# Patient Record
Sex: FEMALE | Race: BLACK OR AFRICAN AMERICAN | Employment: UNEMPLOYED | URBAN - METROPOLITAN AREA
[De-identification: names, ages, dates, MRNs, and addresses within clinical notes are randomized per-mention and may not be internally consistent; named-entity substitution may affect disease eponyms.]

---

## 2017-01-11 ENCOUNTER — NURSE NAVIGATOR (OUTPATIENT)
Dept: FAMILY MEDICINE CLINIC | Age: 42
End: 2017-01-11

## 2017-02-22 ENCOUNTER — OFFICE VISIT (OUTPATIENT)
Dept: FAMILY MEDICINE CLINIC | Age: 42
End: 2017-02-22

## 2017-02-22 VITALS
HEART RATE: 74 BPM | HEIGHT: 63 IN | DIASTOLIC BLOOD PRESSURE: 61 MMHG | SYSTOLIC BLOOD PRESSURE: 129 MMHG | RESPIRATION RATE: 12 BRPM | TEMPERATURE: 98.1 F | BODY MASS INDEX: 50.82 KG/M2 | WEIGHT: 286.8 LBS | OXYGEN SATURATION: 100 %

## 2017-02-22 DIAGNOSIS — K21.9 GASTROESOPHAGEAL REFLUX DISEASE WITHOUT ESOPHAGITIS: ICD-10-CM

## 2017-02-22 DIAGNOSIS — R10.11 RUQ ABDOMINAL PAIN: ICD-10-CM

## 2017-02-22 DIAGNOSIS — E66.01 OBESITY, MORBID, BMI 50 OR HIGHER (HCC): ICD-10-CM

## 2017-02-22 DIAGNOSIS — M54.6 CHRONIC RIGHT-SIDED THORACIC BACK PAIN: ICD-10-CM

## 2017-02-22 DIAGNOSIS — G89.29 CHRONIC RIGHT-SIDED THORACIC BACK PAIN: ICD-10-CM

## 2017-02-22 DIAGNOSIS — R07.9 CHEST PAIN, UNSPECIFIED TYPE: Primary | ICD-10-CM

## 2017-02-22 RX ORDER — OMEPRAZOLE 40 MG/1
40 CAPSULE, DELAYED RELEASE ORAL DAILY
Qty: 30 CAP | Refills: 5 | Status: SHIPPED | OUTPATIENT
Start: 2017-02-22 | End: 2018-05-02 | Stop reason: ALTCHOICE

## 2017-02-22 RX ORDER — PREGABALIN 100 MG/1
100 CAPSULE ORAL 2 TIMES DAILY
Qty: 60 CAP | Refills: 5 | Status: SHIPPED | OUTPATIENT
Start: 2017-02-22 | End: 2017-06-14 | Stop reason: DRUGHIGH

## 2017-02-22 RX ORDER — GABAPENTIN 300 MG/1
CAPSULE ORAL
COMMUNITY
Start: 2016-12-11 | End: 2017-02-22 | Stop reason: ALTCHOICE

## 2017-02-22 RX ORDER — METFORMIN HYDROCHLORIDE 500 MG/1
500 TABLET, EXTENDED RELEASE ORAL 2 TIMES DAILY WITH MEALS
Qty: 60 TAB | Refills: 5 | Status: SHIPPED | OUTPATIENT
Start: 2017-02-22 | End: 2017-09-11 | Stop reason: SDUPTHER

## 2017-02-22 NOTE — PROGRESS NOTES
HISTORY OF PRESENT ILLNESS  Belen Thakur is a 35 y.o. female. HPI  Chest Wall Pain  Patient presents for follow up of severe back and chest wall pain. Symptoms began 3 months ago and have remained unchanged. Pain describes pain as severe 10/10, stabbing of thoracic back radiating into lateral chest wall and RUQ abdomen. Patient reports associated pleurisy pain with pressure and dyspnea. Patient reports new onset of left arm radiculopathy. She denies injury. She has been seen in the ED on 11/27, 12/6 and 12/11. She has had CTA chest, CT abd/pelvis, Thoracic CT, Cervical CT, CXR, abdominal US all without contributing findings. Has been treated with Naproxen, Zithromax, Flexeril, Medrol, Gabapentin, Oxycodone and Diluadid without improvement of pain. Previously labs have shown elevated CRP at 21.7 and WBC at 13.1. Protein levels remain elevated. MRI of thoracic spine negative for discitis but showed severe DDD of thoracic and cervical spine. Patient referred to orthopedics and they recommended a gallbladder function study. MRI Results (most recent):    Results from [place] encounter on 12/20/16   MRI [facility] SPINE W WO CONT   Narrative INDICATION: Thoracic back pain. Elevated CRP. Fever-rule out discitis. EXAM: Sagittal and axial and coronal images were obtained through the thoracic  spine in varying sequences. Intravenous contrast was administered: 10 cc cc Magnevist.    FINDINGS:    Comparison exam:  None are available. Sagittal images demonstrate significant multilevel degenerative disc disease  involving the cervical spine with probably some spinal stenosis at the C5-C6 and  C6-C7 levels. There is normal thoracic spinal alignment but severe multilevel  degenerative disc disease. STIR sequences demonstrate    The thoracic spinal cord    Axial images demonstrate demonstrate the presence of a moderate disc bulge at  T1-T2 with mild central canal narrowing.      There is a tiny central and left-sided protrusion at T8-T9. A moderate central and left-sided protrusion is present at T9-T10. This appears  to abut the thoracic cord at this level. A mild to moderate disc bulge is present at T10-T11. A mild disc bulge with a superimposed tiny right foraminal protrusion is present  at T12-L1. Postcontrast images demonstrate no specific enhancement of disc or vertebra per  se. There is questionable slight enhancement of the superior aspect of T8-not  definite finding. Impression IMPRESSION:    Severe multilevel degenerative disc disease involving the visualized cervical  spine with some evidence suggesting spinal stenosis at C5-C6 and C6-C7 levels. Cervical spine MRI may be appropriate if indicated clinically. Severe multilevel degenerative disc disease involving the thoracic spine. Multilevel tiny protrusions and disc bulges most significantly at T9-T10 with a  moderate central and left-sided protrusion appearing to abut the thoracic cord. No definite evidence for discitis. Question slight enhancement along what appears to represent the superior aspect  of the T8 vertebra-not a definite finding. Follow-up examination at an interval may be reasonable if indicated clinically. Review of Systems   Constitutional: Negative for chills, malaise/fatigue and weight loss. Respiratory: Negative for shortness of breath. Cardiovascular: Positive for chest pain. Negative for palpitations and leg swelling. Gastrointestinal: Positive for abdominal pain (RUQ) and heartburn. Negative for constipation and diarrhea. Musculoskeletal: Positive for back pain. Negative for joint pain, myalgias and neck pain. Neurological: Negative for dizziness, weakness and headaches. Psychiatric/Behavioral: Negative for depression. Physical Exam   Constitutional: She is oriented to person, place, and time. She appears well-developed and well-nourished. No distress.    Neck: Normal range of motion. Neck supple. No JVD present. Carotid bruit is not present. No thyromegaly present. Cardiovascular: Normal rate, regular rhythm and intact distal pulses. Exam reveals no gallop and no friction rub. No murmur heard. Pulmonary/Chest: Effort normal and breath sounds normal. No respiratory distress. Musculoskeletal: She exhibits no edema. Thoracic back: She exhibits decreased range of motion, tenderness and bony tenderness (T7-T10 spinal processes). She exhibits no swelling and no spasm. Lymphadenopathy:     She has no cervical adenopathy. Neurological: She is alert and oriented to person, place, and time. Psychiatric: She has a normal mood and affect. Her behavior is normal.   Nursing note and vitals reviewed. ASSESSMENT and PLAN  Haleigh Holliday was seen today for chest pain. Diagnoses and all orders for this visit:    Chest pain, unspecified type  Atypical symptoms and no acute changes on EKG, but given risk factors will request cardiology evaluation. Go to ER for new or worsening symptoms  -     AMB POC EKG ROUTINE W/ 12 LEADS, INTER & REP  -     REFERRAL TO CARDIOLOGY    Chronic right-sided thoracic back pain  Failure with multiple medications including Gabapentin. Begin Lyrica. -     pregabalin (LYRICA) 100 mg capsule; Take 1 Cap by mouth two (2) times a day. Max Daily Amount: 200 mg.    RUQ abdominal pain  R/o biliary dysfunction. -     NM HEPATOBILIARY DUCT SCAN; Future    Obesity, morbid, BMI 50 or higher ([address]  Discussed candidly with patient that the majority of her problems have arisen from her obesity. I recommend she pursue bariatric surgery. I will write a letter of medical necessity as needed. -     REFERRAL TO BARIATRIC SURGERY    Uncontrolled type 2 diabetes mellitus without complication, without long-term current use of insulin ([address]  Unable to take Janumet due to pill size. Begin Metformin XR.   -     metFORMIN ER (GLUCOPHAGE XR) 500 mg tablet;  Take 1 Tab by mouth two (2) times daily (with meals). Gastroesophageal reflux disease without esophagitis  -     Refill omeprazole (PRILOSEC) 40 mg capsule; Take 1 Cap by mouth daily. For GERD      I have discussed the diagnosis with the patient and the intended plan as seen in the above orders. The patient has received an after-visit summary along with patient information handout. I have discussed medication side effects and warnings with the patient as well. Follow-up Disposition:  Return in about 4 weeks (around 3/22/2017) for diabetes.

## 2017-02-22 NOTE — PATIENT INSTRUCTIONS
Back Pain: Care Instructions  Your Care Instructions    Back pain has many possible causes. It is often related to problems with muscles and ligaments of the back. It may also be related to problems with the nerves, discs, or bones of the back. Moving, lifting, standing, sitting, or sleeping in an awkward way can strain the back. Sometimes you don't notice the injury until later. Arthritis is another common cause of back pain. Although it may hurt a lot, back pain usually improves on its own within several weeks. Most people recover in 12 weeks or less. Using good home treatment and being careful not to stress your back can help you feel better sooner. Follow-up care is a key part of your treatment and safety. Be sure to make and go to all appointments, and call your doctor if you are having problems. Its also a good idea to know your test results and keep a list of the medicines you take. How can you care for yourself at home? · Sit or lie in positions that are most comfortable and reduce your pain. Try one of these positions when you lie down:  ¨ Lie on your back with your knees bent and supported by large pillows. ¨ Lie on the floor with your legs on the seat of a sofa or chair. Josephus Phlegm on your side with your knees and hips bent and a pillow between your legs. ¨ Lie on your stomach if it does not make pain worse. · Do not sit up in bed, and avoid soft couches and twisted positions. Bed rest can help relieve pain at first, but it delays healing. Avoid bed rest after the first day of back pain. · Change positions every 30 minutes. If you must sit for long periods of time, take breaks from sitting. Get up and walk around, or lie in a comfortable position. · Try using a heating pad on a low or medium setting for 15 to 20 minutes every 2 or 3 hours. Try a warm shower in place of one session with the heating pad. · You can also try an ice pack for 10 to 15 minutes every 2 to 3 hours.  Put a thin cloth between the ice pack and your skin. · Take pain medicines exactly as directed. ¨ If the doctor gave you a prescription medicine for pain, take it as prescribed. ¨ If you are not taking a prescription pain medicine, ask your doctor if you can take an over-the-counter medicine. · Take short walks several times a day. You can start with 5 to 10 minutes, 3 or 4 times a day, and work up to longer walks. Walk on level surfaces and avoid hills and stairs until your back is better. · Return to work and other activities as soon as you can. Continued rest without activity is usually not good for your back. · To prevent future back pain, do exercises to stretch and strengthen your back and stomach. Learn how to use good posture, safe lifting techniques, and proper body mechanics. When should you call for help? Call your doctor now or seek immediate medical care if:  · You have new or worsening numbness in your legs. · You have new or worsening weakness in your legs. (This could make it hard to stand up.)  · You lose control of your bladder or bowels. Watch closely for changes in your health, and be sure to contact your doctor if:  · Your pain gets worse. · You are not getting better after 2 weeks. Where can you learn more? Go to http://camelia-konrad.info/. Enter P053 in the search box to learn more about \"Back Pain: Care Instructions. \"  Current as of: May 23, 2016  Content Version: 11.1  © 9813-8188 [employer]. Care instructions adapted under license by [employer] (which disclaims liability or warranty for this information). If you have questions about a medical condition or this instruction, always ask your healthcare professional. [address] any warranty or liability for your use of this information.

## 2017-02-22 NOTE — PROGRESS NOTES
1. Have you been to the ER, urgent care clinic since your last visit? Hospitalized since your last visit? No    2. Have you seen or consulted any other health care providers outside of the [address] [place] [address] since your last visit? Include any pap smears or colon screening.  Dr. Rashmi Butler- gastro 1/2017, ortho- Dr. Marjan Bhatti- 1/2017, Dr. Marcia Landeros- for a back injection- was told she needed a gall bladder function test    Chief Complaint   Patient presents with    Chest Pain

## 2017-02-22 NOTE — MR AVS SNAPSHOT
Visit Information Date & Time Provider Department Dept. Phone Encounter #  
 2/22/2017  7:45 AM Marysol Arzola  [facility] [phone] [id number] Follow-up Instructions Return in about 4 weeks (around 3/22/2017) for diabetes. Upcoming Health Maintenance Date Due DTaP/Tdap/Td series (1 - Tdap) 3/2/1994 LIPID PANEL Q1 5/23/2015 EYE EXAM RETINAL OR DILATED Q1 10/14/2015 PAP AKA CERVICAL CYTOLOGY 6/13/2017 HEMOGLOBIN A1C Q6M 6/2/2017 FOOT EXAM Q1 8/17/2017 MICROALBUMIN Q1 8/17/2017 Pneumococcal 19-64 Highest Risk (3 of 3 - PPSV23) 9/29/2021 Allergies as of 2/22/2017  Review Complete On: 2/22/2017 By: Marysol Arzola NP Severity Noted Reaction Type Reactions Pcn [Penicillins]  05/13/2010    Hives Current Immunizations  Reviewed on 10/3/2016 Name Date Influenza Vaccine 9/29/2016, 10/5/2015, 10/21/2014 Influenza Vaccine Split 1/1/2011 Pneumococcal Vaccine (Unspecified Type) 9/29/2016, 3/31/2011 Not reviewed this visit You Were Diagnosed With   
  
 Codes Comments Chest pain, unspecified type    -  Primary ICD-10-CM: R07.9 ICD-9-CM: 786.50 Chronic right-sided thoracic back pain     ICD-10-CM: M54.6, G89.29 ICD-9-CM: 724.1, 338.29   
 RUQ abdominal pain     ICD-10-CM: R10.11 ICD-9-CM: 789.01 Obesity, morbid, BMI 50 or higher (HCC)     ICD-10-CM: E66.01 
ICD-9-CM: 278.01 Uncontrolled type 2 diabetes mellitus without complication, without long-term current use of insulin ([facility]     ICD-10-CM: E11.65 ICD-9-CM: 250.02 Gastroesophageal reflux disease without esophagitis     ICD-10-CM: K21.9 ICD-9-CM: 530.81 Vitals BP  
  
  
  
  
  
 129/61 (BP 1 Location: Right arm, BP Patient Position: Sitting) BMI and BSA Data Body Mass Index Body Surface Area 50.8 kg/m 2 2.4 m 2 Preferred Pharmacy Pharmacy Name Phone [facility] [address] [place], [place] - [address] [place] [address] Vilma Torres [phone] Your Updated Medication List  
  
   
This list is accurate as of: 2/22/17  9:26 AM.  Always use your most recent med list.  
  
  
  
  
 ADVAIR DISKUS 250-50 mcg/dose diskus inhaler Generic drug:  fluticasone-salmeterol TAKE ONE PUFF BY INHALATION 2 TIMES A DAY FOR ASTHMA PREVENTION  
  
 * albuterol 90 mcg/actuation inhaler Commonly known as:  PROVENTIL HFA, VENTOLIN HFA, PROAIR HFA Take 1-2 Puffs by inhalation every four (4) hours as needed for Wheezing. * albuterol 2.5 mg /3 mL (0.083 %) nebulizer solution Commonly known as:  PROVENTIL VENTOLIN  
3 mL by Nebulization route every four (4) hours as needed for Wheezing. cyclobenzaprine 10 mg tablet Commonly known as:  FLEXERIL Take 0.5-1 Tabs by mouth three (3) times daily as needed for Muscle Spasm(s). HYDROmorphone 2 mg tablet Commonly known as:  DILAUDID Take 1 Tab by mouth every four (4) hours as needed for Pain. Max Daily Amount: 12 mg.  
  
 lisinopril-hydroCHLOROthiazide 20-25 mg per tablet Commonly known as:  PRINZIDE, ZESTORETIC  
TAKE ONE TABLET BY MOUTH ONCE DAILY FOR BLOOD PRESSURE  
  
 meclizine 25 mg tablet Commonly known as:  ANTIVERT Take 1 Tab by mouth three (3) times daily as needed. metFORMIN  mg tablet Commonly known as:  GLUCOPHAGE XR Take 1 Tab by mouth two (2) times daily (with meals). montelukast 10 mg tablet Commonly known as:  SINGULAIR  
TAKE ONE TABLET BY MOUTH ONCE DAILY FOR ALLERGIES AND ASTHMA  
  
 omeprazole 40 mg capsule Commonly known as:  PRILOSEC Take 1 Cap by mouth daily. For GERD  
  
 ondansetron 4 mg disintegrating tablet Commonly known as:  ZOFRAN ODT Take 1 Tab by mouth every eight (8) hours as needed for Nausea. polyethylene glycol 17 gram/dose powder Commonly known as:  Loving Clos Take 17 g by mouth daily as needed. pregabalin 100 mg capsule Commonly known as:  Alcus Ed Take 1 Cap by mouth two (2) times a day. Max Daily Amount: 200 mg.  
  
 TYLENOL ARTHRITIS PAIN 650 mg CR tablet Generic drug:  acetaminophen Take 650 mg by mouth every six (6) hours as needed for Pain. * Notice: This list has 2 medication(s) that are the same as other medications prescribed for you. Read the directions carefully, and ask your doctor or other care provider to review them with you. Prescriptions Printed Refills  
 pregabalin (LYRICA) 100 mg capsule 5 Sig: Take 1 Cap by mouth two (2) times a day. Max Daily Amount: 200 mg. Class: Print Route: Oral  
  
Prescriptions Sent to Pharmacy Refills  
 metFORMIN ER (GLUCOPHAGE XR) 500 mg tablet 5 Sig: Take 1 Tab by mouth two (2) times daily (with meals). Class: Normal  
 Pharmacy: [address] Ph #: [phone] Route: Oral  
 omeprazole (PRILOSEC) 40 mg capsule 5 Sig: Take 1 Cap by mouth daily. For GERD Class: Normal  
 Pharmacy: [address] Ph #: [phone] Route: Oral  
  
We Performed the Following AMB POC EKG ROUTINE W/ 12 LEADS, INTER & REP [66834 CPT(R)] REFERRAL TO BARIATRIC SURGERY [AYJ278 Custom] REFERRAL TO CARDIOLOGY [HIG07 Custom] Comments:  
 Please evaluate patient for chest pain. Follow-up Instructions Return in about 4 weeks (around 3/22/2017) for diabetes. To-Do List   
 02/24/2017 Imaging:  [facility] HEPATOBILIARY DUCT SCAN Referral Information Referral ID Referred By Referred To  
  
 [id number] Kike Mccray Not Available Visits Status Start Date End Date 1 New Request 2/22/17 2/22/18 If your referral has a status of pending review or denied, additional information will be sent to support the outcome of this decision. Referral ID Referred By Referred To [id number] Isaac Hidalgo MD  
   [address] [facility] [address] Michele Lacey Phone: [phone] Fax: [phone] Visits Status Start Date End Date 1 New Request 2/22/17 2/22/18 If your referral has a status of pending review or denied, additional information will be sent to support the outcome of this decision. Referral ID Referred By Referred To  
 [id number] En Suarez MD  
   [address], [address] [facility] Phone: [phone] Fax: [phone] Visits Status Start Date End Date 1 New Request 2/22/17 2/22/18 If your referral has a status of pending review or denied, additional information will be sent to support the outcome of this decision. Patient Instructions Back Pain: Care Instructions Your Care Instructions Back pain has many possible causes. It is often related to problems with muscles and ligaments of the back. It may also be related to problems with the nerves, discs, or bones of the back. Moving, lifting, standing, sitting, or sleeping in an awkward way can strain the back. Sometimes you don't notice the injury until later. Arthritis is another common cause of back pain. Although it may hurt a lot, back pain usually improves on its own within several weeks. Most people recover in 12 weeks or less. Using good home treatment and being careful not to stress your back can help you feel better sooner. Follow-up care is a key part of your treatment and safety. Be sure to make and go to all appointments, and call your doctor if you are having problems. Its also a good idea to know your test results and keep a list of the medicines you take. How can you care for yourself at home? · Sit or lie in positions that are most comfortable and reduce your pain. Try one of these positions when you lie down: ¨ Lie on your back with your knees bent and supported by large pillows. ¨ Lie on the floor with your legs on the seat of a sofa or chair. Clevester Sanes on your side with your knees and hips bent and a pillow between your legs. ¨ Lie on your stomach if it does not make pain worse. · Do not sit up in bed, and avoid soft couches and twisted positions. Bed rest can help relieve pain at first, but it delays healing. Avoid bed rest after the first day of back pain. · Change positions every 30 minutes. If you must sit for long periods of time, take breaks from sitting. Get up and walk around, or lie in a comfortable position. · Try using a heating pad on a low or medium setting for 15 to 20 minutes every 2 or 3 hours. Try a warm shower in place of one session with the heating pad. · You can also try an ice pack for 10 to 15 minutes every 2 to 3 hours. Put a thin cloth between the ice pack and your skin. · Take pain medicines exactly as directed. ¨ If the doctor gave you a prescription medicine for pain, take it as prescribed. ¨ If you are not taking a prescription pain medicine, ask your doctor if you can take an over-the-counter medicine. · Take short walks several times a day. You can start with 5 to 10 minutes, 3 or 4 times a day, and work up to longer walks. Walk on level surfaces and avoid hills and stairs until your back is better. · Return to work and other activities as soon as you can. Continued rest without activity is usually not good for your back. · To prevent future back pain, do exercises to stretch and strengthen your back and stomach. Learn how to use good posture, safe lifting techniques, and proper body mechanics. When should you call for help? Call your doctor now or seek immediate medical care if: 
· You have new or worsening numbness in your legs. · You have new or worsening weakness in your legs. (This could make it hard to stand up.) · You lose control of your bladder or bowels. Watch closely for changes in your health, and be sure to contact your doctor if: · Your pain gets worse. · You are not getting better after 2 weeks. Where can you learn more? Go to http://camelia-konrad.info/. Enter F263 in the search box to learn more about \"Back Pain: Care Instructions. \" Current as of: May 23, 2016 Content Version: 11.1 © 7993-2972 [employer]. Care instructions adapted under license by [employer] (which disclaims liability or warranty for this information). If you have questions about a medical condition or this instruction, always ask your healthcare professional. [address] any warranty or liability for your use of this information. Please provide this summary of care documentation to your next provider. Your primary care clinician is listed as [facility]. If you have any questions after today's visit, please call [phone].

## 2017-02-24 ENCOUNTER — TELEPHONE (OUTPATIENT)
Dept: FAMILY MEDICINE CLINIC | Age: 42
End: 2017-02-24

## 2017-02-24 NOTE — TELEPHONE ENCOUNTER
Zuhair Terry  [phone]    Patient is requesting a pre-authorization for Lyrica.   She states that her pharmacy had previously sent this request.

## 2017-02-27 NOTE — TELEPHONE ENCOUNTER
----- Message from Elvira Davies sent at 2/24/2017  9:41 PM EST -----  Regarding: DIAMANTE Sandoval / Telephone  Contact: [phone]  [address] [facility], [phone]. Pt states she called a couple days ago and her insurance is waiting on the preauthorization for Lyrica from the practice.

## 2017-02-28 NOTE — TELEPHONE ENCOUNTER
Lyrica approved for coverage starting 2/28/2018 - 2/28/2018, case ID [id number]. Patient notified and will notified her pharmacy.

## 2017-03-07 ENCOUNTER — HOSPITAL ENCOUNTER (OUTPATIENT)
Dept: NUCLEAR MEDICINE | Age: 42
Discharge: HOME | End: 2017-03-07
Payer: COMMERCIAL

## 2017-03-07 VITALS — WEIGHT: 286 LBS | BODY MASS INDEX: 50.66 KG/M2

## 2017-03-07 DIAGNOSIS — R10.11 RUQ ABDOMINAL PAIN: ICD-10-CM

## 2017-03-07 PROCEDURE — 78227 HEPATOBIL SYST IMAGE W/DRUG: CPT

## 2017-03-07 PROCEDURE — 74011250636 HC RX REV CODE- 250/636

## 2017-03-07 PROCEDURE — 78226 HEPATOBILIARY SYSTEM IMAGING: CPT

## 2017-03-07 RX ADMIN — SINCALIDE 2.59 MCG: 5 INJECTION, POWDER, LYOPHILIZED, FOR SOLUTION INTRAVENOUS at 10:46

## 2017-03-07 NOTE — PROGRESS NOTES
[phone] ([place]) attempted to call patient no answer left message to call us back in regards to her abdominal scan

## 2017-03-17 NOTE — PROGRESS NOTES
Patient called back verified  Patient notified of above note and voiced understanding of what was read.

## 2017-06-14 ENCOUNTER — TELEPHONE (OUTPATIENT)
Dept: FAMILY MEDICINE CLINIC | Age: 42
End: 2017-06-14

## 2017-06-14 ENCOUNTER — OFFICE VISIT (OUTPATIENT)
Dept: FAMILY MEDICINE CLINIC | Age: 42
End: 2017-06-14

## 2017-06-14 VITALS
TEMPERATURE: 98.9 F | HEIGHT: 63 IN | HEART RATE: 74 BPM | WEIGHT: 286.6 LBS | DIASTOLIC BLOOD PRESSURE: 47 MMHG | RESPIRATION RATE: 16 BRPM | OXYGEN SATURATION: 99 % | BODY MASS INDEX: 50.78 KG/M2 | SYSTOLIC BLOOD PRESSURE: 94 MMHG

## 2017-06-14 DIAGNOSIS — E66.01 OBESITY, MORBID, BMI 50 OR HIGHER (HCC): ICD-10-CM

## 2017-06-14 DIAGNOSIS — I10 ESSENTIAL HYPERTENSION: ICD-10-CM

## 2017-06-14 DIAGNOSIS — G89.29 CHRONIC RIGHT-SIDED THORACIC BACK PAIN: Primary | ICD-10-CM

## 2017-06-14 DIAGNOSIS — M54.6 CHRONIC RIGHT-SIDED THORACIC BACK PAIN: Primary | ICD-10-CM

## 2017-06-14 LAB — HBA1C MFR BLD HPLC: 7.6 %

## 2017-06-14 RX ORDER — LISINOPRIL AND HYDROCHLOROTHIAZIDE 10; 12.5 MG/1; MG/1
1 TABLET ORAL DAILY
Qty: 30 TAB | Refills: 5 | Status: SHIPPED | OUTPATIENT
Start: 2017-06-14 | End: 2018-03-03 | Stop reason: SDUPTHER

## 2017-06-14 RX ORDER — PREGABALIN 150 MG/1
150 CAPSULE ORAL 2 TIMES DAILY
Qty: 60 CAP | Refills: 5 | Status: SHIPPED | OUTPATIENT
Start: 2017-06-14 | End: 2017-12-15 | Stop reason: SDUPTHER

## 2017-06-14 NOTE — PROGRESS NOTES
Chief Complaint   Patient presents with    Back Pain     lower right side of back, 7/10.  Pt states that the pain is worse at night time     \"REVIEWED RECORD IN PREPARATION FOR VISIT AND HAVE OBTAINED THE NECESSARY DOCUMENTATION\"

## 2017-06-14 NOTE — PATIENT INSTRUCTIONS
Learning About How to Have a Healthy Back  What causes back pain? Back pain is often caused by overuse, strain, or injury. For example, people often hurt their backs playing sports or working in the yard, being jolted in a car accident, or lifting something too heavy. Aging plays a part too. Your bones and muscles tend to lose strength as you age, which makes injury more likely. The spongy discs between the bones of the spine (vertebrae) may suffer from wear and tear and no longer provide enough cushion between the bones. A disc that bulges or breaks open (herniated disc) can press on nerves, causing back pain. In some people, back pain is the result of arthritis, broken vertebrae caused by bone loss (osteoporosis), illness, or a spine problem. Although most people have back pain at one time or another, there are steps you can take to make it less likely. How can you have a healthy back? Reduce stress on your back through good posture  Slumping or slouching alone may not cause low back pain. But after the back has been strained or injured, bad posture can make pain worse. · Sleep in a position that maintains your back's normal curves and on a mattress that feels comfortable. Sleep on your side with a pillow between your knees, or sleep on your back with a pillow under your knees. These positions can reduce strain on your back. · Stand and sit up straight. \"Good posture\" generally means your ears, shoulders, and hips are in a straight line. · If you must stand for a long time, put one foot on a stool, ledge, or box. Switch feet every now and then. · Sit in a chair that is low enough to let you place both feet flat on the floor with both knees nearly level with your hips. If your chair or desk is too high, use a footrest to raise your knees. Place a small pillow, a rolled-up towel, or a lumbar roll in the curve of your back if you need extra support.   · Try a kneeling chair, which helps tilt your hips forward. This takes pressure off your lower back. · Try sitting on an exercise ball. It can rock from side to side, which helps keep your back loose. · When driving, keep your knees nearly level with your hips. Sit straight, and drive with both hands on the steering wheel. Your arms should be in a slightly bent position. Reduce stress on your back through careful lifting  · Squat down, bending at the hips and knees only. If you need to, put one knee to the floor and extend your other knee in front of you, bent at a right angle (half kneeling). · Press your chest straight forward. This helps keep your upper back straight while keeping a slight arch in your low back. · Hold the load as close to your body as possible, at the level of your belly button (navel). · Use your feet to change direction, taking small steps. · Lead with your hips as you change direction. Keep your shoulders in line with your hips as you move. · Set down your load carefully, squatting with your knees and hips only. Exercise and stretch your back  · Do some exercise on most days of the week, if your doctor says it is okay. You can walk, run, swim, or cycle. · Stretch your back muscles. Here are a few exercises to try:  [place] on your back, and gently pull one bent knee to your chest. Put that foot back on the floor, and then pull the other knee to your chest.  ¨ Do pelvic tilts. Lie on your back with your knees bent. Tighten your stomach muscles. Pull your belly button (navel) in and up toward your ribs. You should feel like your back is pressing to the floor and your hips and pelvis are slightly lifting off the floor. Hold for 6 seconds while breathing smoothly. ¨ Sit with your back flat against a wall. · Keep your core muscles strong. The muscles of your back, belly (abdomen), and buttocks support your spine. ¨ Pull in your belly and imagine pulling your navel toward your spine. Hold this for 6 seconds, then relax.  Remember to keep breathing normally as you tense your muscles. ¨ Do curl-ups. Always do them with your knees bent. Keep your low back on the floor, and curl your shoulders toward your knees using a smooth, slow motion. Keep your arms folded across your chest. If this bothers your neck, try putting your hands behind your neck (not your head), with your elbows spread apart. ¨ Lie on your back with your knees bent and your feet flat on the floor. Tighten your belly muscles, and then push with your feet and raise your buttocks up a few inches. Hold this position 6 seconds as you continue to breathe normally, then lower yourself slowly to the floor. Repeat 8 to 12 times. ¨ If you like group exercise, try Pilates or yoga. These classes have poses that strengthen the core muscles. Lead a healthy lifestyle  · Stay at a healthy weight to avoid strain on your back. · Do not smoke. Smoking increases the risk of osteoporosis, which weakens the spine. If you need help quitting, talk to your doctor about stop-smoking programs and medicines. These can increase your chances of quitting for good. Where can you learn more? Go to http://camelia-konrad.info/. Enter L315 in the search box to learn more about \"Learning About How to Have a Healthy Back. \"  Current as of: May 23, 2016  Content Version: 11.2  © 8449-2687 [employer]. Care instructions adapted under license by [employer] (which disclaims liability or warranty for this information). If you have questions about a medical condition or this instruction, always ask your healthcare professional. [place] [address] any warranty or liability for your use of this information.

## 2017-06-14 NOTE — TELEPHONE ENCOUNTER
Patient has an appointment on 6/26/17 but is requesting to be seen today for back pain. Is there any availability?

## 2017-06-14 NOTE — PROGRESS NOTES
Vernette Buerger is a 38 y.o. female who was seen in clinic today (6/16/2017). Subjective:  Cardiovascular Review:  She has diabetes, hypertension, hyperlipidemia and obesity. Diet and Lifestyle: not attempting to follow a low fat, low cholesterol diet, not attempting to follow a low sodium diet, sedentary, nonsmoker  Home BP Monitoring: is not measured at home. Pertinent ROS: Reports she is taking medications regularly as instructed. No TIA's, no chest pain on exertion, no dyspnea on exertion, no swelling of ankles. Chest Wall Pain  Patient presents for follow up of thoracic back and chest wall pain. Symptoms began several months ago and have improved. Pain describes pain as moderate 7/10, stabbing of thoracic back radiating into lateral chest wall and RUQ abdomen. Patient reports associated pleurisy pain with pressure and dyspnea. She denies injury. She has had CTA chest, CT abd/pelvis, Thoracic CT, Cervical CT, CXR, abdominal US all without contributing findings. Has been treated with Naproxen, Zithromax, Flexeril, Medrol, Gabapentin, Oxycodone and Diluadid without improvement of pain. Taking Lyrica with improvement of symptoms. MRI of thoracic spine showed severe DDD of thoracic and cervical spine. Prior to Admission medications    Medication Sig Start Date End Date Taking? Authorizing Provider   pregabalin (LYRICA) 150 mg capsule Take 1 Cap by mouth two (2) times a day. Max Daily Amount: 300 mg. 6/14/17  Yes Namrata Patricia NP   lisinopril-hydroCHLOROthiazide (PRINZIDE, ZESTORETIC) 10-12.5 mg per tablet Take 1 Tab by mouth daily. For blood pressure 6/14/17  Yes Namrata Patricia NP   montelukast (SINGULAIR) 10 mg tablet TAKE ONE TABLET BY MOUTH ONCE DAILY FOR  ALLERGIES  AND  ASTHMA 2/28/17  Yes Namrata Patricia NP   metFORMIN ER (GLUCOPHAGE XR) 500 mg tablet Take 1 Tab by mouth two (2) times daily (with meals).  2/22/17  Yes Namrata Patricia NP   omeprazole (PRILOSEC) 40 mg capsule Take 1 Cap by mouth daily. For GERD 2/22/17  Yes Lexis Coe NP   albuterol (PROVENTIL VENTOLIN) 2.5 mg /3 mL (0.083 %) nebulizer solution 3 mL by Nebulization route every four (4) hours as needed for Wheezing. 1/2/17  Yes Lexis Coe NP   albuterol (PROVENTIL HFA, VENTOLIN HFA, PROAIR HFA) 90 mcg/actuation inhaler Take 1-2 Puffs by inhalation every four (4) hours as needed for Wheezing. 8/17/16  Yes Lexis Coe NP   meclizine (ANTIVERT) 25 mg tablet Take 1 Tab by mouth three (3) times daily as needed. 8/17/16  Yes Lexis Coe NP   ADVAIR DISKUS 250-50 mcg/dose diskus inhaler TAKE ONE PUFF BY INHALATION 2 TIMES A DAY FOR ASTHMA PREVENTION 2/18/16  Yes Lexis Coe NP   acetaminophen (TYLENOL ARTHRITIS PAIN) 650 mg CR tablet Take 650 mg by mouth every six (6) hours as needed for Pain. Historical Provider   HYDROmorphone (DILAUDID) 2 mg tablet Take 1 Tab by mouth every four (4) hours as needed for Pain. Max Daily Amount: 12 mg. 12/6/16   Rafael Omalley NP   ondansetron (ZOFRAN ODT) 4 mg disintegrating tablet Take 1 Tab by mouth every eight (8) hours as needed for Nausea. 12/6/16   Rafaelchirag Omalley NP   polyethylene glycol (MIRALAX) 17 gram/dose powder Take 17 g by mouth daily as needed. 12/17/15   Historical Provider   cyclobenzaprine (FLEXERIL) 10 mg tablet Take 0.5-1 Tabs by mouth three (3) times daily as needed for Muscle Spasm(s). 12/21/15   Lexis Coe NP          Allergies   Allergen Reactions    Pcn [Penicillins] Hives        ROS  See HPI    Objective:   Physical Exam   Constitutional: She is oriented to person, place, and time. She appears well-developed and well-nourished. No distress. Neck: Normal range of motion. Neck supple. No JVD present. Carotid bruit is not present. No thyromegaly present. Cardiovascular: Normal rate, regular rhythm and intact distal pulses. Exam reveals no gallop and no friction rub. No murmur heard.   Pulmonary/Chest: Effort normal and breath sounds normal. No respiratory distress. Musculoskeletal: She exhibits no edema. Thoracic back: She exhibits decreased range of motion, tenderness and bony tenderness (T7-T10 spinal processes). She exhibits no swelling and no spasm. Lymphadenopathy:     She has no cervical adenopathy. Neurological: She is alert and oriented to person, place, and time. Psychiatric: She has a normal mood and affect. Her behavior is normal.   Nursing note and vitals reviewed. Visit Vitals    BP 94/47 (BP 1 Location: Right arm, BP Patient Position: Sitting)    Pulse 74    Temp 98.9 °F (37.2 °C) (Oral)    Resp 16    Ht 5' 3\" (1.6 m)    Wt 286 lb 9.6 oz (130 kg)    SpO2 99%    BMI 50.77 kg/m2       Assessment & Plan:  Nata Maria was seen today for back pain. Diagnoses and all orders for this visit:    Chronic right-sided thoracic back pain  Will increase Lyrica. Referral to pain management if symptoms persist.   -     pregabalin (LYRICA) 150 mg capsule; Take 1 Cap by mouth two (2) times a day. Max Daily Amount: 300 mg. Uncontrolled type 2 diabetes mellitus without complication, without long-term current use of insulin (HCC)  AMB POC HEMOGLOBIN A1C: 7.6%. Reviewed diet and lifestyle changes. Essential hypertension  Hypotensive in clinic. Reduce Lisinopril-HCTZ  -     lisinopril-hydroCHLOROthiazide (PRINZIDE, ZESTORETIC) 10-12.5 mg per tablet; Take 1 Tab by mouth daily. For blood pressure    Obesity, morbid, BMI 50 or higher ([facility] [address] [facility]  Discussed need for weight loss through diet and exercise. Reviewed decreased caloric intake and increased activity. I have discussed the diagnosis with the patient and the intended plan as seen in the above orders. The patient has received an after-visit summary along with patient information handout. I have discussed medication side effects and warnings with the patient as well.     Follow-up Disposition:  Return in about 4 months (around 10/14/2017) for diabetes.         Afshan Vieyra NP

## 2017-06-14 NOTE — MR AVS SNAPSHOT
Visit Information Date & Time Provider Department Dept. Phone Encounter #  
 6/14/2017  7:00 PM Joshua Bhatia  [facility] [address] [phone] [id number] Follow-up Instructions Return in about 4 months (around 10/14/2017) for diabetes. Upcoming Health Maintenance Date Due DTaP/Tdap/Td series (1 - Tdap) 3/2/1994 LIPID PANEL Q1 5/23/2015 EYE EXAM RETINAL OR DILATED Q1 10/14/2015 HEMOGLOBIN A1C Q6M 6/2/2017 PAP AKA CERVICAL CYTOLOGY 6/13/2017 INFLUENZA AGE 9 TO ADULT 8/1/2017 FOOT EXAM Q1 8/17/2017 MICROALBUMIN Q1 8/17/2017 Pneumococcal 19-64 Highest Risk (3 of 3 - PPSV23) 9/29/2021 Allergies as of 6/14/2017  Review Complete On: 2/22/2017 By: Joshua Bhatia NP Severity Noted Reaction Type Reactions Pcn [Penicillins]  05/13/2010    Hives Current Immunizations  Reviewed on 10/3/2016 Name Date Influenza Vaccine 9/29/2016, 10/5/2015, 10/21/2014 Influenza Vaccine Split 1/1/2011 Pneumococcal Vaccine (Unspecified Type) 9/29/2016, 3/31/2011 Not reviewed this visit You Were Diagnosed With   
  
 Codes Comments Chronic right-sided thoracic back pain    -  Primary ICD-10-CM: M54.6, G89.29 ICD-9-CM: 724.1, 338.29 Uncontrolled type 2 diabetes mellitus without complication, without long-term current use of insulin ([facility]     ICD-10-CM: E11.65 ICD-9-CM: 250.02 Essential hypertension     ICD-10-CM: I10 
ICD-9-CM: 401.9 Iron deficiency anemia, unspecified iron deficiency anemia type     ICD-10-CM: D50.9 ICD-9-CM: 280.9 Mixed hyperlipidemia     ICD-10-CM: E78.2 ICD-9-CM: 272.2 Obesity, morbid, BMI 50 or higher (HCC)     ICD-10-CM: E66.01 
ICD-9-CM: 278.01 Vitals BP Pulse Temp Resp Height(growth percentile) Weight(growth percentile) 94/47 (BP 1 Location: Right arm, BP Patient Position: Sitting) 74 98.9 °F (37.2 °C) (Oral) 16 5' 3\" (1.6 m) 286 lb 9.6 oz (130 kg) SpO2 BMI OB Status Smoking Status 99% 50.77 kg/m2 Ablation Never Smoker Vitals History BMI and BSA Data Body Mass Index Body Surface Area 50.77 kg/m 2 2.4 m 2 Preferred Pharmacy Pharmacy Name Phone [facility] [address] [place], [place] - [address] [place] [address] Marysol Maher [phone] Your Updated Medication List  
  
   
This list is accurate as of: 6/14/17  7:52 PM.  Always use your most recent med list.  
  
  
  
  
 Vijay Trace 250-50 mcg/dose diskus inhaler Generic drug:  fluticasone-salmeterol TAKE ONE PUFF BY INHALATION 2 TIMES A DAY FOR ASTHMA PREVENTION  
  
 * albuterol 90 mcg/actuation inhaler Commonly known as:  PROVENTIL HFA, VENTOLIN HFA, PROAIR HFA Take 1-2 Puffs by inhalation every four (4) hours as needed for Wheezing. * albuterol 2.5 mg /3 mL (0.083 %) nebulizer solution Commonly known as:  PROVENTIL VENTOLIN  
3 mL by Nebulization route every four (4) hours as needed for Wheezing. cyclobenzaprine 10 mg tablet Commonly known as:  FLEXERIL Take 0.5-1 Tabs by mouth three (3) times daily as needed for Muscle Spasm(s). HYDROmorphone 2 mg tablet Commonly known as:  DILAUDID Take 1 Tab by mouth every four (4) hours as needed for Pain. Max Daily Amount: 12 mg.  
  
 lisinopril-hydroCHLOROthiazide 20-25 mg per tablet Commonly known as:  PRINZIDE, ZESTORETIC  
TAKE ONE TABLET BY MOUTH ONCE DAILY FOR BLOOD PRESSURE  
  
 meclizine 25 mg tablet Commonly known as:  ANTIVERT Take 1 Tab by mouth three (3) times daily as needed. metFORMIN  mg tablet Commonly known as:  GLUCOPHAGE XR Take 1 Tab by mouth two (2) times daily (with meals). montelukast 10 mg tablet Commonly known as:  SINGULAIR  
TAKE ONE TABLET BY MOUTH ONCE DAILY FOR  ALLERGIES  AND  ASTHMA  
  
 omeprazole 40 mg capsule Commonly known as:  PRILOSEC Take 1 Cap by mouth daily. For GERD  
  
 ondansetron 4 mg disintegrating tablet Commonly known as:  ZOFRAN ODT Take 1 Tab by mouth every eight (8) hours as needed for Nausea. polyethylene glycol 17 gram/dose powder Commonly known as:  Chaneta [place] Take 17 g by mouth daily as needed. pregabalin 150 mg capsule Commonly known as:  Jeannine  Take 1 Cap by mouth two (2) times a day. Max Daily Amount: 300 mg.  
  
 TYLENOL ARTHRITIS PAIN 650 mg CR tablet Generic drug:  acetaminophen Take 650 mg by mouth every six (6) hours as needed for Pain. * Notice: This list has 2 medication(s) that are the same as other medications prescribed for you. Read the directions carefully, and ask your doctor or other care provider to review them with you. Prescriptions Printed Refills  
 pregabalin (LYRICA) 150 mg capsule 5 Sig: Take 1 Cap by mouth two (2) times a day. Max Daily Amount: 300 mg. Class: Print Route: Oral  
  
We Performed the Following AMB POC HEMOGLOBIN A1C [16654 CPT(R)] Follow-up Instructions Return in about 4 months (around 10/14/2017) for diabetes. Patient Instructions Learning About How to Have a Healthy Back What causes back pain? Back pain is often caused by overuse, strain, or injury. For example, people often hurt their backs playing sports or working in the yard, being jolted in a car accident, or lifting something too heavy. Aging plays a part too. Your bones and muscles tend to lose strength as you age, which makes injury more likely. The spongy discs between the bones of the spine (vertebrae) may suffer from wear and tear and no longer provide enough cushion between the bones. A disc that bulges or breaks open (herniated disc) can press on nerves, causing back pain. In some people, back pain is the result of arthritis, broken vertebrae caused by bone loss (osteoporosis), illness, or a spine problem.  
Although most people have back pain at one time or another, there are steps you can take to make it less likely. How can you have a healthy back? Reduce stress on your back through good posture Slumping or slouching alone may not cause low back pain. But after the back has been strained or injured, bad posture can make pain worse. · Sleep in a position that maintains your back's normal curves and on a mattress that feels comfortable. Sleep on your side with a pillow between your knees, or sleep on your back with a pillow under your knees. These positions can reduce strain on your back. · Stand and sit up straight. \"Good posture\" generally means your ears, shoulders, and hips are in a straight line. · If you must stand for a long time, put one foot on a stool, ledge, or box. Switch feet every now and then. · Sit in a chair that is low enough to let you place both feet flat on the floor with both knees nearly level with your hips. If your chair or desk is too high, use a footrest to raise your knees. Place a small pillow, a rolled-up towel, or a lumbar roll in the curve of your back if you need extra support. · Try a kneeling chair, which helps tilt your hips forward. This takes pressure off your lower back. · Try sitting on an exercise ball. It can rock from side to side, which helps keep your back loose. · When driving, keep your knees nearly level with your hips. Sit straight, and drive with both hands on the steering wheel. Your arms should be in a slightly bent position. Reduce stress on your back through careful lifting · Squat down, bending at the hips and knees only. If you need to, put one knee to the floor and extend your other knee in front of you, bent at a right angle (half kneeling). · Press your chest straight forward. This helps keep your upper back straight while keeping a slight arch in your low back. · Hold the load as close to your body as possible, at the level of your belly button (navel). · Use your feet to change direction, taking small steps. · Lead with your hips as you change direction. Keep your shoulders in line with your hips as you move. · Set down your load carefully, squatting with your knees and hips only. Exercise and stretch your back · Do some exercise on most days of the week, if your doctor says it is okay. You can walk, run, swim, or cycle. · Stretch your back muscles. Here are a few exercises to try: ¨ Lie on your back, and gently pull one bent knee to your chest. Put that foot back on the floor, and then pull the other knee to your chest. 
¨ Do pelvic tilts. Lie on your back with your knees bent. Tighten your stomach muscles. Pull your belly button (navel) in and up toward your ribs. You should feel like your back is pressing to the floor and your hips and pelvis are slightly lifting off the floor. Hold for 6 seconds while breathing smoothly. ¨ Sit with your back flat against a wall. · Keep your core muscles strong. The muscles of your back, belly (abdomen), and buttocks support your spine. ¨ Pull in your belly and imagine pulling your navel toward your spine. Hold this for 6 seconds, then relax. Remember to keep breathing normally as you tense your muscles. ¨ Do curl-ups. Always do them with your knees bent. Keep your low back on the floor, and curl your shoulders toward your knees using a smooth, slow motion. Keep your arms folded across your chest. If this bothers your neck, try putting your hands behind your neck (not your head), with your elbows spread apart. ¨ Lie on your back with your knees bent and your feet flat on the floor. Tighten your belly muscles, and then push with your feet and raise your buttocks up a few inches. Hold this position 6 seconds as you continue to breathe normally, then lower yourself slowly to the floor. Repeat 8 to 12 times. ¨ If you like group exercise, try Pilates or yoga. These classes have poses that strengthen the core muscles. Lead a healthy lifestyle · Stay at a healthy weight to avoid strain on your back. · Do not smoke. Smoking increases the risk of osteoporosis, which weakens the spine. If you need help quitting, talk to your doctor about stop-smoking programs and medicines. These can increase your chances of quitting for good. Where can you learn more? Go to http://camelia-konrad.info/. Enter L315 in the search box to learn more about \"Learning About How to Have a Healthy Back. \" Current as of: May 23, 2016 Content Version: 11.2 © 8777-7484 [employer]orporated. Care instructions adapted under license by [employer] (which disclaims liability or warranty for this information). If you have questions about a medical condition or this instruction, always ask your healthcare professional. [address] any warranty or liability for your use of this information. Please provide this summary of care documentation to your next provider. Your primary care clinician is listed as Pako Crane. If you have any questions after today's visit, please call [phone].

## 2017-08-03 ENCOUNTER — OFFICE VISIT (OUTPATIENT)
Dept: FAMILY MEDICINE CLINIC | Age: 42
End: 2017-08-03

## 2017-08-03 VITALS
HEART RATE: 81 BPM | WEIGHT: 291 LBS | TEMPERATURE: 98.9 F | OXYGEN SATURATION: 97 % | HEIGHT: 63 IN | BODY MASS INDEX: 51.56 KG/M2 | SYSTOLIC BLOOD PRESSURE: 115 MMHG | DIASTOLIC BLOOD PRESSURE: 78 MMHG | RESPIRATION RATE: 16 BRPM

## 2017-08-03 DIAGNOSIS — M54.41 CHRONIC RIGHT-SIDED LOW BACK PAIN WITH RIGHT-SIDED SCIATICA: ICD-10-CM

## 2017-08-03 DIAGNOSIS — G89.29 CHRONIC RIGHT-SIDED LOW BACK PAIN WITH RIGHT-SIDED SCIATICA: ICD-10-CM

## 2017-08-03 RX ORDER — LISINOPRIL AND HYDROCHLOROTHIAZIDE 20; 25 MG/1; MG/1
TABLET ORAL
COMMUNITY
Start: 2017-06-03 | End: 2017-08-03 | Stop reason: ALTCHOICE

## 2017-08-03 RX ORDER — PREGABALIN 100 MG/1
CAPSULE ORAL
COMMUNITY
Start: 2017-06-05 | End: 2017-08-03 | Stop reason: ALTCHOICE

## 2017-08-03 RX ORDER — CLINDAMYCIN HYDROCHLORIDE 150 MG/1
CAPSULE ORAL
COMMUNITY
Start: 2017-07-06 | End: 2017-08-03 | Stop reason: ALTCHOICE

## 2017-08-03 RX ORDER — PREDNISONE 20 MG/1
20 TABLET ORAL
Qty: 5 TAB | Refills: 0 | Status: SHIPPED | OUTPATIENT
Start: 2017-08-03 | End: 2018-05-02 | Stop reason: ALTCHOICE

## 2017-08-03 RX ORDER — CEPHALEXIN 500 MG/1
CAPSULE ORAL
COMMUNITY
Start: 2017-05-19 | End: 2017-08-03 | Stop reason: ALTCHOICE

## 2017-08-03 NOTE — PATIENT INSTRUCTIONS

## 2017-08-03 NOTE — PROGRESS NOTES
Chief Complaint   Patient presents with    Back Pain     1. Have you been to the ER, urgent care clinic since your last visit? Hospitalized since your last visit? No    2. Have you seen or consulted any other health care providers outside of the [address] [place] [address] since your last visit? Include any pap smears or colon screening.  No

## 2017-08-03 NOTE — LETTER
NOTIFICATION RETURN TO WORK / SCHOOL 
 
8/3/2017 4:12 PM 
 
Ms. Casey Camarillo [address] [place] [phone] To Whom It May Concern: 
 
Casey Camarillo is currently under the care of FLORENCIA Hensley. She will return to work/school on: 8/7/2017 If there are questions or concerns please have the patient contact our office. Sincerely, Andrei Ascencio NP

## 2017-08-03 NOTE — MR AVS SNAPSHOT
Visit Information Date & Time Provider Department Dept. Phone Encounter #  
 8/3/2017  3:30 PM Harvey Wheeler, DIAMANTE [facility] [phone] [id number] Follow-up Instructions Return in about 2 months (around 10/3/2017) for follow up with Norberta Kawasaki. Upcoming Health Maintenance Date Due DTaP/Tdap/Td series (1 - Tdap) 3/2/1994 LIPID PANEL Q1 5/23/2015 EYE EXAM RETINAL OR DILATED Q1 10/14/2015 PAP AKA CERVICAL CYTOLOGY 6/13/2017 INFLUENZA AGE 9 TO ADULT 8/1/2017 FOOT EXAM Q1 8/17/2017 MICROALBUMIN Q1 8/17/2017 HEMOGLOBIN A1C Q6M 12/14/2017 Pneumococcal 19-64 Highest Risk (3 of 3 - PPSV23) 9/29/2021 Allergies as of 8/3/2017  Review Complete On: 8/3/2017 By: Hayes Agustin Severity Noted Reaction Type Reactions Pcn [Penicillins]  05/13/2010    Hives Current Immunizations  Reviewed on 10/3/2016 Name Date Influenza Vaccine 9/29/2016, 10/5/2015, 10/21/2014 Influenza Vaccine Split 1/1/2011 Pneumococcal Vaccine (Unspecified Type) 9/29/2016 ZZZ-RETIRED (DO NOT USE) Pneumococcal Vaccine (Unspecified Type) 3/31/2011 Not reviewed this visit You Were Diagnosed With   
  
 Codes Comments Uncontrolled type 2 diabetes mellitus without complication, without long-term current use of insulin ([facility] [address]    -  Primary ICD-10-CM: E11.65 ICD-9-CM: 250.02 Chronic right-sided low back pain with right-sided sciatica     ICD-10-CM: M54.41, G89.29 ICD-9-CM: 724.2, 724.3, 338.29 Vitals BP Pulse Temp Resp Height(growth percentile) Weight(growth percentile) 115/78 (BP 1 Location: Left arm, BP Patient Position: Sitting) 81 98.9 °F (37.2 °C) (Oral) 16 5' 3\" (1.6 m) 291 lb (132 kg) SpO2 BMI OB Status Smoking Status 97% 51.55 kg/m2 Ablation Never Smoker Vitals History BMI and BSA Data Body Mass Index Body Surface Area 51.55 kg/m 2 2.42 m 2 Preferred Pharmacy Pharmacy Name Phone [facility] [place], [place] - [address] [place] [address] Romy Hui [phone] Your Updated Medication List  
  
   
This list is accurate as of: 8/3/17  4:06 PM.  Always use your most recent med list.  
  
  
  
  
 Veryl Light 250-50 mcg/dose diskus inhaler Generic drug:  fluticasone-salmeterol TAKE ONE PUFF BY INHALATION 2 TIMES A DAY FOR ASTHMA PREVENTION  
  
 * albuterol 90 mcg/actuation inhaler Commonly known as:  PROVENTIL HFA, VENTOLIN HFA, PROAIR HFA Take 1-2 Puffs by inhalation every four (4) hours as needed for Wheezing. * albuterol 2.5 mg /3 mL (0.083 %) nebulizer solution Commonly known as:  PROVENTIL VENTOLIN  
3 mL by Nebulization route every four (4) hours as needed for Wheezing. lisinopril-hydroCHLOROthiazide 10-12.5 mg per tablet Commonly known as:  Karyn Mohs Take 1 Tab by mouth daily. For blood pressure  
  
 meclizine 25 mg tablet Commonly known as:  ANTIVERT Take 1 Tab by mouth three (3) times daily as needed. metFORMIN  mg tablet Commonly known as:  GLUCOPHAGE XR Take 1 Tab by mouth two (2) times daily (with meals). montelukast 10 mg tablet Commonly known as:  SINGULAIR  
TAKE ONE TABLET BY MOUTH ONCE DAILY FOR  ALLERGIES  AND  ASTHMA  
  
 omeprazole 40 mg capsule Commonly known as:  PRILOSEC Take 1 Cap by mouth daily. For GERD polyethylene glycol 17 gram/dose powder Commonly known as:  Becker Baumgarten Take 17 g by mouth daily as needed. predniSONE 20 mg tablet Commonly known as:  Edrie Power Take 1 Tab by mouth daily (with breakfast). pregabalin 150 mg capsule Commonly known as:  Michelle Saint Take 1 Cap by mouth two (2) times a day. Max Daily Amount: 300 mg.  
  
 TYLENOL ARTHRITIS PAIN 650 mg CR tablet Generic drug:  acetaminophen Take 650 mg by mouth every six (6) hours as needed for Pain. * Notice:   This list has 2 medication(s) that are the same as other medications prescribed for you. Read the directions carefully, and ask your doctor or other care provider to review them with you. Prescriptions Sent to Pharmacy Refills  
 predniSONE (DELTASONE) 20 mg tablet 0 Sig: Take 1 Tab by mouth daily (with breakfast). Class: Normal  
 Pharmacy: [address], [address] [place] [address] #: [phone] Route: Oral  
  
Follow-up Instructions Return in about 2 months (around 10/3/2017) for follow up with Adrianna Sierra. Patient Instructions Back Stretches: Exercises Your Care Instructions Here are some examples of exercises for stretching your back. Start each exercise slowly. Ease off the exercise if you start to have pain. Your doctor or physical therapist will tell you when you can start these exercises and which ones will work best for you. How to do the exercises Overhead stretch 1. Stand comfortably with your feet shoulder-width apart. 2. Looking straight ahead, raise both arms over your head and reach toward the ceiling. Do not allow your head to tilt back. 3. Hold for 15 to 30 seconds, then lower your arms to your sides. 4. Repeat 2 to 4 times. Side stretch 1. Stand comfortably with your feet shoulder-width apart. 2. Raise one arm over your head, and then lean to the other side. 3. Slide your hand down your leg as you let the weight of your arm gently stretch your side muscles. Hold for 15 to 30 seconds. 4. Repeat 2 to 4 times on each side. Press-up 1. Lie on your stomach, supporting your body with your forearms. 2. Press your elbows down into the floor to raise your upper back. As you do this, relax your stomach muscles and allow your back to arch without using your back muscles. As your press up, do not let your hips or pelvis come off the floor. 3. Hold for 15 to 30 seconds, then relax. 4. Repeat 2 to 4 times.  
Relax and rest 
 
 1. Lie on your back with a rolled towel under your neck and a pillow under your knees. Extend your arms comfortably to your sides. 2. Relax and breathe normally. 3. Remain in this position for about 10 minutes. 4. If you can, do this 2 or 3 times each day. Follow-up care is a key part of your treatment and safety. Be sure to make and go to all appointments, and call your doctor if you are having problems. It's also a good idea to know your test results and keep a list of the medicines you take. Where can you learn more? Go to http://camelia-konrad.info/. Enter W252 in the search box to learn more about \"Back Stretches: Exercises. \" Current as of: March 21, 2017 Content Version: 11.3 © 7168-2337 [employer], Infectious. Care instructions adapted under license by [employer] (which disclaims liability or warranty for this information). If you have questions about a medical condition or this instruction, always ask your healthcare professional. [address] any warranty or liability for your use of this information. Please provide this summary of care documentation to your next provider. Your primary care clinician is listed as Kentrell Fonseca. If you have any questions after today's visit, please call [phone].

## 2017-08-15 NOTE — PROGRESS NOTES
Back Pain  Patient presents for presents evaluation of low back problems. Symptoms have been present for several days and include pain in right lumbar back (aching in character; 5/10 in severity). Initial inciting event: none. Alleviating factors identifiable by patient are recumbency. Exacerbating factors identifiable by patient are standing, sitting. Treatments so far initiated by patient: none Previous lower back problems: Yes. Previous workup: MRI see below. Previous treatments: PT, NSAIDS, Prednisone. Study Result   **Final Report**         ICD Codes / Adm. Diagnosis: 724.4   / Thoracic or lumbosacral neurit    Examination:  MRI L SPINE WO CON  - [id number] - Sep 17 2015  8:21AM  Accession No:  [id number]  Reason:  lumbar DDD, left leg radiculopathy        REPORT:  CLINICAL HISTORY: Pain     INDICATION: Lower back pain     COMPARISON: None     TECHNIQUE: MR imaging of the lumbar spine was performed with sagittal T1,   T2, STIR;  axial T1, T2. Contrast was not administered.     FINDINGS:     There is normal alignment of the lumbar spine. Vertebral body heights are   maintained. Marrow signal is normal.  The conus medullaris terminates at the   superior aspect of L2. There is minimal congenital narrowing of the lumbar   canal. Abdominal aorta is normal in caliber. Proximal common iliac vessels   are normal in caliber.     L1/2:  The spinal canal and neuroforamina are widely patent.     L2/3:  The spinal canal and neuroforamina are widely patent.     L3/4:  Disc desiccation. Facet arthropathy. Mild right foraminal protrusion. There is mild right foraminal stenosis. The left neural foramen is patent.     L4/5:  Mild facet arthropathy. Disc desiccation. Spinal canal is patent. Mild right foraminal stenosis. .      L5/S1:  Facet arthropathy and hypertrophy is moderate to severe. Moderate   broad-based disc protrusion extends into the foramina bilaterally.  Mild   central canal stenosis and severe bilateral foraminal stenosis. .     The visualized musculature and intraperitoneal structures are within normal   limits         IMPRESSION:  Multilevel disc and facet degenerative change.     Mild central canal stenosis and severe bilateral foraminal stenosis at L5-S1.     Mild right foraminal stenosis L3-4 and L4-5.                  Current Outpatient Prescriptions   Medication Sig Dispense Refill    predniSONE (DELTASONE) 20 mg tablet Take 1 Tab by mouth daily (with breakfast). 5 Tab 0    pregabalin (LYRICA) 150 mg capsule Take 1 Cap by mouth two (2) times a day. Max Daily Amount: 300 mg. 60 Cap 5    lisinopril-hydroCHLOROthiazide (PRINZIDE, ZESTORETIC) 10-12.5 mg per tablet Take 1 Tab by mouth daily. For blood pressure 30 Tab 5    montelukast (SINGULAIR) 10 mg tablet TAKE ONE TABLET BY MOUTH ONCE DAILY FOR  ALLERGIES  AND  ASTHMA 30 Tab 5    metFORMIN ER (GLUCOPHAGE XR) 500 mg tablet Take 1 Tab by mouth two (2) times daily (with meals). 60 Tab 5    omeprazole (PRILOSEC) 40 mg capsule Take 1 Cap by mouth daily. For GERD 30 Cap 5    albuterol (PROVENTIL VENTOLIN) 2.5 mg /3 mL (0.083 %) nebulizer solution 3 mL by Nebulization route every four (4) hours as needed for Wheezing. 30 Each 3    acetaminophen (TYLENOL ARTHRITIS PAIN) 650 mg CR tablet Take 650 mg by mouth every six (6) hours as needed for Pain.  albuterol (PROVENTIL HFA, VENTOLIN HFA, PROAIR HFA) 90 mcg/actuation inhaler Take 1-2 Puffs by inhalation every four (4) hours as needed for Wheezing. 2 Inhaler 6    meclizine (ANTIVERT) 25 mg tablet Take 1 Tab by mouth three (3) times daily as needed. 20 Tab 1    ADVAIR DISKUS 250-50 mcg/dose diskus inhaler TAKE ONE PUFF BY INHALATION 2 TIMES A DAY FOR ASTHMA PREVENTION 1 Inhaler 5    polyethylene glycol (MIRALAX) 17 gram/dose powder Take 17 g by mouth daily as needed. Allergies   Allergen Reactions    Pcn [Penicillins] Hives         ROS:     Denies numbness, tingling, weakness.  Denies saddle anesthesia and bowel or bladder dysfunction. Physical Exam:     Visit Vitals    /78 (BP 1 Location: Left arm, BP Patient Position: Sitting)    Pulse 81    Temp 98.9 °F (37.2 °C) (Oral)    Resp 16    Ht 5' 3\" (1.6 m)    Wt 291 lb (132 kg)    SpO2 97%    BMI 51.55 kg/m2           Patient is a 38y.o. year-old female who appears awake and alert. Answers questions appropriately and is sitting uncomfortably on the exam table. Back: No tenderness upon palpation to the cervical, thoracic, lumbar, or sacral region of the spine. No ecchymosis or swelling noted. ROM limited by body habitus. Muscle strength 5/5. Positive SLR. Heart: S1S2. No murmur, gallop, or rub. Lungs: Clear to auscultation bilaterally. No distress noted. Extrem: Trace edema present likely dependent. Stable gait. DTRs 2+ Bilaterally to the upper and lower extremities. Neuro: CN 2-12 grossly intact. Assessment/ Plan:     Diagnoses and all orders for this visit:    1. Uncontrolled type 2 diabetes mellitus without complication, without long-term current use of insulin ([facility] [address]  Follow up with PCP. 2. Chronic right-sided low back pain with right-sided sciatica  -     predniSONE (DELTASONE) 20 mg tablet; Take 1 Tab by mouth daily (with breakfast). -     REFERRAL TO ORTHOPEDICS  Prdnisone taper, however she will require further intervention secondary to her chronic status. Given referral today. Likely needs updated MRI as well. Discussed proper body mechanics. Encouraged routine stretching as well as the avoidance of heavy lifting until symptoms improve.       Discussed expected course/resolution/complications of diagnosis in detail with patient.    Medication risks/benefits/costs/interactions/alternatives discussed with patient.    Pt was given an after visit summary which includes diagnoses, current medications & vitals.    Pt expressed understanding with the diagnosis and plan    Follow-up Disposition:  Return in about 2 months (around 10/3/2017) for follow up with Annelise Bush.

## 2017-09-11 RX ORDER — MONTELUKAST SODIUM 10 MG/1
TABLET ORAL
Qty: 30 TAB | Refills: 5 | Status: SHIPPED | OUTPATIENT
Start: 2017-09-11 | End: 2018-06-10 | Stop reason: SDUPTHER

## 2017-09-11 RX ORDER — METFORMIN HYDROCHLORIDE 500 MG/1
TABLET, EXTENDED RELEASE ORAL
Qty: 60 TAB | Refills: 5 | Status: SHIPPED | OUTPATIENT
Start: 2017-09-11 | End: 2018-05-09 | Stop reason: DRUGHIGH

## 2017-10-04 RX ORDER — ALBUTEROL SULFATE 90 UG/1
2 AEROSOL, METERED RESPIRATORY (INHALATION)
Qty: 2 INHALER | Refills: 5 | Status: SHIPPED | OUTPATIENT
Start: 2017-10-04 | End: 2020-08-14 | Stop reason: SDUPTHER

## 2017-10-04 RX ORDER — ALBUTEROL SULFATE 90 UG/1
AEROSOL, METERED RESPIRATORY (INHALATION)
COMMUNITY
End: 2017-10-04 | Stop reason: CLARIF

## 2017-10-04 NOTE — TELEPHONE ENCOUNTER
Patient is calling in regards to a missed call from [facility], tried contacting nurse, no success.     Best call back # for patient: [phone]

## 2017-10-04 NOTE — TELEPHONE ENCOUNTER
----- Message from Kin De La Vega sent at 10/4/2017  9:22 AM EDT -----  Regarding: DIAMANTE Peres/Rx refill  Patient has a Rx for an inhaler waiting at [facility] in [place]. They told her it was for 1 inhaler for $50. She normally gets 2 inhalers called in. She would like to see if her 2nd inhaler can be called in as well. Best contact number for patient is [phone].

## 2017-10-04 NOTE — TELEPHONE ENCOUNTER
Patient not using Proventil Inhaler she is using Ventolin HFA and she's getting 2 inhaler for $50.00 patient wants to send new prescription    LOV 8/3/2017

## 2017-10-22 ENCOUNTER — HOSPITAL ENCOUNTER (EMERGENCY)
Age: 42
Discharge: HOME | End: 2017-10-22
Attending: FAMILY MEDICINE

## 2017-10-22 VITALS
SYSTOLIC BLOOD PRESSURE: 141 MMHG | WEIGHT: 293 LBS | BODY MASS INDEX: 53.92 KG/M2 | RESPIRATION RATE: 16 BRPM | OXYGEN SATURATION: 97 % | HEIGHT: 62 IN | HEART RATE: 61 BPM | DIASTOLIC BLOOD PRESSURE: 70 MMHG | TEMPERATURE: 98.5 F

## 2017-10-22 DIAGNOSIS — K04.7 DENTAL INFECTION: Primary | ICD-10-CM

## 2017-10-22 RX ORDER — CLINDAMYCIN HYDROCHLORIDE 300 MG/1
300 CAPSULE ORAL 4 TIMES DAILY
Qty: 28 CAP | Refills: 0 | Status: SHIPPED | OUTPATIENT
Start: 2017-10-22 | End: 2017-10-29

## 2017-10-22 NOTE — UC PROVIDER NOTE
Patient is a 38 y.o. female presenting with dental problem. The history is provided by the patient. Dental Pain    This is a new problem. The current episode started yesterday. The problem occurs constantly. The problem has not changed (Has a history of returing gum infections) since onset. The pain is located in the left upper mouth. The quality of the pain is aching. The pain is at a severity of 8/10. Past Medical History:   Diagnosis Date    Anemia (iron deficiency)     Asthma     DDD (degenerative disc disease), lumbar     Depression     DM type 2 (diabetes mellitus, type 2) ([address] 2011    GERD (gastroesophageal reflux disease)     GI bleed     Hepatic steatosis 2016    confirmed by US    HTN (hypertension)     Irregular menses     Irritable bowel     Kidney mass     To right kidney    Morbid obesity ([address]     FE (obstructive sleep apnea)     w/ Cpap    PUD (peptic ulcer disease)         Past Surgical History:   Procedure Laterality Date    HX  SECTION      x 2    HX COLONOSCOPY  2015    HX ENDOSCOPY  2015    HX HYSTEROSCOPY WITH ENDOMETRIAL ABLATION  2014    HX TUBAL LIGATION      HX TUMOR REMOVAL  2016    right kidney, Dr Darian Galvez removed from right kidney on 02/15/2016          Family History   Problem Relation Age of Onset    Cancer Mother      Ovarian Cancer     Heart Attack Father     Heart Disease Father     Diabetes Father     Other Father      pancreatitis    Cancer Sister     Other Sister      chrons    Heart Attack Maternal Grandfather     Diabetes Paternal Grandmother     HIV/AIDS Son         Social History     Social History    Marital status:      Spouse name: N/A    Number of children: N/A    Years of education: N/A     Occupational History    Not on file.      Social History Main Topics    Smoking status: Never Smoker    Smokeless tobacco: Never Used    Alcohol use No    Drug use: No    Sexual activity: Yes     Partners: Male     Other Topics Concern    Not on file     Social History Narrative                ALLERGIES: Pcn [penicillins]    Review of Systems   Constitutional: Negative for activity change and fever. HENT: Positive for dental problem. Respiratory: Negative for shortness of breath. Cardiovascular: Negative for chest pain. Neurological: Positive for headaches. Vitals:    10/22/17 1916 10/22/17 1923   BP:  141/70   Pulse:  61   Resp:  16   Temp:  98.5 °F (36.9 °C)   SpO2:  97%   Weight: 132 kg (291 lb) 133.6 kg (294 lb 8 oz)   Height: 5' 3\" (1.6 m) 5' 2\" (1.575 m)       Physical Exam   Constitutional: She is oriented to person, place, and time. She appears well-developed and well-nourished. HENT:   Mouth/Throat: Oropharynx is clear and moist.   Left upper gingival tissue inflamed. Eyes: Conjunctivae and EOM are normal.   Pulmonary/Chest: Effort normal.   Neurological: She is alert and oriented to person, place, and time. Skin: Skin is warm and dry. Psychiatric: She has a normal mood and affect. Her behavior is normal. Judgment and thought content normal.   Nursing note and vitals reviewed. MDM     Differential Diagnosis; Clinical Impression; Plan:     CLINICAL IMPRESSION:  Dental infection  (primary encounter diagnosis)    Plan:  1. Cleocin 300 mg   2. Keep scheduled dental appointment  3. Risk of Significant Complications, Morbidity, and/or Mortality:   Presenting problems: Moderate  Diagnostic procedures: Moderate  Management options:   Moderate  Progress:   Patient progress:  Stable      Procedures

## 2017-11-13 ENCOUNTER — HOSPITAL ENCOUNTER (EMERGENCY)
Age: 42
Discharge: STILL ADMITTED OR ADMINISTRATIVE | End: 2017-11-13
Attending: FAMILY MEDICINE

## 2017-11-13 ENCOUNTER — HOSPITAL ENCOUNTER (EMERGENCY)
Age: 42
Discharge: HOME | End: 2017-11-13
Attending: EMERGENCY MEDICINE
Payer: COMMERCIAL

## 2017-11-13 ENCOUNTER — APPOINTMENT (OUTPATIENT)
Dept: GENERAL RADIOLOGY | Age: 42
End: 2017-11-13
Attending: PHYSICIAN ASSISTANT
Payer: COMMERCIAL

## 2017-11-13 VITALS
SYSTOLIC BLOOD PRESSURE: 120 MMHG | DIASTOLIC BLOOD PRESSURE: 62 MMHG | HEIGHT: 64 IN | WEIGHT: 293 LBS | TEMPERATURE: 97.5 F | BODY MASS INDEX: 50.02 KG/M2 | RESPIRATION RATE: 16 BRPM | OXYGEN SATURATION: 100 % | HEART RATE: 81 BPM

## 2017-11-13 DIAGNOSIS — R09.89 GLOBUS SENSATION: Primary | ICD-10-CM

## 2017-11-13 PROCEDURE — 99283 EMERGENCY DEPT VISIT LOW MDM: CPT

## 2017-11-13 PROCEDURE — 71020 XR CHEST PA LAT: CPT

## 2017-11-13 PROCEDURE — 74011000250 HC RX REV CODE- 250: Performed by: PHYSICIAN ASSISTANT

## 2017-11-13 PROCEDURE — 74011250637 HC RX REV CODE- 250/637: Performed by: PHYSICIAN ASSISTANT

## 2017-11-13 RX ORDER — LIDOCAINE HYDROCHLORIDE 20 MG/ML
15 SOLUTION OROPHARYNGEAL AS NEEDED
Qty: 1 BOTTLE | Refills: 0 | Status: SHIPPED | OUTPATIENT
Start: 2017-11-13 | End: 2018-05-02 | Stop reason: ALTCHOICE

## 2017-11-13 RX ADMIN — LIDOCAINE HYDROCHLORIDE 40 ML: 20 SOLUTION ORAL; TOPICAL at 17:29

## 2017-11-13 NOTE — DISCHARGE INSTRUCTIONS
Thank you for allowing us to provide you with excellent care today. We hope we addressed all of your concerns and needs. We strive to provide excellent quality care in the Emergency Department. Please rate us as excellent, as anything less than excellent does not meet our expectations. If you feel that you have not received excellent quality care or timely care, please ask to speak to the nurse manager. Please choose us in the future for your continued health care needs. The exam and treatment you received in the Emergency Department were for an urgent problem and are not intended as complete care. It is important that you follow-up with a doctor, nurse practitioner, or physician assistant to:  (1) confirm your diagnosis,  (2) re-evaluation of changes in your illness and treatment, and  (3) for ongoing care. If your symptoms become worse or you do not improve as expected and you are unable to reach your usual health care provider, you should return to the Emergency Department. We are available 24 hours a day. Take this sheet with you when you go to your follow-up visit. If you have any problem arranging the follow-up visit, contact [address] [place] [address] 21 [phone])    Make an appointment with your Primary Care doctor for follow up of this visit. Return to the ER if you are unable to be seen in the time recommended on your discharge instructions. Learning About a Lump in the Throat  What is a lump in the throat? The condition that people refer to as a \"lump in the throat\" is a feeling that an object is stuck in your throat. It's also called globus (say \"GLOH-bus\"). You don't really have anything stuck there. But the feeling is real, and it can be uncomfortable. This feeling may be there all the time, or it may happen now and then. It's not painful. The lump is felt in the front of the throat. It may feel like it moves up and down when you swallow. It's not clear where this feeling in the throat comes from. Gastroesophageal reflux disease (GERD) may contribute to it. Reflux means that stomach acid and juices flow from the stomach back up into the tube that leads from the throat to the stomach. (This tube is called the esophagus.) The lump in the throat may have other causes. These include problems with swallowing, as well as muscle spasms in the esophagus. It may also be felt as a result of stress or an anxiety disorder. In some cases, the feeling goes away by itself over time. How is it treated? The doctor will first examine you to try to find the cause of the feeling of the lump in your throat. He or she may give you medicine to reduce stomach acid to see if it helps relieve the lump sensation. If anxiety is a factor, the doctor may work with you to address it. If you still feel like there's a lump in your throat, your doctor may give you a variety of other tests. He or she may examine your throat, neck, voice box (pharynx), and esophagus to see if you have any blockage. Your doctor may use a thin, flexible tube, called a scope, to look deep into your throat. This is called a laryngoscopy. Or you may have a swallowing study that uses X-rays to film your throat as you swallow. If the tests find a problem that can be treated, your doctor will treat you for it. You may have speech therapy to learn how to relax the muscles of your throat or swallow differently. And just finding out that there's no physical cause for the lump in your throat may help you feel better. How can you care for yourself at home? · Try not to clear your throat or swallow too often. · Have something to eat or drink. This may give you some relief for a while. · Try to relax and reduce stress. Relaxation techniques such as deep breathing or meditation may help. Follow-up care is a key part of your treatment and safety. Be sure to make and go to all appointments, and call your doctor if you are having problems.  It's also a good idea to know your test results and keep a list of the medicines you take. Where can you learn more? Go to http://camelia-konrad.info/. Enter U804 in the search box to learn more about \"Learning About a Lump in the Throat. \"  Current as of: May 12, 2017  Content Version: 11.4  © 1312-5670 [employer]. Care instructions adapted under license by [employer] (which disclaims liability or warranty for this information). If you have questions about a medical condition or this instruction, always ask your healthcare professional. [address] any warranty or liability for your use of this information.

## 2017-11-13 NOTE — ED NOTES
Patient states she was eating lunch around noon and she swallowed a [place] godoy and she felt it turn sideways and get stuck. States that it is painful when swallowing, no difficulty with breathing, speaking clear sentences. States that she has been able to drink fluids and they did go down.

## 2017-11-13 NOTE — ED NOTES
Patient discharge instructions reviewed with patient by ANNALEE Kidd. Patient verbalized understanding. Patient ambulated off unit with .

## 2017-12-15 DIAGNOSIS — G89.29 CHRONIC RIGHT-SIDED THORACIC BACK PAIN: ICD-10-CM

## 2017-12-15 DIAGNOSIS — M54.6 CHRONIC RIGHT-SIDED THORACIC BACK PAIN: ICD-10-CM

## 2017-12-15 RX ORDER — PREGABALIN 150 MG/1
CAPSULE ORAL
Qty: 60 CAP | Refills: 5 | Status: SHIPPED | OUTPATIENT
Start: 2017-12-15 | End: 2018-07-09 | Stop reason: SDUPTHER

## 2017-12-18 ENCOUNTER — OFFICE VISIT (OUTPATIENT)
Dept: SLEEP MEDICINE | Age: 42
End: 2017-12-18

## 2017-12-18 VITALS
BODY MASS INDEX: 50.02 KG/M2 | OXYGEN SATURATION: 96 % | TEMPERATURE: 98.4 F | HEART RATE: 65 BPM | HEIGHT: 64 IN | DIASTOLIC BLOOD PRESSURE: 70 MMHG | WEIGHT: 293 LBS | SYSTOLIC BLOOD PRESSURE: 103 MMHG

## 2017-12-18 DIAGNOSIS — G47.33 OSA (OBSTRUCTIVE SLEEP APNEA): Primary | ICD-10-CM

## 2017-12-18 DIAGNOSIS — I10 ESSENTIAL HYPERTENSION: ICD-10-CM

## 2017-12-18 NOTE — PROGRESS NOTES
[address] [facility] [place]. [place], [address]  Tel.  [phone]  Fax. 100 [facility] 60  [place], [address] [facility]  Tel.  [phone]  Fax. [phone] [address] [place] [address]  Tel.  [phone]  Fax. [phone]       Subjective:      Hair Vickers is a 38 y.o. female self-referred for evaluation and management of sleep apnea. She was diagnosed with sleep apnea 10 years ago. She is using her device regularly. She complains of snoring, choking, excessive daytime sleepiness associated with machine not blowing and old symptoms (bed wetting and frequent night time awakenings. Symptoms began several years ago, gradually worsening since that time. She usually can fall asleep in 5 minutes. Family or house members note snoring, periods of not breathing even with PAP. She denies falling asleep while during conversation. There are minimal  mask comfort problems. The following problems are noted with PAP:     Drowsiness yes Problems exhaling no   Snoring yes Forget to put on no   Mask Comfortable yes Can't fall asleep no   Dry Mouth yes Mask falls off no   Air Leaking yes Frequent awakenings yes     Hair Vickers does wake up frequently at night. She is bothered by waking up too early and left unable to get back to sleep. She actually sleeps about 5 hours at night and wakes up about 3 times during the night. She does not work shifts:  .   [place] indicates she does get too little sleep at night. Her bedtime is 1100. She awakens at 0600. She does not take naps. She takes   naps a week lasting  . She has the following observed behaviors: Loud snoring, Light snoring, Bedwetting, Pauses in breathing, Biting tongue, Twitching of legs or feet, Grinding teeth, Kicking with legs; . (these are the same symptoms she had before starting on PAP therapy. She does not drive because she is drowsy. Her  even drives her to work.   Other remarks: waking with gasp or snort, vivid dreams     [place] Sleepiness Score: 22   which reflect severe daytime drowsiness. Allergies   Allergen Reactions    Pcn [Penicillins] Hives         Current Outpatient Prescriptions:     LYRICA 150 mg capsule, TAKE ONE CAPSULE BY MOUTH TWICE DAILY *MAX  DAILY AMOUNT 300 MG*, Disp: 60 Cap, Rfl: 5    lidocaine (LIDOCAINE VISCOUS) 2 % solution, Take 15 mL by mouth as needed for Pain., Disp: 1 Bottle, Rfl: 0    albuterol (VENTOLIN HFA) 90 mcg/actuation inhaler, Take 2 Puffs by inhalation every six (6) hours as needed for Wheezing., Disp: 2 Inhaler, Rfl: 5    ADVAIR DISKUS 250-50 mcg/dose diskus inhaler, INHALE ONE DOSE BY MOUTH TWICE DAILY FOR  ASTHMA  PREVENTION, Disp: 1 Inhaler, Rfl: 5    montelukast (SINGULAIR) 10 mg tablet, TAKE ONE TABLET BY MOUTH ONCE DAILY FOR  ALLERGIES  AND  ASTHMA, Disp: 30 Tab, Rfl: 5    metFORMIN ER (GLUCOPHAGE XR) 500 mg tablet, TAKE ONE TABLET BY MOUTH TWICE DAILY WITH MEALS, Disp: 60 Tab, Rfl: 5    lisinopril-hydroCHLOROthiazide (PRINZIDE, ZESTORETIC) 10-12.5 mg per tablet, Take 1 Tab by mouth daily. For blood pressure, Disp: 30 Tab, Rfl: 5    omeprazole (PRILOSEC) 40 mg capsule, Take 1 Cap by mouth daily. For GERD, Disp: 30 Cap, Rfl: 5    acetaminophen (TYLENOL ARTHRITIS PAIN) 650 mg CR tablet, Take 650 mg by mouth every six (6) hours as needed for Pain., Disp: , Rfl:     meclizine (ANTIVERT) 25 mg tablet, Take 1 Tab by mouth three (3) times daily as needed. , Disp: 20 Tab, Rfl: 1    polyethylene glycol (MIRALAX) 17 gram/dose powder, Take 17 g by mouth daily as needed. , Disp: , Rfl:     predniSONE (DELTASONE) 20 mg tablet, Take 1 Tab by mouth daily (with breakfast). , Disp: 5 Tab, Rfl: 0     She  has a past medical history of Anemia (iron deficiency); Asthma; DDD (degenerative disc disease), lumbar (2014); Depression; DM type 2 (diabetes mellitus, type 2) ([facility] (11/23/2011); GERD (gastroesophageal reflux disease); GI bleed (2015);  Hepatic steatosis (2016); HTN (hypertension); Irregular menses; Irritable bowel; Kidney mass; Morbid obesity ([facility] FE (obstructive sleep apnea); and PUD (peptic ulcer disease) (). She  has a past surgical history that includes  section; tubal ligation; endoscopy (2015); colonoscopy (2015); tumor removal (2016); hysteroscopy with endometrial ablation (2014); and urological.    She family history includes Cancer in her mother and sister; Diabetes in her father and paternal grandmother; HIV/AIDS in her son; Heart Attack in her father and maternal grandfather; Heart Disease in her father; Other in her father and sister. She  reports that she has never smoked. She has never used smokeless tobacco. She reports that she does not drink alcohol or use illicit drugs. Review of Systems:  Constitutional:  + weight gain. Eyes:  No blurred vision. CVS:  No significant chest pain  Pulm:  No significant shortness of breath  GI:  No significant nausea or vomiting  :  No significant nocturia  Musculoskeletal:  No significant joint pain at night  Skin:  No significant rashes  Neuro:  No significant dizziness   Psych:  No active mood issues    Sleep Review of Systems: notable for no difficulty falling asleep; +frequent awakenings at night;  some dreaming noted; no nightmares ; no early morning headaches ; no memory problems; no concentration issues; no history of any automobile or occupational accidents due to daytime drowsiness.       Objective:     Visit Vitals    /70    Pulse 65    Temp 98.4 °F (36.9 °C) (Oral)    Ht 5' 4\" (1.626 m)    Wt 293 lb (132.9 kg)    SpO2 96%    BMI 50.29 kg/m2         General:   Not in acute distress   Eyes:  Anicteric sclerae, no obvious strabismus   Nose:  No obvious nasal septum deviation    Oropharynx:   Class 3 oropharyngeal outlet, thick tongue base, enlarged and boggy uvula, low-lying soft palate, narrow tonsilo-pharyngeal pilars   Tonsils:   tonsils are present and normal   Neck:   Neck circ. in \"inches\": 18; midline trachea   Chest/Lungs:  Equal lung expansion, clear on auscultation    CVS:  Normal rate, regular rhythm; no JVD   Skin:  Warm to touch; no obvious rashes   Neuro:  No focal deficits ; no obvious tremor    Psych:  Normal affect,  normal countenance;          Assessment:       ICD-10-CM ICD-9-CM    1. FE (obstructive sleep apnea) G47.33 327.23 SLEEP STUDY UNATTENDED, 4 CHANNEL   2. Essential hypertension I10 401.9        Plan: Old records not available. I am ordering a home sleep apnea test to re-establish the diagnosis of sleep apnea and severity of it. Treatment options for sleep apnea were reviewed. She is interested in a new machine. We discussed APAPs vs CPAP and she would like an APAP if found to be a good candidate. The treatment plan was reviewed with the patient in detail and reviewed with the patient and the lead technologist. she understands that the lead technologist will be calling her  with the results and assisting with the next step in the treatment plan as outlined today during the consultation with me. All of her questions were addressed. Counseling was provided regarding the importance of regular PAP use and on proper sleep hygiene and safe driving. I have counseled the patient regarding the benefits of weight loss. 2.Hypertension - she continues on her current regimen. I have reviewed the relationship between hypertension as it relates to sleep-disordered breathing. Thank you for allowing us to participate in your patient's medical care.   Mitra Santos MD  Diplomate in Sleep Medicine  [facility]

## 2017-12-18 NOTE — PATIENT INSTRUCTIONS
[address] [facility] [address] [place]. [place], [address]  Tel.  [phone]  Fax. 100 [facility] 60  [place], [address] [facility]  Tel.  [phone]  Fax. [phone] [address]  Tel.  [phone]  Fax. [phone]     Sleep Apnea: After Your Visit  Your Care Instructions  Sleep apnea occurs when you frequently stop breathing for 10 seconds or longer during sleep. It can be mild to severe, based on the number of times per hour that you stop breathing or have slowed breathing. Blocked or narrowed airways in your nose, mouth, or throat can cause sleep apnea. Your airway can become blocked when your throat muscles and tongue relax during sleep. Sleep apnea is common, occurring in 1 out of 20 individuals. Individuals having any of the following characteristics should be evaluated and treated right away due to high risk and detrimental consequences from untreated sleep apnea:  1. Obesity  2. Congestive Heart failure  3. Atrial Fibrillation  4. Uncontrolled Hypertension  5. Type II Diabetes  6. Night-time Arrhythmias  7. Stroke  8. Pulmonary Hypertension  9. High-risk Driving Populations (pilots, truck drivers, etc.)  10. Patients Considering Weight-loss Surgery    How do you know you have sleep apnea? You probably have sleep apnea if you answer 'yes' to 3 or more of the following questions:  S - Have you been told that you Snore? T - Are you often Tired during the day? O - Has anyone Observed you stop breathing while sleeping? P- Do you have (or are being treated for) high blood Pressure? B - Are you obese (Body Mass Index > 35)? A - Is your Age 48years old or older? N - Is your Neck size greater than 16 inches? G - Are you male Gender? A sleep physician can prescribe a breathing device that prevents tissues in the throat from blocking your airway.  Or your doctor may recommend using a dental device (oral breathing device) to help keep your airway open. In some cases, surgery may be needed to remove enlarged tissues in the throat. Follow-up care is a key part of your treatment and safety. Be sure to make and go to all appointments, and call your doctor if you are having problems. It's also a good idea to know your test results and keep a list of the medicines you take. How can you care for yourself at home? · Lose weight, if needed. It may reduce the number of times you stop breathing or have slowed breathing. · Go to bed at the same time every night. · Sleep on your side. It may stop mild apnea. If you tend to roll onto your back, sew a pocket in the back of your pajama top. Put a tennis ball into the pocket, and stitch the pocket shut. This will help keep you from sleeping on your back. · Avoid alcohol and medicines such as sleeping pills and sedatives before bed. · Do not smoke. Smoking can make sleep apnea worse. If you need help quitting, talk to your doctor about stop-smoking programs and medicines. These can increase your chances of quitting for good. · Prop up the head of your bed 4 to 6 inches by putting bricks under the legs of the bed. · Treat breathing problems, such as a stuffy nose, caused by a cold or allergies. · Use a continuous positive airway pressure (CPAP) breathing machine if lifestyle changes do not help your apnea and your doctor recommends it. The machine keeps your airway from closing when you sleep. · If CPAP does not help you, ask your doctor whether you should try other breathing machines. A bilevel positive airway pressure machine has two types of air pressureâone for breathing in and one for breathing out. Another device raises or lowers air pressure as needed while you breathe. · If your nose feels dry or bleeds when using one of these machines, talk with your doctor about increasing moisture in the air. A humidifier may help.   · If your nose is runny or stuffy from using a breathing machine, talk with your doctor about using decongestants or a corticosteroid nasal spray. When should you call for help? Watch closely for changes in your health, and be sure to contact your doctor if:  · You still have sleep apnea even though you have made lifestyle changes. · You are thinking of trying a device such as CPAP. · You are having problems using a CPAP or similar machine. Where can you learn more? Go to Taplet. Enter X229 in the search box to learn more about \"Sleep Apnea: After Your Visit. \"   © 9010-1935 Healthwise, [employer]. Care instructions adapted under license by Nalini Jeffrey (which disclaims liability or warranty for this information). This care instruction is for use with your licensed healthcare professional. If you have questions about a medical condition or this instruction, always ask your healthcare professional. [employer] any warranty or liability for your use of this information. PROPER SLEEP HYGIENE    What to avoid  · Do not have drinks with caffeine, such as coffee or black tea, for 8 hours before bed. · Do not smoke or use other types of tobacco near bedtime. Nicotine is a stimulant and can keep you awake. · Avoid drinking alcohol late in the evening, because it can cause you to wake in the middle of the night. · Do not eat a big meal close to bedtime. If you are hungry, eat a light snack. · Do not drink a lot of water close to bedtime, because the need to urinate may wake you up during the night. · Do not read or watch TV in bed. Use the bed only for sleeping and sexual activity. What to try  · Go to bed at the same time every night, and wake up at the same time every morning. Do not take naps during the day. · Keep your bedroom quiet, dark, and cool. · Get regular exercise, but not within 3 to 4 hours of your bedtime. .  · Sleep on a comfortable pillow and mattress.   · If watching the clock makes you anxious, turn it facing away from you so you cannot see the time. · If you worry when you lie down, start a worry book. Well before bedtime, write down your worries, and then set the book and your concerns aside. · Try meditation or other relaxation techniques before you go to bed. · If you cannot fall asleep, get up and go to another room until you feel sleepy. Do something relaxing. Repeat your bedtime routine before you go to bed again. · Make your house quiet and calm about an hour before bedtime. Turn down the lights, turn off the TV, log off the computer, and turn down the volume on music. This can help you relax after a busy day. Drowsy Driving  The [address] [place] [address] Road Traffic Safety Administration cites drowsiness as a causing factor in more than 938,066 police reported crashes annually, resulting in 76,000 injuries and 1,500 deaths. Other surveys suggest 55% of people polled have driven while drowsy in the past year, 23% had fallen asleep but not crashed, 3% crashed, and 2% had and accident due to drowsy driving. Who is at risk? Young Drivers: One study of drowsy driving accidents states that 55% of the drivers were under 25 years. Of those, 75% were male. Shift Workers and Travelers: People who work overnight or travel across time zones frequently are at higher risk of experiencing Circadian Rhythm Disorders. They are trying to work and function when their body is programed to sleep. Sleep Deprived: Lack of sleep has a serious impact on your ability to pay attention or focus on a task. Consistently getting less than the average of 8 hours your body needs creates partial or cumulative sleep deprivation. Untreated Sleep Disorders: Sleep Apnea, Narcolepsy, R.L.S., and other sleep disorders (untreated) prevent a person from getting enough restful sleep. This leads to excessive daytime sleepiness and increases the risk for drowsy driving accidents by up to 7 times.   Medications / Alcohol: Even over the counter medications can cause drowsiness. Medications that impair a drivers attention should have a warning label. Alcohol naturally makes you sleepy and on its own can cause accidents. Combined with excessive drowsiness its effects are amplified. Signs of Drowsy Driving:   * You don't remember driving the last few miles   * You may drift out of your kyaw   * You are unable to focus and your thoughts wander   * You may yawn more often than normal   * You have difficulty keeping your eyes open / nodding off   * Missing traffic signs, speeding, or tailgating  Prevention-   Good sleep hygiene, lifestyle and behavioral choices have the most impact on drowsy driving. There is no substitute for sleep and the average person requires 8 hours nightly. If you find yourself driving drowsy, stop and sleep. Consider the sleep hygiene tips provided during your visit as well. Medication Refill Policy: Refills for all medications require 1 week advance notice. Please have your pharmacy fax a refill request. We are unable to fax, or call in \"controled substance\" medications and you will need to pick these prescriptions up from our office. [employer] Activation    Thank you for requesting access to [employer]. Please follow the instructions below to securely access and download your online medical record. [employer] allows you to send messages to your doctor, view your test results, renew your prescriptions, schedule appointments, and more. How Do I Sign Up? 1. In your internet browser, go to [email or web address]. 2. Click on the First Time User? Click Here link in the Sign In box. You will see the New Member Sign Up page. 3. Enter your [employer] Access Code exactly as it appears below. You will not need to use this code after youve completed the sign-up process. If you do not sign up before the expiration date, you must request a new code. [employer] Access Code:  Activation code not generated  Current [employer]Connect Status: Patient Declined (This is the date your [employer] access code will )    4. Enter the last four digits of your Social Security Number (xxxx) and Date of Birth (mm/dd/yyyy) as indicated and click Submit. You will be taken to the next sign-up page. 5. Create a [employer] ID. This will be your [employer] login ID and cannot be changed, so think of one that is secure and easy to remember. 6. Create a [employer] password. You can change your password at any time. 7. Enter your Password Reset Question and Answer. This can be used at a later time if you forget your password. 8. Enter your e-mail address. You will receive e-mail notification when new information is available in [address] 9. Click Sign Up. You can now view and download portions of your medical record. 10. Click the Download Summary menu link to download a portable copy of your medical information. Additional Information    If you have questions, please call [phone]. Remember, [employer] is NOT to be used for urgent needs. For medical emergencies, dial 911.

## 2017-12-18 NOTE — PROGRESS NOTES
HSAT Setup - [address]    · Patient was educated on proper hookup and operation of the HSAT. · Instruction forms and documentation were reviewed and signed. · The patient demonstrated good understanding of the HSAT. · General information regarding operations and maintenance of the device was provided. · Patient was provided information on sleep apnea including coresponding risk factors and the importance of proper treatment. · Follow-up appointment was made to return the HSAT. He will be contacted once the results have been reviewed. · Patient was asked to contact our office for any problems regarding his home sleep test study.

## 2017-12-19 ENCOUNTER — DOCUMENTATION ONLY (OUTPATIENT)
Dept: SLEEP MEDICINE | Age: 42
End: 2017-12-19

## 2017-12-27 ENCOUNTER — TELEPHONE (OUTPATIENT)
Dept: SLEEP MEDICINE | Age: 42
End: 2017-12-27

## 2017-12-27 DIAGNOSIS — G47.33 OBSTRUCTIVE SLEEP APNEA SYNDROME: Primary | ICD-10-CM

## 2017-12-27 NOTE — TELEPHONE ENCOUNTER
Results of sleep study in R-drive  Lead tech to convey results to patient  HSAT results in R-drive. Test positive forsignificant sleep apnea. AH 12/hour and lowest oxygen saturation was 86%. We had discussed treatment options at initial consultation. Based on the results of the home sleep apnea test, I believe a trial of APAP would be an effective mode of therapy. APAP order attached. she should be seen in the sleep disorder center 4-6 weeks after initiating PAP therapy. The APAP will have modem access so she can call the sleep center if she  has questions/concerns regarding the initial PAP settings. Front staff to Order PAP and call patient and let them know which DME company they should be hearing from after results reviewed with lead support technologist.   Schedule for first adherence visit in 6 weeks.

## 2017-12-27 NOTE — TELEPHONE ENCOUNTER
HSAT Returned - [facility]    Date of Study: 12/18/17    The following information was gathered from the patients study log:    · Lights off: 11:11P  · Estimated sleep onset: 11:37P    · Awakened a total of >6 times  · The patient felt they slept 2 hours  · Patient took no sleep aid before starting the test  · Sleep quality was worse compared to a usual nights sleep. Further information provided: \"I was up just about every hour. Feelings of chocking and not being able to breath. \"

## 2018-01-02 ENCOUNTER — DOCUMENTATION ONLY (OUTPATIENT)
Dept: SLEEP MEDICINE | Age: 43
End: 2018-01-02

## 2018-01-31 ENCOUNTER — HOSPITAL ENCOUNTER (EMERGENCY)
Age: 43
Discharge: HOME | End: 2018-01-31
Attending: FAMILY MEDICINE

## 2018-01-31 ENCOUNTER — HOSPITAL ENCOUNTER (OUTPATIENT)
Dept: LAB | Age: 43
Discharge: HOME | End: 2018-01-31

## 2018-01-31 VITALS
WEIGHT: 290 LBS | DIASTOLIC BLOOD PRESSURE: 60 MMHG | HEIGHT: 64 IN | RESPIRATION RATE: 18 BRPM | SYSTOLIC BLOOD PRESSURE: 132 MMHG | OXYGEN SATURATION: 98 % | TEMPERATURE: 98.5 F | HEART RATE: 89 BPM | BODY MASS INDEX: 49.51 KG/M2

## 2018-01-31 DIAGNOSIS — N76.4 LEFT GENITAL LABIAL ABSCESS: Primary | ICD-10-CM

## 2018-01-31 PROCEDURE — 87205 SMEAR GRAM STAIN: CPT | Performed by: FAMILY MEDICINE

## 2018-01-31 RX ORDER — DOXYCYCLINE 100 MG/1
100 CAPSULE ORAL 2 TIMES DAILY
Qty: 20 CAP | Refills: 0 | Status: SHIPPED | OUTPATIENT
Start: 2018-01-31 | End: 2018-05-02 | Stop reason: ALTCHOICE

## 2018-01-31 RX ORDER — CLINDAMYCIN HYDROCHLORIDE 300 MG/1
300 CAPSULE ORAL 4 TIMES DAILY
Qty: 28 CAP | Refills: 0 | Status: SHIPPED | OUTPATIENT
Start: 2018-01-31 | End: 2018-02-07

## 2018-01-31 RX ORDER — IBUPROFEN 800 MG/1
800 TABLET ORAL
Qty: 20 TAB | Refills: 0 | Status: SHIPPED | OUTPATIENT
Start: 2018-01-31 | End: 2018-02-07

## 2018-01-31 RX ORDER — TRAMADOL HYDROCHLORIDE 50 MG/1
50 TABLET ORAL
Qty: 15 TAB | Refills: 0 | Status: SHIPPED | OUTPATIENT
Start: 2018-01-31 | End: 2018-05-02 | Stop reason: ALTCHOICE

## 2018-01-31 NOTE — DISCHARGE INSTRUCTIONS

## 2018-01-31 NOTE — LETTER
[facility] 
[address] [place] [address] 
[phone] Work/School Note Date: 1/31/2018 To Whom It May concern: 
 
Charis Herring was seen and treated today in the urgent care center by the following provider(s): 
Attending Provider: Debora Christy MD.   
 
Charis Herring may return to work on 2/5/18. Sincerely, Debora Christy MD

## 2018-02-04 LAB
BACTERIA SPEC CULT: NORMAL
GRAM STN SPEC: NORMAL
GRAM STN SPEC: NORMAL
SERVICE CMNT-IMP: NORMAL

## 2018-02-14 NOTE — UC PROVIDER NOTE
Patient is a 38 y.o. female presenting with skin problem. The history is provided by the patient. Skin Problem    This is a new problem. The current episode started more than 1 week ago. The problem has been gradually worsening. There has been no fever. The rash is present on the genitalia (left labia ). The pain is at a severity of 3/10. The pain is mild. The pain has been constant since onset. Associated symptoms include pain. She has tried nothing for the symptoms. Past Medical History:   Diagnosis Date    Anemia (iron deficiency)     Asthma     DDD (degenerative disc disease), lumbar     Depression     DM type 2 (diabetes mellitus, type 2) ([address] 2011    GERD (gastroesophageal reflux disease)     GI bleed     Hepatic steatosis 2016    confirmed by US    HTN (hypertension)     Irregular menses     Irritable bowel     Kidney mass     To right kidney    Morbid [facility]     FE (obstructive sleep apnea)     w/ Cpap    PUD (peptic ulcer disease)         Past Surgical History:   Procedure Laterality Date    HX  SECTION      x 2    HX COLONOSCOPY  2015    HX ENDOSCOPY  2015    HX HYSTEROSCOPY WITH ENDOMETRIAL ABLATION  2014    HX TUBAL LIGATION      HX TUMOR REMOVAL  2016    right kidney, Dr Garcia Grate removed from right kidney on 02/15/2016          Family History   Problem Relation Age of Onset    Cancer Mother      Ovarian Cancer     Heart Attack Father     Heart Disease Father     Diabetes Father     Other Father      pancreatitis    Cancer Sister     Other Sister      chrons    Heart Attack Maternal Grandfather     Diabetes Paternal Grandmother     HIV/AIDS Son         Social History     Social History    Marital status:      Spouse name: N/A    Number of children: N/A    Years of education: N/A     Occupational History    Not on file.      Social History Main Topics    Smoking status: Never Smoker  Smokeless tobacco: Never Used    Alcohol use No    Drug use: No    Sexual activity: Yes     Partners: Male     Other Topics Concern    Not on file     Social History Narrative                ALLERGIES: Pcn [penicillins]    Review of Systems   All other systems reviewed and are negative. Vitals:    01/31/18 1828   BP: 132/60   Pulse: 89   Resp: 18   Temp: 98.5 °F (36.9 °C)   SpO2: 98%   Weight: 131.5 kg (290 lb)   Height: 5' 4\" (1.626 m)       Physical Exam   Constitutional: No distress. Genitourinary:       There is tenderness and lesion (small boil- no fluctuation ) on the left labia. Nursing note and vitals reviewed. MDM     Differential Diagnosis; Clinical Impression; Plan:     CLINICAL IMPRESSION:  Left genital labial abscess  (primary encounter diagnosis)      DDX    Plan:    Warm compress  Doxycycline and if not better add clindamycin  Tramadol and motrin for pain   Amount and/or Complexity of Data Reviewed:    Review and summarize past medical records:  Yes  Risk of Significant Complications, Morbidity, and/or Mortality:   Presenting problems: Moderate  Management options:   Moderate  Progress:   Patient progress:  Stable      Procedures

## 2018-05-02 ENCOUNTER — OFFICE VISIT (OUTPATIENT)
Dept: INTERNAL MEDICINE CLINIC | Facility: CLINIC | Age: 43
End: 2018-05-02

## 2018-05-02 VITALS
TEMPERATURE: 98.1 F | BODY MASS INDEX: 49.17 KG/M2 | DIASTOLIC BLOOD PRESSURE: 57 MMHG | HEIGHT: 64 IN | RESPIRATION RATE: 18 BRPM | OXYGEN SATURATION: 97 % | HEART RATE: 69 BPM | WEIGHT: 288 LBS | SYSTOLIC BLOOD PRESSURE: 119 MMHG

## 2018-05-02 DIAGNOSIS — E11.42 TYPE 2 DIABETES MELLITUS WITH DIABETIC POLYNEUROPATHY, WITHOUT LONG-TERM CURRENT USE OF INSULIN (HCC): ICD-10-CM

## 2018-05-02 DIAGNOSIS — Z76.89 ENCOUNTER TO ESTABLISH CARE: Primary | ICD-10-CM

## 2018-05-02 DIAGNOSIS — Z12.4 SCREENING FOR CERVICAL CANCER: ICD-10-CM

## 2018-05-02 DIAGNOSIS — E66.01 MORBID OBESITY WITH BMI OF 45.0-49.9, ADULT (HCC): ICD-10-CM

## 2018-05-02 DIAGNOSIS — G47.33 OSA (OBSTRUCTIVE SLEEP APNEA): ICD-10-CM

## 2018-05-02 DIAGNOSIS — E78.2 MIXED HYPERLIPIDEMIA: ICD-10-CM

## 2018-05-02 DIAGNOSIS — Z23 ENCOUNTER FOR IMMUNIZATION: ICD-10-CM

## 2018-05-02 DIAGNOSIS — J45.20 MILD INTERMITTENT ASTHMA WITHOUT COMPLICATION: ICD-10-CM

## 2018-05-02 DIAGNOSIS — I10 ESSENTIAL HYPERTENSION: ICD-10-CM

## 2018-05-02 RX ORDER — DICLOFENAC SODIUM 75 MG/1
75 TABLET, DELAYED RELEASE ORAL
COMMUNITY
Start: 2018-04-13 | End: 2018-05-13

## 2018-05-02 NOTE — PATIENT INSTRUCTIONS
Diabetes Foot Health: Care Instructions  Your Care Instructions    When you have diabetes, your feet need extra care and attention. Diabetes can damage the nerve endings and blood vessels in your feet, making you less likely to notice when your feet are injured. Diabetes also limits your body's ability to fight infection and get blood to areas that need it. If you get a minor foot injury, it could become an ulcer or a serious infection. With good foot care, you can prevent most of these problems. Caring for your feet can be quick and easy. Most of the care can be done when you are bathing or getting ready for bed. Follow-up care is a key part of your treatment and safety. Be sure to make and go to all appointments, and call your doctor if you are having problems. It's also a good idea to know your test results and keep a list of the medicines you take. How can you care for yourself at home? · Keep your blood sugar close to normal by watching what and how much you eat, monitoring blood sugar, taking medicines if prescribed, and getting regular exercise. · Do not smoke. Smoking affects blood flow and can make foot problems worse. If you need help quitting, talk to your doctor about stop-smoking programs and medicines. These can increase your chances of quitting for good. · Eat a diet that is low in fats. High fat intake can cause fat to build up in your blood vessels and decrease blood flow. · Inspect your feet daily for blisters, cuts, cracks, or sores. If you cannot see well, use a mirror or have someone help you. · Take care of your feet:  [facility] your feet every day. Use warm (not hot) water. Check the water temperature with your wrists or other part of your body, not your feet. ¨ Dry your feet well. Pat them dry. Do not rub the skin on your feet too hard. Dry well between your toes. If the skin on your feet stays moist, bacteria or a fungus can grow, which can lead to infection.   ¨ Keep your skin soft. Use moisturizing skin cream to keep the skin on your feet soft and prevent calluses and cracks. But do not put the cream between your toes, and stop using any cream that causes a rash. ¨ Clean underneath your toenails carefully. Do not use a sharp object to clean underneath your toenails. Use the blunt end of a nail file or other rounded tool. ¨ Trim and file your toenails straight across to prevent ingrown toenails. Use a nail clipper, not scissors. Use an emery board to smooth the edges. · Change socks daily. Socks without seams are best, because seams often rub the feet. You can find socks for people with diabetes from specialty catalogs. · Look inside your shoes every day for things like gravel or torn linings, which could cause blisters or sores. · Buy shoes that fit well:  ¨ Look for shoes that have plenty of space around the toes. This helps prevent bunions and blisters. ¨ Try on shoes while wearing the kind of socks you will usually wear with the shoes. ¨ Avoid plastic shoes. They may rub your feet and cause blisters. Good shoes should be made of materials that are flexible and breathable, such as leather or cloth. ¨ Break in new shoes slowly by wearing them for no more than an hour a day for several days. Take extra time to check your feet for red areas, blisters, or other problems after you wear new shoes. · Do not go barefoot. Do not wear sandals, and do not wear shoes with very thin soles. Thin soles are easy to puncture. They also do not protect your feet from hot pavement or cold weather. · Have your doctor check your feet during each visit. If you have a foot problem, see your doctor. Do not try to treat an early foot problem at home. Home remedies or treatments that you can buy without a prescription (such as corn removers) can be harmful. · Always get early treatment for foot problems. A minor irritation can lead to a major problem if not properly cared for early.   When should you call for help? Call your doctor now or seek immediate medical care if:  ? · You have a foot sore, an ulcer or break in the skin that is not healing after 4 days, bleeding corns or calluses, or an ingrown toenail. ? · You have blue or black areas, which can mean bruising or blood flow problems. ? · You have peeling skin or tiny blisters between your toes or cracking or oozing of the skin. ? · You have a fever for more than 24 hours and a foot sore. ? · You have new numbness or tingling in your feet that does not go away after you move your feet or change positions. ? · You have unexplained or unusual swelling of the foot or ankle. ? Watch closely for changes in your health, and be sure to contact your doctor if:  ? · You cannot do proper foot care. Where can you learn more? Go to http://camelia-konrad.info/. Enter A739 in the search box to learn more about \"Diabetes Foot Health: Care Instructions. \"  Current as of: March 13, 2017  Content Version: 11.4  © 8112-3079 [employer]. Care instructions adapted under license by [employer] (which disclaims liability or warranty for this information). If you have questions about a medical condition or this instruction, always ask your healthcare professional. [address] any warranty or liability for your use of this information.

## 2018-05-02 NOTE — PROGRESS NOTES
CC:  Chief Complaint   Patient presents with   [address] [facility] [address] Immunization/Injection       HISTORY OF PRESENT ILLNESS  Geanie Leventhal is a 37 y.o. female. Presents to [facility] care. She has type 2 DM with neuropathy, HTN, asthma, allergic rhinitis, FE, hyperlipidemia, and depression. Previous PCP was ANNALEE Espinoza at [facility]. Today she complains of intermittent mid and low back pain that she accounts to her weight. Endocrine Review  She is seen for diabetes. Reports polydipsia and polyuria, denies hypoglycemia. Home glucose monitoring: is performed twice a day. Home -230. She reports medication compliance: compliant all of the time. Medication side effects: none. Diabetic diet compliance: compliant most of the time. Lab review: orders written for new lab studies as appropriate; see orders. Eye exam: overdue. Soc Hx  . Has 2 children (ages 32 and 23). 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 Never smoker. Does not drink alcohol. Denies recreational drug use. Walks occasionally for exercise. Drinks unsweetened tea; denies coffee or sodas.     Health Maintenance  Flu vaccine: declined    Pneumonia vaccine: PPSV-23 9/9/16     Tetanus vaccine: 5/2/18     Pap smear: due for this  Mammogram: due for this  Eye exam: Dr. Micheal Monae, 2016, due for this  Foot exam: 5/2/18  Lipids: will check today  A1c: will check today  Advanced Directives: given information  End of Life: given information      ROS  Constitutional: negative for fevers, chills, night sweats, fatigue, weight loss  Eyes:   negative for visual disturbance and irritation  ENT:   negative for tinnitus, sore throat, nasal congestion, ear pains, hoarseness  Respiratory:  negative for cough, hemoptysis, dyspnea, wheezing  CV:   negative for chest pain, palpitations, lower extremity edema  GI:   negative for heartburn, abd pain, nausea, vomiting, diarrhea, constipation  Endo:               negative for polyuria, polydipsia, polyphagia, cold/heat intolerance  Genitourinary: negative for frequency, dysuria, hematuria  Integument:  negative for rash and pruritus  Hematologic:  negative for easy bruising and gum/nose bleeding  Musculoskel: negative for myalgias, muscle weakness  Neurological:  negative for headaches, dizziness, gait problems, memory problems   Behavl/Psych: negative for feelings of anxiety, depression, mood change    Patient Active Problem List   Diagnosis Code    HTN (hypertension) I10    Depression F32.9    FE (obstructive sleep apnea) G47.33    Asthma J45.909    Iron deficiency anemia D50.9    Hyperlipidemia E78.5    Obesity, morbid, BMI 50 or higher ([address] E66.01    Type II diabetes mellitus, uncontrolled ([address] E11.65     Past Medical History:   Diagnosis Date    Anemia (iron deficiency)     Asthma     DDD (degenerative disc disease), lumbar 2014    Depression     DM type 2 (diabetes mellitus, type 2) ([address] 2011    GERD (gastroesophageal reflux disease)     GI bleed     Hepatic steatosis 2016    confirmed by US    HTN (hypertension)     Irregular menses     Irritable bowel     Kidney mass     To right kidney    Morbid obesity ([address]     FE (obstructive sleep apnea)     w/ Cpap    PUD (peptic ulcer disease)      Past Surgical History:   Procedure Laterality Date    HX  SECTION      x 2    HX COLONOSCOPY  2015    HX ENDOSCOPY  2015    HX HYSTEROSCOPY WITH ENDOMETRIAL ABLATION  2014    HX TUBAL LIGATION      HX TUMOR REMOVAL  2016    right kidney, Dr Christa Gordon UROLOGICAL      Mass removed from right kidney on 02/15/2016      Social History     Social History    Marital status:      Spouse name: N/A    Number of children: N/A    Years of education: N/A     Social History Main Topics    Smoking status: Never Smoker    Smokeless tobacco: Never Used    Alcohol use No    Drug use: No    Sexual activity: Yes     Partners: Male     Other Topics Concern    None     Social History Narrative     Family History   Problem Relation Age of Onset    Cancer Mother      Ovarian Cancer /breast ca    Heart Attack Father     Heart Disease Father     Diabetes Father     Other Father      pancreatitis    Cancer Sister     Other Sister      chrons    Heart Attack Maternal Grandfather     Diabetes Paternal Grandmother     HIV/AIDS Son      Allergies   Allergen Reactions    Pcn [Penicillins] Hives     Current Outpatient Prescriptions   Medication Sig Dispense Refill    diclofenac EC (VOLTAREN) 75 mg EC tablet Take 75 mg by mouth.  lisinopril-hydroCHLOROthiazide (PRINZIDE, ZESTORETIC) 10-12.5 mg per tablet TAKE ONE TABLET BY MOUTH ONCE DAILY FOR BLOOD PRESSURE 30 Tab 5    albuterol (PROVENTIL VENTOLIN) 2.5 mg /3 mL (0.083 %) nebulizer solution USE ONE VIAL IN NEBULIZER EVERY 4 HOURS AS NEEDED FOR WHEEZING 30 Each 3    LYRICA 150 mg capsule TAKE ONE CAPSULE BY MOUTH TWICE DAILY *MAX  DAILY AMOUNT 300 MG* 60 Cap 5    albuterol (VENTOLIN HFA) 90 mcg/actuation inhaler Take 2 Puffs by inhalation every six (6) hours as needed for Wheezing. 2 Inhaler 5    ADVAIR DISKUS 250-50 mcg/dose diskus inhaler INHALE ONE DOSE BY MOUTH TWICE DAILY FOR  ASTHMA  PREVENTION 1 Inhaler 5    montelukast (SINGULAIR) 10 mg tablet TAKE ONE TABLET BY MOUTH ONCE DAILY FOR  ALLERGIES  AND  ASTHMA 30 Tab 5    metFORMIN ER (GLUCOPHAGE XR) 500 mg tablet TAKE ONE TABLET BY MOUTH TWICE DAILY WITH MEALS 60 Tab 5    acetaminophen (TYLENOL ARTHRITIS PAIN) 650 mg CR tablet Take 650 mg by mouth every six (6) hours as needed for Pain.  meclizine (ANTIVERT) 25 mg tablet Take 1 Tab by mouth three (3) times daily as needed. 20 Tab 1    polyethylene glycol (MIRALAX) 17 gram/dose powder Take 17 g by mouth daily as needed.            PHYSICAL EXAM  Visit Vitals    /57 (BP 1 Location: Left arm, BP Patient Position: Sitting)  Pulse 69    Temp 98.1 °F (36.7 °C) (Oral)    Resp 18    Ht 5' 4\" (1.626 m)    Wt 288 lb (130.6 kg)    SpO2 97%    BMI 49.44 kg/m2       General: Pleasant, morbidly obese. In no distress. Alert and oriented to person, place, and time. Head: Normocephalic, atraumatic. Eyes: Conjunctiva clear. Pupils equal, round, reactive to light. Extraocular movements intact. Ears/Nose: Normal nasal mucosa. No drainage or sinus tenderness. Mouth/Throat: Lips, mucosa, and tongue normal. Oropharynx benign. Neck: Supple, symmetrical, trachea midline, no lymphadenopathy, no carotid bruits, no JVD, thyroid: not enlarged, symmetric, no tenderness/mass/nodules. Lungs: Clear to auscultation bilaterally. No crackles or wheezes. Chest Wall: No tenderness or deformity. Heart: RRR, normal S1 and S2, no murmur, click, rub, or gallop. Back: Symmetric, no curvature. ROM normal. No CVA tenderness. Abdomen: Soft, non-distended, bowel sounds normal. No tenderness. No masses. No hepatosplenomegaly. Lymph nodes: Cervical and supraclavicular nodes normal.  Extremities: No clubbing, cyanosis, or edema. Skin: Acanthosis nigricans at neck. Pulses: 2+ and symmetric at dorsalis pedis and posterior tibialis pulses. Neurological: CN II-XII intact. Normal strength, sensation, and reflexes throughout. Musculoskeletal: Gait normal. ROM normal at both knees and shoulders. Psychiatric: Normal mood and affect.  Behavior is normal.   Diabetic foot exam:     Left: Reflexes 2+     Vibratory sensation normal    Proprioception normal   Sharp/dull discrimination normal    Filament test normal sensation with micro filament   Pulse DP: 2+ (normal)   Pulse PT: 2+ (normal)   Deformities: None  Right: Reflexes 2+   Vibratory sensation normal   Proprioception normal   Sharp/dull discrimination normal   Filament test normal sensation with micro filament   Pulse DP: 2+ (normal)   Pulse PT: 2+ (normal)   Deformities: None           ASSESSMENT AND PLAN    ICD-10-CM ICD-9-CM    1. Encounter to establish care Z76.89 V65.8    2. Type 2 diabetes mellitus with diabetic polyneuropathy, without long-term current use of insulin ([facility]) E11.42 250.60 MICROALBUMIN, UR, RAND W/ MICROALB/CREAT RATIO     357.2 HEMOGLOBIN A1C WITH EAG       DIABETES FOOT EXAM      REFERRAL TO OPTOMETRY   3. Essential hypertension F35 663.6 METABOLIC PANEL, COMPREHENSIVE      CBC WITH AUTOMATED DIFF   4. Mixed hyperlipidemia E78.2 272.2 LIPID PANEL      TSH RFX ON ABNORMAL TO FREE T4   5. FE (obstructive sleep apnea) G47.33 327.23    6. Mild intermittent asthma without complication C46.49 447.29    7. Morbid obesity with BMI of 45.0-49.9, adult ([facility]) E66.01 278.01     Z68.42 V85.42    8. Screening for cervical cancer Z12.4 V76.2 REFERRAL TO GYNECOLOGY   9. Encounter for immunization Z23 V03.89 TETANUS, DIPHTHERIA TOXOIDS AND ACELLULAR PERTUSSIS VACCINE (TDAP), IN INDIVIDS. >=7,      Diagnoses and all orders for this visit:    1. Encounter to establish care  Medical records reviewed. 2. Type 2 diabetes mellitus with diabetic polyneuropathy, without long-term current use of insulin ([facility])  Check A1c. Continue metformin at same dose pending results. -     MICROALBUMIN, UR, RAND W/ MICROALB/CREAT RATIO  -     HEMOGLOBIN A1C WITH EAG  -      DIABETES FOOT EXAM  -     REFERRAL TO OPTOMETRY    3. Essential hypertension  Controlled at 119/57. Continue lisinopril-HCTZ. -     METABOLIC PANEL, COMPREHENSIVE  -     CBC WITH AUTOMATED DIFF    4. Mixed hyperlipidemia  -     LIPID PANEL  -     TSH RFX ON ABNORMAL TO FREE T4    5. FE (obstructive sleep apnea)  Continue CPAP. 6. Mild intermittent asthma without complication    7. Morbid obesity with BMI of 45.0-49.9, adult ([facility]    8.  Screening for cervical cancer  -     REFERRAL TO GYNECOLOGY    9. Encounter for immunization  -     Tetanus, diphtheria toxoids and acellular pertussis (TDAP) vaccine, in individuals >=7 years, IM      Follow-up Disposition:  Return in about 3 months (around 8/2/2018), or if symptoms worsen or fail to improve, for DM, HTN, weight. Provided patient and/or family with advanced directive information and answered pertinent questions. Encouraged patient to provide a copy of advanced directive to the office when available. I have discussed the diagnosis with the patient and the intended plan as seen in the above orders. Patient is in agreement. The patient has received an after-visit summary and questions were answered concerning future plans. I have discussed medication side effects and warnings with the patient as well.

## 2018-05-02 NOTE — MR AVS SNAPSHOT
[address] [facility] [address] [phone] Patient: Alexa Has MRN: [id number] ONR:8/4/8009 Visit Information Date & Time Provider Department Dept. Phone Encounter #  
 5/2/2018  8:30 AM Sanjeev Blevins MD [facility] Internal Medicine of [address] [place] [address] [id number] Follow-up Instructions Return in about 3 months (around 8/2/2018), or if symptoms worsen or fail to improve, for DM, HTN, weight. Your Appointments 5/3/2018  3:00 PM  
Any with Kathrine Bassett MD  
[facility] ([facility]) Appt Note: adherence visit; p  
 [address] [facility] [address] [phone] [address] [place] [address], [address] [phone] Upcoming Health Maintenance Date Due DTaP/Tdap/Td series (1 - Tdap) 3/2/1994 LIPID PANEL Q1 5/23/2015 EYE EXAM RETINAL OR DILATED Q1 10/14/2015 PAP AKA CERVICAL CYTOLOGY 6/13/2017 FOOT EXAM Q1 8/17/2017 MICROALBUMIN Q1 8/17/2017 HEMOGLOBIN A1C Q6M 12/14/2017 Influenza Age 5 to Adult 8/1/2018 Allergies as of 5/2/2018  Review Complete On: 5/2/2018 By: Sanjeev Blevins MD  
  
 Severity Noted Reaction Type Reactions Pcn [Penicillins]  05/13/2010    Hives Current Immunizations  Reviewed on 10/3/2016 Name Date Influenza Vaccine 9/29/2016, 10/5/2015, 10/21/2014 Influenza Vaccine Split 1/1/2011 Pneumococcal Vaccine (Unspecified Type) 9/29/2016 Tdap  Incomplete ZZZ-RETIRED (DO NOT USE) Pneumococcal Vaccine (Unspecified Type) 3/31/2011 Not reviewed this visit You Were Diagnosed With   
  
 Codes Comments Encounter to establish care    -  Primary ICD-10-CM: Z76.89 
ICD-9-CM: V65.8 Type 2 diabetes mellitus with diabetic polyneuropathy, without long-term current use of insulin (HCC)     ICD-10-CM: E11.42 
ICD-9-CM: 250.60, 357.2 Essential hypertension     ICD-10-CM: I10 
ICD-9-CM: 401.9 Mixed hyperlipidemia     ICD-10-CM: E78.2 ICD-9-CM: 272.2   
 FE (obstructive sleep apnea)     ICD-10-CM: G47.33 
ICD-9-CM: 327.23 Mild intermittent asthma without complication     IEZ-15-EZ: J45.20 ICD-9-CM: 493.90 Morbid obesity with BMI of 45.0-49.9, adult (HCC)     ICD-10-CM: E66.01, Z68.42 
ICD-9-CM: 278.01, V85.42 Screening for cervical cancer     ICD-10-CM: Z12.4 ICD-9-CM: V76.2 Encounter for immunization     ICD-10-CM: E01 ICD-9-CM: V03.89 Vitals BP Pulse Temp Resp Height(growth percentile) Weight(growth percentile) 119/57 (BP 1 Location: Left arm, BP Patient Position: Sitting) 69 98.1 °F (36.7 °C) (Oral) 18 5' 4\" (1.626 m) 288 lb (130.6 kg) SpO2 BMI OB Status Smoking Status 97% 49.44 kg/m2 Ablation Never Smoker Vitals History BMI and BSA Data Body Mass Index Body Surface Area  
 49.44 kg/m 2 2.43 m 2 Preferred Pharmacy Pharmacy Name Phone [facility] [address] [place] [address] [phone] Your Updated Medication List  
  
   
This list is accurate as of 5/2/18  9:21 AM.  Always use your most recent med list.  
  
  
  
  
 Alfa Harrison 250-50 mcg/dose diskus inhaler Generic drug:  fluticasone-salmeterol INHALE ONE DOSE BY MOUTH TWICE DAILY FOR  ASTHMA  PREVENTION  
  
 * albuterol 90 mcg/actuation inhaler Commonly known as:  VENTOLIN HFA Take 2 Puffs by inhalation every six (6) hours as needed for Wheezing. * albuterol 2.5 mg /3 mL (0.083 %) nebulizer solution Commonly known as:  PROVENTIL VENTOLIN  
USE ONE VIAL IN NEBULIZER EVERY 4 HOURS AS NEEDED FOR WHEEZING  
  
 diclofenac EC 75 mg EC tablet Commonly known as:  VOLTAREN Take 75 mg by mouth.  
  
 lisinopril-hydroCHLOROthiazide 10-12.5 mg per tablet Commonly known as:  PRINZIDE, ZESTORETIC  
TAKE ONE TABLET BY MOUTH ONCE DAILY FOR BLOOD PRESSURE  
  
 LYRICA 150 mg capsule Generic drug:  pregabalin TAKE ONE CAPSULE BY MOUTH TWICE DAILY *MAX  DAILY AMOUNT 300 MG*  
  
 meclizine 25 mg tablet Commonly known as:  ANTIVERT Take 1 Tab by mouth three (3) times daily as needed. metFORMIN  mg tablet Commonly known as:  GLUCOPHAGE XR  
TAKE ONE TABLET BY MOUTH TWICE DAILY WITH MEALS  
  
 montelukast 10 mg tablet Commonly known as:  SINGULAIR  
TAKE ONE TABLET BY MOUTH ONCE DAILY FOR  ALLERGIES  AND  ASTHMA polyethylene glycol 17 gram/dose powder Commonly known as:  Emmer Dwight Take 17 g by mouth daily as needed. TYLENOL ARTHRITIS PAIN 650 mg Robertha Slates Generic drug:  acetaminophen Take 650 mg by mouth every six (6) hours as needed for Pain. * Notice: This list has 2 medication(s) that are the same as other medications prescribed for you. Read the directions carefully, and ask your doctor or other care provider to review them with you. We Performed the Following CBC WITH AUTOMATED DIFF [84514 CPT(R)] HEMOGLOBIN A1C WITH EAG [95377 CPT(R)]  DIABETES FOOT EXAM [HM7 Custom] LIPID PANEL [90700 CPT(R)] METABOLIC PANEL, COMPREHENSIVE [09325 CPT(R)] MICROALBUMIN, UR, RAND W/ MICROALB/CREAT RATIO [id number] CPT(R)] REFERRAL TO GYNECOLOGY [REF30 Custom] Comments:  
 Cervical cancer screening. REFERRAL TO OPTOMETRY [id number] Custom] Comments:  
 Dilated diabetic eye exam.  
 TETANUS, DIPHTHERIA TOXOIDS AND ACELLULAR PERTUSSIS VACCINE (TDAP), IN INDIVIDS. >=7, IM [id number] CPT(R)] TSH RFX ON ABNORMAL TO FREE T4 [[id number] Custom] Follow-up Instructions Return in about 3 months (around 8/2/2018), or if symptoms worsen or fail to improve, for DM, HTN, weight. Referral Information Referral ID Referred By Referred To  
  
 [id number] [facility], [address] [place] [address] Visits Status Start Date End Date 1 New Request 5/2/18 5/2/19 If your referral has a status of pending review or denied, additional information will be sent to support the outcome of this decision. Referral ID Referred By Referred To  
 [id number] Courtney Bundy. [address], [address]  
   [address], [address] Visits Status Start Date End Date 1 New Request 5/2/18 5/2/19 If your referral has a status of pending review or denied, additional information will be sent to support the outcome of this decision. Patient Instructions Diabetes Foot Health: Care Instructions Your Care Instructions When you have diabetes, your feet need extra care and attention. Diabetes can damage the nerve endings and blood vessels in your feet, making you less likely to notice when your feet are injured. Diabetes also limits your body's ability to fight infection and get blood to areas that need it. If you get a minor foot injury, it could become an ulcer or a serious infection. With good foot care, you can prevent most of these problems. Caring for your feet can be quick and easy. Most of the care can be done when you are bathing or getting ready for bed. Follow-up care is a key part of your treatment and safety. Be sure to make and go to all appointments, and call your doctor if you are having problems. It's also a good idea to know your test results and keep a list of the medicines you take. How can you care for yourself at home? · Keep your blood sugar close to normal by watching what and how much you eat, monitoring blood sugar, taking medicines if prescribed, and getting regular exercise. · Do not smoke. Smoking affects blood flow and can make foot problems worse. If you need help quitting, talk to your doctor about stop-smoking programs and medicines. These can increase your chances of quitting for good. · Eat a diet that is low in fats. High fat intake can cause fat to build up in your blood vessels and decrease blood flow. · Inspect your feet daily for blisters, cuts, cracks, or sores. If you cannot see well, use a mirror or have someone help you. · Take care of your feet: 
[facility] AUTHORITY your feet every day. Use warm (not hot) water. Check the water temperature with your wrists or other part of your body, not your feet. ¨ Dry your feet well. Pat them dry. Do not rub the skin on your feet too hard. Dry well between your toes. If the skin on your feet stays moist, bacteria or a fungus can grow, which can lead to infection. ¨ Keep your skin soft. Use moisturizing skin cream to keep the skin on your feet soft and prevent calluses and cracks. But do not put the cream between your toes, and stop using any cream that causes a rash. ¨ Clean underneath your toenails carefully. Do not use a sharp object to clean underneath your toenails. Use the blunt end of a nail file or other rounded tool. ¨ Trim and file your toenails straight across to prevent ingrown toenails. Use a nail clipper, not scissors. Use an emery board to smooth the edges. · Change socks daily. Socks without seams are best, because seams often rub the feet. You can find socks for people with diabetes from specialty catalogs. · Look inside your shoes every day for things like gravel or torn linings, which could cause blisters or sores. · Buy shoes that fit well: 
¨ Look for shoes that have plenty of space around the toes. This helps prevent bunions and blisters. ¨ Try on shoes while wearing the kind of socks you will usually wear with the shoes. ¨ Avoid plastic shoes. They may rub your feet and cause blisters. Good shoes should be made of materials that are flexible and breathable, such as leather or cloth. ¨ Break in new shoes slowly by wearing them for no more than an hour a day for several days. Take extra time to check your feet for red areas, blisters, or other problems after you wear new shoes. · Do not go barefoot.  Do not wear sandals, and do not wear shoes with very thin soles. Thin soles are easy to puncture. They also do not protect your feet from hot pavement or cold weather. · Have your doctor check your feet during each visit. If you have a foot problem, see your doctor. Do not try to treat an early foot problem at home. Home remedies or treatments that you can buy without a prescription (such as corn removers) can be harmful. · Always get early treatment for foot problems. A minor irritation can lead to a major problem if not properly cared for early. When should you call for help? Call your doctor now or seek immediate medical care if: 
? · You have a foot sore, an ulcer or break in the skin that is not healing after 4 days, bleeding corns or calluses, or an ingrown toenail. ? · You have blue or black areas, which can mean bruising or blood flow problems. ? · You have peeling skin or tiny blisters between your toes or cracking or oozing of the skin. ? · You have a fever for more than 24 hours and a foot sore. ? · You have new numbness or tingling in your feet that does not go away after you move your feet or change positions. ? · You have unexplained or unusual swelling of the foot or ankle. ? Watch closely for changes in your health, and be sure to contact your doctor if: 
? · You cannot do proper foot care. Where can you learn more? Go to http://camelia-konrad.info/. Enter A739 in the search box to learn more about \"Diabetes Foot Health: Care Instructions. \" Current as of: March 13, 2017 Content Version: 11.4 © 2835-6101 [employer]. Care instructions adapted under license by [employer] (which disclaims liability or warranty for this information). If you have questions about a medical condition or this instruction, always ask your healthcare professional. [place] [address] any warranty or liability for your use of this information. Introducing [facility]! Dear Meaghan Boyce: Thank you for requesting a [employer] account. Our records indicate that you have previously registered for a [employer] account but its currently inactive. Please call our [employer] support line at [phone]. Additional Information If you have questions, please visit the Frequently Asked Questions section of the [employer] website at [email or web address] Remember, [employer] is NOT to be used for urgent needs. For medical emergencies, dial 911. Now available from your iPhone and Android! Please provide this summary of care documentation to your next provider. Your primary care clinician is listed as Raquel Sandoval. If you have any questions after today's visit, please call [phone].

## 2018-05-02 NOTE — PROGRESS NOTES
Myrtle Avina  Identified pt with two pt identifiers(name and ). Chief Complaint   Patient presents with   [facility] Establish Care    Immunization/Injection       1. Have you been to the ER, urgent care clinic since your last visit? Hospitalized since your last visit? NO    2. Have you seen or consulted any other health care providers outside of the [address] [place] [address] since your last visit? Include any pap smears or colon screening. Vanita Morrison np previous PCP and Marjan    Today's provider has been notified of reason for visit, vitals and flowsheets obtained on patients. Patient declines information on advanced directives.     Reviewed record In preparation for visit, huddled with provider and have obtained necessary documentation      Health Maintenance Due   Topic    DTaP/Tdap/Td series (1 - Tdap)    LIPID PANEL Q1     EYE EXAM RETINAL OR DILATED Q1     PAP AKA CERVICAL CYTOLOGY     FOOT EXAM Q1     MICROALBUMIN Q1     HEMOGLOBIN A1C Q6M        Wt Readings from Last 3 Encounters:   18 288 lb (130.6 kg)   18 290 lb (131.5 kg)   17 293 lb (132.9 kg)     Temp Readings from Last 3 Encounters:   18 98.1 °F (36.7 °C) (Oral)   18 98.5 °F (36.9 °C)   17 98.4 °F (36.9 °C) (Oral)     BP Readings from Last 3 Encounters:   18 119/57   18 132/60   17 103/70     Pulse Readings from Last 3 Encounters:   18 69   18 89   17 65     Vitals:    18 0844   BP: 119/57   Pulse: 69   Resp: 18   Temp: 98.1 °F (36.7 °C)   TempSrc: Oral   SpO2: 97%   Weight: 288 lb (130.6 kg)   Height: 5' 4\" (1.626 m)   PainSc:   6         Learning Assessment:  :     Learning Assessment 2018   PRIMARY LEARNER Patient Patient Patient   HIGHEST LEVEL OF EDUCATION - PRIMARY LEARNER  - > 4 [place] CAREGIVER - No -   PRIMARY LANGUAGE ENGLISH ENGLISH ENGLISH   LEARNER PREFERENCE PRIMARY READING OTHER (COMMENT) LISTENING   ANSWERED BY patient patient self     - - self   RELATIONSHIP SELF SELF SELF       Depression Screening:  :     No flowsheet data found. Fall Risk Assessment:  :     Fall Risk Assessment, last 12 mths 8/3/2017   Able to walk? Yes   Fall in past 12 months? No       Abuse Screening:  :     Abuse Screening Questionnaire 5/9/2014   Do you ever feel afraid of your partner? N   Are you in a relationship with someone who physically or mentally threatens you? N   Is it safe for you to go home? Y       ADL Screening:  :     ADL Assessment 5/2/2018   Feeding yourself No Help Needed   Getting from bed to chair No Help Needed   Getting dressed No Help Needed   Bathing or showering No Help Needed   Walk across the room (includes cane/walker) No Help Needed   Using the telphone No Help Needed   Taking your medications No Help Needed   Preparing meals No Help Needed   Managing money (expenses/bills) No Help Needed   Moderately strenuous housework (laundry) No Help Needed   Shopping for personal items (toiletries/medicines) No Help Needed   Shopping for groceries No Help Needed   Driving No Help Needed   Climbing a flight of stairs No Help Needed   Getting to places beyond walking distances No Help Needed           Patient received TDAP in left arm. Patient tolerated procedure without complaints and received VIS. Medication reconciliation up to date and corrected with patient at this time.

## 2018-05-02 NOTE — PROGRESS NOTES
CC:   Chief Complaint   Patient presents with   [address] [facility] [address] [facility] [address] Immunization/Injection       HISTORY OF PRESENT ILLNESS  Jhon Bonner is a 37 y.o. female. Presents to [facility] care. She has type 2 DM with neuropathy, HTN, asthma, allergic rhinitis, FE, hyperlipidemia, and depression. Previous PCP was ANNALEE Park at [facility]. Today she complains of intermittent mid and low back pain that she accounts to her weight. Endocrine Review  She is seen for diabetes. Reports polydipsia and polyuria, denies hypoglycemia. Home glucose monitoring: is performed twice a day. Home -230. She reports medication compliance: compliant all of the time. Medication side effects: none. Diabetic diet compliance: compliant most of the time. Lab review: orders written for new lab studies as appropriate; see orders. Eye exam: overdue. Soc Hx  . Has 2 children (ages 32 and 23). Works as the  of [employer] for [employer]. Never smoker. Does not drink alcohol. Denies recreational drug use. Walks occasionally for exercise. Drinks unsweetened tea; denies coffee or sodas.     Health Maintenance  Flu vaccine: declined    Pneumonia vaccine: PPSV-23 9/9/16     Tetanus vaccine: 5/2/18     Pap smear: due for this  Mammogram: due for this  Eye exam: Dr. Copeland Officer, 2016, due for this  Foot exam: 5/2/18  Lipids: will check today  A1c: will check today  Advanced Directives: given information  End of Life: given information      ROS  Constitutional: negative for fevers, chills, night sweats  ENT:   negative for sore throat, nasal congestion, ear pains, hoarseness  Respiratory:  negative for cough, hemoptysis, dyspnea,wheezing  CV:   negative for chest pain, palpitations, lower extremity edema  GI:   negative for heartburn, abd pain, nausea, vomiting, diarrhea, constipation  Genitourinary: negative for frequency, dysuria and hematuria  Integument:  negative for rash and pruritus  Musculoskel: negative for myalgias, arthralgias, back pain, muscle weakness, joint pain  Neurological:  negative for headaches, dizziness, vertigo, gait problems  Behavl/Psych: negative for feelings of anxiety, depression, mood change     Patient Active Problem List   Diagnosis Code    HTN (hypertension) I10    Depression F32.9    FE (obstructive sleep apnea) G47.33    Asthma J45.909    Iron deficiency anemia D50.9    Hyperlipidemia E78.5    Obesity, morbid, BMI 50 or higher [facility] E66.01    Type II diabetes mellitus, uncontrolled ([facility] E11.65     Past Medical History:   Diagnosis Date    Anemia (iron deficiency)     Asthma     DDD (degenerative disc disease), lumbar 2014    Depression     DM type 2 (diabetes mellitus, type 2) ([facility] 11/23/2011    GERD (gastroesophageal reflux disease)     GI bleed 2015    Hepatic steatosis 11/2016    confirmed by US    HTN (hypertension)     Irregular menses     Irritable bowel     Kidney mass     To right kidney    Morbid obesity ([facility]     FE (obstructive sleep apnea)     w/ Cpap    PUD (peptic ulcer disease) 2015     Allergies   Allergen Reactions    Pcn [Penicillins] Hives       Current Outpatient Prescriptions   Medication Sig Dispense Refill    diclofenac EC (VOLTAREN) 75 mg EC tablet Take 75 mg by mouth.  lisinopril-hydroCHLOROthiazide (PRINZIDE, ZESTORETIC) 10-12.5 mg per tablet TAKE ONE TABLET BY MOUTH ONCE DAILY FOR BLOOD PRESSURE 30 Tab 5    albuterol (PROVENTIL VENTOLIN) 2.5 mg /3 mL (0.083 %) nebulizer solution USE ONE VIAL IN NEBULIZER EVERY 4 HOURS AS NEEDED FOR WHEEZING 30 Each 3    LYRICA 150 mg capsule TAKE ONE CAPSULE BY MOUTH TWICE DAILY *MAX  DAILY AMOUNT 300 MG* 60 Cap 5    albuterol (VENTOLIN HFA) 90 mcg/actuation inhaler Take 2 Puffs by inhalation every six (6) hours as needed for Wheezing.  2 Inhaler 5    ADVAIR DISKUS 250-50 mcg/dose diskus inhaler INHALE ONE DOSE BY MOUTH TWICE DAILY FOR  ASTHMA PREVENTION 1 Inhaler 5    montelukast (SINGULAIR) 10 mg tablet TAKE ONE TABLET BY MOUTH ONCE DAILY FOR  ALLERGIES  AND  ASTHMA 30 Tab 5    metFORMIN ER (GLUCOPHAGE XR) 500 mg tablet TAKE ONE TABLET BY MOUTH TWICE DAILY WITH MEALS 60 Tab 5    acetaminophen (TYLENOL ARTHRITIS PAIN) 650 mg CR tablet Take 650 mg by mouth every six (6) hours as needed for Pain.  meclizine (ANTIVERT) 25 mg tablet Take 1 Tab by mouth three (3) times daily as needed. 20 Tab 1    polyethylene glycol (MIRALAX) 17 gram/dose powder Take 17 g by mouth daily as needed.  traMADol (ULTRAM) 50 mg tablet Take 1 Tab by mouth every six (6) hours as needed for Pain. Max Daily Amount: 200 mg. 15 Tab 0    doxycycline (MONODOX) 100 mg capsule Take 1 Cap by mouth two (2) times a day. 20 Cap 0    lidocaine (LIDOCAINE VISCOUS) 2 % solution Take 15 mL by mouth as needed for Pain. 1 Bottle 0    predniSONE (DELTASONE) 20 mg tablet Take 1 Tab by mouth daily (with breakfast). 5 Tab 0    omeprazole (PRILOSEC) 40 mg capsule Take 1 Cap by mouth daily. For GERD 30 Cap 5         PHYSICAL EXAM  Visit Vitals    /57 (BP 1 Location: Left arm, BP Patient Position: Sitting)    Pulse 69    Temp 98.1 °F (36.7 °C) (Oral)    Resp 18    Ht 5' 4\" (1.626 m)    Wt 288 lb (130.6 kg)    SpO2 97%    BMI 49.44 kg/m2       General: Well-developed and well-nourished, no distress. HEENT:  Head normocephalic/atraumatic, no scleral icterus  Lungs:  Clear to ausculation bilaterally. Good air movement. Heart:  Regular rate and rhythm, normal S1 and S2, no murmur, gallop, or rub  Extremities: No clubbing, cyanosis, or edema. Neurological: Alert and oriented. Psychiatric: Normal mood and affect.  Behavior is normal.     Results for orders placed or performed during the hospital encounter of 01/31/18   CULTURE, WOUND W GRAM STAIN   Result Value Ref Range    Special Requests: RIGHT LABIA     GRAM STAIN 4+ WBCS SEEN      GRAM STAIN NO ORGANISMS SEEN      Culture result: NO GROWTH 3 DAYS           ASSESSMENT AND PLAN  {ASSESSMENT/PLAN:20433}      Provided patient and/or family with advanced directive information and answered pertinent questions. Encouraged patient to provide a copy of advanced directive to the office when available. I have discussed the diagnosis with the patient and the intended plan as seen in the above orders. Patient is in agreement. The patient has received an after-visit summary and questions were answered concerning future plans. I have discussed medication side effects and warnings with the patient as well.

## 2018-05-03 ENCOUNTER — OFFICE VISIT (OUTPATIENT)
Dept: SLEEP MEDICINE | Age: 43
End: 2018-05-03

## 2018-05-03 VITALS
OXYGEN SATURATION: 97 % | SYSTOLIC BLOOD PRESSURE: 124 MMHG | HEIGHT: 64 IN | HEART RATE: 97 BPM | WEIGHT: 290 LBS | TEMPERATURE: 97.7 F | DIASTOLIC BLOOD PRESSURE: 73 MMHG | BODY MASS INDEX: 49.51 KG/M2

## 2018-05-03 DIAGNOSIS — G47.33 OBSTRUCTIVE SLEEP APNEA SYNDROME: Primary | ICD-10-CM

## 2018-05-03 DIAGNOSIS — I10 ESSENTIAL HYPERTENSION: ICD-10-CM

## 2018-05-03 LAB
ALBUMIN SERPL-MCNC: 3.9 G/DL (ref 3.5–5.5)
ALBUMIN/CREAT UR: 12.2 MG/G CREAT (ref 0–30)
ALBUMIN/GLOB SERPL: 1.2 {RATIO} (ref 1.2–2.2)
ALP SERPL-CCNC: 73 IU/L (ref 39–117)
ALT SERPL-CCNC: 29 IU/L (ref 0–32)
AST SERPL-CCNC: 22 IU/L (ref 0–40)
BASOPHILS # BLD AUTO: 0 X10E3/UL (ref 0–0.2)
BASOPHILS NFR BLD AUTO: 0 %
BILIRUB SERPL-MCNC: 0.2 MG/DL (ref 0–1.2)
BUN SERPL-MCNC: 12 MG/DL (ref 6–24)
BUN/CREAT SERPL: 15 (ref 9–23)
CALCIUM SERPL-MCNC: 9.4 MG/DL (ref 8.7–10.2)
CHLORIDE SERPL-SCNC: 96 MMOL/L (ref 96–106)
CHOLEST SERPL-MCNC: 203 MG/DL (ref 100–199)
CO2 SERPL-SCNC: 25 MMOL/L (ref 18–29)
CREAT SERPL-MCNC: 0.78 MG/DL (ref 0.57–1)
CREAT UR-MCNC: 164.1 MG/DL
EOSINOPHIL # BLD AUTO: 0.3 X10E3/UL (ref 0–0.4)
EOSINOPHIL NFR BLD AUTO: 4 %
ERYTHROCYTE [DISTWIDTH] IN BLOOD BY AUTOMATED COUNT: 15 % (ref 12.3–15.4)
EST. AVERAGE GLUCOSE BLD GHB EST-MCNC: 217 MG/DL
GFR SERPLBLD CREATININE-BSD FMLA CKD-EPI: 106 ML/MIN/1.73
GFR SERPLBLD CREATININE-BSD FMLA CKD-EPI: 92 ML/MIN/1.73
GLOBULIN SER CALC-MCNC: 3.2 G/DL (ref 1.5–4.5)
GLUCOSE SERPL-MCNC: 247 MG/DL (ref 65–99)
HBA1C MFR BLD: 9.2 % (ref 4.8–5.6)
HCT VFR BLD AUTO: 35.5 % (ref 34–46.6)
HDLC SERPL-MCNC: 40 MG/DL
HGB BLD-MCNC: 11.6 G/DL (ref 11.1–15.9)
IMM GRANULOCYTES # BLD: 0 X10E3/UL (ref 0–0.1)
IMM GRANULOCYTES NFR BLD: 0 %
LDLC SERPL CALC-MCNC: 125 MG/DL (ref 0–99)
LYMPHOCYTES # BLD AUTO: 2.8 X10E3/UL (ref 0.7–3.1)
LYMPHOCYTES NFR BLD AUTO: 36 %
MCH RBC QN AUTO: 28 PG (ref 26.6–33)
MCHC RBC AUTO-ENTMCNC: 32.7 G/DL (ref 31.5–35.7)
MCV RBC AUTO: 86 FL (ref 79–97)
MICROALBUMIN UR-MCNC: 20.1 UG/ML
MONOCYTES # BLD AUTO: 0.5 X10E3/UL (ref 0.1–0.9)
MONOCYTES NFR BLD AUTO: 7 %
NEUTROPHILS # BLD AUTO: 4.2 X10E3/UL (ref 1.4–7)
NEUTROPHILS NFR BLD AUTO: 53 %
PLATELET # BLD AUTO: 323 X10E3/UL (ref 150–379)
POTASSIUM SERPL-SCNC: 4.4 MMOL/L (ref 3.5–5.2)
PROT SERPL-MCNC: 7.1 G/DL (ref 6–8.5)
RBC # BLD AUTO: 4.15 X10E6/UL (ref 3.77–5.28)
SODIUM SERPL-SCNC: 136 MMOL/L (ref 134–144)
TRIGL SERPL-MCNC: 192 MG/DL (ref 0–149)
TSH SERPL DL<=0.005 MIU/L-ACNC: 0.97 UIU/ML (ref 0.45–4.5)
VLDLC SERPL CALC-MCNC: 38 MG/DL (ref 5–40)
WBC # BLD AUTO: 7.9 X10E3/UL (ref 3.4–10.8)

## 2018-05-03 NOTE — PROGRESS NOTES
05/03/2018, 3:52 PM Settings requested by MARYBEL Lopez per Derik Dudley MD  Set Mode to AutoSet   Set Min Pressure to 9.0 cmH2O   Set Max Pressure to 15.0 cmH2O   Set Response to Standard   Set EPR type to Fulltime   Set EPR level to 3   Set Ramp enable to Off   Set Ramp time to 5 min   Set Start pressure to 4.0 cmH2O    Previous Settings   Set EPR level to 3   Set Mode to AutoSet   Set Min Pressure to 6.0 cmH2O   Set Max Pressure to 15.0 cmH2O   Set Response to Standard   Set EPR type to Fulltime   Set Ramp enable to Off   Set Ramp time to 5 min   Set Start pressure to 4.0 cmH2O    She was asked to contact our office for any problems regarding PAP therapy.

## 2018-05-03 NOTE — PATIENT INSTRUCTIONS
[address] [place]. [place], [address]  Tel.  [phone]  Fax. [address] [facility] 60  [place], [address] [facility]  Tel.  [phone]  Fax. [phone] [address] [facility] [place]  Tel.  [phone]  Fax. [phone]     PROPER SLEEP HYGIENE    What to avoid  · Do not have drinks with caffeine, such as coffee or black tea, for 8 hours before bed. · Do not smoke or use other types of tobacco near bedtime. Nicotine is a stimulant and can keep you awake. · Avoid drinking alcohol late in the evening, because it can cause you to wake in the middle of the night. · Do not eat a big meal close to bedtime. If you are hungry, eat a light snack. · Do not drink a lot of water close to bedtime, because the need to urinate may wake you up during the night. · Do not read or watch TV in bed. Use the bed only for sleeping and sexual activity. What to try  · Go to bed at the same time every night, and wake up at the same time every morning. Do not take naps during the day. · Keep your bedroom quiet, dark, and cool. · Get regular exercise, but not within 3 to 4 hours of your bedtime. .  · Sleep on a comfortable pillow and mattress. · If watching the clock makes you anxious, turn it facing away from you so you cannot see the time. · If you worry when you lie down, start a worry book. Well before bedtime, write down your worries, and then set the book and your concerns aside. · Try meditation or other relaxation techniques before you go to bed. · If you cannot fall asleep, get up and go to another room until you feel sleepy. Do something relaxing. Repeat your bedtime routine before you go to bed again. · Make your house quiet and calm about an hour before bedtime. Turn down the lights, turn off the TV, log off the computer, and turn down the volume on music. This can help you relax after a busy day.     Drowsy Driving  The [address] [place] [address] Road Traffic Safety Administration cites drowsiness as a causing factor in more than 418,735 police reported crashes annually, resulting in 76,000 injuries and 1,500 deaths. Other surveys suggest 55% of people polled have driven while drowsy in the past year, 23% had fallen asleep but not crashed, 3% crashed, and 2% had and accident due to drowsy driving. Who is at risk? Young Drivers: One study of drowsy driving accidents states that 55% of the drivers were under 25 years. Of those, 75% were male. Shift Workers and Travelers: People who work overnight or travel across time zones frequently are at higher risk of experiencing Circadian Rhythm Disorders. They are trying to work and function when their body is programed to sleep. Sleep Deprived: Lack of sleep has a serious impact on your ability to pay attention or focus on a task. Consistently getting less than the average of 8 hours your body needs creates partial or cumulative sleep deprivation. Untreated Sleep Disorders: Sleep Apnea, Narcolepsy, R.L.S., and other sleep disorders (untreated) prevent a person from getting enough restful sleep. This leads to excessive daytime sleepiness and increases the risk for drowsy driving accidents by up to 7 times. Medications / Alcohol: Even over the counter medications can cause drowsiness. Medications that impair a drivers attention should have a warning label. Alcohol naturally makes you sleepy and on its own can cause accidents. Combined with excessive drowsiness its effects are amplified. Signs of Drowsy Driving:   * You don't remember driving the last few miles   * You may drift out of your kyaw   * You are unable to focus and your thoughts wander   * You may yawn more often than normal   * You have difficulty keeping your eyes open / nodding off   * Missing traffic signs, speeding, or tailgating  Prevention-   Good sleep hygiene, lifestyle and behavioral choices have the most impact on drowsy driving.  There is no substitute for sleep and the average person requires 8 hours nightly. If you find yourself driving drowsy, stop and sleep. Consider the sleep hygiene tips provided during your visit as well. Medication Refill Policy: Refills for all medications require 1 week advance notice. Please have your pharmacy fax a refill request. We are unable to fax, or call in \"controled substance\" medications and you will need to pick these prescriptions up from our office. SingleFeed Activation    Thank you for requesting access to SingleFeed. Please follow the instructions below to securely access and download your online medical record. SingleFeed allows you to send messages to your doctor, view your test results, renew your prescriptions, schedule appointments, and more. How Do I Sign Up? 1. In your internet browser, go to [email or web address]. 2. Click on the First Time User? Click Here link in the Sign In box. You will see the New Member Sign Up page. 3. Enter your SingleFeed Access Code exactly as it appears below. You will not need to use this code after youve completed the sign-up process. If you do not sign up before the expiration date, you must request a new code. SingleFeed Access Code: [email or web address]  Expires: 2018  3:29 PM (This is the date your SingleFeed access code will )    4. Enter the last four digits of your Social Security Number (xxxx) and Date of Birth (mm/dd/yyyy) as indicated and click Submit. You will be taken to the next sign-up page. 5. Create a SingleFeed ID. This will be your SingleFeed login ID and cannot be changed, so think of one that is secure and easy to remember. 6. Create a SingleFeed password. You can change your password at any time. 7. Enter your Password Reset Question and Answer. This can be used at a later time if you forget your password. 8. Enter your e-mail address. You will receive e-mail notification when new information is available in [address] 9. Click Sign Up.  You can now view and download portions of your medical record. 10. Click the Download Summary menu link to download a portable copy of your medical information. Additional Information    If you have questions, please call [phone]. Remember, Appsperse is NOT to be used for urgent needs. For medical emergencies, dial 911.

## 2018-05-03 NOTE — PROGRESS NOTES
[address] [place]. [place], [address]  Tel.  [phone]  Fax. 100 [facility] 60  [place], [address] [facility]  Tel.  [phone]  Fax. [phone] [address] [facility] [place]  Tel.  [phone]  Fax. [phone]     Annita Courtney is a 37 y.o. female seen for a positive airway pressure follow-up. She reports no problems using the device. The following problems are identified:    Drowsiness yes Problems exhaling no   Snoring no Forget to put on no   Mask Comfortable yes Can't fall asleep no   Dry Mouth no Mask falls off no   Air Leaking yes Frequent awakenings no     Download reviewed. She admits that her sleep has improved. Therapy Apnea Index averaged over PAP use: 0.2 /hr which reflects improved sleep breathing condition. Allergies   Allergen Reactions    Pcn [Penicillins] Hives       She has a current medication list which includes the following prescription(s): diclofenac ec, lisinopril-hydrochlorothiazide, lyrica, montelukast, metformin er, acetaminophen, meclizine, polyethylene glycol, albuterol, albuterol, and advair diskus. .      She  has a past medical history of Anemia (iron deficiency); Asthma; DDD (degenerative disc disease), lumbar (2014); Depression; DM type 2 ([facility] 2[facility] (11/23/2011); GERD (gastroesophageal reflux disease); GI bleed (2015); Hepatic steatosis (11/2016); HTN (hypertension); Irregular menses; Irritable bowel; Kidney mass; Morbid obesity ([facility] FE (obstructive sleep apnea); and PUD (peptic ulcer disease) (2015). [place] Sleepiness Score: 21   and Modified F.O.S.Q. Score Total / 2: 13   which reflect improved sleep quality over therapy time.     O>    Visit Vitals    /73    Pulse 97    Temp 97.7 °F (36.5 °C)    Ht 5' 4\" (1.626 m)    Wt 290 lb (131.5 kg)    SpO2 97%    BMI 49.78 kg/m2           General:   Alert, oriented, not in distress   Neck:   No JVD    Chest/Lungs: symetrical lung expansion , no accessory muscle use    Extremities:  no obvious rashes , negative edema    Neuro:  No focal deficits ; No obvious tremor    Psych:  Normal affect ,  Normal countenance ;         A>    ICD-10-CM ICD-9-CM    1. Obstructive sleep apnea syndrome G47.33 327.23    2. Essential hypertension I10 401.9      AHI = 13(12-17). On CPAP :  6-12 cmH2O. Compliant:      yes    Therapeutic Response:  Positive    P>      * Follow-up Disposition:  Return in about 1 year (around 5/3/2019). Adjust to 9-15 cm  * Mask evaluation done in the office - see tech notes. * She was asked to contact our office for any problems regarding PAP therapy. * Counseling was provided regarding the importance of regular PAP use and on proper sleep hygiene and safe driving. * Re-enforced proper and regular cleaning for the device. 2. Hypertension - she continues on her current regimen. I have reviewed the relationship between hypertension as it relates to sleep-disordered breathing.      Joe Brumfield MD  Diplomate in Sleep Medicine  [facility]

## 2018-05-04 ENCOUNTER — DOCUMENTATION ONLY (OUTPATIENT)
Dept: SLEEP MEDICINE | Age: 43
End: 2018-05-04

## 2018-05-08 NOTE — PROGRESS NOTES
Your labs showed normal kidney and liver tests, blood counts, thyroid, and urine protein test. Your A1c was high at 9.2%, meaning your average BS over the past 3 months was 217. The goal A1c is less than 7.0%. Dr. Esperanza Roque recommends you increase metformin 500 mg to 2 tabs twice daily and also start taking glipizide 5 mg twice daily to improve your diabetes control. Your cholesterol level was also running high. Your LDL, or bad cholesterol, was high at 125. It should be less than 100. Both diabetes and high cholesterol increase your risk of heart disease. Dr. Esperanza Roque is going to start you on a cholesterol-lowering medication called atorvastatin. [If patient is agreeable to these changes, send me rx for metformin XR 1000 mg by mouth twice daily, #60, 3 RF, glipizide 5 mg 1 tab by mouth twice daily, #60, 3 RF, and atorvastatin 10 mg 1 tab by mouth once daily, #30, 3 RF. ]

## 2018-05-09 RX ORDER — METFORMIN HYDROCHLORIDE EXTENDED-RELEASE TABLETS 1000 MG/1
1000 TABLET, FILM COATED, EXTENDED RELEASE ORAL 2 TIMES DAILY
Qty: 60 TAB | Refills: 3 | Status: SHIPPED | OUTPATIENT
Start: 2018-05-09 | End: 2018-05-14 | Stop reason: CLARIF

## 2018-05-09 RX ORDER — ATORVASTATIN CALCIUM 10 MG/1
10 TABLET, FILM COATED ORAL DAILY
Qty: 30 TAB | Refills: 3 | Status: SHIPPED | OUTPATIENT
Start: 2018-05-09 | End: 2018-09-16 | Stop reason: SDUPTHER

## 2018-05-09 RX ORDER — GLIPIZIDE 5 MG/1
5 TABLET ORAL 2 TIMES DAILY
Qty: 60 TAB | Refills: 3 | Status: SHIPPED | OUTPATIENT
Start: 2018-05-09 | End: 2018-08-02 | Stop reason: DRUGHIGH

## 2018-05-09 NOTE — PROGRESS NOTES
Spoke with patient and after verifying name and date of birth of patient gave patient test results and any instructions in note per provider. Patient stated understanding. Per Dr Yvon Camarena rx pended for metformin XR 1000 mg by mouth twice daily, #60, 3 RF, glipizide 5 mg 1 tab by mouth twice daily, #60, 3 RF, and atorvastatin 10 mg 1 tab by mouth once daily, #30, 3 RF.

## 2018-05-09 NOTE — TELEPHONE ENCOUNTER
Spoke with patient and after verifying name and date of birth of patient gave patient test results and any instructions in note per provider. Patient stated understanding. Per Dr Gar Romberg rx pended for metformin XR 1000 mg by mouth twice daily, #60, 3 RF, glipizide 5 mg 1 tab by mouth twice daily, #60, 3 RF, and atorvastatin 10 mg 1 tab by mouth once daily, #30, 3 RF.

## 2018-05-10 ENCOUNTER — DOCUMENTATION ONLY (OUTPATIENT)
Dept: INTERNAL MEDICINE CLINIC | Facility: CLINIC | Age: 43
End: 2018-05-10

## 2018-06-08 ENCOUNTER — APPOINTMENT (OUTPATIENT)
Dept: GENERAL RADIOLOGY | Age: 43
End: 2018-06-08
Attending: PHYSICIAN ASSISTANT
Payer: COMMERCIAL

## 2018-06-08 ENCOUNTER — OFFICE VISIT (OUTPATIENT)
Dept: INTERNAL MEDICINE CLINIC | Facility: CLINIC | Age: 43
End: 2018-06-08

## 2018-06-08 ENCOUNTER — HOSPITAL ENCOUNTER (EMERGENCY)
Age: 43
Discharge: HOME | End: 2018-06-08
Attending: EMERGENCY MEDICINE
Payer: COMMERCIAL

## 2018-06-08 ENCOUNTER — TELEPHONE (OUTPATIENT)
Dept: INTERNAL MEDICINE CLINIC | Facility: CLINIC | Age: 43
End: 2018-06-08

## 2018-06-08 VITALS
HEART RATE: 63 BPM | WEIGHT: 285.27 LBS | HEIGHT: 64 IN | DIASTOLIC BLOOD PRESSURE: 81 MMHG | TEMPERATURE: 98.7 F | RESPIRATION RATE: 19 BRPM | SYSTOLIC BLOOD PRESSURE: 114 MMHG | OXYGEN SATURATION: 98 % | BODY MASS INDEX: 48.7 KG/M2

## 2018-06-08 VITALS
DIASTOLIC BLOOD PRESSURE: 68 MMHG | OXYGEN SATURATION: 97 % | BODY MASS INDEX: 49.34 KG/M2 | TEMPERATURE: 98.5 F | RESPIRATION RATE: 18 BRPM | HEART RATE: 75 BPM | WEIGHT: 289 LBS | SYSTOLIC BLOOD PRESSURE: 120 MMHG | HEIGHT: 64 IN

## 2018-06-08 DIAGNOSIS — R07.9 CHEST PAIN, UNSPECIFIED TYPE: Primary | ICD-10-CM

## 2018-06-08 LAB
ALBUMIN SERPL-MCNC: 3.6 G/DL (ref 3.5–5)
ALBUMIN/GLOB SERPL: 0.8 {RATIO} (ref 1.1–2.2)
ALP SERPL-CCNC: 78 U/L (ref 45–117)
ALT SERPL-CCNC: 28 U/L (ref 12–78)
ANION GAP SERPL CALC-SCNC: 6 MMOL/L (ref 5–15)
APPEARANCE UR: CLEAR
AST SERPL-CCNC: 13 U/L (ref 15–37)
ATRIAL RATE: 65 BPM
BACTERIA URNS QL MICRO: NEGATIVE /HPF
BASOPHILS # BLD: 0 K/UL (ref 0–0.1)
BASOPHILS NFR BLD: 0 % (ref 0–1)
BILIRUB SERPL-MCNC: 0.2 MG/DL (ref 0.2–1)
BILIRUB UR QL: NEGATIVE
BUN SERPL-MCNC: 12 MG/DL (ref 6–20)
BUN/CREAT SERPL: 13 (ref 12–20)
CALCIUM SERPL-MCNC: 9.5 MG/DL (ref 8.5–10.1)
CALCULATED P AXIS, ECG09: 34 DEGREES
CALCULATED R AXIS, ECG10: 21 DEGREES
CALCULATED T AXIS, ECG11: 7 DEGREES
CHLORIDE SERPL-SCNC: 104 MMOL/L (ref 97–108)
CO2 SERPL-SCNC: 28 MMOL/L (ref 21–32)
COLOR UR: NORMAL
CREAT SERPL-MCNC: 0.96 MG/DL (ref 0.55–1.02)
D DIMER PPP FEU-MCNC: 0.43 MG/L FEU (ref 0–0.65)
DIAGNOSIS, 93000: NORMAL
DIFFERENTIAL METHOD BLD: ABNORMAL
EOSINOPHIL # BLD: 0.3 K/UL (ref 0–0.4)
EOSINOPHIL NFR BLD: 4 % (ref 0–7)
EPITH CASTS URNS QL MICRO: NORMAL /LPF
ERYTHROCYTE [DISTWIDTH] IN BLOOD BY AUTOMATED COUNT: 14 % (ref 11.5–14.5)
GLOBULIN SER CALC-MCNC: 4.5 G/DL (ref 2–4)
GLUCOSE SERPL-MCNC: 103 MG/DL (ref 65–100)
GLUCOSE UR STRIP.AUTO-MCNC: NEGATIVE MG/DL
HCT VFR BLD AUTO: 36 % (ref 35–47)
HGB BLD-MCNC: 11.7 G/DL (ref 11.5–16)
HGB UR QL STRIP: NEGATIVE
HYALINE CASTS URNS QL MICRO: NORMAL /LPF (ref 0–5)
IMM GRANULOCYTES # BLD: 0 K/UL (ref 0–0.04)
IMM GRANULOCYTES NFR BLD AUTO: 0 % (ref 0–0.5)
KETONES UR QL STRIP.AUTO: NEGATIVE MG/DL
LEUKOCYTE ESTERASE UR QL STRIP.AUTO: NEGATIVE
LIPASE SERPL-CCNC: 82 U/L (ref 73–393)
LYMPHOCYTES # BLD: 3.8 K/UL (ref 0.8–3.5)
LYMPHOCYTES NFR BLD: 42 % (ref 12–49)
MCH RBC QN AUTO: 28.1 PG (ref 26–34)
MCHC RBC AUTO-ENTMCNC: 32.5 G/DL (ref 30–36.5)
MCV RBC AUTO: 86.5 FL (ref 80–99)
MONOCYTES # BLD: 0.6 K/UL (ref 0–1)
MONOCYTES NFR BLD: 7 % (ref 5–13)
NEUTS SEG # BLD: 4.3 K/UL (ref 1.8–8)
NEUTS SEG NFR BLD: 47 % (ref 32–75)
NITRITE UR QL STRIP.AUTO: NEGATIVE
NRBC # BLD: 0 K/UL (ref 0–0.01)
NRBC BLD-RTO: 0 PER 100 WBC
P-R INTERVAL, ECG05: 158 MS
PH UR STRIP: 6 [PH] (ref 5–8)
PLATELET # BLD AUTO: 281 K/UL (ref 150–400)
PMV BLD AUTO: 11 FL (ref 8.9–12.9)
POTASSIUM SERPL-SCNC: 4 MMOL/L (ref 3.5–5.1)
PROT SERPL-MCNC: 8.1 G/DL (ref 6.4–8.2)
PROT UR STRIP-MCNC: NEGATIVE MG/DL
Q-T INTERVAL, ECG07: 406 MS
QRS DURATION, ECG06: 74 MS
QTC CALCULATION (BEZET), ECG08: 422 MS
RBC # BLD AUTO: 4.16 M/UL (ref 3.8–5.2)
RBC #/AREA URNS HPF: NORMAL /HPF (ref 0–5)
SODIUM SERPL-SCNC: 138 MMOL/L (ref 136–145)
SP GR UR REFRACTOMETRY: 1.02 (ref 1–1.03)
TROPONIN I SERPL-MCNC: <0.04 NG/ML
TROPONIN I SERPL-MCNC: <0.04 NG/ML
UA: UC IF INDICATED,UAUC: NORMAL
UROBILINOGEN UR QL STRIP.AUTO: 0.2 EU/DL (ref 0.2–1)
VENTRICULAR RATE, ECG03: 65 BPM
WBC # BLD AUTO: 9.1 K/UL (ref 3.6–11)
WBC URNS QL MICRO: NORMAL /HPF (ref 0–4)

## 2018-06-08 PROCEDURE — 36415 COLL VENOUS BLD VENIPUNCTURE: CPT | Performed by: EMERGENCY MEDICINE

## 2018-06-08 PROCEDURE — 85025 COMPLETE CBC W/AUTO DIFF WBC: CPT | Performed by: PHYSICIAN ASSISTANT

## 2018-06-08 PROCEDURE — 71046 X-RAY EXAM CHEST 2 VIEWS: CPT

## 2018-06-08 PROCEDURE — 84484 ASSAY OF TROPONIN QUANT: CPT | Performed by: PHYSICIAN ASSISTANT

## 2018-06-08 PROCEDURE — 74011250637 HC RX REV CODE- 250/637: Performed by: PHYSICIAN ASSISTANT

## 2018-06-08 PROCEDURE — 81001 URINALYSIS AUTO W/SCOPE: CPT | Performed by: PHYSICIAN ASSISTANT

## 2018-06-08 PROCEDURE — 93005 ELECTROCARDIOGRAM TRACING: CPT

## 2018-06-08 PROCEDURE — 80053 COMPREHEN METABOLIC PANEL: CPT | Performed by: PHYSICIAN ASSISTANT

## 2018-06-08 PROCEDURE — 85379 FIBRIN DEGRADATION QUANT: CPT | Performed by: EMERGENCY MEDICINE

## 2018-06-08 PROCEDURE — 83690 ASSAY OF LIPASE: CPT | Performed by: PHYSICIAN ASSISTANT

## 2018-06-08 PROCEDURE — 99285 EMERGENCY DEPT VISIT HI MDM: CPT

## 2018-06-08 RX ORDER — GUAIFENESIN 100 MG/5ML
162 LIQUID (ML) ORAL DAILY
Qty: 100 TAB | Refills: 0 | Status: SHIPPED | OUTPATIENT
Start: 2018-06-08 | End: 2019-06-18

## 2018-06-08 RX ORDER — NITROGLYCERIN 0.4 MG/1
0.4 TABLET SUBLINGUAL
Qty: 1 BOTTLE | Refills: 0 | Status: SHIPPED | OUTPATIENT
Start: 2018-06-08

## 2018-06-08 RX ORDER — HYDROCODONE BITARTRATE AND ACETAMINOPHEN 5; 325 MG/1; MG/1
1 TABLET ORAL
Status: COMPLETED | OUTPATIENT
Start: 2018-06-08 | End: 2018-06-08

## 2018-06-08 RX ADMIN — HYDROCODONE BITARTRATE AND ACETAMINOPHEN 1 TABLET: 5; 325 TABLET ORAL at 14:02

## 2018-06-08 NOTE — ED NOTES
Discharge instructions reviewed with pt. Discharge instructions given to pt per Dr. Shy Kendrick. Pt able to return/verbalize discharge instructions. Copy of discharge instructions given. Pt condition stable, respiratory status within normal limits, neuro status intact. Pt ambulatory out of ER, accompanied by .

## 2018-06-08 NOTE — ED NOTES
Bedside and Verbal shift change report given to [address] [facility] (oncoming nurse) by Megan Yan (offgoing nurse). Report included the following information SBAR, ED Summary, Intake/Output, MAR and Recent Results. Pt on monitor x 3. Call bell in reach. Pt updated on plan of care.

## 2018-06-08 NOTE — TELEPHONE ENCOUNTER
Patient called with c/o chest pain and pain down left arm. Also has pain in her mid back. She has had this pain since yesterday and states she first thought it was indigestion. She states she has been using her inhaler more than usual. Sounds somewhat distressed. Advised ER. Her  will take her to the ER, if pain increases she will call 911. Patient stated understanding.

## 2018-06-08 NOTE — LETTER
[address] 
[facility] EMERGENCY DEPT 
[address] [place] [address] [address] [id number] [phone] Work/School Note Date: 6/8/2018 To Whom It May concern: 
 
Fayrene Divine was seen and treated today in the emergency room by the following provider(s): 
Attending Provider: Jennifer Jaquez MD.   
 
Fayrene Divine No work till 6/10/18 Sincerely, Jennifer Jaquez MD

## 2018-06-08 NOTE — DISCHARGE INSTRUCTIONS
Chest Pain: Care Instructions  Your Care Instructions    There are many things that can cause chest pain. Some are not serious and will get better on their own in a few days. But some kinds of chest pain need more testing and treatment. Your doctor may have recommended a follow-up visit in the next 8 to 12 hours. If you are not getting better, you may need more tests or treatment. Even though your doctor has released you, you still need to watch for any problems. The doctor carefully checked you, but sometimes problems can develop later. If you have new symptoms or if your symptoms do not get better, get medical care right away. If you have worse or different chest pain or pressure that lasts more than 5 minutes or you passed out (lost consciousness), call 911 or seek other emergency help right away. A medical visit is only one step in your treatment. Even if you feel better, you still need to do what your doctor recommends, such as going to all suggested follow-up appointments and taking medicines exactly as directed. This will help you recover and help prevent future problems. How can you care for yourself at home? · Rest until you feel better. · Take your medicine exactly as prescribed. Call your doctor if you think you are having a problem with your medicine. · Do not drive after taking a prescription pain medicine. When should you call for help? Call 911 if:  ? · You passed out (lost consciousness). ? · You have severe difficulty breathing. ? · You have symptoms of a heart attack. These may include:  ¨ Chest pain or pressure, or a strange feeling in your chest.  ¨ Sweating. ¨ Shortness of breath. ¨ Nausea or vomiting. ¨ Pain, pressure, or a strange feeling in your back, neck, jaw, or upper belly or in one or both shoulders or arms. ¨ Lightheadedness or sudden weakness. ¨ A fast or irregular heartbeat.   After you call 911, the  may tell you to chew 1 adult-strength or 2 to 4 low-dose aspirin. Wait for an ambulance. Do not try to drive yourself. ?Call your doctor today if:  ? · You have any trouble breathing. ? · Your chest pain gets worse. ? · You are dizzy or lightheaded, or you feel like you may faint. ? · You are not getting better as expected. ? · You are having new or different chest pain. Where can you learn more? Go to http://camelia-konrad.info/. Enter A120 in the search box to learn more about \"Chest Pain: Care Instructions. \"  Current as of: 2017  Content Version: 11.4  © 2593-0429 [employer]. Care instructions adapted under license by [employer]Me (which disclaims liability or warranty for this information). If you have questions about a medical condition or this instruction, always ask your healthcare professional. [address] any warranty or liability for your use of this information. Sverhmarket Activation    Thank you for requesting access to Sverhmarket. Please follow the instructions below to securely access and download your online medical record. Sverhmarket allows you to send messages to your doctor, view your test results, renew your prescriptions, schedule appointments, and more. How Do I Sign Up? 1. In your internet browser, go to [email or web address]  2. Click on the First Time User? Click Here link in the Sign In box. You will be redirect to the New Member Sign Up page. 3. Enter your Sverhmarket Access Code exactly as it appears below. You will not need to use this code after youve completed the sign-up process. If you do not sign up before the expiration date, you must request a new code. Sverhmarket Access Code: [email or web address]  Expires: 2018  3:29 PM (This is the date your Sverhmarket access code will )    4. Enter the last four digits of your Social Security Number (xxxx) and Date of Birth (mm/dd/yyyy) as indicated and click Submit.  You will be taken to the next sign-up page.  5. Create a Voltt ID. This will be your Flybits login ID and cannot be changed, so think of one that is secure and easy to remember. 6. Create a Flybits password. You can change your password at any time. 7. Enter your Password Reset Question and Answer. This can be used at a later time if you forget your password. 8. Enter your e-mail address. You will receive e-mail notification when new information is available in [address] 9. Click Sign Up. You can now view and download portions of your medical record. 10. Click the Download Summary menu link to download a portable copy of your medical information. Additional Information    If you have questions, please visit the Frequently Asked Questions section of the Flybits website at [email or web address] Remember, Flybits is NOT to be used for urgent needs. For medical emergencies, dial 911.

## 2018-06-08 NOTE — PATIENT INSTRUCTIONS
Chest Pain: Care Instructions  Your Care Instructions    There are many things that can cause chest pain. Some are not serious and will get better on their own in a few days. But some kinds of chest pain need more testing and treatment. Your doctor may have recommended a follow-up visit in the next 8 to 12 hours. If you are not getting better, you may need more tests or treatment. Even though your doctor has released you, you still need to watch for any problems. The doctor carefully checked you, but sometimes problems can develop later. If you have new symptoms or if your symptoms do not get better, get medical care right away. If you have worse or different chest pain or pressure that lasts more than 5 minutes or you passed out (lost consciousness), call 911 or seek other emergency help right away. A medical visit is only one step in your treatment. Even if you feel better, you still need to do what your doctor recommends, such as going to all suggested follow-up appointments and taking medicines exactly as directed. This will help you recover and help prevent future problems. How can you care for yourself at home? · Rest until you feel better. · Take your medicine exactly as prescribed. Call your doctor if you think you are having a problem with your medicine. · Do not drive after taking a prescription pain medicine. When should you call for help? Call 911 if:  ? · You passed out (lost consciousness). ? · You have severe difficulty breathing. ? · You have symptoms of a heart attack. These may include:  ¨ Chest pain or pressure, or a strange feeling in your chest.  ¨ Sweating. ¨ Shortness of breath. ¨ Nausea or vomiting. ¨ Pain, pressure, or a strange feeling in your back, neck, jaw, or upper belly or in one or both shoulders or arms. ¨ Lightheadedness or sudden weakness. ¨ A fast or irregular heartbeat.   After you call 911, the  may tell you to chew 1 adult-strength or 2 to 4 low-dose aspirin. Wait for an ambulance. Do not try to drive yourself. ?Call your doctor today if:  ? · You have any trouble breathing. ? · Your chest pain gets worse. ? · You are dizzy or lightheaded, or you feel like you may faint. ? · You are not getting better as expected. ? · You are having new or different chest pain. Where can you learn more? Go to http://camelia-konrad.info/. Enter A120 in the search box to learn more about \"Chest Pain: Care Instructions. \"  Current as of: March 20, 2017  Content Version: 11.4  © 0399-8013 [employer]. Care instructions adapted under license by [employer] (which disclaims liability or warranty for this information). If you have questions about a medical condition or this instruction, always ask your healthcare professional. [address] any warranty or liability for your use of this information.

## 2018-06-08 NOTE — MR AVS SNAPSHOT
[address] [address] [phone] Patient: Christel Nielsen MRN: [id number]:[id number][id number] Visit Information Date & Time Provider Department Dept. Phone Encounter #  
 6/8/2018 11:30 AM Dany Boone MD [facility] Internal Medicine of [address] [place] [address] [id number] Follow-up Instructions Return in about 4 days (around 6/12/2018), or if symptoms worsen or fail to improve, for Chest pain. Your Appointments 8/2/2018  8:10 AM  
ESTABLISHED PATIENT with Dany Boone MD  
[facility] Internal Medicine of [facility]) Appt Note: Dm Htn Weight $25cp 05.02.18 Kg  
 [address] 
[facility] [address] [phone]  
  
   
 [address] [facility] Upcoming Health Maintenance Date Due  
 BREAST CANCER SCRN MAMMOGRAM 3/2/1991 EYE EXAM RETINAL OR DILATED Q1 10/14/2015 PAP AKA CERVICAL CYTOLOGY 6/13/2017 Influenza Age 5 to Adult 8/1/2018 HEMOGLOBIN A1C Q6M 11/2/2018 FOOT EXAM Q1 5/2/2019 MICROALBUMIN Q1 5/2/2019 LIPID PANEL Q1 5/2/2019 DTaP/Tdap/Td series (2 - Td) 5/2/2028 Allergies as of 6/8/2018  Review Complete On: 6/8/2018 By: Dany Boone MD  
  
 Severity Noted Reaction Type Reactions Pcn [Penicillins]  05/13/2010    Hives Current Immunizations  Reviewed on 10/3/2016 Name Date Influenza Vaccine 9/29/2016, 10/5/2015, 10/21/2014 Influenza Vaccine Split 1/1/2011 Pneumococcal Vaccine (Unspecified Type) 9/29/2016 Tdap 5/2/2018 ZZZ-RETIRED (DO NOT USE) Pneumococcal Vaccine (Unspecified Type) 3/31/2011 Not reviewed this visit You Were Diagnosed With   
  
 Codes Comments Chest pain, unspecified type    -  Primary ICD-10-CM: R07.9 ICD-9-CM: 786.50 Vitals BP Pulse Temp Resp Height(growth percentile) Weight(growth percentile)  120/68 (BP 1 Location: Left arm, BP Patient Position: Sitting) 75 98.5 °F (36.9 °C) (Oral) 18 5' 4\" (1.626 m) 289 lb (131.1 kg) SpO2 BMI OB Status Smoking Status 97% 49.61 kg/m2 Ablation Never Smoker BMI and BSA Data Body Mass Index Body Surface Area  
 49.61 kg/m 2 2.43 m 2 Preferred Pharmacy Pharmacy Name Phone [facility] [phone] Your Updated Medication List  
  
   
This list is accurate as of 6/8/18 11:55 AM.  Always use your most recent med list.  
  
  
  
  
 ADVAIR DISKUS 250-50 mcg/dose diskus inhaler Generic drug:  fluticasone-salmeterol INHALE ONE DOSE BY MOUTH TWICE DAILY FOR  ASTHMA  PREVENTION  
  
 * albuterol 90 mcg/actuation inhaler Commonly known as:  VENTOLIN HFA Take 2 Puffs by inhalation every six (6) hours as needed for Wheezing. * albuterol 2.5 mg /3 mL (0.083 %) nebulizer solution Commonly known as:  PROVENTIL VENTOLIN  
USE ONE VIAL IN NEBULIZER EVERY 4 HOURS AS NEEDED FOR WHEEZING  
  
 atorvastatin 10 mg tablet Commonly known as:  LIPITOR Take 1 Tab by mouth daily. Indications: hyperlipidemia  
  
 glipiZIDE 5 mg tablet Commonly known as:  Donzell Schooner Take 1 Tab by mouth two (2) times a day. Indications: type 2 diabetes mellitus  
  
 lisinopril-hydroCHLOROthiazide 10-12.5 mg per tablet Commonly known as:  PRINZIDE, ZESTORETIC  
TAKE ONE TABLET BY MOUTH ONCE DAILY FOR BLOOD PRESSURE  
  
 LYRICA 150 mg capsule Generic drug:  pregabalin TAKE ONE CAPSULE BY MOUTH TWICE DAILY *MAX  DAILY AMOUNT 300 MG*  
  
 meclizine 25 mg tablet Commonly known as:  ANTIVERT Take 1 Tab by mouth three (3) times daily as needed. metFORMIN  mg tablet Commonly known as:  GLUCOPHAGE XR Take 2 Tabs by mouth two (2) times a day. montelukast 10 mg tablet Commonly known as:  SINGULAIR  
TAKE ONE TABLET BY MOUTH ONCE DAILY FOR  ALLERGIES  AND  ASTHMA polyethylene glycol 17 gram/dose powder Commonly known as:  Breezy Goodson Take 17 g by mouth daily as needed. TYLENOL ARTHRITIS PAIN 650 mg Norma Mckeon Generic drug:  acetaminophen Take 650 mg by mouth every six (6) hours as needed for Pain. * Notice: This list has 2 medication(s) that are the same as other medications prescribed for you. Read the directions carefully, and ask your doctor or other care provider to review them with you. We Performed the Following AMB POC EKG ROUTINE W/ 12 LEADS, INTER & REP [73593 CPT(R)] Follow-up Instructions Return in about 4 days (around 6/12/2018), or if symptoms worsen or fail to improve, for Chest pain. Patient Instructions Chest Pain: Care Instructions Your Care Instructions There are many things that can cause chest pain. Some are not serious and will get better on their own in a few days. But some kinds of chest pain need more testing and treatment. Your doctor may have recommended a follow-up visit in the next 8 to 12 hours. If you are not getting better, you may need more tests or treatment. Even though your doctor has released you, you still need to watch for any problems. The doctor carefully checked you, but sometimes problems can develop later. If you have new symptoms or if your symptoms do not get better, get medical care right away. If you have worse or different chest pain or pressure that lasts more than 5 minutes or you passed out (lost consciousness), call 911 or seek other emergency help right away. A medical visit is only one step in your treatment. Even if you feel better, you still need to do what your doctor recommends, such as going to all suggested follow-up appointments and taking medicines exactly as directed. This will help you recover and help prevent future problems. How can you care for yourself at home? · Rest until you feel better. · Take your medicine exactly as prescribed. Call your doctor if you think you are having a problem with your medicine. · Do not drive after taking a prescription pain medicine. When should you call for help? Call 911 if: 
? · You passed out (lost consciousness). ? · You have severe difficulty breathing. ? · You have symptoms of a heart attack. These may include: ¨ Chest pain or pressure, or a strange feeling in your chest. 
¨ Sweating. ¨ Shortness of breath. ¨ Nausea or vomiting. ¨ Pain, pressure, or a strange feeling in your back, neck, jaw, or upper belly or in one or both shoulders or arms. ¨ Lightheadedness or sudden weakness. ¨ A fast or irregular heartbeat. After you call 911, the  may tell you to chew 1 adult-strength or 2 to 4 low-dose aspirin. Wait for an ambulance. Do not try to drive yourself. ?Call your doctor today if: 
? · You have any trouble breathing. ? · Your chest pain gets worse. ? · You are dizzy or lightheaded, or you feel like you may faint. ? · You are not getting better as expected. ? · You are having new or different chest pain. Where can you learn more? Go to http://camelia-konrad.info/. Enter A120 in the search box to learn more about \"Chest Pain: Care Instructions. \" Current as of: March 20, 2017 Content Version: 11.4 © 7397-4031 [employer]. Care instructions adapted under license by [employer] (which disclaims liability or warranty for this information). If you have questions about a medical condition or this instruction, always ask your healthcare professional. [address] [address] any warranty or liability for your use of this information. Introducing [facility]! [facility] introduces A[employer] patient portal. Now you can access parts of your medical record, email your doctor's office, and request medication refills online. 1. In your internet browser, go to [email or web address]. [employer] 2. Click on the First Time User? Click Here link in the Sign In box.  You will see the New Member Sign Up page. 3. Enter your [employer] Access Code exactly as it appears below. You will not need to use this code after youve completed the sign-up process. If you do not sign up before the expiration date, you must request a new code. · [employer] Access Code: [email or web address] Expires: 8/1/2018  3:29 PM 
 
4. Enter the last four digits of your Social Security Number (xxxx) and Date of Birth (mm/dd/yyyy) as indicated and click Submit. You will be taken to the next sign-up page. 5. Create a [employer] ID. This will be your [employer] login ID and cannot be changed, so think of one that is secure and easy to remember. 6. Create a [employer] password. You can change your password at any time. 7. Enter your Password Reset Question and Answer. This can be used at a later time if you forget your password. 8. Enter your e-mail address. You will receive e-mail notification when new information is available in [address]. 9. Click Sign Up. You can now view and download portions of your medical record. 10. Click the Download Summary menu link to download a portable copy of your medical information. If you have questions, please visit the Frequently Asked Questions section of the [employer] website. Remember, [employer] is NOT to be used for urgent needs. For medical emergencies, dial 911. Now available from your iPhone and Android! Please provide this summary of care documentation to your next provider. Your primary care clinician is listed as Lyly Multani. If you have any questions after today's visit, please call [phone].

## 2018-06-08 NOTE — PROGRESS NOTES
Gabriella Jung  Identified pt with two pt identifiers(name and ). Chief Complaint   Patient presents with    Chest Pain       1. Have you been to the ER, urgent care clinic since your last visit? Hospitalized since your last visit? NO    2. Have you seen or consulted any other health care providers outside of the [facility] since your last visit? Include any pap smears or colon screening. NO    Today's provider has been notified of reason for visit, vitals and flowsheets obtained on patients.      Patient received paperwork for advance directive during previous visit but has not completed at this time     Reviewed record In preparation for visit, huddled with provider and have obtained necessary documentation      Health Maintenance Due   Topic    BREAST CANCER SCRN MAMMOGRAM     EYE EXAM RETINAL OR DILATED Q1     PAP AKA CERVICAL CYTOLOGY        Wt Readings from Last 3 Encounters:   18 289 lb (131.1 kg)   18 290 lb (131.5 kg)   18 288 lb (130.6 kg)     Temp Readings from Last 3 Encounters:   18 98.5 °F (36.9 °C) (Oral)   18 97.7 °F (36.5 °C)   18 98.1 °F (36.7 °C) (Oral)     BP Readings from Last 3 Encounters:   18 120/68   18 124/73   18 119/57     Pulse Readings from Last 3 Encounters:   18 75   18 97   18 69     Vitals:    18 1123   BP: 120/68   Pulse: 75   Resp: 18   Temp: 98.5 °F (36.9 °C)   TempSrc: Oral   SpO2: 97%   Weight: 289 lb (131.1 kg)   Height: 5' 4\" (1.626 m)   PainSc:   6         Learning Assessment:  :     Learning Assessment 2018   PRIMARY LEARNER Patient Patient Patient   HIGHEST LEVEL OF EDUCATION - PRIMARY LEARNER  - > 4 YEARS OF COLLEGE -   BARRIERS PRIMARY LEARNER - NONE -   [address]  CAREGIVER - No -   PRIMARY LANGUAGE ENGLISH ENGLISH ENGLISH   LEARNER PREFERENCE PRIMARY READING OTHER (COMMENT) LISTENING   ANSWERED BY patient patient self     - - self RELATIONSHIP SELF SELF SELF       Depression Screening:  :     No flowsheet data found. Fall Risk Assessment:  :     Fall Risk Assessment, last 12 mths 8/3/2017   Able to walk? Yes   Fall in past 12 months? No       Abuse Screening:  :     Abuse Screening Questionnaire 5/9/2014   Do you ever feel afraid of your partner? N   Are you in a relationship with someone who physically or mentally threatens you? N   Is it safe for you to go home? Y       ADL Screening:  :     ADL Assessment 5/2/2018   Feeding yourself No Help Needed   Getting from bed to chair No Help Needed   Getting dressed No Help Needed   Bathing or showering No Help Needed   Walk across the room (includes cane/walker) No Help Needed   Using the telphone No Help Needed   Taking your medications No Help Needed   Preparing meals No Help Needed   Managing money (expenses/bills) No Help Needed   Moderately strenuous housework (laundry) No Help Needed   Shopping for personal items (toiletries/medicines) No Help Needed   Shopping for groceries No Help Needed   Driving No Help Needed   Climbing a flight of stairs No Help Needed   Getting to places beyond walking distances No Help Needed           Per Dr. Florina Lancaster verbal order read back orders placed for EKG. Medication reconciliation up to date and corrected with patient at this time.

## 2018-06-08 NOTE — PROGRESS NOTES
CC:   Chief Complaint   Patient presents with    Chest Pain       HISTORY OF PRESENT ILLNESS  Marcia Stout is a 37 y.o. female. Patient complains of 4 day history of chest pain. Located under left breast, radiates to left arm, 10/10 in severity, throbbing in character, occurs at rest any time of the day, has associated dyspnea when it occurs, lasts for about 5 minutes at a time but recurs several times for over an hour. No alleviating factors. Denies new lightheadedness, heart palpitations, or nausea. Has had some regurgitation and increased burping over the past few days; no relief with omeprazole. Has family history of CAD; her father had 2 heart attacks. Denies recent prolonged car or airplane trips. PMH:  She has type 2 DM with neuropathy, HTN, asthma, allergic rhinitis, FE, hyperlipidemia, and depression. Soc Hx  . Has 2 children (ages 32 and 23). Works as the  of [employer] for [employer]. Never smoker. Does not drink alcohol. Denies recreational drug use. Walks occasionally for exercise. Drinks unsweetened tea; denies coffee or sodas.     Patient Active Problem List   Diagnosis Code    HTN (hypertension) I10    Depression F32.9    FE (obstructive sleep apnea) G47.33    Asthma J45.909    Iron deficiency anemia D50.9    Hyperlipidemia E78.5    Obesity, morbid, BMI 50 or higher ([address] E66.01    Type II diabetes mellitus, uncontrolled ([address] E11.65     Past Medical History:   Diagnosis Date    Anemia (iron deficiency)     Asthma     DDD (degenerative disc disease), lumbar 2014    Depression     DM type 2 (diabetes mellitus, type 2) ([address] 11/23/2011    GERD (gastroesophageal reflux disease)     GI bleed 2015    Hepatic steatosis 11/2016    confirmed by US    HTN (hypertension)     Irregular menses     Irritable bowel     Kidney mass     To right kidney    Morbid obesity ([address]     FE (obstructive sleep apnea)     w/ Cpap    PUD (peptic ulcer disease) 2015     Allergies   Allergen Reactions    Pcn [Penicillins] Hives       Current Outpatient Prescriptions   Medication Sig Dispense Refill    metFORMIN ER (GLUCOPHAGE XR) 500 mg tablet Take 2 Tabs by mouth two (2) times a day. 120 Tab 5    glipiZIDE (GLUCOTROL) 5 mg tablet Take 1 Tab by mouth two (2) times a day. Indications: type 2 diabetes mellitus 60 Tab 3    atorvastatin (LIPITOR) 10 mg tablet Take 1 Tab by mouth daily. Indications: hyperlipidemia 30 Tab 3    lisinopril-hydroCHLOROthiazide (PRINZIDE, ZESTORETIC) 10-12.5 mg per tablet TAKE ONE TABLET BY MOUTH ONCE DAILY FOR BLOOD PRESSURE 30 Tab 5    albuterol (PROVENTIL VENTOLIN) 2.5 mg /3 mL (0.083 %) nebulizer solution USE ONE VIAL IN NEBULIZER EVERY 4 HOURS AS NEEDED FOR WHEEZING 30 Each 3    LYRICA 150 mg capsule TAKE ONE CAPSULE BY MOUTH TWICE DAILY *MAX  DAILY AMOUNT 300 MG* 60 Cap 5    albuterol (VENTOLIN HFA) 90 mcg/actuation inhaler Take 2 Puffs by inhalation every six (6) hours as needed for Wheezing. 2 Inhaler 5    ADVAIR DISKUS 250-50 mcg/dose diskus inhaler INHALE ONE DOSE BY MOUTH TWICE DAILY FOR  ASTHMA  PREVENTION 1 Inhaler 5    montelukast (SINGULAIR) 10 mg tablet TAKE ONE TABLET BY MOUTH ONCE DAILY FOR  ALLERGIES  AND  ASTHMA 30 Tab 5    acetaminophen (TYLENOL ARTHRITIS PAIN) 650 mg CR tablet Take 650 mg by mouth every six (6) hours as needed for Pain.  meclizine (ANTIVERT) 25 mg tablet Take 1 Tab by mouth three (3) times daily as needed. 20 Tab 1    polyethylene glycol (MIRALAX) 17 gram/dose powder Take 17 g by mouth daily as needed. PHYSICAL EXAM  Visit Vitals    /68 (BP 1 Location: Left arm, BP Patient Position: Sitting)    Pulse 75    Temp 98.5 °F (36.9 °C) (Oral)    Resp 18    Ht 5' 4\" (1.626 m)    Wt 289 lb (131.1 kg)    SpO2 97%    BMI 49.61 kg/m2       General: Morbidly obese, no distress.   HEENT:  Head normocephalic/atraumatic, no scleral icterus  Lungs:  Clear to ausculation bilaterally. Good air movement. Chest Wall: No tenderness. Heart:  Regular rate and rhythm, normal S1 and S2, no murmur, gallop, or rub  Abdomen: Soft, obese, normal bowel sounds, mild epigastric tenderness, no guarding, masses, rebound tenderness, or HSM. Extremities: No clubbing, cyanosis, or edema. Neurological: Alert and oriented. Psychiatric: Normal mood and affect. Behavior is normal.     Results for orders placed or performed in visit on 05/02/18   LIPID PANEL   Result Value Ref Range    Cholesterol, total 203 (H) 100 - 199 mg/dL    Triglyceride 192 (H) 0 - 149 mg/dL    HDL Cholesterol 40 >39 mg/dL    VLDL, calculated 38 5 - 40 mg/dL    LDL, calculated 125 (H) 0 - 99 mg/dL   METABOLIC PANEL, COMPREHENSIVE   Result Value Ref Range    Glucose 247 (H) 65 - 99 mg/dL    BUN 12 6 - 24 mg/dL    Creatinine 0.78 0.57 - 1.00 mg/dL    GFR est non-AA 92 >59 mL/min/1.73    GFR est  >59 mL/min/1.73    BUN/Creatinine ratio 15 9 - 23    Sodium 136 134 - 144 mmol/L    Potassium 4.4 3.5 - 5.2 mmol/L    Chloride 96 96 - 106 mmol/L    CO2 25 18 - 29 mmol/L    Calcium 9.4 8.7 - 10.2 mg/dL    Protein, total 7.1 6.0 - 8.5 g/dL    Albumin 3.9 3.5 - 5.5 g/dL    GLOBULIN, TOTAL 3.2 1.5 - 4.5 g/dL    A-G Ratio 1.2 1.2 - 2.2    Bilirubin, total 0.2 0.0 - 1.2 mg/dL    Alk. phosphatase 73 39 - 117 IU/L    AST (SGOT) 22 0 - 40 IU/L    ALT (SGPT) 29 0 - 32 IU/L   CBC WITH AUTOMATED DIFF   Result Value Ref Range    WBC 7.9 3.4 - 10.8 x10E3/uL    RBC 4.15 3.77 - 5.28 x10E6/uL    HGB 11.6 11.1 - 15.9 g/dL    HCT 35.5 34.0 - 46.6 %    MCV 86 79 - 97 fL    MCH 28.0 26.6 - 33.0 pg    MCHC 32.7 31.5 - 35.7 g/dL    RDW 15.0 12.3 - 15.4 %    PLATELET 183 345 - 962 x10E3/uL    NEUTROPHILS 53 Not Estab. %    Lymphocytes 36 Not Estab. %    MONOCYTES 7 Not Estab. %    EOSINOPHILS 4 Not Estab. %    BASOPHILS 0 Not Estab. %    ABS. NEUTROPHILS 4.2 1.4 - 7.0 x10E3/uL    Abs Lymphocytes 2.8 0.7 - 3.1 x10E3/uL    ABS.  MONOCYTES 0.5 0.1 - 0.9 x10E3/uL    ABS. EOSINOPHILS 0.3 0.0 - 0.4 x10E3/uL    ABS. BASOPHILS 0.0 0.0 - 0.2 x10E3/uL    IMMATURE GRANULOCYTES 0 Not Estab. %    ABS. IMM. GRANS. 0.0 0.0 - 0.1 x10E3/uL   MICROALBUMIN, UR, RAND W/ MICROALB/CREAT RATIO   Result Value Ref Range    Creatinine, urine 164.1 Not Estab. mg/dL    Microalbumin, urine 20.1 Not Estab. ug/mL    Microalb/Creat ratio (ug/mg creat.) 12.2 0.0 - 30.0 mg/g creat   HEMOGLOBIN A1C WITH EAG   Result Value Ref Range    Hemoglobin A1c 9.2 (H) 4.8 - 5.6 %    Estimated average glucose 217 mg/dL   TSH RFX ON ABNORMAL TO FREE T4   Result Value Ref Range    TSH 0.969 0.450 - 4.500 uIU/mL       EKG today: NSR at 69 bpm. Flat T waves. Unchanged from EKG dated 2/22/17. ASSESSMENT AND PLAN    ICD-10-CM ICD-9-CM    1. Chest pain, unspecified type R07.9 786.50 AMB POC EKG ROUTINE W/ 12 LEADS, INTER & REP     Diagnoses and all orders for this visit:    1. Chest pain, unspecified type  Will send to [facility] ED to rule out MI or unstable angina. She has several cardiac risk factors including DM, HTN, hyperlipidemia, family history of CAD, and obesity. Given  mg in clinic.  -     AMB POC EKG ROUTINE W/ 12 LEADS, INTER & REP      Follow-up Disposition:  Return in about 4 days (around 6/12/2018), or if symptoms worsen or fail to improve, for Chest pain. Provided patient and/or family with advanced directive information and answered pertinent questions. Encouraged patient to provide a copy of advanced directive to the office when available. I have discussed the diagnosis with the patient and the intended plan as seen in the above orders. Patient is in agreement. The patient has received an after-visit summary and questions were answered concerning future plans. I have discussed medication side effects and warnings with the patient as well.

## 2018-06-08 NOTE — ED PROVIDER NOTES
EMERGENCY DEPARTMENT HISTORY AND PHYSICAL EXAM      Date: 6/8/2018  Patient Name: Gerald Maria    History of Presenting Illness     Chief Complaint   Patient presents with    Chest Pain     pt reports chest pain under left breast x4 days, was seen by PCP today and sent to ED for evaluation     I have seen and evaluated this patient in the Express Care portion of triage for CP, seen by PCP today. The patients care will begin now and orders have been placed. This patient will be seen and provided further care in the Emergency Room. Written by Concepción Ramsay, a scribe for Stephen Mcpherson PA-C    History Provided By: Patient and Patient's     HPI: Gerald Maria, 37 y.o. female with PMHx significant for obesity, anemia, HTN, DM (type 2), depression, asthma, GERD, right kidney mass presents ambulatory to the ED with cc of intermittent moderate CP, ongoing for 3 days. Pt reports that she visited PCP's office for check up today when she had an onset of sharp CP to the left side and radiating to her left shoulder and was advised to visit ED. She endorses to taking an Aspirin before leaving PCP's office with mild relief of sxs. Pt notes that the CP typically lasts 5-6 minutes and that she attempts to stay still in order for it to be alleviated. She denies doing any exerting activities or eating at the time of CP onset. Pt discloses that she is sedentary throughout the day. Additional she denies any hx of aneurysms or gallstones. She denies any hx of blood clots or traveling long distances recently. Pt specifically denies any abdominal pain, nausea, vomiting, fevers, chills, or SOB. Chief Complaint: CP  Duration: 3 days  Timing:  Intermittent  Location: left side CP radiating to left shoulder  Quality: Sharp  Severity: Moderate  Modifying Factors: alleviated when staying still  Associated Symptoms: N/A     There are no other complaints, changes, or physical findings at this time.     SHx: (-) smoking; (-) EtOH use; (-) illicit drug use  PCP: Sreedhar Kaur MD    Current Outpatient Prescriptions   Medication Sig Dispense Refill    nitroglycerin (NITROSTAT) 0.4 mg SL tablet Take 1 Tab by mouth every five (5) minutes as needed for Chest Pain. Sit/Lay down then put one tab under the tongue every 5 minutes as needed for chest pain for 3 doses 1 Bottle 0    aspirin 81 mg chewable tablet Take 2 Tabs by mouth daily. 100 Tab 0    metFORMIN ER (GLUCOPHAGE XR) 500 mg tablet Take 2 Tabs by mouth two (2) times a day. 120 Tab 5    glipiZIDE (GLUCOTROL) 5 mg tablet Take 1 Tab by mouth two (2) times a day. Indications: type 2 diabetes mellitus 60 Tab 3    atorvastatin (LIPITOR) 10 mg tablet Take 1 Tab by mouth daily. Indications: hyperlipidemia 30 Tab 3    lisinopril-hydroCHLOROthiazide (PRINZIDE, ZESTORETIC) 10-12.5 mg per tablet TAKE ONE TABLET BY MOUTH ONCE DAILY FOR BLOOD PRESSURE 30 Tab 5    albuterol (PROVENTIL VENTOLIN) 2.5 mg /3 mL (0.083 %) nebulizer solution USE ONE VIAL IN NEBULIZER EVERY 4 HOURS AS NEEDED FOR WHEEZING 30 Each 3    LYRICA 150 mg capsule TAKE ONE CAPSULE BY MOUTH TWICE DAILY *MAX  DAILY AMOUNT 300 MG* 60 Cap 5    albuterol (VENTOLIN HFA) 90 mcg/actuation inhaler Take 2 Puffs by inhalation every six (6) hours as needed for Wheezing. 2 Inhaler 5    ADVAIR DISKUS 250-50 mcg/dose diskus inhaler INHALE ONE DOSE BY MOUTH TWICE DAILY FOR  ASTHMA  PREVENTION 1 Inhaler 5    montelukast (SINGULAIR) 10 mg tablet TAKE ONE TABLET BY MOUTH ONCE DAILY FOR  ALLERGIES  AND  ASTHMA 30 Tab 5    acetaminophen (TYLENOL ARTHRITIS PAIN) 650 mg CR tablet Take 650 mg by mouth every six (6) hours as needed for Pain.  meclizine (ANTIVERT) 25 mg tablet Take 1 Tab by mouth three (3) times daily as needed. 20 Tab 1    polyethylene glycol (MIRALAX) 17 gram/dose powder Take 17 g by mouth daily as needed.          Past History     Past Medical History:  Past Medical History:   Diagnosis Date    Anemia (iron deficiency)  Asthma     DDD (degenerative disc disease), lumbar     Depression     DM type 2 (diabetes mellitus, type 2) ([facility] 2011    GERD (gastroesophageal reflux disease)     GI bleed     Hepatic steatosis 2016    confirmed by US    HTN (hypertension)     Irregular menses     Irritable bowel     Kidney mass     To right kidney    Morbid obesity ([facility] [address]     FE (obstructive sleep apnea)     w/ Cpap    PUD (peptic ulcer disease)        Past Surgical History:  Past Surgical History:   Procedure Laterality Date    HX  SECTION      x 2    HX COLONOSCOPY  2015    HX ENDOSCOPY  2015    HX HYSTEROSCOPY WITH ENDOMETRIAL ABLATION  2014    HX TUBAL LIGATION      HX TUMOR REMOVAL  2016    right kidney, Dr Manisha Braswell Maximiliano Ralphs removed from right kidney on 02/15/2016        Family History:  Family History   Problem Relation Age of Onset    Cancer Mother      Ovarian Cancer /breast ca    Heart Attack Father     Heart Disease Father     Diabetes Father     Other Father      pancreatitis    Cancer Sister     Other Sister      chrons    Heart Attack Maternal Grandfather     Diabetes Paternal Grandmother     HIV/AIDS Son        Social History:  Social History   Substance Use Topics    Smoking status: Never Smoker    Smokeless tobacco: Never Used    Alcohol use No       Allergies: Allergies   Allergen Reactions    Pcn [Penicillins] Hives         Review of Systems   Review of Systems   Constitutional: Negative. Negative for chills and fever. HENT: Negative. Negative for congestion and rhinorrhea. Respiratory: Negative. Negative for cough, chest tightness, shortness of breath and wheezing. Cardiovascular: Positive for chest pain (left side, radiating to left side). Negative for palpitations. Gastrointestinal: Negative. Negative for abdominal pain, constipation, nausea and vomiting. Endocrine: Negative. Genitourinary: Negative.   Negative for decreased urine volume, flank pain, hematuria and pelvic pain. Musculoskeletal: Negative. Negative for back pain and neck pain. Skin: Negative. Negative for color change, pallor and rash. Neurological: Negative. Negative for dizziness, seizures, weakness, numbness and headaches. Hematological: Negative. Negative for adenopathy. Psychiatric/Behavioral: Negative. All other systems reviewed and are negative. Physical Exam   Physical Exam   Constitutional: She is oriented to person, place, and time. She appears well-developed and well-nourished. No distress. HENT:   Head: Normocephalic and atraumatic. Mouth/Throat: No oropharyngeal exudate. Eyes: Conjunctivae are normal. Pupils are equal, round, and reactive to light. Right eye exhibits no discharge. Left eye exhibits no discharge. No scleral icterus. Neck: Normal range of motion. Neck supple. No JVD present. Cardiovascular: Normal rate, regular rhythm, normal heart sounds and intact distal pulses. Exam reveals no gallop and no friction rub. No murmur heard. Pulmonary/Chest: Effort normal and breath sounds normal. No stridor. No respiratory distress. She has no wheezes. She has no rales. She exhibits no tenderness. Abdominal: Soft. Normal aorta and bowel sounds are normal. She exhibits no distension, no abdominal bruit, no pulsatile midline mass and no mass. There is no tenderness. There is no rebound and no guarding. Musculoskeletal:        Right lower leg: Normal.        Left lower leg: Normal.   Neurological: She is alert and oriented to person, place, and time. She displays normal reflexes. No cranial nerve deficit. She exhibits normal muscle tone. Coordination normal.   Skin: Skin is warm. No rash noted. She is not diaphoretic. No pallor. Vitals reviewed.       Diagnostic Study Results     Labs -     Recent Results (from the past 12 hour(s))   TROPONIN I    Collection Time: 06/08/18  4:32 PM   Result Value Ref Range Troponin-I, Qt. <0.04 <0.05 ng/mL     Radiologic Studies -   CXR Results  (Last 48 hours)               06/08/18 1435  XR CHEST PA LAT Final result    Impression:  Impression:   1. No acute cardiopulmonary disease           Narrative:  INDICATION:  Chest pain under left breast for 4 days. Exam: Chest 2 views. Comparison: November 13, 2017. Findings: Cardiomediastinal silhouette is normal. Pulmonary vasculature is not   engorged. No focal parenchymal opacities, effusions, or pneumothorax. Bony   thorax is intact. Medical Decision Making   I am the first provider for this patient. I reviewed the vital signs, available nursing notes, past medical history, past surgical history, family history and social history. Vital Signs-Reviewed the patient's vital signs. Patient Vitals for the past 12 hrs:   Pulse Resp BP SpO2   06/08/18 1715 63 19 114/81 98 %   06/08/18 1545 68 18 104/61 99 %     Pulse Oximetry Analysis - 100% on RA    Cardiac Monitor:   Rate: 65 bpm  Rhythm: Normal Sinus Rhythm     EKG interpretation: (Preliminary) 12:34  Rhythm: normal sinus rhythm; and irregular. Rate (approx.): 65 bpm; Axis: normal; AK interval: normal; QRS interval: normal ; ST/T wave: nonspecific T wave abnormality; P-R-T axes: 34 21 7; Other findings: abnormal ekg. Written by Maryuri Slade ED Scribe, as dictated by Julián Sweeney MD.    Records Reviewed: Nursing Notes, Old Medical Records and Previous Laboratory Studies    Provider Notes (Medical Decision Making):   DDx: coronary syndrome, PE, bilary colic, PUD, aortic aneurysm, atypical CP  Impression/plan: Pt with significant cardiac factor presents to the ED with several days of intermittent atypical CP. She was seen by PCP who had concerns with changes in EKG. Her EKG was unremarkable here. Will discuss case with cardiologist with plans for outpatient f/u and stress test.       ED Course:   Initial assessment performed.  The patients presenting problems have been discussed, and they are in agreement with the care plan formulated and outlined with them. I have encouraged them to ask questions as they arise throughout their visit. Consult Note:  4:08 PM  Renny Serra MD spoke with Macey Gutierrez. Katie Freeman MD.  Specialty: Cardiologist  Discussed pt's hx, disposition, and available diagnostic and imaging results. Reviewed care plans. Consultant agrees with repeat tropin and recommends discharge if pt's enzymes are normal.    Critical Care Time: 0    Disposition:  Discharge Note:  5:05 PM  The pt is ready for discharge. The pt's signs, symptoms, diagnosis, and discharge instructions have been discussed and pt has conveyed their understanding. The pt is to follow up as recommended or return to ER should their symptoms worsen. Plan has been discussed and pt is in agreement. PLAN:  1. Discharge Medication List as of 6/8/2018  5:05 PM      START taking these medications    Details   nitroglycerin (NITROSTAT) 0.4 mg SL tablet Take 1 Tab by mouth every five (5) minutes as needed for Chest Pain. Sit/Lay down then put one tab under the tongue every 5 minutes as needed for chest pain for 3 doses, Print, Disp-1 Bottle, R-0      aspirin 81 mg chewable tablet Take 2 Tabs by mouth daily. , Print, Disp-100 Tab, R-0         CONTINUE these medications which have NOT CHANGED    Details   metFORMIN ER (GLUCOPHAGE XR) 500 mg tablet Take 2 Tabs by mouth two (2) times a day., Normal, Disp-120 Tab, R-5      glipiZIDE (GLUCOTROL) 5 mg tablet Take 1 Tab by mouth two (2) times a day. Indications: type 2 diabetes mellitus, Normal, Disp-60 Tab, R-3      atorvastatin (LIPITOR) 10 mg tablet Take 1 Tab by mouth daily.  Indications: hyperlipidemia, Normal, Disp-30 Tab, R-3      lisinopril-hydroCHLOROthiazide (PRINZIDE, ZESTORETIC) 10-12.5 mg per tablet TAKE ONE TABLET BY MOUTH ONCE DAILY FOR BLOOD PRESSURE, NormalPlease consider 90 day supplies to promote better adherenceDisp-30 Tab, R-5      albuterol (PROVENTIL VENTOLIN) 2.5 mg /3 mL (0.083 %) nebulizer solution USE ONE VIAL IN NEBULIZER EVERY 4 HOURS AS NEEDED FOR WHEEZING, NormalPlease consider 90 day supplies to promote better adherenceDisp-30 Each, R-3      LYRICA 150 mg capsule TAKE ONE CAPSULE BY MOUTH TWICE DAILY *MAX  DAILY AMOUNT 300 MG*, Print, Disp-60 Cap, R-5      albuterol (VENTOLIN HFA) 90 mcg/actuation inhaler Take 2 Puffs by inhalation every six (6) hours as needed for Wheezing., Normal, Disp-2 Inhaler, R-5      ADVAIR DISKUS 250-50 mcg/dose diskus inhaler INHALE ONE DOSE BY MOUTH TWICE DAILY FOR  ASTHMA  PREVENTION, Normal, Disp-1 Inhaler, R-5      montelukast (SINGULAIR) 10 mg tablet TAKE ONE TABLET BY MOUTH ONCE DAILY FOR  ALLERGIES  AND  ASTHMA, Normal, Disp-30 Tab, R-5      acetaminophen (TYLENOL ARTHRITIS PAIN) 650 mg CR tablet Take 650 mg by mouth every six (6) hours as needed for Pain., Historical Med      meclizine (ANTIVERT) 25 mg tablet Take 1 Tab by mouth three (3) times daily as needed., Normal, Disp-20 Tab, R-1      polyethylene glycol (MIRALAX) 17 gram/dose powder Take 17 g by mouth daily as needed., Historical Med           2. Follow-up Information     Follow up With Details Comments [address], DO Schedule an appointment as soon as possible for a visit in 3 days  [address]  [address] [place] [address]  [phone]      [facility] EMERGENCY DEPT  If symptoms worsen [address] [place] [address]  [phone]        Return to ED if worse     Diagnosis     Clinical Impression:   1. Chest pain, unspecified type      Attestations: This note is prepared by Jillian Mclaughlin, acting as a Scribe for Everardo Cano MD.    Everardo Cano MD: The scribe's documentation has been prepared under my direction and personally reviewed by me in its entirety.  I confirm that the notes above accurately reflects all work, treatment, procedures, and medical decision making performed by me.

## 2018-06-11 RX ORDER — MONTELUKAST SODIUM 10 MG/1
TABLET ORAL
Qty: 30 TAB | Refills: 5 | Status: SHIPPED | OUTPATIENT
Start: 2018-06-11 | End: 2019-01-21 | Stop reason: SDUPTHER

## 2018-07-19 DIAGNOSIS — R42 VERTIGO: ICD-10-CM

## 2018-07-19 RX ORDER — MECLIZINE HYDROCHLORIDE 25 MG/1
TABLET ORAL
Qty: 20 TAB | Refills: 1 | Status: SHIPPED | OUTPATIENT
Start: 2018-07-19 | End: 2018-12-13

## 2018-07-23 DIAGNOSIS — G89.29 CHRONIC RIGHT-SIDED THORACIC BACK PAIN: ICD-10-CM

## 2018-07-23 DIAGNOSIS — M54.6 CHRONIC RIGHT-SIDED THORACIC BACK PAIN: ICD-10-CM

## 2018-07-23 RX ORDER — PREGABALIN 150 MG/1
CAPSULE ORAL
Qty: 60 CAP | Refills: 5 | Status: CANCELLED | OUTPATIENT
Start: 2018-07-23

## 2018-07-23 RX ORDER — PREGABALIN 150 MG/1
CAPSULE ORAL
Qty: 60 CAP | Refills: 5 | OUTPATIENT
Start: 2018-07-23

## 2018-07-23 NOTE — TELEPHONE ENCOUNTER
Date Time      8/2/2018 8:10 AM P  Future Appointments  Date Time Provider Dwight Johanna   8/2/2018 8:10 AM [facility] Slade [facility]        Pending Prescriptions Disp Refills    pregabalin (LYRICA) 150 mg capsule 60 Cap 5

## 2018-08-02 ENCOUNTER — OFFICE VISIT (OUTPATIENT)
Dept: INTERNAL MEDICINE CLINIC | Facility: CLINIC | Age: 43
End: 2018-08-02

## 2018-08-02 VITALS
HEART RATE: 79 BPM | HEIGHT: 64 IN | DIASTOLIC BLOOD PRESSURE: 69 MMHG | WEIGHT: 292.6 LBS | TEMPERATURE: 98.6 F | BODY MASS INDEX: 49.95 KG/M2 | OXYGEN SATURATION: 97 % | RESPIRATION RATE: 18 BRPM | SYSTOLIC BLOOD PRESSURE: 106 MMHG

## 2018-08-02 DIAGNOSIS — Z12.4 SCREENING FOR CERVICAL CANCER: ICD-10-CM

## 2018-08-02 DIAGNOSIS — G47.33 OSA (OBSTRUCTIVE SLEEP APNEA): ICD-10-CM

## 2018-08-02 DIAGNOSIS — E11.65: Primary | ICD-10-CM

## 2018-08-02 DIAGNOSIS — E78.2 MIXED HYPERLIPIDEMIA: ICD-10-CM

## 2018-08-02 DIAGNOSIS — E66.01 OBESITY, MORBID, BMI 50 OR HIGHER (HCC): ICD-10-CM

## 2018-08-02 DIAGNOSIS — E11.3559: Primary | ICD-10-CM

## 2018-08-02 DIAGNOSIS — I25.10 CORONARY ARTERY DISEASE INVOLVING NATIVE CORONARY ARTERY OF NATIVE HEART WITHOUT ANGINA PECTORIS: ICD-10-CM

## 2018-08-02 DIAGNOSIS — Z12.39 SCREENING FOR BREAST CANCER: ICD-10-CM

## 2018-08-02 DIAGNOSIS — I10 ESSENTIAL HYPERTENSION: ICD-10-CM

## 2018-08-02 RX ORDER — GLIPIZIDE 10 MG/1
10 TABLET ORAL 2 TIMES DAILY
Qty: 60 TAB | Refills: 5 | Status: SHIPPED | OUTPATIENT
Start: 2018-08-02 | End: 2019-04-12 | Stop reason: SDUPTHER

## 2018-08-02 NOTE — PROGRESS NOTES
CC:   Chief Complaint   Patient presents with    Hypertension    Diabetes    Weight Management       HISTORY OF PRESENT ILLNESS  Artis Diaz is a 37 y.o. female. Presents for 3 month follow up evaluation. She has type 2 DM with neuropathy, HTN, asthma, allergic rhinitis, FE, hyperlipidemia, chronic low back pain, and depression. Today she complains of intermittent mid and low back pain; reports that Lyrica 150 mg BID is not helping the pain as much as it did previously. At last clinic visit on 6/8/18, was sent to [id number] [place] [address] ED with severe left-sided CP. Ruled out for MI. Was referred to Dr. Shakira Seo (Cardiology); reports she had a cardiac stress test that was negative but he told her to continue taking daily ASA and prescribed her SL NTG. Has a follow up appointment with him. Endocrine Review  She is seen for diabetes. Reports polydipsia and polyuria, denies hypoglycemia.  Home glucose monitoring: is performed twice a day. Home FBS: 200-230. She reports medication compliance: compliant all of the time.  Medication side effects: none.  Diabetic diet compliance: compliant most of the time.  Lab review: orders written for new lab studies as appropriate; see orders.  Eye exam: overdue.      Cardiovascular Review  The patient has hypertension, hyperlipidemia and non-obstructive coronary artery disease. She reports taking medications as instructed, no medication side effects noted. Diet and Lifestyle: generally follows a low fat low cholesterol diet, generally follows a low sodium diet, no formal exercise but active during the day. Lab review: orders written for new lab studies as appropriate; see orders. Soc Hx  . Has 2 children (ages 32 and 23). Works as the  of [employer] for [employer]. Never smoker. Does not drink alcohol. Denies recreational drug use. Walks occasionally for exercise.   Drinks unsweetened tea; denies coffee or sodas.     Health Maintenance  Flu vaccine: declined                                    Pneumonia vaccine: PPSV-23 9/9/16                                                                    Tetanus vaccine: 5/2/18                                                                 Pap smear: due for this  Mammogram: due for this  Eye exam: Dr. Celina Hair, 2016, due for this  Foot exam: 5/2/18  Lipids: 5/2/18: tot chol 203, ; was started on atorvastatin 10 mg daily  A1c: 5/2/18 (9.2%)  Advanced Directives: given information  End of Life: given information                                     ROS  A complete review of systems was performed and is negative except for those mentioned in the HPI.      Patient Active Problem List   Diagnosis Code    HTN (hypertension) I10    Depression F32.9    FE (obstructive sleep apnea) G47.33    Asthma J45.909    Iron deficiency anemia D50.9    Hyperlipidemia E78.5    Obesity, morbid, BMI 50 or higher ([facility] E66.01    Type II diabetes mellitus, uncontrolled ([facility] [facility] E11.65     Past Medical History:   Diagnosis Date    Anemia (iron deficiency)     Asthma     DDD (degenerative disc disease), lumbar 2014    Depression     DM type 2 (diabetes mellitus, type 2) ([facility] [address] [facility] 11/23/2011    GERD (gastroesophageal reflux disease)     GI bleed 2015    Hepatic steatosis 11/2016    confirmed by US    HTN (hypertension)     Irregular menses     Irritable bowel     Kidney mass     To right kidney    Morbid obesity (HCC)     FE (obstructive sleep apnea)     w/ Cpap    PUD (peptic ulcer disease) 2015     Allergies   Allergen Reactions    Pcn [Penicillins] Hives       Current Outpatient Prescriptions   Medication Sig Dispense Refill    meclizine (ANTIVERT) 25 mg tablet TAKE ONE TABLET BY MOUTH THREE TIMES DAILY AS NEEDED 20 Tab 1    LYRICA 150 mg capsule TAKE ONE CAPSULE BY MOUTH TWICE DAILY 60 Cap 5    montelukast (SINGULAIR) 10 mg tablet TAKE ONE TABLET BY MOUTH ONCE DAILY FOR  ALLERGIES  AND  ASTHMA 30 Tab 5    nitroglycerin (NITROSTAT) 0.4 mg SL tablet Take 1 Tab by mouth every five (5) minutes as needed for Chest Pain. Sit/Lay down then put one tab under the tongue every 5 minutes as needed for chest pain for 3 doses 1 Bottle 0    aspirin 81 mg chewable tablet Take 2 Tabs by mouth daily. 100 Tab 0    metFORMIN ER (GLUCOPHAGE XR) 500 mg tablet Take 2 Tabs by mouth two (2) times a day. 120 Tab 5    glipiZIDE (GLUCOTROL) 5 mg tablet Take 1 Tab by mouth two (2) times a day. Indications: type 2 diabetes mellitus 60 Tab 3    atorvastatin (LIPITOR) 10 mg tablet Take 1 Tab by mouth daily. Indications: hyperlipidemia 30 Tab 3    lisinopril-hydroCHLOROthiazide (PRINZIDE, ZESTORETIC) 10-12.5 mg per tablet TAKE ONE TABLET BY MOUTH ONCE DAILY FOR BLOOD PRESSURE 30 Tab 5    albuterol (PROVENTIL VENTOLIN) 2.5 mg /3 mL (0.083 %) nebulizer solution USE ONE VIAL IN NEBULIZER EVERY 4 HOURS AS NEEDED FOR WHEEZING 30 Each 3    albuterol (VENTOLIN HFA) 90 mcg/actuation inhaler Take 2 Puffs by inhalation every six (6) hours as needed for Wheezing. 2 Inhaler 5    ADVAIR DISKUS 250-50 mcg/dose diskus inhaler INHALE ONE DOSE BY MOUTH TWICE DAILY FOR  ASTHMA  PREVENTION 1 Inhaler 5    acetaminophen (TYLENOL ARTHRITIS PAIN) 650 mg CR tablet Take 650 mg by mouth every six (6) hours as needed for Pain.  polyethylene glycol (MIRALAX) 17 gram/dose powder Take 17 g by mouth daily as needed. PHYSICAL EXAM  Visit Vitals    /69 (BP 1 Location: Left arm, BP Patient Position: Sitting)    Pulse 79    Temp 98.6 °F (37 °C) (Oral)    Resp 18    Ht 5' 4\" (1.626 m)    Wt 292 lb 9.6 oz (132.7 kg)    SpO2 97%    BMI 50.22 kg/m2       General: Pleasant, morbidly obese, no distress. HEENT:  Head normocephalic/atraumatic, no scleral icterus  Neck: Supple. No carotid bruits, JVD, lymphadenopathy, or thyromegaly. Lungs:  Clear to ausculation bilaterally. Good air movement.   Heart:  Regular rate and rhythm, normal S1 and S2, no murmur, gallop, or rub  Extremities: No clubbing, cyanosis, or edema. Neurological: Alert and oriented. Psychiatric: Normal mood and affect. Behavior is normal.         ASSESSMENT AND PLAN    ICD-10-CM ICD-9-CM    1. Uncontrolled type 2 diabetes mellitus with stable proliferative retinopathy, without long-term current use of insulin, unspecified laterality (HCC) E11.3559 250.52 glipiZIDE (GLUCOTROL) 10 mg tablet    E11.65 362.02 exenatide microspheres (BYDUREON) 2 mg/0.65 mL pnij      HEMOGLOBIN A1C WITH EAG   2. Essential hypertension I10 401.9    3. Obesity, morbid, BMI 50 or higher ([facility]) E66.01 278.01 REFERRAL TO BARIATRIC SURGERY   4. Mixed hyperlipidemia E78.2 272.2 LIPID PANEL   5. Coronary artery disease involving native coronary artery of native heart without angina pectoris I25.10 414.01    6. FE (obstructive sleep apnea) G47.33 327.23    7. Screening for breast cancer Z12.31 V76.10 [facility] MAMMO BI SCREENING INCL CAD   8. Screening for cervical cancer Z12.4 V76.2        Diagnoses and all orders for this visit:    1. Uncontrolled type 2 diabetes mellitus with stable proliferative retinopathy, without long-term current use of insulin, unspecified laterality (HCC)  Last A1c 9.2% in 5/18. Glipizide 5 mg BID added; will increase today to 10 mg BID and add Bydureon.  -     Start glipiZIDE (GLUCOTROL) 10 mg tablet; Take 1 Tab by mouth two (2) times a day. -     Start exenatide microspheres (BYDUREON) 2 mg/0.65 mL pnij; 2 mg by SubCUTAneous route every seven (7) days.  -     HEMOGLOBIN A1C WITH EAG    2. Essential hypertension  Well-controlled. Continue lisinopril-HCTZ 10-12.5 mg daily. 3. Obesity, morbid, BMI 50 or higher ([facility] [address]  -     Referral:  Dr. Ranjeet Baldwin [facility]    4. Mixed hyperlipidemia  -     LIPID PANEL    5. Coronary artery disease involving native coronary artery of native heart without angina pectoris  Non-obstructive. Continue daily ASA and SL NTG prn.   Will obtain medical records from Dr. Sushant Demarco. 6. FE (obstructive sleep apnea)  Continue CPAP. 7. Screening for breast cancer  -     [facility] MAMMO BI SCREENING INCL CAD; Future    8. Screening for cervical cancer  Patient instructed to schedule Pap smear appointment. Given referral in 5/18. Follow-up Disposition:  Return in about 3 months (around 11/2/2018), or if symptoms worsen or fail to improve, for DM, HTN, weight. Provided patient and/or family with advanced directive information and answered pertinent questions. Encouraged patient to provide a copy of advanced directive to the office when available. I have discussed the diagnosis with the patient and the intended plan as seen in the above orders. Patient is in agreement. The patient has received an after-visit summary and questions were answered concerning future plans. I have discussed medication side effects and warnings with the patient as well.

## 2018-08-02 NOTE — PROGRESS NOTES
Eamon Friday  Identified pt with two pt identifiers(name and ). Chief Complaint   Patient presents with    Hypertension    Diabetes    Weight Management       1. Have you been to the ER, urgent care clinic since your last visit? Hospitalized since your last visit? NO    2. Have you seen or consulted any other health care providers outside of the [address] [place] [address] since your last visit? Include any pap smears or colon screening. NO    Today's provider has been notified of reason for visit, vitals and flowsheets obtained on patients. Patient declines information on advanced directives. Reviewed record In preparation for visit, huddled with provider and have obtained necessary documentation      Health Maintenance Due   Topic    BREAST CANCER SCRN MAMMOGRAM     EYE EXAM RETINAL OR DILATED Q1     PAP AKA CERVICAL CYTOLOGY        Wt Readings from Last 3 Encounters:   18 292 lb 9.6 oz (132.7 kg)   18 285 lb 4.4 oz (129.4 kg)   18 289 lb (131.1 kg)     Temp Readings from Last 3 Encounters:   18 98.6 °F (37 °C) (Oral)   18 98.7 °F (37.1 °C)   18 98.5 °F (36.9 °C) (Oral)     BP Readings from Last 3 Encounters:   18 106/69   18 114/81   18 120/68     Pulse Readings from Last 3 Encounters:   18 79   18 63   18 75     Vitals:    18 0817   BP: 106/69   Pulse: 79   Resp: 18   Temp: 98.6 °F (37 °C)   TempSrc: Oral   SpO2: 97%   Weight: 292 lb 9.6 oz (132.7 kg)   Height: 5' 4\" (1.626 m)   PainSc:   6         Learning Assessment:  :     Learning Assessment 2018   PRIMARY LEARNER Patient Patient Patient   HIGHEST LEVEL OF EDUCATION - PRIMARY LEARNER  - > 4 YEARS OF COLLEGE -   BARRIERS PRIMARY LEARNER - NONE -   Ardena Klinefelter CAREGIVER - No -   PRIMARY LANGUAGE ENGLISH ENGLISH ENGLISH   LEARNER PREFERENCE PRIMARY READING OTHER (COMMENT) LISTENING   ANSWERED BY patient patient self     - - self   RELATIONSHIP SELF SELF SELF       Depression Screening:  :     No flowsheet data found. Fall Risk Assessment:  :     Fall Risk Assessment, last 12 mths 8/3/2017   Able to walk? Yes   Fall in past 12 months? No       Abuse Screening:  :     Abuse Screening Questionnaire 5/9/2014   Do you ever feel afraid of your partner? N   Are you in a relationship with someone who physically or mentally threatens you? N   Is it safe for you to go home? Y       ADL Screening:  :     ADL Assessment 5/2/2018   Feeding yourself No Help Needed   Getting from bed to chair No Help Needed   Getting dressed No Help Needed   Bathing or showering No Help Needed   Walk across the room (includes cane/walker) No Help Needed   Using the telphone No Help Needed   Taking your medications No Help Needed   Preparing meals No Help Needed   Managing money (expenses/bills) No Help Needed   Moderately strenuous housework (laundry) No Help Needed   Shopping for personal items (toiletries/medicines) No Help Needed   Shopping for groceries No Help Needed   Driving No Help Needed   Climbing a flight of stairs No Help Needed   Getting to places beyond walking distances No Help Needed     Medication reconciliation up to date and corrected with patient at this time.

## 2018-08-02 NOTE — MR AVS SNAPSHOT
[address] [address] [phone] Patient: Hair Vickers MRN: [id number] VMR:[id number] Visit Information Date & Time Provider Department Dept. Phone Encounter #  
 8/2/2018  8:10 AM Alyson Mendoza MD [facility] Internal Medicine of [address] [place] [address] [id number] Follow-up Instructions Return in about 3 months (around 11/2/2018), or if symptoms worsen or fail to improve, for DM, HTN, weight. Upcoming Health Maintenance Date Due  
 BREAST CANCER SCRN MAMMOGRAM 3/2/1991 EYE EXAM RETINAL OR DILATED Q1 10/14/2015 PAP AKA CERVICAL CYTOLOGY 6/13/2017 Influenza Age 5 to Adult 9/3/2018* HEMOGLOBIN A1C Q6M 11/2/2018 FOOT EXAM Q1 5/2/2019 MICROALBUMIN Q1 5/2/2019 LIPID PANEL Q1 5/2/2019 DTaP/Tdap/Td series (2 - Td) 5/2/2028 *Topic was postponed. The date shown is not the original due date. Allergies as of 8/2/2018  Review Complete On: 8/2/2018 By: Alyson Mendoza MD  
  
 Severity Noted Reaction Type Reactions Pcn [Penicillins]  05/13/2010    Hives Current Immunizations  Reviewed on 10/3/2016 Name Date Influenza Vaccine 9/29/2016, 10/5/2015, 10/21/2014 Influenza Vaccine Split 1/1/2011 Pneumococcal Vaccine (Unspecified Type) 9/29/2016 Tdap 5/2/2018 ZZZ-RETIRED (DO NOT USE) Pneumococcal Vaccine (Unspecified Type) 3/31/2011 Not reviewed this visit You Were Diagnosed With   
  
 Codes Comments Uncontrolled type 2 diabetes mellitus with stable proliferative retinopathy, without long-term current use of insulin, unspecified laterality ([facility] [address]    -  Primary ICD-10-CM: X94.7567, E11.65 ICD-9-CM: 250.52, 362.02 Essential hypertension     ICD-10-CM: I10 
ICD-9-CM: 401.9 Obesity, morbid, BMI 50 or higher (HCC)     ICD-10-CM: E66.01 
ICD-9-CM: 278.01 Mixed hyperlipidemia     ICD-10-CM: E78.2 ICD-9-CM: 272.2 Coronary artery disease involving native coronary artery of native heart without angina pectoris     ICD-10-CM: I25.10 ICD-9-CM: 414.01   
 FE (obstructive sleep apnea)     ICD-10-CM: G47.33 
ICD-9-CM: 327.23 Screening for breast cancer     ICD-10-CM: Z12.31 
ICD-9-CM: V76.10 Screening for cervical cancer     ICD-10-CM: Z12.4 ICD-9-CM: V76.2 Vitals BP Pulse Temp Resp Height(growth percentile) Weight(growth percentile) 106/69 (BP 1 Location: Left arm, BP Patient Position: Sitting) 79 98.6 °F (37 °C) (Oral) 18 5' 4\" (1.626 m) 292 lb 9.6 oz (132.7 kg) SpO2 BMI OB Status Smoking Status 97% 50.22 kg/m2 Ablation Never Smoker BMI and BSA Data Body Mass Index Body Surface Area  
 50.22 kg/m 2 2.45 m 2 Preferred Pharmacy Pharmacy Name Phone [facility] [address], [address] [place] [phone] Your Updated Medication List  
  
   
This list is accurate as of 8/2/18  8:55 AM.  Always use your most recent med list.  
  
  
  
  
 Troy Stands 250-50 mcg/dose diskus inhaler Generic drug:  fluticasone-salmeterol INHALE ONE DOSE BY MOUTH TWICE DAILY FOR  ASTHMA  PREVENTION  
  
 * albuterol 90 mcg/actuation inhaler Commonly known as:  VENTOLIN HFA Take 2 Puffs by inhalation every six (6) hours as needed for Wheezing. * albuterol 2.5 mg /3 mL (0.083 %) nebulizer solution Commonly known as:  PROVENTIL VENTOLIN  
USE ONE VIAL IN NEBULIZER EVERY 4 HOURS AS NEEDED FOR WHEEZING  
  
 aspirin 81 mg chewable tablet Take 2 Tabs by mouth daily. atorvastatin 10 mg tablet Commonly known as:  LIPITOR Take 1 Tab by mouth daily. Indications: hyperlipidemia  
  
 exenatide microspheres 2 mg/0.65 mL Pnij Commonly known as:  BYDUREON  
2 mg by SubCUTAneous route every seven (7) days. glipiZIDE 10 mg tablet Commonly known as:  Loura Chelsea Take 1 Tab by mouth two (2) times a day. lisinopril-hydroCHLOROthiazide 10-12.5 mg per tablet Commonly known as:  PRINZIDE, ZESTORETIC  
TAKE ONE TABLET BY MOUTH ONCE DAILY FOR BLOOD PRESSURE  
  
 LYRICA 150 mg capsule Generic drug:  pregabalin TAKE ONE CAPSULE BY MOUTH TWICE DAILY  
  
 meclizine 25 mg tablet Commonly known as:  ANTIVERT  
TAKE ONE TABLET BY MOUTH THREE TIMES DAILY AS NEEDED  
  
 metFORMIN  mg tablet Commonly known as:  GLUCOPHAGE XR Take 2 Tabs by mouth two (2) times a day. montelukast 10 mg tablet Commonly known as:  SINGULAIR  
TAKE ONE TABLET BY MOUTH ONCE DAILY FOR  ALLERGIES  AND  ASTHMA  
  
 nitroglycerin 0.4 mg SL tablet Commonly known as:  NITROSTAT Take 1 Tab by mouth every five (5) minutes as needed for Chest Pain. Sit/Lay down then put one tab under the tongue every 5 minutes as needed for chest pain for 3 doses  
  
 polyethylene glycol 17 gram/dose powder Commonly known as:  Aurther Buck Take 17 g by mouth daily as needed. TYLENOL ARTHRITIS PAIN 650 mg Troy Pali Generic drug:  acetaminophen Take 650 mg by mouth every six (6) hours as needed for Pain. * Notice: This list has 2 medication(s) that are the same as other medications prescribed for you. Read the directions carefully, and ask your doctor or other care provider to review them with you. Prescriptions Sent to Pharmacy Refills  
 glipiZIDE (GLUCOTROL) 10 mg tablet 5 Sig: Take 1 Tab by mouth two (2) times a day. Class: Normal  
 Pharmacy: [address] [place] [address] Ph #: [phone] Route: Oral  
 exenatide microspheres (BYDUREON) 2 mg/0.65 mL pnij 5 Si mg by SubCUTAneous route every seven (7) days. Class: Normal  
 Pharmacy: [address] [place] [address] Ph #: [phone] Route: SubCUTAneous We Performed the Following HEMOGLOBIN A1C WITH EAG [66074 CPT(R)] LIPID PANEL [27692 CPT(R)] REFERRAL TO BARIATRIC SURGERY [TDN845 Custom] Comments:  
 Evaluation and management for surgical weight loss options in patient with BMI 50 and DM, HTN, hyperlipidemia Follow-up Instructions Return in about 3 months (around 11/2/2018), or if symptoms worsen or fail to improve, for DM, HTN, weight. To-Do List   
 08/02/2018 Imaging:  OBDULIA MAMMO BI SCREENING INCL CAD Referral Information Referral ID Referred By Referred To  
  
 [id number] Ivonne Gold MD   
   [address] [place] [address] Phone: [phone] Fax: [phone] Visits Status Start Date End Date 1 New Request 8/2/18 8/2/19 If your referral has a status of pending review or denied, additional information will be sent to support the outcome of this decision. Patient Instructions Learning About Tests When You Have Diabetes Why do you need regular diabetes tests? Diabetes can be hard on your body if it's not well controlled. But having tests on a regular schedule can help you and your doctor find problems early, when it's easier to start managing them. What tests do you need? The tests you may have, how often you should have them, and the goals of the tests are: 
A1c blood test. This test shows the average level of blood sugar over the past 2 to 3 months. It helps your doctor see whether blood sugar levels have been staying within your target range. · How often: Every 3 to 6 months · Goal: A blood sugar level in your target range Blood pressure test: This test measures the pressure of blood flow in the arteries. Controlling blood pressure can help prevent damage to nerves and blood vessels. · How often: Every 3 to 6 months · Goal: A blood pressure level in your target range Cholesterol test: This test measures the amount of a type of fat in the blood.  It is common for people with diabetes to also have high cholesterol. Too much cholesterol in the blood can build up inside the blood vessels and raise the risk for heart attack and stroke. · How often: At the time of your diabetes diagnosis, and as often as your doctor recommends after that · Goal: A cholesterol level in your target range Albumin-creatinine ratio test: This test checks for kidney damage by looking for the protein albumin (say \"al-BYOO-ivanna\") in the urine. Albumin is normally found in the blood. Kidney damage can let small amounts of it (microalbumin) leak into the urine. · How often: Once a year · Goal: No protein in the urine Blood creatinine test/estimated glomerular filtration (eGFR): The blood creatinine (say \"ohrb-QW-wa-neen\") level shows how well your kidneys are working. Creatinine is a waste product that muscles release into the blood. Blood creatinine is used to estimate the glomerular filtration rate. A high level of creatinine and/or a low eGFR may mean your kidneys are not working as well as they should. · How often: Once a year · Goal: Normal level of creatinine in the blood. The eGFR goal is greater than 60 mL/min/1.73 m². Complete foot exam: The doctor checks for foot sores and whether any sensation has been lost. 
· How often: Once a year · Goal: Healthy feet with no foot ulcers or loss of feeling Dental exam and cleaning: The dentist checks for gum disease and tooth decay. People with high blood sugar are more likely to have these problems. · How often: Every 6 months · Goal: Healthy teeth and gums Complete eye exam: High blood sugar levels can damage the eyes. This exam is done by an ophthalmologist or optometrist. It includes a dilated eye exam. The exam shows whether there's damage to the back of the eye (diabetic retinopathy). · How often: Once a year. If you don't have any signs of diabetic retinopathy, your doctor may recommend an exam every 2 years. · Goal: No damage to the back of the eye Thyroid-stimulating hormone (TSH) blood test: This test checks for thyroid disease. Too little thyroid hormone can cause some medicines (like insulin) to stay in the body longer. This can cause low blood sugar. You may be tested if you have high cholesterol or are a woman over 48years old. · How often: As part of your diabetes diagnosis, and as often as your doctor recommends after that · Goal: Normal level of TSH in the blood Follow-up care is a key part of your treatment and safety. Be sure to make and go to all appointments, and call your doctor if you are having problems. It's also a good idea to know your test results and keep a list of the medicines you take. Where can you learn more? Go to http://camelia-konrad.info/. Enter [id number] in the search box to learn more about \"Learning About Tests When You Have Diabetes. \" Current as of: December 7, 2017 Content Version: 11.7 © 2278-2844 qualifyor. Care instructions adapted under license by [employer] (which disclaims liability or warranty for this information). If you have questions about a medical condition or this instruction, always ask your healthcare professional. [address] [address] any warranty or liability for your use of this information. Introducing [facility] [place] [facility]! [employer] introduces Streamweaver patient portal. Now you can access parts of your medical record, email your doctor's office, and request medication refills online. 1. In your internet browser, go to [email or web address] 2. Click on the First Time User? Click Here link in the Sign In box. You will see the New Member Sign Up page. 3. Enter your Streamweaver Access Code exactly as it appears below. You will not need to use this code after youve completed the sign-up process. If you do not sign up before the expiration date, you must request a new code. · Streamweaver Access Code: [email or web address] Expires: 10/31/2018  8:55 AM 
 
4. Enter the last four digits of your Social Security Number (xxxx) and Date of Birth (mm/dd/yyyy) as indicated and click Submit. You will be taken to the next sign-up page. 5. Create a [employer] ID. This will be your [employer] login ID and cannot be changed, so think of one that is secure and easy to remember. 6. Create a [employer] password. You can change your password at any time. 7. Enter your Password Reset Question and Answer. This can be used at a later time if you forget your password. 8. Enter your e-mail address. You will receive e-mail notification when new information is available in [address] 9. Click Sign Up. You can now view and download portions of your medical record. 10. Click the Download Summary menu link to download a portable copy of your medical information. If you have questions, please visit the Frequently Asked Questions section of the [employer] website. Remember, [employer]ages is NOT to be used for urgent needs. For medical emergencies, dial 911. Now available from your iPhone and Android! Please provide this summary of care documentation to your next provider. Your primary care clinician is listed as Behzad Ledesma. If you have any questions after today's visit, please call [phone].

## 2018-08-02 NOTE — PATIENT INSTRUCTIONS
Learning About Tests When You Have Diabetes  Why do you need regular diabetes tests? Diabetes can be hard on your body if it's not well controlled. But having tests on a regular schedule can help you and your doctor find problems early, when it's easier to start managing them. What tests do you need? The tests you may have, how often you should have them, and the goals of the tests are:  A1c blood test. This test shows the average level of blood sugar over the past 2 to 3 months. It helps your doctor see whether blood sugar levels have been staying within your target range. · How often: Every 3 to 6 months  · Goal: A blood sugar level in your target range  Blood pressure test: This test measures the pressure of blood flow in the arteries. Controlling blood pressure can help prevent damage to nerves and blood vessels. · How often: Every 3 to 6 months  · Goal: A blood pressure level in your target range  Cholesterol test: This test measures the amount of a type of fat in the blood. It is common for people with diabetes to also have high cholesterol. Too much cholesterol in the blood can build up inside the blood vessels and raise the risk for heart attack and stroke. · How often: At the time of your diabetes diagnosis, and as often as your doctor recommends after that  · Goal: A cholesterol level in your target range  Albumin-creatinine ratio test: This test checks for kidney damage by looking for the protein albumin (say \"al-BYOO-ivanna\") in the urine. Albumin is normally found in the blood. Kidney damage can let small amounts of it (microalbumin) leak into the urine. · How often: Once a year  · Goal: No protein in the urine  Blood creatinine test/estimated glomerular filtration (eGFR): The blood creatinine (say \"yzpz-KZ-vb-neen\") level shows how well your kidneys are working. Creatinine is a waste product that muscles release into the blood.  Blood creatinine is used to estimate the glomerular filtration rate. A high level of creatinine and/or a low eGFR may mean your kidneys are not working as well as they should. · How often: Once a year  · Goal: Normal level of creatinine in the blood. The eGFR goal is greater than 60 mL/min/1.73 m². Complete foot exam: The doctor checks for foot sores and whether any sensation has been lost.  · How often: Once a year  · Goal: Healthy feet with no foot ulcers or loss of feeling  Dental exam and cleaning: The dentist checks for gum disease and tooth decay. People with high blood sugar are more likely to have these problems. · How often: Every 6 months  · Goal: Healthy teeth and gums  Complete eye exam: High blood sugar levels can damage the eyes. This exam is done by an ophthalmologist or optometrist. It includes a dilated eye exam. The exam shows whether there's damage to the back of the eye (diabetic retinopathy). · How often: Once a year. If you don't have any signs of diabetic retinopathy, your doctor may recommend an exam every 2 years. · Goal: No damage to the back of the eye  Thyroid-stimulating hormone (TSH) blood test: This test checks for thyroid disease. Too little thyroid hormone can cause some medicines (like insulin) to stay in the body longer. This can cause low blood sugar. You may be tested if you have high cholesterol or are a woman over 48years old. · How often: As part of your diabetes diagnosis, and as often as your doctor recommends after that  · Goal: Normal level of TSH in the blood  Follow-up care is a key part of your treatment and safety. Be sure to make and go to all appointments, and call your doctor if you are having problems. It's also a good idea to know your test results and keep a list of the medicines you take. Where can you learn more? Go to http://camelia-konrad.info/. Enter [id number] in the search box to learn more about \"Learning About Tests When You Have Diabetes. \"  Current as of: December 7, 2017  Content Version: 11.7  © 6705-4745 [employer]wise[employer]. Care instructions adapted under license by [employer] (which disclaims liability or warranty for this information). If you have questions about a medical condition or this instruction, always ask your healthcare professional. [address] any warranty or liability for your use of this information.

## 2018-08-03 LAB
CHOLEST SERPL-MCNC: 153 MG/DL (ref 100–199)
EST. AVERAGE GLUCOSE BLD GHB EST-MCNC: 194 MG/DL
HBA1C MFR BLD: 8.4 % (ref 4.8–5.6)
HDLC SERPL-MCNC: 41 MG/DL
LDLC SERPL CALC-MCNC: 86 MG/DL (ref 0–99)
TRIGL SERPL-MCNC: 132 MG/DL (ref 0–149)
VLDLC SERPL CALC-MCNC: 26 MG/DL (ref 5–40)

## 2018-08-07 RX ORDER — DICLOFENAC SODIUM 75 MG/1
TABLET, DELAYED RELEASE ORAL
COMMUNITY
Start: 2018-05-16 | End: 2018-08-08 | Stop reason: SDUPTHER

## 2018-08-07 NOTE — TELEPHONE ENCOUNTER
Date Time      11/2/2018 8:10 AM Future Appointments  Date Time Provider Dwight Spencer   11/2/2018 8:10 AM Areli Yap MD [address] MD [address] [place]        Pending Prescriptions Disp Refills    diclofenac EC (VOLTAREN) 75 mg EC tablet 60 Tab      Sig: Take 1 Tab by mouth two (2) times a day.

## 2018-08-08 RX ORDER — DICLOFENAC SODIUM 75 MG/1
75 TABLET, DELAYED RELEASE ORAL 2 TIMES DAILY
Qty: 60 TAB | Refills: 3 | OUTPATIENT
Start: 2018-08-08 | End: 2019-12-04

## 2018-08-10 NOTE — PROGRESS NOTES
Spoke with patient and after verifying name and date of birth of patient gave patient test results and any instructions in note per provider. She was able to get the bydureon. Patient stated understanding.

## 2018-08-10 NOTE — PROGRESS NOTES
Your recent labs showed A1c 8.4%, meaning your average BS's over the past 3 months was 194. This is too high. Your goal A1c is less than 7.0%. Were you able to get Bydureon, the injectable diabetes medication that helps you lose weight? If not, Dr. Melia Gomez will start you on a 3rd diabetes tablet. Continue metformin 1000 mg BID and glipizide 10 mg BID.

## 2018-09-27 ENCOUNTER — OFFICE VISIT (OUTPATIENT)
Dept: INTERNAL MEDICINE CLINIC | Facility: CLINIC | Age: 43
End: 2018-09-27

## 2018-09-27 VITALS
HEIGHT: 64 IN | BODY MASS INDEX: 50.02 KG/M2 | RESPIRATION RATE: 18 BRPM | WEIGHT: 293 LBS | SYSTOLIC BLOOD PRESSURE: 119 MMHG | DIASTOLIC BLOOD PRESSURE: 88 MMHG | OXYGEN SATURATION: 95 % | TEMPERATURE: 98.3 F | HEART RATE: 67 BPM

## 2018-09-27 DIAGNOSIS — E11.65: Primary | ICD-10-CM

## 2018-09-27 DIAGNOSIS — M54.50 ACUTE BILATERAL LOW BACK PAIN WITHOUT SCIATICA: ICD-10-CM

## 2018-09-27 DIAGNOSIS — E11.3559: Primary | ICD-10-CM

## 2018-09-27 DIAGNOSIS — R10.33 PERIUMBILICAL ABDOMINAL PAIN: ICD-10-CM

## 2018-09-27 NOTE — PROGRESS NOTES
HPI  Belen Thakur is a 37y.o. year old female patient of Meliton Kline MD who presents with c/o back and abd pain. Pt has history of has HTN (hypertension), Depression, FE (obstructive sleep apnea), Asthma, Iron deficiency anemia, Hyperlipidemia, Obesity, morbid, BMI 50 or higher ([address] Type II diabetes mellitus, uncontrolled ([address] and Coronary artery disease involving native coronary artery of native heart without angina pectoris on her problem list..    C/o possible reaction to Bydureon. C/o stomach getting tight across mid-abdomen and low back pain. Keeping her up at night. Pain is constant, 6/10, sharp achy. Lying down is the worst. Had some skin irritation at injection site on abdomen. Picked it and developed a sore. Denies injury or fall. Denies N/V/D. Denies fever or chills. Hurts when she yawns and has pain down her back. No rashes other than at site. Has hx of chronic back pain and knows she needs surgery but states this pain feels different. Taking tylenol arthritis and lyrica. Tylenol helps some. Denies urinary symptoms, no blood in urine. Per records pt saw Dr. Albert Sotelo one month ago for uncontrolled DM and was started on Bydureon. She last took Bydureon last Sunday, has taken it for a month.        Patient Active Problem List   Diagnosis Code    HTN (hypertension) I10    Depression F32.9    FE (obstructive sleep apnea) G47.33    Asthma J45.909    Iron deficiency anemia D50.9    Hyperlipidemia E78.5    Obesity, morbid, BMI 50 or higher ([address] E66.01    Type II diabetes mellitus, uncontrolled ([address] E11.65    Coronary artery disease involving native coronary artery of native heart without angina pectoris I25.10     Past Medical History:   Diagnosis Date    Anemia (iron deficiency)     Asthma     DDD (degenerative disc disease), lumbar 2014    Depression     DM type 2 (diabetes mellitus, type 2) ([address] 11/23/2011    GERD (gastroesophageal reflux disease)     GI bleed 2015    Hepatic steatosis 2016    confirmed by US    HTN (hypertension)     Irregular menses     Irritable bowel     Kidney mass     To right kidney    Morbid obesity ([address]     FE (obstructive sleep apnea)     w/ Cpap    PUD (peptic ulcer disease)      Past Surgical History:   Procedure Laterality Date    HX  SECTION      x 2    HX COLONOSCOPY  2015    HX ENDOSCOPY  2015    HX HYSTEROSCOPY WITH ENDOMETRIAL ABLATION  2014    HX TUBAL LIGATION      HX TUMOR REMOVAL  2016    right kidney, Dr Perfecto Pablo [facility]      Mass removed from right kidney on 02/15/2016      Social History     Social History    Marital status:      Spouse name: N/A    Number of children: N/A    Years of education: N/A     Social History Main Topics    Smoking status: Never Smoker    Smokeless tobacco: Never Used    Alcohol use No    Drug use: No    Sexual activity: Yes     Partners: Male     Other Topics Concern    None     Social History Narrative     Family History   Problem Relation Age of Onset    Cancer Mother      Ovarian Cancer /breast ca    Heart Attack Father     Heart Disease Father     Diabetes Father     Other Father      pancreatitis    Cancer Sister     Other Sister      chrons    Heart Attack Maternal Grandfather     Diabetes Paternal Grandmother     HIV/AIDS Son      Allergies   Allergen Reactions    Pcn [Penicillins] Hives       MEDICATIONS  Current Outpatient Prescriptions   Medication Sig    atorvastatin (LIPITOR) 10 mg tablet TAKE 1 TABLET BY MOUTH ONCE DAILY    diclofenac EC (VOLTAREN) 75 mg EC tablet Take 1 Tab by mouth two (2) times a day.  glipiZIDE (GLUCOTROL) 10 mg tablet Take 1 Tab by mouth two (2) times a day.  exenatide microspheres (BYDUREON) 2 mg/0.65 mL pnij 2 mg by SubCUTAneous route every seven (7) days.     meclizine (ANTIVERT) 25 mg tablet TAKE ONE TABLET BY MOUTH THREE TIMES DAILY AS NEEDED    LYRICA 150 mg capsule TAKE ONE CAPSULE BY MOUTH TWICE DAILY    montelukast (SINGULAIR) 10 mg tablet TAKE ONE TABLET BY MOUTH ONCE DAILY FOR  ALLERGIES  AND  ASTHMA    nitroglycerin (NITROSTAT) 0.4 mg SL tablet Take 1 Tab by mouth every five (5) minutes as needed for Chest Pain. Sit/Lay down then put one tab under the tongue every 5 minutes as needed for chest pain for 3 doses    aspirin 81 mg chewable tablet Take 2 Tabs by mouth daily.  metFORMIN ER (GLUCOPHAGE XR) 500 mg tablet Take 2 Tabs by mouth two (2) times a day.  lisinopril-hydroCHLOROthiazide (PRINZIDE, ZESTORETIC) 10-12.5 mg per tablet TAKE ONE TABLET BY MOUTH ONCE DAILY FOR BLOOD PRESSURE    albuterol (PROVENTIL VENTOLIN) 2.5 mg /3 mL (0.083 %) nebulizer solution USE ONE VIAL IN NEBULIZER EVERY 4 HOURS AS NEEDED FOR WHEEZING    albuterol (VENTOLIN HFA) 90 mcg/actuation inhaler Take 2 Puffs by inhalation every six (6) hours as needed for Wheezing.  ADVAIR DISKUS 250-50 mcg/dose diskus inhaler INHALE ONE DOSE BY MOUTH TWICE DAILY FOR  ASTHMA  PREVENTION    acetaminophen (TYLENOL ARTHRITIS PAIN) 650 mg CR tablet Take 650 mg by mouth every six (6) hours as needed for Pain.  polyethylene glycol (MIRALAX) 17 gram/dose powder Take 17 g by mouth daily as needed. No current facility-administered medications for this visit. REVIEW OF SYSTEMS  Per HPI        Visit Vitals    /88 (BP 1 Location: Left arm, BP Patient Position: Sitting)    Pulse 67    Temp 98.3 °F (36.8 °C) (Oral)    Resp 18    Ht 5' 4\" (1.626 m)    Wt 294 lb 9.6 oz (133.6 kg)    SpO2 95%    BMI 50.57 kg/m2         General: Well-developed, well-nourished. In no distress. A&O x 3. Head: Normocephalic, atraumatic. Eyes: Conjunctiva clear. Lungs: Clear to auscultation bilaterally. No crackles or wheezes. No use of accessory muscles. Speaks in full sentences without SOB. Chest Wall: No tenderness or deformity. Heart: RRR, normal S1 and S2, no murmur, click, rub, or gallop. Back: Symmetric. ROM intact.  No CVA tenderness. Spine is non-tender to palpation. Abdomen: Soft, non-distended, bowel sounds normal. No tenderness. No masses. No hepatosplenomegaly. Skin: Approx 5mm scab to mid abdomen that is healing. Musculoskeletal: Gait normal.   Psychiatric: Normal mood and affect. Behavior is normal.            Lab Results   Component Value Date/Time    Hemoglobin A1c 8.4 (H) 08/02/2018 09:02 AM    Hemoglobin A1c (POC) 7.6 06/14/2017 08:03 PM     Lab Results   Component Value Date/Time    Sodium 138 06/08/2018 02:20 PM    Potassium 4.0 06/08/2018 02:20 PM    Chloride 104 06/08/2018 02:20 PM    CO2 28 06/08/2018 02:20 PM    Anion gap 6 06/08/2018 02:20 PM    Glucose 103 (H) 06/08/2018 02:20 PM    BUN 12 06/08/2018 02:20 PM    Creatinine 0.96 06/08/2018 02:20 PM    BUN/Creatinine ratio 13 06/08/2018 02:20 PM    GFR est AA >60 06/08/2018 02:20 PM    GFR est non-AA >60 06/08/2018 02:20 PM    Calcium 9.5 06/08/2018 02:20 PM    Bilirubin, total 0.2 06/08/2018 02:20 PM    AST (SGOT) 13 (L) 06/08/2018 02:20 PM    Alk. phosphatase 78 06/08/2018 02:20 PM    Protein, total 8.1 06/08/2018 02:20 PM    Albumin 3.6 06/08/2018 02:20 PM    Globulin 4.5 (H) 06/08/2018 02:20 PM    A-G Ratio 0.8 (L) 06/08/2018 02:20 PM    ALT (SGPT) 28 06/08/2018 02:20 PM       ASSESSMENT and PLAN  Diagnoses and all orders for this visit:    1. Uncontrolled type 2 diabetes mellitus with stable proliferative retinopathy, without long-term current use of insulin, unspecified laterality (HCC)  -     Liraglutide (VICTOZA) 0.6 mg/0.1 mL (18 mg/3 mL) pnij; 0.6 mg once daily  -STOP Bydureon.   -it is unclear if bydureon is direct cause but will try stopping it and eval for symptom improvement   -f/u with Dr. Rhonda Sepulveda in 3 weeks. Contact office in interim if symptoms worsen or do not improve    2. Periumbilical abdominal pain  -see #1    3.  Acute bilateral low back pain without sciatica  -see #1            Patient Instructions     Please contact our office if your symptoms worsen or do not improve. Please call 911 or go directly to the Emergency Department if you develop shortness of breath, chest pain, difficulty breathing or worsening of your symptoms. Type 2 Diabetes: Care Instructions  Your Care Instructions    Type 2 diabetes is a disease that develops when the body's tissues cannot use insulin properly. Over time, the pancreas cannot make enough insulin. Insulin is a hormone that helps the body's cells use sugar (glucose) for energy. It also helps the body store extra sugar in muscle, fat, and liver cells. Without insulin, the sugar cannot get into the cells to do its work. It stays in the blood instead. This can cause high blood sugar levels. A person has diabetes when the blood sugar stays too high too much of the time. Over time, diabetes can lead to diseases of the heart, blood vessels, nerves, kidneys, and eyes. You may be able to control your blood sugar by losing weight, eating a healthy diet, and getting daily exercise. You may also have to take insulin or other diabetes medicine. Follow-up care is a key part of your treatment and safety. Be sure to make and go to all appointments. Call your doctor if you are having problems. It's also a good idea to know your test results and keep a list of the medicines you take. How can you care for yourself at home? · Keep your blood sugar at a target level (which you set with your doctor). ¨ Eat a good diet that spreads carbohydrate throughout the day. Carbohydrate-the body's main source of fuel-affects blood sugar more than any other nutrient. Carbohydrate is in fruits, vegetables, milk, and yogurt. It also is in breads, cereals, vegetables such as potatoes and corn, and sugary foods such as candy and cakes. ¨ Aim for 30 minutes of exercise on most, preferably all, days of the week. Walking is a good choice.  You also may want to do other activities, such as running, swimming, cycling, or playing tennis or team sports. If your doctor says it's okay, do muscle-strengthening exercises at least 2 times a week. ¨ Take your medicines exactly as prescribed. Call your doctor if you think you are having a problem with your medicine. You will get more details on the specific medicines your doctor prescribes. · Check your blood sugar as often as your doctor recommends. It is important to keep track of any symptoms you have, such as low blood sugar. Also tell your doctor if you have any changes in your activities, diet, or insulin use. · Talk to your doctor before you start taking aspirin every day. Aspirin can help certain people lower their risk of a heart attack or stroke. But taking aspirin isn't right for everyone, because it can cause serious bleeding. · Do not smoke. If you need help quitting, talk to your doctor about stop-smoking programs and medicines. These can increase your chances of quitting for good. · Keep your cholesterol and blood pressure at normal levels. You may need to take one or more medicines to reach your goals. Take them exactly as directed. Do not stop or change a medicine without talking to your doctor first.  When should you call for help? Call 911 anytime you think you may need emergency care. For example, call if:    · You passed out (lost consciousness), or you suddenly become very sleepy or confused. (You may have very low blood sugar.)    Call your doctor now or seek immediate medical care if:    · Your blood sugar is 300 mg/dL or is higher than the level your doctor has set for you.     · You have symptoms of low blood sugar, such as:  ¨ Sweating. ¨ Feeling nervous, shaky, and weak. ¨ Extreme hunger and slight nausea. ¨ Dizziness and headache. ¨ Blurred vision.   ¨ Confusion.    Watch closely for changes in your health, and be sure to contact your doctor if:    · You often have problems controlling your blood sugar.     · You have symptoms of long-term diabetes problems, such Walt Werner New vision changes. ¨ New pain, numbness, or tingling in your hands or feet. ¨ Skin problems. Where can you learn more? Go to http://camelia-konrad.info/. Enter C553 in the search box to learn more about \"Type 2 Diabetes: Care Instructions. \"  Current as of: December 7, 2017  Content Version: 11.7  © 7438-0240 [employer] Care instructions adapted under license by [employer] (which disclaims liability or warranty for this information). If you have questions about a medical condition or this instruction, always ask your healthcare professional. [place] [address] any warranty or liability for your use of this information. Please keep your follow-up appointment with Kee Gama MD.     Follow-up Disposition:  Return in about 3 weeks (around 10/18/2018), or if symptoms worsen or fail to improve, for w/Dr. Geo Mi for DM med eval.    Health Maintenance Due   Topic Date Due    BREAST CANCER SCRN MAMMOGRAM  03/02/1991    EYE EXAM RETINAL OR DILATED Q1  10/14/2015    PAP AKA CERVICAL CYTOLOGY  06/13/2017    Influenza Age 9 to Adult  08/01/2018       I have discussed the diagnosis with the patient and the intended plan as seen in the above orders. Patient is in agreement. The patient has received an after-visit summary and questions were answered concerning future plans. I have discussed medication side effects and warnings with the patient as well. Warning signs for the above conditions were discussed including when to call our office or go to the emergency room. The nurse provided the patient and/or family with advanced directive information if needed and encouraged the patient to provide a copy to the office when available.

## 2018-09-27 NOTE — PATIENT INSTRUCTIONS
Please contact our office if your symptoms worsen or do not improve. Please call 911 or go directly to the Emergency Department if you develop shortness of breath, chest pain, difficulty breathing or worsening of your symptoms. Type 2 Diabetes: Care Instructions  Your Care Instructions    Type 2 diabetes is a disease that develops when the body's tissues cannot use insulin properly. Over time, the pancreas cannot make enough insulin. Insulin is a hormone that helps the body's cells use sugar (glucose) for energy. It also helps the body store extra sugar in muscle, fat, and liver cells. Without insulin, the sugar cannot get into the cells to do its work. It stays in the blood instead. This can cause high blood sugar levels. A person has diabetes when the blood sugar stays too high too much of the time. Over time, diabetes can lead to diseases of the heart, blood vessels, nerves, kidneys, and eyes. You may be able to control your blood sugar by losing weight, eating a healthy diet, and getting daily exercise. You may also have to take insulin or other diabetes medicine. Follow-up care is a key part of your treatment and safety. Be sure to make and go to all appointments. Call your doctor if you are having problems. It's also a good idea to know your test results and keep a list of the medicines you take. How can you care for yourself at home? · Keep your blood sugar at a target level (which you set with your doctor). ¨ Eat a good diet that spreads carbohydrate throughout the day. Carbohydrate-the body's main source of fuel-affects blood sugar more than any other nutrient. Carbohydrate is in fruits, vegetables, milk, and yogurt. It also is in breads, cereals, vegetables such as potatoes and corn, and sugary foods such as candy and cakes. ¨ Aim for 30 minutes of exercise on most, preferably all, days of the week. Walking is a good choice.  You also may want to do other activities, such as running, swimming, cycling, or playing tennis or team sports. If your doctor says it's okay, do muscle-strengthening exercises at least 2 times a week. ¨ Take your medicines exactly as prescribed. Call your doctor if you think you are having a problem with your medicine. You will get more details on the specific medicines your doctor prescribes. · Check your blood sugar as often as your doctor recommends. It is important to keep track of any symptoms you have, such as low blood sugar. Also tell your doctor if you have any changes in your activities, diet, or insulin use. · Talk to your doctor before you start taking aspirin every day. Aspirin can help certain people lower their risk of a heart attack or stroke. But taking aspirin isn't right for everyone, because it can cause serious bleeding. · Do not smoke. If you need help quitting, talk to your doctor about stop-smoking programs and medicines. These can increase your chances of quitting for good. · Keep your cholesterol and blood pressure at normal levels. You may need to take one or more medicines to reach your goals. Take them exactly as directed. Do not stop or change a medicine without talking to your doctor first.  When should you call for help? Call 911 anytime you think you may need emergency care. For example, call if:    · You passed out (lost consciousness), or you suddenly become very sleepy or confused. (You may have very low blood sugar.)    Call your doctor now or seek immediate medical care if:    · Your blood sugar is 300 mg/dL or is higher than the level your doctor has set for you.     · You have symptoms of low blood sugar, such as:  ¨ Sweating. ¨ Feeling nervous, shaky, and weak. ¨ Extreme hunger and slight nausea. ¨ Dizziness and headache. ¨ Blurred vision.   ¨ Confusion.    Watch closely for changes in your health, and be sure to contact your doctor if:    · You often have problems controlling your blood sugar.     · You have symptoms of long-term diabetes problems, such as:  ¨ New vision changes. ¨ New pain, numbness, or tingling in your hands or feet. ¨ Skin problems. Where can you learn more? Go to http://camelia-konrad.info/. Enter C553 in the search box to learn more about \"Type 2 Diabetes: Care Instructions. \"  Current as of: December 7, 2017  Content Version: 11.7  © 0758-6085 [employer]. Care instructions adapted under license by [employer] (which disclaims liability or warranty for this information). If you have questions about a medical condition or this instruction, always ask your healthcare professional. [address] any warranty or liability for your use of this information.

## 2018-09-27 NOTE — MR AVS SNAPSHOT
[address] [address] [phone] Patient: Abdoulaye Hassan MRN: [id number] OCP:0/6/9508 Visit Information Date & Time Provider Department Dept. Phone Encounter #  
 9/27/2018  8:00 AM Nona Nieto, [address] [facility] of [address] [place] [address] [id number] Follow-up Instructions Return in about 3 weeks (around 10/18/2018), or if symptoms worsen or fail to improve, for w/Dr. Domenic Mcardle for DM med eval.  
  
Your Appointments 11/2/2018  8:10 AM  
ESTABLISHED PATIENT with Hailey Kendrick MD  
[facility] of [facility]) Appt Note: 3 months (around 11/2/2018), or if symptoms worsen or fail to improve, for DM, HTN, weight [address] [phone]  
  
   
 [address] [facility] Upcoming Health Maintenance Date Due  
 BREAST CANCER SCRN MAMMOGRAM 3/2/1991 EYE EXAM RETINAL OR DILATED Q1 10/14/2015 PAP AKA CERVICAL CYTOLOGY 6/13/2017 Influenza Age 5 to Adult 8/1/2018 HEMOGLOBIN A1C Q6M 2/2/2019 FOOT EXAM Q1 5/2/2019 MICROALBUMIN Q1 5/2/2019 LIPID PANEL Q1 8/2/2019 DTaP/Tdap/Td series (2 - Td) 5/2/2028 Allergies as of 9/27/2018  Review Complete On: 9/27/2018 By: Nona Nieto NP Severity Noted Reaction Type Reactions Pcn [Penicillins]  05/13/2010    Hives Current Immunizations  Reviewed on 9/27/2018 Name Date Influenza Vaccine 9/29/2016, 10/5/2015, 10/21/2014 Influenza Vaccine Split 1/1/2011 Pneumococcal Vaccine (Unspecified Type) 9/29/2016 Tdap 5/2/2018 ZZZ-RETIRED (DO NOT USE) Pneumococcal Vaccine (Unspecified Type) 3/31/2011 Reviewed by Diana Roberts LPN on 0/11/9784 at  8:03 AM  
You Were Diagnosed With   
  
 Codes Comments  Uncontrolled type 2 diabetes mellitus with stable proliferative retinopathy, without long-term current use of insulin, unspecified laterality [facility])    -  Primary ICD-10-CM: Y12.4342, E11.65 ICD-9-CM: 250.52, 362.02 Vitals BP Pulse Temp Resp Height(growth percentile) Weight(growth percentile) 119/88 (BP 1 Location: Left arm, BP Patient Position: Sitting) 67 98.3 °F (36.8 °C) (Oral) 18 5' 4\" (1.626 m) 294 lb 9.6 oz (133.6 kg) SpO2 BMI OB Status Smoking Status 95% 50.57 kg/m2 Ablation Never Smoker Vitals History BMI and BSA Data Body Mass Index Body Surface Area 50.57 kg/m 2 2.46 m 2 Preferred Pharmacy Pharmacy Name Phone [facility] [address] [place] [address] Ramakrishna Potter [phone] Your Updated Medication List  
  
   
This list is accurate as of 9/27/18  8:42 AM.  Always use your most recent med list.  
  
  
  
  
 Sinai Littler 250-50 mcg/dose diskus inhaler Generic drug:  fluticasone-salmeterol INHALE ONE DOSE BY MOUTH TWICE DAILY FOR  ASTHMA  PREVENTION  
  
 * albuterol 90 mcg/actuation inhaler Commonly known as:  VENTOLIN HFA Take 2 Puffs by inhalation every six (6) hours as needed for Wheezing. * albuterol 2.5 mg /3 mL (0.083 %) nebulizer solution Commonly known as:  PROVENTIL VENTOLIN  
USE ONE VIAL IN NEBULIZER EVERY 4 HOURS AS NEEDED FOR WHEEZING  
  
 aspirin 81 mg chewable tablet Take 2 Tabs by mouth daily. atorvastatin 10 mg tablet Commonly known as:  LIPITOR  
TAKE 1 TABLET BY MOUTH ONCE DAILY  
  
 diclofenac EC 75 mg EC tablet Commonly known as:  VOLTAREN Take 1 Tab by mouth two (2) times a day. glipiZIDE 10 mg tablet Commonly known as:  [place] Take 1 Tab by mouth two (2) times a day. Liraglutide 0.6 mg/0.1 mL (18 mg/3 mL) Pnij Commonly known as:  VICTOZA  
0.6 mg once daily  
  
 lisinopril-hydroCHLOROthiazide 10-12.5 mg per tablet Commonly known as:  PRINZIDE, ZESTORETIC  
TAKE ONE TABLET BY MOUTH ONCE DAILY FOR BLOOD PRESSURE  
  
 LYRICA 150 mg capsule Generic drug:  pregabalin TAKE ONE CAPSULE BY MOUTH TWICE DAILY  
  
 meclizine 25 mg tablet Commonly known as:  ANTIVERT  
TAKE ONE TABLET BY MOUTH THREE TIMES DAILY AS NEEDED  
  
 metFORMIN  mg tablet Commonly known as:  GLUCOPHAGE XR Take 2 Tabs by mouth two (2) times a day. montelukast 10 mg tablet Commonly known as:  SINGULAIR  
TAKE ONE TABLET BY MOUTH ONCE DAILY FOR  ALLERGIES  AND  ASTHMA  
  
 nitroglycerin 0.4 mg SL tablet Commonly known as:  NITROSTAT Take 1 Tab by mouth every five (5) minutes as needed for Chest Pain. Sit/Lay down then put one tab under the tongue every 5 minutes as needed for chest pain for 3 doses  
  
 polyethylene glycol 17 gram/dose powder Commonly known as:  [place]berman Amauri Take 17 g by mouth daily as needed. TYLENOL ARTHRITIS PAIN 650 mg Jessica Ifeoma Generic drug:  acetaminophen Take 650 mg by mouth every six (6) hours as needed for Pain. * Notice: This list has 2 medication(s) that are the same as other medications prescribed for you. Read the directions carefully, and ask your doctor or other care provider to review them with you. Prescriptions Sent to Pharmacy Refills Liraglutide (VICTOZA) 0.6 mg/0.1 mL (18 mg/3 mL) pnij 3 Si.6 mg once daily Class: Normal  
 Pharmacy: [address] [place] [address] Ph #: [phone] Follow-up Instructions Return in about 3 weeks (around 10/18/2018), or if symptoms worsen or fail to improve, for w/Dr. Darryle Colt for DM med eval.  
  
  
Patient Instructions Please contact our office if your symptoms worsen or do not improve. Please call 911 or go directly to the Emergency Department if you develop shortness of breath, chest pain, difficulty breathing or worsening of your symptoms. Type 2 Diabetes: Care Instructions Your Care Instructions Type 2 diabetes is a disease that develops when the body's tissues cannot use insulin properly. Over time, the pancreas cannot make enough insulin. Insulin is a hormone that helps the body's cells use sugar (glucose) for energy. It also helps the body store extra sugar in muscle, fat, and liver cells. Without insulin, the sugar cannot get into the cells to do its work. It stays in the blood instead. This can cause high blood sugar levels. A person has diabetes when the blood sugar stays too high too much of the time. Over time, diabetes can lead to diseases of the heart, blood vessels, nerves, kidneys, and eyes. You may be able to control your blood sugar by losing weight, eating a healthy diet, and getting daily exercise. You may also have to take insulin or other diabetes medicine. Follow-up care is a key part of your treatment and safety. Be sure to make and go to all appointments. Call your doctor if you are having problems. It's also a good idea to know your test results and keep a list of the medicines you take. How can you care for yourself at home? · Keep your blood sugar at a target level (which you set with your doctor). ¨ Eat a good diet that spreads carbohydrate throughout the day. Carbohydrate-the body's main source of fuel-affects blood sugar more than any other nutrient. Carbohydrate is in fruits, vegetables, milk, and yogurt. It also is in breads, cereals, vegetables such as potatoes and corn, and sugary foods such as candy and cakes. ¨ Aim for 30 minutes of exercise on most, preferably all, days of the week. Walking is a good choice. You also may want to do other activities, such as running, swimming, cycling, or playing tennis or team sports. If your doctor says it's okay, do muscle-strengthening exercises at least 2 times a week. ¨ Take your medicines exactly as prescribed. Call your doctor if you think you are having a problem with your medicine. You will get more details on the specific medicines your doctor prescribes. · Check your blood sugar as often as your doctor recommends. It is important to keep track of any symptoms you have, such as low blood sugar. Also tell your doctor if you have any changes in your activities, diet, or insulin use. · Talk to your doctor before you start taking aspirin every day. Aspirin can help certain people lower their risk of a heart attack or stroke. But taking aspirin isn't right for everyone, because it can cause serious bleeding. · Do not smoke. If you need help quitting, talk to your doctor about stop-smoking programs and medicines. These can increase your chances of quitting for good. · Keep your cholesterol and blood pressure at normal levels. You may need to take one or more medicines to reach your goals. Take them exactly as directed. Do not stop or change a medicine without talking to your doctor first. 
When should you call for help? Call 911 anytime you think you may need emergency care. For example, call if: 
  · You passed out (lost consciousness), or you suddenly become very sleepy or confused. (You may have very low blood sugar.)  
 Call your doctor now or seek immediate medical care if: 
  · Your blood sugar is 300 mg/dL or is higher than the level your doctor has set for you.  
  · You have symptoms of low blood sugar, such as: ¨ Sweating. ¨ Feeling nervous, shaky, and weak. ¨ Extreme hunger and slight nausea. ¨ Dizziness and headache. ¨ Blurred vision. ¨ Confusion.  
 Watch closely for changes in your health, and be sure to contact your doctor if: 
  · You often have problems controlling your blood sugar.  
  · You have symptoms of long-term diabetes problems, such as: ¨ New vision changes. ¨ New pain, numbness, or tingling in your hands or feet. ¨ Skin problems. Where can you learn more? Go to http://camelia-konrad.info/. Enter C553 in the search box to learn more about \"Type 2 Diabetes: Care Instructions. \" Current as of: December 7, 2017 Content Version: 11.7 © 9656-1739 [employer]. Care instructions adapted under license by [employer] (which disclaims liability or warranty for this information). If you have questions about a medical condition or this instruction, always ask your healthcare professional. [address] any warranty or liability for your use of this information. Introducing [facility]! [employer] introduces [employer] patient portal. Now you can access parts of your medical record, email your doctor's office, and request medication refills online. 1. In your internet browser, go to [email or web address] 2. Click on the First Time User? Click Here link in the Sign In box. You will see the New Member Sign Up page. 3. Enter your [employer] Access Code exactly as it appears below. You will not need to use this code after youve completed the sign-up process. If you do not sign up before the expiration date, you must request a new code. · [employer] Access Code: [email or web address] Expires: 10/31/2018  8:55 AM 
 
4. Enter the last four digits of your Social Security Number (xxxx) and Date of Birth (mm/dd/yyyy) as indicated and click Submit. You will be taken to the next sign-up page. 5. Create a Concuity ID. This will be your Concuity login ID and cannot be changed, so think of one that is secure and easy to remember. 6. Create a [employer] password. You can change your password at any time. 7. Enter your Password Reset Question and Answer. This can be used at a later time if you forget your password. 8. Enter your e-mail address. You will receive e-mail notification when new information is available in [address] 9. Click Sign Up. You can now view and download portions of your medical record. 10. Click the Download Summary menu link to download a portable copy of your medical information. If you have questions, please visit the Frequently Asked Questions section of the [employer] website. Remember, [employer] is NOT to be used for urgent needs. For medical emergencies, dial 911. Now available from your iPhone and Android! Please provide this summary of care documentation to your next provider. Your primary care clinician is listed as Katlyn Maurer. If you have any questions after today's visit, please call [phone].

## 2018-09-27 NOTE — PROGRESS NOTES
Trisha Rodriguez  Identified pt with two pt identifiers(name and ). Chief Complaint   Patient presents with    Medication Problem     started few weeks ago, started when she began new medication, last took it last     Back Pain    Abdominal Pain    Rash       Reviewed record In preparation for visit and have obtained necessary documentation. Has info on advanced directive but has not filled them out. 1. Have you been to the ER, urgent care clinic or hospitalized since your last visit? No     2. Have you seen or consulted any other health care providers outside of the [address] [place] [address] since your last visit? Include any pap smears or colon screening. No    Vitals reviewed with provider.     Health Maintenance reviewed:     Health Maintenance Due   Topic    BREAST CANCER SCRN MAMMOGRAM     EYE EXAM RETINAL OR DILATED Q1     PAP AKA CERVICAL CYTOLOGY     Influenza Age 5 to Adult           Wt Readings from Last 3 Encounters:   18 294 lb 9.6 oz (133.6 kg)   18 292 lb 9.6 oz (132.7 kg)   18 285 lb 4.4 oz (129.4 kg)        Temp Readings from Last 3 Encounters:   18 98.3 °F (36.8 °C) (Oral)   18 98.6 °F (37 °C) (Oral)   18 98.7 °F (37.1 °C)        BP Readings from Last 3 Encounters:   18 119/88   18 106/69   18 114/81        Pulse Readings from Last 3 Encounters:   18 67   18 79   18 63        Vitals:    18 0809   BP: 119/88   Pulse: 67   Resp: 18   Temp: 98.3 °F (36.8 °C)   TempSrc: Oral   SpO2: 95%   Weight: 294 lb 9.6 oz (133.6 kg)   Height: 5' 4\" (1.626 m)   PainSc:   7   PainLoc: Abdomen          Learning Assessment:   :       Learning Assessment 2018   PRIMARY LEARNER Patient Patient Patient   HIGHEST LEVEL OF EDUCATION - PRIMARY LEARNER  - > 4 YEARS OF COLLEGE -   BARRIERS PRIMARY LEARNER - NONE -   CO-LEARNER CAREGIVER - No -   PRIMARY LANGUAGE ENGLISH ENGLISH ENGLISH   LEARNER PREFERENCE PRIMARY READING OTHER (COMMENT) LISTENING   ANSWERED BY patient patient self     - - self   RELATIONSHIP SELF SELF SELF        Depression Screening:   :       PHQ over the last two weeks 9/27/2018   Little interest or pleasure in doing things Not at all   Feeling down, depressed, irritable, or hopeless Not at all   Total Score PHQ 2 0        Fall Risk Assessment:   :       Fall Risk Assessment, last 12 mths 8/3/2017   Able to walk? Yes   Fall in past 12 months? No        Abuse Screening:   :       Abuse Screening Questionnaire 5/9/2014   Do you ever feel afraid of your partner? N   Are you in a relationship with someone who physically or mentally threatens you? N   Is it safe for you to go home?  Y        ADL Screening:   :       ADL Assessment 5/2/2018   Feeding yourself No Help Needed   Getting from bed to chair No Help Needed   Getting dressed No Help Needed   Bathing or showering No Help Needed   Walk across the room (includes cane/walker) No Help Needed   Using the telphone No Help Needed   Taking your medications No Help Needed   Preparing meals No Help Needed   Managing money (expenses/bills) No Help Needed   Moderately strenuous housework (laundry) No Help Needed   Shopping for personal items (toiletries/medicines) No Help Needed   Shopping for groceries No Help Needed   Driving No Help Needed   Climbing a flight of stairs No Help Needed   Getting to places beyond walking distances No Help Needed

## 2018-10-08 DIAGNOSIS — I10 ESSENTIAL HYPERTENSION: ICD-10-CM

## 2018-10-08 RX ORDER — LISINOPRIL AND HYDROCHLOROTHIAZIDE 10; 12.5 MG/1; MG/1
TABLET ORAL
Qty: 90 TAB | Refills: 3 | Status: SHIPPED | OUTPATIENT
Start: 2018-10-08 | End: 2019-11-27 | Stop reason: SDUPTHER

## 2018-10-08 NOTE — TELEPHONE ENCOUNTER
PCP: Serenity Hsu MD     Last appt: 9/27/2018   Future Appointments  Date Time Provider Dwight Spencer   10/18/2018 9:30 AM Serenity Hsu  W. Marily Castillo   11/2/2018 8:10 AM Serenity Hsu  W. Marily Castillo        Requested Prescriptions     Pending Prescriptions Disp Refills    lisinopril-hydroCHLOROthiazide (PRINZIDE, ZESTORETIC) 10-12.5 mg per tablet 30 Tab 5     Sig: TAKE ONE TABLET BY MOUTH ONCE DAILY FOR BLOOD PRESSURE

## 2018-10-08 NOTE — TELEPHONE ENCOUNTER
Refill   Requested Prescriptions     Pending Prescriptions Disp Refills    lisinopril-hydroCHLOROthiazide (PRINZIDE, ZESTORETIC) 10-12.5 mg per tablet 30 Tab 5     Pt is not seeing Marius Schwab, NP any more.

## 2018-10-18 ENCOUNTER — OFFICE VISIT (OUTPATIENT)
Dept: INTERNAL MEDICINE CLINIC | Facility: CLINIC | Age: 43
End: 2018-10-18

## 2018-10-18 VITALS
TEMPERATURE: 97.6 F | RESPIRATION RATE: 16 BRPM | BODY MASS INDEX: 49 KG/M2 | SYSTOLIC BLOOD PRESSURE: 105 MMHG | HEIGHT: 64 IN | OXYGEN SATURATION: 96 % | WEIGHT: 287 LBS | HEART RATE: 65 BPM | DIASTOLIC BLOOD PRESSURE: 61 MMHG

## 2018-10-18 DIAGNOSIS — E78.2 MIXED HYPERLIPIDEMIA: ICD-10-CM

## 2018-10-18 DIAGNOSIS — E66.01 OBESITY, MORBID, BMI 50 OR HIGHER (HCC): ICD-10-CM

## 2018-10-18 DIAGNOSIS — Z23 ENCOUNTER FOR IMMUNIZATION: ICD-10-CM

## 2018-10-18 DIAGNOSIS — M54.2 NECK PAIN: ICD-10-CM

## 2018-10-18 DIAGNOSIS — I10 ESSENTIAL HYPERTENSION: ICD-10-CM

## 2018-10-18 DIAGNOSIS — E11.42 TYPE 2 DIABETES MELLITUS WITH DIABETIC POLYNEUROPATHY, WITHOUT LONG-TERM CURRENT USE OF INSULIN (HCC): Primary | ICD-10-CM

## 2018-10-18 RX ORDER — AMITRIPTYLINE HYDROCHLORIDE 50 MG/1
50 TABLET, FILM COATED ORAL
Qty: 30 TAB | Refills: 2 | Status: SHIPPED | OUTPATIENT
Start: 2018-10-18 | End: 2018-10-18 | Stop reason: SDUPTHER

## 2018-10-18 RX ORDER — AMITRIPTYLINE HYDROCHLORIDE 50 MG/1
50 TABLET, FILM COATED ORAL
Qty: 30 TAB | Refills: 2 | Status: SHIPPED | OUTPATIENT
Start: 2018-10-18 | End: 2019-12-27 | Stop reason: ALTCHOICE

## 2018-10-18 NOTE — PROGRESS NOTES
CC:   Chief Complaint   Patient presents with    Diabetes    Medication Refill    Immunization/Injection    Neck Pain     upper back and numbness in hands/feet       HISTORY OF PRESENT ILLNESS  Gisel Thapa is a 37 y.o. female. Presents for 2 month follow up evaluation. She has type 2 DM with neuropathy, HTN, asthma, allergic rhinitis, FE, hyperlipidemia, chronic low back pain, and depression.  Today she complains of pain at the back of the neck and constant numbness at bilateral hands and feet for the past 2 weeks. Was unable to tolerate Bydureon. Changed to Victoza that she is tolerating well; has been taking for about 3 weeks. Has noticed decreased appetite on Victoza. Endocrine Review  She is seen for diabetes.  Denies polydipsia, polyuria, or hypoglycemia.  Home glucose monitoring: is performed twice a day.  Home FBS: 134 this am.  She reports medication compliance: compliant all of the time.  Medication side effects: none.  Diabetic diet compliance: compliant most of the time.  Lab review: reviewed with patient.  Last A1c 8.4% on 8/2/18. Eye exam: overdue.       Cardiovascular Review  The patient has hypertension, hyperlipidemia and non-obstructive coronary artery disease.  She reports taking medications as instructed, no medication side effects noted.  Diet and Lifestyle: generally follows a low fat low cholesterol diet, generally follows a low sodium diet, no formal exercise but active during the day.  Lab review: orders written for new lab studies as appropriate; see orders.       Soc Hx  .  Has 2 children (ages 32 and 23).  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 Maxwell Lennon not drink alcohol.   Denies recreational drug use.  Walks occasionally for exercise.  Drinks unsweetened tea; denies coffee or sodas.     Health Maintenance  Flu vaccine: declined                                    Pneumonia vaccine: PPSV-23 9/9/16                                                                    Tetanus vaccine: 5/2/18                                                                 Pap smear: due for this  Mammogram: due for this  Eye exam: Dr. Beatrice Briones, 2016, due for this  Foot exam: 5/2/18  Lipids: 8/2/18 (tot chol 153, LDL 86)  A1c: 8/2/18 (8.4%)  Advanced Directives: given information  End of Life: given information                                      ROS  A complete review of systems was performed and is negative except for those mentioned in the HPI.      Patient Active Problem List   Diagnosis Code    HTN (hypertension) I10    Depression F32.9    FE (obstructive sleep apnea) G47.33    Asthma J45.909    Iron deficiency anemia D50.9    Hyperlipidemia E78.5    Obesity, morbid, BMI 50 or higher ([facility] E66.01    Type II diabetes mellitus, uncontrolled ([facility] E11.65    Coronary artery disease involving native coronary artery of native heart without angina pectoris I25.10     Past Medical History:   Diagnosis Date    Anemia (iron deficiency)     Asthma     DDD (degenerative disc disease), lumbar 2014    Depression     DM type 2 (diabetes mellitus, type 2) ([facility] 11/23/2011    GERD (gastroesophageal reflux disease)     GI bleed 2015    Hepatic steatosis 11/2016    confirmed by US    HTN (hypertension)     Irregular menses     Irritable bowel     Kidney mass     To right kidney    Morbid obesity (HCC)     FE (obstructive sleep apnea)     w/ Cpap    PUD (peptic ulcer disease) 2015     Allergies   Allergen Reactions    Pcn [Penicillins] Hives       Current Outpatient Medications   Medication Sig Dispense Refill    lisinopril-hydroCHLOROthiazide (PRINZIDE, ZESTORETIC) 10-12.5 mg per tablet TAKE ONE TABLET BY MOUTH ONCE DAILY FOR BLOOD PRESSURE 90 Tab 3    Liraglutide (VICTOZA) 0.6 mg/0.1 mL (18 mg/3 mL) pnij 0.6 mg once daily 30 Adjustable Dose Pre-filled Pen Syringe 3    atorvastatin (LIPITOR) 10 mg tablet TAKE 1 TABLET BY MOUTH ONCE DAILY 90 Tab 1    diclofenac EC (VOLTAREN) 75 mg EC tablet Take 1 Tab by mouth two (2) times a day. 60 Tab 3    glipiZIDE (GLUCOTROL) 10 mg tablet Take 1 Tab by mouth two (2) times a day. 60 Tab 5    meclizine (ANTIVERT) 25 mg tablet TAKE ONE TABLET BY MOUTH THREE TIMES DAILY AS NEEDED 20 Tab 1    LYRICA 150 mg capsule TAKE ONE CAPSULE BY MOUTH TWICE DAILY 60 Cap 5    montelukast (SINGULAIR) 10 mg tablet TAKE ONE TABLET BY MOUTH ONCE DAILY FOR  ALLERGIES  AND  ASTHMA 30 Tab 5    nitroglycerin (NITROSTAT) 0.4 mg SL tablet Take 1 Tab by mouth every five (5) minutes as needed for Chest Pain. Sit/Lay down then put one tab under the tongue every 5 minutes as needed for chest pain for 3 doses 1 Bottle 0    aspirin 81 mg chewable tablet Take 2 Tabs by mouth daily. 100 Tab 0    metFORMIN ER (GLUCOPHAGE XR) 500 mg tablet Take 2 Tabs by mouth two (2) times a day. 120 Tab 5    albuterol (PROVENTIL VENTOLIN) 2.5 mg /3 mL (0.083 %) nebulizer solution USE ONE VIAL IN NEBULIZER EVERY 4 HOURS AS NEEDED FOR WHEEZING 30 Each 3    albuterol (VENTOLIN HFA) 90 mcg/actuation inhaler Take 2 Puffs by inhalation every six (6) hours as needed for Wheezing. 2 Inhaler 5    ADVAIR DISKUS 250-50 mcg/dose diskus inhaler INHALE ONE DOSE BY MOUTH TWICE DAILY FOR  ASTHMA  PREVENTION 1 Inhaler 5    acetaminophen (TYLENOL ARTHRITIS PAIN) 650 mg CR tablet Take 650 mg by mouth every six (6) hours as needed for Pain.  polyethylene glycol (MIRALAX) 17 gram/dose powder Take 17 g by mouth daily as needed. PHYSICAL EXAM  Visit Vitals  /61 (BP 1 Location: Left arm, BP Patient Position: Sitting)   Pulse 65   Temp 97.6 °F (36.4 °C) (Oral)   Resp 16   Ht 5' 4\" (1.626 m)   Wt 287 lb (130.2 kg)   SpO2 96%   BMI 49.26 kg/m²   7 lb weight loss since last clinic visit one month ago    General: Morbidly obese, no distress. HEENT:  Head normocephalic/atraumatic, no scleral icterus  Neck: Normal ROM.   No tenderness at posterior neck. Increased pain with flexion and extension. No lymphadenopathy or thyromegaly. Lungs:  Clear to ausculation bilaterally. Good air movement. Heart:  Regular rate and rhythm, normal S1 and S2, no murmur, gallop, or rub  Extremities: No clubbing, cyanosis, or edema. Negative Tinel's sign at hands. Neurological: Alert and oriented. Psychiatric: Normal mood and affect. Behavior is normal.         ASSESSMENT AND PLAN    ICD-10-CM ICD-9-CM    1. Type 2 diabetes mellitus with diabetic polyneuropathy, without long-term current use of insulin ([facility]) E11.42 250.60      357.2    2. Neck pain M54.2 723.1 XR SPINE CERV 4 OR 5 V   3. Essential hypertension I10 401.9    4. Obesity, morbid, BMI 50 or higher ([facility] E66.01 278.01    5. Mixed hyperlipidemia E78.2 272.2    6. Encounter for immunization Z23 V03.89 INFLUENZA VIRUS VAC QUAD,SPLIT,PRESV FREE SYRINGE IM     Diagnoses and all orders for this visit:    Type 2 diabetes mellitus with diabetic polyneuropathy, without long-term current use of insulin ([facility]  Improving control by home BS readings. Continue present management with glipizide, metformin, and Victoza. Continue Lyrica. -     Start amitriptyline (ELAVIL) 50 mg tablet; Take 1 Tab by mouth nightly. For numbness at hands and feet. Neck pain  Suspect OA or DDD. Heating pad and   -     XR SPINE CERV 4 OR 5 V; Future    Essential hypertension  Controlled. Continue present management. Obesity, morbid, BMI 50 or higher ([facility]  Discussed the patient's BMI with her. The BMI follow up plan is as follows: dietary management education, guidance, and counseling; encourage exercise; monitor weight; prescribed dietary intake. Losing weight on Victoza. Follow up BMI in 2 months. Mixed hyperlipidemia  Controlled on atorvastatin 10 mg daily.     Encounter for immunization  -     INFLUENZA VIRUS VAC QUAD,SPLIT,PRESV FREE SYRINGE IM      Follow-up Disposition:  Return in about 2 months (around 12/18/2018), or if symptoms worsen or fail to improve, for DM, neuropathy. I have discussed the diagnosis with the patient and the intended plan as seen in the above orders. Patient is in agreement. The patient has received an after-visit summary and questions were answered concerning future plans. I have discussed medication side effects and warnings with the patient as well.

## 2018-10-18 NOTE — PATIENT INSTRUCTIONS
Vaccine Information Statement    Influenza (Flu) Vaccine (Inactivated or Recombinant): What you need to know    Many Vaccine Information Statements are available in Greek and other languages. See www.immunize.org/vis  Hojas de Información Sobre Vacunas están disponibles en Español y en muchos otros idiomas. Visite www.immunize.org/vis    1. Why get vaccinated? Influenza (flu) is a contagious disease that spreads around the [place] every year, usually between October and May. Flu is caused by influenza viruses, and is spread mainly by coughing, sneezing, and close contact. Anyone can get flu. Flu strikes suddenly and can last several days. Symptoms vary by age, but can include:   fever/chills   sore throat   muscle aches   fatigue   cough   headache    runny or stuffy nose    Flu can also lead to pneumonia and blood infections, and cause diarrhea and seizures in children. If you have a medical condition, such as heart or lung disease, flu can make it worse. Flu is more dangerous for some people. Infants and young children, people 72years of age and older, pregnant women, and people with certain health conditions or a weakened immune system are at greatest risk. Each year thousands of people in the [facility] die from flu, and many more are hospitalized. Flu vaccine can:   keep you from getting flu,   make flu less severe if you do get it, and   keep you from spreading flu to your family and other people. 2. Inactivated and recombinant flu vaccines    A dose of flu vaccine is recommended every flu season. Children 6 months through 6years of age may need two doses during the same flu season. Everyone else needs only one dose each flu season.        Some inactivated flu vaccines contain a very small amount of a mercury-based preservative called thimerosal. Studies have not shown thimerosal in vaccines to be harmful, but flu vaccines that do not contain thimerosal are available. There is no live flu virus in flu shots. They cannot cause the flu. There are many flu viruses, and they are always changing. Each year a new flu vaccine is made to protect against three or four viruses that are likely to cause disease in the upcoming flu season. But even when the vaccine doesnt exactly match these viruses, it may still provide some protection    Flu vaccine cannot prevent:   flu that is caused by a virus not covered by the vaccine, or   illnesses that look like flu but are not. It takes about 2 weeks for protection to develop after vaccination, and protection lasts through the flu season. 3. Some people should not get this vaccine    Tell the person who is giving you the vaccine:     If you have any severe, life-threatening allergies. If you ever had a life-threatening allergic reaction after a dose of flu vaccine, or have a severe allergy to any part of this vaccine, you may be advised not to get vaccinated. Most, but not all, types of flu vaccine contain a small amount of egg protein.  If you ever had Guillain-Barré Syndrome (also called GBS). Some people with a history of GBS should not get this vaccine. This should be discussed with your doctor.  If you are not feeling well. It is usually okay to get flu vaccine when you have a mild illness, but you might be asked to come back when you feel better. 4. Risks of a vaccine reaction    With any medicine, including vaccines, there is a chance of reactions. These are usually mild and go away on their own, but serious reactions are also possible. Most people who get a flu shot do not have any problems with it.      Minor problems following a flu shot include:    soreness, redness, or swelling where the shot was given     hoarseness   sore, red or itchy eyes   cough   fever   aches   headache   itching   fatigue  If these problems occur, they usually begin soon after the shot and last 1 or 2 days. More serious problems following a flu shot can include the following:     There may be a small increased risk of Guillain-Barré Syndrome (GBS) after inactivated flu vaccine. This risk has been estimated at 1 or 2 additional cases per million people vaccinated. This is much lower than the risk of severe complications from flu, which can be prevented by flu vaccine.  Young children who get the flu shot along with pneumococcal vaccine (PCV13) and/or DTaP vaccine at the same time might be slightly more likely to have a seizure caused by fever. Ask your doctor for more information. Tell your doctor if a child who is getting flu vaccine has ever had a seizure. Problems that could happen after any injected vaccine:      People sometimes faint after a medical procedure, including vaccination. Sitting or lying down for about 15 minutes can help prevent fainting, and injuries caused by a fall. Tell your doctor if you feel dizzy, or have vision changes or ringing in the ears.  Some people get severe pain in the shoulder and have difficulty moving the arm where a shot was given. This happens very rarely.  Any medication can cause a severe allergic reaction. Such reactions from a vaccine are very rare, estimated at about 1 in a million doses, and would happen within a few minutes to a few hours after the vaccination. As with any medicine, there is a very remote chance of a vaccine causing a serious injury or death. The safety of vaccines is always being monitored. For more information, visit: www.cdc.gov/vaccinesafety/    5. What if there is a serious reaction? What should I look for?  Look for anything that concerns you, such as signs of a severe allergic reaction, very high fever, or unusual behavior.     Signs of a severe allergic reaction can include hives, swelling of the face and throat, difficulty breathing, a fast heartbeat, dizziness, and weakness - usually within a few minutes to a few hours after the vaccination. What should I do?  If you think it is a severe allergic reaction or other emergency that cant wait, call 9-1-1 and get the person to the nearest hospital. Otherwise, call your doctor.  Reactions should be reported to the Vaccine Adverse Event Reporting System (VAERS). Your doctor should file this report, or you can do it yourself through  the VAERS web site at www.vaers. [facility].gov, or by calling 0-771.336.8040. VAERS does not give medical advice. 6. The National Vaccine Injury Compensation Program    The [facility] Vaccine Injury Compensation Program (VICP) is a federal program that was created to compensate people who may have been injured by certain vaccines. Persons who believe they may have been injured by a vaccine can learn about the program and about filing a claim by calling 4-891.883.8429 or visiting the [employer] website at www.[facility].gov/vaccinecompensation. There is a time limit to file a claim for compensation. 7. How can I learn more?  Ask your healthcare provider. He or she can give you the vaccine package insert or suggest other sources of information.  Call your local or state health department.  Contact the Centers for Disease Control and Prevention (CDC):  - Call 3-670.276.9123 (1-800-CDC-INFO) or  - Visit CDCs website at www.cdc.gov/flu    Vaccine Information Statement   Inactivated Influenza Vaccine   8/7/2015  42 JUANJOSE Dhruv Dana [id number]    Department of Health and Human Services  Centers for Disease Control and Prevention    Office Use Only

## 2018-10-18 NOTE — PROGRESS NOTES
Joshua Ontiveros  Identified pt with two pt identifiers(name and ). Chief Complaint   Patient presents with    Diabetes    Medication Refill    Immunization/Injection    Neck Pain     upper back and numbness in hands/feet       1. Have you been to the ER, urgent care clinic since your last visit? Hospitalized since your last visit? NO    2. Have you seen or consulted any other health care providers outside of the [address] [place] [address] since your last visit? Include any pap smears or colon screening. NO  Per Dr. Handy Persaud verbal order read back orders placed for flu vaccine. Prescription for elavil 50 mg #30 2 refills called to [address] per written order of Dr Handy Persaud. Patient was given referrals for mammogram,eye exam and pap at previous visit. Today's provider has been notified of reason for visit, vitals and flowsheets obtained on patients.      Patient received paperwork for advance directive during previous visit but has not completed at this time     Reviewed record In preparation for visit, huddled with provider and have obtained necessary documentation      Health Maintenance Due   Topic    BREAST CANCER SCRN MAMMOGRAM     EYE EXAM RETINAL OR DILATED Q1     PAP AKA CERVICAL CYTOLOGY     Influenza Age 5 to Adult        Wt Readings from Last 3 Encounters:   10/18/18 287 lb (130.2 kg)   18 294 lb 9.6 oz (133.6 kg)   18 292 lb 9.6 oz (132.7 kg)     Temp Readings from Last 3 Encounters:   10/18/18 97.6 °F (36.4 °C) (Oral)   18 98.3 °F (36.8 °C) (Oral)   18 98.6 °F (37 °C) (Oral)     BP Readings from Last 3 Encounters:   18 119/88   18 106/69   18 114/81     Pulse Readings from Last 3 Encounters:   10/18/18 65   18 67   18 79     Vitals:    10/18/18 0940   Pulse: 65   Resp: 16   Temp: 97.6 °F (36.4 °C)   TempSrc: Oral   SpO2: 96%   Weight: 287 lb (130.2 kg)   Height: 5' 4\" (1.626 m)   PainSc:   8   PainLoc: Neck         Learning Assessment:  :     Learning Assessment 5/2/2018 12/2/2016 5/9/2014   PRIMARY LEARNER Patient Patient Patient   HIGHEST LEVEL OF EDUCATION - PRIMARY LEARNER  - > 4 YEARS OF COLLEGE -   BARRIERS PRIMARY LEARNER - NONE -   CO-LEARNER CAREGIVER - No -   PRIMARY LANGUAGE ENGLISH ENGLISH ENGLISH   LEARNER PREFERENCE PRIMARY READING OTHER (COMMENT) LISTENING   ANSWERED BY patient patient self     - - self   RELATIONSHIP SELF SELF SELF       Depression Screening:  :     PHQ over the last two weeks 9/27/2018   Little interest or pleasure in doing things Not at all   Feeling down, depressed, irritable, or hopeless Not at all   Total Score PHQ 2 0       Fall Risk Assessment:  :     Fall Risk Assessment, last 12 mths 8/3/2017   Able to walk? Yes   Fall in past 12 months? No       Abuse Screening:  :     Abuse Screening Questionnaire 5/9/2014   Do you ever feel afraid of your partner? N   Are you in a relationship with someone who physically or mentally threatens you? N   Is it safe for you to go home? Y       ADL Screening:  :     ADL Assessment 5/2/2018   Feeding yourself No Help Needed   Getting from bed to chair No Help Needed   Getting dressed No Help Needed   Bathing or showering No Help Needed   Walk across the room (includes cane/walker) No Help Needed   Using the telphone No Help Needed   Taking your medications No Help Needed   Preparing meals No Help Needed   Managing money (expenses/bills) No Help Needed   Moderately strenuous housework (laundry) No Help Needed   Shopping for personal items (toiletries/medicines) No Help Needed   Shopping for groceries No Help Needed   Driving No Help Needed   Climbing a flight of stairs No Help Needed   Getting to places beyond walking distances No Help Needed       After verbal order read back of , patient received Flu Shot in right arm,  [address]  [id number] Lot [id number] Exp 6/12/19. Patient tolerated procedure without complaints and received VIS.           Medication reconciliation up to date and corrected with patient at this time.

## 2018-11-13 ENCOUNTER — TELEPHONE (OUTPATIENT)
Dept: SLEEP MEDICINE | Age: 43
End: 2018-11-13

## 2018-11-13 NOTE — TELEPHONE ENCOUNTER
Patient called into the office to have a download of her cpap device, she states she is not sleeping well and wants to make sure the pressure is ok. Please download and reach out to the patient at [phone].

## 2018-12-13 ENCOUNTER — OFFICE VISIT (OUTPATIENT)
Dept: INTERNAL MEDICINE CLINIC | Facility: CLINIC | Age: 43
End: 2018-12-13

## 2018-12-13 VITALS
HEART RATE: 78 BPM | BODY MASS INDEX: 49.34 KG/M2 | TEMPERATURE: 97.8 F | RESPIRATION RATE: 14 BRPM | SYSTOLIC BLOOD PRESSURE: 116 MMHG | HEIGHT: 64 IN | WEIGHT: 289 LBS | OXYGEN SATURATION: 97 % | DIASTOLIC BLOOD PRESSURE: 84 MMHG

## 2018-12-13 DIAGNOSIS — E78.2 MIXED HYPERLIPIDEMIA: ICD-10-CM

## 2018-12-13 DIAGNOSIS — M54.6 CHRONIC MIDLINE THORACIC BACK PAIN: ICD-10-CM

## 2018-12-13 DIAGNOSIS — J45.20 MILD INTERMITTENT ASTHMA WITHOUT COMPLICATION: ICD-10-CM

## 2018-12-13 DIAGNOSIS — I10 ESSENTIAL HYPERTENSION: ICD-10-CM

## 2018-12-13 DIAGNOSIS — Z12.4 SCREENING FOR CERVICAL CANCER: ICD-10-CM

## 2018-12-13 DIAGNOSIS — G89.29 CHRONIC MIDLINE THORACIC BACK PAIN: ICD-10-CM

## 2018-12-13 DIAGNOSIS — E11.42 TYPE 2 DIABETES MELLITUS WITH DIABETIC POLYNEUROPATHY, WITHOUT LONG-TERM CURRENT USE OF INSULIN (HCC): Primary | ICD-10-CM

## 2018-12-13 DIAGNOSIS — Z12.39 SCREENING FOR BREAST CANCER: ICD-10-CM

## 2018-12-13 DIAGNOSIS — I25.10 CORONARY ARTERY DISEASE INVOLVING NATIVE CORONARY ARTERY OF NATIVE HEART WITHOUT ANGINA PECTORIS: ICD-10-CM

## 2018-12-13 DIAGNOSIS — M54.50 CHRONIC MIDLINE LOW BACK PAIN WITHOUT SCIATICA: ICD-10-CM

## 2018-12-13 DIAGNOSIS — G89.29 CHRONIC MIDLINE LOW BACK PAIN WITHOUT SCIATICA: ICD-10-CM

## 2018-12-13 DIAGNOSIS — E66.01 MORBID OBESITY WITH BMI OF 45.0-49.9, ADULT (HCC): ICD-10-CM

## 2018-12-13 LAB — HBA1C MFR BLD HPLC: 7.7 %

## 2018-12-13 NOTE — PROGRESS NOTES
CC:   Chief Complaint   Patient presents with    Diabetes    Blood Pressure Check    Weight Management       HISTORY OF PRESENT ILLNESS  Asya Royal is a 37 y.o. female. Presents for 2 month follow up evaluation.  She has type 2 DM with neuropathy, HTN, asthma, allergic rhinitis, FE, hyperlipidemia, chronic low back pain, vertigo, and depression.      At last clinic visit, she complained of posterior neck pain and constant numbness at bilateral hands and feet. Cervical spine X-ray showed osteoarthritis with anterior disc osteophytes at C4-C7. Was started on diclofenac 75 mg BID. For numbness at hands and feet, was started amitriptyline 50 mg nightly as an addition to Lyrica 150 mg BID. Reports that these medications have been helping. Takes Diclofenac only as needed and takes amitriptyline only about 2-3 days a week. Reports she does not like taking medications. Today she complains of worsened mid and low back pain. Denies any recent injury or trauma. Endocrine Review  She is seen for diabetes.  Denies polydipsia, polyuria, or hypoglycemia.  Home glucose monitoring: is performed twice a day. She reports medication compliance with glipizide, metformin, and Victoza. Medication side effects: none.  Diabetic diet compliance: compliant most of the time.  Lab review: reviewed with patient.  A1c 7.7% on 12/13/18 (was 8.4% on 8/2/18). Eye exam: overdue.       Soc Hx  .  Has 2 children (ages 32 and 23).  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   Denies alcohol or recreational drug use.  Walks occasionally for exercise.  Drinks unsweetened tea; denies coffee or sodas.     Health Maintenance  Flu vaccine: declined                                    Pneumonia vaccine: PPSV-23 9/9/16                                                                    Tetanus vaccine: 5/2/18                                                                 Pap smear: due for this  Mammogram: due for this  Eye exam: Dr. Oscar Gallegos 2016, due for this  Foot exam: 5/2/18  Lipids: 8/2/18 (tot chol 153, LDL 86)  A1c: 12/13/18 (7.7%)  Advanced Directives: given information  End of Life: given information                                      ROS  A complete review of systems was performed and is negative except for those mentioned in the HPI. Patient Active Problem List   Diagnosis Code    HTN (hypertension) I10    Depression F32.9    FE (obstructive sleep apnea) G47.33    Asthma J45.909    Iron deficiency anemia D50.9    Hyperlipidemia E78.5    Obesity, morbid, BMI 50 or higher ([facility] E66.01    Type 2 diabetes mellitus with diabetic polyneuropathy, without long-term current use of insulin (HCC) E11.42    Coronary artery disease involving native coronary artery of native heart without angina pectoris I25.10     Past Medical History:   Diagnosis Date    Anemia (iron deficiency)     Asthma     DDD (degenerative disc disease), lumbar 2014    Depression     DM type 2 (diabetes mellitus, type 2) ([facility] 11/23/2011    GERD (gastroesophageal reflux disease)     GI bleed 2015    Hepatic steatosis 11/2016    confirmed by US    HTN (hypertension)     Irregular menses     Irritable bowel     Kidney mass     To right kidney    Morbid obesity ([facility]     FE (obstructive sleep apnea)     w/ Cpap    PUD (peptic ulcer disease) 2015     Allergies   Allergen Reactions    Pcn [Penicillins] Hives       Current Outpatient Medications   Medication Sig Dispense Refill    amitriptyline (ELAVIL) 50 mg tablet Take 1 Tab by mouth nightly. For numbness at hands and feet.  30 Tab 2    lisinopril-hydroCHLOROthiazide (PRINZIDE, ZESTORETIC) 10-12.5 mg per tablet TAKE ONE TABLET BY MOUTH ONCE DAILY FOR BLOOD PRESSURE 90 Tab 3    Liraglutide (VICTOZA) 0.6 mg/0.1 mL (18 mg/3 mL) pnij 0.6 mg once daily 30 Adjustable Dose Pre-filled Pen Syringe 3    atorvastatin (LIPITOR) 10 mg tablet TAKE 1 TABLET BY MOUTH ONCE DAILY 90 Tab 1    diclofenac EC (VOLTAREN) 75 mg EC tablet Take 1 Tab by mouth two (2) times a day. 60 Tab 3    glipiZIDE (GLUCOTROL) 10 mg tablet Take 1 Tab by mouth two (2) times a day. 60 Tab 5    meclizine (ANTIVERT) 25 mg tablet TAKE ONE TABLET BY MOUTH THREE TIMES DAILY AS NEEDED 20 Tab 1    LYRICA 150 mg capsule TAKE ONE CAPSULE BY MOUTH TWICE DAILY 60 Cap 5    montelukast (SINGULAIR) 10 mg tablet TAKE ONE TABLET BY MOUTH ONCE DAILY FOR  ALLERGIES  AND  ASTHMA 30 Tab 5    nitroglycerin (NITROSTAT) 0.4 mg SL tablet Take 1 Tab by mouth every five (5) minutes as needed for Chest Pain. Sit/Lay down then put one tab under the tongue every 5 minutes as needed for chest pain for 3 doses 1 Bottle 0    metFORMIN ER (GLUCOPHAGE XR) 500 mg tablet Take 2 Tabs by mouth two (2) times a day. 120 Tab 5    albuterol (PROVENTIL VENTOLIN) 2.5 mg /3 mL (0.083 %) nebulizer solution USE ONE VIAL IN NEBULIZER EVERY 4 HOURS AS NEEDED FOR WHEEZING 30 Each 3    albuterol (VENTOLIN HFA) 90 mcg/actuation inhaler Take 2 Puffs by inhalation every six (6) hours as needed for Wheezing. 2 Inhaler 5    ADVAIR DISKUS 250-50 mcg/dose diskus inhaler INHALE ONE DOSE BY MOUTH TWICE DAILY FOR  ASTHMA  PREVENTION 1 Inhaler 5    acetaminophen (TYLENOL ARTHRITIS PAIN) 650 mg CR tablet Take 650 mg by mouth every six (6) hours as needed for Pain.  polyethylene glycol (MIRALAX) 17 gram/dose powder Take 17 g by mouth daily as needed.  aspirin 81 mg chewable tablet Take 2 Tabs by mouth daily. 100 Tab 0         PHYSICAL EXAM  Visit Vitals  /84 (BP 1 Location: Left arm, BP Patient Position: Sitting)   Pulse 78   Temp 97.8 °F (36.6 °C) (Oral)   Resp 14   Ht 5' 4\" (1.626 m)   Wt 289 lb (131.1 kg)   SpO2 97%   BMI 49.61 kg/m²   2 lb weight gain since last clinic visit, 5 lb weight loss over past 3 months    General: Morbidly obese, no distress. HEENT:  Head normocephalic/atraumatic, no scleral icterus  Neck: Normal ROM. No tenderness at posterior neck. No lymphadenopathy or thyromegaly. Lungs:  Clear to ausculation bilaterally. Good air movement. Heart:  Regular rate and rhythm, normal S1 and S2, no murmur, gallop, or rub  Extremities: No clubbing, cyanosis, or edema. Negative Tinel's sign at hands. Neurological: Alert and oriented. Psychiatric: Normal mood and affect. Behavior is normal.         ASSESSMENT AND PLAN    ICD-10-CM ICD-9-CM    1. Type 2 diabetes mellitus with diabetic polyneuropathy, without long-term current use of insulin ([facility]) E11.42 250.60 AMB POC HEMOGLOBIN A1C     357.2 REFERRAL TO OPHTHALMOLOGY   2. Essential hypertension I10 401.9    3. Chronic midline thoracic back pain M54.6 724.1 XR SPINE THORAC 3 V    G89.29 338.29 REFERRAL TO ORTHOPEDICS   4. Mixed hyperlipidemia E78.2 272.2    5. Coronary artery disease involving native coronary artery of native heart without angina pectoris I25.10 414.01    6. Mild intermittent asthma without complication M56.17 206.96    7. Morbid obesity with BMI of 45.0-49.9, adult ([facility]) E66.01 278.01     Z68.42 V85.42    8. Screening for cervical cancer Z12.4 V76.2 REFERRAL TO GYNECOLOGY   9. Screening for breast cancer Z12.31 V76.10 [facility] MAMMO BI SCREENING INCL CAD   10. Chronic midline low back pain without sciatica M54.5 724.2 XR SPINE LUMB 2 OR 3 V    G89.29 338.29      Diagnoses and all orders for this visit:    1. Type 2 diabetes mellitus with diabetic polyneuropathy, without long-term current use of insulin ([facility])  A1c 7.7% today. Improving control. Continue glipizide, metformin, and Victoza  -     AMB POC HEMOGLOBIN A1C  -     REFERRAL TO OPHTHALMOLOGY (Dr. Eileen Caban)    2. Essential hypertension  Controlled. Continue lisinopril- HCTZ 10-12.5 mg daily. 3. Chronic midline thoracic back pain  Continue Lyrica and diclofenac.  -     XR SPINE THORAC 3 V; Future  -     REFERRAL TO ORTHOPEDICS (Dr. Milly Ibarra)    4. Mixed hyperlipidemia  Controlled on atorvastatin.     5. Coronary artery disease involving native coronary artery of native heart without angina pectoris  Asymptomatic. Continue ASA 81 mg daily and statin. 6. Mild intermittent asthma without complication    7. Morbid obesity with BMI of 45.0-49.9, adult [facility])  Discussed the patient's BMI with her. The BMI follow up plan is as follows: dietary management education, guidance, and counseling; encourage exercise; monitor weight; prescribed dietary intake. Follow up BMI in 3 months. 8. Screening for cervical cancer  -     REFERRAL TO GYNECOLOGY    9. Screening for breast cancer  -     [facility] MAMMO BI SCREENING INCL CAD; Future    10. Chronic midline low back pain without sciatica  -     XR SPINE LUMB 2 OR 3 V; Future      Follow-up Disposition:  Return in about 3 months (around 3/13/2019), or if symptoms worsen or fail to improve, for DM, HTN; schedule for fasting labs 1 week prior to appt. I have discussed the diagnosis with the patient and the intended plan as seen in the above orders. Patient is in agreement. The patient has received an after-visit summary and questions were answered concerning future plans. I have discussed medication side effects and warnings with the patient as well.

## 2018-12-13 NOTE — PROGRESS NOTES
Tabby Nicholas  Identified pt with two pt identifiers(name and ). Chief Complaint   Patient presents with    Diabetes    Blood Pressure Check    Weight Management       Reviewed record In preparation for visit and have obtained necessary documentation. Has info on advanced directive but has not filled them out. 1. Have you been to the ER, urgent care clinic or hospitalized since your last visit? No     2. Have you seen or consulted any other health care providers outside of the [facility] since your last visit? Include any pap smears or colon screening. No    Vitals reviewed with provider.     Health Maintenance reviewed:     Health Maintenance Due   Topic    BREAST CANCER SCRN MAMMOGRAM     EYE EXAM RETINAL OR DILATED     PAP AKA CERVICAL CYTOLOGY           Wt Readings from Last 3 Encounters:   18 289 lb (131.1 kg)   10/18/18 287 lb (130.2 kg)   18 294 lb 9.6 oz (133.6 kg)        Temp Readings from Last 3 Encounters:   18 97.8 °F (36.6 °C) (Oral)   10/18/18 97.6 °F (36.4 °C) (Oral)   18 98.3 °F (36.8 °C) (Oral)        BP Readings from Last 3 Encounters:   18 116/84   10/18/18 105/61   18 119/88        Pulse Readings from Last 3 Encounters:   18 78   10/18/18 65   18 67        Vitals:    18 0803   BP: 116/84   Pulse: 78   Resp: 14   Temp: 97.8 °F (36.6 °C)   TempSrc: Oral   SpO2: 97%   Weight: 289 lb (131.1 kg)   Height: 5' 4\" (1.626 m)   PainSc:   6   PainLoc: Back          Learning Assessment:   :       Learning Assessment 2018   PRIMARY LEARNER Patient Patient Patient   HIGHEST LEVEL OF EDUCATION - PRIMARY LEARNER  - > 4 YEARS OF COLLEGE -   BARRIERS PRIMARY LEARNER - NONE -   CO-LEARNER CAREGIVER - No -   PRIMARY LANGUAGE ENGLISH ENGLISH ENGLISH   LEARNER PREFERENCE PRIMARY READING OTHER (COMMENT) LISTENING   ANSWERED BY patient patient self     - - self   RELATIONSHIP SELF SELF SELF        Depression Screening:   :       PHQ over the last two weeks 9/27/2018   Little interest or pleasure in doing things Not at all   Feeling down, depressed, irritable, or hopeless Not at all   Total Score PHQ 2 0        Fall Risk Assessment:   :       Fall Risk Assessment, last 12 mths 8/3/2017   Able to walk? Yes   Fall in past 12 months? No        Abuse Screening:   :       Abuse Screening Questionnaire 5/9/2014   Do you ever feel afraid of your partner? N   Are you in a relationship with someone who physically or mentally threatens you? N   Is it safe for you to go home?  Y        ADL Screening:   :       ADL Assessment 5/2/2018   Feeding yourself No Help Needed   Getting from bed to chair No Help Needed   Getting dressed No Help Needed   Bathing or showering No Help Needed   Walk across the room (includes cane/walker) No Help Needed   Using the telphone No Help Needed   Taking your medications No Help Needed   Preparing meals No Help Needed   Managing money (expenses/bills) No Help Needed   Moderately strenuous housework (laundry) No Help Needed   Shopping for personal items (toiletries/medicines) No Help Needed   Shopping for groceries No Help Needed   Driving No Help Needed   Climbing a flight of stairs No Help Needed   Getting to places beyond walking distances No Help Needed

## 2018-12-26 NOTE — TELEPHONE ENCOUNTER
2nd Download added to patient chart. Patient was called and data was reviewed. Patient will reachout to [facility] for a mask fitting. Patient to schedule a follow up visit if needed.

## 2019-01-06 RX ORDER — METFORMIN HYDROCHLORIDE 500 MG/1
TABLET, EXTENDED RELEASE ORAL
Qty: 120 TAB | Refills: 5 | Status: SHIPPED | OUTPATIENT
Start: 2019-01-06 | End: 2019-09-10 | Stop reason: SDUPTHER

## 2019-01-30 ENCOUNTER — OFFICE VISIT (OUTPATIENT)
Dept: INTERNAL MEDICINE CLINIC | Facility: CLINIC | Age: 44
End: 2019-01-30

## 2019-01-30 VITALS
HEIGHT: 64 IN | OXYGEN SATURATION: 96 % | DIASTOLIC BLOOD PRESSURE: 74 MMHG | TEMPERATURE: 98 F | WEIGHT: 292 LBS | HEART RATE: 77 BPM | RESPIRATION RATE: 18 BRPM | SYSTOLIC BLOOD PRESSURE: 133 MMHG | BODY MASS INDEX: 49.85 KG/M2

## 2019-01-30 DIAGNOSIS — K62.89 PERIRECTAL SKIN IRRITATION: ICD-10-CM

## 2019-01-30 DIAGNOSIS — A08.4 VIRAL GASTROENTERITIS: Primary | ICD-10-CM

## 2019-01-30 RX ORDER — PREGABALIN 200 MG/1
200 CAPSULE ORAL 2 TIMES DAILY
COMMUNITY
Start: 2019-01-11 | End: 2020-06-01 | Stop reason: SDUPTHER

## 2019-01-30 NOTE — LETTER
NOTIFICATION RETURN TO WORK / SCHOOL 
 
1/30/2019 11:39 AM 
 
Ms. Ev Mcneill [address] [place] [id number] To Whom It May Concern: 
 
Ev Mcneill is currently under the care of [address] [place] [address]. Please excuse her for missing work on 1/28 - 1/31/19. She will return to work/school on: Friday, 2/1/19. If there are questions or concerns please have the patient contact our office. Sincerely, Nadege Baig MD

## 2019-01-30 NOTE — PROGRESS NOTES
Brigido Charles  Identified pt with two pt identifiers(name and ). Chief Complaint   Patient presents with    Diarrhea    Hoarse    Abdominal Pain    Rash     between buttocks     Has referrals given recently,but has not scheduled appts as of yet. 1. Have you been to the ER, urgent care clinic since your last visit? Hospitalized since your last visit? NO    2. Have you seen or consulted any other health care providers outside of the [address] [place] [address] since your last visit? Include any pap smears or colon screening. Dr Carvajal Plants provider has been notified of reason for visit, vitals and flowsheets obtained on patients.      Patient received paperwork for advance directive during previous visit but has not completed at this time     Reviewed record In preparation for visit, huddled with provider and have obtained necessary documentation      Health Maintenance Due   Topic    BREAST CANCER SCRN MAMMOGRAM     EYE EXAM RETINAL OR DILATED     PAP AKA CERVICAL CYTOLOGY        Wt Readings from Last 3 Encounters:   19 292 lb (132.5 kg)   18 289 lb (131.1 kg)   10/18/18 287 lb (130.2 kg)     Temp Readings from Last 3 Encounters:   19 98 °F (36.7 °C) (Oral)   18 97.8 °F (36.6 °C) (Oral)   10/18/18 97.6 °F (36.4 °C) (Oral)     BP Readings from Last 3 Encounters:   19 133/74   18 116/84   10/18/18 105/61     Pulse Readings from Last 3 Encounters:   19 77   18 78   10/18/18 65     Vitals:    19 1111   BP: 133/74   Pulse: 77   Resp: 18   Temp: 98 °F (36.7 °C)   TempSrc: Oral   SpO2: 96%   Weight: 292 lb (132.5 kg)   Height: 5' 4\" (1.626 m)   PainSc:   7   PainLoc: Abdomen         Learning Assessment:  :     Learning Assessment 2018   PRIMARY LEARNER Patient Patient Patient   HIGHEST LEVEL OF EDUCATION - PRIMARY LEARNER  - > 4 YEARS OF COLLEGE -   BARRIERS PRIMARY LEARNER - NONE -   [address] CAREGIVER - No -   PRIMARY LANGUAGE ENGLISH ENGLISH ENGLISH   LEARNER PREFERENCE PRIMARY READING OTHER (COMMENT) LISTENING   ANSWERED BY patient patient self     - - self   RELATIONSHIP SELF SELF SELF       Depression Screening:  :     PHQ over the last two weeks 1/30/2019   Little interest or pleasure in doing things Not at all   Feeling down, depressed, irritable, or hopeless Not at all   Total Score PHQ 2 0       Fall Risk Assessment:  :     Fall Risk Assessment, last 12 mths 8/3/2017   Able to walk? Yes   Fall in past 12 months? No       Abuse Screening:  :     Abuse Screening Questionnaire 5/9/2014   Do you ever feel afraid of your partner? N   Are you in a relationship with someone who physically or mentally threatens you? N   Is it safe for you to go home? Y       ADL Screening:  :     ADL Assessment 5/2/2018   Feeding yourself No Help Needed   Getting from bed to chair No Help Needed   Getting dressed No Help Needed   Bathing or showering No Help Needed   Walk across the room (includes cane/walker) No Help Needed   Using the telphone No Help Needed   Taking your medications No Help Needed   Preparing meals No Help Needed   Managing money (expenses/bills) No Help Needed   Moderately strenuous housework (laundry) No Help Needed   Shopping for personal items (toiletries/medicines) No Help Needed   Shopping for groceries No Help Needed   Driving No Help Needed   Climbing a flight of stairs No Help Needed   Getting to places beyond walking distances No Help Needed                 Medication reconciliation up to date and corrected with patient at this time.

## 2019-01-30 NOTE — PROGRESS NOTES
CC:   Chief Complaint   Patient presents with    Diarrhea    Hoarse    Abdominal Pain    Rash     between buttocks       HISTORY OF PRESENT ILLNESS  Alexa Garcia is a 37 y.o. female. Patient complains of 3 day history of diarrhea, crampy abdominal pain, and low-grade fevers. Having 3-4 loose stools daily; has itching and discomfort at jaylyn-rectal area over past 2 days (\"my butt cheeks hurt\"). Symptoms unchanged since onset. Stayed home from work on 1/28 and 1/29/19. She denies heartburn, nausea, vomiting, hematemesis, constipation, hematochezia, melena, or dysuria. Has decreased oral intake because she is afraid she will have diarrhea. Her urine output has been normal.  Exposed to people with colds and illnesses at work but denies any known contacts with similar symptoms. She denies any known recent ingestion of possible contaminated food, toxic plants, or inappropriate medications/poisons.        Patient Active Problem List   Diagnosis Code    HTN (hypertension) I10    Depression F32.9    FE (obstructive sleep apnea) G47.33    Asthma J45.909    Iron deficiency anemia D50.9    Hyperlipidemia E78.5    Obesity, morbid, BMI 50 or higher ([address] E66.01    Type 2 diabetes mellitus with diabetic polyneuropathy, without long-term current use of insulin (HCC) E11.42    Coronary artery disease involving native coronary artery of native heart without angina pectoris I25.10     Past Medical History:   Diagnosis Date    Anemia (iron deficiency)     Asthma     DDD (degenerative disc disease), lumbar 2014    Depression     DM type 2 (diabetes mellitus, type 2) ([address] 11/23/2011    GERD (gastroesophageal reflux disease)     GI bleed 2015    Hepatic steatosis 11/2016    confirmed by US    HTN (hypertension)     Irregular menses     Irritable bowel     Kidney mass     To right kidney    Morbid obesity ([address]     FE (obstructive sleep apnea)     w/ Cpap    PUD (peptic ulcer disease) 2015 Allergies   Allergen Reactions    Pcn [Penicillins] Hives       Current Outpatient Medications   Medication Sig Dispense Refill    pregabalin (LYRICA) 200 mg capsule Take 200 mg by mouth two (2) times a day.  montelukast (SINGULAIR) 10 mg tablet TAKE ONE TABLET BY MOUTH ONCE DAILY FOR  ALLERGIES  AND  ASTHMA 90 Tab 3    metFORMIN ER (GLUCOPHAGE XR) 500 mg tablet TAKE 2 TABLETS BY MOUTH TWICE DAILY 120 Tab 5    amitriptyline (ELAVIL) 50 mg tablet Take 1 Tab by mouth nightly. For numbness at hands and feet. 30 Tab 2    lisinopril-hydroCHLOROthiazide (PRINZIDE, ZESTORETIC) 10-12.5 mg per tablet TAKE ONE TABLET BY MOUTH ONCE DAILY FOR BLOOD PRESSURE 90 Tab 3    Liraglutide (VICTOZA) 0.6 mg/0.1 mL (18 mg/3 mL) pnij 0.6 mg once daily 30 Adjustable Dose Pre-filled Pen Syringe 3    atorvastatin (LIPITOR) 10 mg tablet TAKE 1 TABLET BY MOUTH ONCE DAILY 90 Tab 1    diclofenac EC (VOLTAREN) 75 mg EC tablet Take 1 Tab by mouth two (2) times a day. 60 Tab 3    glipiZIDE (GLUCOTROL) 10 mg tablet Take 1 Tab by mouth two (2) times a day. 60 Tab 5    nitroglycerin (NITROSTAT) 0.4 mg SL tablet Take 1 Tab by mouth every five (5) minutes as needed for Chest Pain. Sit/Lay down then put one tab under the tongue every 5 minutes as needed for chest pain for 3 doses 1 Bottle 0    aspirin 81 mg chewable tablet Take 2 Tabs by mouth daily. 100 Tab 0    albuterol (PROVENTIL VENTOLIN) 2.5 mg /3 mL (0.083 %) nebulizer solution USE ONE VIAL IN NEBULIZER EVERY 4 HOURS AS NEEDED FOR WHEEZING 30 Each 3    albuterol (VENTOLIN HFA) 90 mcg/actuation inhaler Take 2 Puffs by inhalation every six (6) hours as needed for Wheezing. 2 Inhaler 5         PHYSICAL EXAM  Visit Vitals  /74 (BP 1 Location: Left arm, BP Patient Position: Sitting)   Pulse 77   Temp 98 °F (36.7 °C) (Oral)   Resp 18   Ht 5' 4\" (1.626 m)   Wt 292 lb (132.5 kg)   SpO2 96%   BMI 50.12 kg/m²       General: Morbidly obese, no distress.   HEENT:  Head normocephalic/atraumatic, no scleral icterus  Lungs:  Clear to ausculation bilaterally. Good air movement. Heart:  Regular rate and rhythm, normal S1 and S2, no murmur, gallop, or rub  Abdomen: Soft, obese, normal bowel sounds, no tenderness, no guarding, masses, rebound tenderness, or HSM. Rectal: Mild skin irritation at inner gluteal folds (right > left). Neurological: Alert and oriented. Psychiatric: Normal mood and affect. Behavior is normal.         ASSESSMENT AND PLAN    ICD-10-CM ICD-9-CM    1. Viral gastroenteritis A08.4 008.8    2. Perirectal skin irritation K62.89 569.49      Diagnoses and all orders for this visit:    1. Viral gastroenteritis  Okay to take Imodium for diarrhea. Dinks plenty of fluids to prevent dehydration, start with liquid diet then advance to soft bland foods as tolerated. Avoid spicy or fatty foods. 2. Perirectal skin irritation   Secondary to diarrhea. Recommended Tuck's or witch-glen wipes and Desitin ointment. Work note written; excuse from work 1/28-1/31/19, return to work on 2/1/19. Follow-up Disposition:  Return if symptoms worsen or fail to improve, for Scheduled appointment on 3/15/19. I have discussed the diagnosis with the patient and the intended plan as seen in the above orders. Patient is in agreement. The patient has received an after-visit summary and questions were answered concerning future plans. I have discussed medication side effects and warnings with the patient as well.

## 2019-01-30 NOTE — PATIENT INSTRUCTIONS
Gastroenteritis: Care Instructions  Your Care Instructions    Gastroenteritis is an illness that may cause nausea, vomiting, and diarrhea. It is sometimes called \"stomach flu. \" It can be caused by bacteria or a virus. You will probably begin to feel better in 1 to 2 days. In the meantime, get plenty of rest and make sure you do not become dehydrated. Dehydration occurs when your body loses too much fluid. Follow-up care is a key part of your treatment and safety. Be sure to make and go to all appointments, and call your doctor if you are having problems. It's also a good idea to know your test results and keep a list of the medicines you take. How can you care for yourself at home? · If your doctor prescribed antibiotics, take them as directed. Do not stop taking them just because you feel better. You need to take the full course of antibiotics. · Drink plenty of fluids to prevent dehydration, enough so that your urine is light yellow or clear like water. Choose water and other caffeine-free clear liquids until you feel better. If you have kidney, heart, or liver disease and have to limit fluids, talk with your doctor before you increase your fluid intake. · Drink fluids slowly, in frequent, small amounts, because drinking too much too fast can cause vomiting. · Begin eating mild foods, such as dry toast, yogurt, applesauce, bananas, and rice. Avoid spicy, hot, or high-fat foods, and do not drink alcohol or caffeine for a day or two. Do not drink milk or eat ice cream until you are feeling better. How to prevent gastroenteritis  · Keep hot foods hot and cold foods cold. · Do not eat meats, dressings, salads, or other foods that have been kept at room temperature for more than 2 hours. · Use a thermometer to check your refrigerator. It should be between 34°F and 40°F.  · Defrost meats in the refrigerator or microwave, not on the kitchen counter. · Keep your hands and your kitchen clean.  Wash your hands, cutting boards, and countertops with hot soapy water frequently. · Cook meat until it is well done. · Do not eat raw eggs or uncooked sauces made with raw eggs. · Do not take chances. If food looks or tastes spoiled, throw it out. When should you call for help? Call 911 anytime you think you may need emergency care. For example, call if:    · You vomit blood or what looks like coffee grounds.     · You passed out (lost consciousness).     · You pass maroon or very bloody stools.    Call your doctor now or seek immediate medical care if:    · You have severe belly pain.     · You have signs of needing more fluids. You have sunken eyes, a dry mouth, and pass only a little dark urine.     · You feel like you are going to faint.     · You have increased belly pain that does not go away in 1 to 2 days.     · You have new or increased nausea, or you are vomiting.     · You have a new or higher fever.     · Your stools are black and tarlike or have streaks of blood.    Watch closely for changes in your health, and be sure to contact your doctor if:    · You are dizzy or lightheaded.     · You urinate less than usual, or your urine is dark yellow or brown.     · You do not feel better with each day that goes by. Where can you learn more? Go to http://camelia-konrad.info/. Enter N142 in the search box to learn more about \"Gastroenteritis: Care Instructions. \"  Current as of: July 30, 2018  Content Version: 11.9  © 3518-9183 [employer]. Care instructions adapted under license by [employer] (which disclaims liability or warranty for this information). If you have questions about a medical condition or this instruction, always ask your healthcare professional. [place] [address] any warranty or liability for your use of this information.

## 2019-02-03 NOTE — ED PROVIDER NOTES
[address]  EMERGENCY DEPARTMENT HISTORY AND PHYSICAL EXAM         Date of Service: 2017   Patient Name: Fatmata Irizarry   YOB: 1975  Medical Record Number: [id number]    History of Presenting Illness     Chief Complaint   Patient presents with    Foreign Body Swallowed     Patient states she has a Algerian godoy stuck in her throat x 3 hours. Patient is talking in full sentences, denies any difficulty breathing. History Provided By:  patient    Additional History:   Fatmata Irizarry is a 38 y.o. female with PMhx significant for HTN, DM, GERD, and PUD who presents ambulatory to the ED for evaluation of possible foreign body obstruction. Pt states a Algerian godoy became stuck in her throat at 12:30 PM. Pt presents with mild throat pain but denies any trouble breathing or swallowing. She states she has drank water and tea and ate a banana, bread, and tea without relief in her symptoms. She notes she thinks the Algerian godoy has moved a little she first felt it become stuck. She denies any cough, or any other complaints at this time. Social Hx: - Tobacco, - EtOH, - Illicit Drugs    There are no other complaints, changes or physical findings at this time.     Primary Care Provider: Fan Hagan NP     Past History     Past Medical History:   Past Medical History:   Diagnosis Date    Anemia (iron deficiency)     Asthma     DDD (degenerative disc disease), lumbar     Depression     DM type 2 (diabetes mellitus, type 2) ([address] 2011    GERD (gastroesophageal reflux disease)     GI bleed     Hepatic steatosis 2016    confirmed by US    HTN (hypertension)     Irregular menses     Irritable bowel     Kidney mass     To right kidney    Morbid obesity ([address]     FE (obstructive sleep apnea)     w/ Cpap    PUD (peptic ulcer disease)         Past Surgical History:   Past Surgical History:   Procedure Laterality Date    HX  SECTION      x 2    HX COLONOSCOPY  12/2015    HX ENDOSCOPY  12/2015    HX HYSTEROSCOPY WITH ENDOMETRIAL ABLATION  8/2014    HX TUBAL LIGATION      HX TUMOR REMOVAL  2/2016    right kidney, Dr Chuck Sood [place] removed from right kidney on 02/15/2016         Family History:   Family History   Problem Relation Age of Onset    Cancer Mother      Ovarian Cancer     Heart Attack Father     Heart Disease Father     Diabetes Father     Other Father      pancreatitis    Cancer Sister     Other Sister      chrons    Heart Attack Maternal Grandfather     Diabetes Paternal Grandmother     HIV/AIDS Son         Social History:   Social History   Substance Use Topics    Smoking status: Never Smoker    Smokeless tobacco: Never Used    Alcohol use No        Allergies: Allergies   Allergen Reactions    Pcn [Penicillins] Hives        Review of Systems   Review of Systems   Constitutional: Negative for chills and fever. HENT: Positive for sore throat. Negative for trouble swallowing.         (+) possible foreign body obstruction   Eyes: Negative for pain. Respiratory: Negative for cough and shortness of breath. Cardiovascular: Negative for chest pain. Gastrointestinal: Negative for abdominal pain, diarrhea, nausea and vomiting. Genitourinary: Negative for dysuria and hematuria. Musculoskeletal: Negative for arthralgias and myalgias. Skin: Negative for rash. Neurological: Negative for dizziness, light-headedness, numbness and headaches. Psychiatric/Behavioral: Negative for behavioral problems and confusion. Physical Exam  Physical Exam   Constitutional: She is oriented to person, place, and time. She appears well-developed and well-nourished. No distress. HENT:   Head: Normocephalic and atraumatic.    Right Ear: External ear normal.   Left Ear: External ear normal.   Nose: Nose normal.   Mouth/Throat: Oropharynx is clear and moist. No oropharyngeal exudate, posterior oropharyngeal edema or posterior oropharyngeal erythema. No foreign body seen on exam.   No drooling. Eyes: Conjunctivae and EOM are normal.   Neck: Normal range of motion. Neck supple. Cardiovascular: Normal rate, regular rhythm and normal heart sounds. No murmur heard. Pulmonary/Chest: Effort normal and breath sounds normal. No stridor. She has no decreased breath sounds. She has no wheezes. No tripoding. Pt able to tolerate secretions and speak in full sentences. Quiet breathing. Abdominal: Soft. Bowel sounds are normal. She exhibits no distension. There is no tenderness. There is no guarding. Musculoskeletal: Normal range of motion. She exhibits no edema or tenderness. Neurological: She is alert and oriented to person, place, and time. Skin: Skin is warm and dry. No rash noted. She is not diaphoretic. Psychiatric: She has a normal mood and affect. Her behavior is normal. Judgment normal.   Nursing note and vitals reviewed. Medical Decision Making   I am the first provider for this patient. I reviewed the vital signs, available nursing notes, past medical history, past surgical history, family history and social history. Old Medical Records: Old medical records. Provider Notes:   Pt presents after possible foreign body obstruction from french godoy 4 hours ago. Pt has tried conservative measures without relief. Pt is not in respiratory distress. Pt able to tolerate secretions and speak in full sentences. XR is negative for foreign body. Suspect possible esophageal abrasion. Recommend Lidocaine as needed and follow up with PCP if symptoms persist.       ED Course:  4:22 PM   Initial assessment performed. The patients presenting problems have been discussed, and they are in agreement with the care plan formulated and outlined with them. I have encouraged them to ask questions as they arise throughout their visit.     Progress Notes:   5:25 PM  I have just reevaluated the patient. I have reviewed her vital signs and determined there is currently no worsening in their condition or physical exam. Results have been reviewed with them and their questions have been answered. Patient tolerating PO without difficulty. 5:36 PM  I have reviewed discharge instructions with the patient and explained medications that she is being discharged with. The patient verbalized understanding and agrees with plan. Diagnostic Study Results     Radiologic Studies -  The following have been ordered and reviewed:    CXR Results  (Last 48 hours)               11/13/17 1708  XR CHEST PA LAT Final result    Impression:  IMPRESSION: No acute intrathoracic disease. Narrative:  CLINICAL HISTORY: Evaluate for presence of foreign body,    INDICATION: Possible foreign body       COMPARISON: 3/24/2012       FINDINGS:    PA and lateral views of the chest are obtained. The cardiopericardial silhouette is within normal limits. There is no pleural   effusion, pneumothorax or focal consolidation present. Vital Signs-Reviewed the patient's vital signs. Patient Vitals for the past 12 hrs:   Temp Pulse Resp BP SpO2   11/13/17 1557 97.5 °F (36.4 °C) 81 16 120/62 100 %       Medications Given in the ED:  Medications   mylanta/viscous lidocaine (SUSAN)(GI COCKTAIL) (40 mL Oral Given 11/13/17 1729)       Diagnosis:  Clinical Impression:   1. Globus sensation         Plan:  1: Discharge home  2. Medications as directed  3. Schedule f/u with PCP  4.  Return precautions reviewed    Follow-up Information     Follow up With Details Comments Contact Info    Cheryl Covington NP Schedule an appointment as soon as possible for a visit in 3 days  [address] EMERGENCY DEPT  As needed, If symptoms worsen [address] [place] [address]  [phone]          5:   Discharge Medication List as of 11/13/2017  5:16 PM      START taking these medications    Details   lidocaine (LIDOCAINE VISCOUS) 2 % solution Take 15 mL by mouth as needed for Pain., Print, Disp-1 Bottle, R-0         CONTINUE these medications which have NOT CHANGED    Details   albuterol (VENTOLIN HFA) 90 mcg/actuation inhaler Take 2 Puffs by inhalation every six (6) hours as needed for Wheezing., Normal, Disp-2 Inhaler, R-5      ADVAIR DISKUS 250-50 mcg/dose diskus inhaler INHALE ONE DOSE BY MOUTH TWICE DAILY FOR  ASTHMA  PREVENTION, Normal, Disp-1 Inhaler, R-5      montelukast (SINGULAIR) 10 mg tablet TAKE ONE TABLET BY MOUTH ONCE DAILY FOR  ALLERGIES  AND  ASTHMA, Normal, Disp-30 Tab, R-5      metFORMIN ER (GLUCOPHAGE XR) 500 mg tablet TAKE ONE TABLET BY MOUTH TWICE DAILY WITH MEALS, Normal, Disp-60 Tab, R-5      predniSONE (DELTASONE) 20 mg tablet Take 1 Tab by mouth daily (with breakfast). , Normal, Disp-5 Tab, R-0      pregabalin (LYRICA) 150 mg capsule Take 1 Cap by mouth two (2) times a day. Max Daily Amount: 300 mg., Print, Disp-60 Cap, R-5      lisinopril-hydroCHLOROthiazide (PRINZIDE, ZESTORETIC) 10-12.5 mg per tablet Take 1 Tab by mouth daily. For blood pressure, Normal, Disp-30 Tab, R-5      omeprazole (PRILOSEC) 40 mg capsule Take 1 Cap by mouth daily. For GERD, Normal, Disp-30 Cap, R-5      acetaminophen (TYLENOL ARTHRITIS PAIN) 650 mg CR tablet Take 650 mg by mouth every six (6) hours as needed for Pain., Historical Med      meclizine (ANTIVERT) 25 mg tablet Take 1 Tab by mouth three (3) times daily as needed., Normal, Disp-20 Tab, R-1      polyethylene glycol (MIRALAX) 17 gram/dose powder Take 17 g by mouth daily as needed., Historical Med           Return to ED if worse. Disposition:  DISCHARGE NOTE  5:36 PM  The patient has been re-evaluated and is ready for discharge. Reviewed available results with patient. Counseled pt on diagnosis and care plan. Pt has expressed understanding, and all questions have been answered.  Pt agrees with plan and agrees to follow up as recommended, or return to the ED if their symptoms worsen. Discharge instructions have been provided and explained to the pt, along with reasons to return to the ED. Attestations: This note is prepared by Masha Murray, acting as Scribe for Elma Vargas. NATE [place]: The scribe's documentation has been prepared under my direction and personally reviewed by me in its entirety. I confirm that the note above accurately reflects all work, treatment, procedures, and medical decision making performed by me. 16

## 2019-03-11 DIAGNOSIS — I10 ESSENTIAL HYPERTENSION: ICD-10-CM

## 2019-03-11 DIAGNOSIS — E78.2 MIXED HYPERLIPIDEMIA: ICD-10-CM

## 2019-03-11 DIAGNOSIS — E11.42 TYPE 2 DIABETES MELLITUS WITH DIABETIC POLYNEUROPATHY, WITHOUT LONG-TERM CURRENT USE OF INSULIN (HCC): ICD-10-CM

## 2019-03-12 LAB
ALBUMIN SERPL-MCNC: 4 G/DL (ref 3.5–5.5)
ALBUMIN/CREAT UR: 8.1 MG/G CREAT (ref 0–30)
ALBUMIN/GLOB SERPL: 1.2 {RATIO} (ref 1.2–2.2)
ALP SERPL-CCNC: 71 IU/L (ref 39–117)
ALT SERPL-CCNC: 25 IU/L (ref 0–32)
AST SERPL-CCNC: 21 IU/L (ref 0–40)
BASOPHILS # BLD AUTO: 0 X10E3/UL (ref 0–0.2)
BASOPHILS NFR BLD AUTO: 1 %
BILIRUB SERPL-MCNC: 0.3 MG/DL (ref 0–1.2)
BUN SERPL-MCNC: 9 MG/DL (ref 6–24)
BUN/CREAT SERPL: 10 (ref 9–23)
CALCIUM SERPL-MCNC: 9.1 MG/DL (ref 8.7–10.2)
CHLORIDE SERPL-SCNC: 102 MMOL/L (ref 96–106)
CHOLEST SERPL-MCNC: 145 MG/DL (ref 100–199)
CO2 SERPL-SCNC: 23 MMOL/L (ref 20–29)
CREAT SERPL-MCNC: 0.92 MG/DL (ref 0.57–1)
CREAT UR-MCNC: 250.5 MG/DL
EOSINOPHIL # BLD AUTO: 0.3 X10E3/UL (ref 0–0.4)
EOSINOPHIL NFR BLD AUTO: 5 %
ERYTHROCYTE [DISTWIDTH] IN BLOOD BY AUTOMATED COUNT: 14.6 % (ref 12.3–15.4)
EST. AVERAGE GLUCOSE BLD GHB EST-MCNC: 192 MG/DL
GLOBULIN SER CALC-MCNC: 3.4 G/DL (ref 1.5–4.5)
GLUCOSE SERPL-MCNC: 168 MG/DL (ref 65–99)
HBA1C MFR BLD: 8.3 % (ref 4.8–5.6)
HCT VFR BLD AUTO: 34.7 % (ref 34–46.6)
HDLC SERPL-MCNC: 41 MG/DL
HGB BLD-MCNC: 11.1 G/DL (ref 11.1–15.9)
IMM GRANULOCYTES # BLD AUTO: 0 X10E3/UL (ref 0–0.1)
IMM GRANULOCYTES NFR BLD AUTO: 0 %
LDLC SERPL CALC-MCNC: 87 MG/DL (ref 0–99)
LYMPHOCYTES # BLD AUTO: 2.1 X10E3/UL (ref 0.7–3.1)
LYMPHOCYTES NFR BLD AUTO: 35 %
MCH RBC QN AUTO: 27 PG (ref 26.6–33)
MCHC RBC AUTO-ENTMCNC: 32 G/DL (ref 31.5–35.7)
MCV RBC AUTO: 84 FL (ref 79–97)
MICROALBUMIN UR-MCNC: 20.2 UG/ML
MONOCYTES # BLD AUTO: 0.4 X10E3/UL (ref 0.1–0.9)
MONOCYTES NFR BLD AUTO: 7 %
NEUTROPHILS # BLD AUTO: 3.1 X10E3/UL (ref 1.4–7)
NEUTROPHILS NFR BLD AUTO: 52 %
PLATELET # BLD AUTO: 284 X10E3/UL (ref 150–379)
POTASSIUM SERPL-SCNC: 4.8 MMOL/L (ref 3.5–5.2)
PROT SERPL-MCNC: 7.4 G/DL (ref 6–8.5)
RBC # BLD AUTO: 4.11 X10E6/UL (ref 3.77–5.28)
SODIUM SERPL-SCNC: 138 MMOL/L (ref 134–144)
TRIGL SERPL-MCNC: 83 MG/DL (ref 0–149)
TSH SERPL DL<=0.005 MIU/L-ACNC: 1.29 UIU/ML (ref 0.45–4.5)
VLDLC SERPL CALC-MCNC: 17 MG/DL (ref 5–40)
WBC # BLD AUTO: 5.9 X10E3/UL (ref 3.4–10.8)

## 2019-03-15 ENCOUNTER — OFFICE VISIT (OUTPATIENT)
Dept: INTERNAL MEDICINE CLINIC | Facility: CLINIC | Age: 44
End: 2019-03-15

## 2019-03-15 VITALS
DIASTOLIC BLOOD PRESSURE: 66 MMHG | HEART RATE: 73 BPM | WEIGHT: 289 LBS | RESPIRATION RATE: 18 BRPM | TEMPERATURE: 98 F | SYSTOLIC BLOOD PRESSURE: 109 MMHG | OXYGEN SATURATION: 98 % | BODY MASS INDEX: 49.34 KG/M2 | HEIGHT: 64 IN

## 2019-03-15 DIAGNOSIS — G89.29 CHRONIC MIDLINE LOW BACK PAIN WITHOUT SCIATICA: ICD-10-CM

## 2019-03-15 DIAGNOSIS — E66.01 MORBID OBESITY WITH BMI OF 45.0-49.9, ADULT (HCC): ICD-10-CM

## 2019-03-15 DIAGNOSIS — Z12.4 SCREENING FOR CERVICAL CANCER: ICD-10-CM

## 2019-03-15 DIAGNOSIS — Z12.39 SCREENING FOR BREAST CANCER: ICD-10-CM

## 2019-03-15 DIAGNOSIS — E11.42 TYPE 2 DIABETES MELLITUS WITH DIABETIC POLYNEUROPATHY, WITHOUT LONG-TERM CURRENT USE OF INSULIN (HCC): Primary | ICD-10-CM

## 2019-03-15 DIAGNOSIS — M54.50 CHRONIC MIDLINE LOW BACK PAIN WITHOUT SCIATICA: ICD-10-CM

## 2019-03-15 DIAGNOSIS — E78.2 MIXED HYPERLIPIDEMIA: ICD-10-CM

## 2019-03-15 DIAGNOSIS — I25.10 CORONARY ARTERY DISEASE INVOLVING NATIVE CORONARY ARTERY OF NATIVE HEART WITHOUT ANGINA PECTORIS: ICD-10-CM

## 2019-03-15 DIAGNOSIS — I10 ESSENTIAL HYPERTENSION: ICD-10-CM

## 2019-03-15 NOTE — PROGRESS NOTES
CC:   Chief Complaint   Patient presents with    Diabetes    Hypertension    Results       HISTORY OF PRESENT ILLNESS  Alexa Garcia is a 53 y.o. female. Presents for 3 month follow up evaluation.  She has type 2 DM with neuropathy, HTN, asthma, allergic rhinitis, FE, hyperlipidemia, chronic low back pain, vertigo, and depression.  Today she has no new complaints. Still with mid and low back pain; no injury or trauma. Saw Dr. Hector Zepeda on 1/11/19; he determined the back pain was muscular. Recommended weight loss, prescribed Skelaxin and physical therapy, and referred to Dr. Faviola Estrada. States that she does not have time to do PT.     Endocrine Review  She is seen for diabetes.  Denies polydipsia, polyuria, or hypoglycemia.  Home glucose monitoring: is performed twice a day. She reports medication compliance with glipizide, metformin, and Victoza. Medication side effects: none.  Diabetic diet compliance: compliant most of the time.  Lab review: reviewed with patient.  A1c 8.3% on 3/11/19 (was 7.7% on 12/13/18 and 8.4% on 8/2/18).   Eye exam: overdue.       Soc Hx  .  Has 2 children (ages 32 and 23).  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   Denies alcohol or recreational drug use.  Walks occasionally for exercise.  Drinks unsweetened tea; denies coffee or sodas.     Health Maintenance  Flu vaccine: declined                                    Pneumonia vaccine: PPSV-23 9/9/16                                                                    Tetanus vaccine: 5/2/18                                                                 Pap smear: due for this  Mammogram: due for this  Eye exam: Dr. Lutz [place] 2016, due for this  Foot exam: 5/2/18  Lipids: 8/2/18 (tot chol 153, LDL 86)  A1c: 12/13/18 (7.7%)  Advanced Directives: given information  End of Life: given information                                      ROS  A complete review of systems was performed and is negative except for those mentioned in the HPI. Patient Active Problem List   Diagnosis Code    HTN (hypertension) I10    Depression F32.9    FE (obstructive sleep apnea) G47.33    Asthma J45.909    Iron deficiency anemia D50.9    Hyperlipidemia E78.5    Obesity, morbid, BMI 50 or higher ([facility] [address] E66.01    Type 2 diabetes mellitus with diabetic polyneuropathy, without long-term current use of insulin (HCC) E11.42    Coronary artery disease involving native coronary artery of native heart without angina pectoris I25.10     Past Medical History:   Diagnosis Date    Anemia (iron deficiency)     Asthma     DDD (degenerative disc disease), lumbar 2014    Depression     DM type 2 (diabetes mellitus, type 2) ([facility] [address] 11/23/2011    GERD (gastroesophageal reflux disease)     GI bleed 2015    Hepatic steatosis 11/2016    confirmed by US    HTN (hypertension)     Irregular menses     Irritable bowel     Kidney mass     To right kidney    Morbid obesity ([facility] [address]     FE (obstructive sleep apnea)     w/ Cpap    PUD (peptic ulcer disease) 2015     Allergies   Allergen Reactions    Pcn [Penicillins] Hives       Current Outpatient Medications   Medication Sig Dispense Refill    pregabalin (LYRICA) 200 mg capsule Take 200 mg by mouth two (2) times a day.  montelukast (SINGULAIR) 10 mg tablet TAKE ONE TABLET BY MOUTH ONCE DAILY FOR  ALLERGIES  AND  ASTHMA 90 Tab 3    metFORMIN ER (GLUCOPHAGE XR) 500 mg tablet TAKE 2 TABLETS BY MOUTH TWICE DAILY 120 Tab 5    amitriptyline (ELAVIL) 50 mg tablet Take 1 Tab by mouth nightly. For numbness at hands and feet.  30 Tab 2    lisinopril-hydroCHLOROthiazide (PRINZIDE, ZESTORETIC) 10-12.5 mg per tablet TAKE ONE TABLET BY MOUTH ONCE DAILY FOR BLOOD PRESSURE 90 Tab 3    Liraglutide (VICTOZA) 0.6 mg/0.1 mL (18 mg/3 mL) pnij 0.6 mg once daily 30 Adjustable Dose Pre-filled Pen Syringe 3    atorvastatin (LIPITOR) 10 mg tablet TAKE 1 TABLET BY MOUTH ONCE DAILY 90 Tab 1  diclofenac EC (VOLTAREN) 75 mg EC tablet Take 1 Tab by mouth two (2) times a day. 60 Tab 3    glipiZIDE (GLUCOTROL) 10 mg tablet Take 1 Tab by mouth two (2) times a day. 60 Tab 5    nitroglycerin (NITROSTAT) 0.4 mg SL tablet Take 1 Tab by mouth every five (5) minutes as needed for Chest Pain. Sit/Lay down then put one tab under the tongue every 5 minutes as needed for chest pain for 3 doses 1 Bottle 0    aspirin 81 mg chewable tablet Take 2 Tabs by mouth daily. 100 Tab 0    albuterol (PROVENTIL VENTOLIN) 2.5 mg /3 mL (0.083 %) nebulizer solution USE ONE VIAL IN NEBULIZER EVERY 4 HOURS AS NEEDED FOR WHEEZING 30 Each 3    albuterol (VENTOLIN HFA) 90 mcg/actuation inhaler Take 2 Puffs by inhalation every six (6) hours as needed for Wheezing. 2 Inhaler 5         PHYSICAL EXAM  Visit Vitals  /66 (BP 1 Location: Right arm, BP Patient Position: Sitting)   Pulse 73   Temp 98 °F (36.7 °C) (Oral)   Resp 18   Ht 5' 4\" (1.626 m)   Wt 289 lb (131.1 kg)   SpO2 98%   BMI 49.61 kg/m²   Weight unchanged since clinic visit 3 months ago    General: Morbidly obese, no distress. HEENT:  Head normocephalic/atraumatic, no scleral icterus  Neck: Normal ROM.  No tenderness at posterior neck. No lymphadenopathy or thyromegaly. Lungs:  Clear to ausculation bilaterally. Good air movement. Heart:  Regular rate and rhythm, normal S1 and S2, no murmur, gallop, or rub  Extremities: No clubbing, cyanosis, or edema. Back:  Normal ROM. No vertebral, paravertebral, or CVA tenderness. Neurological: Alert and oriented. Non-focal exam.  Psychiatric: Normal mood and affect.  Behavior is normal.      Results for orders placed or performed in visit on 03/11/19   MICROALBUMIN, UR, RAND W/ MICROALB/CREAT RATIO   Result Value Ref Range    Creatinine, urine 250.5 Not Estab. mg/dL    Microalbumin, urine 20.2 Not Estab. ug/mL    Microalb/Creat ratio (ug/mg creat.) 8.1 0.0 - 30.0 mg/g creat   LIPID PANEL   Result Value Ref Range    Cholesterol, total 145 100 - 199 mg/dL    Triglyceride 83 0 - 149 mg/dL    HDL Cholesterol 41 >39 mg/dL    VLDL, calculated 17 5 - 40 mg/dL    LDL, calculated 87 0 - 99 mg/dL   HEMOGLOBIN A1C WITH EAG   Result Value Ref Range    Hemoglobin A1c 8.3 (H) 4.8 - 5.6 %    Estimated average glucose 192 mg/dL   CBC WITH AUTOMATED DIFF   Result Value Ref Range    WBC 5.9 3.4 - 10.8 x10E3/uL    RBC 4.11 3.77 - 5.28 x10E6/uL    HGB 11.1 11.1 - 15.9 g/dL    HCT 34.7 34.0 - 46.6 %    MCV 84 79 - 97 fL    MCH 27.0 26.6 - 33.0 pg    MCHC 32.0 31.5 - 35.7 g/dL    RDW 14.6 12.3 - 15.4 %    PLATELET 782 318 - 631 x10E3/uL    NEUTROPHILS 52 Not Estab. %    Lymphocytes 35 Not Estab. %    MONOCYTES 7 Not Estab. %    EOSINOPHILS 5 Not Estab. %    BASOPHILS 1 Not Estab. %    ABS. NEUTROPHILS 3.1 1.4 - 7.0 x10E3/uL    Abs Lymphocytes 2.1 0.7 - 3.1 x10E3/uL    ABS. MONOCYTES 0.4 0.1 - 0.9 x10E3/uL    ABS. EOSINOPHILS 0.3 0.0 - 0.4 x10E3/uL    ABS. BASOPHILS 0.0 0.0 - 0.2 x10E3/uL    IMMATURE GRANULOCYTES 0 Not Estab. %    ABS. IMM. GRANS. 0.0 0.0 - 0.1 [id number]/VM   METABOLIC PANEL, COMPREHENSIVE   Result Value Ref Range    Glucose 168 (H) 65 - 99 mg/dL    BUN 9 6 - 24 mg/dL    Creatinine 0.92 0.57 - 1.00 mg/dL    GFR est non-AA 75 >59 mL/min/1.73    GFR est AA 86 >59 mL/min/1.73    BUN/Creatinine ratio 10 9 - 23    Sodium 138 134 - 144 mmol/L    Potassium 4.8 3.5 - 5.2 mmol/L    Chloride 102 96 - 106 mmol/L    CO2 23 20 - 29 mmol/L    Calcium 9.1 8.7 - 10.2 mg/dL    Protein, total 7.4 6.0 - 8.5 g/dL    Albumin 4.0 3.5 - 5.5 g/dL    GLOBULIN, TOTAL 3.4 1.5 - 4.5 g/dL    A-G Ratio 1.2 1.2 - 2.2    Bilirubin, total 0.3 0.0 - 1.2 mg/dL    Alk. phosphatase 71 39 - 117 IU/L    AST (SGOT) 21 0 - 40 IU/L    ALT (SGPT) 25 0 - 32 IU/L   TSH RFX ON ABNORMAL TO FREE T4   Result Value Ref Range    TSH 1.290 0.450 - 4.500 uIU/mL       ASSESSMENT AND PLAN    ICD-10-CM ICD-9-CM    1.  Type 2 diabetes mellitus with diabetic polyneuropathy, without long-term current use of insulin ([facility]) E11.42 250.60 liraglutide (VICTOZA 2-RENEE) 0.6 mg/0.1 mL (18 mg/3 mL) pnij     357.2    2. Essential hypertension I10 401.9    3. Chronic midline low back pain without sciatica M54.5 724.2     G89.29 338.29    4. Mixed hyperlipidemia E78.2 272.2    5. Coronary artery disease involving native coronary artery of native heart without angina pectoris I25.10 414.01    6. Morbid obesity with BMI of 45.0-49.9, adult ([facility]) E66.01 278.01 REFERRAL TO WEIGHT LOSS    Z68.42 V85.42    7. Screening for breast cancer Z12.31 V76.10    8. Screening for cervical cancer Z12.4 V76.2      Discussed labs from 31//19/ with patient. Normal kidney and liver tests, blood counts, thyroid, cholesterol (tot chol 145, LDL 87) , and urine protein test.  Your A1c was 8.3% with average BS of 192; A1c was 7.7% on 12/13/18.  Plan to increase Victoza to 1.2 mg once daily (can go up to 1.8 mg once daily). Diagnoses and all orders for this visit:    1. Type 2 diabetes mellitus with diabetic polyneuropathy, without long-term current use of insulin ([facility])  Uncontrolled. A1c 8.3%. increase Victoza from 0.6 mg to 1.2 mg once daily. Continue glipizide and metformin. -     liraglutide (VICTOZA 2-RENEE) 0.6 mg/0.1 mL (18 mg/3 mL) pnij; 1.2 mg by SubCUTAneous route daily.        -     She was reminded to schedule diabetic eye exam    2. Essential hypertension  Well-controlled. Continue lisinopril-HCTZ 10-12.5 mg daily. 3. Chronic midline low back pain without sciatica  Continue diclofenac, Lyrica, amitriptyline, and muscle relaxant. Aim for weight loss. 4. Mixed hyperlipidemia  Well-controlled on atorvastatin 10 mg daily. 5. Coronary artery disease involving native coronary artery of native heart without angina pectoris  Asymptomatic. Continue ASA 81 mg daily. 6. Morbid obesity with BMI of 45.0-49.9, adult ([facility])  -     REFERRAL TO WEIGHT LOSS (Dr. Angel Daily)    7.  Screening for breast cancer  She was reminded to schedule mammogram.    8. Screening for cervical cancer  She was reminded to schedule Pap smear. Follow-up Disposition:  Return in about 3 months (around 6/15/2019), or if symptoms worsen or fail to improve, for DM, HTN, POC A1c. I have discussed the diagnosis with the patient and the intended plan as seen in the above orders. Patient is in agreement. The patient has received an after-visit summary and questions were answered concerning future plans. I have discussed medication side effects and warnings with the patient as well.

## 2019-03-15 NOTE — PROGRESS NOTES
Turner Evans  Identified pt with two pt identifiers(name and ). Chief Complaint   Patient presents with    Diabetes    Hypertension    Results       1. Have you been to the ER, urgent care clinic since your last visit? Hospitalized since your last visit? NO    2. Have you seen or consulted any other health care providers outside of the [address] [place] [address] since your last visit? Include any pap smears or colon screening. Dr Jacqui Bumpers provider has been notified of reason for visit, vitals and flowsheets obtained on patients.      Patient received paperwork for advance directive during previous visit but has not completed at this time     Reviewed record In preparation for visit, huddled with provider and have obtained necessary documentation      Health Maintenance Due   Topic    BREAST CANCER SCRN MAMMOGRAM     EYE EXAM RETINAL OR DILATED     PAP AKA CERVICAL CYTOLOGY        Wt Readings from Last 3 Encounters:   03/15/19 289 lb (131.1 kg)   19 292 lb (132.5 kg)   18 289 lb (131.1 kg)     Temp Readings from Last 3 Encounters:   03/15/19 98 °F (36.7 °C) (Oral)   19 98 °F (36.7 °C) (Oral)   18 97.8 °F (36.6 °C) (Oral)     BP Readings from Last 3 Encounters:   03/15/19 109/66   19 133/74   18 116/84     Pulse Readings from Last 3 Encounters:   03/15/19 73   19 77   18 78     Vitals:    03/15/19 0757   BP: 109/66   Pulse: 73   Resp: 18   Temp: 98 °F (36.7 °C)   TempSrc: Oral   SpO2: 98%   Weight: 289 lb (131.1 kg)   Height: 5' 4\" (1.626 m)   PainSc:   7   PainLoc: Back         Learning Assessment:  :     Learning Assessment 2018   PRIMARY LEARNER Patient Patient Patient   HIGHEST LEVEL OF EDUCATION - PRIMARY LEARNER  - > 4 YEARS OF COLLEGE -   BARRIERS PRIMARY LEARNER - NONE -   CO-LEARNER CAREGIVER - No -   PRIMARY LANGUAGE ENGLISH ENGLISH ENGLISH   LEARNER PREFERENCE PRIMARY READING OTHER (COMMENT) LISTENING   ANSWERED BY patient patient self     - - self   RELATIONSHIP SELF SELF SELF       Depression Screening:  :     3 most recent PHQ Screens 3/15/2019   Little interest or pleasure in doing things Not at all   Feeling down, depressed, irritable, or hopeless Not at all   Total Score PHQ 2 0       Fall Risk Assessment:  :     Fall Risk Assessment, last 12 mths 8/3/2017   Able to walk? Yes   Fall in past 12 months? No       Abuse Screening:  :     Abuse Screening Questionnaire 5/9/2014   Do you ever feel afraid of your partner? N   Are you in a relationship with someone who physically or mentally threatens you? N   Is it safe for you to go home? Y       ADL Screening:  :     ADL Assessment 5/2/2018   Feeding yourself No Help Needed   Getting from bed to chair No Help Needed   Getting dressed No Help Needed   Bathing or showering No Help Needed   Walk across the room (includes cane/walker) No Help Needed   Using the telphone No Help Needed   Taking your medications No Help Needed   Preparing meals No Help Needed   Managing money (expenses/bills) No Help Needed   Moderately strenuous housework (laundry) No Help Needed   Shopping for personal items (toiletries/medicines) No Help Needed   Shopping for groceries No Help Needed   Driving No Help Needed   Climbing a flight of stairs No Help Needed   Getting to places beyond walking distances No Help Needed                 Medication reconciliation up to date and corrected with patient at this time.

## 2019-03-15 NOTE — PATIENT INSTRUCTIONS
[facility] Medically Supervised Weight Loss Program  Dr. Ira Acevedo, DO  [facility]  [place], [place]  Direct Information Line: [phone]         Learning About Diabetes Food Guidelines  Your Care Instructions    Meal planning is important to manage diabetes. It helps keep your blood sugar at a target level (which you set with your doctor). You don't have to eat special foods. You can eat what your family eats, including sweets once in a while. But you do have to pay attention to how often you eat and how much you eat of certain foods. You may want to work with a dietitian or a certified diabetes educator (CDE) to help you plan meals and snacks. A dietitian or CDE can also help you lose weight if that is one of your goals. What should you know about eating carbs? Managing the amount of carbohydrate (carbs) you eat is an important part of healthy meals when you have diabetes. Carbohydrate is found in many foods. · Learn which foods have carbs. And learn the amounts of carbs in different foods. ? Bread, cereal, pasta, and rice have about 15 grams of carbs in a serving. A serving is 1 slice of bread (1 ounce), ½ cup of cooked cereal, or 1/3 cup of cooked pasta or rice. ? Fruits have 15 grams of carbs in a serving. A serving is 1 small fresh fruit, such as an apple or orange; ½ of a banana; ½ cup of cooked or canned fruit; ½ cup of fruit juice; 1 cup of melon or raspberries; or 2 tablespoons of dried fruit. ? Milk and no-sugar-added yogurt have 15 grams of carbs in a serving. A serving is 1 cup of milk or 2/3 cup of no-sugar-added yogurt. ? Starchy vegetables have 15 grams of carbs in a serving. A serving is ½ cup of mashed potatoes or sweet potato; 1 cup winter squash; ½ of a small baked potato; ½ cup of cooked beans; or ½ cup cooked corn or green peas.   · Learn how much carbs to eat each day and at each meal. A dietitian or CDE can teach you how to keep track of the amount of carbs you eat. This is called carbohydrate counting. · If you are not sure how to count carbohydrate grams, use the Plate Method to plan meals. It is a good, quick way to make sure that you have a balanced meal. It also helps you spread carbs throughout the day. ? Divide your plate by types of foods. Put non-starchy vegetables on half the plate, meat or other protein food on one-quarter of the plate, and a grain or starchy vegetable in the final quarter of the plate. To this you can add a small piece of fruit and 1 cup of milk or yogurt, depending on how many carbs you are supposed to eat at a meal.  · Try to eat about the same amount of carbs at each meal. Do not \"save up\" your daily allowance of carbs to eat at one meal.  · Proteins have very little or no carbs per serving. Examples of proteins are beef, chicken, turkey, fish, eggs, tofu, cheese, cottage cheese, and peanut butter. A serving size of meat is 3 ounces, which is about the size of a deck of cards. Examples of meat substitute serving sizes (equal to 1 ounce of meat) are 1/4 cup of cottage cheese, 1 egg, 1 tablespoon of peanut butter, and ½ cup of tofu. How can you eat out and still eat healthy? · Learn to estimate the serving sizes of foods that have carbohydrate. If you measure food at home, it will be easier to estimate the amount in a serving of restaurant food. · If the meal you order has too much carbohydrate (such as potatoes, corn, or baked beans), ask to have a low-carbohydrate food instead. Ask for a salad or green vegetables. · If you use insulin, check your blood sugar before and after eating out to help you plan how much to eat in the future. · If you eat more carbohydrate at a meal than you had planned, take a walk or do other exercise. This will help lower your blood sugar. What else should you know? · Limit saturated fat, such as the fat from meat and dairy products.  This is a healthy choice because people who have diabetes are at higher risk of heart disease. So choose lean cuts of meat and nonfat or low-fat dairy products. Use olive or canola oil instead of butter or shortening when cooking. · Don't skip meals. Your blood sugar may drop too low if you skip meals and take insulin or certain medicines for diabetes. · Check with your doctor before you drink alcohol. Alcohol can cause your blood sugar to drop too low. Alcohol can also cause a bad reaction if you take certain diabetes medicines. Follow-up care is a key part of your treatment and safety. Be sure to make and go to all appointments, and call your doctor if you are having problems. It's also a good idea to know your test results and keep a list of the medicines you take. Where can you learn more? Go to http://camelia-konrad.info/. Enter V166 in the search box to learn more about \"Learning About Diabetes Food Guidelines. \"  Current as of: July 25, 2018  Content Version: 11.9  © 2445-7187 [employer]. Care instructions adapted under license by [employer] (which disclaims liability or warranty for this information). If you have questions about a medical condition or this instruction, always ask your healthcare professional. [address] any warranty or liability for your use of this information.

## 2019-03-26 ENCOUNTER — TELEPHONE (OUTPATIENT)
Dept: INTERNAL MEDICINE CLINIC | Facility: CLINIC | Age: 44
End: 2019-03-26

## 2019-03-26 NOTE — TELEPHONE ENCOUNTER
Patient states her blood sugar has been dropping to 50-69 mid morning for the last 3- 4 mornings. Please advise.

## 2019-03-26 NOTE — TELEPHONE ENCOUNTER
Pt states prior to medication being changed her blood sugar was high but since the change her numbers have been rather low, she's even almost passed out because they've been so low.  Would like return call from nurse to advise her of what she should do or if her medication needs to be changed  May be reached at [phone] and further states that if you need to leave a voicemail that is perfectly fine because it is her private phone

## 2019-03-26 NOTE — TELEPHONE ENCOUNTER
Instruct her to decrease glipizide 10 mg to 1/2 tab twice a day. If blood sugars continue to be too low after 3 days, then decrease to 1/2 tab once a day. Continue metformin and Victoza at the same doses.

## 2019-06-06 ENCOUNTER — OFFICE VISIT (OUTPATIENT)
Dept: ENDOCRINOLOGY | Age: 44
End: 2019-06-06

## 2019-06-06 VITALS
HEIGHT: 64 IN | SYSTOLIC BLOOD PRESSURE: 96 MMHG | WEIGHT: 287 LBS | HEART RATE: 64 BPM | DIASTOLIC BLOOD PRESSURE: 58 MMHG | BODY MASS INDEX: 49 KG/M2

## 2019-06-06 DIAGNOSIS — E11.9 TYPE 2 DIABETES MELLITUS WITHOUT COMPLICATION, WITHOUT LONG-TERM CURRENT USE OF INSULIN (HCC): Primary | ICD-10-CM

## 2019-06-06 DIAGNOSIS — E78.5 HYPERLIPIDEMIA, UNSPECIFIED HYPERLIPIDEMIA TYPE: ICD-10-CM

## 2019-06-06 DIAGNOSIS — I10 ESSENTIAL HYPERTENSION WITH GOAL BLOOD PRESSURE LESS THAN 130/80: ICD-10-CM

## 2019-06-06 RX ORDER — FLUTICASONE PROPIONATE AND SALMETEROL 500; 50 UG/1; UG/1
POWDER RESPIRATORY (INHALATION)
COMMUNITY
Start: 2015-08-07 | End: 2019-06-18 | Stop reason: SDUPTHER

## 2019-06-06 RX ORDER — METAXALONE 800 MG/1
TABLET ORAL
Refills: 2 | COMMUNITY
Start: 2019-04-03 | End: 2019-06-18

## 2019-06-06 NOTE — PROGRESS NOTES
CONSULTATION REQUESTED BY: Daryl Alston MD     REASON FOR CONSULT:  Uncontrolled type 2 diabetes    CHIEF COMPLAINT: evaluation of type 2 diabetes    HISTORY OF PRESENT ILLNESS:   Lesvia Birmingham is a 53 y.o. female with a PMHx as noted below who presents for evaluation of uncontrolled type 2 diabetes. Diabetes History:  Diabetes was diagnosed about 3 years ago,  Family History of diabetes is positive in her father,  Last A1c prior to initial visit was 8.3%, today is 6.9%.      Current Home Regimen:  - metformin 1000mg twice per day  - glipizide 10mg before breakfast and dinner (taking dinner dose at bedtime)  - victoza 1.2mg each morning before breakfast (taking at bedtime)    Review of home glucose:  AM: 120-197 variable  Not exercising  Not following any caloric or carb restriction    Review of most recent diabetes-related labs:  Lab Results   Component Value Date    HBA1C 8.3 (H) 03/11/2019    HBA1C 8.4 (H) 08/02/2018    HBA1C 9.2 (H) 05/02/2018    CEK4PEJR 7.7 12/13/2018    XXA8VGHO 7.6 06/14/2017    HXZ3HAIX 8.4 12/02/2016    CHOL 145 03/11/2019    LDLC 87 03/11/2019    GFRAA 86 03/11/2019    GFRNA 75 03/11/2019    MCACR 8.1 03/11/2019    TSH 1.290 03/11/2019    B12LT 423 08/17/2016     Lab Key:  [id number] = IA-2 pancreatic islet cell autoantibody  CPEPL = C-peptide level  :EXT = External Lab  GADLT = CAILIN-65 autoantibody   INSUL = Insulin level  MCACR (or MALBEXT) = Urine Microalbumin (or External UM)  B12LT = B12 level    PAST MEDICAL/SURGICAL HISTORY:   Past Medical History:   Diagnosis Date    Anemia (iron deficiency)     Asthma     DDD (degenerative disc disease), lumbar 2014    Depression     DM type 2 (diabetes mellitus, type 2) ([facility] [address] 11/23/2011    GERD (gastroesophageal reflux disease)     GI bleed 2015    Hepatic steatosis 11/2016    confirmed by US    HTN (hypertension)     Irregular menses     Irritable bowel     Kidney mass     5/28/19: US showed rigth renal cystic nephroma, follows with Urology (Dr. Celestina Seoi)    Morbid obesity ([address]     FE (obstructive sleep apnea)     w/ Cpap    PUD (peptic ulcer disease)      Past Surgical History:   Procedure Laterality Date    HX  SECTION      x 2    HX COLONOSCOPY  2015    HX ENDOSCOPY  2015    HX HYSTEROSCOPY WITH ENDOMETRIAL ABLATION  2014    HX TUBAL LIGATION      HX TUMOR REMOVAL  2016    right kidney, Dr Lisandra Morrison removed from right kidney on 02/15/2016        ALLERGIES:   Allergies   Allergen Reactions    Pcn [Penicillins] Hives       MEDICATIONS ON ADMISSION:     Current Outpatient Medications:     fluticasone propion-salmeterol (ADVAIR DISKUS) 500-50 mcg/dose diskus inhaler, Advair Diskus 500 mcg-50 mcg/dose powder for inhalation  Prescribed by non [facility] MD, Disp: , Rfl:     atorvastatin (LIPITOR) 10 mg tablet, TAKE 1 TABLET BY MOUTH ONCE DAILY, Disp: 90 Tab, Rfl: 3    glipiZIDE (GLUCOTROL) 10 mg tablet, TAKE 1 TABLET BY MOUTH TWICE DAILY, Disp: 180 Tab, Rfl: 3    liraglutide (VICTOZA 2-RENEE) 0.6 mg/0.1 mL (18 mg/3 mL) pnij, 1.2 mg by SubCUTAneous route daily. , Disp: 60 Adjustable Dose Pre-filled Pen Syringe, Rfl: 5    pregabalin (LYRICA) 200 mg capsule, Take 200 mg by mouth two (2) times a day., Disp: , Rfl:     montelukast (SINGULAIR) 10 mg tablet, TAKE ONE TABLET BY MOUTH ONCE DAILY FOR  ALLERGIES  AND  ASTHMA, Disp: 90 Tab, Rfl: 3    metFORMIN ER (GLUCOPHAGE XR) 500 mg tablet, TAKE 2 TABLETS BY MOUTH TWICE DAILY, Disp: 120 Tab, Rfl: 5    lisinopril-hydroCHLOROthiazide (PRINZIDE, ZESTORETIC) 10-12.5 mg per tablet, TAKE ONE TABLET BY MOUTH ONCE DAILY FOR BLOOD PRESSURE, Disp: 90 Tab, Rfl: 3    albuterol (PROVENTIL VENTOLIN) 2.5 mg /3 mL (0.083 %) nebulizer solution, USE ONE VIAL IN NEBULIZER EVERY 4 HOURS AS NEEDED FOR WHEEZING, Disp: 30 Each, Rfl: 3    albuterol (VENTOLIN HFA) 90 mcg/actuation inhaler, Take 2 Puffs by inhalation every six (6) hours as needed for Wheezing., Disp: 2 Inhaler, Rfl: 5    metaxalone (SKELAXIN) 800 mg tablet, , Disp: , Rfl: 2    amitriptyline (ELAVIL) 50 mg tablet, Take 1 Tab by mouth nightly. For numbness at hands and feet., Disp: 30 Tab, Rfl: 2    diclofenac EC (VOLTAREN) 75 mg EC tablet, Take 1 Tab by mouth two (2) times a day., Disp: 60 Tab, Rfl: 3    nitroglycerin (NITROSTAT) 0.4 mg SL tablet, Take 1 Tab by mouth every five (5) minutes as needed for Chest Pain. Sit/Lay down then put one tab under the tongue every 5 minutes as needed for chest pain for 3 doses, Disp: 1 Bottle, Rfl: 0    aspirin 81 mg chewable tablet, Take 2 Tabs by mouth daily. , Disp: 100 Tab, Rfl: 0    SOCIAL HISTORY:   Social History     Socioeconomic History    Marital status:      Spouse name: Not on file    Number of children: Not on file    Years of education: Not on file    Highest education level: Not on file   Occupational History    Not on file   Social Needs    Financial resource strain: Not on file    Food insecurity:     Worry: Not on file     Inability: Not on file    Transportation needs:     Medical: Not on file     Non-medical: Not on file   Tobacco Use    Smoking status: Never Smoker    Smokeless tobacco: Never Used   Substance and Sexual Activity    Alcohol use: No    Drug use: No    Sexual activity: Yes     Partners: Male   Lifestyle    Physical activity:     Days per week: Not on file     Minutes per session: Not on file    Stress: Not on file   Relationships    Social connections:     Talks on phone: Not on file     Gets together: Not on file     Attends Che[employer] service: Not on file     Active member of club or organization: Not on file     Attends meetings of clubs or organizations: Not on file     Relationship status: Not on file    Intimate partner violence:     Fear of current or ex partner: Not on file     Emotionally abused: Not on file     Physically abused: Not on file     Forced sexual activity: Not on file   Other Topics Concern  Not on file   Social History Narrative    Not on file       FAMILY HISTORY:  Family History   Problem Relation Age of Onset    Cancer Mother         Ovarian Cancer /breast ca    Heart Attack Father     Heart Disease Father     Diabetes Father     Other Father         pancreatitis    Cancer Sister     Other Sister         chrons    Heart Attack Maternal Grandfather     Diabetes Paternal Grandmother     HIV/AIDS Son        REVIEW OF SYSTEMS: Complete ROS assessed and noted for that which is described above, all else are negative. Eyes: normal  ENT: normal  CVS: normal  Resp: normal  GI: normal  : normal  GYN: normal  Endocrine: normal  Integument: normal  Musculoskeletal: normal  Neuro: normal  Psych: normal      PHYSICAL EXAMINATION:    VITAL SIGNS:  Visit Vitals  BP 96/58 (BP 1 Location: Left arm, BP Patient Position: Sitting)   Pulse 64   Ht 5' 4\" (1.626 m)   Wt 287 lb (130.2 kg)   BMI 49.26 kg/m²       GENERAL: NCAT, Sitting comfortably, NAD  EYES: EOMI, non-icteric, no proptosis  Ear/Nose/Throat: NCAT, no inflammation, no masses  LYMPH NODES: No LAD  CARDIOVASCULAR: S1 S2, RRR, No murmur, 2+ radial pulses  RESPIRATORY: CTA b/l, no wheeze/rales  GASTROINTESTINAL: NT, ND  MUSCULOSKELETAL: Normal ROM, no atrophy  SKIN: warm, no edema/rash/ or other skin changes  NEUROLOGIC: 5/5 power all extremities, no tremor, AAOx3  PSYCHIATRIC: Normal affect, Normal insight and judgement    Diabetic foot exam:     Left Foot:   Visual Exam: normal    Pulse DP: 2+ (normal)   Filament test: normal sensation    Vibratory sensation: Vibratory sensation: normal       Right Foot:   Visual Exam: normal    Pulse DP: 2+ (normal)   Filament test: normal sensation    Vibratory sensation: Vibratory sensation: normal      REVIEW OF LABORATORY AND RADIOLOGY DATA:   Labs and documentation have been reviewed as described above.      ASSESSMENT AND PLAN:   Blanca Douglas is a 53 y.o. female with a PMHx as noted above who presents for evaluation of uncontrolled type 2 diabetes. Problems:  Type 2 diabetes Uncontrolled  Hyperlipidemia  Hypertension    We had the pleasure of reviewing together the basics of diabetes including basic pathophysiology and diabetes care. We further discussed the importance of checking home glucose regularly and takin all of their scheduled medications in order to have the best possible outcome. I was able to answer any questions they had in clinic today and they are invited to reach me if they have any further questions. Based upon our discussion together today we have decided to make the following changes: We noted several areas that we can work on together. Firstly we can increase her victoza to 1.8mg as she is tolerating it well. She denies hx of pancreatitis or thyroid issues. This will allow her to receive the most benefit for her weight and sugars. Another area to work on is the glipizide taken at bedtime, can be moved to pre-dinner, which will help get her post prandial sugar down sooner which would be ideal. Finally, she is not tacking carb intake or exercising. We noted that lifestyle modifications are missing and that these are the very first items she should be working on as the medications are not substitutes for these. We discussed carb goals per meal and exercise ideas. PLAN  Type 2 Diabetes  Medications:  metformin 1000mg twice per day  glipizide 10mg before breakfast and dinner  victoza Increase to 1.8mg each morning before breakfast  Walk 20 minutes 4 days/week, 1 hr after dinner  Advised to check glucose daily  Provided with glucose log sheets for later review. HTN: BP stable on current medications, no changes today. HLD: reviewed, stable on a statin    RTC: I would like to see them back in 3 months. 60 minutes spent together with patient today of which >50% of this time was spent in counseling and coordination of care. Temi Maddox.  [facility]

## 2019-06-06 NOTE — PATIENT INSTRUCTIONS
metformin 1000mg twice per day    glipizide 10mg before breakfast and dinner    victoza Increase to 1.8mg each morning before breakfast    Please walk 20 minutes 4 days per week, 1 hour after dinner      Please note our new policy, you must arrive to the clinic 15 minutes before your appointment time to allow enough time for proper check-in, adequate time to spend with your doctor, and also to respect the appointment time of the next patient. Not arriving 15 minutes in advance may result in having your appointment rescheduled for the next available day/time.  ----------------------------------------------------------------------------------------------------------------------    Below you will find a glucose log sheet which you can use to record your blood sugars. Without checking and recording what your home glucose levels are, it will be difficult to make any changes to your medication dose, even when significant changes may be needed. Please feel free to use the log below to record your home glucose levels. At the very least, I would like for you to login the entire 2-3 weeks just before your visit so we can make your visit much more productive and beneficial to you. GLUCOSE LOG SHEET:    Date Breakfast Lunch Dinner Bedtime Comments ? GLUCOSE LOG SHEET:    Date Breakfast Lunch Dinner Bedtime Comments ? GLUCOSE LOG SHEET:    Date Breakfast Lunch Dinner Bedtime Comments ?

## 2019-06-07 LAB — HBA1C MFR BLD HPLC: 6.9 %

## 2019-06-18 ENCOUNTER — OFFICE VISIT (OUTPATIENT)
Dept: INTERNAL MEDICINE CLINIC | Facility: CLINIC | Age: 44
End: 2019-06-18

## 2019-06-18 VITALS
SYSTOLIC BLOOD PRESSURE: 113 MMHG | WEIGHT: 281 LBS | DIASTOLIC BLOOD PRESSURE: 68 MMHG | RESPIRATION RATE: 18 BRPM | OXYGEN SATURATION: 98 % | TEMPERATURE: 98 F | BODY MASS INDEX: 47.97 KG/M2 | HEART RATE: 78 BPM | HEIGHT: 64 IN

## 2019-06-18 DIAGNOSIS — E11.42 TYPE 2 DIABETES MELLITUS WITH DIABETIC POLYNEUROPATHY, WITHOUT LONG-TERM CURRENT USE OF INSULIN (HCC): Primary | ICD-10-CM

## 2019-06-18 DIAGNOSIS — G89.29 CHRONIC MIDLINE LOW BACK PAIN WITHOUT SCIATICA: ICD-10-CM

## 2019-06-18 DIAGNOSIS — I10 ESSENTIAL HYPERTENSION: ICD-10-CM

## 2019-06-18 DIAGNOSIS — E66.01 MORBID OBESITY WITH BMI OF 45.0-49.9, ADULT (HCC): ICD-10-CM

## 2019-06-18 DIAGNOSIS — M54.50 CHRONIC MIDLINE LOW BACK PAIN WITHOUT SCIATICA: ICD-10-CM

## 2019-06-18 DIAGNOSIS — E78.2 MIXED HYPERLIPIDEMIA: ICD-10-CM

## 2019-06-18 DIAGNOSIS — S76.312A STRAIN OF LEFT HAMSTRING MUSCLE, INITIAL ENCOUNTER: ICD-10-CM

## 2019-06-18 DIAGNOSIS — J45.30 MILD PERSISTENT ASTHMA WITHOUT COMPLICATION: ICD-10-CM

## 2019-06-18 DIAGNOSIS — I25.10 CORONARY ARTERY DISEASE INVOLVING NATIVE CORONARY ARTERY OF NATIVE HEART WITHOUT ANGINA PECTORIS: ICD-10-CM

## 2019-06-18 LAB — HBA1C MFR BLD HPLC: 6.7 %

## 2019-06-18 RX ORDER — FLUTICASONE PROPIONATE AND SALMETEROL 500; 50 UG/1; UG/1
1 POWDER RESPIRATORY (INHALATION) EVERY 12 HOURS
Qty: 60 EACH | Refills: 11 | Status: SHIPPED | OUTPATIENT
Start: 2019-06-18 | End: 2021-01-26 | Stop reason: SDUPTHER

## 2019-06-18 RX ORDER — METAXALONE 800 MG/1
800 TABLET ORAL
Qty: 90 TAB | Refills: 0 | Status: SHIPPED | OUTPATIENT
Start: 2019-06-18 | End: 2020-01-11

## 2019-06-18 NOTE — PROGRESS NOTES
CC:   Chief Complaint   Patient presents with    Diabetes    Hypertension    Buttocks pain     left side       HISTORY OF PRESENT ILLNESS  Mayra Flynn is a 53 y.o. female. Accompanied by her . Presents for 3 month follow up evaluation. She has type 2 DM with neuropathy, HTN, asthma, allergic rhinitis, FE, hyperlipidemia, chronic low back pain, and depression.  Today she has complains of 3 day history of left buttock pain that after pushing something away forcefully with her left leg. Midline low back pain better; tends to come and go. Endocrine Review  She is seen for diabetes. Since last clinic visit, saw Dr. Adan Martinez on 6/6/19. He increased her Victoza dose to 1.8 mg/day.   Denies polydipsia, polyuria, or hypoglycemia.  Home glucose monitoring: is performed twice a day.  She reports medication compliance with glipizide, metformin, and Victoza.  Medication side effects: none.  Diabetic diet compliance: compliant most of the time.  Lab review: reviewed with patient.  A1c is 6.7% today (6/18/19), was 8.3% on 3/11/19.  Eye exam: UTD.      Soc Hx  .  Has 2 adult children.  Works as the  of [employer] for [employer]. [employer] be running for [employer] in Nov.  Never smoker.  Denies alcohol or recreational drug use.  Walks occasionally for exercise; admits to not walking as much as Dr. Adan Martinez recommended. Annabel Crome unsweetened tea; denies coffee or sodas.     Health Maintenance  Flu vaccine: declined                                    Pneumonia vaccine: PPSV-23 9/9/16                                                                    Tetanus vaccine: 5/2/18                                                                 Pap smear: 3/28/19, Luis Miguel Pierre ([address] [place] [address])  Mammogram: 3/28/19 ([address] [place] [address])  Eye exam: 4/18/19, Dr. Red Carty exam: 5/2/18  Lipids:3/11/19 (tot chol 145, LDL 87)  A1c: 6/6/19 (6.9%)  Advanced Directives: given information  End of Life: given information                                      ROS  A complete review of systems was performed and is negative except for those mentioned in the HPI.     Patient Active Problem List   Diagnosis Code    HTN (hypertension) I10    Depression F32.9    FE (obstructive sleep apnea) G47.33    Asthma J45.909    Iron deficiency anemia D50.9    Hyperlipidemia E78.5    Obesity, morbid, BMI 50 or higher ([facility] [address] E66.01    Type 2 diabetes mellitus with diabetic polyneuropathy, without long-term current use of insulin (HCC) E11.42    Coronary artery disease involving native coronary artery of native heart without angina pectoris I25.10    Chronic midline low back pain without sciatica M54.5, G89.29     Past Medical History:   Diagnosis Date    Anemia (iron deficiency)     Asthma     DDD (degenerative disc disease), lumbar 2014    Depression     DM type 2 (diabetes mellitus, type 2) ([facility] [address] 11/23/2011    GERD (gastroesophageal reflux disease)     GI bleed 2015    Hepatic steatosis 11/2016    confirmed by US    HTN (hypertension)     Irregular menses     Irritable bowel     Kidney mass     5/28/19: US showed rigth renal cystic nephroma, follows with Urology (Dr. Cid Friday)    Morbid obesity ([facility] [address]     FE (obstructive sleep apnea)     w/ Cpap    PUD (peptic ulcer disease) 2015     Allergies   Allergen Reactions    Pcn [Penicillins] Hives       Current Outpatient Medications   Medication Sig Dispense Refill    fluticasone propion-salmeterol (ADVAIR DISKUS) 500-50 mcg/dose diskus inhaler Advair Diskus 500 mcg-50 mcg/dose powder for inhalation   Prescribed by non 606/706 Megan Rooney MD      atorvastatin (LIPITOR) 10 mg tablet TAKE 1 TABLET BY MOUTH ONCE DAILY 90 Tab 3    glipiZIDE (GLUCOTROL) 10 mg tablet TAKE 1 TABLET BY MOUTH TWICE DAILY 180 Tab 3    liraglutide (VICTOZA 2-RENEE) 0.6 mg/0.1 mL (18 mg/3 mL) pnij 1.2 mg by SubCUTAneous route daily.  (Patient taking differently: 1.8 mg by SubCUTAneous route daily.) 60 Adjustable Dose Pre-filled Pen Syringe 5    pregabalin (LYRICA) 200 mg capsule Take 200 mg by mouth two (2) times a day.  montelukast (SINGULAIR) 10 mg tablet TAKE ONE TABLET BY MOUTH ONCE DAILY FOR  ALLERGIES  AND  ASTHMA 90 Tab 3    metFORMIN ER (GLUCOPHAGE XR) 500 mg tablet TAKE 2 TABLETS BY MOUTH TWICE DAILY 120 Tab 5    amitriptyline (ELAVIL) 50 mg tablet Take 1 Tab by mouth nightly. For numbness at hands and feet. 30 Tab 2    lisinopril-hydroCHLOROthiazide (PRINZIDE, ZESTORETIC) 10-12.5 mg per tablet TAKE ONE TABLET BY MOUTH ONCE DAILY FOR BLOOD PRESSURE 90 Tab 3    diclofenac EC (VOLTAREN) 75 mg EC tablet Take 1 Tab by mouth two (2) times a day. 60 Tab 3    nitroglycerin (NITROSTAT) 0.4 mg SL tablet Take 1 Tab by mouth every five (5) minutes as needed for Chest Pain. Sit/Lay down then put one tab under the tongue every 5 minutes as needed for chest pain for 3 doses 1 Bottle 0    albuterol (PROVENTIL VENTOLIN) 2.5 mg /3 mL (0.083 %) nebulizer solution USE ONE VIAL IN NEBULIZER EVERY 4 HOURS AS NEEDED FOR WHEEZING 30 Each 3    albuterol (VENTOLIN HFA) 90 mcg/actuation inhaler Take 2 Puffs by inhalation every six (6) hours as needed for Wheezing. 2 Inhaler 5         PHYSICAL EXAM  Visit Vitals  /68 (BP 1 Location: Right arm, BP Patient Position: Sitting)   Pulse 78   Temp 98 °F (36.7 °C) (Oral)   Resp 18   Ht 5' 4\" (1.626 m)   Wt 281 lb (127.5 kg)   SpO2 98%   BMI 48.23 kg/m²   8 lb weight loss since last clinic visit 3 months ago    General: Morbidly obese, no distress. HEENT:  Head normocephalic/atraumatic, no scleral icterus  Neck: Supple. No carotid bruits, JVD, lymphadenopathy, or thyromegaly. Lungs:  Clear to ausculation bilaterally. Good air movement. Heart:  Regular rate and rhythm, normal S1 and S2, no murmur, gallop, or rub  Extremities: No clubbing, cyanosis, or edema. Normal ROM at knees with no crepitus. Tenderness along left hamstring muscle. Back:  Normal ROM.  No vertebral, paravertebral, or CVA tenderness. Neurological: Alert and oriented. Non-focal exam.  Psychiatric: Normal mood and affect. Behavior is normal.     Lab Results   Component Value Date/Time    Hemoglobin A1c 8.3 (H) 03/11/2019 08:24 AM    Hemoglobin A1c (POC) 6.7 06/18/2019 08:10 AM         ASSESSMENT AND PLAN    ICD-10-CM ICD-9-CM    1. Type 2 diabetes mellitus with diabetic polyneuropathy, without long-term current use of insulin ([facility]) E11.42 250.60 AMB POC HEMOGLOBIN A1C     357.2 liraglutide (VICTOZA 2-RENEE) 0.6 mg/0.1 mL (18 mg/3 mL) pnij   2. Essential hypertension I10 401.9    3. Strain of left hamstring muscle, initial encounter S76.312A 843.8 metaxalone (SKELAXIN) 800 mg tablet   4. Mixed hyperlipidemia E78.2 272.2    5. Chronic midline low back pain without sciatica M54.5 724.2     G89.29 338.29    6. Coronary artery disease involving native coronary artery of native heart without angina pectoris I25.10 414.01    7. Morbid obesity with BMI of 45.0-49.9, adult ([facility]) E66.01 278.01     Z68.42 V85.42    8. Mild persistent asthma without complication Q29.63 816.23 fluticasone propion-salmeterol (ADVAIR DISKUS) 500-50 mcg/dose diskus inhaler     Diagnoses and all orders for this visit:    1. Type 2 diabetes mellitus with diabetic polyneuropathy, without long-term current use of insulin ([facility])  A1c 6.7% today. Well-controlled. Continue metformin, glipizide, and Victoza. -     AMB POC HEMOGLOBIN A1C  -     liraglutide (VICTOZA 2-RENEE) 0.6 mg/0.1 mL (18 mg/3 mL) pnij; 1.8 mg by SubCUTAneous route daily. 2. Essential hypertension  Controlled. Continue present management. 3. Strain of left hamstring muscle, initial encounter  Use diclofenac and muscle relaxant.  -     Start metaxalone (SKELAXIN) 800 mg tablet; Take 1 Tab by mouth three (3) times daily as needed (muscle spasms). 4. Mixed hyperlipidemia  Controlled. Continue present management.     5. Chronic midline low back pain without sciatica    6. Coronary artery disease involving native coronary artery of native heart without angina pectoris    7. Morbid obesity with BMI of 45.0-49.9, adult [facility])  Discussed the patient's BMI with her. The BMI follow up plan is as follows: dietary management education, guidance, and counseling; encourage exercise; monitor weight; prescribed dietary intake. Follow up BMI in 3 months. Patient Instructions  Healthy snacks include:  -  Apple   -  Rice cakes  -  Raisins  -  98% fat-free popcorn -  Almonds  -  Orange  -  Yogurt   -  Granola bar  -  Animal crackers  -  Fruit smoothie  -  Hardboiled egg -  Carrot sticks  -  Sunflower seeds  -  Seedless grapes -  Banana  -  Apple sauce   -  Fig bars  -  Celery & reduced-fat peanut butter    WEIGHT LOSS RECOMMENDATIONS:  New Technology:   -  Use the free My Fitness Pal onelia to keep track of daily calories     Aerobic exercise: goal of 3-5 times per week, about 30 minutes    Diet changes: limiting daily calorie intake to 2,000. Work on reading nutrition labels on food (in particular the serving size, the calories per serving, and carbohydrates). Work on decreasing portion sizes & snacking. 8. Mild persistent asthma without complication  -     Refill fluticasone propion-salmeterol (ADVAIR DISKUS) 500-50 mcg/dose diskus inhaler; Take 1 Puff by inhalation every twelve (12) hours. Asthma, Dx: J45.30      Follow-up and Dispositions    · Return in about 3 months (around 9/18/2019), or if symptoms worsen or fail to improve, for DM, HTN, weight. I have discussed the diagnosis with the patient and the intended plan as seen in the above orders. Patient is in agreement. The patient has received an after-visit summary and questions were answered concerning future plans. I have discussed medication side effects and warnings with the patient as well.

## 2019-06-18 NOTE — PATIENT INSTRUCTIONS
Patient Instructions    Healthy snacks include:  -  Apple   -  Rice cakes  -  Raisins  -  98% fat-free popcorn -  Almonds  -  Orange  -  Yogurt   -  Granola bar  -  Animal crackers  -  Fruit smoothie  -  Hardboiled egg -  Carrot sticks  -  Sunflower seeds  -  Seedless grapes -  Banana  -  Apple sauce   -  Fig bars  -  Celery & reduced-fat peanut butter      WEIGHT LOSS RECOMMENDATIONS:    New Technology:   -  Use the free My [employer] Pal onelia to keep track of daily calories     Aerobic exercise: goal of 3-5 times per week, about 30 minutes    Diet changes: limiting daily calorie intake to 2,000. Work on reading nutrition labels on food (in particular the serving size, the calories per serving, and carbohydrates). Work on decreasing portion sizes & snacking.

## 2019-06-18 NOTE — PROGRESS NOTES
Jay Hassan  Identified pt with two pt identifiers(name and ). Chief Complaint   Patient presents with    Diabetes    Hypertension    Buttocks pain     left side       1. Have you been to the ER, urgent care clinic since your last visit? Hospitalized since your last visit? NO    2. Have you seen or consulted any other health care providers outside of the [address] [place] [address] since your last visit? Include any pap smears or colon screening. Dr Myriam Burgess provider has been notified of reason for visit, vitals and flowsheets obtained on patients.      Patient received paperwork for advance directive during previous visit but has not completed at this time     Reviewed record In preparation for visit, huddled with provider and have obtained necessary documentation      Health Maintenance Due   Topic    BREAST CANCER SCRN MAMMOGRAM     PAP AKA CERVICAL CYTOLOGY        Wt Readings from Last 3 Encounters:   19 281 lb (127.5 kg)   19 287 lb (130.2 kg)   03/15/19 289 lb (131.1 kg)     Temp Readings from Last 3 Encounters:   19 98 °F (36.7 °C) (Oral)   03/15/19 98 °F (36.7 °C) (Oral)   19 98 °F (36.7 °C) (Oral)     BP Readings from Last 3 Encounters:   19 113/68   19 96/58   03/15/19 109/66     Pulse Readings from Last 3 Encounters:   19 78   19 64   03/15/19 73     Vitals:    19 0805   BP: 113/68   Pulse: 78   Resp: 18   Temp: 98 °F (36.7 °C)   TempSrc: Oral   SpO2: 98%   Weight: 281 lb (127.5 kg)   Height: 5' 4\" (1.626 m)   PainSc:   6   PainLoc: Buttocks         Learning Assessment:  :     Learning Assessment 2018   PRIMARY LEARNER Patient Patient Patient   HIGHEST LEVEL OF EDUCATION - PRIMARY LEARNER  - > 4 YEARS OF COLLEGE -   BARRIERS PRIMARY LEARNER - NONE -   CO-LEARNER CAREGIVER - No -   PRIMARY LANGUAGE ENGLISH ENGLISH ENGLISH   LEARNER PREFERENCE PRIMARY READING OTHER (COMMENT) LISTENING   ANSWERED BY patient patient self     - - self   RELATIONSHIP SELF SELF SELF       Depression Screening:  :     3 most recent PHQ Screens 3/15/2019   Little interest or pleasure in doing things Not at all   Feeling down, depressed, irritable, or hopeless Not at all   Total Score PHQ 2 0       Fall Risk Assessment:  :     Fall Risk Assessment, last 12 mths 8/3/2017   Able to walk? Yes   Fall in past 12 months? No       Abuse Screening:  :     Abuse Screening Questionnaire 5/9/2014   Do you ever feel afraid of your partner? N   Are you in a relationship with someone who physically or mentally threatens you? N   Is it safe for you to go home? Y       ADL Screening:  :     ADL Assessment 5/2/2018   Feeding yourself No Help Needed   Getting from bed to chair No Help Needed   Getting dressed No Help Needed   Bathing or showering No Help Needed   Walk across the room (includes cane/walker) No Help Needed   Using the telphone No Help Needed   Taking your medications No Help Needed   Preparing meals No Help Needed   Managing money (expenses/bills) No Help Needed   Moderately strenuous housework (laundry) No Help Needed   Shopping for personal items (toiletries/medicines) No Help Needed   Shopping for groceries No Help Needed   Driving No Help Needed   Climbing a flight of stairs No Help Needed   Getting to places beyond walking distances No Help Needed                 Medication reconciliation up to date and corrected with patient at this time.

## 2019-07-02 DIAGNOSIS — Z12.39 SCREENING FOR BREAST CANCER: ICD-10-CM

## 2019-09-10 RX ORDER — METFORMIN HYDROCHLORIDE 500 MG/1
TABLET, EXTENDED RELEASE ORAL
Qty: 120 TAB | Refills: 5 | Status: SHIPPED | OUTPATIENT
Start: 2019-09-10 | End: 2020-09-28

## 2019-11-27 DIAGNOSIS — I10 ESSENTIAL HYPERTENSION: ICD-10-CM

## 2019-11-29 RX ORDER — LISINOPRIL AND HYDROCHLOROTHIAZIDE 10; 12.5 MG/1; MG/1
TABLET ORAL
Qty: 90 TAB | Refills: 3 | Status: SHIPPED | OUTPATIENT
Start: 2019-11-29 | End: 2020-02-18

## 2019-12-04 ENCOUNTER — HOSPITAL ENCOUNTER (EMERGENCY)
Age: 44
Discharge: HOME | End: 2019-12-04
Attending: EMERGENCY MEDICINE
Payer: SELF-PAY

## 2019-12-04 ENCOUNTER — APPOINTMENT (OUTPATIENT)
Dept: GENERAL RADIOLOGY | Age: 44
End: 2019-12-04
Attending: PHYSICIAN ASSISTANT
Payer: SELF-PAY

## 2019-12-04 VITALS
DIASTOLIC BLOOD PRESSURE: 43 MMHG | OXYGEN SATURATION: 100 % | WEIGHT: 273.59 LBS | SYSTOLIC BLOOD PRESSURE: 117 MMHG | TEMPERATURE: 98 F | RESPIRATION RATE: 20 BRPM | HEART RATE: 70 BPM | BODY MASS INDEX: 45.58 KG/M2 | HEIGHT: 65 IN

## 2019-12-04 DIAGNOSIS — R07.9 CHEST PAIN, UNSPECIFIED TYPE: Primary | ICD-10-CM

## 2019-12-04 LAB
ALBUMIN SERPL-MCNC: 3.2 G/DL (ref 3.5–5)
ALBUMIN/GLOB SERPL: 0.8 {RATIO} (ref 1.1–2.2)
ALP SERPL-CCNC: 77 U/L (ref 45–117)
ALT SERPL-CCNC: 25 U/L (ref 12–78)
ANION GAP SERPL CALC-SCNC: 7 MMOL/L (ref 5–15)
AST SERPL-CCNC: 10 U/L (ref 15–37)
BASOPHILS # BLD: 0 K/UL (ref 0–0.1)
BASOPHILS NFR BLD: 0 % (ref 0–1)
BILIRUB SERPL-MCNC: 0.2 MG/DL (ref 0.2–1)
BUN SERPL-MCNC: 9 MG/DL (ref 6–20)
BUN/CREAT SERPL: 9 (ref 12–20)
CALCIUM SERPL-MCNC: 9.6 MG/DL (ref 8.5–10.1)
CHLORIDE SERPL-SCNC: 104 MMOL/L (ref 97–108)
CO2 SERPL-SCNC: 27 MMOL/L (ref 21–32)
CREAT SERPL-MCNC: 1.03 MG/DL (ref 0.55–1.02)
DIFFERENTIAL METHOD BLD: ABNORMAL
EOSINOPHIL # BLD: 0.2 K/UL (ref 0–0.4)
EOSINOPHIL NFR BLD: 3 % (ref 0–7)
ERYTHROCYTE [DISTWIDTH] IN BLOOD BY AUTOMATED COUNT: 15.1 % (ref 11.5–14.5)
GLOBULIN SER CALC-MCNC: 4.1 G/DL (ref 2–4)
GLUCOSE SERPL-MCNC: 87 MG/DL (ref 65–100)
HCT VFR BLD AUTO: 35.9 % (ref 35–47)
HGB BLD-MCNC: 11.6 G/DL (ref 11.5–16)
IMM GRANULOCYTES # BLD AUTO: 0 K/UL (ref 0–0.04)
IMM GRANULOCYTES NFR BLD AUTO: 0 % (ref 0–0.5)
LYMPHOCYTES # BLD: 2.6 K/UL (ref 0.8–3.5)
LYMPHOCYTES NFR BLD: 37 % (ref 12–49)
MCH RBC QN AUTO: 27.4 PG (ref 26–34)
MCHC RBC AUTO-ENTMCNC: 32.3 G/DL (ref 30–36.5)
MCV RBC AUTO: 84.7 FL (ref 80–99)
MONOCYTES # BLD: 0.4 K/UL (ref 0–1)
MONOCYTES NFR BLD: 6 % (ref 5–13)
NEUTS SEG # BLD: 3.8 K/UL (ref 1.8–8)
NEUTS SEG NFR BLD: 54 % (ref 32–75)
NRBC # BLD: 0 K/UL (ref 0–0.01)
NRBC BLD-RTO: 0 PER 100 WBC
PLATELET # BLD AUTO: 289 K/UL (ref 150–400)
PMV BLD AUTO: 11.2 FL (ref 8.9–12.9)
POTASSIUM SERPL-SCNC: 3.7 MMOL/L (ref 3.5–5.1)
PROT SERPL-MCNC: 7.3 G/DL (ref 6.4–8.2)
RBC # BLD AUTO: 4.24 M/UL (ref 3.8–5.2)
SODIUM SERPL-SCNC: 138 MMOL/L (ref 136–145)
TROPONIN I SERPL-MCNC: <0.05 NG/ML
WBC # BLD AUTO: 7.1 K/UL (ref 3.6–11)

## 2019-12-04 PROCEDURE — 36415 COLL VENOUS BLD VENIPUNCTURE: CPT

## 2019-12-04 PROCEDURE — 84484 ASSAY OF TROPONIN QUANT: CPT

## 2019-12-04 PROCEDURE — 93005 ELECTROCARDIOGRAM TRACING: CPT

## 2019-12-04 PROCEDURE — 71046 X-RAY EXAM CHEST 2 VIEWS: CPT

## 2019-12-04 PROCEDURE — 85025 COMPLETE CBC W/AUTO DIFF WBC: CPT

## 2019-12-04 PROCEDURE — 80053 COMPREHEN METABOLIC PANEL: CPT

## 2019-12-04 PROCEDURE — 99283 EMERGENCY DEPT VISIT LOW MDM: CPT

## 2019-12-04 PROCEDURE — 74011250637 HC RX REV CODE- 250/637: Performed by: PHYSICIAN ASSISTANT

## 2019-12-04 RX ORDER — NITROGLYCERIN 0.4 MG/1
0.4 TABLET SUBLINGUAL
Status: DISCONTINUED | OUTPATIENT
Start: 2019-12-04 | End: 2019-12-04

## 2019-12-04 RX ORDER — OXYCODONE AND ACETAMINOPHEN 5; 325 MG/1; MG/1
1 TABLET ORAL
Status: COMPLETED | OUTPATIENT
Start: 2019-12-04 | End: 2019-12-04

## 2019-12-04 RX ORDER — HYDROCODONE BITARTRATE AND ACETAMINOPHEN 5; 325 MG/1; MG/1
1 TABLET ORAL
Qty: 10 TAB | Refills: 0 | Status: SHIPPED | OUTPATIENT
Start: 2019-12-04 | End: 2019-12-07

## 2019-12-04 RX ORDER — GUAIFENESIN 100 MG/5ML
81 LIQUID (ML) ORAL DAILY
Qty: 30 TAB | Refills: 0 | Status: SHIPPED | OUTPATIENT
Start: 2019-12-04 | End: 2020-01-03

## 2019-12-04 RX ORDER — MORPHINE SULFATE 4 MG/ML
4 INJECTION, SOLUTION INTRAMUSCULAR; INTRAVENOUS
Status: DISCONTINUED | OUTPATIENT
Start: 2019-12-04 | End: 2019-12-04

## 2019-12-04 RX ADMIN — OXYCODONE HYDROCHLORIDE AND ACETAMINOPHEN 1 TABLET: 5; 325 TABLET ORAL at 20:00

## 2019-12-05 LAB
ATRIAL RATE: 66 BPM
CALCULATED P AXIS, ECG09: 48 DEGREES
CALCULATED R AXIS, ECG10: 42 DEGREES
CALCULATED T AXIS, ECG11: 20 DEGREES
DIAGNOSIS, 93000: NORMAL
P-R INTERVAL, ECG05: 158 MS
Q-T INTERVAL, ECG07: 408 MS
QRS DURATION, ECG06: 80 MS
QTC CALCULATION (BEZET), ECG08: 427 MS
VENTRICULAR RATE, ECG03: 66 BPM

## 2019-12-05 NOTE — ED NOTES
Pt reports left sided chest pain that radiates to the left side of her neck and anterior shoulder. Pt reports increased pain with movement but the pain is not contingent upon movement.  Pt denies any injury

## 2019-12-05 NOTE — ED NOTES
Pt discharged by Shelly Patel. Pt provided with discharge instructions Rx and instructions on follow up care. Pt out of ED in stable condition accompanied by .

## 2019-12-05 NOTE — DISCHARGE INSTRUCTIONS
Thank you for allowing us to take care of you today! We hope we addressed all of your concerns and needs. We strive to provide excellent quality care in the Emergency Department. You will receive a survey after your visit to evaluate the care you were provided. Should you receive a survey from us, we invite you to share your experience and tell us what made it excellent. It was a pleasure serving you, we invite you to share your experience with us, in our pursuit for excellence, should you be selected to receive a survey. The exam and treatment you received in the Emergency Department were for an urgent problem and are not intended as complete care. It is important that you follow up with a doctor, nurse practitioner, or physician assistant for ongoing care. If your symptoms become worse or you do not improve as expected and you are unable to reach your usual health care provider, you should return to the Emergency Department. We are available 24 hours a day. Please take your discharge instructions with you when you go to your follow-up appointment. If you have any problem arranging a follow-up appointment, contact the Emergency Department immediately. If a prescription has been provided, please have it filled as soon as possible to prevent a delay in treatment. Read the entire medication instruction sheet provided to you by the pharmacy. If you have any questions or reservations about taking the medication due to side effects or interactions with other medications, please call your primary care physician or contact the ER to speak with the charge nurse. Make an appointment with your family doctor or the physician you were referred to for follow-up of this visit as instructed on your discharge paperwork, as this is mandatory follow-up. Return to the ER if you are unable to be seen or if you are unable to be seen in a timely manner.     If you have any problem arranging the follow-up visit, contact the Emergency Department immediately. I hope you feel better and thank you again for allow us to provide you with excellent care today! Warmest regards,    Brett Emmanuel PA-C  Emergency Medicine Physician Assistant  [facility]      Vitals:    12/04/19 1841   BP: 117/43   BP 1 Location: Left arm   BP Patient Position: At rest   Pulse: 70   Resp: 20   Temp: 98 °F (36.7 °C)   SpO2: 100%   Weight: 124.1 kg (273 lb 9.5 oz)   Height: 5' 5\" (1.651 m)       Recent Results (from the past 12 hour(s))   CBC WITH AUTOMATED DIFF    Collection Time: 12/04/19  7:29 PM   Result Value Ref Range    WBC 7.1 3.6 - 11.0 K/uL    RBC 4.24 3.80 - 5.20 M/uL    HGB 11.6 11.5 - 16.0 g/dL    HCT 35.9 35.0 - 47.0 %    MCV 84.7 80.0 - 99.0 FL    MCH 27.4 26.0 - 34.0 PG    MCHC 32.3 30.0 - 36.5 g/dL    RDW 15.1 (H) 11.5 - 14.5 %    PLATELET 691 850 - 329 K/uL    MPV 11.2 8.9 - 12.9 FL    NRBC 0.0 0  WBC    ABSOLUTE NRBC 0.00 0.00 - 0.01 K/uL    NEUTROPHILS 54 32 - 75 %    LYMPHOCYTES 37 12 - 49 %    MONOCYTES 6 5 - 13 %    EOSINOPHILS 3 0 - 7 %    BASOPHILS 0 0 - 1 %    IMMATURE GRANULOCYTES 0 0.0 - 0.5 %    ABS. NEUTROPHILS 3.8 1.8 - 8.0 K/UL    ABS. LYMPHOCYTES 2.6 0.8 - 3.5 K/UL    ABS. MONOCYTES 0.4 0.0 - 1.0 K/UL    ABS. EOSINOPHILS 0.2 0.0 - 0.4 K/UL    ABS. BASOPHILS 0.0 0.0 - 0.1 K/UL    ABS. IMM.  GRANS. 0.0 0.00 - 0.04 K/UL    DF AUTOMATED     METABOLIC PANEL, COMPREHENSIVE    Collection Time: 12/04/19  7:29 PM   Result Value Ref Range    Sodium 138 136 - 145 mmol/L    Potassium 3.7 3.5 - 5.1 mmol/L    Chloride 104 97 - 108 mmol/L    CO2 27 21 - 32 mmol/L    Anion gap 7 5 - 15 mmol/L    Glucose 87 65 - 100 mg/dL    BUN 9 6 - 20 MG/DL    Creatinine 1.03 (H) 0.55 - 1.02 MG/DL    BUN/Creatinine ratio 9 (L) 12 - 20      GFR est AA >60 >60 ml/min/1.73m2    GFR est non-AA 58 (L) >60 ml/min/1.73m2    Calcium 9.6 8.5 - 10.1 MG/DL    Bilirubin, total 0.2 0.2 - 1.0 MG/DL    ALT (SGPT) 25 12 - 78 U/L    AST (SGOT) 10 (L) 15 - 37 U/L    Alk. phosphatase 77 45 - 117 U/L    Protein, total 7.3 6.4 - 8.2 g/dL    Albumin 3.2 (L) 3.5 - 5.0 g/dL    Globulin 4.1 (H) 2.0 - 4.0 g/dL    A-G Ratio 0.8 (L) 1.1 - 2.2     TROPONIN I    Collection Time: 12/04/19  7:29 PM   Result Value Ref Range    Troponin-I, Qt. <0.05 <0.05 ng/mL       XR CHEST PA LAT   Final Result   IMPRESSION: No acute cardiopulmonary disease. CT Results  (Last 48 hours)    None            [facility] Departments     For adult and child immunizations, family planning, TB screening, STD testing and women's health services. [facility]: [place] [phone]      [facility] 25   657 [facility]   [address]   170 [facility]: [facility] 200 [facility] [phone]      [address] [place] [address]          [address] [place] [address] [address]     For primary care services, woman and child wellness, and some clinics providing specialty care. [facility] [address] [facility] 2525 [facility] [phone]   411 [facility] 200 [facility] [address] [place] [address] [phone]   339 [facility] [address]  [phone]   [address]  [facility] 1604 [facility] 5850  Community  [phone]   [address] [place] [address] [address] [place] [phone]   [facility] 81 [facility] [phone]   Christina Share the [address] [phone]   [facility]: [facility] ravi Guadarrama [address] [place] [address], [address] [phone]     [address] [phone]   [facility] Out[facility] 5850  Community  [phone]   [facility]  [address] ([email or web address]) [phone]         Sexual Health/Woman Wellness Clinics    For STD/HIV testing and treatment, pregnancy testing and services, men's health, birth control services, LGBT services, and hepatitis/HPV vaccine services. [place] for [place] [employer] 201 [address] [facility] [address] [facility] [address] [facility] 1579 600 [address] [place] All [phone]   [facility] [address], 5th floor [phone]   Pregnancy [address] for Women [address]  [place] [phone]         [place] [address] [facility] [address] [place] [address]   [phone]   [place]   [phone]   Women, Infant and Children's Services: [address] [phone]       [address] [facility] [address]   [address]  Crisis Intervention   [phone]   [address]   4506 [facility] Psychiatry     [phone]   [address] Crisis   [address] [facility] [address] [phone]       Local Primary Care Physicians  [facility] [phone]  Romero Priest, MD Eugenie Fothergill, MD Ozzie Manns, MD [facility] Community Doctors [phone]  Tacho Marrufo, FNP  MD Chani Franz MD Harlen Laos, MD [address] [address] [phone]  MD Gina Graham MD [address] [facility] [phone]  MD Asia Jimenez MD Willard Boozer, MD Rosalia Hollering, MD   [facility] [phone]  [facility] IRGTBK ADAMARIS, MD Rufino Shelley, NP [address] [phone]  MD Azeb White MD Mayme Herrlich, MD Ardith Rhine, MD Wilbur Parish, MD Gerome Meadows, MD Cyndie Robinsons, MD   [facility] [phone]  Lata Hou MD [facility] [phone]  MD Edis Chavez, NP  RoopaMD Amber Levy MD Evander Hedger, MD Loralee Bernhardt, MD Elizbeth Certain, MD   [facility] 375 MD Yari Oleary, FNP  Praveen Travis, NP  Anushka Hull, MD Joana Marin, MD Alphonse Robert, MD JONAS Trujillo [facility] [phone]  Nadine Kim, MD Willa Merritt, MD Regina Ricks, MD Shayy Starkey, MD Sharon Ledesma, MD   [facility] [place] [phone]  MD Shivani Sotelo, MD [place] [phone]  Sheila Zhang, MD Kelvin Salinas, MD Alirio William, MD   [facility] [phone]  Derick Lopez, MD Howard Hung, MD Daiana Garcia, MD Sonny Arizmendi, MD Rocio Her, MD Mari Tate, NP  Darion Griffith MD [address]  66   [phone]  Lesli Jeffers, MD Pam Keller, MD Niels Seo MD   2102 [facility] [phone]  MD Vijaya Gomez, FNP  Lo Haines, PAKARRIE Haines, FNP  Dominick Casillas, PA-C Hamilton Bosworth, MD Charlaine Gallant, NP Dorsey Alpers, DO Miscellaneous:  Josy Crowder -[phone]       Patient Education        Chest Pain: Care Instructions  Your Care Instructions    There are many things that can cause chest pain. Some are not serious and will get better on their own in a few days. But some kinds of chest pain need more testing and treatment. Your doctor may have recommended a follow-up visit in the next 8 to 12 hours. If you are not getting better, you may need more tests or treatment. Even though your doctor has released you, you still need to watch for any problems. The doctor carefully checked you, but sometimes problems can develop later. If you have new symptoms or if your symptoms do not get better, get medical care right away. If you have worse or different chest pain or pressure that lasts more than 5 minutes or you passed out (lost consciousness), call 911 or seek other emergency help right away.    A medical visit is only one step in your treatment. Even if you feel better, you still need to do what your doctor recommends, such as going to all suggested follow-up appointments and taking medicines exactly as directed. This will help you recover and help prevent future problems. How can you care for yourself at home? · Rest until you feel better. · Take your medicine exactly as prescribed. Call your doctor if you think you are having a problem with your medicine. · Do not drive after taking a prescription pain medicine. When should you call for help? Call 911 if:    · You passed out (lost consciousness).     · You have severe difficulty breathing.     · You have symptoms of a heart attack. These may include:  ? Chest pain or pressure, or a strange feeling in your chest.  ? Sweating. ? Shortness of breath. ? Nausea or vomiting. ? Pain, pressure, or a strange feeling in your back, neck, jaw, or upper belly or in one or both shoulders or arms. ? Lightheadedness or sudden weakness. ? A fast or irregular heartbeat. After you call 911, the  may tell you to chew 1 adult-strength or 2 to 4 low-dose aspirin. Wait for an ambulance. Do not try to drive yourself.    Call your doctor today if:    · You have any trouble breathing.     · Your chest pain gets worse.     · You are dizzy or lightheaded, or you feel like you may faint.     · You are not getting better as expected.     · You are having new or different chest pain. Where can you learn more? Go to http://camelia-konrad.info/. Enter A120 in the search box to learn more about \"Chest Pain: Care Instructions. \"  Current as of: June 26, 2019  Content Version: 12.2  © 0173-5206 [employer]. Care instructions adapted under license by "[employer] (which disclaims liability or warranty for this information).  If you have questions about a medical condition or this instruction, always ask your healthcare professional. [address] any warranty or liability for your use of this information. Patient Education        Musculoskeletal Chest Pain: Care Instructions  Your Care Instructions    Chest pain is not always a sign that something is wrong with your heart or that you have another serious problem. The doctor thinks your chest pain is caused by strained muscles or ligaments, inflamed chest cartilage, or another problem in your chest, rather than by your heart. You may need more tests to find the cause of your chest pain. Follow-up care is a key part of your treatment and safety. Be sure to make and go to all appointments, and call your doctor if you are having problems. It's also a good idea to know your test results and keep a list of the medicines you take. How can you care for yourself at home? · Take pain medicines exactly as directed. ? If the doctor gave you a prescription medicine for pain, take it as prescribed. ? If you are not taking a prescription pain medicine, ask your doctor if you can take an over-the-counter medicine. · Rest and protect the sore area. · Stop, change, or take a break from any activity that may be causing your pain or soreness. · Put ice or a cold pack on the sore area for 10 to 20 minutes at a time. Try to do this every 1 to 2 hours for the next 3 days (when you are awake) or until the swelling goes down. Put a thin cloth between the ice and your skin. · After 2 or 3 days, apply a heating pad set on low or a warm cloth to the area that hurts. Some doctors suggest that you go back and forth between hot and cold. · Do not wrap or tape your ribs for support. This may cause you to take smaller breaths, which could increase your risk of lung problems. · Mentholated creams such as Bengay or Icy Hot may soothe sore muscles. Follow the instructions on the package. · Follow your doctor's instructions for exercising. · Gentle stretching and massage may help you get better faster.  Stretch slowly to the point just before pain begins, and hold the stretch for at least 15 to 30 seconds. Do this 3 or 4 times a day. Stretch just after you have applied heat. · As your pain gets better, slowly return to your normal activities. Any increased pain may be a sign that you need to rest a while longer. When should you call for help? Call 911 anytime you think you may need emergency care. For example, call if:    · You have chest pain or pressure. This may occur with:  ? Sweating. ? Shortness of breath. ? Nausea or vomiting. ? Pain that spreads from the chest to the neck, jaw, or one or both shoulders or arms. ? Dizziness or lightheadedness. ? A fast or uneven pulse. After calling 911, chew 1 adult-strength aspirin. Wait for an ambulance. Do not try to drive yourself.     · You have sudden chest pain and shortness of breath, or you cough up blood.    Call your doctor now or seek immediate medical care if:    · You have any trouble breathing.     · Your chest pain gets worse.     · Your chest pain occurs consistently with exercise and is relieved by rest.    Watch closely for changes in your health, and be sure to contact your doctor if:    · Your chest pain does not get better after 1 week. Where can you learn more? Go to http://camelia-konrad.info/. Enter V293 in the search box to learn more about \"Musculoskeletal Chest Pain: Care Instructions. \"  Current as of: June 26, 2019  Content Version: 12.2  © 5407-0807 [employer]. Care instructions adapted under license by [employer] (which disclaims liability or warranty for this information). If you have questions about a medical condition or this instruction, always ask your healthcare professional. [place] [address] any warranty or liability for your use of this information.

## 2019-12-27 ENCOUNTER — OFFICE VISIT (OUTPATIENT)
Dept: INTERNAL MEDICINE CLINIC | Facility: CLINIC | Age: 44
End: 2019-12-27

## 2019-12-27 VITALS
HEIGHT: 65 IN | TEMPERATURE: 98.1 F | SYSTOLIC BLOOD PRESSURE: 137 MMHG | OXYGEN SATURATION: 98 % | HEART RATE: 57 BPM | BODY MASS INDEX: 45.98 KG/M2 | DIASTOLIC BLOOD PRESSURE: 82 MMHG | RESPIRATION RATE: 12 BRPM | WEIGHT: 276 LBS

## 2019-12-27 DIAGNOSIS — G44.219 EPISODIC TENSION-TYPE HEADACHE, NOT INTRACTABLE: ICD-10-CM

## 2019-12-27 DIAGNOSIS — E66.01 OBESITY, MORBID, BMI 40.0-49.9 (HCC): ICD-10-CM

## 2019-12-27 DIAGNOSIS — R10.12 ABDOMINAL WALL PAIN IN LEFT UPPER QUADRANT: ICD-10-CM

## 2019-12-27 DIAGNOSIS — F33.2 SEVERE EPISODE OF RECURRENT MAJOR DEPRESSIVE DISORDER, WITHOUT PSYCHOTIC FEATURES (HCC): Primary | ICD-10-CM

## 2019-12-27 PROBLEM — R87.610 ATYPICAL SQUAMOUS CELLS OF UNDETERMINED SIGNIFICANCE (ASCUS) ON PAPANICOLAOU SMEAR OF CERVIX: Status: ACTIVE | Noted: 2019-04-03

## 2019-12-27 RX ORDER — ESCITALOPRAM OXALATE 20 MG/1
TABLET ORAL
Qty: 30 TAB | Refills: 5 | Status: SHIPPED | OUTPATIENT
Start: 2019-12-27 | End: 2020-09-25

## 2019-12-27 NOTE — PROGRESS NOTES
Kinza Mijares  Identified pt with two pt identifiers(name and ). Chief Complaint   Patient presents with    Headache    Abdominal Pain       Reviewed record In preparation for visit and have obtained necessary documentation. Has info on advanced directive but has not filled them out. 1. Have you been to the ER, urgent care clinic or hospitalized since your last visit? [facility] ER 19    2. Have you seen or consulted any other health care providers outside of the [address] [place] [address] since your last visit? Include any pap smears or colon screening. No    Vitals reviewed with provider.     Health Maintenance reviewed:     Health Maintenance Due   Topic    Influenza Age 5 to Adult     HEMOGLOBIN A1C Q6M     BREAST CANCER SCRN MAMMOGRAM           Wt Readings from Last 3 Encounters:   19 276 lb (125.2 kg)   19 273 lb 9.5 oz (124.1 kg)   19 281 lb (127.5 kg)        Temp Readings from Last 3 Encounters:   19 98.1 °F (36.7 °C) (Oral)   19 98 °F (36.7 °C)   19 98 °F (36.7 °C) (Oral)        BP Readings from Last 3 Encounters:   19 137/82   19 117/43   19 113/68        Pulse Readings from Last 3 Encounters:   19 (!) 57   19 70   19 78        Vitals:    19 0936   BP: 137/82   Pulse: (!) 57   Resp: 12   Temp: 98.1 °F (36.7 °C)   TempSrc: Oral   SpO2: 98%   Weight: 276 lb (125.2 kg)   Height: 5' 5\" (1.651 m)   PainSc:   4   PainLoc: Head          Learning Assessment:   :       Learning Assessment 2018   PRIMARY LEARNER Patient Patient Patient   HIGHEST LEVEL OF EDUCATION - PRIMARY LEARNER  - > 4 YEARS OF COLLEGE -   BARRIERS PRIMARY LEARNER - NONE -   CO-LEARNER CAREGIVER - No -   PRIMARY LANGUAGE ENGLISH ENGLISH ENGLISH   LEARNER PREFERENCE PRIMARY READING OTHER (COMMENT) LISTENING   ANSWERED BY patient patient self     - - self   RELATIONSHIP SELF SELF SELF        Depression Screening:   :       3 most recent PHQ Screens 3/15/2019   Little interest or pleasure in doing things Not at all   Feeling down, depressed, irritable, or hopeless Not at all   Total Score PHQ 2 0        Fall Risk Assessment:   :       Fall Risk Assessment, last 12 mths 8/3/2017   Able to walk? Yes   Fall in past 12 months? No        Abuse Screening:   :       Abuse Screening Questionnaire 5/9/2014   Do you ever feel afraid of your partner? N   Are you in a relationship with someone who physically or mentally threatens you? N   Is it safe for you to go home?  Y        ADL Screening:   :       ADL Assessment 5/2/2018   Feeding yourself No Help Needed   Getting from bed to chair No Help Needed   Getting dressed No Help Needed   Bathing or showering No Help Needed   Walk across the room (includes cane/walker) No Help Needed   Using the telphone No Help Needed   Taking your medications No Help Needed   Preparing meals No Help Needed   Managing money (expenses/bills) No Help Needed   Moderately strenuous housework (laundry) No Help Needed   Shopping for personal items (toiletries/medicines) No Help Needed   Shopping for groceries No Help Needed   Driving No Help Needed   Climbing a flight of stairs No Help Needed   Getting to places beyond walking distances No Help Needed

## 2019-12-27 NOTE — PATIENT INSTRUCTIONS

## 2019-12-27 NOTE — PROGRESS NOTES
HISTORY OF PRESENT ILLNESS  Cindy Norman is a 53 y.o. female. She is accompanied by her . HPI  She presents for several complaints: headaches, abdominal pain and depression. Precipitating factors are stress. 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 Similar symptoms occurred years ago with stress and she responded well to Lexapro. Current headache began 3 days ago, but have been occuring off and on for almost 2 months. Headaches are describes as throbbing pain, unilateral in the left temporal area along with the feeling that she is wearing a hat that is too tight and hurts all aroung head. She denies nausea, vomiting, sonophobia, photophobia, scotomata,aura. They usually last several days and occur every week. .Pain intensity at worst is 10/10 and after taking Excedrin 4/10. She complains of 2 months intermittent LUQ abdominal pain without radiation  She was seen in the ED for this on Dec 16 as she feared she was having a heart attack. CBC, creat and troponin were normal except Creat of 1.03. EKG showed only non-specific changes. CXR was normal. The pain is described as a dull ache and is 7/10 in intensity. Pain lasts 30 minutes a time. Aggravating factors: none. Alleviating factors: recumbency. Associated symptoms: constipation. The patient denies belching, diarrhea, urinary frequency and urinary urgency. Symptoms have been unchanged since. She is seen for depression. Current therapy is with nothing. Symptoms began 2 months ago. Ongoing symptoms include depressed mood, tearfulness, anhedonia, insomnia, feelings of worthlessness/guilt, difficulty concentrating, hopelessness, recurrent thoughts of death and suicidal thoughts without plan. She denies suicidal thoughts with specific plan and suicidal attempt.        Patient Active Problem List   Diagnosis Code    HTN (hypertension) I10    Depression F32.9    FE (obstructive sleep apnea) G47.33    Asthma J45.909    Iron deficiency anemia D50.9    Hyperlipidemia E78.5    Obesity, morbid, BMI 50 or higher (HCC) E66.01    Type 2 diabetes mellitus with diabetic polyneuropathy, without long-term current use of insulin (HCC) E11.42    Coronary artery disease involving native coronary artery of native heart without angina pectoris I25.10    Chronic midline low back pain without sciatica M54.5, G89.29    Atypical squamous cells of undetermined significance (ASCUS) on Papanicolaou smear of cervix R87.610     Past Medical History:   Diagnosis Date    Anemia (iron deficiency)     Asthma     DDD (degenerative disc disease), lumbar     Depression     DM type 2 (diabetes mellitus, type 2) ([facility] [address] 2011    GERD (gastroesophageal reflux disease)     GI bleed     Hepatic steatosis 2016    confirmed by US    HTN (hypertension)     Irregular menses     Irritable bowel     Kidney mass     19: US showed rigth renal cystic nephroma, follows with Urology (Dr. Bijan Flores)    Morbid obesity ([facility] [address]     FE (obstructive sleep apnea)     w/ Cpap    PUD (peptic ulcer disease)      Past Surgical History:   Procedure Laterality Date    HX  SECTION      x 2    HX COLONOSCOPY  2015    HX ENDOSCOPY  2015    HX HYSTEROSCOPY WITH ENDOMETRIAL ABLATION  2014    HX TUBAL LIGATION      HX TUMOR REMOVAL  2016    right kidney, Dr Fredy Rojas removed from right kidney on 02/15/2016      Social History     Socioeconomic History    Marital status:      Spouse name: Not on file    Number of children: Not on file    Years of education: Not on file    Highest education level: Not on file   Tobacco Use    Smoking status: Never Smoker    Smokeless tobacco: Never Used   Substance and Sexual Activity    Alcohol use: No    Drug use: No    Sexual activity: Yes     Partners: Male     Family History   Problem Relation Age of Onset    Cancer Mother         Ovarian Cancer /breast ca    Heart Attack Father     Heart Disease Father     Diabetes Father     Other Father         pancreatitis    Cancer Sister     Other Sister         chrons    Heart Attack Maternal Grandfather     Diabetes Paternal Grandmother     HIV/AIDS Son      Allergies   Allergen Reactions    Pcn [Penicillins] Hives     Current Outpatient Medications   Medication Sig Dispense Refill    aspirin 81 mg chewable tablet Take 1 Tab by mouth daily for 30 days. 30 Tab 0    lisinopril-hydroCHLOROthiazide (PRINZIDE, ZESTORETIC) 10-12.5 mg per tablet TAKE 1 TABLET BY MOUTH ONCE DAILY FOR BLOOD PRESSURE 90 Tab 3    metFORMIN ER (GLUCOPHAGE XR) 500 mg tablet TAKE 2 TABLETS BY MOUTH TWICE DAILY 120 Tab 5    liraglutide (VICTOZA 2-RENEE) 0.6 mg/0.1 mL (18 mg/3 mL) pnij 1.8 mg by SubCUTAneous route daily. 9 Pen 3    fluticasone propion-salmeterol (ADVAIR DISKUS) 500-50 mcg/dose diskus inhaler Take 1 Puff by inhalation every twelve (12) hours. Asthma, Dx: J45.30 60 Each 11    metaxalone (SKELAXIN) 800 mg tablet Take 1 Tab by mouth three (3) times daily as needed (muscle spasms). 90 Tab 0    atorvastatin (LIPITOR) 10 mg tablet TAKE 1 TABLET BY MOUTH ONCE DAILY 90 Tab 3    glipiZIDE (GLUCOTROL) 10 mg tablet TAKE 1 TABLET BY MOUTH TWICE DAILY 180 Tab 3    pregabalin (LYRICA) 200 mg capsule Take 200 mg by mouth two (2) times a day.  montelukast (SINGULAIR) 10 mg tablet TAKE ONE TABLET BY MOUTH ONCE DAILY FOR  ALLERGIES  AND  ASTHMA 90 Tab 3    amitriptyline (ELAVIL) 50 mg tablet Take 1 Tab by mouth nightly. For numbness at hands and feet. 30 Tab 2    nitroglycerin (NITROSTAT) 0.4 mg SL tablet Take 1 Tab by mouth every five (5) minutes as needed for Chest Pain.  Sit/Lay down then put one tab under the tongue every 5 minutes as needed for chest pain for 3 doses 1 Bottle 0    albuterol (PROVENTIL VENTOLIN) 2.5 mg /3 mL (0.083 %) nebulizer solution USE ONE VIAL IN NEBULIZER EVERY 4 HOURS AS NEEDED FOR WHEEZING 30 Each 3    albuterol (VENTOLIN HFA) 90 mcg/actuation inhaler Take 2 Puffs by inhalation every six (6) hours as needed for Wheezing. 2 Inhaler 5     ROS     Visit Vitals  /82 (BP 1 Location: Left arm, BP Patient Position: Sitting)   Pulse (!) 57   Temp 98.1 °F (36.7 °C) (Oral)   Resp 12   Ht 5' 5\" (1.651 m)   Wt 276 lb (125.2 kg)   SpO2 98%   BMI 45.93 kg/m²     Physical Exam  Vitals signs and nursing note reviewed. Constitutional:       Appearance: She is obese. HENT:      Head: Normocephalic and atraumatic. Right Ear: Tympanic membrane and ear canal normal.      Left Ear: Tympanic membrane and ear canal normal.      Nose: Nose normal.      Mouth/Throat:      Mouth: Mucous membranes are moist.      Pharynx: Oropharynx is clear. Eyes:      Extraocular Movements: Extraocular movements intact. Conjunctiva/sclera: Conjunctivae normal.      Pupils: Pupils are equal, round, and reactive to light. Funduscopic exam:     Right eye: No papilledema. Left eye: No papilledema. Neck:      Musculoskeletal: Neck supple. Thyroid: No thyromegaly. Cardiovascular:      Rate and Rhythm: Normal rate and regular rhythm. Heart sounds: Normal heart sounds. No murmur. Pulmonary:      Effort: Pulmonary effort is normal.      Breath sounds: Normal breath sounds. No wheezing, rhonchi or rales. Abdominal:      General: Abdomen is flat. Bowel sounds are normal. There is no distension. Palpations: Abdomen is soft. There is no hepatomegaly, splenomegaly or mass. Tenderness: There is tenderness in the left upper quadrant. Comments: Pain in LUQ moving from supine to sitting position. Lymphadenopathy:      Cervical: No cervical adenopathy. Skin:     General: Skin is warm and dry. Findings: No rash. Neurological:      Mental Status: She is alert and oriented to person, place, and time. Cranial Nerves: Cranial nerves are intact. Motor: Motor function is intact. Coordination: Coordination is intact. Gait: Gait is intact. Deep Tendon Reflexes:      Reflex Scores:       Patellar reflexes are 2+ on the right side and 2+ on the left side. Psychiatric:         Attention and Perception: Attention normal.         Mood and Affect: Mood is depressed. Affect is tearful. Speech: Speech normal.         Behavior: Behavior is slowed. Behavior is cooperative. Cognition and Memory: Cognition normal.         Judgment: Judgment is inappropriate. Lab Results   Component Value Date/Time    WBC 7.1 12/04/2019 07:29 PM    HGB 11.6 12/04/2019 07:29 PM    HCT 35.9 12/04/2019 07:29 PM    PLATELET 647 48/43/9953 07:29 PM    MCV 84.7 12/04/2019 07:29 PM     Lab Results   Component Value Date/Time    Sodium 138 12/04/2019 07:29 PM    Potassium 3.7 12/04/2019 07:29 PM    Chloride 104 12/04/2019 07:29 PM    CO2 27 12/04/2019 07:29 PM    Anion gap 7 12/04/2019 07:29 PM    Glucose 87 12/04/2019 07:29 PM    BUN 9 12/04/2019 07:29 PM    Creatinine 1.03 (H) 12/04/2019 07:29 PM    BUN/Creatinine ratio 9 (L) 12/04/2019 07:29 PM    GFR est AA >60 12/04/2019 07:29 PM    GFR est non-AA 58 (L) 12/04/2019 07:29 PM    Calcium 9.6 12/04/2019 07:29 PM    Bilirubin, total 0.2 12/04/2019 07:29 PM    AST (SGOT) 10 (L) 12/04/2019 07:29 PM    Alk. phosphatase 77 12/04/2019 07:29 PM    Protein, total 7.3 12/04/2019 07:29 PM    Albumin 3.2 (L) 12/04/2019 07:29 PM    Globulin 4.1 (H) 12/04/2019 07:29 PM    A-G Ratio 0.8 (L) 12/04/2019 07:29 PM    ALT (SGPT) 25 12/04/2019 07:29 PM         ASSESSMENT and PLAN    ICD-10-CM ICD-9-CM    1. Severe episode of recurrent major depressive disorder, without psychotic features ([facility] [address] F33.2 296.33 escitalopram oxalate (LEXAPRO) 20 mg tablet   2. Abdominal wall pain in left upper quadrant R10.12 789.02    3. Episodic tension-type headache, not intractable G44.219 339.11    4.  Obesity, morbid, BMI 40.0-49.9 ([facility] E66.01 278.01      Diagnoses and all orders for this visit:    1. Severe episode of recurrent major depressive disorder, without psychotic features ([facility] [address]  After some discussion she contracted for safety and  will make sure she is not left alone. Will need to set up with therapist as well, but she was so distraught today, that was not discussed. Advised time in  sunlight and aerobic exercise even if only starts at 5 minutes twice a day. She has been given suicide hotline phone numbers and told to go to ED is she feels that she will harm herself. -     escitalopram oxalate (LEXAPRO) 20 mg tablet; Take a half tablet for 5 days, then one tablet daily. 2. Abdominal wall pain in left upper quadrant  Nothing to suggest intra-abdominal pathology    3. Episodic tension-type headache, not intractable  Probably stress induced. Use Excedrin PRN    4. Obesity, morbid, BMI 40.0-49.9 ([facility]  Discussed using smaller plate for portion control, keeping a food diary for awareness of food consumed, checking weight often, and increasing physical activity. Will re-evaluate next visit. Follow-up and Dispositions    · Return in about 2 weeks (around 1/10/2020) for with Dr Peggy Werner for depression (with Dr BURNS is no availabitly with Dr ABARCA).       lab results and schedule of future lab studies reviewed with patient  reviewed diet, exercise and weight control  I have discussed the diagnosis, evaluation and treatment options and the intended plan with the patient. Patient understands and is in agreement. The patient has received an after-visit summary and questions were answered concerning future plans. I have discussed side effects and warnings of any new medications with the patient as well.

## 2020-01-11 ENCOUNTER — HOSPITAL ENCOUNTER (EMERGENCY)
Age: 45
Discharge: HOME | End: 2020-01-11
Attending: EMERGENCY MEDICINE
Payer: SELF-PAY

## 2020-01-11 VITALS
TEMPERATURE: 98.6 F | WEIGHT: 271.17 LBS | HEIGHT: 65 IN | BODY MASS INDEX: 45.18 KG/M2 | RESPIRATION RATE: 18 BRPM | SYSTOLIC BLOOD PRESSURE: 109 MMHG | HEART RATE: 62 BPM | OXYGEN SATURATION: 98 % | DIASTOLIC BLOOD PRESSURE: 74 MMHG

## 2020-01-11 DIAGNOSIS — J01.00 ACUTE NON-RECURRENT MAXILLARY SINUSITIS: Primary | ICD-10-CM

## 2020-01-11 PROCEDURE — 99283 EMERGENCY DEPT VISIT LOW MDM: CPT

## 2020-01-11 RX ORDER — DOXYCYCLINE HYCLATE 100 MG
100 TABLET ORAL 2 TIMES DAILY
Qty: 20 TAB | Refills: 0 | Status: SHIPPED | OUTPATIENT
Start: 2020-01-11 | End: 2020-01-21

## 2020-01-11 NOTE — ED PROVIDER NOTES
EMERGENCY DEPARTMENT HISTORY AND PHYSICAL EXAM      Date: 1/11/2020  Patient Name: Estella Delgado    History of Presenting Illness     Chief Complaint   Patient presents with    Ear Pain     bilateral     Sinus Infection     Pt. states she feels like she has a sinus infection    Dizziness       History Provided By: Patient    HPI: Estella Delgado, 53 y.o. female presents by POV to the ED with cc of bilateral ear pain, sinus pressure and dizziness. Patient states she is having the symptoms for approximately 2 weeks. Patient states she was taking over-the-counter sinus medications and the symptoms seem to go away for couple of days and then they came back worse. Patient states the left ear hurts worse then the right and the right nostril is congested more than the left. Patient denies headache, shortness of breath or chest pain. There are no other complaints, changes, or physical findings at this time. PCP: Daphne Dietz MD    No current facility-administered medications on file prior to encounter. Current Outpatient Medications on File Prior to Encounter   Medication Sig Dispense Refill    escitalopram oxalate (LEXAPRO) 20 mg tablet Take a half tablet for 5 days, then one tablet daily. 30 Tab 5    lisinopril-hydroCHLOROthiazide (PRINZIDE, ZESTORETIC) 10-12.5 mg per tablet TAKE 1 TABLET BY MOUTH ONCE DAILY FOR BLOOD PRESSURE 90 Tab 3    metFORMIN ER (GLUCOPHAGE XR) 500 mg tablet TAKE 2 TABLETS BY MOUTH TWICE DAILY 120 Tab 5    fluticasone propion-salmeterol (ADVAIR DISKUS) 500-50 mcg/dose diskus inhaler Take 1 Puff by inhalation every twelve (12) hours. Asthma, Dx: J45.30 60 Each 11    atorvastatin (LIPITOR) 10 mg tablet TAKE 1 TABLET BY MOUTH ONCE DAILY 90 Tab 3    glipiZIDE (GLUCOTROL) 10 mg tablet TAKE 1 TABLET BY MOUTH TWICE DAILY 180 Tab 3    pregabalin (LYRICA) 200 mg capsule Take 200 mg by mouth two (2) times a day.       montelukast (SINGULAIR) 10 mg tablet TAKE ONE TABLET BY MOUTH ONCE DAILY FOR  ALLERGIES  AND  ASTHMA 90 Tab 3    albuterol (PROVENTIL VENTOLIN) 2.5 mg /3 mL (0.083 %) nebulizer solution USE ONE VIAL IN NEBULIZER EVERY 4 HOURS AS NEEDED FOR WHEEZING 30 Each 3    albuterol (VENTOLIN HFA) 90 mcg/actuation inhaler Take 2 Puffs by inhalation every six (6) hours as needed for Wheezing. 2 Inhaler 5    liraglutide (VICTOZA 2-RENEE) 0.6 mg/0.1 mL (18 mg/3 mL) pnij 1.8 mg by SubCUTAneous route daily. 9 Pen 3    [DISCONTINUED] metaxalone (SKELAXIN) 800 mg tablet Take 1 Tab by mouth three (3) times daily as needed (muscle spasms). 90 Tab 0    nitroglycerin (NITROSTAT) 0.4 mg SL tablet Take 1 Tab by mouth every five (5) minutes as needed for Chest Pain.  Sit/Lay down then put one tab under the tongue every 5 minutes as needed for chest pain for 3 doses 1 Bottle 0       Past History     Past Medical History:  Past Medical History:   Diagnosis Date    Anemia (iron deficiency)     Asthma     DDD (degenerative disc disease), lumbar     Depression     DM type 2 (diabetes mellitus, type 2) ([address] 2011    GERD (gastroesophageal reflux disease)     GI bleed     Hepatic steatosis 2016    confirmed by US    HTN (hypertension)     Irregular menses     Irritable bowel     Kidney mass     19: US showed rigth renal cystic nephroma, follows with Urology (Dr. Cid Friday)    Morbid obesity ([address]     FE (obstructive sleep apnea)     w/ Cpap    PUD (peptic ulcer disease)        Past Surgical History:  Past Surgical History:   Procedure Laterality Date    HX  SECTION      x 2    HX COLONOSCOPY  2015    HX ENDOSCOPY  2015    HX HYSTEROSCOPY WITH ENDOMETRIAL ABLATION  2014    HX TUBAL LIGATION      HX TUMOR REMOVAL  2016    right kidney, Dr Musa Reis removed from right kidney on 02/15/2016        Family History:  Family History   Problem Relation Age of Onset    Cancer Mother         Ovarian Cancer /breast ca   Alchi [place] Heart Attack Father     Heart Disease Father     Diabetes Father     Other Father         pancreatitis    Cancer Sister     Other Sister         chrons    Heart Attack Maternal Grandfather     Diabetes Paternal Grandmother     HIV/AIDS Son        Social History:  Social History     Tobacco Use    Smoking status: Never Smoker    Smokeless tobacco: Never Used   Substance Use Topics    Alcohol use: No    Drug use: No       Allergies: Allergies   Allergen Reactions    Pcn [Penicillins] Hives         Review of Systems   Review of Systems   Constitutional: Negative for chills and fever. HENT: Positive for ear pain, sinus pressure and sinus pain. Negative for congestion, rhinorrhea and sore throat. Eyes: Negative for pain, discharge and itching. Respiratory: Negative for cough and shortness of breath. Cardiovascular: Negative for chest pain. Gastrointestinal: Negative for abdominal pain, nausea and vomiting. Endocrine: Negative for polyuria. Genitourinary: Negative for dysuria and frequency. Musculoskeletal: Negative for arthralgias and myalgias. Skin: Negative for rash. Neurological: Negative for dizziness, weakness and headaches. All other systems reviewed and are negative. Physical Exam   Physical Exam  Vitals signs and nursing note reviewed. Constitutional:       General: She is not in acute distress. Appearance: She is well-developed. She is not diaphoretic. Comments: 53 y.o. female   HENT:      Head: Normocephalic and atraumatic. Right Ear: Hearing and external ear normal. Tympanic membrane is erythematous. Left Ear: Hearing, tympanic membrane, ear canal and external ear normal.      Nose: Nasal tenderness and congestion present. Right Turbinates: Swollen. Right Sinus: Maxillary sinus tenderness present. Left Sinus: Maxillary sinus tenderness present.       Mouth/Throat:      Mouth: Mucous membranes are moist.      Pharynx: Oropharynx is clear.   Eyes:      Conjunctiva/sclera: Conjunctivae normal.      Pupils: Pupils are equal, round, and reactive to light. Neck:      Musculoskeletal: Normal range of motion and neck supple. No neck rigidity or muscular tenderness. Vascular: No carotid bruit. Cardiovascular:      Rate and Rhythm: Normal rate and regular rhythm. Heart sounds: Normal heart sounds. No murmur. Pulmonary:      Effort: Pulmonary effort is normal. No respiratory distress. Breath sounds: Normal breath sounds. No wheezing. Abdominal:      General: Bowel sounds are normal.      Palpations: Abdomen is soft. Musculoskeletal: Normal range of motion. Lymphadenopathy:      Cervical: No cervical adenopathy. Skin:     General: Skin is warm and dry. Capillary Refill: Capillary refill takes less than 2 seconds. Neurological:      General: No focal deficit present. Mental Status: She is alert and oriented to person, place, and time. Mental status is at baseline. Psychiatric:         Mood and Affect: Mood normal.         Behavior: Behavior normal.         Diagnostic Study Results     Labs -   No results found for this or any previous visit (from the past 12 hour(s)). Radiologic Studies -   No orders to display       Medical Decision Making   I am the first provider for this patient. I reviewed the vital signs, available nursing notes, past medical history, past surgical history, family history and social history. Vital Signs-Reviewed the patient's vital signs. Patient Vitals for the past 12 hrs:   Temp Pulse Resp BP SpO2   01/11/20 0835 98.6 °F (37 °C) 62 18 109/74 98 %       Records Reviewed: Nursing Notes, Old Medical Records, Previous Radiology Studies and Previous Laboratory Studies    Provider Notes (Medical Decision Making):   Differential diagnoses include rhinovirus, allergic rhinitis, acute sinusitis, pharyngitis. ED Course:   Initial assessment performed.  The patients presenting problems have been discussed, and they are in agreement with the care plan formulated and outlined with them. I have encouraged them to ask questions as they arise throughout their visit. Advised patient she should begin taking the doxycycline for the acute sinusitis soon as possible and take until regimen is complete then follow-up with her primary care provider in 3 to 5 days. Otherwise she may come back to the emergency department if symptoms worsen which include but not limited to shortness of breath, difficulty breathing or swallowing, or any other worsening symptoms. Critical Care Time: None    Disposition:  DISCHARGE NOTE:  8:55 AM  The pt is ready for discharge. The pt's signs, symptoms, diagnosis, and discharge instructions have been discussed and pt has conveyed their understanding. The pt is to follow up as recommended or return to ER should their symptoms worsen. Plan has been discussed and pt is in agreement. Jose Leo NP  1/11/2020      PLAN:  1. Current Discharge Medication List      START taking these medications    Details   doxycycline (VIBRA-TABS) 100 mg tablet Take 1 Tab by mouth two (2) times a day for 10 days. Qty: 20 Tab, Refills: 0         CONTINUE these medications which have NOT CHANGED    Details   escitalopram oxalate (LEXAPRO) 20 mg tablet Take a half tablet for 5 days, then one tablet daily.   Qty: 30 Tab, Refills: 5    Associated Diagnoses: Severe episode of recurrent major depressive disorder, without psychotic features (HCC)      lisinopril-hydroCHLOROthiazide (PRINZIDE, ZESTORETIC) 10-12.5 mg per tablet TAKE 1 TABLET BY MOUTH ONCE DAILY FOR BLOOD PRESSURE  Qty: 90 Tab, Refills: 3    Associated Diagnoses: Essential hypertension      metFORMIN ER (GLUCOPHAGE XR) 500 mg tablet TAKE 2 TABLETS BY MOUTH TWICE DAILY  Qty: 120 Tab, Refills: 5    Comments: Please consider 90 day supplies to promote better adherence      fluticasone propion-salmeterol (ADVAIR DISKUS) 500-50 mcg/dose diskus inhaler Take 1 Puff by inhalation every twelve (12) hours. Asthma, Dx: J45.30  Qty: 60 Each, Refills: 11    Associated Diagnoses: Mild persistent asthma without complication      atorvastatin (LIPITOR) 10 mg tablet TAKE 1 TABLET BY MOUTH ONCE DAILY  Qty: 90 Tab, Refills: 3      glipiZIDE (GLUCOTROL) 10 mg tablet TAKE 1 TABLET BY MOUTH TWICE DAILY  Qty: 180 Tab, Refills: 3    Comments: Please consider 90 day supplies to promote better adherence  Associated Diagnoses: Uncontrolled type 2 diabetes mellitus with stable proliferative retinopathy, without long-term current use of insulin ([facility])      pregabalin (LYRICA) 200 mg capsule Take 200 mg by mouth two (2) times a day. montelukast (SINGULAIR) 10 mg tablet TAKE ONE TABLET BY MOUTH ONCE DAILY FOR  ALLERGIES  AND  ASTHMA  Qty: 90 Tab, Refills: 3    Comments: Please consider 90 day supplies to promote better adherence      albuterol (PROVENTIL VENTOLIN) 2.5 mg /3 mL (0.083 %) nebulizer solution USE ONE VIAL IN NEBULIZER EVERY 4 HOURS AS NEEDED FOR WHEEZING  Qty: 30 Each, Refills: 3    Comments: Please consider 90 day supplies to promote better adherence      albuterol (VENTOLIN HFA) 90 mcg/actuation inhaler Take 2 Puffs by inhalation every six (6) hours as needed for Wheezing. Qty: 2 Inhaler, Refills: 5      liraglutide (VICTOZA 2-RENEE) 0.6 mg/0.1 mL (18 mg/3 mL) pnij 1.8 mg by SubCUTAneous route daily. Qty: 9 Pen, Refills: 3    Associated Diagnoses: Type 2 diabetes mellitus with diabetic polyneuropathy, without long-term current use of insulin ([facility])      nitroglycerin (NITROSTAT) 0.4 mg SL tablet Take 1 Tab by mouth every five (5) minutes as needed for Chest Pain. Sit/Lay down then put one tab under the tongue every 5 minutes as needed for chest pain for 3 doses  Qty: 1 Bottle, Refills: 0           2.    Follow-up Information     Follow up With Specialties Details Why Contact Info    [facility] EMERGENCY DEPT Emergency Medicine Go in 1 week As needed, If symptoms worsen [address] [place] [address]    Jennyfer Crawley MD Internal Medicine Schedule an appointment as soon as possible for a visit in 3 days As needed, If symptoms worsen [address] [place] [address]  [phone]          Return to ED if worse     Diagnosis     Clinical Impression:   1. Acute non-recurrent maxillary sinusitis          Please note that this dictation was completed with Droplet, the computer voice recognition software. Quite often unanticipated grammatical, syntax, homophones, and other interpretive errors are inadvertently transcribed by the computer software. Please disregards these errors. Please excuse any errors that have escaped final proofreading. This note will not be viewable in [address]

## 2020-01-11 NOTE — ED TRIAGE NOTES
Assumed care of pt from triage. Pt is A&O x 4. Pt reports CC of bilat ear pain and HA x 1 week. Pt reports intermittent dizziness when moving too quickly. Pt reports she was on meclizine in the past for vertigo and believes this is the same, however she is out of medication. Pt ambulatory from triage without difficulty.

## 2020-01-11 NOTE — DISCHARGE INSTRUCTIONS
Patient Education        Sinusitis: Care Instructions  Your Care Instructions    Sinusitis is an infection of the lining of the sinus cavities in your head. Sinusitis often follows a cold. It causes pain and pressure in your head and face. In most cases, sinusitis gets better on its own in 1 to 2 weeks. But some mild symptoms may last for several weeks. Sometimes antibiotics are needed. Follow-up care is a key part of your treatment and safety. Be sure to make and go to all appointments, and call your doctor if you are having problems. It's also a good idea to know your test results and keep a list of the medicines you take. How can you care for yourself at home? · Take an over-the-counter pain medicine, such as acetaminophen (Tylenol), ibuprofen (Advil, Motrin), or naproxen (Aleve). Read and follow all instructions on the label. · If the doctor prescribed antibiotics, take them as directed. Do not stop taking them just because you feel better. You need to take the full course of antibiotics. · Be careful when taking over-the-counter cold or flu medicines and Tylenol at the same time. Many of these medicines have acetaminophen, which is Tylenol. Read the labels to make sure that you are not taking more than the recommended dose. Too much acetaminophen (Tylenol) can be harmful. · Breathe warm, moist air from a steamy shower, a hot bath, or a sink filled with hot water. Avoid cold, dry air. Using a humidifier in your home may help. Follow the directions for cleaning the machine. · Use saline (saltwater) nasal washes to help keep your nasal passages open and wash out mucus and bacteria. You can buy saline nose drops at a grocery store or drugstore. Or you can make your own at home by adding 1 teaspoon of salt and 1 teaspoon of baking soda to 2 cups of distilled water. If you make your own, fill a bulb syringe with the solution, insert the tip into your nostril, and squeeze gently. Roz Crowder your nose.   · Put a hot, wet towel or a warm gel pack on your face 3 or 4 times a day for 5 to 10 minutes each time. · Try a decongestant nasal spray like oxymetazoline (Afrin). Do not use it for more than 3 days in a row. Using it for more than 3 days can make your congestion worse. When should you call for help? Call your doctor now or seek immediate medical care if:    · You have new or worse swelling or redness in your face or around your eyes.     · You have a new or higher fever.    Watch closely for changes in your health, and be sure to contact your doctor if:    · You have new or worse facial pain.     · The mucus from your nose becomes thicker (like pus) or has new blood in it.     · You are not getting better as expected. Where can you learn more? Go to http://camelia-konrad.info/. Enter I713 in the search box to learn more about \"Sinusitis: Care Instructions. \"  Current as of: October 21, 2018  Content Version: 12.2  © 6368-9523 [employer]. Care instructions adapted under license by [employer] (which disclaims liability or warranty for this information). If you have questions about a medical condition or this instruction, always ask your healthcare professional. [address] any warranty or liability for your use of this information.

## 2020-02-17 ENCOUNTER — OFFICE VISIT (OUTPATIENT)
Dept: INTERNAL MEDICINE CLINIC | Facility: CLINIC | Age: 45
End: 2020-02-17

## 2020-02-17 VITALS
HEIGHT: 65 IN | BODY MASS INDEX: 46.9 KG/M2 | OXYGEN SATURATION: 98 % | DIASTOLIC BLOOD PRESSURE: 58 MMHG | TEMPERATURE: 98.3 F | HEART RATE: 66 BPM | WEIGHT: 281.5 LBS | SYSTOLIC BLOOD PRESSURE: 96 MMHG | RESPIRATION RATE: 12 BRPM

## 2020-02-17 DIAGNOSIS — G47.33 OSA (OBSTRUCTIVE SLEEP APNEA): ICD-10-CM

## 2020-02-17 DIAGNOSIS — E11.42 TYPE 2 DIABETES MELLITUS WITH DIABETIC POLYNEUROPATHY, WITHOUT LONG-TERM CURRENT USE OF INSULIN (HCC): ICD-10-CM

## 2020-02-17 DIAGNOSIS — I10 ESSENTIAL HYPERTENSION: ICD-10-CM

## 2020-02-17 DIAGNOSIS — R51.9 CHRONIC DAILY HEADACHE: Primary | ICD-10-CM

## 2020-02-17 DIAGNOSIS — F33.0 MILD EPISODE OF RECURRENT MAJOR DEPRESSIVE DISORDER (HCC): ICD-10-CM

## 2020-02-17 DIAGNOSIS — E66.01 MORBID OBESITY WITH BMI OF 40.0-44.9, ADULT (HCC): ICD-10-CM

## 2020-02-17 LAB
HBA1C MFR BLD HPLC: 8.2 %
MICROALBUMIN UR TEST STR-MCNC: 80 MG/L
MICROALBUMIN/CREAT RATIO POC: >300 MG/G

## 2020-02-17 RX ORDER — BUTALBITAL, ACETAMINOPHEN AND CAFFEINE 50; 325; 40 MG/1; MG/1; MG/1
1 TABLET ORAL
Qty: 60 TAB | Refills: 1 | Status: SHIPPED | OUTPATIENT
Start: 2020-02-17 | End: 2021-01-26

## 2020-02-17 NOTE — PATIENT INSTRUCTIONS
Patient Instructions  1. Start Fioricet 1 tablet every 6 hrs as needed for headaches. Try not to take any more than 4 tabs a day. 2.  Stop taking Tylenol PM, Ne [place] Plus, Tylenol severe sinus, ibuprofen, or BC powder. 3.  Take Benadryl 25 mg 1-2 tab at bedtime as needed for sleep. 4.  Stop taking lisinopril-HCT since your blood pressure is low. Headache: Care Instructions  Your Care Instructions    Headaches have many possible causes. Most headaches aren't a sign of a more serious problem, and they will get better on their own. Home treatment may help you feel better faster. The doctor has checked you carefully, but problems can develop later. If you notice any problems or new symptoms, get medical treatment right away. Follow-up care is a key part of your treatment and safety. Be sure to make and go to all appointments, and call your doctor if you are having problems. It's also a good idea to know your test results and keep a list of the medicines you take. How can you care for yourself at home? · Do not drive if you have taken a prescription pain medicine. · Rest in a quiet, dark room until your headache is gone. Close your eyes and try to relax or go to sleep. Don't watch TV or read. · Put a cold, moist cloth or cold pack on the painful area for 10 to 20 minutes at a time. Put a thin cloth between the cold pack and your skin. · Use a warm, moist towel or a heating pad set on low to relax tight shoulder and neck muscles. · Have someone gently massage your neck and shoulders. · Take pain medicines exactly as directed. ? If the doctor gave you a prescription medicine for pain, take it as prescribed. ? If you are not taking a prescription pain medicine, ask your doctor if you can take an over-the-counter medicine. · Be careful not to take pain medicine more often than the instructions allow, because you may get worse or more frequent headaches when the medicine wears off.   · Do not ignore new symptoms that occur with a headache, such as a fever, weakness or numbness, vision changes, or confusion. These may be signs of a more serious problem. To prevent headaches  · Keep a headache diary so you can figure out what triggers your headaches. Avoiding triggers may help you prevent headaches. Record when each headache began, how long it lasted, and what the pain was like (throbbing, aching, stabbing, or dull). Write down any other symptoms you had with the headache, such as nausea, flashing lights or dark spots, or sensitivity to bright light or loud noise. Note if the headache occurred near your period. List anything that might have triggered the headache, such as certain foods (chocolate, cheese, wine) or odors, smoke, bright light, stress, or lack of sleep. · Find healthy ways to deal with stress. Headaches are most common during or right after stressful times. Take time to relax before and after you do something that has caused a headache in the past.  · Try to keep your muscles relaxed by keeping good posture. Check your jaw, face, neck, and shoulder muscles for tension, and try relaxing them. When sitting at a desk, change positions often, and stretch for 30 seconds each hour. · Get plenty of sleep and exercise. · Eat regularly and well. Long periods without food can trigger a headache. · Treat yourself to a massage. Some people find that regular massages are very helpful in relieving tension. · Limit caffeine by not drinking too much coffee, tea, or soda. But don't quit caffeine suddenly, because that can also give you headaches. · Reduce eyestrain from computers by blinking frequently and looking away from the computer screen every so often. Make sure you have proper eyewear and that your monitor is set up properly, about an arm's length away. · Seek help if you have depression or anxiety. Your headaches may be linked to these conditions.  Treatment can both prevent headaches and help with symptoms of anxiety or depression. When should you call for help? Call 911 anytime you think you may need emergency care. For example, call if:    · You have signs of a stroke. These may include:  ? Sudden numbness, paralysis, or weakness in your face, arm, or leg, especially on only one side of your body. ? Sudden vision changes. ? Sudden trouble speaking. ? Sudden confusion or trouble understanding simple statements. ? Sudden problems with walking or balance. ? A sudden, severe headache that is different from past headaches.    Call your doctor now or seek immediate medical care if:    · You have a new or worse headache.     · Your headache gets much worse. Where can you learn more? Go to http://camelia-konrad.info/. Enter M271 in the search box to learn more about \"Headache: Care Instructions. \"  Current as of: March 28, 2019  Content Version: 12.2  © 2844-8182 [employer]. Care instructions adapted under license by [employer] (which disclaims liability or warranty for this information). If you have questions about a medical condition or this instruction, always ask your healthcare professional. [place] [address] any warranty or liability for your use of this information.

## 2020-02-17 NOTE — PROGRESS NOTES
CC:   Chief Complaint   Patient presents with    Headache     over 30 days    Diabetes       HISTORY OF PRESENT ILLNESS  Ami Hoffman is a 53 y.o. female. Accompanied by her . Presents for evaluation of severe headaches. She has type 2 DM with neuropathy, HTN, asthma, allergic rhinitis, FE, hyperlipidemia, chronic low back pain, and depression. Complains of daily headaches for over a month. Located at across frontal area. Throbbing, 9/10 today, usually 7/10. Wakes up with headache, lasts all day. Denies fevers, chills, sinus congestion, aura, nausea, photophobia, or phonophobia. Rubbing the head helps. Has been taking Tylenol PM or Ne [place] Plus with some relief; makes HA pain decrease to 4/10. Over past month has also taken Tylenol Severe Sinus, ibuprofen, and BC powder with minimal relief. Headache improved on days 3 and 4 only after taking antibiotic for acute sinusitis in Jan.  Denies history of daily headaches lasting over a month in the past.  Does have history of daily headaches for 10 days in a row in the past diagnosed as cluster headaches. Has not been sleeping well even with using CPAP. Wakes up still feeling tired. Reports depressed mood and anxiety better since starting Lexapro 20 mg daily on 12/7/19. Tolerating with no side effects. Denies SI or HI. She is seen for diabetes and hypertension. Since last visit she reports no polyuria or polydipsia, no hypoglycemia. Home glucose monitoring: is not performed. She reports medication compliance: compliant all of the time. Medication side effects: none. Diabetic diet compliance: noncompliant some of the time. Lab review: A1c is 8.2% today (2/17/20), was 6.7% on 6/18/19 . Eye exam: UTD.         Patient Active Problem List   Diagnosis Code    HTN (hypertension) I10    Depression F32.9    FE (obstructive sleep apnea) G47.33    Asthma J45.909    Iron deficiency anemia D50.9    Hyperlipidemia E78.5    Obesity, morbid, BMI 50 or higher ([facility]) E66.01    Type 2 diabetes mellitus with diabetic polyneuropathy, without long-term current use of insulin ([facility]) E11.42    Coronary artery disease involving native coronary artery of native heart without angina pectoris I25.10    Chronic midline low back pain without sciatica M54.5, G89.29    Atypical squamous cells of undetermined significance (ASCUS) on Papanicolaou smear of cervix R87.610     Past Medical History:   Diagnosis Date    Anemia (iron deficiency)     Asthma     DDD (degenerative disc disease), lumbar 2014    Depression     DM type 2 (diabetes mellitus, type 2) ([facility] 11/23/2011    GERD (gastroesophageal reflux disease)     GI bleed 2015    Hepatic steatosis 11/2016    confirmed by US    HTN (hypertension)     Irregular menses     Irritable bowel     Kidney mass     5/28/19: US showed rigth renal cystic nephroma, follows with Urology (Dr. Brant Neumann)    Morbid obesity ([facility]     FE (obstructive sleep apnea)     w/ Cpap    PUD (peptic ulcer disease) 2015     Allergies   Allergen Reactions    Pcn [Penicillins] Hives       Current Outpatient Medications   Medication Sig Dispense Refill    escitalopram oxalate (LEXAPRO) 20 mg tablet Take a half tablet for 5 days, then one tablet daily. 30 Tab 5    lisinopril-hydroCHLOROthiazide (PRINZIDE, ZESTORETIC) 10-12.5 mg per tablet TAKE 1 TABLET BY MOUTH ONCE DAILY FOR BLOOD PRESSURE 90 Tab 3    metFORMIN ER (GLUCOPHAGE XR) 500 mg tablet TAKE 2 TABLETS BY MOUTH TWICE DAILY 120 Tab 5    liraglutide (VICTOZA 2-RENEE) 0.6 mg/0.1 mL (18 mg/3 mL) pnij 1.8 mg by SubCUTAneous route daily. 9 Pen 3    fluticasone propion-salmeterol (ADVAIR DISKUS) 500-50 mcg/dose diskus inhaler Take 1 Puff by inhalation every twelve (12) hours.  Asthma, Dx: J45.30 60 Each 11    atorvastatin (LIPITOR) 10 mg tablet TAKE 1 TABLET BY MOUTH ONCE DAILY 90 Tab 3    glipiZIDE (GLUCOTROL) 10 mg tablet TAKE 1 TABLET BY MOUTH TWICE DAILY 180 Tab 3    pregabalin (LYRICA) 200 mg capsule Take 200 mg by mouth two (2) times a day.  montelukast (SINGULAIR) 10 mg tablet TAKE ONE TABLET BY MOUTH ONCE DAILY FOR  ALLERGIES  AND  ASTHMA 90 Tab 3    nitroglycerin (NITROSTAT) 0.4 mg SL tablet Take 1 Tab by mouth every five (5) minutes as needed for Chest Pain. Sit/Lay down then put one tab under the tongue every 5 minutes as needed for chest pain for 3 doses 1 Bottle 0    albuterol (PROVENTIL VENTOLIN) 2.5 mg /3 mL (0.083 %) nebulizer solution USE ONE VIAL IN NEBULIZER EVERY 4 HOURS AS NEEDED FOR WHEEZING 30 Each 3    albuterol (VENTOLIN HFA) 90 mcg/actuation inhaler Take 2 Puffs by inhalation every six (6) hours as needed for Wheezing. 2 Inhaler 5         PHYSICAL EXAM  Visit Vitals  BP 96/58 (BP 1 Location: Left arm, BP Patient Position: Sitting)   Pulse 66   Temp 98.3 °F (36.8 °C) (Oral)   Resp 12   Ht 5' 5\" (1.651 m)   Wt 281 lb 8 oz (127.7 kg)   SpO2 98%   BMI 46.84 kg/m²       General: Obese, no distress. HEENT:  Head normocephalic/atraumatic, no scleral icterus. Oropharynx benign. No sinus tenderness. Lungs:  Clear to ausculation bilaterally. Good air movement. Heart:  Regular rate and rhythm, normal S1 and S2, no murmur, gallop, or rub  Extremities: No clubbing, cyanosis, or edema. Neurological: Alert and oriented. Psychiatric: Normal mood and affect. Behavior is normal.     Results for orders placed or performed in visit on 02/17/20   AMB POC HEMOGLOBIN A1C   Result Value Ref Range    Hemoglobin A1c (POC) 8.2 %         ASSESSMENT AND PLAN    ICD-10-CM ICD-9-CM    1. Chronic daily headache R51 784.0 butalbital-acetaminophen-caffeine (FIORICET, ESGIC) -40 mg per tablet   2. Type 2 diabetes mellitus with diabetic polyneuropathy, without long-term current use of insulin (HCC) E11.42 250.60 AMB POC HEMOGLOBIN A1C     357.2 AMB POC URINE, MICROALBUMIN   3. Essential hypertension I10 401.9    4.  Mild episode of recurrent major depressive disorder ([facility] F33.0 296.31    5. FE (obstructive sleep apnea) G47.33 327.23    6. Morbid obesity with BMI of 40.0-44.9, adult ([facility] E66.01 278.01     Z68.41 V85.41      Diagnoses and all orders for this visit:    1. Chronic daily headache  Tension headaches. May be due to poor sleep. Also consider medication overuse headaches. -     Start butalbital-acetaminophen-caffeine (FIORICET, ESGIC) -40 mg per tablet; Take 1 Tab by mouth every six (6) hours as needed for Headache. Max: 4 tabs a day  Patient Instructions  1. Start Fioricet 1 tablet every 6 hrs as needed for headaches. Try not to take any more than 4 tabs a day. 2.  Stop taking Tylenol PM, Ne [place] Plus, Tylenol severe sinus, ibuprofen, or BC powder. 3.  Take Benadryl 25 mg 1-2 tab at bedtime as needed for sleep. 4.  Stop taking lisinopril-HCT since your blood pressure is low. 2. Type 2 diabetes mellitus with diabetic polyneuropathy, without long-term current use of insulin (HCC)  Uncontrolled. A1c 8.2% today. Counseled on adherence to diabetic diet. -     AMB POC HEMOGLOBIN A1C  -     AMB POC URINE, MICROALBUMIN    3. Essential hypertension  Low BP at 96/58. Stop lisinopril-HCTZ. 4. Mild episode of recurrent major depressive disorder (HCC)  Improved symptoms on Lexapro. 5. FE (obstructive sleep apnea)    6. Morbid obesity with BMI of 40.0-44.9, adult ([facility]  Counseled on diet, exercise, and weight loss. Follow-up and Dispositions    · Return in about 1 month (around 3/17/2020), or if symptoms worsen or fail to improve, for Headaches. I have discussed the diagnosis with the patient and the intended plan as seen in the above orders. Patient is in agreement. The patient has received an after-visit summary and questions were answered concerning future plans. I have discussed medication side effects and warnings with the patient as well.

## 2020-02-17 NOTE — PROGRESS NOTES
Mayra Flynn  Identified pt with two pt identifiers(name and ). Chief Complaint   Patient presents with    Headache    Diabetes       Reviewed record In preparation for visit and have obtained necessary documentation. Has info on advanced directive but has not filled them out. 1. Have you been to the ER, urgent care clinic or hospitalized since your last visit[facility] [id number] [facility]     2. Have you seen or consulted any other health care providers outside of the [address] [place] [address] since your last visit? Include any pap smears or colon screening. No    Vitals reviewed with provider. Health Maintenance reviewed:     Health Maintenance Due   Topic    Influenza Age 5 to Adult     MICROALBUMIN Q1         Wt Readings from Last 3 Encounters:   20 271 lb 2.7 oz (123 kg)   19 276 lb (125.2 kg)   19 273 lb 9.5 oz (124.1 kg)      Temp Readings from Last 3 Encounters:   20 98.6 °F (37 °C)   19 98.1 °F (36.7 °C) (Oral)   19 98 °F (36.7 °C)      BP Readings from Last 3 Encounters:   20 109/74   19 137/82   19 117/43      Pulse Readings from Last 3 Encounters:   20 62   19 (!) 57   19 70      There were no vitals filed for this visit.        Learning Assessment:   :     Learning Assessment 2018   PRIMARY LEARNER Patient Patient Patient   HIGHEST LEVEL OF EDUCATION - PRIMARY LEARNER  - > 4 YEARS OF COLLEGE -   BARRIERS PRIMARY LEARNER - NONE -   CO-LEARNER CAREGIVER - No -   PRIMARY LANGUAGE ENGLISH ENGLISH ENGLISH   LEARNER PREFERENCE PRIMARY READING OTHER (COMMENT) LISTENING   ANSWERED BY patient patient self     - - self   RELATIONSHIP SELF SELF SELF        Depression Screening:   :     3 most recent PHQ Screens 3/15/2019   Little interest or pleasure in doing things Not at all   Feeling down, depressed, irritable, or hopeless Not at all   Total Score PHQ 2 0        Fall Risk Assessment:   :     Fall Risk Assessment, last 12 mths 8/3/2017   Able to walk? Yes   Fall in past 12 months? No        Abuse Screening:   :     Abuse Screening Questionnaire 5/9/2014   Do you ever feel afraid of your partner? N   Are you in a relationship with someone who physically or mentally threatens you? N   Is it safe for you to go home?  Y        ADL Screening:   :     ADL Assessment 5/2/2018   Feeding yourself No Help Needed   Getting from bed to chair No Help Needed   Getting dressed No Help Needed   Bathing or showering No Help Needed   Walk across the room (includes cane/walker) No Help Needed   Using the telphone No Help Needed   Taking your medications No Help Needed   Preparing meals No Help Needed   Managing money (expenses/bills) No Help Needed   Moderately strenuous housework (laundry) No Help Needed   Shopping for personal items (toiletries/medicines) No Help Needed   Shopping for groceries No Help Needed   Driving No Help Needed   Climbing a flight of stairs No Help Needed   Getting to places beyond walking distances No Help Needed

## 2020-02-18 DIAGNOSIS — E11.21 DIABETIC NEPHROPATHY ASSOCIATED WITH TYPE 2 DIABETES MELLITUS (HCC): Primary | ICD-10-CM

## 2020-02-18 RX ORDER — LISINOPRIL 5 MG/1
5 TABLET ORAL DAILY
Qty: 90 TAB | Refills: 1 | Status: SHIPPED | OUTPATIENT
Start: 2020-02-18 | End: 2020-03-05 | Stop reason: ALTCHOICE

## 2020-02-18 NOTE — PROGRESS NOTES
Your urine protein test done in clinic showed you are spilling a lot of protein into the urine. This is an early sign of diabetes starting to affect your kidneys. Dr. Arvizu Service is starting you on a low dose of lisinopril for kidney protection. A prescription has been sent to your pharmacy.

## 2020-02-27 ENCOUNTER — TELEPHONE (OUTPATIENT)
Dept: INTERNAL MEDICINE CLINIC | Facility: CLINIC | Age: 45
End: 2020-02-27

## 2020-02-27 DIAGNOSIS — R42 VERTIGO: ICD-10-CM

## 2020-02-27 NOTE — TELEPHONE ENCOUNTER
Patient has called and requested a prescription for vertigo called Meclizine; she has had it from another doctor here before (older script) she did see Dr Jeovany Espinoza a week or so ago but for something else.   [phone]

## 2020-02-28 RX ORDER — MECLIZINE HYDROCHLORIDE 25 MG/1
25 TABLET ORAL
Qty: 20 TAB | Refills: 1 | Status: SHIPPED | OUTPATIENT
Start: 2020-02-28

## 2020-02-28 NOTE — TELEPHONE ENCOUNTER
A prescription was sent to [address] Review of her medical records showed that she last received a prescription of meclizine for vertigo on 8/17/16.

## 2020-03-04 ENCOUNTER — TELEPHONE (OUTPATIENT)
Dept: INTERNAL MEDICINE CLINIC | Facility: CLINIC | Age: 45
End: 2020-03-04

## 2020-03-04 DIAGNOSIS — E11.42 TYPE 2 DIABETES MELLITUS WITH DIABETIC POLYNEUROPATHY, WITHOUT LONG-TERM CURRENT USE OF INSULIN (HCC): Primary | ICD-10-CM

## 2020-03-04 NOTE — TELEPHONE ENCOUNTER
Pt stated she was recently placed on lisinopril and has now developed a severe cough. She would like to request this rx be changed to something else. Please give pt a call back to discuss at [phone].

## 2020-03-05 RX ORDER — VALSARTAN 40 MG/1
40 TABLET ORAL DAILY
Qty: 30 TAB | Refills: 3 | Status: SHIPPED | OUTPATIENT
Start: 2020-03-05 | End: 2020-07-07 | Stop reason: RX

## 2020-03-05 NOTE — TELEPHONE ENCOUNTER
I called the patient and verified her by name and date of birth. I informed the patient of the message per Dr. Chloe Sandhu. The patient stated that a prescription for Linsopril was sent in to her pharmacy after she was told not to take it anymore. It is now causing a cough and wants to know if there is something else that can be taken in place of it. Please advise. I see that it was sent for her to take because of kidney protection.

## 2020-03-06 NOTE — TELEPHONE ENCOUNTER
I called the patient and verified her by name and date of birth. I informed the patient of the message per Dr. Leia Padgett. The patient understood and did not have any questions at this time.

## 2020-05-12 ENCOUNTER — OFFICE VISIT (OUTPATIENT)
Dept: URGENT CARE | Age: 45
End: 2020-05-12

## 2020-05-12 ENCOUNTER — NURSE TRIAGE (OUTPATIENT)
Dept: OTHER | Facility: CLINIC | Age: 45
End: 2020-05-12

## 2020-05-12 VITALS — RESPIRATION RATE: 18 BRPM | OXYGEN SATURATION: 98 % | HEART RATE: 86 BPM | TEMPERATURE: 98.4 F

## 2020-05-12 DIAGNOSIS — J45.31 MILD PERSISTENT ASTHMA WITH EXACERBATION: Primary | ICD-10-CM

## 2020-05-12 RX ORDER — ALBUTEROL SULFATE 0.83 MG/ML
2.5 SOLUTION RESPIRATORY (INHALATION)
Qty: 30 NEBULE | Refills: 0 | Status: SHIPPED | OUTPATIENT
Start: 2020-05-12 | End: 2020-08-14 | Stop reason: SDUPTHER

## 2020-05-12 RX ORDER — PREDNISONE 10 MG/1
TABLET ORAL
Qty: 21 TAB | Refills: 0 | Status: SHIPPED | OUTPATIENT
Start: 2020-05-12 | End: 2020-08-14

## 2020-05-12 RX ORDER — ALBUTEROL SULFATE 90 UG/1
2 AEROSOL, METERED RESPIRATORY (INHALATION)
Qty: 1 INHALER | Refills: 0 | Status: SHIPPED | OUTPATIENT
Start: 2020-05-12 | End: 2021-01-26 | Stop reason: SDUPTHER

## 2020-05-12 NOTE — TELEPHONE ENCOUNTER
Pt reports worsening SOB, worse than her underlying asthma normally is, wheezing. Reason for Disposition   [1] MILD difficulty breathing (e.g., minimal/no SOB at rest, SOB with walking, pulse <100) AND [2] NEW-onset or WORSE than normal    Answer Assessment - Initial Assessment Questions  1. RESPIRATORY STATUS: \"Describe your breathing? \" (e.g., wheezing, shortness of breath, unable to speak, severe coughing)       Wheezing and SOB; can talk but gets winded when walking  2. ONSET: \"When did this breathing problem begin? \"       Worsening over the past several days  3. PATTERN \"Does the difficult breathing come and go, or has it been constant since it started? \"       constant  4. SEVERITY: \"How bad is your breathing? \" (e.g., mild, moderate, severe)     - MILD: No SOB at rest, mild SOB with walking, speaks normally in sentences, can lay down, no retractions, pulse < 100.     - MODERATE: SOB at rest, SOB with minimal exertion and prefers to sit, cannot lie down flat, speaks in phrases, mild retractions, audible wheezing, pulse 100-120.     - SEVERE: Very SOB at rest, speaks in single words, struggling to breathe, sitting hunched forward, retractions, pulse > 120       Worse than usual, her nebulizer does help some  5. RECURRENT SYMPTOM: \"Have you had difficulty breathing before? \" If so, ask: \"When was the last time? \" and \"What happened that time? \"       Yes, she has asthma  6. CARDIAC HISTORY: \"Do you have any history of heart disease? \" (e.g., heart attack, angina, bypass surgery, angioplasty)       yes  7. LUNG HISTORY: \"Do you have any history of lung disease? \"  (e.g., pulmonary embolus, asthma, emphysema)      yes  8. CAUSE: \"What do you think is causing the breathing problem? \"       asthma  9. OTHER SYMPTOMS: \"Do you have any other symptoms? (e.g., dizziness, runny nose, cough, chest pain, fever)      no  10. PREGNANCY: \"Is there any chance you are pregnant? \" \"When was your last menstrual period? \"        no  11. TRAVEL: \"Have you traveled out of the country in the last month? \" (e.g., travel history, exposures)        no    Protocols used: BREATHING DIFFICULTY-ADULT-AH

## 2020-05-12 NOTE — PROGRESS NOTES
This patient was seen in [facility] at [address] [place] [address] Urgent Care while in their vehicle due to COVID-19 pandemic with PPE and focused examination in order to decrease community viral transmission. The patient/guardian gave verbal consent to treat. Phuong Higuera is a 50 y.o. female who complains of worsening cough, SOB for 3 days. Has hx of asthma. Has increased use of albuterol inhaler. Denies fever, CP. No known COVID-19 contacts. Has hx of DM2, HTN. Pt declines COVID-19 testing as she has been self-isolating for weeks. Has nebulizer at home but ran out of albuterol solution. The history is provided by the patient.         Past Medical History:   Diagnosis Date    Anemia (iron deficiency)     Asthma     DDD (degenerative disc disease), lumbar     Depression     DM type 2 (diabetes mellitus, type 2) ([facility] [address] 2011    GERD (gastroesophageal reflux disease)     GI bleed     Hepatic steatosis 2016    confirmed by US    HTN (hypertension)     Irregular menses     Irritable bowel     Kidney mass     19: US showed rigth renal cystic nephroma, follows with Urology (Dr. Caden Robles)    Morbid obesity ([facility] [address]     FE (obstructive sleep apnea)     w/ Cpap    PUD (peptic ulcer disease)         Past Surgical History:   Procedure Laterality Date    HX  SECTION      x 2    HX COLONOSCOPY  2015    HX ENDOSCOPY  2015    HX HYSTEROSCOPY WITH ENDOMETRIAL ABLATION  2014    HX TUBAL LIGATION      HX TUMOR REMOVAL  2016    right kidney, Dr Jessica Sanchez removed from right kidney on 02/15/2016          Family History   Problem Relation Age of Onset    Cancer Mother         Ovarian Cancer /breast ca    Heart Attack Father     Heart Disease Father     Diabetes Father     Other Father         pancreatitis    Cancer Sister     Other Sister         chrons    Heart Attack Maternal Grandfather     Diabetes Paternal Grandmother    Mar Bui HIV/AIDS Son         Social History     Socioeconomic History    Marital status:      Spouse name: Not on file    Number of children: Not on file    Years of education: Not on file    Highest education level: Not on file   Occupational History    Not on file   Social Needs    Financial resource strain: Not on file    Food insecurity     Worry: Not on file     Inability: Not on file    Transportation needs     Medical: Not on file     Non-medical: Not on file   Tobacco Use    Smoking status: Never Smoker    Smokeless tobacco: Never Used   Substance and Sexual Activity    Alcohol use: No    Drug use: No    Sexual activity: Yes     Partners: Male   Lifestyle    Physical activity     Days per week: Not on file     Minutes per session: Not on file    Stress: Not on file   Relationships    Social connections     Talks on phone: Not on file     Gets together: Not on file     Attends [employer] service: Not on file     Active member of club or organization: Not on file     Attends meetings of clubs or organizations: Not on file     Relationship status: Not on file    Intimate partner violence     Fear of current or ex partner: Not on file     Emotionally abused: Not on file     Physically abused: Not on file     Forced sexual activity: Not on file   Other Topics Concern    Not on file   Social History Narrative    Not on file                ALLERGIES: Lisinopril and Pcn [penicillins]    Review of Systems   Constitutional: Negative for chills and fever. HENT: Negative for congestion and sore throat. Respiratory: Positive for cough and shortness of breath. Negative for wheezing. Cardiovascular: Negative for chest pain. Gastrointestinal: Negative for nausea and vomiting. Musculoskeletal: Negative for myalgias. Neurological: Negative for dizziness and headaches.        Vitals:    05/12/20 1024   Pulse: 86   Resp: 18   Temp: 98.4 °F (36.9 °C)   SpO2: 98%       Physical Exam  Vitals signs and nursing note reviewed. Constitutional:       General: She is not in acute distress. Appearance: She is well-developed. She is not diaphoretic. Pulmonary:      Effort: Pulmonary effort is normal. No respiratory distress. Breath sounds: No stridor. Examination of the right-upper field reveals wheezing. Examination of the left-upper field reveals wheezing. Examination of the right-lower field reveals wheezing. Examination of the left-lower field reveals wheezing. Wheezing present. No rhonchi or rales. Neurological:      Mental Status: She is alert. Psychiatric:         Behavior: Behavior normal.         Thought Content: Thought content normal.         Judgment: Judgment normal.         MDM    ICD-10-CM ICD-9-CM   1. Mild persistent asthma with exacerbation J45.31 493.92       Orders Placed This Encounter    predniSONE (STERAPRED DS) 10 mg dose pack     Sig: Take as directed for 6 days, with food     Dispense:  21 Tab     Refill:  0    albuterol (PROVENTIL VENTOLIN) 2.5 mg /3 mL (0.083 %) nebu     Sig: 3 mL by Nebulization route every six (6) hours as needed for Wheezing. Dispense:  30 Nebule     Refill:  0    albuterol (PROVENTIL HFA, VENTOLIN HFA, PROAIR HFA) 90 mcg/actuation inhaler     Sig: Take 2 Puffs by inhalation every six (6) hours as needed for Wheezing. Dispense:  1 Inhaler     Refill:  0      Follow up with PCP virtually    If signs and symptoms become worse the pt is to go to the ER.          Procedures

## 2020-06-01 DIAGNOSIS — E11.42 TYPE 2 DIABETES MELLITUS WITH DIABETIC POLYNEUROPATHY, WITHOUT LONG-TERM CURRENT USE OF INSULIN (HCC): Primary | ICD-10-CM

## 2020-06-01 RX ORDER — PREGABALIN 200 MG/1
200 CAPSULE ORAL 2 TIMES DAILY
Qty: 60 CAP | Refills: 1 | Status: SHIPPED | OUTPATIENT
Start: 2020-06-01 | End: 2020-10-22

## 2020-06-01 NOTE — TELEPHONE ENCOUNTER
REFILL     PCP: Luci Shannon MD     Last appt: 2/17/2020   No future appointments. Requested Prescriptions     Pending Prescriptions Disp Refills    pregabalin (Lyrica) 200 mg capsule       Sig: Take 1 Cap by mouth two (2) times a day. Max Daily Amount: 400 mg.

## 2020-06-01 NOTE — TELEPHONE ENCOUNTER
[facility] sent a fax to request a refill of   Requested Prescriptions     Pending Prescriptions Disp Refills    pregabalin (Lyrica) 200 mg capsule       Sig: Take 1 Cap by mouth two (2) times a day. Max Daily Amount: 400 mg.

## 2020-06-03 ENCOUNTER — HOSPITAL ENCOUNTER (EMERGENCY)
Age: 45
Discharge: HOME | End: 2020-06-03
Attending: EMERGENCY MEDICINE
Payer: SELF-PAY

## 2020-06-03 ENCOUNTER — APPOINTMENT (OUTPATIENT)
Dept: GENERAL RADIOLOGY | Age: 45
End: 2020-06-03
Attending: EMERGENCY MEDICINE
Payer: SELF-PAY

## 2020-06-03 ENCOUNTER — APPOINTMENT (OUTPATIENT)
Dept: ULTRASOUND IMAGING | Age: 45
End: 2020-06-03
Attending: EMERGENCY MEDICINE
Payer: SELF-PAY

## 2020-06-03 VITALS
RESPIRATION RATE: 16 BRPM | SYSTOLIC BLOOD PRESSURE: 145 MMHG | TEMPERATURE: 98.6 F | BODY MASS INDEX: 49.95 KG/M2 | OXYGEN SATURATION: 99 % | WEIGHT: 292.55 LBS | DIASTOLIC BLOOD PRESSURE: 70 MMHG | HEIGHT: 64 IN | HEART RATE: 80 BPM

## 2020-06-03 DIAGNOSIS — M77.9 TENDINITIS: ICD-10-CM

## 2020-06-03 DIAGNOSIS — M25.562 ACUTE PAIN OF LEFT KNEE: Primary | ICD-10-CM

## 2020-06-03 PROCEDURE — 73560 X-RAY EXAM OF KNEE 1 OR 2: CPT

## 2020-06-03 PROCEDURE — 74011250637 HC RX REV CODE- 250/637: Performed by: EMERGENCY MEDICINE

## 2020-06-03 PROCEDURE — 93971 EXTREMITY STUDY: CPT

## 2020-06-03 PROCEDURE — 99284 EMERGENCY DEPT VISIT MOD MDM: CPT

## 2020-06-03 RX ORDER — HYDROCODONE BITARTRATE AND ACETAMINOPHEN 5; 325 MG/1; MG/1
1 TABLET ORAL
Status: COMPLETED | OUTPATIENT
Start: 2020-06-03 | End: 2020-06-03

## 2020-06-03 RX ORDER — NAPROXEN 500 MG/1
500 TABLET ORAL
Qty: 20 TAB | Refills: 0 | Status: SHIPPED | OUTPATIENT
Start: 2020-06-03 | End: 2020-06-03

## 2020-06-03 RX ORDER — CYCLOBENZAPRINE HCL 10 MG
10 TABLET ORAL
Qty: 20 TAB | Refills: 0 | Status: SHIPPED | OUTPATIENT
Start: 2020-06-03 | End: 2020-06-03

## 2020-06-03 RX ORDER — NAPROXEN 500 MG/1
500 TABLET ORAL
Qty: 20 TAB | Refills: 0 | Status: SHIPPED | OUTPATIENT
Start: 2020-06-03 | End: 2020-08-14

## 2020-06-03 RX ORDER — ONDANSETRON 4 MG/1
4 TABLET, ORALLY DISINTEGRATING ORAL
Status: COMPLETED | OUTPATIENT
Start: 2020-06-03 | End: 2020-06-03

## 2020-06-03 RX ORDER — CYCLOBENZAPRINE HCL 10 MG
10 TABLET ORAL
Qty: 20 TAB | Refills: 0 | Status: SHIPPED | OUTPATIENT
Start: 2020-06-03 | End: 2020-10-19

## 2020-06-03 RX ADMIN — HYDROCODONE BITARTRATE AND ACETAMINOPHEN 1 TABLET: 5; 325 TABLET ORAL at 11:38

## 2020-06-03 RX ADMIN — ONDANSETRON 4 MG: 4 TABLET, ORALLY DISINTEGRATING ORAL at 11:38

## 2020-06-03 NOTE — ED NOTES
Discharge instructions provided. Pt verbalized understanding and has no further questions at this time. Pt leaving ED via wheelchair in stable condition.

## 2020-06-03 NOTE — DISCHARGE INSTRUCTIONS
Patient Education        Knee Pain or Injury: Care Instructions  Your Care Instructions     Injuries are a common cause of knee problems. Sudden (acute) injuries may be caused by a direct blow to the knee. They can also be caused by abnormal twisting, bending, or falling on the knee. Pain, bruising, or swelling may be severe, and may start within minutes of the injury. Overuse is another cause of knee pain. Other causes are climbing stairs, kneeling, and other activities that use the knee. Everyday wear and tear, especially as you get older, also can cause knee pain. Rest, along with home treatment, often relieves pain and allows your knee to heal. If you have a serious knee injury, you may need tests and treatment. Follow-up care is a key part of your treatment and safety. Be sure to make and go to all appointments, and call your doctor if you are having problems. It's also a good idea to know your test results and keep a list of the medicines you take. How can you care for yourself at home? · Be safe with medicines. Read and follow all instructions on the label. ? If the doctor gave you a prescription medicine for pain, take it as prescribed. ? If you are not taking a prescription pain medicine, ask your doctor if you can take an over-the-counter medicine. · Rest and protect your knee. Take a break from any activity that may cause pain. · Put ice or a cold pack on your knee for 10 to 20 minutes at a time. Put a thin cloth between the ice and your skin. · Prop up a sore knee on a pillow when you ice it or anytime you sit or lie down for the next 3 days. Try to keep it above the level of your heart. This will help reduce swelling. · If your knee is not swollen, you can put moist heat, a heating pad, or a warm cloth on your knee. · If your doctor recommends an elastic bandage, sleeve, or other type of support for your knee, wear it as directed.   · Follow your doctor's instructions about how much weight you can put on your leg. Use a cane, crutches, or a walker as instructed. · Follow your doctor's instructions about activity during your healing process. If you can do mild exercise, slowly increase your activity. · Reach and stay at a healthy weight. Extra weight can strain the joints, especially the knees and hips, and make the pain worse. Losing even a few pounds may help. When should you call for help? [id number] anytime you think you may need emergency care. For example, call if:  · You have symptoms of a blood clot in your lung (called a pulmonary embolism). These may include:  ? Sudden chest pain. ? Trouble breathing. ? Coughing up blood. Call your doctor now or seek immediate medical care if:  · You have severe or increasing pain. · Your leg or foot turns cold or changes color. · You cannot stand or put weight on your knee. · Your knee looks twisted or bent out of shape. · You cannot move your knee. · You have signs of infection, such as:  ? Increased pain, swelling, warmth, or redness. ? Red streaks leading from the knee. ? Pus draining from a place on your knee. ? A fever. · You have signs of a blood clot in your leg (called a deep vein thrombosis), such as:  ? Pain in your calf, back of the knee, thigh, or groin. ? Redness and swelling in your leg or groin. Watch closely for changes in your health, and be sure to contact your doctor if:  · You have tingling, weakness, or numbness in your knee. · You have any new symptoms, such as swelling. · You have bruises from a knee injury that last longer than 2 weeks. · You do not get better as expected. Where can you learn more? Go to http://camelia-konrad.info/  Enter K195 in the search box to learn more about \"Knee Pain or Injury: Care Instructions. \"  Current as of: June 26, 2019               Content Version: 12.5  © 5363-9049 [employer]wise[employer].    Care instructions adapted under license by [employer] (which disclaims liability or warranty for this information). If you have questions about a medical condition or this instruction, always ask your healthcare professional. [address] any warranty or liability for your use of this information. Patient Education        Tendon Injury (Tendinopathy): Care Instructions  Your Care Instructions     Tendons are tough, flexible tissues that connect muscle to bone. A tendon can hurt or get torn from overuse or aging, especially tendons in the shoulder, elbow, wrist, hip, knee, or ankle. Tendon injuries may be called tendinopathy or tendinitis. Tendon injuries can occur from any motion you have to repeat in a job, sports, or daily activities. Tennis elbow is one common tendon injury. You can treat most tendon problems with over-the-counter pain medicine, rest, changes in your activities, and physical therapy. Follow-up care is a key part of your treatment and safety. Be sure to make and go to all appointments, and call your doctor if you are having problems. It's also a good idea to know your test results and keep a list of the medicines you take. How can you care for yourself at home? · Rest the sore area. You may have to stop doing the activity that caused the tendon pain for a while. · Take an over-the-counter pain medicine, such as acetaminophen (Tylenol), ibuprofen (Advil, Motrin), or naproxen (Aleve). Read and follow all instructions on the label. · Do not take two or more pain medicines at the same time unless the doctor told you to. Many pain medicines have acetaminophen, which is Tylenol. Too much acetaminophen (Tylenol) can be harmful. · Put ice or a cold pack on the sore area for 10 to 20 minutes at a time. Try to do this every 1 to 2 hours for the next 3 days (when you are awake) or until any swelling goes down. Put a thin cloth between the ice and your skin.   · Prop up the sore area on a pillow when you ice it or anytime you sit or lie down during the next 3 days. Try to keep it above the level of your heart. This will help reduce swelling. · Follow your doctor's advice for wearing and caring for a sling, splint, or cast. In some cases, you may wear one of these for a while to help your tendon heal.  · Follow your doctor's advice for stretching and physical therapy. Gently move your joint through its full range of motion. This will prevent stiffness in your joint. · Go back to your activity slowly. Warm up before and stretch after the activity. You also can try making some changes. For example, if a sport caused your tendon pain, alternate the sport with another activity. If using a tool causes pain, switch hands or change your . Stop the activity if it hurts. After the activity, apply ice to prevent pain and swelling. · Do not smoke. Smoking can slow healing. If you need help quitting, talk to your doctor about stop-smoking programs and medicines. These can increase your chances of quitting for good. When should you call for help? Watch closely for changes in your health, and be sure to contact your doctor if:  · Your pain gets worse. · You do not get better as expected. Where can you learn more? Go to http://camelia-konrad.info/  Enter A157 in the search box to learn more about \"Tendon Injury (Tendinopathy): Care Instructions. \"  Current as of: March 2, 2020               Content Version: 12.5  © 3003-2386 [employer]wise, Inc[employer]. Care instructions adapted under license by [employer] (which disclaims liability or warranty for this information). If you have questions about a medical condition or this instruction, always ask your healthcare professional. [address] any warranty or liability for your use of this information.

## 2020-06-03 NOTE — ED NOTES
Pt arrives to the ED [id number] with a c/c of intermittent left knee pain onset six weeks ago, worsening today. Pt additionally endorses she noted some swelling this past week, denies any SOB or CP. Pt is noted in stable condition, now in ED room with side rail up, bed to lowest position and call light within reach. Will continue to monitor.

## 2020-06-03 NOTE — ED PROVIDER NOTES
EMERGENCY DEPARTMENT HISTORY AND PHYSICAL EXAM      Date: 6/3/2020  Patient Name: Danilo Dunn    History of Presenting Illness     Chief Complaint   Patient presents with    Knee Pain     Ambulatory into the ED with c/o non-traumatic Lt knee pain x 6 weeks. She had xrays at [facility] about 6 weeks ago and was told she had a fracture. Was evaluated by [facility] 2 weeks ago and was told it was a hamstring injury. Now c/o swelling and increased pain to Lt knee x 1 week. History Provided By: Patient    HPI: Danilo Dunn, 50 y.o. female presents to the ED with cc of left knee pain. Patient states that her symptoms began 6 weeks ago. She denies any injury. [facility], and told that she had a fracture. She received a knee immobilizer, and after several weeks, states that the pain had not improved. She followed up with orthopedics, and they looked at her x-rays from [facility].  They stated she did not have a fracture, but they said she had pulled her hamstring muscles. She followed their instructions, did exercises, but continues to have discomfort. She states that currently her pain is a 7 out of 10 in severity. When she ambulates, the pain worsens. She denies calf pain or numbness or tingling. She also denies chest pain, fever, chills or shortness of breath. There are no other complaints, changes, or physical findings at this time. PCP: Una Elizabeth MD    No current facility-administered medications on file prior to encounter. Current Outpatient Medications on File Prior to Encounter   Medication Sig Dispense Refill    pregabalin (Lyrica) 200 mg capsule Take 1 Cap by mouth two (2) times a day. Max Daily Amount: 400 mg. 60 Cap 1    predniSONE (STERAPRED DS) 10 mg dose pack Take as directed for 6 days, with food 21 Tab 0    albuterol (PROVENTIL VENTOLIN) 2.5 mg /3 mL (0.083 %) nebu 3 mL by Nebulization route every six (6) hours as needed for Wheezing.  27 Nebule 0    albuterol (PROVENTIL HFA, VENTOLIN HFA, PROAIR HFA) 90 mcg/actuation inhaler Take 2 Puffs by inhalation every six (6) hours as needed for Wheezing. 1 Inhaler 0    montelukast (SINGULAIR) 10 mg tablet TAKE 1 TABLET BY MOUTH ONCE DAILY FOR  ALLERGIES  AND  ASTHMA 90 Tab 3    valsartan (DIOVAN) 40 mg tablet Take 1 Tab by mouth daily. For renal protection. 30 Tab 3    meclizine (ANTIVERT) 25 mg tablet Take 1 Tab by mouth three (3) times daily as needed for Dizziness. 20 Tab 1    butalbital-acetaminophen-caffeine (FIORICET, ESGIC) -40 mg per tablet Take 1 Tab by mouth every six (6) hours as needed for Headache. Max: 4 tabs a day 60 Tab 1    escitalopram oxalate (LEXAPRO) 20 mg tablet Take a half tablet for 5 days, then one tablet daily. 30 Tab 5    metFORMIN ER (GLUCOPHAGE XR) 500 mg tablet TAKE 2 TABLETS BY MOUTH TWICE DAILY 120 Tab 5    liraglutide (VICTOZA 2-RENEE) 0.6 mg/0.1 mL (18 mg/3 mL) pnij 1.8 mg by SubCUTAneous route daily. 9 Pen 3    fluticasone propion-salmeterol (ADVAIR DISKUS) 500-50 mcg/dose diskus inhaler Take 1 Puff by inhalation every twelve (12) hours. Asthma, Dx: J45.30 60 Each 11    atorvastatin (LIPITOR) 10 mg tablet TAKE 1 TABLET BY MOUTH ONCE DAILY 90 Tab 3    glipiZIDE (GLUCOTROL) 10 mg tablet TAKE 1 TABLET BY MOUTH TWICE DAILY 180 Tab 3    nitroglycerin (NITROSTAT) 0.4 mg SL tablet Take 1 Tab by mouth every five (5) minutes as needed for Chest Pain. Sit/Lay down then put one tab under the tongue every 5 minutes as needed for chest pain for 3 doses 1 Bottle 0    albuterol (PROVENTIL VENTOLIN) 2.5 mg /3 mL (0.083 %) nebulizer solution USE ONE VIAL IN NEBULIZER EVERY 4 HOURS AS NEEDED FOR WHEEZING 30 Each 3    albuterol (VENTOLIN HFA) 90 mcg/actuation inhaler Take 2 Puffs by inhalation every six (6) hours as needed for Wheezing.  2 Inhaler 5       Past History     Past Medical History:  Past Medical History:   Diagnosis Date    Anemia (iron deficiency)     Asthma  DDD (degenerative disc disease), lumbar     Depression     DM type 2 (diabetes mellitus, type 2) ([facility] [address] 2011    GERD (gastroesophageal reflux disease)     GI bleed     Hepatic steatosis 2016    confirmed by US    HTN (hypertension)     Irregular menses     Irritable bowel     Kidney mass     19: US showed rigth renal cystic nephroma, follows with Urology (Dr. Ramona Blandon)    Morbid obesity ([address]     FE (obstructive sleep apnea)     w/ Cpap    PUD (peptic ulcer disease)        Past Surgical History:  Past Surgical History:   Procedure Laterality Date    HX  SECTION      x 2    HX COLONOSCOPY  2015    HX ENDOSCOPY  2015    HX HYSTEROSCOPY WITH ENDOMETRIAL ABLATION  2014    HX TUBAL LIGATION      HX TUMOR REMOVAL  2016    right kidney, Dr Norris Middleton UROLOGICAL      Mass removed from right kidney on 02/15/2016        Family History:  Family History   Problem Relation Age of Onset    Cancer Mother         Ovarian Cancer /breast ca    Heart Attack Father     Heart Disease Father     Diabetes Father     Other Father         pancreatitis    Cancer Sister     Other Sister         chrons    Heart Attack Maternal Grandfather     Diabetes Paternal Grandmother     HIV/AIDS Son        Social History:  Social History     Tobacco Use    Smoking status: Never Smoker    Smokeless tobacco: Never Used   Substance Use Topics    Alcohol use: No    Drug use: No       Allergies: Allergies   Allergen Reactions    Lisinopril Cough    Pcn [Penicillins] Hives         Review of Systems   Review of Systems   Constitutional: Negative for fever. HENT: Negative for congestion. Eyes: Negative. Respiratory: Negative for cough. Cardiovascular: Negative for chest pain. Gastrointestinal: Negative for abdominal pain. Endocrine: Negative for heat intolerance. Genitourinary: Negative for dysuria. Musculoskeletal: Negative for back pain.    Skin: Negative for rash. Allergic/Immunologic: Negative for immunocompromised state. Neurological: Negative for dizziness. Hematological: Does not bruise/bleed easily. Psychiatric/Behavioral: Negative. All other systems reviewed and are negative. Physical Exam   Physical Exam  Vitals signs and nursing note reviewed. Constitutional:       General: She is not in acute distress. Appearance: She is well-developed. HENT:      Head: Normocephalic. Neck:      Musculoskeletal: Normal range of motion and neck supple. Cardiovascular:      Rate and Rhythm: Normal rate and regular rhythm. Heart sounds: Normal heart sounds. Pulmonary:      Effort: Pulmonary effort is normal.      Breath sounds: Normal breath sounds. Abdominal:      General: Bowel sounds are normal.      Palpations: Abdomen is soft. Tenderness: There is no abdominal tenderness. Musculoskeletal: Normal range of motion. General: Tenderness present. Comments: Left knee tenderness and increased warmth, no erythema   Skin:     General: Skin is warm and dry. Neurological:      General: No focal deficit present. Mental Status: She is alert and oriented to person, place, and time. Psychiatric:         Mood and Affect: Mood normal.         Behavior: Behavior normal.         Diagnostic Study Results     Labs -   No results found for this or any previous visit (from the past 12 hour(s)). Radiologic Studies -   XR KNEE LT MAX 2 VWS   Final Result   IMPRESSION: Small joint effusion. No acute osseous abnormality. CT Results  (Last 48 hours)    None        CXR Results  (Last 48 hours)    None          Medical Decision Making   I am the first provider for this patient. I reviewed the vital signs, available nursing notes, past medical history, past surgical history, family history and social history. Vital Signs-Reviewed the patient's vital signs.   Patient Vitals for the past 12 hrs:   Temp Pulse Resp BP SpO2 06/03/20 1145 -- -- -- 147/60 98 %   06/03/20 1046 98.6 °F (37 °C) 82 14 (!) 149/98 97 %     . Records Reviewed: Nursing Notes and Old Medical Records    Provider Notes (Medical Decision Making):   Fracture, sprain, contusion, arthritis, DVT    ED Course:   Initial assessment performed. The patients presenting problems have been discussed, and they are in agreement with the care plan formulated and outlined with them. I have encouraged them to ask questions as they arise throughout their visit. Progress note:    Patient is feeling better. Her results were reviewed. She is advised to follow-up and return to ER if worse         Critical Care Time:   0    Disposition:  home  DISCHARGE PLAN:  1. Discharge Medication List as of 6/3/2020 12:38 PM      START taking these medications    Details   naproxen (NAPROSYN) 500 mg tablet Take 1 Tab by mouth every twelve (12) hours as needed for Pain., Normal, Disp-20 Tab, R-0      cyclobenzaprine (FLEXERIL) 10 mg tablet Take 1 Tab by mouth three (3) times daily as needed for Muscle Spasm(s). , Normal, Disp-20 Tab, R-0         CONTINUE these medications which have NOT CHANGED    Details   pregabalin (Lyrica) 200 mg capsule Take 1 Cap by mouth two (2) times a day. Max Daily Amount: 400 mg., Normal, Disp-60 Cap, R-1      predniSONE (STERAPRED DS) 10 mg dose pack Take as directed for 6 days, with food, Normal, Disp-21 Tab, R-0      !! albuterol (PROVENTIL VENTOLIN) 2.5 mg /3 mL (0.083 %) nebu 3 mL by Nebulization route every six (6) hours as needed for Wheezing., Normal, Disp-30 Nebule, R-0      !! albuterol (PROVENTIL HFA, VENTOLIN HFA, PROAIR HFA) 90 mcg/actuation inhaler Take 2 Puffs by inhalation every six (6) hours as needed for Wheezing., Normal, Disp-1 Inhaler, R-0      montelukast (SINGULAIR) 10 mg tablet TAKE 1 TABLET BY MOUTH ONCE DAILY FOR  ALLERGIES  AND  ASTHMA, Normal, Disp-90 Tab, R-3      valsartan (DIOVAN) 40 mg tablet Take 1 Tab by mouth daily.  For renal protection. , Normal, Disp-30 Tab, R-3      meclizine (ANTIVERT) 25 mg tablet Take 1 Tab by mouth three (3) times daily as needed for Dizziness., Normal, Disp-20 Tab, R-1      butalbital-acetaminophen-caffeine (FIORICET, ESGIC) -40 mg per tablet Take 1 Tab by mouth every six (6) hours as needed for Headache. Max: 4 tabs a day, Normal, Disp-60 Tab, R-1      escitalopram oxalate (LEXAPRO) 20 mg tablet Take a half tablet for 5 days, then one tablet daily. , Normal, Disp-30 Tab, R-5      metFORMIN ER (GLUCOPHAGE XR) 500 mg tablet TAKE 2 TABLETS BY MOUTH TWICE DAILY, NormalPlease consider 90 day supplies to promote better adherenceDisp-120 Tab, R-5      liraglutide (VICTOZA 2-RENEE) 0.6 mg/0.1 mL (18 mg/3 mL) pnij 1.8 mg by SubCUTAneous route daily. , Normal, Disp-9 Pen, R-3      fluticasone propion-salmeterol (ADVAIR DISKUS) 500-50 mcg/dose diskus inhaler Take 1 Puff by inhalation every twelve (12) hours. Asthma, Dx: J45.30, Normal, Disp-60 Each, R-11      atorvastatin (LIPITOR) 10 mg tablet TAKE 1 TABLET BY MOUTH ONCE DAILY, Normal, Disp-90 Tab, R-3      glipiZIDE (GLUCOTROL) 10 mg tablet TAKE 1 TABLET BY MOUTH TWICE DAILY, NormalPlease consider 90 day supplies to promote better adherenceDisp-180 Tab, R-3      nitroglycerin (NITROSTAT) 0.4 mg SL tablet Take 1 Tab by mouth every five (5) minutes as needed for Chest Pain. Sit/Lay down then put one tab under the tongue every 5 minutes as needed for chest pain for 3 doses, Print, Disp-1 Bottle, R-0      !! albuterol (PROVENTIL VENTOLIN) 2.5 mg /3 mL (0.083 %) nebulizer solution USE ONE VIAL IN NEBULIZER EVERY 4 HOURS AS NEEDED FOR WHEEZING, NormalPlease consider 90 day supplies to promote better adherenceDisp-30 Each, R-3      !! albuterol (VENTOLIN HFA) 90 mcg/actuation inhaler Take 2 Puffs by inhalation every six (6) hours as needed for Wheezing., Normal, Disp-2 Inhaler, R-5       !! - Potential duplicate medications found. Please discuss with provider.         2. Follow-up Information     Follow up With Specialties Details Why Contact Info    Nadeem Whitney MD Internal Medicine In 2 days As needed [address]  [phone]      Gurdeep Hughes MD Orthopedic Surgery Call As needed [address]  [address] [facility] [address]  [phone]      [facility] EMERGENCY DEPT Emergency Medicine  If symptoms worsen [address] [address]  [phone]        3. Return to ED if worse     Diagnosis     Clinical Impression:   1. Acute pain of left knee    2. Tendinitis        Attestations:    Elodia Montano MD    Please note that this dictation was completed with [employer], the computer voice recognition software. Quite often unanticipated grammatical, syntax, homophones, and other interpretive errors are inadvertently transcribed by the computer software. Please disregard these errors. Please excuse any errors that have escaped final proofreading. Thank you.

## 2020-06-29 ENCOUNTER — TELEPHONE (OUTPATIENT)
Dept: INTERNAL MEDICINE CLINIC | Facility: CLINIC | Age: 45
End: 2020-06-29

## 2020-06-29 NOTE — TELEPHONE ENCOUNTER
Find out which ED she went to and request records. I cannot write an order without seeing her first, but I recommend she follow up with Dr. Flory Baugh and let him know that the knee is not getting better and ask if a MRI is needed. Let her know that a knee MRI costs anywhere from $550 to $750, sometimes more.

## 2020-06-29 NOTE — TELEPHONE ENCOUNTER
Pt called requesting a script to get an MRI done on her left knee. Pt stated that she is having severe pain and swilling in that knee. Pt also stated that she has been multiple places to get the knee checked out but the only thing they will do is x-rays and they are stating that they are not finding anything. Pt stated that she can't take the pain anymore and needs something to be done.     Pt stated if she does not answer to please leave a vm

## 2020-06-29 NOTE — TELEPHONE ENCOUNTER
Verified patients name and date of birth. Patient went to [facility] ER. She saw th NP at [facility] and was told she had a hamstring problem. She states she is having trouble walking. Patient states she is not working now and would like just get the MRI ordered if possible. Please advise.

## 2020-06-30 ENCOUNTER — TELEPHONE (OUTPATIENT)
Dept: INTERNAL MEDICINE CLINIC | Facility: CLINIC | Age: 45
End: 2020-06-30

## 2020-06-30 NOTE — TELEPHONE ENCOUNTER
Tell her can either see Dr. Melisa Barahona as I recommended or I can place referral for her to see a different orthopedist.

## 2020-06-30 NOTE — TELEPHONE ENCOUNTER
Returning the call about the order for MRI needs an in person visit first available is 7/22 /2020 and she is in constant pain wants to know what other option she may have.   [phone]

## 2020-07-07 ENCOUNTER — TELEPHONE (OUTPATIENT)
Dept: INTERNAL MEDICINE CLINIC | Facility: CLINIC | Age: 45
End: 2020-07-07

## 2020-07-07 DIAGNOSIS — E11.42 TYPE 2 DIABETES MELLITUS WITH DIABETIC POLYNEUROPATHY, WITHOUT LONG-TERM CURRENT USE OF INSULIN (HCC): Primary | ICD-10-CM

## 2020-07-07 RX ORDER — LOSARTAN POTASSIUM 25 MG/1
25 TABLET ORAL DAILY
Qty: 30 TAB | Refills: 3 | Status: SHIPPED | OUTPATIENT
Start: 2020-07-07 | End: 2020-12-28 | Stop reason: SDUPTHER

## 2020-07-07 NOTE — TELEPHONE ENCOUNTER
Verified patients name and date of birth. Pharmacist Jessica states they have losartan 25 mg and 50 mg. Valsartan 40 not available.

## 2020-07-07 NOTE — TELEPHONE ENCOUNTER
[address] on file sent a fax requesting an alternative for valsartan 40mg tablet, currently on backorder.

## 2020-08-14 ENCOUNTER — VIRTUAL VISIT (OUTPATIENT)
Dept: INTERNAL MEDICINE CLINIC | Age: 45
End: 2020-08-14
Payer: MEDICAID

## 2020-08-14 ENCOUNTER — TELEPHONE (OUTPATIENT)
Dept: INTERNAL MEDICINE CLINIC | Age: 45
End: 2020-08-14

## 2020-08-14 DIAGNOSIS — I10 ESSENTIAL HYPERTENSION: ICD-10-CM

## 2020-08-14 DIAGNOSIS — E11.42 TYPE 2 DIABETES MELLITUS WITH DIABETIC POLYNEUROPATHY, WITHOUT LONG-TERM CURRENT USE OF INSULIN (HCC): Primary | ICD-10-CM

## 2020-08-14 DIAGNOSIS — F33.1 MODERATE EPISODE OF RECURRENT MAJOR DEPRESSIVE DISORDER (HCC): ICD-10-CM

## 2020-08-14 DIAGNOSIS — M54.50 CHRONIC MIDLINE LOW BACK PAIN WITHOUT SCIATICA: ICD-10-CM

## 2020-08-14 DIAGNOSIS — G89.29 CHRONIC MIDLINE LOW BACK PAIN WITHOUT SCIATICA: ICD-10-CM

## 2020-08-14 DIAGNOSIS — M15.9 PRIMARY OSTEOARTHRITIS INVOLVING MULTIPLE JOINTS: ICD-10-CM

## 2020-08-14 PROCEDURE — 99214 OFFICE O/P EST MOD 30 MIN: CPT | Performed by: INTERNAL MEDICINE

## 2020-08-14 PROCEDURE — 3052F HG A1C>EQUAL 8.0%<EQUAL 9.0%: CPT | Performed by: INTERNAL MEDICINE

## 2020-08-14 RX ORDER — VALSARTAN 40 MG/1
TABLET ORAL
COMMUNITY
Start: 2020-05-11 | End: 2021-01-26

## 2020-08-14 RX ORDER — PIOGLITAZONEHYDROCHLORIDE 30 MG/1
30 TABLET ORAL DAILY
Qty: 30 TAB | Refills: 2 | Status: SHIPPED | OUTPATIENT
Start: 2020-08-14 | End: 2020-12-09

## 2020-08-14 RX ORDER — IBUPROFEN 600 MG/1
600 TABLET ORAL
Qty: 60 TAB | Refills: 2 | Status: SHIPPED | OUTPATIENT
Start: 2020-08-14 | End: 2021-08-23

## 2020-08-14 NOTE — TELEPHONE ENCOUNTER
Pt called and wanted an inperson visit with Dr. Daniele Soria. Pt stated that she is in constant pain and was hoping to get some kind of referral to see a specialist. Pt also stated that she does not believe her dm medication is working right her bs has been in the 300's. Pt refused a virtual visit for this afternoon.  Pt also stated that she did not have insurance and did not want to pay extra for a virtual visit

## 2020-08-14 NOTE — PROGRESS NOTES
Laura Puentes is a 50 y.o. female who was seen by synchronous (real-time) audio-video technology on 8/14/2020 for Blood Pressure Check (running in 300's for about a month) and Joint Pain (knees, shoulder, back and ankles,wrists and fingers)        Assessment & Plan:     Diagnoses and all orders for this visit:    1. Type 2 diabetes mellitus with diabetic polyneuropathy, without long-term current use of insulin (HCC)  Elevated blood sugars in 300's at home. Continue metformin XR 1000 mg BID, glipizide 10 mg BID, and Victoza 1.8 mg sq daily. Will run out of Victoza in about 2 weeks; replace with pioglitazone. Explained to her that we may need to start insulin if her next A1C is 10.0% or greater.  -     Start pioglitazone (ACTOS) 30 mg tablet; Take 1 Tab by mouth daily. 2. Essential hypertension  Continue valsartan. 3. Chronic midline low back pain without sciatica  -     Start ibuprofen (MOTRIN) 600 mg tablet; Take 1 Tab by mouth every six (6) hours as needed for Pain. 4. Primary osteoarthritis involving multiple joints  -     Start ibuprofen (MOTRIN) 600 mg tablet; Take 1 Tab by mouth every six (6) hours as needed for Pain. 5. Moderate episode of recurrent major depressive disorder (HCC)  Continue Lexapro 20 mg daily. Follow-up and Dispositions    · Return in about 2 months (around 10/14/2020), or if symptoms worsen or fail to improve, for DM, POC A1c, depression. 712  Subjective:     Presents with complaints of pain and high blood sugars. Last seen in clinic on 2/17/20. She has type 2 DM with neuropathy, HTN, asthma, allergic rhinitis, FE, hyperlipidemia, chronic low back pain, and depression. Reports BS's have been in the 300's, including fasting blood sugars over the past few weeks. Gaining weight. Denies polydipsia or polyuria. Compliant with medications but will not be able to afford Victoza after this month. Compliant with diabetic diet most of the time.   Last A1c 8.2% on 2/17/20. Complains of constant pain at several joints, including knees, shoulders, back, ankles, wrists, and fingers. Using Voltaren gel and taking Tylenol without relief. Hard to shower herself well due to pain. Out of work since Nov.  No health insurance. Feels depressed. Denies SI or HI. Her  is on disability. Prior to Admission medications    Medication Sig Start Date End Date Taking? Authorizing Provider   valsartan (DIOVAN) 40 mg tablet valsartan 40 mg tablet   Take 1 tablet every day by oral route. 5/11/20  Yes Provider, Historical   atorvastatin (LIPITOR) 10 mg tablet Take 1 tablet by mouth once daily 7/7/20  Yes Levi Sandoval MD   losartan (COZAAR) 25 mg tablet Take 1 Tab by mouth daily. (Substitute for valsartan). For renal protection. 7/7/20  Yes Satnider England MD   naproxen (NAPROSYN) 500 mg tablet Take 1 Tab by mouth every twelve (12) hours as needed for Pain. 6/3/20  Yes Melchor Uribe MD   cyclobenzaprine (FLEXERIL) 10 mg tablet Take 1 Tab by mouth three (3) times daily as needed for Muscle Spasm(s). 6/3/20  Yes Melchor Uribe MD   pregabalin (Lyrica) 200 mg capsule Take 1 Cap by mouth two (2) times a day. Max Daily Amount: 400 mg. 6/1/20  Yes Levi Sandoval MD   albuterol (PROVENTIL HFA, VENTOLIN HFA, PROAIR HFA) 90 mcg/actuation inhaler Take 2 Puffs by inhalation every six (6) hours as needed for Wheezing. 5/12/20  Yes Roseann Rey MD   montelukast (SINGULAIR) 10 mg tablet TAKE 1 TABLET BY MOUTH ONCE DAILY FOR  ALLERGIES  AND  ASTHMA 4/13/20  Yes Satinder England MD   meclizine (ANTIVERT) 25 mg tablet Take 1 Tab by mouth three (3) times daily as needed for Dizziness. 2/28/20  Yes Levi Sandoval MD   butalbital-acetaminophen-caffeine (FIORICET, ESGIC) -40 mg per tablet Take 1 Tab by mouth every six (6) hours as needed for Headache.  Max: 4 tabs a day 2/17/20  Yes Levi Sandoval MD   escitalopram oxalate (LEXAPRO) 20 mg tablet Take a half tablet for 5 days, then one tablet daily. 12/27/19  Yes Manjula Fierro MD   metFORMIN ER (GLUCOPHAGE XR) 500 mg tablet TAKE 2 TABLETS BY MOUTH TWICE DAILY 9/10/19  Yes Emerald Villalba NP   liraglutide (VICTOZA 2-RENEE) 0.6 mg/0.1 mL (18 mg/3 mL) pnij 1.8 mg by SubCUTAneous route daily. 6/18/19  Yes Jadon England MD   fluticasone propion-salmeterol (ADVAIR DISKUS) 500-50 mcg/dose diskus inhaler Take 1 Puff by inhalation every twelve (12) hours. Asthma, Dx: J45.30 6/18/19  Yes Alexis Callaway MD   glipiZIDE (GLUCOTROL) 10 mg tablet TAKE 1 TABLET BY MOUTH TWICE DAILY 4/12/19  Yes Alexis Callaway MD   nitroglycerin (NITROSTAT) 0.4 mg SL tablet Take 1 Tab by mouth every five (5) minutes as needed for Chest Pain.  Sit/Lay down then put one tab under the tongue every 5 minutes as needed for chest pain for 3 doses 6/8/18  Yes Latisha Rojas MD   albuterol (PROVENTIL VENTOLIN) 2.5 mg /3 mL (0.083 %) nebulizer solution USE ONE VIAL IN NEBULIZER EVERY 4 HOURS AS NEEDED FOR WHEEZING 3/5/18  Yes Candice Baez NP     Patient Active Problem List   Diagnosis Code    HTN (hypertension) I10    Depression F32.9    FE (obstructive sleep apnea) G47.33    Asthma J45.909    Iron deficiency anemia D50.9    Hyperlipidemia E78.5    Obesity, morbid, BMI 50 or higher ([facility]) E66.01    Type 2 diabetes mellitus with diabetic polyneuropathy, without long-term current use of insulin ([facility]) E11.42    Coronary artery disease involving native coronary artery of native heart without angina pectoris I25.10    Chronic midline low back pain without sciatica M54.5, G89.29    Atypical squamous cells of undetermined significance (ASCUS) on Papanicolaou smear of cervix R87.610       Objective:     Patient-Reported Vitals 8/14/2020   Patient-Reported Weight 289lb   Patient-Reported LMP no menses      General: alert, cooperative, no distress   Mental  status: normal mood, behavior, speech, dress, motor activity, and thought processes, able to follow commands   HENT: NCAT   Neck: no visualized mass   Resp: no respiratory distress   Neuro: no gross deficits   Skin: no discoloration or lesions of concern on visible areas   Psychiatric: normal affect, consistent with stated mood, no evidence of hallucinations     Additional exam findings: morbidly obese, tearful      We discussed the expected course, resolution and complications of the diagnosis(es) in detail. Medication risks, benefits, costs, interactions, and alternatives were discussed as indicated. I advised her to contact the office if her condition worsens, changes or fails to improve as anticipated. She expressed understanding with the diagnosis(es) and plan. THIS VISIT WAS COMPLETED VIRTUALLY USING [employer]. Azael Elizondo, who was evaluated through a patient-initiated, synchronous (real-time) audio-video encounter, and/or her healthcare decision maker, is aware that it is a billable service, with coverage as determined by her insurance carrier. She provided verbal consent to proceed: Yes, and patient identification was verified. It was conducted pursuant to the emergency declaration under the [address] [place] [address] authority and the [employer] and [employer] General Act. A caregiver was present when appropriate. Ability to conduct physical exam was limited. I was at home. The patient was at home.       Mary Bernard MD

## 2020-08-14 NOTE — PATIENT INSTRUCTIONS
Learning About Benefits From Quitting Smoking How does quitting smoking make you healthier? If you're thinking about quitting smoking, you may have a few reasons to be smoke-free. Your health may be one of them. · When you quit smoking, you lower your risks for cancer, lung disease, heart attack, stroke, blood vessel disease, and blindness from macular degeneration. · When you're smoke-free, you get sick less often, and you heal faster. You are less likely to get colds, flu, bronchitis, and pneumonia. · As a nonsmoker, you may find that your mood is better and you are less stressed. When and how will you feel healthier? Quitting has real health benefits that start from day 1 of being smoke-free. And the longer you stay smoke-free, the healthier you get and the better you feel. The first hours · After just 20 minutes, your blood pressure and heart rate go down. That means there's less stress on your heart and blood vessels. · Within 12 hours, the level of carbon monoxide in your blood drops back to normal. That makes room for more oxygen. With more oxygen in your body, you may notice that you have more energy than when you smoked. After 2 weeks · Your lungs start to work better. · Your risk of heart attack starts to drop. After 1 month · When your lungs are clear, you cough less and breathe deeper, so it's easier to be active. · Your sense of taste and smell return. That means you can enjoy food more than you have since you started smoking. Over the years · Over the years, your risks of heart disease, heart attack, and stroke are lower. · After 10 years, your risk of dying from lung cancer is cut by about half. And your risk for many other types of cancer is lower too. How would quitting help others in your life? When you quit smoking, you improve the health of everyone who now breathes in your smoke. · Their heart, lung, and cancer risks drop, much like yours. · They are sick less. For babies and small children, living smoke-free means they're less likely to have ear infections, pneumonia, and bronchitis. · If you're a woman who is or will be pregnant someday, quitting smoking means a healthier . · Children who are close to you are less likely to become adult smokers. Where can you learn more? Go to http://camelia-konrad.info/ Enter 052 [id number] [id number] 11 in the search box to learn more about \"Learning About Benefits From Quitting Smoking. \" Current as of: 2020               Content Version: 12.5 © 4251-6320 [employer]. Care instructions adapted under license by [employer] (which disclaims liability or warranty for this information). If you have questions about a medical condition or this instruction, always ask your healthcare professional. [address] any warranty or liability for your use of this information.

## 2020-08-14 NOTE — PROGRESS NOTES
Tayler Fonseca  Identified pt with two pt identifiers(name and ). Chief Complaint   Patient presents with    Blood Pressure Check     running in 300's for about a month    Joint Pain     knees, shoulder, back and ankles,wrists and fingers     Pain in joints is rated at an 8/10 an not get comfortable has been going for a while, but has gotten worse    1. Have you been to the ER, urgent care clinic since your last visit? Hospitalized since your last visit? [facility] [place] [facility]    2. Have you seen or consulted any other health care providers outside of the [address] [place] [address] since your last visit? Include any pap smears or colon screening. [facility]  And Jennifer Polanco     Today's provider has been notified of reason for visit, vitals and flowsheets obtained on patients. Reviewed record In preparation for visit, huddled with provider and have obtained necessary documentation      Health Maintenance Due   Topic    MICROALBUMIN Q1     Lipid Screen     Foot Exam Q1     Influenza Age 5 to Adult        Wt Readings from Last 3 Encounters:   20 292 lb 8.8 oz (132.7 kg)   20 281 lb 8 oz (127.7 kg)   20 271 lb 2.7 oz (123 kg)     Temp Readings from Last 3 Encounters:   20 98.6 °F (37 °C)   20 98.4 °F (36.9 °C)   20 98.3 °F (36.8 °C) (Oral)     BP Readings from Last 3 Encounters:   20 145/70   20 96/58   20 109/74     Pulse Readings from Last 3 Encounters:   20 80   20 86   20 66     There were no vitals filed for this visit.       Learning Assessment:  :     Learning Assessment 2018   PRIMARY LEARNER Patient Patient Patient   HIGHEST LEVEL OF EDUCATION - PRIMARY LEARNER  - > 4 YEARS OF COLLEGE -   BARRIERS PRIMARY LEARNER - NONE -   CO-LEARNER CAREGIVER - No -   PRIMARY LANGUAGE ENGLISH ENGLISH ENGLISH   LEARNER PREFERENCE PRIMARY READING OTHER (COMMENT) LISTENING   ANSWERED BY patient patient self     - - self RELATIONSHIP SELF SELF SELF       Depression Screening:  :     3 most recent PHQ Screens 2/17/2020   PHQ Not Done Active Diagnosis of Depression or Bipolar Disorder   Little interest or pleasure in doing things -   Feeling down, depressed, irritable, or hopeless -   Total Score PHQ 2 -       Fall Risk Assessment:  :     Fall Risk Assessment, last 12 mths 8/14/2020   Able to walk? Yes   Fall in past 12 months? Yes   Fall with injury? No   Number of falls in past 12 months 4   Fall Risk Score 4       Abuse Screening:  :     Abuse Screening Questionnaire 8/14/2020 5/9/2014   Do you ever feel afraid of your partner? N N   Are you in a relationship with someone who physically or mentally threatens you? N N   Is it safe for you to go home? Y Y       ADL Screening:  :     ADL Assessment 5/2/2018   Feeding yourself No Help Needed   Getting from bed to chair No Help Needed   Getting dressed No Help Needed   Bathing or showering No Help Needed   Walk across the room (includes cane/walker) No Help Needed   Using the telphone No Help Needed   Taking your medications No Help Needed   Preparing meals No Help Needed   Managing money (expenses/bills) No Help Needed   Moderately strenuous housework (laundry) No Help Needed   Shopping for personal items (toiletries/medicines) No Help Needed   Shopping for groceries No Help Needed   Driving No Help Needed   Climbing a flight of stairs No Help Needed   Getting to places beyond walking distances No Help Needed                 Medication reconciliation up to date and corrected with patient at this time.

## 2020-08-24 ENCOUNTER — TELEPHONE (OUTPATIENT)
Dept: INTERNAL MEDICINE CLINIC | Age: 45
End: 2020-08-24

## 2020-08-24 DIAGNOSIS — M15.9 PRIMARY OSTEOARTHRITIS INVOLVING MULTIPLE JOINTS: Primary | ICD-10-CM

## 2020-08-24 NOTE — TELEPHONE ENCOUNTER
Pt called and given Dr Afshan Peña name and number to call for appointment. Informed if visit is to expensive, can do an in person with Dr Tk Beltran (already has one 10/19/2020). Pt has no further questions at this time.

## 2020-08-24 NOTE — TELEPHONE ENCOUNTER
Pt called to request a referral to an arthritis specialist for \"all over arthritis\". Pt stated she can barely walk and needs to see a specialist. Please return call at [phone].

## 2020-08-24 NOTE — TELEPHONE ENCOUNTER
She wants a referral to see a rheumatologist, states all of her joints hurt, no position is comfortable. Pain is a 9/10, stopped working in November and has no insurance.

## 2020-08-24 NOTE — TELEPHONE ENCOUNTER
A referral has been placed and can be mailed to her to call and schedule an appointment. She will have to call and schedule an appointment. If it is too expensive, she should schedule an in-person appointment with me about her arthritis and pain.

## 2020-08-26 ENCOUNTER — TELEPHONE (OUTPATIENT)
Dept: INTERNAL MEDICINE CLINIC | Age: 45
End: 2020-08-26

## 2020-08-26 NOTE — TELEPHONE ENCOUNTER
Pt called to confirm referral has been sent to Dr. Tiago England. Please return pt's call to discuss at [phone], pt was expecting a call from that office when they received referral, hasn't heard back.

## 2020-10-19 ENCOUNTER — OFFICE VISIT (OUTPATIENT)
Dept: INTERNAL MEDICINE CLINIC | Age: 45
End: 2020-10-19
Payer: MEDICAID

## 2020-10-19 VITALS
DIASTOLIC BLOOD PRESSURE: 84 MMHG | HEIGHT: 64 IN | HEART RATE: 65 BPM | OXYGEN SATURATION: 97 % | RESPIRATION RATE: 16 BRPM | BODY MASS INDEX: 49.54 KG/M2 | TEMPERATURE: 97.8 F | WEIGHT: 290.2 LBS | SYSTOLIC BLOOD PRESSURE: 128 MMHG

## 2020-10-19 DIAGNOSIS — I10 ESSENTIAL HYPERTENSION: ICD-10-CM

## 2020-10-19 DIAGNOSIS — Z23 NEEDS FLU SHOT: ICD-10-CM

## 2020-10-19 DIAGNOSIS — E78.2 MIXED HYPERLIPIDEMIA: ICD-10-CM

## 2020-10-19 DIAGNOSIS — Z23 ENCOUNTER FOR IMMUNIZATION: ICD-10-CM

## 2020-10-19 DIAGNOSIS — H65.01 NON-RECURRENT ACUTE SEROUS OTITIS MEDIA OF RIGHT EAR: ICD-10-CM

## 2020-10-19 DIAGNOSIS — E11.42 TYPE 2 DIABETES MELLITUS WITH DIABETIC POLYNEUROPATHY, WITHOUT LONG-TERM CURRENT USE OF INSULIN (HCC): Primary | ICD-10-CM

## 2020-10-19 DIAGNOSIS — E11.21 TYPE 2 DIABETES WITH NEPHROPATHY (HCC): ICD-10-CM

## 2020-10-19 DIAGNOSIS — F33.1 MODERATE EPISODE OF RECURRENT MAJOR DEPRESSIVE DISORDER (HCC): ICD-10-CM

## 2020-10-19 DIAGNOSIS — M25.50 ARTHRALGIA, UNSPECIFIED JOINT: ICD-10-CM

## 2020-10-19 DIAGNOSIS — J45.30 MILD PERSISTENT ASTHMA WITHOUT COMPLICATION: ICD-10-CM

## 2020-10-19 LAB — HBA1C MFR BLD HPLC: 9.4 %

## 2020-10-19 PROCEDURE — 90471 IMMUNIZATION ADMIN: CPT

## 2020-10-19 PROCEDURE — 83036 HEMOGLOBIN GLYCOSYLATED A1C: CPT

## 2020-10-19 PROCEDURE — 90686 IIV4 VACC NO PRSV 0.5 ML IM: CPT

## 2020-10-19 PROCEDURE — 99214 OFFICE O/P EST MOD 30 MIN: CPT

## 2020-10-19 RX ORDER — BUPROPION HYDROCHLORIDE 150 MG/1
150 TABLET, EXTENDED RELEASE ORAL DAILY
Qty: 30 TAB | Refills: 3 | Status: SHIPPED | OUTPATIENT
Start: 2020-10-19 | End: 2021-02-23 | Stop reason: SDUPTHER

## 2020-10-19 RX ORDER — DOXYCYCLINE 100 MG/1
100 CAPSULE ORAL 2 TIMES DAILY
Qty: 10 CAP | Refills: 0 | Status: SHIPPED | OUTPATIENT
Start: 2020-10-19 | End: 2020-10-28 | Stop reason: SDUPTHER

## 2020-10-19 NOTE — PROGRESS NOTES
Chief Complaint   Patient presents with    Diabetes     2 month f/u    Depression     Pt states she is having numbness in hands and feet again, she has not been taking the Amitriptyline and thinks she needs to be back on it. Pt states she is in pain and has a constant headache all the time. 1. Have you been to the ER, urgent care clinic since your last visit? Hospitalized since your last visit? No    2. Have you seen or consulted any other health care providers outside of the [address] [place] [address] since your last visit? Include any pap smears or colon screening. Yes When: Rheumatologist Dr. Gordy Ashby 10/5/2020    Visit Vitals  /84 (BP 1 Location: Left arm, BP Patient Position: Sitting)   Pulse 65   Temp 97.8 °F (36.6 °C) (Oral)   Resp 16   Ht 5' 4\" (1.626 m)   Wt 290 lb 3.2 oz (131.6 kg)   SpO2 97%   BMI 49.81 kg/m²     Administered Flu vaccine, pt tolerated well, VIS provided.

## 2020-10-19 NOTE — PATIENT INSTRUCTIONS
Patient Instructions  Stop Lexapro 20 mg by:  1) Take 1 tablet every other day for 3 times,   2) Take 1 tablet every 3 days for 2 times,   3) Take 1 tablet every 4 days for 2 times,   4) Take 1 tablet every 7 days for 2 times then stop. 5) Let me know when you are off Lexapro completely, so I can start you on amitriptyline. Vaccine Information Statement    Influenza (Flu) Vaccine (Inactivated or Recombinant): What You Need to Know    Many Vaccine Information Statements are available in Occitan and other languages. See www.immunize.org/vis  Hojas de información sobre vacunas están disponibles en español y en muchos otros idiomas. Visite www.immunize.org/vis    1. Why get vaccinated? Influenza vaccine can prevent influenza (flu). Flu is a contagious disease that spreads around the [facility] every year, usually between October and May. Anyone can get the flu, but it is more dangerous for some people. Infants and young children, people 72years of age and older, pregnant women, and people with certain health conditions or a weakened immune system are at greatest risk of flu complications. Pneumonia, bronchitis, sinus infections and ear infections are examples of flu-related complications. If you have a medical condition, such as heart disease, cancer or diabetes, flu can make it worse. Flu can cause fever and chills, sore throat, muscle aches, fatigue, cough, headache, and runny or stuffy nose. Some people may have vomiting and diarrhea, though this is more common in children than adults. Each year thousands of people in the [facility] die from flu, and many more are hospitalized. Flu vaccine prevents millions of illnesses and flu-related visits to the doctor each year. 2. Influenza vaccines     CDC recommends everyone 10months of age and older get vaccinated every flu season. Children 6 months through 6years of age may need 2 doses during a single flu season.   Everyone else needs only 1 dose each flu season. It takes about 2 weeks for protection to develop after vaccination. There are many flu viruses, and they are always changing. Each year a new flu vaccine is made to protect against three or four viruses that are likely to cause disease in the upcoming flu season. Even when the vaccine doesnt exactly match these viruses, it may still provide some protection. Influenza vaccine does not cause flu. Influenza vaccine may be given at the same time as other vaccines. 3. Talk with your health care provider    Tell your vaccine provider if the person getting the vaccine:   Has had an allergic reaction after a previous dose of influenza vaccine, or has any severe, life-threatening allergies.  Has ever had Guillain-Barré Syndrome (also called GBS). In some cases, your health care provider may decide to postpone influenza vaccination to a future visit. People with minor illnesses, such as a cold, may be vaccinated. People who are moderately or severely ill should usually wait until they recover before getting influenza vaccine. Your health care provider can give you more information. 4. Risks of a reaction     Soreness, redness, and swelling where shot is given, fever, muscle aches, and headache can happen after influenza vaccine.  There may be a very small increased risk of Guillain-Barré Syndrome (GBS) after inactivated influenza vaccine (the flu shot). [employer] children who get the flu shot along with pneumococcal vaccine (PCV13), and/or DTaP vaccine at the same time might be slightly more likely to have a seizure caused by fever. Tell your health care provider if a child who is getting flu vaccine has ever had a seizure. People sometimes faint after medical procedures, including vaccination. Tell your provider if you feel dizzy or have vision changes or ringing in the ears.     As with any medicine, there is a very remote chance of a vaccine causing a severe allergic reaction, other serious injury, or death. 5. What if there is a serious problem? An allergic reaction could occur after the vaccinated person leaves the clinic. If you see signs of a severe allergic reaction (hives, swelling of the face and throat, difficulty breathing, a fast heartbeat, dizziness, or weakness), call 9-1-1 and get the person to the nearest hospital.    For other signs that concern you, call your health care provider. Adverse reactions should be reported to the Vaccine Adverse Event Reporting System (VAERS). Your health care provider will usually file this report, or you can do it yourself. Visit the VAERS website at www.vaers. hhs.gov or call 3-925.251.6256. VAERS is only for reporting reactions, and VAERS staff do not give medical advice. 6. The National Vaccine Injury Compensation Program    The [facility] Vaccine Injury Compensation Program (VICP) is a federal program that was created to compensate people who may have been injured by certain vaccines. Visit the VICP website at www.hrsa.gov/vaccinecompensation or call 0-738.618.8918 to learn about the program and about filing a claim. There is a time limit to file a claim for compensation. 7. How can I learn more?  Ask your health care provider.  Call your local or state health department.  Contact the Centers for Disease Control and Prevention (CDC):  - Call 7-887.513.9250 (1-800-CDC-INFO) or  - Visit CDCs influenza website at www.cdc.gov/flu    Vaccine Information Statement (Interim)  Inactivated Influenza Vaccine   8/15/2019  42 JUANJOSE Godfrey [id number]   Department of Health and Human Services  Centers for Disease Control and Prevention    Office Use Only

## 2020-10-19 NOTE — PROGRESS NOTES
CC:   Chief Complaint   Patient presents with    Diabetes     2 month f/u    Depression       HISTORY OF PRESENT ILLNESS  Trell Reyes is a 50 y.o. female. Presents for 2 month follow up evaluation. She has type 2 DM with neuropathy, HTN, asthma, allergic rhinitis, FE, hyperlipidemia, chronic low back pain, and depression. Complains of headache now and pain at joints diffusely, especially knees, ankles, and shoulder. Has applied for disability. Saw arthritis doctor recently; he is testing her for RA and SLE. Has next appointment in 2 days. Complains of pain at right ear for a week. Improved when she took an antibiotic before dental work. Reports poor vision. Legally blind in left eye. Saw eye doctor about a month ago. . Could not do refraction exam; was told to get DM under better control then return. Has polyuria and unintentional weight loss (from 319 a few months ago to 290 lbs today), denies polydipsia. Has much numbness at feet and hands. Would like to get back on amitriptyline 50 mg nightly but was stopped when she started on Lexapro due to adverse interactions between the two. FBS today: 130. PM BS: 150-220. Infrequently has BS 46-50; has hypoglycemic sxs, can happen any time of the day. Trying to stay on diabetic diet. Avoiding fast foods. Cristian Alley in her yard 2 days ago. Samantha Ramírez on ground for some time until she could get up.  felt useless because he could not help her. Only she and  at home. Youngest son moved out in Aug to go to school to do his Masters. Feeling very depressed since she lost her job. Was main breadwinner in the family because her  is disabled. Feels like she has no purpose in life. Denies SI or HI. Also depressed about her health problems. Taking Lexapro but not helping enough. ROS  A complete review of systems was performed and is negative except for those mentioned in the HPI.      Patient Active Problem List Diagnosis Code    HTN (hypertension) I10    Moderate episode of recurrent major depressive disorder (HCC) F33.1    FE (obstructive sleep apnea) G47.33    Asthma J45.909    Iron deficiency anemia D50.9    Hyperlipidemia E78.5    Obesity, morbid, BMI 50 or higher ([facility]) E66.01    Type 2 diabetes mellitus with diabetic polyneuropathy, without long-term current use of insulin (HCC) E11.42    Coronary artery disease involving native coronary artery of native heart without angina pectoris I25.10    Chronic midline low back pain without sciatica M54.5, G89.29    Atypical squamous cells of undetermined significance (ASCUS) on Papanicolaou smear of cervix R87.610     Past Medical History:   Diagnosis Date    Anemia (iron deficiency)     Asthma     DDD (degenerative disc disease), lumbar 2014    Depression     DM type 2 (diabetes mellitus, type 2) ([facility] 11/23/2011    GERD (gastroesophageal reflux disease)     GI bleed 2015    Hepatic steatosis 11/2016    confirmed by US    HTN (hypertension)     Irregular menses     Irritable bowel     Kidney mass     5/28/19: US showed rigth renal cystic nephroma, follows with Urology (Dr. Zaina Negron)    Morbid obesity ([facility]     FE (obstructive sleep apnea)     w/ Cpap    PUD (peptic ulcer disease) 2015     Allergies   Allergen Reactions    Lisinopril Cough    Pcn [Penicillins] Hives       Current Outpatient Medications   Medication Sig Dispense Refill    metFORMIN ER (GLUCOPHAGE XR) 500 mg tablet Take 2 tablets by mouth twice daily 120 Tab 3    escitalopram oxalate (LEXAPRO) 20 mg tablet Take 1 Tab by mouth daily. 30 Tab 3    valsartan (DIOVAN) 40 mg tablet valsartan 40 mg tablet   Take 1 tablet every day by oral route.  ibuprofen (MOTRIN) 600 mg tablet Take 1 Tab by mouth every six (6) hours as needed for Pain. 60 Tab 2    pioglitazone (ACTOS) 30 mg tablet Take 1 Tab by mouth daily.  30 Tab 2    atorvastatin (LIPITOR) 10 mg tablet Take 1 tablet by mouth once daily 90 Tab 1    losartan (COZAAR) 25 mg tablet Take 1 Tab by mouth daily. (Substitute for valsartan). For renal protection. 30 Tab 3    pregabalin (Lyrica) 200 mg capsule Take 1 Cap by mouth two (2) times a day. Max Daily Amount: 400 mg. 60 Cap 1    albuterol (PROVENTIL HFA, VENTOLIN HFA, PROAIR HFA) 90 mcg/actuation inhaler Take 2 Puffs by inhalation every six (6) hours as needed for Wheezing. 1 Inhaler 0    montelukast (SINGULAIR) 10 mg tablet TAKE 1 TABLET BY MOUTH ONCE DAILY FOR  ALLERGIES  AND  ASTHMA 90 Tab 3    meclizine (ANTIVERT) 25 mg tablet Take 1 Tab by mouth three (3) times daily as needed for Dizziness. 20 Tab 1    butalbital-acetaminophen-caffeine (FIORICET, ESGIC) -40 mg per tablet Take 1 Tab by mouth every six (6) hours as needed for Headache. Max: 4 tabs a day 60 Tab 1    liraglutide (VICTOZA 2-RENEE) 0.6 mg/0.1 mL (18 mg/3 mL) pnij 1.8 mg by SubCUTAneous route daily. 9 Pen 3    fluticasone propion-salmeterol (ADVAIR DISKUS) 500-50 mcg/dose diskus inhaler Take 1 Puff by inhalation every twelve (12) hours. Asthma, Dx: J45.30 60 Each 11    glipiZIDE (GLUCOTROL) 10 mg tablet TAKE 1 TABLET BY MOUTH TWICE DAILY 180 Tab 3    nitroglycerin (NITROSTAT) 0.4 mg SL tablet Take 1 Tab by mouth every five (5) minutes as needed for Chest Pain. Sit/Lay down then put one tab under the tongue every 5 minutes as needed for chest pain for 3 doses 1 Bottle 0    albuterol (PROVENTIL VENTOLIN) 2.5 mg /3 mL (0.083 %) nebulizer solution USE ONE VIAL IN NEBULIZER EVERY 4 HOURS AS NEEDED FOR WHEEZING 30 Each 3         PHYSICAL EXAM  Visit Vitals  /84 (BP 1 Location: Left arm, BP Patient Position: Sitting)   Pulse 65   Temp 97.8 °F (36.6 °C) (Oral)   Resp 16   Ht 5' 4\" (1.626 m)   Wt 290 lb 3.2 oz (131.6 kg)   SpO2 97%   BMI 49.81 kg/m²       General: Morbidly obese, no distress. HEENT:  Head normocephalic/atraumatic, no scleral icterus  Neck: Supple.  No JVD, lymphadenopathy, or thyromegaly. Lungs:  Clear to ausculation bilaterally. Good air movement. Heart:  Regular rate and rhythm, normal S1 and S2, no murmur, gallop, or rub  Extremities: No clubbing, cyanosis, or edema. Neurological: Alert and oriented. Psychiatric: Normal mood and affect. Behavior is normal.   Diabetic foot exam:     Left Foot:   Visual Exam: normal    Pulse DP: 1+ (weak)   Filament test: absent sensation    Vibratory sensation: absent      Right Foot:   Visual Exam: normal    Pulse DP: 1+ (weak)   Filament test: absent sensation    Vibratory sensation: absent      Results for orders placed or performed in visit on 10/19/20   AMB POC HEMOGLOBIN A1C   Result Value Ref Range    Hemoglobin A1c (POC) 9.4 %         ASSESSMENT AND PLAN    ICD-10-CM ICD-9-CM    1. Type 2 diabetes mellitus with diabetic polyneuropathy, without long-term current use of insulin (HCC)  E11.42 250.60 AMB POC HEMOGLOBIN A1C     357.2 HM DIABETES FOOT EXAM   2. Type 2 diabetes with nephropathy (HCC)  E11.21 250.40      583.81    3. Arthralgia, unspecified joint  M25.50 719.40    4. Non-recurrent acute serous otitis media of right ear  H65.01 381.01 doxycycline (MONODOX) 100 mg capsule   5. Essential hypertension  Q35 126.2 METABOLIC PANEL, BASIC   6. Mixed hyperlipidemia  E78.2 272.2 LIPID PANEL   7. Moderate episode of recurrent major depressive disorder (HCC)  F33.1 296.32 buPROPion SR (WELLBUTRIN SR) 150 mg SR tablet   8. Mild persistent asthma without complication  P60.57 852.17    9. Needs flu shot  Z23 V04.81 INFLUENZA VIRUS VAC QUAD,SPLIT,PRESV FREE SYRINGE IM   10. Encounter for immunization  Z23 V03.89      Diagnoses and all orders for this visit:    1. Type 2 diabetes mellitus with diabetic polyneuropathy, without long-term current use of insulin (HCC)  Not controlled. A1c 9.4% today, up from 8.2% in 2/20. Better BS's at home over the past 2-3 weeks with improved diet. Continue Victoza, glipizide, metformin, and pioglitazone.   On Lyrica for neuropathy; would like to have amitriptyline added back. -     AMB POC HEMOGLOBIN A1C  -      DIABETES FOOT EXAM    2. Type 2 diabetes with nephropathy (HCC)  Urine microalbuminuria seen on 2/17/20. 3. Arthralgia, unspecified joint  Diffuse arthralgias. Follow up with rheumatologist to R/O RA or SLE. 4. Non-recurrent acute serous otitis media of right ear  -     Start doxycycline (MONODOX) 100 mg capsule; Take 1 Cap by mouth two (2) times a day. 5. Essential hypertension  Controlled. Continue losartan 25 mg daily.  -     METABOLIC PANEL, BASIC    6. Mixed hyperlipidemia  Continue atorvastatin 10 mg daily pending lab results. -     LIPID PANEL    7. Moderate episode of recurrent major depressive disorder (HCC)  -     Start buPROPion SR (WELLBUTRIN SR) 150 mg SR tablet; Take 1 Tab by mouth daily. 8. Mild persistent asthma without complication  Controlled. 9. Needs flu shot  -     INFLUENZA VIRUS VAC QUAD,SPLIT,PRESV FREE SYRINGE IM      Follow-up and Dispositions    · Return in about 3 months (around 1/19/2021), or if symptoms worsen or fail to improve, for DM, HTN, depression. I have discussed the diagnosis with the patient and the intended plan as seen in the above orders. Patient is in agreement. The patient has received an after-visit summary and questions were answered concerning future plans. I have discussed medication side effects and warnings with the patient as well.

## 2020-10-20 LAB
BUN SERPL-MCNC: 13 MG/DL (ref 6–24)
BUN/CREAT SERPL: 16 (ref 9–23)
CALCIUM SERPL-MCNC: 9.9 MG/DL (ref 8.7–10.2)
CHLORIDE SERPL-SCNC: 101 MMOL/L (ref 96–106)
CHOLEST SERPL-MCNC: 187 MG/DL (ref 100–199)
CO2 SERPL-SCNC: 27 MMOL/L (ref 20–29)
CREAT SERPL-MCNC: 0.83 MG/DL (ref 0.57–1)
GLUCOSE SERPL-MCNC: 118 MG/DL (ref 65–99)
HDLC SERPL-MCNC: 49 MG/DL
LDLC SERPL CALC-MCNC: 117 MG/DL (ref 0–99)
POTASSIUM SERPL-SCNC: 4.7 MMOL/L (ref 3.5–5.2)
SODIUM SERPL-SCNC: 141 MMOL/L (ref 134–144)
TRIGL SERPL-MCNC: 118 MG/DL (ref 0–149)
VLDLC SERPL CALC-MCNC: 21 MG/DL (ref 5–40)

## 2020-10-21 NOTE — PROGRESS NOTES
Message sent to patient by My Chart:  Your labs showed normal kidney tests. Your LDL, or bad cholesterol, was high at 117. It should be less than 100. Start taking atorvastatin 10 mg 2 tablets once a day. Let me know when you start getting low so I can send in a refill on this higher dose.     Dr. Rodríguez Hence

## 2020-10-22 ENCOUNTER — TELEPHONE (OUTPATIENT)
Dept: INTERNAL MEDICINE CLINIC | Age: 45
End: 2020-10-22

## 2020-10-22 DIAGNOSIS — H66.90 ACUTE OTITIS MEDIA, UNSPECIFIED OTITIS MEDIA TYPE: Primary | ICD-10-CM

## 2020-10-22 DIAGNOSIS — E11.42 TYPE 2 DIABETES MELLITUS WITH DIABETIC POLYNEUROPATHY, WITHOUT LONG-TERM CURRENT USE OF INSULIN (HCC): ICD-10-CM

## 2020-10-22 RX ORDER — PREGABALIN 200 MG/1
CAPSULE ORAL
Qty: 60 CAP | Refills: 0 | Status: SHIPPED | OUTPATIENT
Start: 2020-10-22 | End: 2020-12-04

## 2020-10-22 RX ORDER — AZITHROMYCIN 250 MG/1
TABLET, FILM COATED ORAL
Qty: 6 TAB | Refills: 0 | Status: SHIPPED | OUTPATIENT
Start: 2020-10-22 | End: 2021-01-26

## 2020-10-22 NOTE — TELEPHONE ENCOUNTER
Pt called and requested a call back to discuss possibly getting a different antibiotic than the one originally prescribed for her ear infection. Pt stated that her ear is still pounding, getting worse not better. Please return call at [phone].

## 2020-10-22 NOTE — TELEPHONE ENCOUNTER
Verified patients name and date of birth. Advised her per Dr Momo Martino. Patient stated understanding.

## 2020-10-26 ENCOUNTER — TELEPHONE (OUTPATIENT)
Dept: INTERNAL MEDICINE CLINIC | Age: 45
End: 2020-10-26

## 2020-10-26 DIAGNOSIS — H65.01 NON-RECURRENT ACUTE SEROUS OTITIS MEDIA OF RIGHT EAR: ICD-10-CM

## 2020-10-26 NOTE — TELEPHONE ENCOUNTER
Verified patients name and date of birth. Patient has severe right ear pain-states she she has finished zpac that was prescribed. Her right ear still hurts and patient wants to know if she can have another antibiotic called to [facility].

## 2020-10-26 NOTE — TELEPHONE ENCOUNTER
Pt called because she is still experiencing severe pain in her ear and has finished the antibiotics prescribed. Pt would like a call back at [phone] to discuss what she should do next, no appts available w/in the next 2 days.

## 2020-10-27 NOTE — TELEPHONE ENCOUNTER
Tell her that since she is allergic to penicillin, there are not many choices. The next best choice for ear infection is doxycycline, which I prescribed to her before. Did she have any problems with it? If not, I will prescribe it again but for 10 days. The other choice is cefdinir for 10 days but this medication is a cousin to penicillin and may possibly cause hives.

## 2020-10-28 RX ORDER — DOXYCYCLINE 100 MG/1
100 CAPSULE ORAL 2 TIMES DAILY
Qty: 20 CAP | Refills: 0 | Status: SHIPPED | OUTPATIENT
Start: 2020-10-28 | End: 2020-11-07

## 2020-10-28 NOTE — TELEPHONE ENCOUNTER
Pt returning call from the nurse. Pt stated she needs to know what antibiotics are to be prescribed. Has had continued pain for another two days. Please return call at [phone]. Pt stated if she does not answer, please call 's cell at [phone].

## 2020-10-28 NOTE — TELEPHONE ENCOUNTER
Spoke with patient. She stated she did not have any problems with the doxycycline and is willing to try it again. Uses [facility].

## 2020-10-28 NOTE — TELEPHONE ENCOUNTER
Verified patients name and date of birth. [facility] He is unsure if patient had an issue with doxycycline in the past. He will have patient call office when available. She uses [facility] [address] .

## 2020-11-10 ENCOUNTER — TELEPHONE (OUTPATIENT)
Dept: INTERNAL MEDICINE CLINIC | Age: 45
End: 2020-11-10

## 2020-11-10 NOTE — TELEPHONE ENCOUNTER
Approved today pioglitazone 30 mg   Your request has been approved   Drug Pioglitazone HCl 30MG tablets

## 2020-11-15 PROBLEM — M06.00 SERONEGATIVE RHEUMATOID ARTHRITIS (HCC): Status: ACTIVE | Noted: 2020-11-15

## 2020-12-04 DIAGNOSIS — E11.42 TYPE 2 DIABETES MELLITUS WITH DIABETIC POLYNEUROPATHY, WITHOUT LONG-TERM CURRENT USE OF INSULIN (HCC): ICD-10-CM

## 2020-12-04 DIAGNOSIS — H65.01 NON-RECURRENT ACUTE SEROUS OTITIS MEDIA OF RIGHT EAR: ICD-10-CM

## 2020-12-04 RX ORDER — ATORVASTATIN CALCIUM 10 MG/1
TABLET, FILM COATED ORAL
Qty: 90 TAB | Refills: 3 | Status: SHIPPED | OUTPATIENT
Start: 2020-12-04 | End: 2021-10-27

## 2020-12-04 RX ORDER — AMITRIPTYLINE HYDROCHLORIDE 50 MG/1
50 TABLET, FILM COATED ORAL
Qty: 30 TAB | Refills: 3 | Status: SHIPPED | OUTPATIENT
Start: 2020-12-04 | End: 2022-02-09

## 2020-12-04 RX ORDER — PREGABALIN 200 MG/1
CAPSULE ORAL
Qty: 60 CAP | Refills: 0 | Status: SHIPPED | OUTPATIENT
Start: 2020-12-04 | End: 2020-12-31

## 2020-12-04 RX ORDER — DOXYCYCLINE 100 MG/1
CAPSULE ORAL
Qty: 10 CAP | Refills: 0 | OUTPATIENT
Start: 2020-12-04

## 2020-12-04 NOTE — TELEPHONE ENCOUNTER
Verified patients name and date of birth. Patient stated she was told she could go back on elavil once she was off lexapro. She is fully off lexapro and now on wellbutrin.

## 2020-12-09 DIAGNOSIS — E11.42 TYPE 2 DIABETES MELLITUS WITH DIABETIC POLYNEUROPATHY, WITHOUT LONG-TERM CURRENT USE OF INSULIN (HCC): ICD-10-CM

## 2020-12-09 RX ORDER — PIOGLITAZONEHYDROCHLORIDE 30 MG/1
TABLET ORAL
Qty: 30 TAB | Refills: 0 | Status: SHIPPED | OUTPATIENT
Start: 2020-12-09 | End: 2021-01-06

## 2020-12-28 DIAGNOSIS — E11.42 TYPE 2 DIABETES MELLITUS WITH DIABETIC POLYNEUROPATHY, WITHOUT LONG-TERM CURRENT USE OF INSULIN (HCC): ICD-10-CM

## 2020-12-28 RX ORDER — LOSARTAN POTASSIUM 25 MG/1
25 TABLET ORAL DAILY
Qty: 30 TAB | Refills: 3 | Status: SHIPPED | OUTPATIENT
Start: 2020-12-28 | End: 2021-01-26 | Stop reason: ALTCHOICE

## 2020-12-28 NOTE — TELEPHONE ENCOUNTER
PCP: Wicho Bush MD     Last appt: 10/19/2020   Future Appointments   Date Time Provider Dwight Spencer   1/26/2021  8:10 AM Wicho Bush MD [facility] AMB        Requested Prescriptions     Pending Prescriptions Disp Refills    losartan (COZAAR) 25 mg tablet 30 Tab 3     Sig: Take 1 Tab by mouth daily. (Substitute for valsartan). For renal protection.

## 2020-12-28 NOTE — TELEPHONE ENCOUNTER
Requested Prescriptions     Pending Prescriptions Disp Refills    losartan (COZAAR) 25 mg tablet 30 Tab 3     Sig: Take 1 Tab by mouth daily. (Substitute for valsartan). For renal protection.

## 2020-12-30 DIAGNOSIS — E11.42 TYPE 2 DIABETES MELLITUS WITH DIABETIC POLYNEUROPATHY, WITHOUT LONG-TERM CURRENT USE OF INSULIN (HCC): ICD-10-CM

## 2020-12-31 RX ORDER — PREGABALIN 200 MG/1
CAPSULE ORAL
Qty: 60 CAP | Refills: 0 | Status: SHIPPED | OUTPATIENT
Start: 2020-12-31 | End: 2021-02-15 | Stop reason: SDUPTHER

## 2021-01-04 ENCOUNTER — TELEPHONE (OUTPATIENT)
Dept: INTERNAL MEDICINE CLINIC | Age: 46
End: 2021-01-04

## 2021-01-04 NOTE — TELEPHONE ENCOUNTER
Pt called and stated that her arthritis doctor wanted to put her on a low dose prednisone of 5 mg daily. Pt and this provider need to know if this is ok and if not what else should they try. Pt stated that she is having a lot of pain.

## 2021-01-04 NOTE — TELEPHONE ENCOUNTER
Tell her it is okay to start on prednisone if needed but she will need to check her BS's twice a day on prednisone since prednisone can increase blood glucose (check BS before breakfast and 1-2 hrs after dinner). It is likely she will need to start on insulin if she starts taking prednisone.

## 2021-01-04 NOTE — TELEPHONE ENCOUNTER
Verified patients name and date of birth. Advised her per Dr Ele Linares. Patient stated understanding.

## 2021-01-05 DIAGNOSIS — E11.42 TYPE 2 DIABETES MELLITUS WITH DIABETIC POLYNEUROPATHY, WITHOUT LONG-TERM CURRENT USE OF INSULIN (HCC): ICD-10-CM

## 2021-01-06 ENCOUNTER — TRANSCRIBE ORDER (OUTPATIENT)
Dept: SCHEDULING | Age: 46
End: 2021-01-06

## 2021-01-06 DIAGNOSIS — R10.32 LEFT LOWER QUADRANT PAIN: Primary | ICD-10-CM

## 2021-01-06 RX ORDER — PIOGLITAZONEHYDROCHLORIDE 30 MG/1
TABLET ORAL
Qty: 30 TAB | Refills: 0 | Status: SHIPPED | OUTPATIENT
Start: 2021-01-06 | End: 2021-06-29

## 2021-01-21 ENCOUNTER — HOSPITAL ENCOUNTER (OUTPATIENT)
Dept: CT IMAGING | Age: 46
Discharge: HOME | End: 2021-01-21
Attending: NURSE PRACTITIONER
Payer: MEDICAID

## 2021-01-21 DIAGNOSIS — R10.32 LEFT LOWER QUADRANT PAIN: ICD-10-CM

## 2021-01-21 PROCEDURE — 74177 CT ABD & PELVIS W/CONTRAST: CPT

## 2021-01-21 PROCEDURE — 74011000255 HC RX REV CODE- 255: Performed by: NURSE PRACTITIONER

## 2021-01-21 PROCEDURE — 74011000636 HC RX REV CODE- 636: Performed by: NURSE PRACTITIONER

## 2021-01-21 RX ORDER — BARIUM SULFATE 20 MG/ML
900 SUSPENSION ORAL
Status: COMPLETED | OUTPATIENT
Start: 2021-01-21 | End: 2021-01-21

## 2021-01-21 RX ADMIN — IOPAMIDOL 100 ML: 755 INJECTION, SOLUTION INTRAVENOUS at 08:12

## 2021-01-21 RX ADMIN — BARIUM SULFATE 900 ML: 21 SUSPENSION ORAL at 08:12

## 2021-01-26 ENCOUNTER — OFFICE VISIT (OUTPATIENT)
Dept: INTERNAL MEDICINE CLINIC | Age: 46
End: 2021-01-26
Payer: MEDICAID

## 2021-01-26 VITALS
RESPIRATION RATE: 16 BRPM | OXYGEN SATURATION: 97 % | BODY MASS INDEX: 50.02 KG/M2 | WEIGHT: 293 LBS | HEART RATE: 72 BPM | HEIGHT: 64 IN | SYSTOLIC BLOOD PRESSURE: 145 MMHG | TEMPERATURE: 99.1 F | DIASTOLIC BLOOD PRESSURE: 77 MMHG

## 2021-01-26 DIAGNOSIS — E11.42 TYPE 2 DIABETES MELLITUS WITH DIABETIC POLYNEUROPATHY, WITHOUT LONG-TERM CURRENT USE OF INSULIN (HCC): Primary | ICD-10-CM

## 2021-01-26 DIAGNOSIS — M06.00 SERONEGATIVE RHEUMATOID ARTHRITIS (HCC): ICD-10-CM

## 2021-01-26 DIAGNOSIS — J45.30 MILD PERSISTENT ASTHMA WITHOUT COMPLICATION: ICD-10-CM

## 2021-01-26 DIAGNOSIS — E78.2 MIXED HYPERLIPIDEMIA: ICD-10-CM

## 2021-01-26 DIAGNOSIS — R41.3 MEMORY LOSS: ICD-10-CM

## 2021-01-26 DIAGNOSIS — I10 ESSENTIAL HYPERTENSION: ICD-10-CM

## 2021-01-26 LAB — HBA1C MFR BLD HPLC: 6.3 %

## 2021-01-26 PROCEDURE — 83036 HEMOGLOBIN GLYCOSYLATED A1C: CPT | Performed by: INTERNAL MEDICINE

## 2021-01-26 PROCEDURE — 99214 OFFICE O/P EST MOD 30 MIN: CPT | Performed by: INTERNAL MEDICINE

## 2021-01-26 RX ORDER — GLIPIZIDE 10 MG/1
TABLET ORAL
Qty: 180 TAB | Refills: 1
Start: 2021-01-26 | End: 2021-08-27

## 2021-01-26 RX ORDER — VALSARTAN 40 MG/1
TABLET ORAL
Qty: 180 TAB | Refills: 0
Start: 2021-01-26 | End: 2022-01-27 | Stop reason: DRUGHIGH

## 2021-01-26 RX ORDER — LEUCOVORIN CALCIUM 5 MG/1
TABLET ORAL
COMMUNITY
Start: 2020-12-11 | End: 2021-06-29

## 2021-01-26 RX ORDER — FLUTICASONE PROPIONATE AND SALMETEROL 500; 50 UG/1; UG/1
1 POWDER RESPIRATORY (INHALATION) EVERY 12 HOURS
Qty: 60 EACH | Refills: 11 | Status: SHIPPED | OUTPATIENT
Start: 2021-01-26 | End: 2022-01-27 | Stop reason: SDUPTHER

## 2021-01-26 RX ORDER — METHOTREXATE 2.5 MG/1
TABLET ORAL
COMMUNITY
Start: 2021-01-19

## 2021-01-26 RX ORDER — PREDNISONE 5 MG/1
TABLET ORAL
COMMUNITY
Start: 2021-01-04 | End: 2021-03-31 | Stop reason: SDUPTHER

## 2021-01-26 RX ORDER — ALBUTEROL SULFATE 90 UG/1
2 AEROSOL, METERED RESPIRATORY (INHALATION)
Qty: 1 INHALER | Refills: 11 | Status: SHIPPED | OUTPATIENT
Start: 2021-01-26 | End: 2022-01-27 | Stop reason: SDUPTHER

## 2021-01-26 RX ORDER — DICYCLOMINE HYDROCHLORIDE 10 MG/1
CAPSULE ORAL
COMMUNITY
Start: 2021-01-06 | End: 2021-06-29

## 2021-01-26 RX ORDER — FOLIC ACID 1 MG/1
1 TABLET ORAL DAILY
COMMUNITY
Start: 2021-01-19

## 2021-01-26 NOTE — PROGRESS NOTES
CC:   Chief Complaint   Patient presents with    Hypertension       HISTORY OF PRESENT ILLNESS  Lindsay Guy is a 50 y.o. female. Presents for 3 month follow up evaluation. She has type 2 DM with neuropathy, HTN, hyperlipidemia, seronegative RA, asthma, allergic rhinitis, FE,  depression, and morbid obesity.      She complains of:   1) LLQ abdominal pain that radiates to the RLQ. Has been seeing Dr. Veda Callaway and Skylar Donovan NP for this. Was started on dicyclomine. 2) Memory loss and new stuttering for the past few months. Would like to see a neurologist.  3) diffuse pain at the joints. On prednisone 5 mg daily, helping joint pains. Also on methotrexate. Has difficulty walking long distances due to pain. Would like to get handicapped placard; wrote in [facility] form. She is seen for diabetes. Since last visit she reports hypoglycemia in the early afternoons; occurred even after decreasing glipizide 10 mg form 1 tab twice daily to 1/2 tab twice daily. Takes pioglitazone in the morning. Denies polyuria or polydipsia. Home glucose monitoring: is performed regularly. She reports medication compliance: compliant all of the time. Medication side effects: none. Diabetic diet compliance: compliant most of the time. Lab review: A1c is 6.3% today (1/26/21), down from 9.4% on 10/19/20. Eye exam: UTD. The patient has hypertension and hyperlipidemia. Denies HA's, CP, SOB, dizziness, heart palpitations, or leg swelling. Home BP monitoring: not done. She reports taking medications as instructed, no medication side effects noted. Diet and Lifestyle: generally follows a low fat low cholesterol diet, generally follows a low sodium diet, sedentary. [id number]: tot chol 187, LDL 17. Depressed mood improving. 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 Denies SI or HI. Compliant with Wellbutrin  mg daily.     Soc Hx  ;  is disabled.  Has 2 children (ages 35 and 24); youngest son in school studying for Master's degree.  Unemployed; has applied for disability.  Never smoker.  Denies alcohol or recreational drug use.  Walks occasionally for exercise.  Drinks unsweetened tea; denies coffee or sodas. ROS  A complete review of systems was performed and is negative except for those mentioned in the HPI.     Patient Active Problem List   Diagnosis Code    HTN (hypertension) I10    Moderate episode of recurrent major depressive disorder ([facility] F33.1    FE (obstructive sleep apnea) G47.33    Asthma J45.909    Iron deficiency anemia D50.9    Hyperlipidemia E78.5    Obesity, morbid, BMI 50 or higher ([facility]) E66.01    Type 2 diabetes mellitus with diabetic polyneuropathy, without long-term current use of insulin ([facility]) E11.42    Coronary artery disease involving native coronary artery of native heart without angina pectoris I25.10    Chronic midline low back pain without sciatica M54.5, G89.29    Atypical squamous cells of undetermined significance (ASCUS) on Papanicolaou smear of cervix R87.610    Type 2 diabetes with nephropathy ([facility]) E11.21    Seronegative rheumatoid arthritis ([facility] M06.00     Past Medical History:   Diagnosis Date    Anemia (iron deficiency)     Asthma     DDD (degenerative disc disease), lumbar 2014    Depression     DM type 2 (diabetes mellitus, type 2) ([facility] 11/23/2011    GERD (gastroesophageal reflux disease)     GI bleed 2015    Hepatic steatosis 11/2016    confirmed by US    HTN (hypertension)     Irregular menses     Irritable bowel     Kidney mass     5/28/19: US showed rigth renal cystic nephroma, follows with Urology (Dr. Ludy Renae)    Morbid obesity ([facility]     FE (obstructive sleep apnea)     w/ Cpap    PUD (peptic ulcer disease) 2015     Allergies   Allergen Reactions    Lisinopril Cough    Pcn [Penicillins] Hives       Current Outpatient Medications   Medication Sig Dispense Refill    dicyclomine (BENTYL) 10 mg capsule TAKE 1 CAPSULE BY MOUTH 4 TIMES DAILY AS NEEDED FOR PAIN      folic acid (FOLVITE) 1 mg tablet       methotrexate (RHEUMATREX) 2.5 mg tablet       predniSONE (DELTASONE) 5 mg tablet TAKE 1 TABLET BY MOUTH IN THE MORNING AS NEEDED      leucovorin calcium (WELLCOVORIN) 5 mg tablet TAKE 1 TABLET BY MOUTH ONCE A WEEK 5 HOURS AFTER METHOTREXATE DOSE      fluticasone propion-salmeteroL (Advair Diskus) 500-50 mcg/dose diskus inhaler Take 1 Puff by inhalation every twelve (12) hours. Asthma, Dx: J45.30 60 Each 11    albuterol (PROVENTIL HFA, VENTOLIN HFA, PROAIR HFA) 90 mcg/actuation inhaler Take 2 Puffs by inhalation every six (6) hours as needed for Wheezing. Asthma, Dx: J45.30 1 Inhaler 11    pioglitazone (ACTOS) 30 mg tablet Take 1 tablet by mouth once daily 30 Tab 0    pregabalin (LYRICA) 200 mg capsule TAKE 1 TABLET BY MOUTH TWICE DAILY . DO NOT EXCEED 2 PER 24 HOURS 60 Cap 0    losartan (COZAAR) 25 mg tablet Take 1 Tab by mouth daily. (Substitute for valsartan). For renal protection. 30 Tab 3    glipiZIDE (GLUCOTROL) 10 mg tablet Take 1 tablet by mouth twice daily 180 Tab 1    atorvastatin (LIPITOR) 10 mg tablet Take 1 tablet by mouth once daily 90 Tab 3    amitriptyline (ELAVIL) 50 mg tablet Take 1 Tab by mouth nightly. For numbness at hands and feet. 30 Tab 3    buPROPion SR (WELLBUTRIN SR) 150 mg SR tablet Take 1 Tab by mouth daily. 30 Tab 3    metFORMIN ER (GLUCOPHAGE XR) 500 mg tablet Take 2 tablets by mouth twice daily 120 Tab 3    valsartan (DIOVAN) 40 mg tablet valsartan 40 mg tablet   Take 1 tablet every day by oral route.  ibuprofen (MOTRIN) 600 mg tablet Take 1 Tab by mouth every six (6) hours as needed for Pain. 60 Tab 2    montelukast (SINGULAIR) 10 mg tablet TAKE 1 TABLET BY MOUTH ONCE DAILY FOR  ALLERGIES  AND  ASTHMA 90 Tab 3    meclizine (ANTIVERT) 25 mg tablet Take 1 Tab by mouth three (3) times daily as needed for Dizziness.  20 Tab 1    nitroglycerin (NITROSTAT) 0.4 mg SL tablet Take 1 Tab by mouth every five (5) minutes as needed for Chest Pain. Sit/Lay down then put one tab under the tongue every 5 minutes as needed for chest pain for 3 doses 1 Bottle 0    albuterol (PROVENTIL VENTOLIN) 2.5 mg /3 mL (0.083 %) nebulizer solution USE ONE VIAL IN NEBULIZER EVERY 4 HOURS AS NEEDED FOR WHEEZING 30 Each 3         PHYSICAL EXAM  Visit Vitals  BP (!) 145/77 (BP 1 Location: Left arm, BP Patient Position: Sitting)   Pulse 72   Temp 99.1 °F (37.3 °C) (Oral)   Resp 16   Ht 5' 4\" (1.626 m)   Wt 301 lb (136.5 kg)   SpO2 97%   BMI 51.67 kg/m²       General: Morbidly obese, no distress. HEENT:  Head normocephalic/atraumatic, no scleral icterus  Neck: Supple. No JVD, lymphadenopathy, or thyromegaly. Neck: Supple. No JVD, lymphadenopathy, or thyromegaly. Lungs:  Clear to ausculation bilaterally. Good air movement. Heart:  Regular rate and rhythm, normal S1 and S2, no murmur, gallop, or rub  Extremities: No clubbing, cyanosis, or edema. Neurological: Alert and oriented. Psychiatric: Normal mood and affect. Behavior is normal.    Results for orders placed or performed in visit on 01/26/21   AMB POC HEMOGLOBIN A1C   Result Value Ref Range    Hemoglobin A1c (POC) 6.3 %           ASSESSMENT AND PLAN    ICD-10-CM ICD-9-CM    1. Type 2 diabetes mellitus with diabetic polyneuropathy, without long-term current use of insulin ([facility])  E11.42 250.60 AMB POC HEMOGLOBIN A1C     357.2 MICROALBUMIN, UR, RAND W/ MICROALB/CREAT RATIO   2. Essential hypertension  I10 401.9    3. Mixed hyperlipidemia  E78.2 272.2    4. Mild persistent asthma without complication  T49.11 806.66 fluticasone propion-salmeteroL (Advair Diskus) 500-50 mcg/dose diskus inhaler      albuterol (PROVENTIL HFA, VENTOLIN HFA, PROAIR HFA) 90 mcg/actuation inhaler   5. Seronegative rheumatoid arthritis ([facility])  M06.00 714.0    6. Memory loss  R41.3 780.93 REFERRAL TO NEUROLOGY   7.  Uncontrolled type 2 diabetes mellitus with stable proliferative retinopathy, without long-term current use of insulin ([facility])  E11.3559 250.52 glipiZIDE (GLUCOTROL) 10 mg tablet    E11.65 362.02      Diagnoses and all orders for this visit:    1. Type 2 diabetes mellitus with diabetic polyneuropathy, without long-term current use of insulin ([facility])  Controlled with A1c 6.4% today. Improved from A1c 9.4% in 10/20. On amitriptyline and Lyrica for polyneuropathy.  -     AMB POC HEMOGLOBIN A1C  -     MICROALBUMIN, UR, RAND W/ MICROALB/CREAT RATIO        -     Continue: glipiZIDE (GLUCOTROL) 10 mg tablet; Take 1/2 tablet by mouth twice daily        -     Start taking pioglitazone in the evenings rather than mornings to avoid early afternoon hypoglycemia. 2. Essential hypertension  Not at goal of less than 140/90.    -     Increase to: valsartan (DIOVAN) 40 mg tablet; Take 2 tablets every day by oral route. -      Stop losartan (was prescribed as substitute med when pharmacy was out of valsartan)    3. Mixed hyperlipidemia  Not controlled. Was started on atorvastatin 10 mg daily on 12/4/20. Plan to recheck lipid panel at next clinic visit. 4. Mild persistent asthma without complication  Controlled. -     Refill fluticasone propion-salmeteroL (Advair Diskus) 500-50 mcg/dose diskus inhaler; Take 1 Puff by inhalation every twelve (12) hours. Asthma, Dx: J45.30  -     Refill albuterol (PROVENTIL HFA, VENTOLIN HFA, PROAIR HFA) 90 mcg/actuation inhaler; Take 2 Puffs by inhalation every six (6) hours as needed for Wheezing. Asthma, Dx: J45.30    5. Seronegative rheumatoid arthritis (HCC)  On MTX, prednisone, leucovorin, and folic acid. 6. Memory loss  Suspect it may be medication-related (MTX, Lyrica, or amitriptyline all suspect). Okay to wean off Lyrica or amitriptyline if needed.     -     REFERRAL TO NEUROLOGY      Follow-up and Dispositions    · Return in about 3 months (around 4/26/2021), or if symptoms worsen or fail to improve, for HTN, DM, depression. I have discussed the diagnosis with the patient and the intended plan as seen in the above orders. Patient is in agreement. The patient has received an after-visit summary and questions were answered concerning future plans. I have discussed medication side effects and warnings with the patient as well.

## 2021-01-26 NOTE — PROGRESS NOTES
Identified pt with two pt identifiers. Reviewed record in preparation for visit and have obtained necessary documentation. All patient medications has been reviewed. Chief Complaint   Patient presents with    Hypertension     Additional information about chief complaint:    Visit Vitals  BP (!) 145/77 (BP 1 Location: Left arm, BP Patient Position: Sitting)   Pulse 72   Temp 99.1 °F (37.3 °C) (Oral)   Resp 16   Ht 5' 4\" (1.626 m)   Wt 301 lb (136.5 kg)   SpO2 97%   BMI 51.67 kg/m²       Health Maintenance Due   Topic    COVID-19 Vaccine (1 of 2)    A1C test (Diabetic or Prediabetic)     MICROALBUMIN Q1        1. Have you been to the ER, urgent care clinic since your last visit? Hospitalized since your last visit? No   2. Have you seen or consulted any other health care providers outside of the [address] [place] [address] since your last visit? Include any pap smears or colon screening.   Rheumatologist and GI  bdg

## 2021-01-27 LAB
ALBUMIN/CREAT UR: 4 MG/G CREAT (ref 0–29)
CREAT UR-MCNC: 232.6 MG/DL
MICROALBUMIN UR-MCNC: 10.3 UG/ML
SPECIMEN STATUS REPORT, ROLRST: NORMAL

## 2021-01-28 NOTE — PROGRESS NOTES
Message sent to patient by My Chart:  Your urine microalbumin, or protein, test returned normal.  This means there is no early sign of diabetes affecting your kidneys.     Dr. Lizandro Montemayor

## 2021-01-29 ENCOUNTER — TELEPHONE (OUTPATIENT)
Dept: INTERNAL MEDICINE CLINIC | Age: 46
End: 2021-01-29

## 2021-01-29 NOTE — TELEPHONE ENCOUNTER
Pt states she called DR Li's office on Monday and was informed she would get a call with in 24 hrs. Pt called back on Wednesday and was told something. I called at 4:36pm office close, will try again on Monday.

## 2021-01-29 NOTE — TELEPHONE ENCOUNTER
Pt called and stated that the rheumatologist that was referred to her does not answer the phone.  Pt was wondering if we could get in touch with them or if we could recommend someone else

## 2021-02-02 ENCOUNTER — TRANSCRIBE ORDER (OUTPATIENT)
Dept: FAMILY MEDICINE CLINIC | Age: 46
End: 2021-02-02

## 2021-02-02 ENCOUNTER — TRANSCRIBE ORDER (OUTPATIENT)
Dept: NEUROLOGY | Age: 46
End: 2021-02-02

## 2021-02-02 NOTE — TELEPHONE ENCOUNTER
Unable to speak to anyone in Dr Elizabet Hair office, referral faxed to [phone], asking them to call pt to schedule. Update left on pt's vm.

## 2021-02-15 DIAGNOSIS — E11.42 TYPE 2 DIABETES MELLITUS WITH DIABETIC POLYNEUROPATHY, WITHOUT LONG-TERM CURRENT USE OF INSULIN (HCC): ICD-10-CM

## 2021-02-15 RX ORDER — PREGABALIN 200 MG/1
CAPSULE ORAL
Qty: 60 CAP | Refills: 0 | Status: SHIPPED | OUTPATIENT
Start: 2021-02-15 | End: 2021-03-17

## 2021-02-15 NOTE — TELEPHONE ENCOUNTER
[facility] on file sent a fax requesting a refill of   Requested Prescriptions     Pending Prescriptions Disp Refills    pregabalin (LYRICA) 200 mg capsule 60 Cap 0

## 2021-02-15 NOTE — TELEPHONE ENCOUNTER
REFILL     PCP: Fatoumata Noble MD     Last appt: 1/26/2021   Future Appointments   Date Time Provider Dwight Spencer   2/24/2021  9:00 AM Oli Meyer MD [facility] AMB   4/27/2021  8:50 AM Fatoumata Noble MD [facility] AMB        Requested Prescriptions     Pending Prescriptions Disp Refills    pregabalin (LYRICA) 200 mg capsule 60 Cap 0

## 2021-02-22 ENCOUNTER — TELEPHONE (OUTPATIENT)
Dept: INTERNAL MEDICINE CLINIC | Age: 46
End: 2021-02-22

## 2021-02-22 NOTE — TELEPHONE ENCOUNTER
Pt is getting the Covid vaccine today at [facility]. She wanted to know if we received information from [employer], informed January 27, forms were scanned and sent to medical records company. She states she has felt more nervous and wanted to have her Wellbutrin increased, appointment given for 2/23/2021. Pt has no further questions at this time.

## 2021-02-22 NOTE — TELEPHONE ENCOUNTER
Pt would like a callback at [phone] to discuss covid vaccine she is scheduled for this afternoon (pt would like provider recommendation for safety) and to discuss a recent social security claim. Pt would like to know if we have been contacted about this claim.  Pt is requesting a callback prior to vaccine appt @ 2:00pm.

## 2021-02-23 ENCOUNTER — OFFICE VISIT (OUTPATIENT)
Dept: INTERNAL MEDICINE CLINIC | Age: 46
End: 2021-02-23
Payer: MEDICAID

## 2021-02-23 VITALS
HEIGHT: 64 IN | WEIGHT: 293 LBS | RESPIRATION RATE: 16 BRPM | SYSTOLIC BLOOD PRESSURE: 142 MMHG | DIASTOLIC BLOOD PRESSURE: 82 MMHG | TEMPERATURE: 98.3 F | OXYGEN SATURATION: 98 % | BODY MASS INDEX: 50.02 KG/M2 | HEART RATE: 66 BPM

## 2021-02-23 DIAGNOSIS — F33.1 MODERATE EPISODE OF RECURRENT MAJOR DEPRESSIVE DISORDER (HCC): Primary | ICD-10-CM

## 2021-02-23 PROCEDURE — 99213 OFFICE O/P EST LOW 20 MIN: CPT | Performed by: INTERNAL MEDICINE

## 2021-02-23 RX ORDER — BUPROPION HYDROCHLORIDE 150 MG/1
150 TABLET, EXTENDED RELEASE ORAL 2 TIMES DAILY
Qty: 60 TAB | Refills: 3 | Status: SHIPPED | OUTPATIENT
Start: 2021-02-23 | End: 2021-08-27

## 2021-02-23 NOTE — PROGRESS NOTES
Identified pt with two pt identifiers. Reviewed record in preparation for visit and have obtained necessary documentation. All patient medications has been reviewed. Chief Complaint   Patient presents with    Medication Evaluation     Additional information about chief complaint:    Visit Vitals  BP (!) 142/82 (BP 1 Location: Left lower arm, BP Patient Position: Sitting, BP Cuff Size: Large adult long)   Pulse 66   Temp 98.3 °F (36.8 °C) (Oral)   Resp 16   Ht 5' 4\" (1.626 m)   Wt 299 lb (135.6 kg)   SpO2 98%   BMI 51.32 kg/m²       Health Maintenance Due   Topic    Hepatitis C Screening        1. Have you been to the ER, urgent care clinic since your last visit? Hospitalized since your last visit? NO     2. Have you seen or consulted any other health care providers outside of the [address] [place] [address] since your last visit? Include any pap smears or colon screening.  NO     bdg

## 2021-02-23 NOTE — PROGRESS NOTES
CC:   Chief Complaint   Patient presents with    Medication Evaluation       HISTORY OF PRESENT ILLNESS  Lindsay Guy is a 50 y.o. female. Presents for medication evaluation.   She has type 2 DM with neuropathy, HTN, hyperlipidemia, seronegative RA, asthma, allergic rhinitis, FE,  depression, and morbid obesity.     Complains of feeling stressed out and depressed. Does not feel like Wellbutrin  mg daily is helping enough. Has not been looking for a job because she has severe pain some days. 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 Denies SI or HI. Was on Lexapro previously that helped symptoms but was changed to Wellbutrin due to adverse interaction between Lexapro and amitriptyline.        Patient Active Problem List   Diagnosis Code    HTN (hypertension) I10    Moderate episode of recurrent major depressive disorder ([facility] F33.1    FE (obstructive sleep apnea) G47.33    Asthma J45.909    Iron deficiency anemia D50.9    Hyperlipidemia E78.5    Obesity, morbid, BMI 50 or higher ([facility]) E66.01    Type 2 diabetes mellitus with diabetic polyneuropathy, without long-term current use of insulin ([facility]) E11.42    Coronary artery disease involving native coronary artery of native heart without angina pectoris I25.10    Chronic midline low back pain without sciatica M54.5, G89.29    Atypical squamous cells of undetermined significance (ASCUS) on Papanicolaou smear of cervix R87.610    Type 2 diabetes with nephropathy ([facility]) E11.21    Seronegative rheumatoid arthritis ([facility] M06.00     Past Medical History:   Diagnosis Date    Anemia (iron deficiency)     Asthma     DDD (degenerative disc disease), lumbar 2014    Depression     DM type 2 (diabetes mellitus, type 2) ([facility] 11/23/2011    GERD (gastroesophageal reflux disease)     GI bleed 2015    Hepatic steatosis 11/2016    confirmed by US    HTN (hypertension)     Irregular menses     Irritable bowel     Kidney mass     5/28/19: US showed rigth renal cystic nephroma, follows with Urology (Dr. Asa Rosenberg)    Morbid obesity ([address]     FE (obstructive sleep apnea)     w/ Cpap    PUD (peptic ulcer disease) 2015     Allergies   Allergen Reactions    Lisinopril Cough    Pcn [Penicillins] Hives       Current Outpatient Medications   Medication Sig Dispense Refill    pregabalin (LYRICA) 200 mg capsule TAKE 1 TABLET BY MOUTH TWICE DAILY . DO NOT EXCEED 2 PER 24 HOURS 60 Cap 0    dicyclomine (BENTYL) 10 mg capsule TAKE 1 CAPSULE BY MOUTH 4 TIMES DAILY AS NEEDED FOR PAIN      folic acid (FOLVITE) 1 mg tablet       methotrexate (RHEUMATREX) 2.5 mg tablet       predniSONE (DELTASONE) 5 mg tablet TAKE 1 TABLET BY MOUTH IN THE MORNING AS NEEDED      leucovorin calcium (WELLCOVORIN) 5 mg tablet TAKE 1 TABLET BY MOUTH ONCE A WEEK 5 HOURS AFTER METHOTREXATE DOSE      fluticasone propion-salmeteroL (Advair Diskus) 500-50 mcg/dose diskus inhaler Take 1 Puff by inhalation every twelve (12) hours. Asthma, Dx: J45.30 60 Each 11    albuterol (PROVENTIL HFA, VENTOLIN HFA, PROAIR HFA) 90 mcg/actuation inhaler Take 2 Puffs by inhalation every six (6) hours as needed for Wheezing. Asthma, Dx: J45.30 1 Inhaler 11    glipiZIDE (GLUCOTROL) 10 mg tablet Take 1/2 tablet by mouth twice daily 180 Tab 1    valsartan (DIOVAN) 40 mg tablet Take 2 tablets every day by oral route. 180 Tab 0    pioglitazone (ACTOS) 30 mg tablet Take 1 tablet by mouth once daily 30 Tab 0    atorvastatin (LIPITOR) 10 mg tablet Take 1 tablet by mouth once daily 90 Tab 3    amitriptyline (ELAVIL) 50 mg tablet Take 1 Tab by mouth nightly. For numbness at hands and feet. 30 Tab 3    buPROPion SR (WELLBUTRIN SR) 150 mg SR tablet Take 1 Tab by mouth daily.  30 Tab 3    metFORMIN ER (GLUCOPHAGE XR) 500 mg tablet Take 2 tablets by mouth twice daily 120 Tab 3    ibuprofen (MOTRIN) 600 mg tablet Take 1 Tab by mouth every six (6) hours as needed for Pain. 60 Tab 2    montelukast (SINGULAIR) 10 mg tablet TAKE 1 TABLET BY MOUTH ONCE DAILY FOR  ALLERGIES  AND  ASTHMA 90 Tab 3    meclizine (ANTIVERT) 25 mg tablet Take 1 Tab by mouth three (3) times daily as needed for Dizziness. 20 Tab 1    nitroglycerin (NITROSTAT) 0.4 mg SL tablet Take 1 Tab by mouth every five (5) minutes as needed for Chest Pain. Sit/Lay down then put one tab under the tongue every 5 minutes as needed for chest pain for 3 doses 1 Bottle 0    albuterol (PROVENTIL VENTOLIN) 2.5 mg /3 mL (0.083 %) nebulizer solution USE ONE VIAL IN NEBULIZER EVERY 4 HOURS AS NEEDED FOR WHEEZING 30 Each 3         PHYSICAL EXAM  Visit Vitals  BP (!) 142/82 (BP 1 Location: Left lower arm, BP Patient Position: Sitting, BP Cuff Size: Large adult long)   Pulse 66   Temp 98.3 °F (36.8 °C) (Oral)   Resp 16   Ht 5' 4\" (1.626 m)   Wt 299 lb (135.6 kg)   SpO2 98%   BMI 51.32 kg/m²       General: Morbidly obese, no distress. HEENT:  Head normocephalic/atraumatic, no scleral icterus  Lungs:  Clear to ausculation bilaterally. Good air movement. Heart:  Regular rate and rhythm, normal S1 and S2, no murmur, gallop, or rub  Extremities: No clubbing, cyanosis, or edema. Neurological: Alert and oriented. Psychiatric: Normal mood and affect. Behavior is normal.         ASSESSMENT AND PLAN    ICD-10-CM ICD-9-CM    1. Moderate episode of recurrent major depressive disorder (HCC)  F33.1 296.32 buPROPion SR (WELLBUTRIN SR) 150 mg SR tablet     Diagnoses and all orders for this visit:    1. Moderate episode of recurrent major depressive disorder ([facility]  Not controlled. Increase dose of Wellbutrin.  -     Start buPROPion SR (WELLBUTRIN SR) 150 mg SR tablet; Take 1 Tab by mouth two (2) times a day. Follow-up and Dispositions    · Return in about 1 month (around 3/23/2021), or if symptoms worsen or fail to improve, for Depression.            I have discussed the diagnosis with the patient and the intended plan as seen in the above orders. Patient is in agreement. The patient has received an after-visit summary and questions were answered concerning future plans. I have discussed medication side effects and warnings with the patient as well.

## 2021-02-23 NOTE — PATIENT INSTRUCTIONS
Recovering From Depression: Care Instructions  Your Care Instructions     Taking good care of yourself is important as you recover from depression. In time, your symptoms will fade as your treatment takes hold. Do not give up. Instead, focus your energy on getting better. Your mood will improve. It just takes some time. Focus on things that can help you feel better, such as being with friends and family, eating well, and getting enough rest. But take things slowly. Do not do too much too soon. You will begin to feel better gradually. Follow-up care is a key part of your treatment and safety. Be sure to make and go to all appointments, and call your doctor if you are having problems. It's also a good idea to know your test results and keep a list of the medicines you take. How can you care for yourself at home? Be realistic  · If you have a large task to do, break it up into smaller steps you can handle, and just do what you can. · You may want to put off important decisions until your depression has lifted. If you have plans that will have a major impact on your life, such as marriage, divorce, or a job change, try to wait a bit. Talk it over with friends and loved ones who can help you look at the overall picture first.  · Reaching out to people for help is important. Do not isolate yourself. Let your family and friends help you. Find someone you can trust and confide in, and talk to that person. · Be patient, and be kind to yourself. Remember that depression is not your fault and is not something you can overcome with willpower alone. Treatment is important for depression, just like for any other illness. Feeling better takes time, and your mood will improve little by little. Stay active  · Stay busy and get outside. Take a walk, or try some other light exercise. · Talk with your doctor about an exercise program. Exercise can help with mild depression. · Go to a movie or concert.  Take part in a Gnosticism activity or other social gathering. Go to a [employer] game. · Ask a friend to have dinner with you. Take care of yourself  · Eat a balanced diet with plenty of fresh fruits and vegetables, whole grains, and lean protein. If you have lost your appetite, eat small snacks rather than large meals. · Avoid using illegal drugs or marijuana and drinking alcohol. Do not take medicines that have not been prescribed for you. They may interfere with medicines you may be taking for depression, or they may make your depression worse. · Take your medicines exactly as they are prescribed. You may start to feel better within 1 to 3 weeks of taking antidepressant medicine. But it can take as many as 6 to 8 weeks to see more improvement. If you have questions or concerns about your medicines, or if you do not notice any improvement by 3 weeks, talk to your doctor. · Continue to take your medicine after your symptoms improve. Taking your medicine for at least 6 months after you feel better can help keep you from getting depressed again. If this isn't the first time you have been depressed, your doctor may recommend you to take medicine even longer. · If you have any side effects from your medicine, tell your doctor. Many side effects are mild and will go away on their own after you have been taking the medicine for a few weeks. Some may last longer. Talk to your doctor if side effects are bothering you too much. You might be able to try a different medicine. · Continue counseling. It may help prevent depression from returning, especially if you've had multiple episodes of depression. Talk with your counselor if you are having a hard time attending your sessions or you think the sessions aren't working. Don't just stop going. · Get enough sleep. Talk to your doctor if you are having problems sleeping. · Avoid sleeping pills unless they are prescribed by the doctor treating your depression.  Sleeping pills may make you groggy during the day, and they may interact with other medicine you are taking. · If you have any other illnesses, such as diabetes, heart disease, or high blood pressure, make sure to continue with your treatment. Tell your doctor about all of the medicines you take, including those with or without a prescription. · If you or someone you know talks about suicide, self-harm, or feeling hopeless, get help right away. Call the [address] [place] [address] at 6-867-374-TOOR ([phone]) or text HOME to 210950 to access the Crisis Text Line. Consider saving these numbers in your phone. When should you call for help? Call 911 anytime you think you may need emergency care. For example, call if:    · You feel like hurting yourself or someone else.     · Someone you know has depression and is about to attempt or is attempting suicide. Call your doctor now or seek immediate medical care if:    · You hear voices.     · Someone you know has depression and:  ? Starts to give away his or her possessions. ? Uses illegal drugs or drinks alcohol heavily. ? Talks or writes about death, including writing suicide notes or talking about guns, knives, or pills. ? Starts to spend a lot of time alone. ? Acts very aggressively or suddenly appears calm. Watch closely for changes in your health, and be sure to contact your doctor if:    · You do not get better as expected. Where can you learn more? Go to http://www.gray.com/  Enter N529 in the search box to learn more about \"Recovering From Depression: Care Instructions. \"  Current as of: January 31, 2020               Content Version: 12.6  © 3063-7183 [employer]. Care instructions adapted under license by [employer] (which disclaims liability or warranty for this information).  If you have questions about a medical condition or this instruction, always ask your healthcare professional. Hanane Lockett disclaims any warranty or liability for your use of this information.

## 2021-02-24 ENCOUNTER — OFFICE VISIT (OUTPATIENT)
Dept: NEUROLOGY | Age: 46
End: 2021-02-24
Payer: MEDICAID

## 2021-02-24 VITALS
HEART RATE: 68 BPM | BODY MASS INDEX: 48.82 KG/M2 | SYSTOLIC BLOOD PRESSURE: 151 MMHG | TEMPERATURE: 97.8 F | RESPIRATION RATE: 16 BRPM | DIASTOLIC BLOOD PRESSURE: 86 MMHG | HEIGHT: 65 IN | OXYGEN SATURATION: 100 % | WEIGHT: 293 LBS

## 2021-02-24 DIAGNOSIS — R41.89 COGNITIVE CHANGE: ICD-10-CM

## 2021-02-24 DIAGNOSIS — F80.9 SPEECH AND LANGUAGE DEFICITS: Primary | ICD-10-CM

## 2021-02-24 PROCEDURE — 99205 OFFICE O/P NEW HI 60 MIN: CPT | Performed by: PSYCHIATRY & NEUROLOGY

## 2021-02-24 RX ORDER — ASPIRIN 81 MG/1
81 TABLET ORAL DAILY
Qty: 60 TAB | Refills: 2 | Status: SHIPPED | OUTPATIENT
Start: 2021-02-24 | End: 2021-06-29

## 2021-02-24 NOTE — PROGRESS NOTES
Referring Physician: Luci Shannon MD     Reason for Consultation:  Word finding difficulty     Chief Complaint: Word finding difficulty and memory complaints     History of Present Illness:   Toño Skinner is a 50 y.o. female with a history of diabetes, hypertension, hyperlipidemia who presents to neurology clinic for evaluation of difficulty with memory as well as word finding difficulty. Patient reports all her symptoms started approximately 1 year ago. 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 She feels like this started slowly and has been progressing since that time. She feels like the most significant changes started approximately 6 months ago. In regards to her speech she frequently has difficulty with finding the right word and needs to pause for a few seconds to get the word out. She has no difficulty with thinking of the word however does not want to come out of her mouth. She also does state that she stutters and to avoid that she slows her speech. Again she used to be a local politician and had no issues with speech previously. She also reports that she has been having trouble with short-term memory and has been noted to repeat the same questions or stories multiple times. Additionally she does forget her medications from time to time but has not doubled her dose. Reports that she now needs to write everything down otherwise she may forget an appointment. She currently is working as an  however has not had any trouble with her job. She has not had any errors made in his other day early for work. She also does maintain finances at home and does report occasionally she has missed playing a bill. She denies any trouble with learning new tasks at this time.   She reports she still does the cooking at home and at times she has left the stove on or the water running. He also tells me that she has mostly stopped driving due to anxiety that she had and does not feel comfortable doing so. She reports that she was only driving short distances for the last 6 years and approximately once a week however now she does not even do this. Patient reports that she does have hypertension and her blood pressure has been elevated recently with it being above 160. She reports that she has the diagnosis of diabetes and initially it was above 10 however now it is around 6. She also tells me that her sleep is poor and has previously been diagnosed with sleep apnea. She has not had an appointment with her sleep doctor in 4 to 5 years.   Does state that she has been having more apneic episodes and does not feel rested after a night's rest.     Past Medical History:   Diagnosis Date    Anemia (iron deficiency)     Asthma     DDD (degenerative disc disease), lumbar     Depression     DM type 2 (diabetes mellitus, type 2) ([address] 2011    GERD (gastroesophageal reflux disease)     GI bleed     Hepatic steatosis 2016    confirmed by US    HTN (hypertension)     Irregular menses     Irritable bowel     Kidney mass     19: US showed rigth renal cystic nephroma, follows with Urology (Dr. Lori Cheek)    Morbid obesity ([address]     FE (obstructive sleep apnea)     w/ Cpap    PUD (peptic ulcer disease)         Past Surgical History:   Procedure Laterality Date    HX  SECTION      x 2    HX COLONOSCOPY  2015    HX ENDOSCOPY  2015    HX HYSTEROSCOPY WITH ENDOMETRIAL ABLATION  2014    HX TUBAL LIGATION      HX TUMOR REMOVAL  2016    right kidney, Dr Shahbaz Barbosa removed from right kidney on 02/15/2016         Family History   Problem Relation Age of Onset    Cancer Mother         Ovarian Cancer /breast ca    Heart Attack Father     Heart Disease Father     Diabetes Father     Other Father         pancreatitis    Cancer Sister    • Other Sister         chrons   • Heart Attack Maternal Grandfather    • Diabetes Paternal Grandmother    • HIV/AIDS Son         Social History     Tobacco Use   • Smoking status: Never Smoker   • Smokeless tobacco: Never Used   Substance Use Topics   • Alcohol use: No        Allergies   Allergen Reactions   • Lisinopril Cough   • Pcn [Penicillins] Hives        Prior to Admission medications    Medication Sig Start Date End Date Taking? Authorizing Provider   buPROPion SR (WELLBUTRIN SR) 150 mg SR tablet Take 1 Tab by mouth two (2) times a day. 2/23/21  Yes Tish England MD   pregabalin (LYRICA) 200 mg capsule TAKE 1 TABLET BY MOUTH TWICE DAILY . DO NOT EXCEED 2 PER 24 HOURS 2/15/21  Yes Tish England MD   dicyclomine (BENTYL) 10 mg capsule TAKE 1 CAPSULE BY MOUTH 4 TIMES DAILY AS NEEDED FOR PAIN 1/6/21  Yes Provider, Historical   folic acid (FOLVITE) 1 mg tablet  1/19/21  Yes Provider, Historical   methotrexate (RHEUMATREX) 2.5 mg tablet  1/19/21  Yes Provider, Historical   predniSONE (DELTASONE) 5 mg tablet TAKE 1 TABLET BY MOUTH IN THE MORNING AS NEEDED 1/4/21  Yes Provider, Historical   leucovorin calcium (WELLCOVORIN) 5 mg tablet TAKE 1 TABLET BY MOUTH ONCE A WEEK 5 HOURS AFTER METHOTREXATE DOSE 12/11/20  Yes Provider, Historical   fluticasone propion-salmeteroL (Advair Diskus) 500-50 mcg/dose diskus inhaler Take 1 Puff by inhalation every twelve (12) hours. Asthma, Dx: J45.30 1/26/21  Yes Tish England MD   albuterol (PROVENTIL HFA, VENTOLIN HFA, PROAIR HFA) 90 mcg/actuation inhaler Take 2 Puffs by inhalation every six (6) hours as needed for Wheezing. Asthma, Dx: J45.30 1/26/21  Yes Tish England MD   glipiZIDE (GLUCOTROL) 10 mg tablet Take 1/2 tablet by mouth twice daily 1/26/21  Yes Tish England MD   valsartan (DIOVAN) 40 mg tablet Take 2 tablets every day by oral route. 1/26/21  Yes Tish England MD   pioglitazone (ACTOS) 30 mg tablet Take 1 tablet by mouth once daily 1/6/21   Yes Taryn Huff MD   atorvastatin (LIPITOR) 10 mg tablet Take 1 tablet by mouth once daily 12/4/20  Yes Taryn Huff MD   amitriptyline (ELAVIL) 50 mg tablet Take 1 Tab by mouth nightly. For numbness at hands and feet. 12/4/20  Yes Taryn Huff MD   metFORMIN ER (GLUCOPHAGE XR) 500 mg tablet Take 2 tablets by mouth twice daily 9/28/20  Yes Taryn Huff MD   ibuprofen (MOTRIN) 600 mg tablet Take 1 Tab by mouth every six (6) hours as needed for Pain. 8/14/20  Yes Woo England MD   montelukast (SINGULAIR) 10 mg tablet TAKE 1 TABLET BY MOUTH ONCE DAILY FOR  ALLERGIES  AND  ASTHMA 4/13/20  Yes Taryn Huff MD   meclizine (ANTIVERT) 25 mg tablet Take 1 Tab by mouth three (3) times daily as needed for Dizziness. 2/28/20  Yes Woo England MD   nitroglycerin (NITROSTAT) 0.4 mg SL tablet Take 1 Tab by mouth every five (5) minutes as needed for Chest Pain.  Sit/Lay down then put one tab under the tongue every 5 minutes as needed for chest pain for 3 doses 6/8/18  Yes George Wiley MD   albuterol (PROVENTIL VENTOLIN) 2.5 mg /3 mL (0.083 %) nebulizer solution USE ONE VIAL IN NEBULIZER EVERY 4 HOURS AS NEEDED FOR WHEEZING 3/5/18  Yes Edis Sandoval NP       Review of Systems:  General, constitutional: negative  Eyes, vision: negative  Ears, nose, throat: negative  Cardiovascular, heart: negative  Respiratory: negative  Gastrointestinal: negative  Genitourinary: negative  Musculoskeletal: negative  Skin and integumentary: negative  Psychiatric: negative  Endocrine: negative  Neurological: negative, except for HPI  Hematologic/lymphatic: negative  Allergy/immunology: negative    Visit Vitals  BP (!) 151/86   Pulse 68   Temp 97.8 °F (36.6 °C)   Resp 16   Ht 5' 5\" (1.651 m)   Wt 300 lb (136.1 kg)   SpO2 100%   BMI 49.92 kg/m²       Physical Exam:  General:  no acute distress  Neck: no carotid bruits  Lungs: clear to auscultation  Heart:  no murmurs, regular rate and rhythm   Lower extremity: no edema    Neurological exam:  Mental Status: Awake, alert, oriented to person, place and time  Registration intact able to recall 1 out of 3 words at 5 minutes despite prompts. Attention and Concentration: able to state the days of the week backwards   Speech and Language: No dysarthria. Able to name, repeat and follow commands. Initially she did have some pauses in her speech where she was looking for the right word however during the course of the interview this appeared to have improved. Fund of knowledge was preserved    Cranial nerves: II-XII  Pupils equal and reactive, visual fields intact by confrontation   Extraocular movements intact, no evidence of nystagmus or ptosis   Facial sensation intact   Facial movements symmetric   Hearing intact to soft rub bilaterally   Shoulder shrug symmetric and strong   Tongue protrusion full and midline without fasciculation or atrophy    Motor:   Normal tone and Bulk   Drift: No evidence of pronator drift     Strength testing:   deltoid triceps biceps Wrist ext. Wrist flex. intrinsics   Right 5 5 5 5 5 5   Left 5 5 5 5 5 5      Hip flex. Hip ext. Knee ext. Knee flex Dorsi flex Plantar flex   Right  5 5 5 5 5 5   Left  5 5 5 5 5 5       Sensory:  Intact to light touch and pinprick. There is no extinction. Reflexes:     Biceps Triceps  Brachiorad Patellar Achilles Plantar Hoffmans   Right  2 2 2 2 2 Down Neg   Left  2 2 2 2 2 Down Neg        Cerebellar testing:  No dysmetria. Normal rapid alternating movements; finger-to-nose and heel-to- shin testing are within normal limits. Romberg: absent    Gait: steady    Data:     INTERNAL RECORDS:  The patient's electronic medical record was reviewed.   The relevant details include:    Lab Results   Component Value Date/Time    Hemoglobin A1c 8.3 (H) 03/11/2019 08:24 AM    Sodium 141 10/19/2020 10:45 AM    Potassium 4.7 10/19/2020 10:45 AM    Chloride 101 10/19/2020 10:45 AM    Glucose 118 (H) 10/19/2020 10:45 AM    BUN 13 10/19/2020 10:45 AM    Creatinine 0.83 10/19/2020 10:45 AM    Calcium 9.9 10/19/2020 10:45 AM    WBC 7.1 12/04/2019 07:29 PM    HCT 35.9 12/04/2019 07:29 PM    HGB 11.6 12/04/2019 07:29 PM    PLATELET 646 65/65/2151 07:29 PM       CT Results (maximum last 3): [facility]   CT ABD PELV W CONT    Narrative EXAM: CT ABD PELV W CONT    INDICATION: Left lower quadrant pain    COMPARISON: 2016     CONTRAST: 100 mL of Isovue-370. TECHNIQUE:   Following the uneventful intravenous administration of contrast, thin axial  images were obtained through the abdomen and pelvis. Coronal and sagittal  reconstructions were generated. Oral contrast was administered. CT dose  reduction was achieved through use of a standardized protocol tailored for this  examination and automatic exposure control for dose modulation. FINDINGS:   LOWER THORAX: No significant abnormality in the incidentally imaged lower chest.  LIVER: No mass. There is hepatic steatosis  BILIARY TREE: Gallbladder is within normal limits. CBD is not dilated. SPLEEN: within normal limits. PANCREAS: No mass or ductal dilatation. ADRENALS: Unremarkable. KIDNEYS: No mass, calculus, or hydronephrosis. There is scarring right kidney  from previous surgery. STOMACH: Unremarkable. SMALL BOWEL: No dilatation or wall thickening. COLON: No dilatation or wall thickening. APPENDIX: Normal  PERITONEUM: No ascites or pneumoperitoneum. RETROPERITONEUM: No lymphadenopathy or aortic aneurysm. REPRODUCTIVE ORGANS: There is a cystic area in the endocervical canal measuring  4.4 x 2.4 x 3.3 cm which communicates with within endometrium. URINARY BLADDER: No mass or calculus. BONES: No destructive bone lesion. ABDOMINAL WALL: No mass or hernia. ADDITIONAL COMMENTS: N/A      Impression 1. Hepatic steatosis. 2. Scarring right mid kidney.   3. There is a low-density area in the endocervical canal measuring 4.4 x 2.4 x  3.3 cm and measures 16 Hounsfield units which appears to communicate with the  endometrium and is new from 2016 and further assessment is recommended. 23X       Results from Abstract encounter on 01/05/17   CT SPINE CERV [address] CONT   Results from Hospital Encounter encounter on 12/06/16   CT ABD PELV W CONT    Narrative CT ABDOMEN AND PELVIS WITH CONTRAST. 12/6/2016 5:42 PM     INDICATION: Abdominal pain. COMPARISON: 11/27/2016, 2/24/2016. TECHNIQUE: CT of the abdomen and pelvis was performed after the administration  of 100 cc IV contrast (Isovue 370). CT dose reduction was achieved through use  of a standardized protocol tailored for this examination and automatic exposure  control for dose modulation. Adaptive statistical iterative reconstruction  (ASIR) was utilized. FINDINGS: The study is limited by patient body habitus: redundant soft tissue  causes photon starvation, and the flank(s) touching the gantry cause(s) beam  hardening artifact. Abdomen: The liver is fatty infiltrated. There is an old nephrectomy defect or  renal cortical scarring in the right interpolar region. Bilateral accessory  lower pole renal arteries and accessory lower pole right renal vein are normal  variants. The lung bases are clear and the heart size is normal. The distal  esophagus, stomach, duodenum, gallbladder, pancreas, spleen, adrenals, and left  kidney are normal.    Pelvis: The     small bowel, ileocecal junction, appendix, colon, and bladder  are     normal. No free air or fluid, and no abdominopelvic lymphadenopathy. Impression IMPRESSION:  1. No acute process in the abdomen and pelvis. 2. Hepatic steatosis. MRI Results (maximum last 3): Results from [facility] [place] encounter on 12/20/16   MRI [facility] SPINE W WO CONT    Narrative INDICATION: Thoracic back pain. Elevated CRP. Fever-rule out discitis.     EXAM: Sagittal and axial and coronal images were obtained through the thoracic  spine in varying sequences. Intravenous contrast was administered: 10 cc cc Magnevist.    FINDINGS:    Comparison exam:  None are available. Sagittal images demonstrate significant multilevel degenerative disc disease  involving the cervical spine with probably some spinal stenosis at the C5-C6 and  C6-C7 levels. There is normal thoracic spinal alignment but severe multilevel  degenerative disc disease. STIR sequences demonstrate    The thoracic spinal cord    Axial images demonstrate demonstrate the presence of a moderate disc bulge at  T1-T2 with mild central canal narrowing. There is a tiny central and left-sided protrusion at T8-T9. A moderate central and left-sided protrusion is present at T9-T10. This appears  to abut the thoracic cord at this level. A mild to moderate disc bulge is present at T10-T11. A mild disc bulge with a superimposed tiny right foraminal protrusion is present  at T12-L1. Postcontrast images demonstrate no specific enhancement of disc or vertebra per  se. There is questionable slight enhancement of the superior aspect of T8-not  definite finding. Impression IMPRESSION:    Severe multilevel degenerative disc disease involving the visualized cervical  spine with some evidence suggesting spinal stenosis at C5-C6 and C6-C7 levels. Cervical spine MRI may be appropriate if indicated clinically. Severe multilevel degenerative disc disease involving the thoracic spine. Multilevel tiny protrusions and disc bulges most significantly at T9-T10 with a  moderate central and left-sided protrusion appearing to abut the thoracic cord. No definite evidence for discitis. Question slight enhancement along what appears to represent the superior aspect  of the T8 vertebra-not a definite finding. Follow-up examination at an interval may be reasonable if indicated clinically.      Results from [facility] [place] encounter on 09/17/15   MRI LUMB SPINE WO CONT    Narrative **Final Report**       ICD Codes / Adm. Diagnosis: 724.4   / Thoracic or lumbosacral neurit    Examination:  MRI L SPINE WO CON  - [id number] - Sep 17 2015  8:21AM  Accession No:  [id number]  Reason:  lumbar DDD, left leg radiculopathy      REPORT:  CLINICAL HISTORY: Pain    INDICATION: Lower back pain    COMPARISON: None    TECHNIQUE: MR imaging of the lumbar spine was performed with sagittal T1,   T2, STIR;  axial T1, T2. Contrast was not administered. FINDINGS:    There is normal alignment of the lumbar spine. Vertebral body heights are   maintained. Marrow signal is normal.  The conus medullaris terminates at the   superior aspect of L2. There is minimal congenital narrowing of the lumbar   canal. Abdominal aorta is normal in caliber. Proximal common iliac vessels   are normal in caliber. L1/2:  The spinal canal and neuroforamina are widely patent. L2/3:  The spinal canal and neuroforamina are widely patent. L3/4:  Disc desiccation. Facet arthropathy. Mild right foraminal protrusion. There is mild right foraminal stenosis. The left neural foramen is patent. L4/5:  Mild facet arthropathy. Disc desiccation. Spinal canal is patent. Mild right foraminal stenosis. Crystal Ruchi L5/S1:  Facet arthropathy and hypertrophy is moderate to severe. Moderate   broad-based disc protrusion extends into the foramina bilaterally. Mild   central canal stenosis and severe bilateral foraminal stenosis. .    The visualized musculature and intraperitoneal structures are within normal   limits        IMPRESSION:  Multilevel disc and facet degenerative change. Mild central canal stenosis and severe bilateral foraminal stenosis at L5-S1. Mild right foraminal stenosis L3-4 and L4-5.               Signing/Reading Doctor: Handy Watt ([id number])    Jose Shepard ([id number])  Sep 17 2015  8:22AM                                      Assessment and Plan   Nathalie Oliveros is a 50 y.o. female with a history of diabetes, hypertension, hyperlipidemia who presents to neurology clinic for evaluation of difficulty with memory as well as word finding difficulty. Etiology is unclear however she does have significant risk factors for both stroke as well as vascular dementia despite her young age. I also suspect that some of her symptoms may be due to an underlying depression or anxiety given that they started after the loss of an election. Word finding difficulties: Given her risk factors will need to rule out stroke or tumor which may be resulting in the symptoms. She does seem to stutter and the difficulty in speech did fluctuate during the course of exam which does raise a suspicion for nonphysiologic findings.  -We will obtain an MRI of the brain with and without contrast to rule out stroke, mass, bleed as a potential cause for her symptoms  -Based on the above results we may need to do additional testing with a CTA head and neck. -Even that there is concern for stroke and her risk factors, I have told patient to start aspirin 81 mg daily.  -She was recently seen by her PCP who checked blood work, will request those records today.  -We did discuss speech therapy however patient would like to determine a diagnosis prior to starting therapy. Short-term memory complaints: Concerning for possible vascular dementia versus early onset Alzheimer's disease. She does have family history with aunts and uncles who do have dementia.  -We will obtain again an MRI of the brain to rule out vascular dementia as a potential cause.  -We will obtain neuropsychological testing to determine etiology.   There may be some underlying mood disorder resulting in her symptoms.  -We will obtain laboratory studies from her PCP and ensure that a TSH and B12 have been checked to look for reversible causes of dementia.  -We extensively discussed the importance of diet and exercise and how this can be used to reduce the rate of developing and the progression of neurodegenerative diseases such as Alzheimer's disease.  -I also did discuss cognitive therapy for the patient, but she would like to hold off on this as well. Patient Active Problem List   Diagnosis Code    HTN (hypertension) I10    Moderate episode of recurrent major depressive disorder ([facility] F33.1    FE (obstructive sleep apnea) G47.33    Asthma J45.909    Iron deficiency anemia D50.9    Hyperlipidemia E78.5    Obesity, morbid, BMI 50 or higher ([facility]) E66.01    Type 2 diabetes mellitus with diabetic polyneuropathy, without long-term current use of insulin ([facility]) E11.42    Coronary artery disease involving native coronary artery of native heart without angina pectoris I25.10    Chronic midline low back pain without sciatica M54.5, G89.29    Atypical squamous cells of undetermined significance (ASCUS) on Papanicolaou smear of cervix R87.610    Type 2 diabetes with nephropathy ([facility]) E11.21    Seronegative rheumatoid arthritis ([facility] M06.00            I have discussed the diagnosis with the patient and the intended plan as seen in the above orders. Patient is in agreement. The patient has received an after-visit summary and questions were answered concerning future plans. I have discussed medication side effects and warnings with the patient as well.         Signed By:  Alysia Hoover MD     February 24, 2021

## 2021-02-24 NOTE — PROGRESS NOTES
Visit Vitals  BP (!) 151/86   Pulse 68   Temp 97.8 °F (36.6 °C)   Resp 16   Ht 5' 5\" (1.651 m)   Wt 300 lb (136.1 kg)   SpO2 100%   BMI 49.92 kg/m²       Chief Complaint   Patient presents with    Memory Loss     starting to experience memory loss and is having trouble getting words out has really started to become an issue in the last 6 month

## 2021-02-24 NOTE — PATIENT INSTRUCTIONS
A Healthy Lifestyle: Care Instructions  Your Care Instructions     A healthy lifestyle can help you feel good, stay at a healthy weight, and have plenty of energy for both work and play. A healthy lifestyle is something you can share with your whole family. A healthy lifestyle also can lower your risk for serious health problems, such as high blood pressure, heart disease, and diabetes. You can follow a few steps listed below to improve your health and the health of your family. Follow-up care is a key part of your treatment and safety. Be sure to make and go to all appointments, and call your doctor if you are having problems. It's also a good idea to know your test results and keep a list of the medicines you take. How can you care for yourself at home? · Do not eat too much sugar, fat, or fast foods. You can still have dessert and treats now and then. The goal is moderation. · Start small to improve your eating habits. Pay attention to portion sizes, drink less juice and soda pop, and eat more fruits and vegetables. ? Eat a healthy amount of food. A 3-ounce serving of meat, for example, is about the size of a deck of cards. Fill the rest of your plate with vegetables and whole grains. ? Limit the amount of soda and sports drinks you have every day. Drink more water when you are thirsty. ? Eat at least 5 servings of fruits and vegetables every day. It may seem like a lot, but it is not hard to reach this goal. A serving or helping is 1 piece of fruit, 1 cup of vegetables, or 2 cups of leafy, raw vegetables. Have an apple or some carrot sticks as an afternoon snack instead of a candy bar. Try to have fruits and/or vegetables at every meal.  · Make exercise part of your daily routine. You may want to start with simple activities, such as walking, bicycling, or slow swimming. Try to be active 30 to 60 minutes every day. You do not need to do all 30 to 60 minutes all at once.  For example, you can exercise 3 times a day for 10 or 20 minutes. Moderate exercise is safe for most people, but it is always a good idea to talk to your doctor before starting an exercise program.  · Keep moving. Leonardo Grady the lawn, work in the garden, or [employer]. Take the stairs instead of the elevator at work. · If you smoke, quit. People who smoke have an increased risk for heart attack, stroke, cancer, and other lung illnesses. Quitting is hard, but there are ways to boost your chance of quitting tobacco for good. ? Use nicotine gum, patches, or lozenges. ? Ask your doctor about stop-smoking programs and medicines. ? Keep trying. In addition to reducing your risk of diseases in the future, you will notice some benefits soon after you stop using tobacco. If you have shortness of breath or asthma symptoms, they will likely get better within a few weeks after you quit. · Limit how much alcohol you drink. Moderate amounts of alcohol (up to 2 drinks a day for men, 1 drink a day for women) are okay. But drinking too much can lead to liver problems, high blood pressure, and other health problems. Family health  If you have a family, there are many things you can do together to improve your health. · Eat meals together as a family as often as possible. · Eat healthy foods. This includes fruits, vegetables, lean meats and dairy, and whole grains. · Include your family in your fitness plan. Most people think of activities such as jogging or tennis as the way to fitness, but there are many ways you and your family can be more active. Anything that makes you breathe hard and gets your heart pumping is exercise. Here are some tips:  ? Walk to do errands or to take your child to school or the bus.  ? Go for a family bike ride after dinner instead of watching TV. Where can you learn more?   Go to http://www.gray.com/  Enter Q133 in the search box to learn more about \"A Healthy Lifestyle: Care Instructions. \"  Current as of: January 31, 2020               Content Version: 12.6  © 8488-0917 [employer]. Care instructions adapted under license by [employer] (which disclaims liability or warranty for this information). If you have questions about a medical condition or this instruction, always ask your healthcare professional. [place] [address] any warranty or liability for your use of this information.

## 2021-03-09 ENCOUNTER — HOSPITAL ENCOUNTER (OUTPATIENT)
Dept: MRI IMAGING | Age: 46
Discharge: HOME | End: 2021-03-09
Attending: PSYCHIATRY & NEUROLOGY
Payer: MEDICAID

## 2021-03-09 DIAGNOSIS — F80.9 SPEECH AND LANGUAGE DEFICITS: ICD-10-CM

## 2021-03-09 DIAGNOSIS — R41.89 COGNITIVE CHANGE: ICD-10-CM

## 2021-03-09 PROCEDURE — 74011250636 HC RX REV CODE- 250/636: Performed by: PSYCHIATRY & NEUROLOGY

## 2021-03-09 PROCEDURE — A9575 INJ GADOTERATE MEGLUMI 0.1ML: HCPCS | Performed by: PSYCHIATRY & NEUROLOGY

## 2021-03-09 PROCEDURE — 70553 MRI BRAIN STEM W/O & W/DYE: CPT

## 2021-03-09 RX ORDER — GADOTERATE MEGLUMINE 376.9 MG/ML
20 INJECTION INTRAVENOUS
Status: COMPLETED | OUTPATIENT
Start: 2021-03-09 | End: 2021-03-09

## 2021-03-09 RX ADMIN — GADOTERATE MEGLUMINE 15 ML: 376.9 INJECTION INTRAVENOUS at 09:37

## 2021-03-16 NOTE — PERIOP NOTES
Attempted PAT call to patient. Pt reports over last 6 months, occasional episodes of chest discomfort with activity(resolved with rest). Pt has NTG from previous cardio eval, but has not used. +RED unchanged per pt. Pt unable to recall cardiologist that she saw in the past.  Note from [facility], Dr. Jessica Cerda in media from 2018. Pt reports she only saw cardio 1-2 times. Will have anesthesia review. [address] [facility] reviewed with Cristopher Travis NP, notified of above. Per BRANDON Hilton, pt need to have f/u with cardiology to proceed with elective procedure. Pt notified by phone. Tea [phone] in Dr. Beatris Renteria office notified of above. She will reach out to pt.    1418 call from [facility] in Dr. Beatris Renteria office. Pt has cardio appt at [facility] 3/17 at 09:00. Pt will call [facility] after cardio visit.   Angel[facility] will update ASU

## 2021-03-17 ENCOUNTER — TELEPHONE (OUTPATIENT)
Dept: NEUROLOGY | Age: 46
End: 2021-03-17

## 2021-03-17 NOTE — PERIOP NOTES
Received call from [facility] that pt has cardiac clearance. Awaiting report. Completed PAT call with pt. Called [facility] for ASA clearance from neurologist, Dr. Eugene Knowles.     3/18 Received Cardiology note from [facility]. Called [facility] for EKG. Staff states EKG has not been scanned in yet. Will plan to call 3/19 after 12pm for EKG.     3/19/2021 [facility] called to confirm neurology note in Epic to stop ASA two days before procedure. Notified pt.

## 2021-03-17 NOTE — PERIOP NOTES
[facility]  Ambulatory Surgery Unit  Pre-operative Instructions    Surgery/Procedure Date  March 23, 2021            Tentative Arrival Time 10AM      1. On the day of your surgery/procedure, please report to the Ambulatory Surgery Unit Registration Desk and sign in at your designated time. The Ambulatory Surgery Unit is located in [facility] on the [facility] side of the [facility] across from the [address] [place] [address]. Please have all of your health insurance cards and a photo ID. 2. You must have someone with you to drive you home, as you should not drive a car for 24 hours following anesthesia. Please make arrangements for a responsible adult friend or family member to stay with you for at least the first 24 hours after your surgery. 3. Do not have anything to eat or drink (including water, gum, mints, coffee, juice) after 11:59 PM, March 22nd. This may not apply to medications prescribed by your physician. (Please note below the special instructions with medications to take the morning of surgery, if applicable.)    4. We recommend you do not drink any alcoholic beverages for 24 hours before and after your surgery. 5. Contact your surgeons office for instructions on the following medications: non-steroidal anti-inflammatory drugs (i.e. Advil, Aleve), vitamins, and supplements. (Some surgeons will want you to stop these medications prior to surgery and others may allow you to take them)   **If you are currently taking Plavix, Coumadin, Aspirin and/or other blood-thinning agents, contact your surgeon for instructions. ** Your surgeon will partner with the physician prescribing these medications to determine if it is safe to stop or if you need to continue taking. Please do not stop taking these medications without instructions from your surgeon.     6. In an effort to help prevent surgical site infection, we ask that you shower with an anti-bacterial soap (i.e. Dial/Safeguard, or the soap provided to you at your preadmission testing appointment) for 3 days prior to and on the morning of surgery, using a fresh towel after each shower. (Please begin this process with fresh bed linens.) Do not apply any lotions, powders, or deodorants after the shower on the day of your procedure. If applicable, please do not shave the operative site for 48 hours prior to surgery. 7. Wear comfortable clothes. Wear glasses instead of contacts. Do not bring any jewelry or money (other than copays or fees as instructed). Do not wear make-up, particularly mascara, the morning of your surgery. Do not wear nail polish, particularly if you are having foot /hand surgery. Wear your hair loose or down, no ponytails, buns, snehal pins or clips. All body piercings must be removed. 8. You should understand that if you do not follow these instructions your surgery may be cancelled. If your physical condition changes (i.e. fever, cold or flu) please contact your surgeon as soon as possible. 9. It is important that you be on time. If a situation occurs where you may be late, or if you have any questions or problems, please call [phone].    10. Your surgery time may be subject to change. You will receive a phone call the day prior to surgery to confirm your arrival time. 11. Pediatric patients: please bring a change of clothes, diapers, bottle/sippy cup, pacifier, etc.      Special Instructions: Take all medications and inhalers, as prescribed, on the morning of surgery with a sip of water EXCEPT: no diabetic medications. Insulin Dependent Diabetic patients: Take your diabetic medications as prescribed the day before surgery. Hold all diabetic medications the day of surgery. If you are scheduled to arrive for surgery after 8:00 AM, and your AM blood sugar is >200, please call Ambulatory Surgery. I understand a pre-operative phone call will be made to verify my surgery time.   In the event that I am not available, I give permission for a message to be left on my answering service and/or with another person?       yes    Preop instructions reviewed  Pt verbalized understanding.          ___________________      ___________________      ________________  (Signature of Patient)          (Witness)                   (Date and Time)

## 2021-03-19 ENCOUNTER — HOSPITAL ENCOUNTER (OUTPATIENT)
Dept: PREADMISSION TESTING | Age: 46
Discharge: HOME | End: 2021-03-19
Payer: MEDICAID

## 2021-03-19 LAB — SARS-COV-2, COV2: NORMAL

## 2021-03-19 PROCEDURE — U0003 INFECTIOUS AGENT DETECTION BY NUCLEIC ACID (DNA OR RNA); SEVERE ACUTE RESPIRATORY SYNDROME CORONAVIRUS 2 (SARS-COV-2) (CORONAVIRUS DISEASE [COVID-19]), AMPLIFIED PROBE TECHNIQUE, MAKING USE OF HIGH THROUGHPUT TECHNOLOGIES AS DESCRIBED BY CMS-2020-01-R: HCPCS

## 2021-03-20 LAB — SARS-COV-2, COV2NT: NOT DETECTED

## 2021-03-22 ENCOUNTER — ANESTHESIA EVENT (OUTPATIENT)
Dept: SURGERY | Age: 46
End: 2021-03-22
Payer: MEDICAID

## 2021-03-23 ENCOUNTER — HOSPITAL ENCOUNTER (OUTPATIENT)
Age: 46
Setting detail: OUTPATIENT SURGERY
Discharge: HOME | End: 2021-03-23
Attending: OBSTETRICS & GYNECOLOGY | Admitting: OBSTETRICS & GYNECOLOGY
Payer: MEDICAID

## 2021-03-23 ENCOUNTER — ANESTHESIA (OUTPATIENT)
Dept: SURGERY | Age: 46
End: 2021-03-23
Payer: MEDICAID

## 2021-03-23 VITALS
DIASTOLIC BLOOD PRESSURE: 66 MMHG | TEMPERATURE: 97.1 F | HEART RATE: 74 BPM | OXYGEN SATURATION: 100 % | BODY MASS INDEX: 48.82 KG/M2 | HEIGHT: 65 IN | RESPIRATION RATE: 16 BRPM | WEIGHT: 293 LBS | SYSTOLIC BLOOD PRESSURE: 122 MMHG

## 2021-03-23 LAB
GLUCOSE BLD STRIP.AUTO-MCNC: 141 MG/DL (ref 65–100)
GLUCOSE BLD STRIP.AUTO-MCNC: 153 MG/DL (ref 65–100)
HCG UR QL: NEGATIVE
SERVICE CMNT-IMP: ABNORMAL
SERVICE CMNT-IMP: ABNORMAL

## 2021-03-23 PROCEDURE — 76030000000 HC AMB SURG OR TIME 0.5 TO 1: Performed by: OBSTETRICS & GYNECOLOGY

## 2021-03-23 PROCEDURE — 77030031139 HC SUT VCRL2 J&J -A: Performed by: OBSTETRICS & GYNECOLOGY

## 2021-03-23 PROCEDURE — 74011250637 HC RX REV CODE- 250/637: Performed by: ANESTHESIOLOGY

## 2021-03-23 PROCEDURE — 74011250636 HC RX REV CODE- 250/636: Performed by: NURSE ANESTHETIST, CERTIFIED REGISTERED

## 2021-03-23 PROCEDURE — 77030033136 HC TBNG INFLO AQUILEX ST HOLO -C: Performed by: OBSTETRICS & GYNECOLOGY

## 2021-03-23 PROCEDURE — 81025 URINE PREGNANCY TEST: CPT

## 2021-03-23 PROCEDURE — 76060000061 HC AMB SURG ANES 0.5 TO 1 HR: Performed by: OBSTETRICS & GYNECOLOGY

## 2021-03-23 PROCEDURE — 77030033137 HC TBNG OUTFLO AQUILEX ST HOLO -B: Performed by: OBSTETRICS & GYNECOLOGY

## 2021-03-23 PROCEDURE — 76210000040 HC AMBSU PH I REC FIRST 0.5 HR: Performed by: OBSTETRICS & GYNECOLOGY

## 2021-03-23 PROCEDURE — 77030011992 HC AIRWY NASOPHGL TELE -A: Performed by: ANESTHESIOLOGY

## 2021-03-23 PROCEDURE — 82962 GLUCOSE BLOOD TEST: CPT

## 2021-03-23 PROCEDURE — 74011000250 HC RX REV CODE- 250: Performed by: NURSE ANESTHETIST, CERTIFIED REGISTERED

## 2021-03-23 PROCEDURE — 74011250636 HC RX REV CODE- 250/636: Performed by: ANESTHESIOLOGY

## 2021-03-23 PROCEDURE — 2709999900 HC NON-CHARGEABLE SUPPLY: Performed by: OBSTETRICS & GYNECOLOGY

## 2021-03-23 PROCEDURE — 76210000046 HC AMBSU PH II REC FIRST 0.5 HR: Performed by: OBSTETRICS & GYNECOLOGY

## 2021-03-23 PROCEDURE — 74011000250 HC RX REV CODE- 250: Performed by: OBSTETRICS & GYNECOLOGY

## 2021-03-23 PROCEDURE — 77030021352 HC CBL LD SYS DISP COVD -B: Performed by: OBSTETRICS & GYNECOLOGY

## 2021-03-23 PROCEDURE — 74011250637 HC RX REV CODE- 250/637: Performed by: OBSTETRICS & GYNECOLOGY

## 2021-03-23 RX ORDER — DEXMEDETOMIDINE HYDROCHLORIDE 100 UG/ML
INJECTION, SOLUTION INTRAVENOUS AS NEEDED
Status: DISCONTINUED | OUTPATIENT
Start: 2021-03-23 | End: 2021-03-23 | Stop reason: HOSPADM

## 2021-03-23 RX ORDER — SODIUM CHLORIDE 0.9 % (FLUSH) 0.9 %
5-40 SYRINGE (ML) INJECTION AS NEEDED
Status: DISCONTINUED | OUTPATIENT
Start: 2021-03-23 | End: 2021-03-23 | Stop reason: HOSPADM

## 2021-03-23 RX ORDER — PROPOFOL 10 MG/ML
INJECTION, EMULSION INTRAVENOUS AS NEEDED
Status: DISCONTINUED | OUTPATIENT
Start: 2021-03-23 | End: 2021-03-23 | Stop reason: HOSPADM

## 2021-03-23 RX ORDER — HYDROMORPHONE HYDROCHLORIDE 1 MG/ML
INJECTION, SOLUTION INTRAMUSCULAR; INTRAVENOUS; SUBCUTANEOUS
Status: COMPLETED
Start: 2021-03-23 | End: 2021-03-23

## 2021-03-23 RX ORDER — LIDOCAINE HYDROCHLORIDE 10 MG/ML
0.1 INJECTION, SOLUTION EPIDURAL; INFILTRATION; INTRACAUDAL; PERINEURAL AS NEEDED
Status: DISCONTINUED | OUTPATIENT
Start: 2021-03-23 | End: 2021-03-23 | Stop reason: HOSPADM

## 2021-03-23 RX ORDER — DIPHENHYDRAMINE HYDROCHLORIDE 50 MG/ML
12.5 INJECTION, SOLUTION INTRAMUSCULAR; INTRAVENOUS AS NEEDED
Status: DISCONTINUED | OUTPATIENT
Start: 2021-03-23 | End: 2021-03-23 | Stop reason: HOSPADM

## 2021-03-23 RX ORDER — SILVER NITRATE 38.21; 12.74 MG/1; MG/1
1 STICK TOPICAL ONCE
Status: COMPLETED | OUTPATIENT
Start: 2021-03-23 | End: 2021-03-23

## 2021-03-23 RX ORDER — KETOROLAC TROMETHAMINE 30 MG/ML
INJECTION, SOLUTION INTRAMUSCULAR; INTRAVENOUS AS NEEDED
Status: DISCONTINUED | OUTPATIENT
Start: 2021-03-23 | End: 2021-03-23 | Stop reason: HOSPADM

## 2021-03-23 RX ORDER — ACETAMINOPHEN 500 MG
1000 TABLET ORAL ONCE
Status: COMPLETED | OUTPATIENT
Start: 2021-03-23 | End: 2021-03-23

## 2021-03-23 RX ORDER — FENTANYL CITRATE 50 UG/ML
25 INJECTION, SOLUTION INTRAMUSCULAR; INTRAVENOUS
Status: DISCONTINUED | OUTPATIENT
Start: 2021-03-23 | End: 2021-03-23 | Stop reason: HOSPADM

## 2021-03-23 RX ORDER — SODIUM CHLORIDE 0.9 % (FLUSH) 0.9 %
5-40 SYRINGE (ML) INJECTION EVERY 8 HOURS
Status: DISCONTINUED | OUTPATIENT
Start: 2021-03-23 | End: 2021-03-23 | Stop reason: HOSPADM

## 2021-03-23 RX ORDER — SODIUM CHLORIDE, SODIUM LACTATE, POTASSIUM CHLORIDE, CALCIUM CHLORIDE 600; 310; 30; 20 MG/100ML; MG/100ML; MG/100ML; MG/100ML
25 INJECTION, SOLUTION INTRAVENOUS CONTINUOUS
Status: DISCONTINUED | OUTPATIENT
Start: 2021-03-23 | End: 2021-03-23 | Stop reason: HOSPADM

## 2021-03-23 RX ORDER — HYDROMORPHONE HYDROCHLORIDE 1 MG/ML
.2-.5 INJECTION, SOLUTION INTRAMUSCULAR; INTRAVENOUS; SUBCUTANEOUS ONCE
Status: DISCONTINUED | OUTPATIENT
Start: 2021-03-23 | End: 2021-03-23 | Stop reason: HOSPADM

## 2021-03-23 RX ORDER — MORPHINE SULFATE 10 MG/ML
2 INJECTION, SOLUTION INTRAMUSCULAR; INTRAVENOUS
Status: DISCONTINUED | OUTPATIENT
Start: 2021-03-23 | End: 2021-03-23 | Stop reason: HOSPADM

## 2021-03-23 RX ORDER — ACETAMINOPHEN 500 MG
TABLET ORAL
Status: DISCONTINUED
Start: 2021-03-23 | End: 2021-03-23 | Stop reason: HOSPADM

## 2021-03-23 RX ORDER — LIDOCAINE HYDROCHLORIDE 20 MG/ML
INJECTION, SOLUTION EPIDURAL; INFILTRATION; INTRACAUDAL; PERINEURAL AS NEEDED
Status: DISCONTINUED | OUTPATIENT
Start: 2021-03-23 | End: 2021-03-23 | Stop reason: HOSPADM

## 2021-03-23 RX ORDER — LIDOCAINE HYDROCHLORIDE 20 MG/ML
10 INJECTION, SOLUTION INFILTRATION; PERINEURAL ONCE
Status: DISCONTINUED | OUTPATIENT
Start: 2021-03-23 | End: 2021-03-23 | Stop reason: HOSPADM

## 2021-03-23 RX ORDER — ONDANSETRON 2 MG/ML
INJECTION INTRAMUSCULAR; INTRAVENOUS AS NEEDED
Status: DISCONTINUED | OUTPATIENT
Start: 2021-03-23 | End: 2021-03-23 | Stop reason: HOSPADM

## 2021-03-23 RX ORDER — OXYCODONE AND ACETAMINOPHEN 5; 325 MG/1; MG/1
1 TABLET ORAL
Status: DISCONTINUED | OUTPATIENT
Start: 2021-03-23 | End: 2021-03-23 | Stop reason: HOSPADM

## 2021-03-23 RX ORDER — HYDROMORPHONE HYDROCHLORIDE 1 MG/ML
INJECTION, SOLUTION INTRAMUSCULAR; INTRAVENOUS; SUBCUTANEOUS AS NEEDED
Status: DISCONTINUED | OUTPATIENT
Start: 2021-03-23 | End: 2021-03-23 | Stop reason: HOSPADM

## 2021-03-23 RX ORDER — IBUPROFEN 800 MG/1
800 TABLET ORAL
Qty: 30 TAB | Refills: 1 | Status: SHIPPED | OUTPATIENT
Start: 2021-03-23 | End: 2021-07-06

## 2021-03-23 RX ORDER — GLYCOPYRROLATE 0.2 MG/ML
INJECTION INTRAMUSCULAR; INTRAVENOUS AS NEEDED
Status: DISCONTINUED | OUTPATIENT
Start: 2021-03-23 | End: 2021-03-23 | Stop reason: HOSPADM

## 2021-03-23 RX ORDER — PROPOFOL 10 MG/ML
INJECTION, EMULSION INTRAVENOUS
Status: DISCONTINUED | OUTPATIENT
Start: 2021-03-23 | End: 2021-03-23 | Stop reason: HOSPADM

## 2021-03-23 RX ORDER — MIDAZOLAM HYDROCHLORIDE 1 MG/ML
INJECTION, SOLUTION INTRAMUSCULAR; INTRAVENOUS AS NEEDED
Status: DISCONTINUED | OUTPATIENT
Start: 2021-03-23 | End: 2021-03-23 | Stop reason: HOSPADM

## 2021-03-23 RX ADMIN — PROPOFOL 100 MCG/KG/MIN: 10 INJECTION, EMULSION INTRAVENOUS at 10:42

## 2021-03-23 RX ADMIN — GLYCOPYRROLATE 0.2 MG: 0.2 INJECTION, SOLUTION INTRAMUSCULAR; INTRAVENOUS at 10:41

## 2021-03-23 RX ADMIN — HYDROMORPHONE HYDROCHLORIDE 0.1 MG: 1 INJECTION, SOLUTION INTRAMUSCULAR; INTRAVENOUS; SUBCUTANEOUS at 10:49

## 2021-03-23 RX ADMIN — HYDROMORPHONE HYDROCHLORIDE 0.1 MG: 1 INJECTION, SOLUTION INTRAMUSCULAR; INTRAVENOUS; SUBCUTANEOUS at 10:53

## 2021-03-23 RX ADMIN — SODIUM CHLORIDE, POTASSIUM CHLORIDE, SODIUM LACTATE AND CALCIUM CHLORIDE: 600; 310; 30; 20 INJECTION, SOLUTION INTRAVENOUS at 10:34

## 2021-03-23 RX ADMIN — SODIUM CHLORIDE, POTASSIUM CHLORIDE, SODIUM LACTATE AND CALCIUM CHLORIDE 25 ML/HR: 600; 310; 30; 20 INJECTION, SOLUTION INTRAVENOUS at 10:28

## 2021-03-23 RX ADMIN — DEXMEDETOMIDINE HYDROCHLORIDE 8 MCG: 100 INJECTION, SOLUTION, CONCENTRATE INTRAVENOUS at 10:46

## 2021-03-23 RX ADMIN — MIDAZOLAM HYDROCHLORIDE 2 MG: 1 INJECTION, SOLUTION INTRAMUSCULAR; INTRAVENOUS at 10:34

## 2021-03-23 RX ADMIN — HYDROMORPHONE HYDROCHLORIDE 0.1 MG: 1 INJECTION, SOLUTION INTRAMUSCULAR; INTRAVENOUS; SUBCUTANEOUS at 10:46

## 2021-03-23 RX ADMIN — PROPOFOL 40 MG: 10 INJECTION, EMULSION INTRAVENOUS at 10:43

## 2021-03-23 RX ADMIN — DEXMEDETOMIDINE HYDROCHLORIDE 4 MCG: 100 INJECTION, SOLUTION, CONCENTRATE INTRAVENOUS at 10:49

## 2021-03-23 RX ADMIN — ONDANSETRON HYDROCHLORIDE 4 MG: 2 INJECTION, SOLUTION INTRAMUSCULAR; INTRAVENOUS at 10:41

## 2021-03-23 RX ADMIN — Medication 1000 MG: at 10:29

## 2021-03-23 RX ADMIN — DEXMEDETOMIDINE HYDROCHLORIDE 8 MCG: 100 INJECTION, SOLUTION, CONCENTRATE INTRAVENOUS at 10:44

## 2021-03-23 RX ADMIN — HYDROMORPHONE HYDROCHLORIDE 0.2 MG: 1 INJECTION, SOLUTION INTRAMUSCULAR; INTRAVENOUS; SUBCUTANEOUS at 10:40

## 2021-03-23 RX ADMIN — LIDOCAINE HYDROCHLORIDE 80 MG: 20 INJECTION, SOLUTION EPIDURAL; INFILTRATION; INTRACAUDAL; PERINEURAL at 10:41

## 2021-03-23 RX ADMIN — PROPOFOL 20 MG: 10 INJECTION, EMULSION INTRAVENOUS at 10:46

## 2021-03-23 RX ADMIN — KETOROLAC TROMETHAMINE 30 MG: 30 INJECTION, SOLUTION INTRAMUSCULAR; INTRAVENOUS at 11:17

## 2021-03-23 RX ADMIN — Medication 3 AMPULE: at 10:34

## 2021-03-23 NOTE — ANESTHESIA PREPROCEDURE EVALUATION
Anesthetic History   No history of anesthetic complications            Review of Systems / Medical History  Patient summary reviewed, nursing notes reviewed and pertinent labs reviewed    Pulmonary        Sleep apnea: CPAP    Asthma        Neuro/Psych         Psychiatric history     Cardiovascular    Hypertension: well controlled              Exercise tolerance: >4 METS  Comments: Recent c/o chest pain evaluated by Cariologist 03/2021. Cleared for procedure at low risk. No further w/u needed.    GI/Hepatic/Renal     GERD (occasionally only)      PUD    Comments: IBS Endo/Other    Diabetes: well controlled, type 2    Morbid obesity and arthritis     Other Findings            Physical Exam    Airway  Mallampati: III  TM Distance: > 6 cm  Neck ROM: normal range of motion   Mouth opening: Normal     Cardiovascular    Rhythm: regular  Rate: normal         Dental    Dentition: Caps/crowns     Pulmonary  Breath sounds clear to auscultation               Abdominal  GI exam deferred       Other Findings            Anesthetic Plan    ASA: 3  Anesthesia type: MAC and general - backup          Induction: Intravenous  Anesthetic plan and risks discussed with: Patient

## 2021-03-23 NOTE — OP NOTES
HYSTEROSCOPY D & C  FULL OP NOTE        DATE OF PROCEDURE:  3/23/2021    PREOPERATIVE DIAGNOSIS:  CERVICAL STENOSIS, THICKENED ENDOMETRIUM    POSTOPERATIVE DIAGNOSIS:  SAME, SCARRED UTERINE CAVITY    PROCEDURE: Hysteroscopy, dilatation & curettage. SURGEON:  Eriberto Lopez MD    ASSISTANT:  none    ANESTHESIA: MAC. EBL:  100 cc    FINDINGS: Cervix noted to be flush with the vaginal wall with a shortened vaginal canal. Stenotic external cervical os. Scarred endometrial cavity s/p ablation    Specimen:  Endometrial  curettings    PROCEDURE: Patient was placed on the operating table in the supine position. Time out was done to confirm the operating procedure, surgeon, patient and site. Once confirmed by the team, procedure was started. MAC anesthesia was found to be adequate and she was prepped and draped in normal sterile fashion in dorsal lithotomy position. A sterile speculum was placed in the vagina and the cervix was noted to be flush with the vaginal walls. The anterior lip of the cervix grasped with a single-tooth tenaculum. The cervix was dilated to 7 with [facility] dilators. The diagnostic hysteroscope was then placed in the endometrial cavity using normal saline as distention media. The cavity was narrow and scarred and unable to visualize tubal ostia. The hysteroscope was then removed. A sharp curettage was then performed with endometrial curettings being sent for histologic review, using a medium sharp curette. When the tenaculum was removed there was bleeding along the anterior cervix which was made hemostatic with running stitch of 3-0 Vicryl. There was then a small are of bleeding along introitus at 11 o'clock that was made hemostatic with figure of 8 stitch of 3-0 Vicryl. There was no further bleeding noted. There was no bleeding. Instruments were removed. There was minimal difference in I&Os of distention media. Sponge, lap and needle counts were correct x 2.  The patient went to the recovery room in satisfactory condition. She will call with any increased pain, fever, or bleeding.

## 2021-03-23 NOTE — PERIOP NOTES
Julissa Charlton  1975  [id number]    Situation:  Verbal report given from: MATTEO Novak RN and LASHONDA Poe CRNA  Procedure: Procedure(s):   HYSTEROSCOPY DILATION AND CURETTAGE AND MYOSURE    Background:    Preoperative diagnosis: CERVICAL STENOSIS    Postoperative diagnosis: CERVICAL STENOSIS    :  Dr. Camilo Napoles    Assistant(s): Circ-1: Alison Lao RN  Scrub Tech-1: Vandana Caprice    Specimens:   ID Type Source Tests Collected by Time Destination   1 : ENDOMETRIAL CURETTINGS Preservative Endometrial Curetting  Brook Fairchild MD 3/23/2021 1101 Pathology       Assessment:  Intra-procedure medications         Anesthesia gave intra-procedure sedation and medications, see anesthesia flow sheet     Intravenous fluids: LR@ KVO     Vital signs stable       Recommendation:    Permission to share finding with Yojana Raw : yes

## 2021-03-23 NOTE — DISCHARGE INSTRUCTIONS
>>>You received Tylenol 1000mg before your surgery, you may take tylenol (or pain medication containing Tylenol or Acetaminophen) in 6 hours at 4:30 pm today.<<<    >>>You received Toradol during your surgery. You may not take any form of NSAIDS (non steroidal anti inflammatory drugs) such as Advil, Ibuprofen, Aleve, Motrin until 5:15 pm today. <<<    After Care Instructions For Your D&C      1. You may resume your usual diet once the nausea resolves. Initially, try sips of warm fluids and a bland diet. 2. Avoid heavy lifting and straining. Gradually increase your activity. First, try walking and doing light activity around the house. Resume your normal habits if no significant discomfort or bleeding develops. Most women can return to work within one to four days after this procedure. 3. You may take showers. Avoid using a tub bath, swimming pool or hot tub until after your check-up. 4. Do not place anything in your vagina until after your postoperative visit. Do not   douche, use tampons, or have intercourse because this may cause bleeding and   infection. 5. You may initially experience a heavy bloody discharge. This should not be more than your menstrual flow. Over the next several days, the flow should steadily decrease. 6. Typically following the procedure, there is little or no pain. You may feel cramps in your lower abdomen. Tylenol may relieve mild cramping. If pain medication does not improve your symptoms, you should contact your physician. 7. Contact the office if you have excessive bleeding (saturating a pad an hour for two hours or passing large clots). It is also necessary to speak with your physician if you develop chills, a temperature greater than 100.4, difficulty voiding or burning on urination. 8. Your physician may want to see you in the office after your D&C. Please call for an appointment if this has not already been arranged.  Our office phone number is [phone]. If appropriate, the microscopic results from your procedure will be discussed at this follow-up visit. We suggest taking a stool softener such as Colace or Miralax (follow package instructions) as needed. DO NOT DRIVE TODAY    DO NOT TAKE SLEEPING MEDICATIONS OR ANTIANXIETY MEDICATIONS WHILE TAKING NARCOTIC PAIN MEDICATIONS,  ESPECIALLY THE NIGHT OF ANESTHESIA! CPAP PATIENTS BE SURE TO WEAR MACHINE WHENEVER NAPPING OR SLEEPING! DISCHARGE SUMMARY from Nurse    The following personal items collected during your admission are returned to you:   Dental Appliance: Dental Appliances: None  Vision: Visual Aid: None  Hearing Aid:    Jewelry: Jewelry: None  Clothing: Clothing: None  Other Valuables: Other Valuables: Cell Phone(with belongings)  Valuables sent to safe:        PATIENT INSTRUCTIONS:    After General Anesthesia or Intravenous Sedation, for 24 hours or while taking prescription Narcotics:        Someone should be with you for the next 24 hours. For your own safety, a responsible adult must drive you home. · Limit your activities  · Recommended activity: Rest today, up with assistance today. Do not climb stairs or shower unattended for the next 24 hours. · Please start with a soft bland diet and advance as tolerated (no nausea) to regular diet. · If you have a sore throat you should try the following: fluids, warm salt water gargles, or throat lozenges. If it does not improve after several days please follow up with your primary physician. · Do not drive and operate hazardous machinery  · Do not make important personal or business decisions  · Do  not drink alcoholic beverages  · If you have not urinated within 8 hours after discharge, please contact your surgeon on call.     Report the following to your surgeon:  · Excessive pain, swelling, redness or odor of or around the surgical area  · Temperature over 100.5  · Nausea and vomiting lasting longer than 4 hours or if unable to take medications  · Any signs of decreased circulation or nerve impairment to extremity: change in color, persistent  numbness, tingling, coldness or increase pain      · You will receive a Post Operative Call from one of the Recovery Room Nurses on the day after your surgery to check on you. It is very important for us to know how you are recovering after your surgery. If you have an issue or need to speak with someone, please call your surgeon, do not wait for the post operative call. · You may receive an e-mail or letter in the mail from [employer] regarding your experience with us in the Ambulatory Surgery Unit. Your feedback is valuable to us and we appreciate your participation in the survey. · If the above instructions are not adequate or you are having problems after your surgery, call the physician at their office number. · We wish you a speedy recovery ? What to do at Home:      *  Please give a list of your current medications to your Primary Care Provider. *  Please update this list whenever your medications are discontinued, doses are      changed, or new medications (including over-the-counter products) are added. *  Please carry medication information at all times in case of emergency situations. If you have not received your influenza and/or pneumococcal vaccine, please follow up with your primary care physician. The discharge information has been reviewed with the patient and caregiver. The patient and caregiver verbalized understanding.

## 2021-03-23 NOTE — ANESTHESIA POSTPROCEDURE EVALUATION
Procedure(s):   HYSTEROSCOPY DILATION AND CURETTAGE AND MYOSURE.    general, MAC    Anesthesia Post Evaluation      Multimodal analgesia: multimodal analgesia used between 6 hours prior to anesthesia start to PACU discharge  Patient location during evaluation: PACU  Patient participation: complete - patient participated  Level of consciousness: awake  Pain score: 0  Airway patency: patent  Anesthetic complications: no  Cardiovascular status: acceptable  Respiratory status: acceptable  Hydration status: acceptable  Post anesthesia nausea and vomiting:  none  Final Post Anesthesia Temperature Assessment:  Normothermia (36.0-37.5 degrees C)      INITIAL Post-op Vital signs:   Vitals Value Taken Time   /62 03/23/21 1150   Temp 36.2 °C (97.1 °F) 03/23/21 1150   Pulse 73 03/23/21 1150   Resp 15 03/23/21 1150   SpO2 99 % 03/23/21 1150

## 2021-03-23 NOTE — H&P
Gynecology History and Physical    Name: Julissa Charlton MRN: [id number] SSN: [id number]    YOB: 1975  Age: 48 y.o. Sex: female       Subjective:      Chief complaint: Thickened endometrium    Bryn Boast is a 48 y.o.  female with a history of LLQ and thickened endometrium with cervical stenosis preventing in office endometrial sampling. Occasional spotting s/p ablation, [address] [place] [address] 7/2020=9  GI work up was benign    CT scan 1/21/21 low density area in endocervical canal 4.4x2. 4x3.3 cm appears to communicate with endometrium    She states she has had the LLQ pain for months. It is a dull nagging pain that sometimes gets more intense. Seems worse with movement. No constipation or diarrhea. No  sxs. Pain not related to spotting she sometimes has. Denies n/v or fever/chills. US 3/2021-Difficult imaging due to body habitus. Attempted both transvaginal and transabdominal approach. Fluid collection seen in cervical canal. Unable to visualize endometrial stripe to see if fluid collection extends into uterine cavity. Normal appearing left ovary. Right ovary not seen. No adnexal masses or free fluid. Unable to get adequate endometrial biopsy in office due to cervical stenosis     She is admitted for Procedure(s) (LRB):  HYSTEROSCOPY DILATION AND CURETTAGE AND MYOSURE (N/A). The current method of family planning is tubal ligation. OB History    No obstetric history on file.        Past Medical History:   Diagnosis Date    Anemia (iron deficiency)     Asthma     DDD (degenerative disc disease), lumbar 2014    Depression     DM type 2 (diabetes mellitus, type 2) ([address] 11/23/2011    GERD (gastroesophageal reflux disease)     GI bleed 2015    Hepatic steatosis 11/2016    confirmed by US    HTN (hypertension)     Irregular menses     Irritable bowel     Kidney mass     5/28/19: US showed rigth renal cystic nephroma, follows with Urology (Dr. Claudette Gibes)    Morbid obesity ([address]     FE on CPAP     cpap complaint    PUD (peptic ulcer disease)     Renal cancer, right ([address]     tx surgery     Past Surgical History:   Procedure Laterality Date    HX  SECTION      x 2    HX COLONOSCOPY  2015    HX ENDOSCOPY  2015    HX HYSTEROSCOPY WITH ENDOMETRIAL ABLATION  2014    HX TUBAL LIGATION      HX TUMOR REMOVAL  2016    right kidney, Dr Randall Mcgovern removed from right kidney on 02/15/2016      Social History     Occupational History    Not on file   Tobacco Use    Smoking status: Never Smoker    Smokeless tobacco: Never Used   Substance and Sexual Activity    Alcohol use: No    Drug use: No    Sexual activity: Yes     Partners: Male     Family History   Problem Relation Age of Onset    Cancer Mother         Ovarian Cancer /breast ca    Heart Attack Father     Heart Disease Father     Diabetes Father     Other Father         pancreatitis    Cancer Sister     Other Sister         chrons    Heart Attack Maternal Grandfather     Diabetes Paternal Grandmother     HIV/AIDS Son         Allergies   Allergen Reactions    Lisinopril Cough    Pcn [Penicillins] Hives     Prior to Admission medications    Medication Sig Start Date End Date Taking? Authorizing Provider   pregabalin (LYRICA) 200 mg capsule TAKE 1 CAPSULE BY MOUTH TWICE DAILY . DO NOT EXCEED 2 PER 24 HOURS 3/17/21  Yes Edgar England MD   aspirin delayed-release 81 mg tablet Take 1 Tab by mouth daily. 21  Yes Gerry Mccoy MD   buPROPion SR [facility] SR) 150 mg SR tablet Take 1 Tab by mouth two (2) times a day. 21  Yes Edgar England MD   dicyclomine (BENTYL) 10 mg capsule TAKE 1 CAPSULE BY MOUTH 4 TIMES DAILY AS NEEDED FOR PAIN 21  Yes Provider, Historical   folic acid (FOLVITE) 1 mg tablet  21  Yes Provider, Historical   methotrexate (RHEUMATREX) 2.5 mg tablet 15 mg every Wednesday.  Pt takes 6 pills for 15 mg total. 21  Yes Provider, Historical   predniSONE (DELTASONE) 5 mg tablet TAKE 1 TABLET BY MOUTH IN THE MORNING AS NEEDED 1/4/21  Yes Provider, Historical   leucovorin calcium (WELLCOVORIN) 5 mg tablet TAKE 1 TABLET BY MOUTH ONCE A WEEK 5 HOURS AFTER METHOTREXATE DOSE 12/11/20  Yes Provider, Historical   fluticasone propion-salmeteroL (Advair Diskus) 500-50 mcg/dose diskus inhaler Take 1 Puff by inhalation every twelve (12) hours. Asthma, Dx: J45.30 1/26/21  Yes Lori Pacheco MD   albuterol (PROVENTIL HFA, VENTOLIN HFA, PROAIR HFA) 90 mcg/actuation inhaler Take 2 Puffs by inhalation every six (6) hours as needed for Wheezing. Asthma, Dx: J45.30 1/26/21  Yes Lori Pacheco MD   glipiZIDE (GLUCOTROL) 10 mg tablet Take 1/2 tablet by mouth twice daily 1/26/21  Yes Lori Pacheco MD   valsartan (DIOVAN) 40 mg tablet Take 2 tablets every day by oral route. 1/26/21  Yes Lori Pacheco MD   pioglitazone (ACTOS) 30 mg tablet Take 1 tablet by mouth once daily 1/6/21  Yes Lori Pacheco MD   atorvastatin (LIPITOR) 10 mg tablet Take 1 tablet by mouth once daily 12/4/20  Yes Lori Pacheco MD   amitriptyline (ELAVIL) 50 mg tablet Take 1 Tab by mouth nightly. For numbness at hands and feet. Patient taking differently: Take 50 mg by mouth nightly. For numbness at hands and feet PRN 12/4/20  Yes Lori Pacheco MD   metFORMIN ER (GLUCOPHAGE XR) 500 mg tablet Take 2 tablets by mouth twice daily 9/28/20  Yes Lori Pacheco MD   ibuprofen (MOTRIN) 600 mg tablet Take 1 Tab by mouth every six (6) hours as needed for Pain. 8/14/20  Yes Leesa England MD   montelukast (SINGULAIR) 10 mg tablet TAKE 1 TABLET BY MOUTH ONCE DAILY FOR  ALLERGIES  AND  ASTHMA 4/13/20  Yes Lori Pacheco MD   meclizine (ANTIVERT) 25 mg tablet Take 1 Tab by mouth three (3) times daily as needed for Dizziness.  2/28/20  Yes Leesa England MD   albuterol (PROVENTIL VENTOLIN) 2.5 mg /3 mL (0.083 %) nebulizer solution USE ONE VIAL IN NEBULIZER EVERY 4 HOURS AS NEEDED FOR WHEEZING 3/5/18  Yes Lori, Navid Rangel NP   nitroglycerin (NITROSTAT) 0.4 mg SL tablet Take 1 Tab by mouth every five (5) minutes as needed for Chest Pain. Sit/Lay down then put one tab under the tongue every 5 minutes as needed for chest pain for 3 doses 6/8/18   Baylee Leija MD        Review of Systems:  A comprehensive review of systems was negative except for that written in the History of Present Illness. Objective:     Vitals:    03/16/21 1208   Weight: 136.1 kg (300 lb)   Height: 5' 4.5\" (1.638 m)       Physical Exam:  Patient without distress. Heart: Regular rate and rhythm  Lung: clear to auscultation throughout lung fields, no wheezes, no rales, no rhonchi and normal respiratory effort  Abdomen: soft, nontender    Assessment:     Active Problems:    * No active hospital problems. *     Thickened endometroim with cervical stenosis    Plan:     Procedure(s) (LRB):  HYSTEROSCOPY DILATION AND CURETTAGE AND MYOSURE (N/A)  Discussed the risks of surgery including the risks of bleeding, infection, deep vein thrombosis, and surgical injuries to internal organs including but not limited to the bowels, bladder, rectum, and female reproductive organs. The patient understands the risks; any and all questions were answered to the patient's satisfaction.

## 2021-03-23 NOTE — PERIOP NOTES
Patient: Linda Fernandez MRN: [id number]  SSN: [id number]   YOB: 1975  Age: 48 y.o. Sex: female     Patient is status post Procedure(s): HYSTEROSCOPY DILATION AND CURETTAGE AND MYOSURE. Surgeon(s) and Role:     * Arianna Mcdonough MD - Primary    Local/Dose/Irrigation:  SEE MAR                  Peripheral IV 03/23/21 Right Antecubital (Active)   Site Assessment Clean, dry, & intact 03/23/21 1027   Phlebitis Assessment 0 03/23/21 1027   Infiltration Assessment 0 03/23/21 1027   Dressing Status Clean, dry, & intact 03/23/21 1027   Dressing Type Tape;Transparent 03/23/21 1027   Hub Color/Line Status Pink; Infusing 03/23/21 1027            Airway - Endotracheal Tube 03/23/21 (Active)                   Dressing/Packing:  Incision 03/23/21 Vagina-Dressing/Treatment: Peripad (03/23/21 0900)    Other:  FLUID DEFICIT 80 ML. PATIENT STRAIGHT CATHED AT THE BEGINNING OF THE PROCEDURE, URINE OUTPUT 20 ML.

## 2021-03-23 NOTE — PERIOP NOTES
Patient states that Jazmin Larios will be with them for at least 24 hours following today's procedure. Permission received to review discharge instructions and discuss private health information with Jazmin Larios. Mistral-Air warming blanket applied at this time. Set to appropriate setting that is comfortable to patient. Will continue to monitor.

## 2021-03-23 NOTE — PERIOP NOTES
Patient received to PACU, VSS. Patient awake and alert, with no complaints of pain. Peripad intact. 1149: Patient tolerating liquids without issue. Patient has no complaints. Discharge instructions given. Patient and  verbalize understanding of instructions and follow up appointment. Patient and  state ready for discharge. IV removed. Patient discharged at this time by wheelchair with belongings,  to provide transportation home.

## 2021-03-31 ENCOUNTER — HOSPITAL ENCOUNTER (OUTPATIENT)
Dept: GENERAL RADIOLOGY | Age: 46
Discharge: HOME | End: 2021-03-31
Payer: MEDICAID

## 2021-03-31 ENCOUNTER — OFFICE VISIT (OUTPATIENT)
Dept: INTERNAL MEDICINE CLINIC | Age: 46
End: 2021-03-31
Payer: MEDICAID

## 2021-03-31 VITALS
OXYGEN SATURATION: 97 % | BODY MASS INDEX: 48.82 KG/M2 | TEMPERATURE: 98.4 F | HEART RATE: 77 BPM | DIASTOLIC BLOOD PRESSURE: 74 MMHG | SYSTOLIC BLOOD PRESSURE: 114 MMHG | RESPIRATION RATE: 12 BRPM | HEIGHT: 65 IN | WEIGHT: 293 LBS

## 2021-03-31 DIAGNOSIS — M25.562 ACUTE PAIN OF LEFT KNEE: ICD-10-CM

## 2021-03-31 DIAGNOSIS — M54.50 ACUTE RIGHT-SIDED LOW BACK PAIN WITHOUT SCIATICA: ICD-10-CM
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
## 2021-03-31 PROCEDURE — 99213 OFFICE O/P EST LOW 20 MIN: CPT | Performed by: INTERNAL MEDICINE

## 2021-03-31 PROCEDURE — 72100 X-RAY EXAM L-S SPINE 2/3 VWS: CPT

## 2021-03-31 PROCEDURE — 73562 X-RAY EXAM OF KNEE 3: CPT

## 2021-03-31 RX ORDER — PREDNISONE 5 MG/1
TABLET ORAL
Qty: 15 TAB | Refills: 0 | Status: SHIPPED | OUTPATIENT
Start: 2021-03-31 | End: 2021-06-29

## 2021-03-31 NOTE — PROGRESS NOTES
CC:   Chief Complaint   Patient presents with    Fall     Room 3A, 10 days ago fell down 3 steps and rolled went to [facility] this past Sunday    Back Pain     left knee       HISTORY OF PRESENT ILLNESS  Rodriguez Mcneill is a 48 y.o. female. Patient complains of pain at right lower back and left knee since falling down 2 steps in her home 10 days ago. Seen at [facility] on 3/28/21; discharge paperwork not available. Diagnosed with strained muscles and OA. Discharged on a muscle relaxer. Had no X-rays. Complains of persisting 8/10 pain at right lower back, worse with laying down. Left knee pain achy, also 8/10, worse with walking or standing. No relief with ibuprofen 800 mg or Tylenol. Depression better on Wellbutrin 150 mg twice daily.     Patient Active Problem List   Diagnosis Code    HTN (hypertension) I10    Moderate episode of recurrent major depressive disorder ([facility] F33.1    FE (obstructive sleep apnea) G47.33    Asthma J45.909    Iron deficiency anemia D50.9    Hyperlipidemia E78.5    Obesity, morbid, BMI 50 or higher ([facility]) E66.01    Type 2 diabetes mellitus with diabetic polyneuropathy, without long-term current use of insulin ([facility]) E11.42    Coronary artery disease involving native coronary artery of native heart without angina pectoris I25.10    Chronic midline low back pain without sciatica M54.5, G89.29    Atypical squamous cells of undetermined significance (ASCUS) on Papanicolaou smear of cervix R87.610    Type 2 diabetes with nephropathy ([facility]) E11.21    Seronegative rheumatoid arthritis ([facility] M06.00     Past Medical History:   Diagnosis Date    Anemia (iron deficiency)     Asthma     DDD (degenerative disc disease), lumbar 2014    Depression     DM type 2 (diabetes mellitus, type 2) ([facility] 11/23/2011    GERD (gastroesophageal reflux disease)     GI bleed 2015    Hepatic steatosis 11/2016    confirmed by US    HTN (hypertension)     Irregular menses     Irritable bowel     Kidney mass     5/28/19: US showed rigth renal cystic nephroma, follows with Urology (Dr. Rivera Lloyd)    Morbid obesity ([address]     FE on CPAP     cpap complaint    PUD (peptic ulcer disease) 2015    Renal cancer, right ([address]     tx surgery     Allergies   Allergen Reactions    Lisinopril Cough    Pcn [Penicillins] Hives       Current Outpatient Medications   Medication Sig Dispense Refill    ibuprofen (MOTRIN) 800 mg tablet Take 1 Tab by mouth every eight (8) hours as needed for Pain. 30 Tab 1    pregabalin (LYRICA) 200 mg capsule TAKE 1 CAPSULE BY MOUTH TWICE DAILY . DO NOT EXCEED 2 PER 24 HOURS 60 Cap 0    aspirin delayed-release 81 mg tablet Take 1 Tab by mouth daily. 60 Tab 2    buPROPion SR (WELLBUTRIN SR) 150 mg SR tablet Take 1 Tab by mouth two (2) times a day. 60 Tab 3    dicyclomine (BENTYL) 10 mg capsule TAKE 1 CAPSULE BY MOUTH 4 TIMES DAILY AS NEEDED FOR PAIN      folic acid (FOLVITE) 1 mg tablet Take 1 mg by mouth daily.  methotrexate (RHEUMATREX) 2.5 mg tablet 15 mg every Wednesday. Pt takes 6 pills for 15 mg total.      predniSONE (DELTASONE) 5 mg tablet TAKE 1 TABLET BY MOUTH IN THE MORNING AS NEEDED      leucovorin calcium (WELLCOVORIN) 5 mg tablet TAKE 1 TABLET BY MOUTH ONCE A WEEK 5 HOURS AFTER METHOTREXATE DOSE      fluticasone propion-salmeteroL (Advair Diskus) 500-50 mcg/dose diskus inhaler Take 1 Puff by inhalation every twelve (12) hours. Asthma, Dx: J45.30 60 Each 11    albuterol (PROVENTIL HFA, VENTOLIN HFA, PROAIR HFA) 90 mcg/actuation inhaler Take 2 Puffs by inhalation every six (6) hours as needed for Wheezing. Asthma, Dx: J45.30 1 Inhaler 11    glipiZIDE (GLUCOTROL) 10 mg tablet Take 1/2 tablet by mouth twice daily 180 Tab 1    valsartan (DIOVAN) 40 mg tablet Take 2 tablets every day by oral route.  180 Tab 0    pioglitazone (ACTOS) 30 mg tablet Take 1 tablet by mouth once daily 30 Tab 0    atorvastatin (LIPITOR) 10 mg tablet Take 1 tablet by mouth once daily 90 Tab 3    amitriptyline (ELAVIL) 50 mg tablet Take 1 Tab by mouth nightly. For numbness at hands and feet. (Patient taking differently: Take 50 mg by mouth nightly. For numbness at hands and feet PRN) 30 Tab 3    metFORMIN ER (GLUCOPHAGE XR) 500 mg tablet Take 2 tablets by mouth twice daily 120 Tab 3    ibuprofen (MOTRIN) 600 mg tablet Take 1 Tab by mouth every six (6) hours as needed for Pain. 60 Tab 2    montelukast (SINGULAIR) 10 mg tablet TAKE 1 TABLET BY MOUTH ONCE DAILY FOR  ALLERGIES  AND  ASTHMA 90 Tab 3    meclizine (ANTIVERT) 25 mg tablet Take 1 Tab by mouth three (3) times daily as needed for Dizziness. 20 Tab 1    nitroglycerin (NITROSTAT) 0.4 mg SL tablet Take 1 Tab by mouth every five (5) minutes as needed for Chest Pain. Sit/Lay down then put one tab under the tongue every 5 minutes as needed for chest pain for 3 doses 1 Bottle 0    albuterol (PROVENTIL VENTOLIN) 2.5 mg /3 mL (0.083 %) nebulizer solution USE ONE VIAL IN NEBULIZER EVERY 4 HOURS AS NEEDED FOR WHEEZING 30 Each 3         PHYSICAL EXAM  Visit Vitals  /74 (BP 1 Location: Left upper arm, BP Patient Position: Sitting)   Pulse 77   Temp 98.4 °F (36.9 °C) (Oral)   Resp 12   Ht 5' 4.5\" (1.638 m)   Wt 302 lb (137 kg)   SpO2 97%   BMI 51.04 kg/m²       General: Morbidly obese, no distress. HEENT:  Head normocephalic/atraumatic, no scleral icterus  Lungs:  Clear to ausculation bilaterally. Good air movement. Heart:  Regular rate and rhythm, normal S1 and S2, no murmur, gallop, or rub  Extremities: No clubbing, cyanosis, or edema. Left knee with tenderness along medial line, normal ROM. Back: Tenderness at lumbar vertebral and right paravertebral areas. Neurological: Alert and oriented. Psychiatric: Normal mood and affect. Behavior is normal.         ASSESSMENT AND PLAN    ICD-10-CM ICD-9-CM    1. Fall, subsequent encounter  W19. XXXD V58.89      E888.9    2.  Acute right-sided low back pain without sciatica M54.5 724.2 predniSONE (DELTASONE) 5 mg tablet      XR SPINE LUMB 2 OR 3 V   3. Acute pain of left knee  M25.562 719.46 XR KNEE LT 3 V     Diagnoses and all orders for this visit:    1. Fall, subsequent encounter    2. Acute right-sided low back pain without sciatica  Rule out compression fracture. Stop ibuprofen. -     Start predniSONE (DELTASONE) 5 mg tablet; Take 2 tablets by mouth once daily for 5 days then 1 tablet daily for 5 days. -     XR SPINE LUMB 2 OR 3 V; Future    3. Acute pain of left knee  Rule out fracture or dislocation. -     XR KNEE LT 3 V; Future      Follow-up and Dispositions    · Return if symptoms worsen or fail to improve, for Scheduled appt on 4/27/21; cancel 4/5/21 appt. I have discussed the diagnosis with the patient and the intended plan as seen in the above orders. Patient is in agreement. The patient has received an after-visit summary and questions were answered concerning future plans. I have discussed medication side effects and warnings with the patient as well.

## 2021-03-31 NOTE — PATIENT INSTRUCTIONS
Back Pain: Care Instructions  Your Care Instructions     Back pain has many possible causes. It is often related to problems with muscles and ligaments of the back. It may also be related to problems with the nerves, discs, or bones of the back. Moving, lifting, standing, sitting, or sleeping in an awkward way can strain the back. Sometimes you don't notice the injury until later. Arthritis is another common cause of back pain. Although it may hurt a lot, back pain usually improves on its own within several weeks. Most people recover in 12 weeks or less. Using good home treatment and being careful not to stress your back can help you feel better sooner. Follow-up care is a key part of your treatment and safety. Be sure to make and go to all appointments, and call your doctor if you are having problems. It's also a good idea to know your test results and keep a list of the medicines you take. How can you care for yourself at home? · Sit or lie in positions that are most comfortable and reduce your pain. Try one of these positions when you lie down:  ? Lie on your back with your knees bent and supported by large pillows. ? Lie on the floor with your legs on the seat of a sofa or chair. ? Lie on your side with your knees and hips bent and a pillow between your legs. ? Lie on your stomach if it does not make pain worse. · Do not sit up in bed, and avoid soft couches and twisted positions. Bed rest can help relieve pain at first, but it delays healing. Avoid bed rest after the first day of back pain. · Change positions every 30 minutes. If you must sit for long periods of time, take breaks from sitting. Get up and walk around, or lie in a comfortable position. · Try using a heating pad on a low or medium setting for 15 to 20 minutes every 2 or 3 hours. Try a warm shower in place of one session with the heating pad. · You can also try an ice pack for 10 to 15 minutes every 2 to 3 hours.  Put a thin cloth between the ice pack and your skin. · Take pain medicines exactly as directed. ? If the doctor gave you a prescription medicine for pain, take it as prescribed. ? If you are not taking a prescription pain medicine, ask your doctor if you can take an over-the-counter medicine. · Take short walks several times a day. You can start with 5 to 10 minutes, 3 or 4 times a day, and work up to longer walks. Walk on level surfaces and avoid hills and stairs until your back is better. · Return to work and other activities as soon as you can. Continued rest without activity is usually not good for your back. · To prevent future back pain, do exercises to stretch and strengthen your back and stomach. Learn how to use good posture, safe lifting techniques, and proper body mechanics. When should you call for help? Call your doctor now or seek immediate medical care if:    · You have new or worsening numbness in your legs.     · You have new or worsening weakness in your legs. (This could make it hard to stand up.)     · You lose control of your bladder or bowels. Watch closely for changes in your health, and be sure to contact your doctor if:    · You have a fever, lose weight, or don't feel well.     · You do not get better as expected. Where can you learn more? Go to http://www.[employer].com/  Enter I594 in the search box to learn more about \"Back Pain: Care Instructions. \"  Current as of: March 2, 2020               Content Version: 12.6  © 7879-3460 [employer]. Care instructions adapted under license by [employer] (which disclaims liability or warranty for this information). If you have questions about a medical condition or this instruction, always ask your healthcare professional. [place] [address] any warranty or liability for your use of this information.

## 2021-03-31 NOTE — PROGRESS NOTES
Lashae Lopez  Identified pt with two pt identifiers(name and ). Chief Complaint   Patient presents with    Fall     Room 3A, 10 days ago fell down 3 steps and rolled went to [facility] this past     Back Pain     left knee       Reviewed record In preparation for visit and have obtained necessary documentation. 1. Have you been to the ER, urgent care clinic or hospitalized since your last visit? No     2. Have you seen or consulted any other health care providers outside of the [address] [place] [address] since your last visit? Include any pap smears or colon screening. No    Vitals reviewed with provider.     Health Maintenance reviewed:     Health Maintenance Due   Topic    Hepatitis C Screening           Wt Readings from Last 3 Encounters:   21 302 lb (137 kg)   21 300 lb (136.1 kg)   21 300 lb (136.1 kg)        Temp Readings from Last 3 Encounters:   21 98.4 °F (36.9 °C) (Oral)   21 97.1 °F (36.2 °C)   21 97.8 °F (36.6 °C)        BP Readings from Last 3 Encounters:   21 114/74   21 122/66   21 (!) 151/86        Pulse Readings from Last 3 Encounters:   21 77   21 74   21 68        Vitals:    21 0942   BP: 114/74   Pulse: 77   Resp: 12   Temp: 98.4 °F (36.9 °C)   TempSrc: Oral   SpO2: 97%   Weight: 302 lb (137 kg)   Height: 5' 4.5\" (1.638 m)   PainSc:   8   PainLoc: Back          Learning Assessment:   :       Learning Assessment 2018   PRIMARY LEARNER Patient Patient Patient   HIGHEST LEVEL OF EDUCATION - PRIMARY LEARNER  - > 4 YEARS OF COLLEGE -   BARRIERS PRIMARY LEARNER - NONE -   CO-LEARNER CAREGIVER - No -   PRIMARY LANGUAGE ENGLISH ENGLISH ENGLISH   LEARNER PREFERENCE PRIMARY READING OTHER (COMMENT) LISTENING   ANSWERED BY patient patient self     - - self   RELATIONSHIP SELF SELF SELF        Depression Screening:   :       3 most recent PHQ Screens 3/31/2021   PHQ Not Done Active Diagnosis of Depression or Bipolar Disorder   Little interest or pleasure in doing things -   Feeling down, depressed, irritable, or hopeless -   Total Score PHQ 2 -        Fall Risk Assessment:   :       Fall Risk Assessment, last 12 mths 2/23/2021   Able to walk? Yes   Fall in past 12 months? 1   Do you feel unsteady? 1   Are you worried about falling 1   Number of falls in past 12 months -   Fall with injury? 1        Abuse Screening:   :       Abuse Screening Questionnaire 8/14/2020 5/9/2014   Do you ever feel afraid of your partner? N N   Are you in a relationship with someone who physically or mentally threatens you? N N   Is it safe for you to go home?  Y Y        ADL Screening:   :       ADL Assessment 5/2/2018   Feeding yourself No Help Needed   Getting from bed to chair No Help Needed   Getting dressed No Help Needed   Bathing or showering No Help Needed   Walk across the room (includes cane/walker) No Help Needed   Using the telphone No Help Needed   Taking your medications No Help Needed   Preparing meals No Help Needed   Managing money (expenses/bills) No Help Needed   Moderately strenuous housework (laundry) No Help Needed   Shopping for personal items (toiletries/medicines) No Help Needed   Shopping for groceries No Help Needed   Driving No Help Needed   Climbing a flight of stairs No Help Needed   Getting to places beyond walking distances No Help Needed

## 2021-03-31 NOTE — PROGRESS NOTES
Inform patient that her X-rays showed no broken bones. Her left knee X-ray showed stable osteoarthritis most prominent at the middle part of her knee. The back X-ray showed degenerative disc disease, which is arthritis also. Take prednisone as prescribed for pain.

## 2021-04-01 ENCOUNTER — TELEPHONE (OUTPATIENT)
Dept: INTERNAL MEDICINE CLINIC | Age: 46
End: 2021-04-01

## 2021-04-01 NOTE — TELEPHONE ENCOUNTER
----- Message from Flash Barrera sent at 4/1/2021 12:08 PM EDT -----  Regarding: Dr Dixie Sifuentes telephone  General Message/Vendor Calls    Caller's first and last name: Magdalena Hui      Reason for call: X-Ray results      Callback required yes/no and why:      Best contact number(s): [phone]    Details to clarify the request: Pt is requesting a call back to obtain the E-Ray results performed yesterday 3/31/21.       Flash Barrera

## 2021-04-06 ENCOUNTER — APPOINTMENT (OUTPATIENT)
Dept: CT IMAGING | Age: 46
End: 2021-04-06
Attending: PHYSICIAN ASSISTANT
Payer: MEDICAID

## 2021-04-06 ENCOUNTER — TELEPHONE (OUTPATIENT)
Dept: INTERNAL MEDICINE CLINIC | Age: 46
End: 2021-04-06

## 2021-04-06 ENCOUNTER — HOSPITAL ENCOUNTER (EMERGENCY)
Age: 46
Discharge: HOME | End: 2021-04-06
Attending: EMERGENCY MEDICINE | Admitting: EMERGENCY MEDICINE
Payer: MEDICAID

## 2021-04-06 VITALS
HEART RATE: 69 BPM | TEMPERATURE: 98.5 F | SYSTOLIC BLOOD PRESSURE: 149 MMHG | OXYGEN SATURATION: 100 % | DIASTOLIC BLOOD PRESSURE: 62 MMHG | BODY MASS INDEX: 50.52 KG/M2 | RESPIRATION RATE: 14 BRPM | WEIGHT: 293 LBS

## 2021-04-06 DIAGNOSIS — R10.11 RUQ ABDOMINAL PAIN: ICD-10-CM

## 2021-04-06 DIAGNOSIS — R10.9 FLANK PAIN: ICD-10-CM

## 2021-04-06 DIAGNOSIS — S20.211A CONTUSION OF RIB ON RIGHT SIDE, INITIAL ENCOUNTER: Primary | ICD-10-CM

## 2021-04-06 LAB
ALBUMIN SERPL-MCNC: 3.2 G/DL (ref 3.5–5)
ALBUMIN/GLOB SERPL: 0.7 {RATIO} (ref 1.1–2.2)
ALP SERPL-CCNC: 84 U/L (ref 45–117)
ALT SERPL-CCNC: 28 U/L (ref 12–78)
ANION GAP SERPL CALC-SCNC: 6 MMOL/L (ref 5–15)
AST SERPL-CCNC: 11 U/L (ref 15–37)
BASOPHILS # BLD: 0 K/UL (ref 0–0.1)
BASOPHILS NFR BLD: 1 % (ref 0–1)
BILIRUB SERPL-MCNC: 0.2 MG/DL (ref 0.2–1)
BUN SERPL-MCNC: 10 MG/DL (ref 6–20)
BUN/CREAT SERPL: 11 (ref 12–20)
CALCIUM SERPL-MCNC: 9.4 MG/DL (ref 8.5–10.1)
CHLORIDE SERPL-SCNC: 105 MMOL/L (ref 97–108)
CO2 SERPL-SCNC: 26 MMOL/L (ref 21–32)
CREAT SERPL-MCNC: 0.93 MG/DL (ref 0.55–1.02)
DIFFERENTIAL METHOD BLD: ABNORMAL
EOSINOPHIL # BLD: 0.2 K/UL (ref 0–0.4)
EOSINOPHIL NFR BLD: 2 % (ref 0–7)
ERYTHROCYTE [DISTWIDTH] IN BLOOD BY AUTOMATED COUNT: 15.9 % (ref 11.5–14.5)
GLOBULIN SER CALC-MCNC: 4.6 G/DL (ref 2–4)
GLUCOSE SERPL-MCNC: 197 MG/DL (ref 65–100)
HCT VFR BLD AUTO: 34.5 % (ref 35–47)
HGB BLD-MCNC: 10.9 G/DL (ref 11.5–16)
IMM GRANULOCYTES # BLD AUTO: 0.1 K/UL (ref 0–0.04)
IMM GRANULOCYTES NFR BLD AUTO: 1 % (ref 0–0.5)
LIPASE SERPL-CCNC: 70 U/L (ref 73–393)
LYMPHOCYTES # BLD: 1.8 K/UL (ref 0.8–3.5)
LYMPHOCYTES NFR BLD: 20 % (ref 12–49)
MCH RBC QN AUTO: 27.5 PG (ref 26–34)
MCHC RBC AUTO-ENTMCNC: 31.6 G/DL (ref 30–36.5)
MCV RBC AUTO: 86.9 FL (ref 80–99)
MONOCYTES # BLD: 0.5 K/UL (ref 0–1)
MONOCYTES NFR BLD: 6 % (ref 5–13)
NEUTS SEG # BLD: 6.2 K/UL (ref 1.8–8)
NEUTS SEG NFR BLD: 70 % (ref 32–75)
NRBC # BLD: 0 K/UL (ref 0–0.01)
NRBC BLD-RTO: 0 PER 100 WBC
PLATELET # BLD AUTO: 287 K/UL (ref 150–400)
PMV BLD AUTO: 11.5 FL (ref 8.9–12.9)
POTASSIUM SERPL-SCNC: 4.1 MMOL/L (ref 3.5–5.1)
PROT SERPL-MCNC: 7.8 G/DL (ref 6.4–8.2)
RBC # BLD AUTO: 3.97 M/UL (ref 3.8–5.2)
SODIUM SERPL-SCNC: 137 MMOL/L (ref 136–145)
WBC # BLD AUTO: 8.8 K/UL (ref 3.6–11)

## 2021-04-06 PROCEDURE — 74011250636 HC RX REV CODE- 250/636: Performed by: PHYSICIAN ASSISTANT

## 2021-04-06 PROCEDURE — 36415 COLL VENOUS BLD VENIPUNCTURE: CPT

## 2021-04-06 PROCEDURE — 96374 THER/PROPH/DIAG INJ IV PUSH: CPT

## 2021-04-06 PROCEDURE — 80053 COMPREHEN METABOLIC PANEL: CPT

## 2021-04-06 PROCEDURE — 83690 ASSAY OF LIPASE: CPT

## 2021-04-06 PROCEDURE — 74011000636 HC RX REV CODE- 636: Performed by: EMERGENCY MEDICINE

## 2021-04-06 PROCEDURE — 85025 COMPLETE CBC W/AUTO DIFF WBC: CPT

## 2021-04-06 PROCEDURE — 74177 CT ABD & PELVIS W/CONTRAST: CPT

## 2021-04-06 PROCEDURE — 99282 EMERGENCY DEPT VISIT SF MDM: CPT

## 2021-04-06 RX ORDER — ACETAMINOPHEN 500 MG
1000 TABLET ORAL
Qty: 20 TAB | Refills: 0 | Status: SHIPPED | OUTPATIENT
Start: 2021-04-06

## 2021-04-06 RX ORDER — KETOROLAC TROMETHAMINE 30 MG/ML
15 INJECTION, SOLUTION INTRAMUSCULAR; INTRAVENOUS
Status: COMPLETED | OUTPATIENT
Start: 2021-04-06 | End: 2021-04-06

## 2021-04-06 RX ORDER — CYCLOBENZAPRINE HCL 10 MG
10 TABLET ORAL
Qty: 15 TAB | Refills: 0 | Status: SHIPPED | OUTPATIENT
Start: 2021-04-06 | End: 2022-04-18

## 2021-04-06 RX ORDER — LIDOCAINE 50 MG/G
PATCH TOPICAL
Qty: 15 EACH | Refills: 0 | Status: SHIPPED | OUTPATIENT
Start: 2021-04-06 | End: 2022-09-14

## 2021-04-06 RX ADMIN — KETOROLAC TROMETHAMINE 15 MG: 30 INJECTION, SOLUTION INTRAMUSCULAR at 09:52

## 2021-04-06 RX ADMIN — IOPAMIDOL 100 ML: 755 INJECTION, SOLUTION INTRAVENOUS at 09:50

## 2021-04-06 NOTE — ED PROVIDER NOTES
EMERGENCY DEPARTMENT HISTORY AND PHYSICAL EXAM      Date: 4/6/2021  Patient Name: Nemo Rhodes    History of Presenting Illness     Chief Complaint   Patient presents with    Flank Pain     R flank pain after falling about 2 weeks ago. denies n/v, reports IBS and no change in BM's with her IBS. deneis urinary sx. Reports pain gets worse with movement    Fall     History Provided By: Patient    HPI: Nemo Rhodes, 48 y.o. female with medical history significant for morbid obesity, anemia, hypertension, depression, asthma, type 2 diabetes, GERD, peptic ulcer disease, right kidney tumor removal procedure who presents via self to the ED with cc of acute moderate aching right-sided flank pain and right upper quadrant abdominal pain worsening X 1 day. Endorses that she had a fall 2 weeks ago where she rolled down some steps. She was evaluated by primary care provider week later due to her knee pain and low back pain. States that she had x-rays which did not show any acute process. Endorses that pain in her right flank has become worse. Exacerbated by movement. Ibuprofen with minimal relief. No medications prior to arrival today. Denies chest pain, shortness of breath, fever, chills, n/v, wheezing, cough, hemoptysis, dysuria, frequency, urgency, hematuria, saddle paresthesias, focal weakness, numbness, tingling, constipation, diarrhea, melena, hematochezia. PCP: Sara Desai MD    There are no other complaints, changes, or physical findings at this time. No current facility-administered medications on file prior to encounter. Current Outpatient Medications on File Prior to Encounter   Medication Sig Dispense Refill    predniSONE (DELTASONE) 5 mg tablet Take 2 tablets by mouth once daily for 5 days then 1 tablet daily for 5 days. 15 Tab 0    ibuprofen (MOTRIN) 800 mg tablet Take 1 Tab by mouth every eight (8) hours as needed for Pain.  30 Tab 1    pregabalin (LYRICA) 200 mg capsule TAKE 1 CAPSULE BY MOUTH TWICE DAILY . DO NOT EXCEED 2 PER 24 HOURS 60 Cap 0    aspirin delayed-release 81 mg tablet Take 1 Tab by mouth daily. 60 Tab 2    buPROPion SR (WELLBUTRIN SR) 150 mg SR tablet Take 1 Tab by mouth two (2) times a day. 60 Tab 3    dicyclomine (BENTYL) 10 mg capsule TAKE 1 CAPSULE BY MOUTH 4 TIMES DAILY AS NEEDED FOR PAIN      folic acid (FOLVITE) 1 mg tablet Take 1 mg by mouth daily.  methotrexate (RHEUMATREX) 2.5 mg tablet 15 mg every Wednesday. Pt takes 6 pills for 15 mg total.      leucovorin calcium (WELLCOVORIN) 5 mg tablet TAKE 1 TABLET BY MOUTH ONCE A WEEK 5 HOURS AFTER METHOTREXATE DOSE      fluticasone propion-salmeteroL (Advair Diskus) 500-50 mcg/dose diskus inhaler Take 1 Puff by inhalation every twelve (12) hours. Asthma, Dx: J45.30 60 Each 11    albuterol (PROVENTIL HFA, VENTOLIN HFA, PROAIR HFA) 90 mcg/actuation inhaler Take 2 Puffs by inhalation every six (6) hours as needed for Wheezing. Asthma, Dx: J45.30 1 Inhaler 11    glipiZIDE (GLUCOTROL) 10 mg tablet Take 1/2 tablet by mouth twice daily 180 Tab 1    valsartan (DIOVAN) 40 mg tablet Take 2 tablets every day by oral route. 180 Tab 0    pioglitazone (ACTOS) 30 mg tablet Take 1 tablet by mouth once daily 30 Tab 0    atorvastatin (LIPITOR) 10 mg tablet Take 1 tablet by mouth once daily 90 Tab 3    amitriptyline (ELAVIL) 50 mg tablet Take 1 Tab by mouth nightly. For numbness at hands and feet. (Patient taking differently: Take 50 mg by mouth nightly. For numbness at hands and feet PRN) 30 Tab 3    metFORMIN ER (GLUCOPHAGE XR) 500 mg tablet Take 2 tablets by mouth twice daily 120 Tab 3    ibuprofen (MOTRIN) 600 mg tablet Take 1 Tab by mouth every six (6) hours as needed for Pain. 60 Tab 2    montelukast (SINGULAIR) 10 mg tablet TAKE 1 TABLET BY MOUTH ONCE DAILY FOR  ALLERGIES  AND  ASTHMA 90 Tab 3    meclizine (ANTIVERT) 25 mg tablet Take 1 Tab by mouth three (3) times daily as needed for Dizziness.  20 Tab 1    nitroglycerin (NITROSTAT) 0.4 mg SL tablet Take 1 Tab by mouth every five (5) minutes as needed for Chest Pain.  Sit/Lay down then put one tab under the tongue every 5 minutes as needed for chest pain for 3 doses 1 Bottle 0    albuterol (PROVENTIL VENTOLIN) 2.5 mg /3 mL (0.083 %) nebulizer solution USE ONE VIAL IN NEBULIZER EVERY 4 HOURS AS NEEDED FOR WHEEZING 30 Each 3     Past History     Past Medical History:  Past Medical History:   Diagnosis Date    Anemia (iron deficiency)     Asthma     DDD (degenerative disc disease), lumbar     Depression     DM type 2 (diabetes mellitus, type 2) ([facility] [address] 2011    GERD (gastroesophageal reflux disease)     GI bleed     Hepatic steatosis 2016    confirmed by US    HTN (hypertension)     Irregular menses     Irritable bowel     Kidney mass     19: US showed rigth renal cystic nephroma, follows with Urology (Dr. Jeff Chambers)    Morbid obesity ([facility] [address]     FE on CPAP     cpap complaint    PUD (peptic ulcer disease)     Renal cancer, right ([facility] [address]     tx surgery     Past Surgical History:  Past Surgical History:   Procedure Laterality Date    HX  SECTION      x 2    HX COLONOSCOPY  2015    HX ENDOSCOPY  2015    HX HYSTEROSCOPY WITH ENDOMETRIAL ABLATION  2014    HX TUBAL LIGATION      HX TUMOR REMOVAL  2016    right kidney, Dr Cat Forde UROLOGICAL      Mass removed from right kidney on 02/15/2016      Family History:  Family History   Problem Relation Age of Onset    Cancer Mother         Ovarian Cancer /breast ca    Heart Attack Father     Heart Disease Father     Diabetes Father     Other Father         pancreatitis    Cancer Sister     Other Sister         chrons    Heart Attack Maternal Grandfather     Diabetes Paternal Grandmother     HIV/AIDS Son      Social History:  Social History     Tobacco Use    Smoking status: Never Smoker    Smokeless tobacco: Never Used   Substance Use Topics    Alcohol use: No    Drug use: No     Allergies: Allergies   Allergen Reactions    Lisinopril Cough    Pcn [Penicillins] Hives     Review of Systems   Review of Systems   Constitutional: Negative for activity change, appetite change, chills, fatigue, fever and unexpected weight change. HENT: Negative. Negative for congestion, postnasal drip, rhinorrhea, sore throat, trouble swallowing and voice change. Eyes: Negative for pain and visual disturbance. Respiratory: Negative for cough, chest tightness, shortness of breath and wheezing. Cardiovascular: Negative for chest pain, palpitations and leg swelling. Gastrointestinal: Positive for abdominal pain. Negative for abdominal distention, blood in stool, constipation, diarrhea, nausea and vomiting. Genitourinary: Negative for decreased urine volume, difficulty urinating, dysuria, flank pain, frequency, hematuria, pelvic pain and urgency. Musculoskeletal: Positive for arthralgias and myalgias. Negative for back pain, joint swelling, neck pain and neck stiffness. Skin: Negative. Negative for rash and wound. Neurological: Negative for dizziness, seizures, syncope, light-headedness and headaches. Psychiatric/Behavioral: Negative. Negative for confusion. Physical Exam   Physical Exam  Vitals signs and nursing note reviewed. Constitutional:       General: She is not in acute distress. Appearance: She is well-developed. She is obese. She is not ill-appearing, toxic-appearing or diaphoretic. HENT:      Head: Normocephalic and atraumatic. Mouth/Throat:      Mouth: Mucous membranes are moist.   Eyes:      Conjunctiva/sclera: Conjunctivae normal.      Pupils: Pupils are equal, round, and reactive to light. Neck:      Musculoskeletal: Normal range of motion. Cardiovascular:      Rate and Rhythm: Normal rate and regular rhythm. Pulses: Normal pulses. Heart sounds: Normal heart sounds. No murmur. No friction rub. No gallop.     Pulmonary: Effort: Pulmonary effort is normal. No tachypnea, accessory muscle usage or respiratory distress. Breath sounds: Normal breath sounds and air entry. No decreased breath sounds, wheezing, rhonchi or rales. Chest:      Chest wall: Tenderness present. No mass, lacerations, deformity, swelling, crepitus or edema. Comments: Tenderness to palpation localized to right lateral ribs #8-12. Abdominal:      General: Abdomen is protuberant. Bowel sounds are normal. There is no distension. Palpations: Abdomen is soft. Abdomen is not rigid. There is no mass. Tenderness: There is abdominal tenderness in the right upper quadrant. There is no right CVA tenderness, left CVA tenderness, guarding or rebound. Negative signs include Thomas's sign and McBurney's sign. Musculoskeletal: Normal range of motion. Skin:     General: Skin is warm and dry. Neurological:      Mental Status: She is alert and oriented to person, place, and time. Psychiatric:         Behavior: Behavior normal.         Thought Content: Thought content normal.         Judgment: Judgment normal.       Diagnostic Study Results   Labs -     Recent Results (from the past 12 hour(s))   CBC WITH AUTOMATED DIFF    Collection Time: 04/06/21  9:47 AM   Result Value Ref Range    WBC 8.8 3.6 - 11.0 K/uL    RBC 3.97 3.80 - 5.20 M/uL    HGB 10.9 (L) 11.5 - 16.0 g/dL    HCT 34.5 (L) 35.0 - 47.0 %    MCV 86.9 80.0 - 99.0 FL    MCH 27.5 26.0 - 34.0 PG    MCHC 31.6 30.0 - 36.5 g/dL    RDW 15.9 (H) 11.5 - 14.5 %    PLATELET 916 153 - 106 K/uL    MPV 11.5 8.9 - 12.9 FL    NRBC 0.0 0  WBC    ABSOLUTE NRBC 0.00 0.00 - 0.01 K/uL    NEUTROPHILS 70 32 - 75 %    LYMPHOCYTES 20 12 - 49 %    MONOCYTES 6 5 - 13 %    EOSINOPHILS 2 0 - 7 %    BASOPHILS 1 0 - 1 %    IMMATURE GRANULOCYTES 1 (H) 0.0 - 0.5 %    ABS. NEUTROPHILS 6.2 1.8 - 8.0 K/UL    ABS. LYMPHOCYTES 1.8 0.8 - 3.5 K/UL    ABS. MONOCYTES 0.5 0.0 - 1.0 K/UL    ABS.  EOSINOPHILS 0.2 0.0 - 0.4 K/UL ABS. BASOPHILS 0.0 0.0 - 0.1 K/UL    ABS. IMM. GRANS. 0.1 (H) 0.00 - 0.04 K/UL    DF AUTOMATED     METABOLIC PANEL, COMPREHENSIVE    Collection Time: 04/06/21  9:47 AM   Result Value Ref Range    Sodium 137 136 - 145 mmol/L    Potassium 4.1 3.5 - 5.1 mmol/L    Chloride 105 97 - 108 mmol/L    CO2 26 21 - 32 mmol/L    Anion gap 6 5 - 15 mmol/L    Glucose 197 (H) 65 - 100 mg/dL    BUN 10 6 - 20 MG/DL    Creatinine 0.93 0.55 - 1.02 MG/DL    BUN/Creatinine ratio 11 (L) 12 - 20      GFR est AA >60 >60 ml/min/1.73m2    GFR est non-AA >60 >60 ml/min/1.73m2    Calcium 9.4 8.5 - 10.1 MG/DL    Bilirubin, total 0.2 0.2 - 1.0 MG/DL    ALT (SGPT) 28 12 - 78 U/L    AST (SGOT) 11 (L) 15 - 37 U/L    Alk. phosphatase 84 45 - 117 U/L    Protein, total 7.8 6.4 - 8.2 g/dL    Albumin 3.2 (L) 3.5 - 5.0 g/dL    Globulin 4.6 (H) 2.0 - 4.0 g/dL    A-G Ratio 0.7 (L) 1.1 - 2.2     LIPASE    Collection Time: 04/06/21  9:47 AM   Result Value Ref Range    Lipase 70 (L) 73 - 393 U/L       Radiologic Studies -   CT ABD PELV W CONT   Final Result   1. Endocervical fluid suggests outlet stenosis. Direct visualization of the   cervix is recommended. 2. No acute process. Ct Abd Pelv W Cont    Result Date: 4/6/2021  1. Endocervical fluid suggests outlet stenosis. Direct visualization of the cervix is recommended. 2. No acute process. Medical Decision Making   I am the first provider for this patient. I reviewed the vital signs, available nursing notes, past medical history, past surgical history, family history and social history. Vital Signs-Reviewed the patient's vital signs.   Patient Vitals for the past 24 hrs:   Temp Pulse Resp BP SpO2   04/06/21 0926 98.5 °F (36.9 °C) 69 14 (!) 149/62 100 %     Pulse Oximetry Analysis - 100% on RA (normal)    Records Reviewed: Nursing Notes, Old Medical Records, Previous Radiology Studies and Previous Laboratory Studies    Provider Notes (Medical Decision Making):   Patient presents with RUQ abdominal pain. Differential includes gastritis, pancreatitis, cholelithiasis, cholecystitis, hepatitis, muscular strain, sprain, fracture, renal pathology, gastroenteritis. Will obtain labs and CT. Will give analgesics and antiemetics PRN. ED Course:   Initial assessment performed. The patients presenting problems have been discussed, and they are in agreement with the care plan formulated and outlined with them. I have encouraged them to ask questions as they arise throughout their visit. Progress Note:   Updated pt on all returned results and findings. Discussed the importance of proper follow up as referred below along with return precautions. Pt in agreement with the care plan and expresses agreement with and understanding of all items discussed. Disposition:  1:09 PM  I have discussed with patient their diagnosis, treatment, and follow up plan. The patient agrees to follow up as outlined in discharge paperwork and also to return to the ED with any worsening. Ken Dumont PA-C      PLAN:  1. Current Discharge Medication List      START taking these medications    Details   lidocaine (LIDODERM) 5 % Apply patch to the affected area for 12 hours a day and remove for 12 hours a day. Qty: 15 Each, Refills: 0      acetaminophen (TYLENOL) 500 mg tablet Take 2 Tabs by mouth every six (6) hours as needed for Pain. Qty: 20 Tab, Refills: 0      cyclobenzaprine (FLEXERIL) 10 mg tablet Take 1 Tab by mouth three (3) times daily as needed for Muscle Spasm(s). Qty: 15 Tab, Refills: 0         CONTINUE these medications which have NOT CHANGED    Details   !! ibuprofen (MOTRIN) 800 mg tablet Take 1 Tab by mouth every eight (8) hours as needed for Pain. Qty: 30 Tab, Refills: 1      !! ibuprofen (MOTRIN) 600 mg tablet Take 1 Tab by mouth every six (6) hours as needed for Pain.   Qty: 60 Tab, Refills: 2    Associated Diagnoses: Chronic midline low back pain without sciatica; Primary osteoarthritis involving multiple joints       !! - Potential duplicate medications found. Please discuss with provider. 2.   Follow-up Information     Follow up With Specialties Details Why Contact Info    Karla Rodriguez MD Internal Medicine Schedule an appointment as soon as possible for a visit  As needed, If symptoms worsen [address] [place] [address]  [phone]      [facility] EMERGENCY DEPT Emergency Medicine Go to  As needed, If symptoms worsen [address] [place] [address]  [phone]        Return to ED if worse     Diagnosis     Clinical Impression:   1. Contusion of rib on right side, initial encounter    2. RUQ abdominal pain    3. Flank pain            Please note that this dictation was completed with Dragon, computer voice recognition software. Quite often unanticipated grammatical, syntax, homophones, and other interpretive errors are inadvertently transcribed by the computer software. Please disregard these errors. Additionally, please excuse any errors that have escaped final proofreading.

## 2021-04-06 NOTE — TELEPHONE ENCOUNTER
Patient called and can't remember the results that were given for her X-ray. She is still having a lot of pain in her side and would like to go back over her results on her side. Please give patient a call back.     [facility]# [phone]

## 2021-04-06 NOTE — DISCHARGE INSTRUCTIONS
It was a pleasure taking care of you in our Emergency Department today. We know that when you come to [facility], you are entrusting us with your health, comfort, and safety. Our physicians and nurses honor that trust, and truly appreciate the opportunity to care for you and your loved ones. We also value your feedback. If you receive a survey about your Emergency Department experience today, please fill it out. We care about our patients' feedback, and we listen to what you have to say. Thank you!

## 2021-04-22 ENCOUNTER — VIRTUAL VISIT (OUTPATIENT)
Dept: SLEEP MEDICINE | Age: 46
End: 2021-04-22
Payer: MEDICAID

## 2021-04-22 DIAGNOSIS — G47.33 OBSTRUCTIVE SLEEP APNEA (ADULT) (PEDIATRIC): Primary | ICD-10-CM

## 2021-04-22 DIAGNOSIS — E11.42 TYPE 2 DIABETES MELLITUS WITH DIABETIC POLYNEUROPATHY, WITHOUT LONG-TERM CURRENT USE OF INSULIN (HCC): ICD-10-CM

## 2021-04-22 DIAGNOSIS — I10 ESSENTIAL HYPERTENSION: ICD-10-CM

## 2021-04-22 DIAGNOSIS — Z11.59 SPECIAL SCREENING EXAMINATION FOR UNSPECIFIED VIRAL DISEASE: ICD-10-CM

## 2021-04-22 PROCEDURE — 99213 OFFICE O/P EST LOW 20 MIN: CPT | Performed by: INTERNAL MEDICINE

## 2021-04-22 NOTE — PROGRESS NOTES
[address] [facility] [facility]. [place], [address]  Tel.  [phone]  Fax. [address] [facility] [address]  [place], [address] [facility]  Tel.  [phone]  Fax. [phone] [address] [facility] [address] [place]   Tel.  [phone]  Fax. [phone]       Telemedicine visit performed with verbal consent of the patient. Patient called and identity confirmed with 2 patient identifers    Patient was seen at home  Vivian Vasquez is a 48 y.o. female who was seen by synchronous (real-time) audio-video technology on 4/22/2021. Consent:  She and/or her healthcare decision maker is aware that this patient-initiated Telehealth encounter is the equivalent to a face to face encounter in the sleep disorder center and has provided verbal consent to proceed: Yes    I was in the office while conducting this encounter. Georgette Mccabe is a 48 y.o. female seen at this telemedicine visit for a positive airway pressure follow-up. She reports some problems using the device. She is 100% compliant over the past 30 days. The following problems are identified:    Drowsiness Yes, despite PAP use Problems exhaling no   Snoring No, not sure Forget to put on no   Mask Comfortable yes  Can't fall asleep no   Dry Mouth Yes at times Mask falls off no   Air Leaking yes Frequent awakenings no       Download reviewed. Some weigh tgain since last seen  She admits that her sleep has improved on PAP therapy using nasal mask and heated tubing.     Allergies   Allergen Reactions    Lisinopril Cough    Pcn [Penicillins] Hives       She has a current medication list which includes the following prescription(s): montelukast, lidocaine, acetaminophen, cyclobenzaprine, prednisone, ibuprofen, pregabalin, aspirin delayed-release, bupropion sr, dicyclomine, folic acid, methotrexate, leucovorin calcium, fluticasone propion-salmeterol, albuterol, glipizide, valsartan, pioglitazone, atorvastatin, amitriptyline, metformin er, ibuprofen, meclizine, nitroglycerin, and albuterol. .      She  has a past medical history of Anemia (iron deficiency), Asthma, DDD (degenerative disc disease), lumbar (2014), Depression, DM type 2 (diabetes mellitus, type 2) ([facility] [address] (11/23/2011), GERD (gastroesophageal reflux disease), GI bleed (2015), Hepatic steatosis (11/2016), HTN (hypertension), Irregular menses, Irritable bowel, Kidney mass, Morbid obesity ([facility] [address] FE on CPAP, PUD (peptic ulcer disease) (2015), and Renal cancer, right ([facility] [address] [place] Sleepiness Score: 18      O>      Weight 297  Vital Signs: (As obtained by patient/caregiver at home)        Constitutional: [x] Appears well-developed and well-nourished [x] No apparent distress      [] Abnormal -     Mental status: [x] Alert and awake  [x] Oriented to person/place/time [x] Able to follow commands    [] Abnormal -     Eyes:   EOM    [x]  Normal    [] Abnormal -   Sclera  [x]  Normal    [] Abnormal -          Discharge [x]  None visible   [] Abnormal -     HENT: [x] Normocephalic, atraumatic  [] Abnormal -     External Ears [x] Normal  [] Abnormal -    Neck: [x] No visualized mass [] Abnormal -     Pulmonary/Chest: [x] Respiratory effort normal   [x] No visualized signs of difficulty breathing or respiratory distress        [] Abnormal -       Neurological:        [x] No Facial Asymmetry (Cranial nerve 7 motor function) (limited exam due to video visit)          [x] No gaze palsy        [] Abnormal -          Skin:        [x] No significant exanthematous lesions or discoloration noted on facial skin         [] Abnormal -            Psychiatric:       [x] Normal Affect [] Abnormal -        Other pertinent observable physical exam findings:-            A>    ICD-10-CM ICD-9-CM    1. Obstructive sleep apnea (adult) (pediatric)  G47.33 327.23 SLEEP LAB (PAP TITRATION)   2. Special screening examination for unspecified viral disease  Z11.59 V73.99 NOVEL CORONAVIRUS (COVID-19)   3.  Essential hypertension  I10 401.9    4. Type 2 diabetes mellitus with diabetic polyneuropathy, without long-term current use of insulin (HCC)  E11.42 250.60      357.2      AHI = 13 (12-17). On CPAP :  9-15 cmH2O. Compliant:      yes    Therapeutic Response:  Positive    P>  Sleepy despite PAP use- PAP titration ordered  she is compliant with PAP therapy and PAP continues to benefit patient and remains necessary for control of her sleep apnea. CPAP setting - continue current settings and will adjust as needed when PAP titration results available     * We have recommended a dedicated weight loss through appropriate diet and an exercise regimen as significant weight reduction has been shown to reduce severity of obstructive sleep apnea. * I will order replacement supplies when results available    * She was asked to contact our office for any problems regarding PAP therapy. * Counseling was provided regarding the importance of regular PAP use and on proper sleep hygiene and safe driving. * Re-enforced proper and regular cleaning for the device. 2.Hypertension - she continues on her current regimen. I have reviewed the relationship between hypertension as it relates to sleep-disordered breathing. 3. Type II diabetes - she continues on her current regimen. I have reviewed the relationship between sleep disordered breathing as it relates to diabetes. All of her questions were addressed. Pursuant to the emergency declaration under the [facility] waiver authority and the [employer] Act, this Virtual  Visit was conducted, with patient's consent, to reduce the patient's risk of exposure to COVID-19 and provide continuity of care for an established patient. Services were provided through a video synchronous discussion virtually to substitute for in-person clinic visit.     Zo Nelson MD    Electronically signed by    Shilpa Marie MD  Diplomate in Sleep Medicine  [facility]

## 2021-04-22 NOTE — PATIENT INSTRUCTIONS
217 [facility] [place]. 1668 [facility], [address] Tel.  [phone] Fax. 100 [facility] [address] [place], [address] [facility] Tel.  [phone] Fax. [phone] 9250 [facility] T[place] Mock Tel.  [phone] Fax. [phone] PROPER SLEEP HYGIENE What to avoid · Do not have drinks with caffeine, such as coffee or black tea, for 8 hours before bed. · Do not smoke or use other types of tobacco near bedtime. Nicotine is a stimulant and can keep you awake. · Avoid drinking alcohol late in the evening, because it can cause you to wake in the middle of the night. · Do not eat a big meal close to bedtime. If you are hungry, eat a light snack. · Do not drink a lot of water close to bedtime, because the need to urinate may wake you up during the night. · Do not read or watch TV in bed. Use the bed only for sleeping and sexual activity. What to try · Go to bed at the same time every night, and wake up at the same time every morning. Do not take naps during the day. · Keep your bedroom quiet, dark, and cool. · Get regular exercise, but not within 3 to 4 hours of your bedtime. Agnes Heman · Sleep on a comfortable pillow and mattress. · If watching the clock makes you anxious, turn it facing away from you so you cannot see the time. · If you worry when you lie down, start a worry book. Well before bedtime, write down your worries, and then set the book and your concerns aside. · Try meditation or other relaxation techniques before you go to bed. · If you cannot fall asleep, get up and go to another room until you feel sleepy. Do something relaxing. Repeat your bedtime routine before you go to bed again. · Make your house quiet and calm about an hour before bedtime. Turn down the lights, turn off the TV, log off the computer, and turn down the volume on music. This can help you relax after a busy day. Drowsy Driving [employer] cites drowsiness as a causing factor in more than 005,267 police reported crashes annually, resulting in 76,000 injuries and 1,500 deaths. Other surveys suggest 55% of people polled have driven while drowsy in the past year, 23% had fallen asleep but not crashed, 3% crashed, and 2% had and accident due to drowsy driving. Who is at risk? Young Drivers: One study of drowsy driving accidents states that 55% of the drivers were under 25 years. Of those, 75% were male. Shift Workers and Travelers: People who work overnight or travel across time zones frequently are at higher risk of experiencing Circadian Rhythm Disorders. They are trying to work and function when their body is programed to sleep. Sleep Deprived: Lack of sleep has a serious impact on your ability to pay attention or focus on a task. Consistently getting less than the average of 8 hours your body needs creates partial or cumulative sleep deprivation. Untreated Sleep Disorders: Sleep Apnea, Narcolepsy, R.L.S., and other sleep disorders (untreated) prevent a person from getting enough restful sleep. This leads to excessive daytime sleepiness and increases the risk for drowsy driving accidents by up to 7 times. Medications / Alcohol: Even over the counter medications can cause drowsiness. Medications that impair a drivers attention should have a warning label. Alcohol naturally makes you sleepy and on its own can cause accidents. Combined with excessive drowsiness its effects are amplified. Signs of Drowsy Driving: * You don't remember driving the last few miles * You may drift out of your kyaw * You are unable to focus and your thoughts wander * You may yawn more often than normal 
 * You have difficulty keeping your eyes open / nodding off * Missing traffic signs, speeding, or tailgating Prevention-  
Good sleep hygiene, lifestyle and behavioral choices have the most impact on drowsy driving.  There is no substitute for sleep and the average person requires 8 hours nightly. If you find yourself driving drowsy, stop and sleep. Consider the sleep hygiene tips provided during your visit as well. Medication Refill Policy: Refills for all medications require 1 week advance notice. Please have your pharmacy fax a refill request. We are unable to fax, or call in \"controled substance\" medications and you will need to pick these prescriptions up from our office. [employer] Thank you for requesting access to [employer]. Please follow the instructions below to securely access and download your online medical record. [employer] allows you to send messages to your doctor, view your test results, renew your prescriptions, schedule appointments, and more. How Do I Sign Up? 1. In your internet browser, go to [email or web address] 2. Click on the First Time User? Click Here link in the Sign In box. You will see the New Member Sign Up page. 3. Enter your [employer] Access Code exactly as it appears below. You will not need to use this code after youve completed the sign-up process. If you do not sign up before the expiration date, you must request a new code. [employer] Access Code: Activation code not generated Current [employer] Status: Active (This is the date your [employer] access code will ) 4. Enter the last four digits of your Social Security Number (xxxx) and Date of Birth (mm/dd/yyyy) as indicated and click Submit. You will be taken to the next sign-up page. 5. Create a [employer] ID. This will be your [employer] login ID and cannot be changed, so think of one that is secure and easy to remember. 6. Create a [employer] password. You can change your password at any time. 7. Enter your Password Reset Question and Answer. This can be used at a later time if you forget your password. 8. Enter your e-mail address. You will receive e-mail notification when new information is available in [address]  
9. Click Sign Up. You can now view and download portions of your medical record. 10. Click the Download Summary menu link to download a portable copy of your medical information. Additional Information If you have questions, please call [phone]. Remember, [employer] is NOT to be used for urgent needs. For medical emergencies, dial 911.

## 2021-04-27 ENCOUNTER — OFFICE VISIT (OUTPATIENT)
Dept: INTERNAL MEDICINE CLINIC | Age: 46
End: 2021-04-27
Payer: MEDICAID

## 2021-04-27 VITALS
OXYGEN SATURATION: 96 % | HEART RATE: 80 BPM | TEMPERATURE: 98.2 F | RESPIRATION RATE: 16 BRPM | DIASTOLIC BLOOD PRESSURE: 63 MMHG | WEIGHT: 293 LBS | SYSTOLIC BLOOD PRESSURE: 113 MMHG | BODY MASS INDEX: 50.02 KG/M2 | HEIGHT: 64 IN

## 2021-04-27 DIAGNOSIS — E11.42 TYPE 2 DIABETES MELLITUS WITH DIABETIC POLYNEUROPATHY, WITHOUT LONG-TERM CURRENT USE OF INSULIN (HCC): Primary | ICD-10-CM

## 2021-04-27 DIAGNOSIS — Z11.59 NEED FOR HEPATITIS C SCREENING TEST: ICD-10-CM

## 2021-04-27 DIAGNOSIS — F33.1 MODERATE EPISODE OF RECURRENT MAJOR DEPRESSIVE DISORDER (HCC): ICD-10-CM

## 2021-04-27 DIAGNOSIS — M06.00 SERONEGATIVE RHEUMATOID ARTHRITIS (HCC): ICD-10-CM

## 2021-04-27 DIAGNOSIS — I10 ESSENTIAL HYPERTENSION: ICD-10-CM

## 2021-04-27 DIAGNOSIS — E78.2 MIXED HYPERLIPIDEMIA: ICD-10-CM

## 2021-04-27 LAB — HBA1C MFR BLD HPLC: 8.5 %

## 2021-04-27 PROCEDURE — 83036 HEMOGLOBIN GLYCOSYLATED A1C: CPT | Performed by: INTERNAL MEDICINE

## 2021-04-27 PROCEDURE — 3052F HG A1C>EQUAL 8.0%<EQUAL 9.0%: CPT | Performed by: INTERNAL MEDICINE

## 2021-04-27 PROCEDURE — 99214 OFFICE O/P EST MOD 30 MIN: CPT | Performed by: INTERNAL MEDICINE

## 2021-04-27 NOTE — PROGRESS NOTES
Identified pt with two pt identifiers. Reviewed record in preparation for visit and have obtained necessary documentation. All patient medications has been reviewed. Chief Complaint   Patient presents with    Diabetes    Hypertension     Additional information about chief complaint:    Visit Vitals  /63 (BP 1 Location: Left upper arm, BP Patient Position: Sitting, BP Cuff Size: Large adult)   Pulse 80   Temp 98.2 °F (36.8 °C) (Oral)   Resp 16   Ht 5' 4\" (1.626 m)   Wt 298 lb (135.2 kg)   SpO2 96%   BMI 51.15 kg/m²       Health Maintenance Due   Topic    Hepatitis C Screening     Eye Exam Retinal or Dilated       Visit Vitals  /63 (BP 1 Location: Left upper arm, BP Patient Position: Sitting, BP Cuff Size: Large adult)   Pulse 80   Temp 98.2 °F (36.8 °C) (Oral)   Resp 16   Ht 5' 4\" (1.626 m)   Wt 298 lb (135.2 kg)   SpO2 96%   BMI 51.15 kg/m²         1. Have you been to the ER, urgent care clinic since your last visit? Hospitalized since your last visit? No     2. Have you seen or consulted any other health care providers outside of the [address] [place] [address] since your last visit? Include any pap smears or colon screening.  No    bdg

## 2021-04-27 NOTE — PATIENT INSTRUCTIONS
Learning About Tests When You Have Diabetes  Why do you need regular tests? Diabetes can lead to other health problems if it's not well controlled. You'll need tests to monitor how well your diabetes is controlled and to check for other things like high cholesterol or kidney problems. Having tests on a regular schedule can help your doctor find problems early, when it's easier to manage them. What tests do you need? These are the tests you may need and how often you should have them. A1c blood test.   This test shows the average level of blood sugar over the past 2 to 3 months. It helps your doctor see whether blood sugar levels have been staying within your target range. How often: Every 3 to 6 months  Goal: A blood sugar level in your target range  Blood pressure test.   This test measures the pressure of blood flow in the arteries. Controlling blood pressure can help prevent damage to nerves and blood vessels. How often: Every 3 to 6 months  Goal: A blood pressure level in your target range  Cholesterol test.   This test measures the amount of a type of fat in the blood. It is common for people with diabetes to also have high cholesterol. Too much cholesterol in the blood can build up inside the blood vessels and raise the risk for heart attack and stroke. How often: At the time of your diabetes diagnosis, and as often as your doctor recommends after that  Goal: A cholesterol level in your target range  Albumin-creatinine ratio test.   This test checks for kidney damage by looking for the protein albumin (say \"al-BYOO-ivanna\") in the urine. Albumin is normally found in the blood. Kidney damage can let small amounts of it (microalbumin) leak into the urine. How often: Once a year  Goal: No protein in the urine  Blood creatinine test/estimated glomerular filtration (eGFR). The blood creatinine (say \"jiwb-IR-ay-neen\") level shows how well your kidneys are working.  Creatinine is a waste product that muscles release into the blood. Blood creatinine is used to estimate the glomerular filtration rate. A high level of creatinine and/or a low eGFR may mean your kidneys are not working as well as they should. How often: Once a year  Goal: Normal level of creatinine in the blood. The eGFR goal is greater than 60 mL/min/1.73 m². Complete foot exam.   The doctor checks for foot sores and whether any sensation has been lost.  How often: Once a year  Goal: Healthy feet with no foot ulcers or loss of feeling  Dental exam and cleaning. The dentist checks for gum disease and tooth decay. People with high blood sugar are more likely to have these problems. How often: Every 6 months  Goal: Healthy teeth and gums  Complete eye exam.   High blood sugar levels can damage the eyes. This exam is done by an ophthalmologist or optometrist. It includes a dilated eye exam. The exam shows whether there's damage to the back of the eye (diabetic retinopathy). How often: Once a year. If you don't have any signs of diabetic retinopathy, your doctor may recommend an exam every 2 years. Goal: No damage to the back of the eye  Thyroid-stimulating hormone (TSH) blood test.   This test checks for thyroid disease. Too little thyroid hormone can cause some medicines (like insulin) to stay in the body longer. This can cause low blood sugar. You may be tested if you have high cholesterol or are a woman over 48years old. How often: As part of your diabetes diagnosis, and as often as your doctor recommends after that  Goal: Normal level of TSH in the blood  Follow-up care is a key part of your treatment and safety. Be sure to make and go to all appointments, and call your doctor if you are having problems. It's also a good idea to know your test results and keep a list of the medicines you take. Where can you learn more?   Go to [email or web address].com/  Enter A243 in the search box to learn more about \"Learning About Tests When You Have Diabetes. \"  Current as of: August 31, 2020               Content Version: 12.8  © 2006-2021 Healthwise, Incorporated. Care instructions adapted under license by [employer] (which disclaims liability or warranty for this information). If you have questions about a medical condition or this instruction, always ask your healthcare professional. [place] [address] any warranty or liability for your use of this information.

## 2021-04-27 NOTE — PROGRESS NOTES
CC:   Chief Complaint   Patient presents with    Diabetes    Hypertension       HISTORY OF PRESENT ILLNESS  Pierre Tipton is a 48 y.o. female. Presents for follow up evaluation. She has type 2 DM with neuropathy, HTN, hyperlipidemia, seronegative RA, asthma, allergic rhinitis, FE,  depression, and morbid obesity. Denies polydipsia, polyuria, or hypoglycemia. Checks BS once daily at 11 am: 190 or less. A1c is 8.5% today (4/27/21); was 6.3% on 1/26/21. Last eye exam: 11/20, Dr. Madhuri Serrano. Still having much aching. Dr. Shiva Parrish is trying to find the right doses on medications to help her pain. Is presently on prednisone. Mood has been better. Not feeling depressed and anxious like she used to. ROS  A complete review of systems was performed and is negative except for those mentioned in the HPI.        Patient Active Problem List   Diagnosis Code    HTN (hypertension) I10    Mod[facility] of [facility] F33.1    FE (obstructive sleep apnea) G47.33    Asthma J45.909    Iron deficiency anemia D50.9    Hyperlipidemia E78.5    Obesity, morbid, BMI 50 or higher (HCC) E66.01    Type 2 diabetes mellitus with diabetic polyneuropathy, without long-term current use of insulin (HCC) E11.42    Coronary artery disease involving native coronary artery of native heart without angina pectoris I25.10    Chronic midline low back pain without sciatica M54.5, G89.29    Atypical squamous cells of undetermined significance (ASCUS) on Papanicolaou smear of cervix R87.610    Type 2 diabetes with nephropathy ([facility]) E11.21    Seronegative rheumatoid arthritis ([facility] M06.00     Past Medical History:   Diagnosis Date    Anemia (iron deficiency)     Asthma     DDD (degenerative disc disease), lumbar 2014    Depression     DM type 2 (diabetes mellitus, type 2) ([facility] 11/23/2011    GERD (gastroesophageal reflux disease)     GI bleed 2015    Hepatic steatosis 11/2016 confirmed by US    HTN (hypertension)     Irregular menses     Irritable bowel     Kidney mass     5/28/19: US showed rigth renal cystic nephroma, follows with Urology (Dr. Tarik Livingston)    Morbid obesity ([address]     FE on CPAP     cpap complaint    PUD (peptic ulcer disease) 2015    Renal cancer, right ([facility]     tx surgery     Allergies   Allergen Reactions    Lisinopril Cough    Pcn [Penicillins] Hives       Current Outpatient Medications   Medication Sig Dispense Refill    montelukast (SINGULAIR) 10 mg tablet TAKE 1 TABLET BY MOUTH ONCE DAILY FOR ALLERGIES AND FOR ASTHMA 90 Tab 3    lidocaine (LIDODERM) 5 % Apply patch to the affected area for 12 hours a day and remove for 12 hours a day. 15 Each 0    acetaminophen (TYLENOL) 500 mg tablet Take 2 Tabs by mouth every six (6) hours as needed for Pain. 20 Tab 0    cyclobenzaprine (FLEXERIL) 10 mg tablet Take 1 Tab by mouth three (3) times daily as needed for Muscle Spasm(s). 15 Tab 0    predniSONE (DELTASONE) 5 mg tablet Take 2 tablets by mouth once daily for 5 days then 1 tablet daily for 5 days. 15 Tab 0    ibuprofen (MOTRIN) 800 mg tablet Take 1 Tab by mouth every eight (8) hours as needed for Pain. 30 Tab 1    pregabalin (LYRICA) 200 mg capsule TAKE 1 CAPSULE BY MOUTH TWICE DAILY . DO NOT EXCEED 2 PER 24 HOURS 60 Cap 0    aspirin delayed-release 81 mg tablet Take 1 Tab by mouth daily. 60 Tab 2    buPROPion SR (WELLBUTRIN SR) 150 mg SR tablet Take 1 Tab by mouth two (2) times a day. 60 Tab 3    dicyclomine (BENTYL) 10 mg capsule TAKE 1 CAPSULE BY MOUTH 4 TIMES DAILY AS NEEDED FOR PAIN      folic acid (FOLVITE) 1 mg tablet Take 1 mg by mouth daily.  methotrexate (RHEUMATREX) 2.5 mg tablet 15 mg every Wednesday.  Pt takes 6 pills for 15 mg total.      leucovorin calcium (WELLCOVORIN) 5 mg tablet TAKE 1 TABLET BY MOUTH ONCE A WEEK 5 HOURS AFTER METHOTREXATE DOSE      fluticasone propion-salmeteroL (Advair Diskus) 500-50 mcg/dose diskus inhaler Take 1 Puff by inhalation every twelve (12) hours. Asthma, Dx: J45.30 60 Each 11    albuterol (PROVENTIL HFA, VENTOLIN HFA, PROAIR HFA) 90 mcg/actuation inhaler Take 2 Puffs by inhalation every six (6) hours as needed for Wheezing. Asthma, Dx: J45.30 1 Inhaler 11    glipiZIDE (GLUCOTROL) 10 mg tablet Take 1/2 tablet by mouth twice daily 180 Tab 1    valsartan (DIOVAN) 40 mg tablet Take 2 tablets every day by oral route. 180 Tab 0    pioglitazone (ACTOS) 30 mg tablet Take 1 tablet by mouth once daily 30 Tab 0    atorvastatin (LIPITOR) 10 mg tablet Take 1 tablet by mouth once daily 90 Tab 3    amitriptyline (ELAVIL) 50 mg tablet Take 1 Tab by mouth nightly. For numbness at hands and feet. (Patient taking differently: Take 50 mg by mouth nightly. For numbness at hands and feet PRN) 30 Tab 3    metFORMIN ER (GLUCOPHAGE XR) 500 mg tablet Take 2 tablets by mouth twice daily 120 Tab 3    ibuprofen (MOTRIN) 600 mg tablet Take 1 Tab by mouth every six (6) hours as needed for Pain. 60 Tab 2    meclizine (ANTIVERT) 25 mg tablet Take 1 Tab by mouth three (3) times daily as needed for Dizziness. 20 Tab 1    nitroglycerin (NITROSTAT) 0.4 mg SL tablet Take 1 Tab by mouth every five (5) minutes as needed for Chest Pain. Sit/Lay down then put one tab under the tongue every 5 minutes as needed for chest pain for 3 doses 1 Bottle 0    albuterol (PROVENTIL VENTOLIN) 2.5 mg /3 mL (0.083 %) nebulizer solution USE ONE VIAL IN NEBULIZER EVERY 4 HOURS AS NEEDED FOR WHEEZING 30 Each 3         PHYSICAL EXAM  Visit Vitals  /63 (BP 1 Location: Left upper arm, BP Patient Position: Sitting, BP Cuff Size: Large adult)   Pulse 80   Temp 98.2 °F (36.8 °C) (Oral)   Resp 16   Ht 5' 4\" (1.626 m)   Wt 298 lb (135.2 kg)   SpO2 96%   BMI 51.15 kg/m²       General: Morbidly obese, no distress. HEENT:  Head normocephalic/atraumatic, no scleral icterus  Lungs:  Clear to ausculation bilaterally. Good air movement.   Heart:  Regular rate and rhythm, normal S1 and S2, no murmur, gallop, or rub  Extremities: No clubbing, cyanosis, or edema. Neurological: Alert and oriented. Psychiatric: Normal mood and affect. Behavior is normal.     Results for orders placed or performed in visit on 04/27/21   AMB POC HEMOGLOBIN A1C   Result Value Ref Range    Hemoglobin A1c (POC) 8.5 %         ASSESSMENT AND PLAN    ICD-10-CM ICD-9-CM    1. Type 2 diabetes mellitus with diabetic polyneuropathy, without long-term current use of insulin (HCC)  E11.42 250.60 AMB POC HEMOGLOBIN A1C     357.2 HEMOGLOBIN A1C WITH EAG      MICROALBUMIN, UR, RAND W/ MICROALB/CREAT RATIO      HEMOGLOBIN A1C WITH EAG      MICROALBUMIN, UR, RAND W/ MICROALB/CREAT RATIO   2. Essential hypertension  R08 123.0 METABOLIC PANEL, COMPREHENSIVE      CBC WITH AUTOMATED DIFF      METABOLIC PANEL, COMPREHENSIVE      CBC WITH AUTOMATED DIFF   3. Mixed hyperlipidemia  E78.2 272.2 LIPID PANEL      LIPID PANEL   4. Moderate episode of recurrent major depressive disorder (HCC)  F33.1 296.32    5. Seronegative rheumatoid arthritis (HCC)  M06.00 714.0    6. Need for hepatitis C screening test  Z11.59 V73.89 HEPATITIS C AB      HEPATITIS C AB     Diagnoses and all orders for this visit:    1. Type 2 diabetes mellitus with diabetic polyneuropathy, without long-term current use of insulin ([facility])  A1c elevated today at 8.5%, most likely due to prednisone. Will not make changes to DM medications at this time; continue metformin  mg 2 tabs BID, glipizide 10 mg 1/2 tab BID and pioglitazone 30 mg daily. Continue amitriptyline and Lyrica for polyneuropathy.  -     AMB POC HEMOGLOBIN A1C  -     HEMOGLOBIN A1C WITH EAG; Future  -     MICROALBUMIN, UR, RAND W/ MICROALB/CREAT RATIO; Future    2. Essential hypertension  Well-controlled. Continue valsartan 80 mg daily.  -     METABOLIC PANEL, COMPREHENSIVE; Future  -     CBC WITH AUTOMATED DIFF; Future    3.  Mixed hyperlipidemia  Stable on atorvastatin 10 mg nightly. -     LIPID PANEL; Future    4. Moderate episode of recurrent major depressive disorder (HCC)  Controlled on Wellbutrin  mg BID. 5. Seronegative rheumatoid arthritis (HCC)  On  MTX and leucovorin. Continue following with Dr. Janice Del Angel. 6. Need for hepatitis C screening test  -     HEPATITIS C AB; Future      Follow-up and Dispositions    · Return in about 6 months (around 10/27/2021), or if symptoms worsen or fail to improve, for HTN, DM; have fasting labs done 1 week prior to appointment. I have discussed the diagnosis with the patient and the intended plan as seen in the above orders. Patient is in agreement. The patient has received an after-visit summary and questions were answered concerning future plans. I have discussed medication side effects and warnings with the patient as well.

## 2021-05-05 ENCOUNTER — TELEPHONE (OUTPATIENT)
Dept: INTERNAL MEDICINE CLINIC | Age: 46
End: 2021-05-05

## 2021-05-05 LAB
SARS-COV-2, NAA 2 DAY TAT: NORMAL
SARS-COV-2, NAA: NOT DETECTED

## 2021-05-05 NOTE — TELEPHONE ENCOUNTER
Please have patient increase to full tablet of glipizide in AM and 1/2 tab in PM while on prednisone.  Go back to previous dose when off prednisone

## 2021-05-05 NOTE — TELEPHONE ENCOUNTER
Patient called and said her sugar has been high all week around 300, and wants to know if the keto diet is safe for her to start.     Please give patient a call back  BCB# [phone]

## 2021-05-05 NOTE — TELEPHONE ENCOUNTER
Pt states sugars are in 300 all day. Eating salads but no protein. Currently on prednisone 5mg daily, glipizide 10mg half tablet twice a day, pioglitazone 30 and metformin 500 4 tablets daily.

## 2021-05-06 RX ORDER — METFORMIN HYDROCHLORIDE 500 MG/1
TABLET, EXTENDED RELEASE ORAL
Qty: 120 TAB | Refills: 0 | Status: SHIPPED | OUTPATIENT
Start: 2021-05-06 | End: 2021-06-14

## 2021-05-09 ENCOUNTER — DOCUMENTATION ONLY (OUTPATIENT)
Dept: SLEEP MEDICINE | Age: 46
End: 2021-05-09

## 2021-05-09 ENCOUNTER — HOSPITAL ENCOUNTER (OUTPATIENT)
Dept: SLEEP MEDICINE | Age: 46
Discharge: HOME | End: 2021-05-09
Payer: MEDICAID

## 2021-05-09 DIAGNOSIS — G47.33 OBSTRUCTIVE SLEEP APNEA (ADULT) (PEDIATRIC): ICD-10-CM

## 2021-05-09 PROCEDURE — 95811 POLYSOM 6/>YRS CPAP 4/> PARM: CPT | Performed by: INTERNAL MEDICINE

## 2021-05-10 VITALS
TEMPERATURE: 97.3 F | WEIGHT: 293 LBS | HEIGHT: 64 IN | OXYGEN SATURATION: 99 % | BODY MASS INDEX: 50.02 KG/M2 | HEART RATE: 102 BPM | SYSTOLIC BLOOD PRESSURE: 134 MMHG | DIASTOLIC BLOOD PRESSURE: 71 MMHG

## 2021-05-14 ENCOUNTER — TELEPHONE (OUTPATIENT)
Dept: SLEEP MEDICINE | Age: 46
End: 2021-05-14

## 2021-05-14 DIAGNOSIS — G47.33 OBSTRUCTIVE SLEEP APNEA (ADULT) (PEDIATRIC): Primary | ICD-10-CM

## 2021-05-14 NOTE — TELEPHONE ENCOUNTER
Results of sleep study in [employer]  Lead tech to convey results to patient  Titration done to optimize pressure settings. In the lab, 10 cmH20 controlled the events well. Her device is set at 9-15 cm so it will adjust up if higher than 10 noted (at the end of the night, she was sleeping on her tummy, so she may need higher settings on her side or back. I will have my staff adjust setting to 10-15 cmH20. Replacement supplies with a mask-fitting are being ordered. She should be seen in 3 months to see how she is doing.     Staff to send order for replacement supplies

## 2021-05-16 DIAGNOSIS — E11.42 TYPE 2 DIABETES MELLITUS WITH DIABETIC POLYNEUROPATHY, WITHOUT LONG-TERM CURRENT USE OF INSULIN (HCC): ICD-10-CM

## 2021-05-17 RX ORDER — PREGABALIN 200 MG/1
CAPSULE ORAL
Qty: 60 CAP | Refills: 0 | Status: SHIPPED | OUTPATIENT
Start: 2021-05-17 | End: 2021-06-15

## 2021-05-17 RX ORDER — LOSARTAN POTASSIUM 25 MG/1
TABLET ORAL
Qty: 30 TAB | Refills: 0 | OUTPATIENT
Start: 2021-05-17

## 2021-05-17 NOTE — TELEPHONE ENCOUNTER
05/17/2021, 12:34 PM    by Elroy Rooney sent to device    Set Mode to AutoSet  Set Min Pressure to 10.0 cmH2O  Set Max Pressure to 15.0 cmH2O  Set Response to Standard  Set EPR to Fulltime  Set EPR level to 3  Set Ramp enable to Off  Set Ramp time to 5 min  Set Start pressure to 4.0 cmH2O    Reviewed settings change with patient who is fully comfortable with moving forward with the adjustment patient will contact our office if needs further assistance.

## 2021-05-18 ENCOUNTER — DOCUMENTATION ONLY (OUTPATIENT)
Dept: SLEEP MEDICINE | Age: 46
End: 2021-05-18

## 2021-05-19 ENCOUNTER — OFFICE VISIT (OUTPATIENT)
Dept: NEUROLOGY | Age: 46
End: 2021-05-19
Payer: MEDICAID

## 2021-05-19 VITALS
HEART RATE: 81 BPM | OXYGEN SATURATION: 98 % | DIASTOLIC BLOOD PRESSURE: 86 MMHG | WEIGHT: 293 LBS | SYSTOLIC BLOOD PRESSURE: 131 MMHG | HEIGHT: 64 IN | BODY MASS INDEX: 50.02 KG/M2

## 2021-05-19 DIAGNOSIS — R41.89 COGNITIVE CHANGE: ICD-10-CM

## 2021-05-19 DIAGNOSIS — F80.9 SPEECH AND LANGUAGE DEFICITS: Primary | ICD-10-CM

## 2021-05-19 PROCEDURE — 99214 OFFICE O/P EST MOD 30 MIN: CPT | Performed by: PSYCHIATRY & NEUROLOGY

## 2021-05-19 RX ORDER — LOSARTAN POTASSIUM 25 MG/1
1 TABLET ORAL DAILY
COMMUNITY
Start: 2021-03-17 | End: 2021-06-29

## 2021-05-19 NOTE — PROGRESS NOTES
Chief Complaint: Word finding difficulty and memory complaints     History of Present Illness:   Nate Davila is a 48 y.o. female with a history of diabetes, hypertension, hyperlipidemia who presents to neurology clinic for evaluation of difficulty with memory as well as word finding difficulty. Patient reports all her symptoms started approximately 1 year ago. 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 She feels like this started slowly and has been progressing since that time. She feels like the most significant changes started approximately 6 months ago. In regards to her speech she frequently has difficulty with finding the right word and needs to pause for a few seconds to get the word out. She has no difficulty with thinking of the word however does not want to come out of her mouth. She also does state that she stutters and to avoid that she slows her speech. Again she used to be a local politician and had no issues with speech previously. She also reports that she has been having trouble with short-term memory and has been noted to repeat the same questions or stories multiple times. Additionally she does forget her medications from time to time but has not doubled her dose. Reports that she now needs to write everything down otherwise she may forget an appointment. She currently is working as an  however has not had any trouble with her job. She has not had any errors made in his other day early for work. She also does maintain finances at home and does report occasionally she has missed playing a bill. She denies any trouble with learning new tasks at this time. She reports she still does the cooking at home and at times she has left the stove on or the water running.   He also tells me that she has mostly stopped driving due to anxiety that she had and does not feel comfortable doing so. She reports that she was only driving short distances for the last 6 years and approximately once a week however now she does not even do this. Patient reports that she does have hypertension and her blood pressure has been elevated recently with it being above 160. She reports that she has the diagnosis of diabetes and initially it was above 10 however now it is around 6. She also tells me that her sleep is poor and has previously been diagnosed with sleep apnea. She has not had an appointment with her sleep doctor in 4 to 5 years. Does state that she has been having more apneic episodes and does not feel rested after a night's rest.     Interval hx   Since she was last seen, patient had MRI of the brain that was reported as normal.  Unfortunately she was not able to undergo neuropsych testing. She reports that she is still having difficulty with finding words as well as her short-term memory. We did discuss that this could be due to an underlying mood disorder the stress that she experienced after losing her election.     Past Medical History:   Diagnosis Date    Anemia (iron deficiency)     Asthma     DDD (degenerative disc disease), lumbar     Depression     DM type 2 (diabetes mellitus, type 2) ([address] 2011    GERD (gastroesophageal reflux disease)     GI bleed     Hepatic steatosis 2016    confirmed by US    HTN (hypertension)     Irregular menses     Irritable bowel     Kidney mass     19: US showed rigth renal cystic nephroma, follows with Urology (Dr. Tim Ceballos)    Morbid obesity ([address]     FE on CPAP     cpap complaint    PUD (peptic ulcer disease)     Renal cancer, right ([address]     tx surgery        Past Surgical History:   Procedure Laterality Date    HX  SECTION      x 2    HX COLONOSCOPY  2015    HX DILATION AND CURETTAGE  2021    HX ENDOSCOPY  2015    HX HYSTEROSCOPY WITH ENDOMETRIAL ABLATION 8/2014    HX TUBAL LIGATION      HX TUMOR REMOVAL  2/2016    right kidney, Dr Maryanne Kay removed from right kidney on 02/15/2016         Family History   Problem Relation Age of Onset    Cancer Mother         Ovarian Cancer /breast ca    Heart Attack Father     Heart Disease Father     Diabetes Father     Other Father         pancreatitis    Cancer Sister     Other Sister         chrons    Heart Attack Maternal Grandfather     Diabetes Paternal Grandmother     HIV/AIDS Son         Social History     Tobacco Use    Smoking status: Never Smoker    Smokeless tobacco: Never Used   Substance Use Topics    Alcohol use: No        Allergies   Allergen Reactions    Lisinopril Cough    Pcn [Penicillins] Hives        Prior to Admission medications    Medication Sig Start Date End Date Taking? Authorizing Provider   losartan (COZAAR) 25 mg tablet Take 1 Tablet by mouth daily. 3/17/21  Yes Provider, Historical   pregabalin (LYRICA) 200 mg capsule TAKE 1 CAPSULE BY MOUTH TWICE DAILY . DO NOT EXCEED 2 PER 24 HOURS 5/17/21  Yes Gita England MD   metFORMIN ER (GLUCOPHAGE XR) 500 mg tablet Take 2 tablets by mouth twice daily 5/6/21  Yes Marty Oviedo NP   montelukast (SINGULAIR) 10 mg tablet TAKE 1 TABLET BY MOUTH ONCE DAILY FOR ALLERGIES AND FOR ASTHMA 4/15/21  Yes Gita England MD   lidocaine (LIDODERM) 5 % Apply patch to the affected area for 12 hours a day and remove for 12 hours a day. 4/6/21  Yes Daryle Garfinkel, PA-C   acetaminophen (TYLENOL) 500 mg tablet Take 2 Tabs by mouth every six (6) hours as needed for Pain. 4/6/21  Yes Daryle Garfinkel, PA-C   cyclobenzaprine (FLEXERIL) 10 mg tablet Take 1 Tab by mouth three (3) times daily as needed for Muscle Spasm(s). 4/6/21  Yes Daryle Garfinkel, PA-C   predniSONE (DELTASONE) 5 mg tablet Take 2 tablets by mouth once daily for 5 days then 1 tablet daily for 5 days.   Patient taking differently: Take 2 tablets by mouth once daily for 5 days then 1 tablet daily for 5 days. As needed 3/31/21  Yes Zahra Rooney MD   ibuprofen (MOTRIN) 800 mg tablet Take 1 Tab by mouth every eight (8) hours as needed for Pain. 3/23/21  Yes Michela Pearce MD   aspirin delayed-release 81 mg tablet Take 1 Tab by mouth daily. 2/24/21  Yes Eligio Hui MD   buPROPion SR [facility] SR) 150 mg SR tablet Take 1 Tab by mouth two (2) times a day. 2/23/21  Yes Jen England MD   dicyclomine (BENTYL) 10 mg capsule TAKE 1 CAPSULE BY MOUTH 4 TIMES DAILY AS NEEDED FOR PAIN 1/6/21  Yes Provider, Historical   folic acid (FOLVITE) 1 mg tablet Take 1 mg by mouth daily. 1/19/21  Yes Provider, Historical   methotrexate (RHEUMATREX) 2.5 mg tablet 20 mg every Wednesday. Pt takes 8 pills for 20 mg total. 1/19/21  Yes Provider, Historical   leucovorin calcium (WELLCOVORIN) 5 mg tablet TAKE 1 TABLET BY MOUTH ONCE A WEEK 5 HOURS AFTER METHOTREXATE DOSE 12/11/20  Yes Provider, Historical   fluticasone propion-salmeteroL (Advair Diskus) 500-50 mcg/dose diskus inhaler Take 1 Puff by inhalation every twelve (12) hours. Asthma, Dx: J45.30 1/26/21  Yes Zahra Rooney MD   albuterol (PROVENTIL HFA, VENTOLIN HFA, PROAIR HFA) 90 mcg/actuation inhaler Take 2 Puffs by inhalation every six (6) hours as needed for Wheezing. Asthma, Dx: J45.30 1/26/21  Yes Zahra Rooney MD   glipiZIDE (GLUCOTROL) 10 mg tablet Take 1/2 tablet by mouth twice daily 1/26/21  Yes Zahra Rooney MD   atorvastatin (LIPITOR) 10 mg tablet Take 1 tablet by mouth once daily 12/4/20  Yes Zahra Rooney MD   amitriptyline (ELAVIL) 50 mg tablet Take 1 Tab by mouth nightly. For numbness at hands and feet. Patient taking differently: Take 50 mg by mouth nightly as needed. For numbness at hands and feet PRN 12/4/20  Yes Jen England MD   ibuprofen (MOTRIN) 600 mg tablet Take 1 Tab by mouth every six (6) hours as needed for Pain.  8/14/20  Yes Jen England MD   meclizine (ANTIVERT) 25 mg tablet Take 1 Tab by mouth three (3) times daily as needed for Dizziness. 2/28/20  Yes Karen England MD   nitroglycerin (NITROSTAT) 0.4 mg SL tablet Take 1 Tab by mouth every five (5) minutes as needed for Chest Pain. Sit/Lay down then put one tab under the tongue every 5 minutes as needed for chest pain for 3 doses 6/8/18  Yes Lenin Quinn MD   albuterol (PROVENTIL VENTOLIN) 2.5 mg /3 mL (0.083 %) nebulizer solution USE ONE VIAL IN NEBULIZER EVERY 4 HOURS AS NEEDED FOR WHEEZING 3/5/18  Yes Chikis Sandoval, NP   valsartan (DIOVAN) 40 mg tablet Take 2 tablets every day by oral route. Patient not taking: Reported on 5/19/2021 1/26/21   Karla Rodriguez MD   pioglitazone (ACTOS) 30 mg tablet Take 1 tablet by mouth once daily  Patient not taking: Reported on 5/19/2021 1/6/21   Karla Rodriguez MD       Review of Systems:  General, constitutional: negative  Eyes, vision: negative  Ears, nose, throat: negative  Cardiovascular, heart: negative  Respiratory: negative  Gastrointestinal: negative  Genitourinary: negative  Musculoskeletal: negative  Skin and integumentary: negative  Psychiatric: negative  Endocrine: negative  Neurological: negative, except for HPI  Hematologic/lymphatic: negative  Allergy/immunology: negative    Visit Vitals  /86   Pulse 81   Ht 5' 4\" (1.626 m)   Wt 295 lb (133.8 kg)   SpO2 98%   BMI 50.64 kg/m²       Physical Exam:  General:  no acute distress  Neck: no carotid bruits  Lungs: clear to auscultation  Heart:  no murmurs, regular rate and rhythm   Lower extremity: no edema    Neurological exam:  Mental Status: Awake, alert, oriented to person, place and time  Registration intact able to recall 1 out of 3 words at 5 minutes despite prompts. Attention and Concentration: able to state the days of the week backwards   Speech and Language: No dysarthria. Able to name, repeat and follow commands.   Initially she did have some pauses in her speech where she was looking for the right word however during the course of the interview this appeared to have improved. Fund of knowledge was preserved    Cranial nerves: II-XII  Pupils equal and reactive, visual fields intact by confrontation   Extraocular movements intact, no evidence of nystagmus or ptosis   Facial sensation intact   Facial movements symmetric   Hearing intact to soft rub bilaterally   Shoulder shrug symmetric and strong   Tongue protrusion full and midline without fasciculation or atrophy    Motor:   Normal tone and Bulk   Drift: No evidence of pronator drift     Strength testing:   deltoid triceps biceps Wrist ext. Wrist flex. intrinsics   Right 5 5 5 5 5 5   Left 5 5 5 5 5 5      Hip flex. Hip ext. Knee ext. Knee flex Dorsi flex Plantar flex   Right  5 5 5 5 5 5   Left  5 5 5 5 5 5       Sensory:  Intact to light touch and pinprick. There is no extinction. Reflexes:     Biceps Triceps  Brachiorad Patellar Achilles Plantar Hoffmans   Right  2 2 2 2 2 Down Neg   Left  2 2 2 2 2 Down Neg        Cerebellar testing:  No dysmetria. Normal rapid alternating movements; finger-to-nose and heel-to- shin testing are within normal limits. Romberg: absent    Gait: steady    Data:     INTERNAL RECORDS:  The patient's electronic medical record was reviewed. The relevant details include:    Lab Results   Component Value Date/Time    Hemoglobin A1c 8.3 (H) 03/11/2019 08:24 AM    Sodium 137 04/06/2021 09:47 AM    Potassium 4.1 04/06/2021 09:47 AM    Chloride 105 04/06/2021 09:47 AM    Glucose 197 (H) 04/06/2021 09:47 AM    BUN 10 04/06/2021 09:47 AM    Creatinine 0.93 04/06/2021 09:47 AM    Calcium 9.4 04/06/2021 09:47 AM    WBC 8.8 04/06/2021 09:47 AM    HCT 34.5 (L) 04/06/2021 09:47 AM    HGB 10.9 (L) 04/06/2021 09:47 AM    PLATELET 830 17/94/8447 09:47 AM       CT Results (maximum last 3):   Results from [place] encounter on 04/06/21    CT ABD PELV W CONT    Narrative  CT ABDOMEN AND PELVIS WITH CONTRAST. 4/6/2021 12:24 PM    INDICATION: Increasing right flank and right upper quadrant abdominal pain after  a fall 2 weeks ago. COMPARISON: 1/21/2021. TECHNIQUE: CT of the abdomen and pelvis was performed after the administration  100 cc IV Isovue-370. CT dose reduction was achieved through use of a  standardized protocol tailored for this examination and automatic exposure  control for dose modulation. FINDINGS:  Abdomen: Right renal cortical scarring in the interpolar region is moderate. The  liver is probably fatty infiltrated, with subtle fatty sparing around the  gallbladder fossa. The lung bases are clear. The heart size is normal. The  distal esophagus, stomach, duodenum, collapsed gallbladder, pancreas, spleen,  adrenals, and left kidney are normal.    Pelvis: Circumscribed fluid in the cervix protrudes through the external  cervical ostium (601-108). This suggests cervical outlet stenosis. Direct  visualization is recommended. It is stable from 1/21/2021 and new from  12/6/2016. Incidental note is made of bilateral ovarian cysts. Sigmoid diverticulosis is mild. The small bowel, ileocecal junction, appendix,  colon, and bladder are otherwise normal.    Calcified disc bulges causes multilevel canal stenosis in the lumbar and lower  thoracic spine. Impression  1. Endocervical fluid suggests outlet stenosis. Direct visualization of the  cervix is recommended. 2. No acute process. Results from [place] encounter on 01/21/21    CT ABD PELV W CONT    Narrative  EXAM: CT ABD PELV W CONT    INDICATION: Left lower quadrant pain    COMPARISON: 2016    CONTRAST: 100 mL of Isovue-370. TECHNIQUE:  Following the uneventful intravenous administration of contrast, thin axial  images were obtained through the abdomen and pelvis. Coronal and sagittal  reconstructions were generated. Oral contrast was administered.  CT dose  reduction was achieved through use of a standardized protocol tailored for this  examination and automatic exposure control for dose modulation. FINDINGS:  LOWER THORAX: No significant abnormality in the incidentally imaged lower chest.  LIVER: No mass. There is hepatic steatosis  BILIARY TREE: Gallbladder is within normal limits. CBD is not dilated. SPLEEN: within normal limits. PANCREAS: No mass or ductal dilatation. ADRENALS: Unremarkable. KIDNEYS: No mass, calculus, or hydronephrosis. There is scarring right kidney  from previous surgery. STOMACH: Unremarkable. SMALL BOWEL: No dilatation or wall thickening. COLON: No dilatation or wall thickening. APPENDIX: Normal  PERITONEUM: No ascites or pneumoperitoneum. RETROPERITONEUM: No lymphadenopathy or aortic aneurysm. REPRODUCTIVE ORGANS: There is a cystic area in the endocervical canal measuring  4.4 x 2.4 x 3.3 cm which communicates with within endometrium. URINARY BLADDER: No mass or calculus. BONES: No destructive bone lesion. ABDOMINAL WALL: No mass or hernia. ADDITIONAL COMMENTS: N/A    Impression  1. Hepatic steatosis. 2. Scarring right mid kidney. 3. There is a low-density area in the endocervical canal measuring 4.4 x 2.4 x  3.3 cm and measures 16 Hounsfield units which appears to communicate with the  endometrium and is new from 2016 and further assessment is recommended. 23X      Results from [facility] encounter on 01/05/17    CT SPINE [facility] WO CONT      MRI Results (maximum last 3): Results from [place] encounter on 03/09/21    MRI BRAIN W WO CONT    Narrative  EXAM: MRI BRAIN W WO CONT    TECHNIQUE: Brain images including sagittal and axial T1-weighted, axial FLAIR,  T2-weighted, diffusion weighted, gradient echo,  precontrast. Axial and coronal  postcontrast T1-weighted images. IV CONTRAST:  15 cc Dotarem    INDICATION:  eval for vascular dementia, stroke    COMPARISON:  None. FINDINGS:  BRAIN PARENCHYMA:  Normal. No significant white matter disease. No acute or  chronic infarct. No mass or abnormal enhancement.   INTRACRANIAL HEMORRHAGE: None.  CSF SPACES:  Normal in size and morphology for the patient's age. BASAL CISTERNS:  Patent. MIDLINE SHIFT: None. VASCULAR SYSTEM:  Normal flow voids. PARANASAL SINUSES AND MASTOID AIR CELLS:  No significant opacification. VISUALIZED ORBITS:  No significant abnormalities. VISUALIZED UPPER CERVICAL SPINE:  No significant abnormalities. SELLA:  No enlargement or  focal abnormality. SKULL BASE:  No significant abnormalities. Cerebellar tonsils in normal  position. CALVARIUM:  Intact. Impression  No abnormalities demonstrated. Results from [place] encounter on 12/20/16    MRI [facility] SPINE W WO CONT    Narrative  INDICATION: Thoracic back pain. Elevated CRP. Fever-rule out discitis. EXAM: Sagittal and axial and coronal images were obtained through the thoracic  spine in varying sequences. Intravenous contrast was administered: 10 cc cc Magnevist.    FINDINGS:    Comparison exam:  None are available. Sagittal images demonstrate significant multilevel degenerative disc disease  involving the cervical spine with probably some spinal stenosis at the C5-C6 and  C6-C7 levels. There is normal thoracic spinal alignment but severe multilevel  degenerative disc disease. STIR sequences demonstrate    The thoracic spinal cord    Axial images demonstrate demonstrate the presence of a moderate disc bulge at  T1-T2 with mild central canal narrowing. There is a tiny central and left-sided protrusion at T8-T9. A moderate central and left-sided protrusion is present at T9-T10. This appears  to abut the thoracic cord at this level. A mild to moderate disc bulge is present at T10-T11. A mild disc bulge with a superimposed tiny right foraminal protrusion is present  at T12-L1. Postcontrast images demonstrate no specific enhancement of disc or vertebra per  se.  There is questionable slight enhancement of the superior aspect of T8-not  definite finding. Impression  IMPRESSION:    Severe multilevel degenerative disc disease involving the visualized cervical  spine with some evidence suggesting spinal stenosis at C5-C6 and C6-C7 levels. Cervical spine MRI may be appropriate if indicated clinically. Severe multilevel degenerative disc disease involving the thoracic spine. Multilevel tiny protrusions and disc bulges most significantly at T9-T10 with a  moderate central and left-sided protrusion appearing to abut the thoracic cord. No definite evidence for discitis. Question slight enhancement along what appears to represent the superior aspect  of the T8 vertebra-not a definite finding. Follow-up examination at an interval may be reasonable if indicated clinically. Results from [place] encounter on 09/17/15    MRI LUMB SPINE WO CONT    Narrative  **Final Report**      ICD Codes / Adm. Diagnosis: 724.4   / Thoracic or lumbosacral neurit  Examination:  MRI L SPINE WO CON  - [id number] - Sep 17 2015  8:21AM  Accession No:  [id number]  Reason:  lumbar DDD, left leg radiculopathy      REPORT:  CLINICAL HISTORY: Pain    INDICATION: Lower back pain    COMPARISON: None    TECHNIQUE: MR imaging of the lumbar spine was performed with sagittal T1,  T2, STIR;  axial T1, T2. Contrast was not administered. FINDINGS:    There is normal alignment of the lumbar spine. Vertebral body heights are  maintained. Marrow signal is normal.  The conus medullaris terminates at the  superior aspect of L2. There is minimal congenital narrowing of the lumbar  canal. Abdominal aorta is normal in caliber. Proximal common iliac vessels  are normal in caliber. L1/2:  The spinal canal and neuroforamina are widely patent. L2/3:  The spinal canal and neuroforamina are widely patent. L3/4:  Disc desiccation. Facet arthropathy. Mild right foraminal protrusion. There is mild right foraminal stenosis. The left neural foramen is patent.     L4/5:  Mild facet arthropathy. Disc desiccation. Spinal canal is patent. Mild right foraminal stenosis. Saranya Rad L5/S1:  Facet arthropathy and hypertrophy is moderate to severe. Moderate  broad-based disc protrusion extends into the foramina bilaterally. Mild  central canal stenosis and severe bilateral foraminal stenosis. .    The visualized musculature and intraperitoneal structures are within normal  limits    Impression  :  Multilevel disc and facet degenerative change. Mild central canal stenosis and severe bilateral foraminal stenosis at L5-S1. Mild right foraminal stenosis L3-4 and L4-5. Signing/Reading Doctor: Jamin Castellon ([id number])  Approved: Jamin Castellon ([id number])  Sep 17 2015  8:22AM      Assessment and Plan   Valeria Pallas is a 48 y.o. female with a history of diabetes, hypertension, hyperlipidemia who presents to neurology clinic for evaluation of difficulty with memory as well as word finding difficulty. Etiology is unclear however she does have significant risk factors for both stroke as well as vascular dementia despite her young age. I also suspect that some of her symptoms may be due to an underlying depression or anxiety given that they started after the loss of an election. MRI of the brain showed no evidence of abnormalities. Word finding difficulties: Given her risk factors will need to rule out stroke or tumor which may be resulting in the symptoms. She does seem to stutter and the difficulty in speech did fluctuate during the course of exam which does raise a suspicion for nonphysiologic findings. - Given that there is concern for stroke and her risk factors, I have told patient to start aspirin 81 mg daily.  - She was recently seen by her PCP who checked blood work, will request those records today. - We did discuss speech therapy however patient would like to determine a diagnosis prior to starting therapy.     Short-term memory complaints: Concerning for possible vascular dementia versus early onset Alzheimer's disease. She does have family history with aunts and uncles who do have dementia.  -We will obtain neuropsychological testing to determine etiology. There may be some underlying mood disorder resulting in her symptoms.  -We will obtain laboratory studies from her PCP and ensure that a TSH and B12 have been checked to look for reversible causes of dementia.  -We extensively discussed the importance of diet and exercise and how this can be used to reduce the rate of developing and the progression of neurodegenerative diseases such as Alzheimer's disease.  -I also did discuss cognitive therapy for the patient, but she would like to hold off on this as well. Patient Active Problem List   Diagnosis Code    HTN (hypertension) I10    Moderate episode of recurrent [facility] disorder [facility] F33.1    FE (obstructive sleep apnea) G47.33    Asthma J45.909    Iron deficiency anemia D50.9    Hyperlipidemia E78.5    Obesity, morbid, BMI 50 or higher ([facility]) E66.01    Type 2 diabetes mellitus with diabetic polyneuropathy, without long-term current use of insulin ([facility]) E11.42    Coronary artery disease involving native coronary artery of native heart without angina pectoris I25.10    Chronic midline low back pain without sciatica M54.5, G89.29    Atypical squamous cells of undetermined significance (ASCUS) on Papanicolaou smear of cervix R87.610    Type 2 diabetes with nephropathy ([facility]) E11.21    Seronegative rheumatoid arthritis ([facility] M06.00        I have discussed the diagnosis with the patient and the intended plan as seen in the above orders. Patient is in agreement. The patient has received an after-visit summary and questions were answered concerning future plans. I have discussed medication side effects and warnings with the patient as well.         Signed By:  Gustavo Garza MD     May 19, 2021

## 2021-06-14 DIAGNOSIS — E11.42 TYPE 2 DIABETES MELLITUS WITH DIABETIC POLYNEUROPATHY, WITHOUT LONG-TERM CURRENT USE OF INSULIN (HCC): ICD-10-CM

## 2021-06-14 RX ORDER — METFORMIN HYDROCHLORIDE 500 MG/1
TABLET, EXTENDED RELEASE ORAL
Qty: 120 TABLET | Refills: 5 | Status: SHIPPED | OUTPATIENT
Start: 2021-06-14 | End: 2022-01-27 | Stop reason: SDUPTHER

## 2021-06-15 RX ORDER — PREGABALIN 200 MG/1
CAPSULE ORAL
Qty: 60 CAPSULE | Refills: 0 | Status: SHIPPED | OUTPATIENT
Start: 2021-06-15 | End: 2022-02-09 | Stop reason: SDUPTHER

## 2021-06-23 DIAGNOSIS — R41.89 COGNITIVE CHANGE: Primary | ICD-10-CM

## 2021-06-29 ENCOUNTER — APPOINTMENT (OUTPATIENT)
Dept: CT IMAGING | Age: 46
End: 2021-06-29
Attending: EMERGENCY MEDICINE
Payer: MEDICAID

## 2021-06-29 ENCOUNTER — HOSPITAL ENCOUNTER (EMERGENCY)
Age: 46
Discharge: HOME | End: 2021-06-29
Attending: EMERGENCY MEDICINE
Payer: MEDICAID

## 2021-06-29 VITALS
OXYGEN SATURATION: 97 % | TEMPERATURE: 98 F | RESPIRATION RATE: 14 BRPM | BODY MASS INDEX: 50.64 KG/M2 | HEART RATE: 74 BPM | SYSTOLIC BLOOD PRESSURE: 139 MMHG | WEIGHT: 293 LBS | DIASTOLIC BLOOD PRESSURE: 94 MMHG

## 2021-06-29 DIAGNOSIS — R10.9 ACUTE ABDOMINAL PAIN: Primary | ICD-10-CM

## 2021-06-29 LAB
ALBUMIN SERPL-MCNC: 3.6 G/DL (ref 3.5–5)
ALBUMIN/GLOB SERPL: 0.8 {RATIO} (ref 1.1–2.2)
ALP SERPL-CCNC: 102 U/L (ref 45–117)
ALT SERPL-CCNC: 71 U/L (ref 12–78)
ANION GAP SERPL CALC-SCNC: 4 MMOL/L (ref 5–15)
APPEARANCE UR: CLEAR
AST SERPL-CCNC: 40 U/L (ref 15–37)
BACTERIA URNS QL MICRO: NEGATIVE /HPF
BASOPHILS # BLD: 0 K/UL (ref 0–0.1)
BASOPHILS NFR BLD: 1 % (ref 0–1)
BILIRUB SERPL-MCNC: 0.4 MG/DL (ref 0.2–1)
BILIRUB UR QL CFM: NEGATIVE
BUN SERPL-MCNC: 14 MG/DL (ref 6–20)
BUN/CREAT SERPL: 14 (ref 12–20)
CALCIUM SERPL-MCNC: 9.2 MG/DL (ref 8.5–10.1)
CHLORIDE SERPL-SCNC: 105 MMOL/L (ref 97–108)
CO2 SERPL-SCNC: 30 MMOL/L (ref 21–32)
COLOR UR: ABNORMAL
CREAT SERPL-MCNC: 0.99 MG/DL (ref 0.55–1.02)
DIFFERENTIAL METHOD BLD: ABNORMAL
EOSINOPHIL # BLD: 0.2 K/UL (ref 0–0.4)
EOSINOPHIL NFR BLD: 3 % (ref 0–7)
EPITH CASTS URNS QL MICRO: ABNORMAL /LPF
ERYTHROCYTE [DISTWIDTH] IN BLOOD BY AUTOMATED COUNT: 16 % (ref 11.5–14.5)
GLOBULIN SER CALC-MCNC: 4.4 G/DL (ref 2–4)
GLUCOSE SERPL-MCNC: 213 MG/DL (ref 65–100)
GLUCOSE UR STRIP.AUTO-MCNC: NEGATIVE MG/DL
HCT VFR BLD AUTO: 36.7 % (ref 35–47)
HGB BLD-MCNC: 11.5 G/DL (ref 11.5–16)
HGB UR QL STRIP: NEGATIVE
HYALINE CASTS URNS QL MICRO: ABNORMAL /LPF (ref 0–5)
IMM GRANULOCYTES # BLD AUTO: 0 K/UL (ref 0–0.04)
IMM GRANULOCYTES NFR BLD AUTO: 1 % (ref 0–0.5)
KETONES UR QL STRIP.AUTO: NEGATIVE MG/DL
LEUKOCYTE ESTERASE UR QL STRIP.AUTO: ABNORMAL
LIPASE SERPL-CCNC: 60 U/L (ref 73–393)
LYMPHOCYTES # BLD: 2.8 K/UL (ref 0.8–3.5)
LYMPHOCYTES NFR BLD: 36 % (ref 12–49)
MCH RBC QN AUTO: 27.8 PG (ref 26–34)
MCHC RBC AUTO-ENTMCNC: 31.3 G/DL (ref 30–36.5)
MCV RBC AUTO: 88.6 FL (ref 80–99)
MONOCYTES # BLD: 0.5 K/UL (ref 0–1)
MONOCYTES NFR BLD: 7 % (ref 5–13)
NEUTS SEG # BLD: 4 K/UL (ref 1.8–8)
NEUTS SEG NFR BLD: 52 % (ref 32–75)
NITRITE UR QL STRIP.AUTO: NEGATIVE
NRBC # BLD: 0 K/UL (ref 0–0.01)
NRBC BLD-RTO: 0 PER 100 WBC
PH UR STRIP: 5.5 [PH] (ref 5–8)
PLATELET # BLD AUTO: 270 K/UL (ref 150–400)
PMV BLD AUTO: 11.3 FL (ref 8.9–12.9)
POTASSIUM SERPL-SCNC: 3.9 MMOL/L (ref 3.5–5.1)
PROT SERPL-MCNC: 8 G/DL (ref 6.4–8.2)
PROT UR STRIP-MCNC: NEGATIVE MG/DL
RBC # BLD AUTO: 4.14 M/UL (ref 3.8–5.2)
RBC #/AREA URNS HPF: ABNORMAL /HPF (ref 0–5)
SODIUM SERPL-SCNC: 139 MMOL/L (ref 136–145)
SP GR UR REFRACTOMETRY: 1.03 (ref 1–1.03)
UA: UC IF INDICATED,UAUC: ABNORMAL
UROBILINOGEN UR QL STRIP.AUTO: 1 EU/DL (ref 0.2–1)
WBC # BLD AUTO: 7.6 K/UL (ref 3.6–11)
WBC URNS QL MICRO: ABNORMAL /HPF (ref 0–4)

## 2021-06-29 PROCEDURE — 74177 CT ABD & PELVIS W/CONTRAST: CPT

## 2021-06-29 PROCEDURE — 36415 COLL VENOUS BLD VENIPUNCTURE: CPT

## 2021-06-29 PROCEDURE — 74011250636 HC RX REV CODE- 250/636: Performed by: EMERGENCY MEDICINE

## 2021-06-29 PROCEDURE — 74011000636 HC RX REV CODE- 636: Performed by: EMERGENCY MEDICINE

## 2021-06-29 PROCEDURE — 83690 ASSAY OF LIPASE: CPT

## 2021-06-29 PROCEDURE — 81001 URINALYSIS AUTO W/SCOPE: CPT

## 2021-06-29 PROCEDURE — 99284 EMERGENCY DEPT VISIT MOD MDM: CPT

## 2021-06-29 PROCEDURE — 85025 COMPLETE CBC W/AUTO DIFF WBC: CPT

## 2021-06-29 PROCEDURE — 80053 COMPREHEN METABOLIC PANEL: CPT

## 2021-06-29 PROCEDURE — 96374 THER/PROPH/DIAG INJ IV PUSH: CPT

## 2021-06-29 RX ORDER — DICYCLOMINE HYDROCHLORIDE 10 MG/5ML
20 SOLUTION ORAL 4 TIMES DAILY
Qty: 200 ML | Refills: 0 | Status: SHIPPED | OUTPATIENT
Start: 2021-06-29 | End: 2022-01-27 | Stop reason: SDUPTHER

## 2021-06-29 RX ORDER — FENTANYL CITRATE 50 UG/ML
50 INJECTION, SOLUTION INTRAMUSCULAR; INTRAVENOUS
Status: COMPLETED | OUTPATIENT
Start: 2021-06-29 | End: 2021-06-29

## 2021-06-29 RX ORDER — HYDROCODONE BITARTRATE AND ACETAMINOPHEN 5; 325 MG/1; MG/1
1 TABLET ORAL
Qty: 10 TABLET | Refills: 0 | Status: SHIPPED | OUTPATIENT
Start: 2021-06-29 | End: 2021-07-02

## 2021-06-29 RX ORDER — FAMOTIDINE 20 MG/1
20 TABLET, FILM COATED ORAL 2 TIMES DAILY
Qty: 20 TABLET | Refills: 0 | Status: SHIPPED | OUTPATIENT
Start: 2021-06-29 | End: 2021-07-09

## 2021-06-29 RX ADMIN — IOPAMIDOL 100 ML: 755 INJECTION, SOLUTION INTRAVENOUS at 12:25

## 2021-06-29 RX ADMIN — FENTANYL CITRATE 50 MCG: 50 INJECTION, SOLUTION INTRAMUSCULAR; INTRAVENOUS at 12:38

## 2021-06-29 NOTE — ED NOTES
Jairo Cardenas RN reviewed discharge instructions with the patient. The patient verbalized understanding. All questions and concerns were addressed. The patient declined a wheelchair and is discharged ambulatory in the care of family members with instructions and prescriptions in hand. Pt is alert and oriented x 4. Respirations are clear and unlabored.

## 2021-06-29 NOTE — ED NOTES
Complaint of LLQ pain since last week. [facility] last Thursday where she had elevated WBC and was rx'd cirpo. She was told to come to the ER if pain worsened. Initially, she felt better, but then her pain returned.  She had a fever last night, which she treated with tylenol

## 2021-06-29 NOTE — ED PROVIDER NOTES
EMERGENCY DEPARTMENT HISTORY AND PHYSICAL EXAM      Date: 6/29/2021  Patient Name: Tia Jeter    History of Presenting Illness     Chief Complaint   Patient presents with    Abdominal Pain     pt reports LLQ abd pain. elevated WBC at pt first last week so started on antibiotics. Reports that pain went away and came back last night with a 102 fever. took tylenol last night and this AM        History Provided By: Patient    HPI: Tia Jeter, 48 y.o. female with a past medical history significant for Diabetes, hypertension, multiple milligrams as stated below presents to the ED with cc of moderate to severe left lower quadrant pain over the last 4 to 5 days. Patient was seen in outpatient basis and told she had a white blood cell count that was elevated at 13,000. She was started on ciprofloxacin and symptoms seem to improve for several days. Over last night she spiked a fever 102 and noticed the pain was worsening left lower quadrant. She does have a history of diverticulosis and diverticulitis and was worried there could be a perforation. She denies any constipation or diarrhea. No bloody stools. No nausea or vomiting. No dysuria or hematuria. No other associated symptoms. No other exacerbating ameliorating factors. There are no other complaints, changes, or physical findings at this time. PCP: Maryan Suero MD    No current facility-administered medications on file prior to encounter. Current Outpatient Medications on File Prior to Encounter   Medication Sig Dispense Refill    pregabalin (LYRICA) 200 mg capsule TAKE 1 TABLET BY MOUTH TWICE DAILY .  DO NOT EXCEED 2 PER 24 HOURS 60 Capsule 0    metFORMIN ER (GLUCOPHAGE XR) 500 mg tablet Take 2 tablets by mouth twice daily 120 Tablet 5    montelukast (SINGULAIR) 10 mg tablet TAKE 1 TABLET BY MOUTH ONCE DAILY FOR ALLERGIES AND FOR ASTHMA 90 Tab 3    lidocaine (LIDODERM) 5 % Apply patch to the affected area for 12 hours a day and remove for 12 hours a day. 15 Each 0    acetaminophen (TYLENOL) 500 mg tablet Take 2 Tabs by mouth every six (6) hours as needed for Pain. 20 Tab 0    cyclobenzaprine (FLEXERIL) 10 mg tablet Take 1 Tab by mouth three (3) times daily as needed for Muscle Spasm(s). 15 Tab 0    ibuprofen (MOTRIN) 800 mg tablet Take 1 Tab by mouth every eight (8) hours as needed for Pain. 30 Tab 1    buPROPion SR (WELLBUTRIN SR) 150 mg SR tablet Take 1 Tab by mouth two (2) times a day. 60 Tab 3    folic acid (FOLVITE) 1 mg tablet Take 1 mg by mouth daily.  methotrexate (RHEUMATREX) 2.5 mg tablet 20 mg every Wednesday. Pt takes 8 pills for 20 mg total.      fluticasone propion-salmeteroL (Advair Diskus) 500-50 mcg/dose diskus inhaler Take 1 Puff by inhalation every twelve (12) hours. Asthma, Dx: J45.30 60 Each 11    albuterol (PROVENTIL HFA, VENTOLIN HFA, PROAIR HFA) 90 mcg/actuation inhaler Take 2 Puffs by inhalation every six (6) hours as needed for Wheezing. Asthma, Dx: J45.30 1 Inhaler 11    glipiZIDE (GLUCOTROL) 10 mg tablet Take 1/2 tablet by mouth twice daily 180 Tab 1    valsartan (DIOVAN) 40 mg tablet Take 2 tablets every day by oral route. 180 Tab 0    atorvastatin (LIPITOR) 10 mg tablet Take 1 tablet by mouth once daily 90 Tab 3    amitriptyline (ELAVIL) 50 mg tablet Take 1 Tab by mouth nightly. For numbness at hands and feet. (Patient taking differently: Take 50 mg by mouth nightly as needed. For numbness at hands and feet PRN) 30 Tab 3    ibuprofen (MOTRIN) 600 mg tablet Take 1 Tab by mouth every six (6) hours as needed for Pain. 60 Tab 2    meclizine (ANTIVERT) 25 mg tablet Take 1 Tab by mouth three (3) times daily as needed for Dizziness. 20 Tab 1    nitroglycerin (NITROSTAT) 0.4 mg SL tablet Take 1 Tab by mouth every five (5) minutes as needed for Chest Pain.  Sit/Lay down then put one tab under the tongue every 5 minutes as needed for chest pain for 3 doses 1 Bottle 0    albuterol (PROVENTIL VENTOLIN) 2.5 mg /3 mL (0.083 %) nebulizer solution USE ONE VIAL IN NEBULIZER EVERY 4 HOURS AS NEEDED FOR WHEEZING 30 Each 3    [DISCONTINUED] losartan (COZAAR) 25 mg tablet Take 1 Tablet by mouth daily.  [DISCONTINUED] predniSONE (DELTASONE) 5 mg tablet Take 2 tablets by mouth once daily for 5 days then 1 tablet daily for 5 days. (Patient taking differently: Take 2 tablets by mouth once daily for 5 days then 1 tablet daily for 5 days. As needed) 15 Tab 0    [DISCONTINUED] aspirin delayed-release 81 mg tablet Take 1 Tab by mouth daily.  60 Tab 2    [DISCONTINUED] dicyclomine (BENTYL) 10 mg capsule TAKE 1 CAPSULE BY MOUTH 4 TIMES DAILY AS NEEDED FOR PAIN      [DISCONTINUED] leucovorin calcium (WELLCOVORIN) 5 mg tablet TAKE 1 TABLET BY MOUTH ONCE A WEEK 5 HOURS AFTER METHOTREXATE DOSE      [DISCONTINUED] pioglitazone (ACTOS) 30 mg tablet Take 1 tablet by mouth once daily (Patient not taking: Reported on 2021) 30 Tab 0       Past History     Past Medical History:  Past Medical History:   Diagnosis Date    Anemia (iron deficiency)     Asthma     DDD (degenerative disc disease), lumbar     Depression     DM type 2 (diabetes mellitus, type 2) ([facility] [address] 2011    GERD (gastroesophageal reflux disease)     GI bleed     Hepatic steatosis 2016    confirmed by US    HTN (hypertension)     Irregular menses     Irritable bowel     Kidney mass     19: US showed rigth renal cystic nephroma, follows with Urology (Dr. Harinder De)    Morbid obesity ([facility] [address]     FE on CPAP     cpap complaint    PUD (peptic ulcer disease)     Renal cancer, right ([address]     tx surgery       Past Surgical History:  Past Surgical History:   Procedure Laterality Date    HX  SECTION      x 2    HX COLONOSCOPY  2015    HX DILATION AND CURETTAGE  2021    HX ENDOSCOPY  2015    HX HYSTEROSCOPY WITH ENDOMETRIAL ABLATION  2014    HX TUBAL LIGATION      HX TUMOR REMOVAL  2016    right kidney,  Jareth Castellanos HX UROLOGICAL      Mass removed from right kidney on 02/15/2016        Family History:  Family History   Problem Relation Age of Onset    Cancer Mother         Ovarian Cancer /breast ca    Heart Attack Father     Heart Disease Father     Diabetes Father     Other Father         pancreatitis    Cancer Sister     Other Sister         chrons    Heart Attack Maternal Grandfather     Diabetes Paternal Grandmother     HIV/AIDS Son        Social History:  Social History     Tobacco Use    Smoking status: Never Smoker    Smokeless tobacco: Never Used   Vaping Use    Vaping Use: Never used   Substance Use Topics    Alcohol use: No    Drug use: No       Allergies: Allergies   Allergen Reactions    Lisinopril Cough    Pcn [Penicillins] Hives         Review of Systems   Review of Systems   Constitutional: Positive for fever. Negative for chills, diaphoresis and fatigue. HENT: Negative for ear pain and sore throat. Eyes: Negative for pain, discharge and redness. Respiratory: Negative for cough and shortness of breath. Cardiovascular: Negative for chest pain and leg swelling. Gastrointestinal: Positive for abdominal pain. Negative for diarrhea, nausea and vomiting. Endocrine: Negative for cold intolerance and heat intolerance. Genitourinary: Negative for flank pain and hematuria. Musculoskeletal: Negative for back pain and neck stiffness. Skin: Negative for rash and wound. Neurological: Negative for dizziness, syncope and headaches. All other systems reviewed and are negative. Physical Exam   Physical Exam  Vitals and nursing note reviewed. Constitutional:       General: She is in acute distress. Appearance: She is well-developed. She is obese. She is not ill-appearing. HENT:      Head: Normocephalic and atraumatic. Mouth/Throat:      Pharynx: No oropharyngeal exudate.    Eyes:      Conjunctiva/sclera: Conjunctivae normal.      Pupils: Pupils are equal, round, and reactive to light. Cardiovascular:      Rate and Rhythm: Normal rate and regular rhythm. Heart sounds: No murmur heard. Pulmonary:      Effort: Pulmonary effort is normal. No respiratory distress. Breath sounds: Normal breath sounds. No wheezing. Abdominal:      General: Bowel sounds are normal. There is no distension. Palpations: Abdomen is soft. Tenderness: There is abdominal tenderness in the left lower quadrant. There is guarding. There is no right CVA tenderness, left CVA tenderness or rebound. Musculoskeletal:         General: No deformity. Normal range of motion. Cervical back: Normal range of motion. Skin:     General: Skin is warm and dry. Findings: No rash. Neurological:      Mental Status: She is alert and oriented to person, place, and time. Coordination: Coordination normal.   Psychiatric:         Behavior: Behavior normal.         Diagnostic Study Results     Labs -     Recent Results (from the past 24 hour(s))   CBC WITH AUTOMATED DIFF    Collection Time: 06/29/21 10:54 AM   Result Value Ref Range    WBC 7.6 3.6 - 11.0 K/uL    RBC 4.14 3.80 - 5.20 M/uL    HGB 11.5 11.5 - 16.0 g/dL    HCT 36.7 35.0 - 47.0 %    MCV 88.6 80.0 - 99.0 FL    MCH 27.8 26.0 - 34.0 PG    MCHC 31.3 30.0 - 36.5 g/dL    RDW 16.0 (H) 11.5 - 14.5 %    PLATELET 949 194 - 424 K/uL    MPV 11.3 8.9 - 12.9 FL    NRBC 0.0 0  WBC    ABSOLUTE NRBC 0.00 0.00 - 0.01 K/uL    NEUTROPHILS 52 32 - 75 %    LYMPHOCYTES 36 12 - 49 %    MONOCYTES 7 5 - 13 %    EOSINOPHILS 3 0 - 7 %    BASOPHILS 1 0 - 1 %    IMMATURE GRANULOCYTES 1 (H) 0.0 - 0.5 %    ABS. NEUTROPHILS 4.0 1.8 - 8.0 K/UL    ABS. LYMPHOCYTES 2.8 0.8 - 3.5 K/UL    ABS. MONOCYTES 0.5 0.0 - 1.0 K/UL    ABS. EOSINOPHILS 0.2 0.0 - 0.4 K/UL    ABS. BASOPHILS 0.0 0.0 - 0.1 K/UL    ABS. IMM.  GRANS. 0.0 0.00 - 0.04 K/UL    DF AUTOMATED     METABOLIC PANEL, COMPREHENSIVE    Collection Time: 06/29/21 10:54 AM   Result Value Ref Range Sodium 139 136 - 145 mmol/L    Potassium 3.9 3.5 - 5.1 mmol/L    Chloride 105 97 - 108 mmol/L    CO2 30 21 - 32 mmol/L    Anion gap 4 (L) 5 - 15 mmol/L    Glucose 213 (H) 65 - 100 mg/dL    BUN 14 6 - 20 MG/DL    Creatinine 0.99 0.55 - 1.02 MG/DL    BUN/Creatinine ratio 14 12 - 20      GFR est AA >60 >60 ml/min/1.73m2    GFR est non-AA 60 (L) >60 ml/min/1.73m2    Calcium 9.2 8.5 - 10.1 MG/DL    Bilirubin, total 0.4 0.2 - 1.0 MG/DL    ALT (SGPT) 71 12 - 78 U/L    AST (SGOT) 40 (H) 15 - 37 U/L    Alk. phosphatase 102 45 - 117 U/L    Protein, total 8.0 6.4 - 8.2 g/dL    Albumin 3.6 3.5 - 5.0 g/dL    Globulin 4.4 (H) 2.0 - 4.0 g/dL    A-G Ratio 0.8 (L) 1.1 - 2.2     LIPASE    Collection Time: 06/29/21 10:54 AM   Result Value Ref Range    Lipase 60 (L) 73 - 393 U/L   URINALYSIS W/ REFLEX CULTURE    Collection Time: 06/29/21 11:29 AM    Specimen: Miscellaneous sample; Urine    Urine specimen   Result Value Ref Range    Color YELLOW/STRAW      Appearance CLEAR CLEAR      Specific gravity 1.030 1.003 - 1.030      pH (UA) 5.5 5.0 - 8.0      Protein Negative NEG mg/dL    Glucose Negative NEG mg/dL    Ketone Negative NEG mg/dL    Blood Negative NEG      Urobilinogen 1.0 0.2 - 1.0 EU/dL    Nitrites Negative NEG      Leukocyte Esterase TRACE (A) NEG      UA:UC IF INDICATED CULTURE NOT INDICATED BY UA RESULT CNI      WBC 5-10 0 - 4 /hpf    RBC 0-5 0 - 5 /hpf    Epithelial cells FEW FEW /lpf    Bacteria Negative NEG /hpf    Hyaline cast 0-2 0 - 5 /lpf   BILIRUBIN, CONFIRM    Collection Time: 06/29/21 11:29 AM   Result Value Ref Range    Bilirubin UA, confirm Negative NEG         Radiologic Studies -   CT ABD PELV W CONT   Final Result   No acute intra-abdominal pathology. Incidental findings as above        CT Results  (Last 48 hours)               06/29/21 1227  CT ABD PELV W CONT Final result    Impression:  No acute intra-abdominal pathology.  Incidental findings as above       Narrative:  EXAM: CT ABD PELV W CONT INDICATION: LLQ abd pain       COMPARISON: 4/6/2021        CONTRAST: 100 mL of Isovue-370. TECHNIQUE:    Following the uneventful intravenous administration of contrast, thin axial   images were obtained through the abdomen and pelvis. Coronal and sagittal   reconstructions were generated. Oral contrast was not administered. CT dose   reduction was achieved through use of a standardized protocol tailored for this   examination and automatic exposure control for dose modulation. FINDINGS:    LOWER THORAX: No significant abnormality in the incidentally imaged lower chest.   LIVER: Hepatic steatosis   BILIARY TREE: Gallbladder is within normal limits. CBD is not dilated. SPLEEN: within normal limits. PANCREAS: No mass or ductal dilatation. ADRENALS: Unremarkable. KIDNEYS: No mass, calculus, or hydronephrosis. STOMACH: Unremarkable. SMALL BOWEL: No dilatation or wall thickening. COLON: Colonic diverticulosis. APPENDIX: Unremarkable   PERITONEUM: No significant free fluid or free intraperitoneal air   RETROPERITONEUM: Unremarkable   REPRODUCTIVE ORGANS: Possible small cysts in bilateral ovaries which may be   physiologic in a patient of this age. URINARY BLADDER: Decompressed   BONES: Unremarkable   ABDOMINAL WALL: No mass or hernia. ADDITIONAL COMMENTS: N/A               CXR Results  (Last 48 hours)    None            Medical Decision Making   I am the first provider for this patient. I reviewed the vital signs, available nursing notes, past medical history, past surgical history, family history and social history. Vital Signs-Reviewed the patient's vital signs.   Patient Vitals for the past 12 hrs:   Temp Pulse Resp BP SpO2   06/29/21 1500 -- -- -- (!) 139/94 97 %   06/29/21 1445 -- -- -- (!) 139/114 98 %   06/29/21 1430 -- -- -- (!) 156/78 97 %   06/29/21 1415 -- -- -- (!) 175/74 98 %   06/29/21 1400 -- -- -- (!) 196/64 100 %   06/29/21 1345 -- -- -- (!) 197/113 99 %   06/29/21 1330 -- -- -- (!) 198/82 99 %   06/29/21 1315 -- -- -- (!) 189/108 98 %   06/29/21 1300 -- -- -- (!) 179/104 97 %   06/29/21 1245 -- -- -- (!) 169/76 95 %   06/29/21 1200 -- -- -- (!) 135/52 99 %   06/29/21 1145 -- -- -- (!) 151/129 99 %   06/29/21 1135 -- -- -- (!) 145/52 98 %   06/29/21 1050 98 °F (36.7 °C) 74 14 (!) 143/62 100 %       Records Reviewed: Nursing records and medical records reviewed    MDM:  Pt presents with acute abdominal pain; vital signs stable with currently a non-peritoneal exam; DDx includes: Gastroenteritis, hepatitis, pancreatitis, obstruction, appendicitis, viral illness, IBD, diverticulitis, mesenteric ischemia, AAA or descending dissection, ACS, kidney stone. Will get labs, treat symptomatically and obtain serial abdominal exams to determine if a CT is warranted. Will reassess and monitor closely. Provider Notes (Medical Decision Making):   Patient is a 23-year-old female presenting with known history of diverticulitis with left lower quadrant pain. She has normal white blood cell count, no evidence of urinary tract infection, and a CT scan that is unremarkable for any acute pathology. She may have a resolving diverticulitis and encouraged her to finish her course of antibiotics. However this may be a viral cause. I will treat her with Bentyl, antacids, and short course of pain medications and have her follow-up as an outpatient with her primary care doctor. ED Course:   Initial assessment performed. The patients presenting problems have been discussed, and they are in agreement with the care plan formulated and outlined with them. I have encouraged them to ask questions as they arise throughout their visit.          Medications Administered     fentaNYL citrate (PF) injection 50 mcg     Admin Date  06/29/2021 Action  Given Dose  50 mcg Route  IntraVENous Administered By  Sunil Vallejo, RN          iopamidoL (ISOVUE-370) 76 % injection 100 mL     Admin Date  06/29/2021 Action  Given Dose  100 mL Route  IntraVENous Administered By  Kathryn Aldridge                    Critical Care:  None      Disposition:  2:29 PM  Tequila Gorman  results have been reviewed with her. She has been counseled regarding her diagnosis. She verbally conveys understanding and agreement of the signs, symptoms, diagnosis, treatment and prognosis and additionally agrees to follow up as recommended with Dr. Lou Nielsen MD in 24 - 48 hours. She also agrees with the care-plan and conveys that all of her questions have been answered. I have also put together some discharge instructions for her that include: 1) educational information regarding their diagnosis, 2) how to care for their diagnosis at home, as well a 3) list of reasons why they would want to return to the ED prior to their follow-up appointment, should their condition change. DISCHARGE PLAN:  1. Current Discharge Medication List      START taking these medications    Details   dicyclomine (BENTYL) 10 mg/5 mL soln oral solution Take 10 mL by mouth four (4) times daily. Qty: 200 mL, Refills: 0  Start date: 2021      famotidine (Pepcid) 20 mg tablet Take 1 Tablet by mouth two (2) times a day for 10 days. Qty: 20 Tablet, Refills: 0  Start date: 2021, End date: 2021      HYDROcodone-acetaminophen (Lorcet, HYDROcodone,) 5-325 mg per tablet Take 1 Tablet by mouth every six (6) hours as needed for Pain for up to 3 days. Max Daily Amount: 4 Tablets. Qty: 10 Tablet, Refills: 0  Start date: 2021, End date: 2021    Associated Diagnoses: Acute abdominal pain         STOP taking these medications       dicyclomine (BENTYL) 10 mg capsule Comments:   Reason for Stoppin.   Follow-up Information     Follow up With Specialties Details Why Jose Leos MD Internal Medicine In 3 days For a follow-up evaluation.  [address]  [phone]      [facility] DEPT Emergency Medicine In 2 days If symptoms worsen [address] [place] [address]  [phone]        3. Return to ED if worse     Diagnosis     Clinical Impression:   1. Acute abdominal pain        Attestations:    Kathrin Loyola MD    Please note that this dictation was completed with [employer], the computer voice recognition software. Quite often unanticipated grammatical, syntax, homophones, and other interpretive errors are inadvertently transcribed by the computer software. Please disregard these errors. Please excuse any errors that have escaped final proofreading. Thank you.

## 2021-06-29 NOTE — DISCHARGE INSTRUCTIONS
It was a pleasure taking care of you in our Emergency Department today. We know that when you come to [facility], you are entrusting us with your health, comfort, and safety. Our physicians and nurses honor that trust, and truly appreciate the opportunity to care for you and your loved ones. We also value your feedback. If you receive a survey about your Emergency Department experience today, please fill it out. We care about our patients' feedback, and we listen to what you have to say. Thank you! Your CAT scan today did not show a clear cause for your pain. Your symptoms may result be resolving with your antibiotics and you should continue these until they are done. Take medications today as prescribed and continue to monitor your symptoms. Please return to the ER if you worsen in any way.

## 2021-07-06 ENCOUNTER — OFFICE VISIT (OUTPATIENT)
Dept: INTERNAL MEDICINE CLINIC | Age: 46
End: 2021-07-06
Payer: MEDICAID

## 2021-07-06 VITALS
BODY MASS INDEX: 49.85 KG/M2 | OXYGEN SATURATION: 97 % | HEIGHT: 64 IN | SYSTOLIC BLOOD PRESSURE: 116 MMHG | RESPIRATION RATE: 16 BRPM | HEART RATE: 73 BPM | TEMPERATURE: 98 F | WEIGHT: 292 LBS | DIASTOLIC BLOOD PRESSURE: 68 MMHG

## 2021-07-06 DIAGNOSIS — J02.9 PHARYNGITIS, UNSPECIFIED ETIOLOGY: Primary | ICD-10-CM

## 2021-07-06 LAB
S PYO AG THROAT QL: NEGATIVE
VALID INTERNAL CONTROL?: YES

## 2021-07-06 PROCEDURE — 87880 STREP A ASSAY W/OPTIC: CPT | Performed by: INTERNAL MEDICINE

## 2021-07-06 PROCEDURE — 99213 OFFICE O/P EST LOW 20 MIN: CPT | Performed by: INTERNAL MEDICINE

## 2021-07-06 RX ORDER — AZITHROMYCIN 250 MG/1
TABLET, FILM COATED ORAL
Qty: 6 TABLET | Refills: 0 | Status: SHIPPED | OUTPATIENT
Start: 2021-07-06 | End: 2021-07-29

## 2021-07-06 NOTE — PROGRESS NOTES
Identified pt with two pt identifiers. Reviewed record in preparation for visit and have obtained necessary documentation. All patient medications has been reviewed. Chief Complaint   Patient presents with    Sore Throat     x2 days      Additional information about chief complaint:    Visit Vitals  /68 (BP 1 Location: Right arm, BP Patient Position: Sitting, BP Cuff Size: Large adult)   Pulse 73   Temp 98 °F (36.7 °C) (Oral)   Resp 16   Ht 5' 4\" (1.626 m)   Wt 292 lb (132.5 kg)   SpO2 97%   BMI 50.12 kg/m²       Health Maintenance Due   Topic    Hepatitis C Screening     Eye Exam Retinal or Dilated        1. Have you been to the ER, urgent care clinic since your last visit? Hospitalized since your last visit? Yes ED 6/29/21- abdominal pain     2. Have you seen or consulted any other health care providers outside of the [address] [place] [address] since your last visit? Include any pap smears or colon screening.  No    bdg

## 2021-07-06 NOTE — PATIENT INSTRUCTIONS
Sore Throat: Care Instructions  Your Care Instructions     Infection by bacteria or a virus causes most sore throats. Cigarette smoke, dry air, air pollution, allergies, and yelling can also cause a sore throat. Sore throats can be painful and annoying. Fortunately, most sore throats go away on their own. If you have a bacterial infection, your doctor may prescribe antibiotics. Follow-up care is a key part of your treatment and safety. Be sure to make and go to all appointments, and call your doctor if you are having problems. It's also a good idea to know your test results and keep a list of the medicines you take. How can you care for yourself at home? · If your doctor prescribed antibiotics, take them as directed. Do not stop taking them just because you feel better. You need to take the full course of antibiotics. · Gargle with warm salt water once an hour to help reduce swelling and relieve discomfort. Use 1 teaspoon of salt mixed in 1 cup of warm water. · Take an over-the-counter pain medicine, such as acetaminophen (Tylenol), ibuprofen (Advil, Motrin), or naproxen (Aleve). Read and follow all instructions on the label. · Be careful when taking over-the-counter cold or flu medicines and Tylenol at the same time. Many of these medicines have acetaminophen, which is Tylenol. Read the labels to make sure that you are not taking more than the recommended dose. Too much acetaminophen (Tylenol) can be harmful. · Drink plenty of fluids. Fluids may help soothe an irritated throat. Hot fluids, such as tea or soup, may help decrease throat pain. · Use over-the-counter throat lozenges to soothe pain. Regular cough drops or hard candy may also help. These should not be given to young children because of the risk of choking. · Do not smoke or allow others to smoke around you. If you need help quitting, talk to your doctor about stop-smoking programs and medicines.  These can increase your chances of quitting for good. · Use a vaporizer or humidifier to add moisture to your bedroom. Follow the directions for cleaning the machine. When should you call for help? Call your doctor now or seek immediate medical care if:    · You have new or worse trouble swallowing.     · Your sore throat gets much worse on one side. Watch closely for changes in your health, and be sure to contact your doctor if you do not get better as expected. Where can you learn more? Go to http://www.gray.com/  Enter U420 in the search box to learn more about \"Sore Throat: Care Instructions. \"  Current as of: December 2, 2020               Content Version: 12.8  © 6146-1990 [employer]. Care instructions adapted under license by [employer] (which disclaims liability or warranty for this information). If you have questions about a medical condition or this instruction, always ask your healthcare professional. [address] any warranty or liability for your use of this information.

## 2021-07-06 NOTE — PROGRESS NOTES
CC:   Chief Complaint   Patient presents with    Sore Throat     x2 days        HISTORY OF PRESENT ILLNESS  Juanita Mobley is a 48 y.o. female. Patient complains of 2 day history of sore throat. Feels similar to previous strep throat. Denies fevers, chills, cough, headaches, sinus congestion, ear pains, dyspnea, wheezing, chest pain, or myalgias. Treatment to date: none. Patient reports no ill contacts.       Patient Active Problem List   Diagnosis Code    HTN (hypertension) I10    Moderate episode of recurrent major depressive disorder ([facility] F33.1    FE (obstructive sleep apnea) G47.33    Asthma J45.909    Iron deficiency anemia D50.9    Hyperlipidemia E78.5    Obesity, morbid, BMI 50 or higher ([facility]) E66.01    Type 2 diabetes mellitus with diabetic polyneuropathy, without long-term current use of insulin ([facility]) E11.42    Coronary artery disease involving native coronary artery of native heart without angina pectoris I25.10    Chronic midline low back pain without sciatica M54.5, G89.29    Atypical squamous cells of undetermined significance (ASCUS) on Papanicolaou smear of cervix R87.610    Type 2 diabetes with nephropathy ([facility]) E11.21    Seronegative rheumatoid arthritis ([facility] M06.00     Past Medical History:   Diagnosis Date    Anemia (iron deficiency)     Asthma     DDD (degenerative disc disease), lumbar 2014    Depression     DM type 2 (diabetes mellitus, type 2) ([facility] 11/23/2011    GERD (gastroesophageal reflux disease)     GI bleed 2015    Hepatic steatosis 11/2016    confirmed by US    HTN (hypertension)     Irregular menses     Irritable bowel     Kidney mass     5/28/19: US showed rigth renal cystic nephroma, follows with Urology (Dr. Thuy Sutton)    Morbid obesity ([facility]     FE on CPAP     cpap complaint    PUD (peptic ulcer disease) 2015    Renal cancer, right (HCC)     tx surgery     Allergies   Allergen Reactions    Lisinopril Cough    Pcn [Penicillins] Hives Current Outpatient Medications   Medication Sig Dispense Refill    azithromycin (Zithromax Z-Sav) 250 mg tablet Take 2 tablets on day 1 then 1 tablet daily on days 2-5 6 Tablet 0    dicyclomine (BENTYL) 10 mg/5 mL soln oral solution Take 10 mL by mouth four (4) times daily. 200 mL 0    famotidine (Pepcid) 20 mg tablet Take 1 Tablet by mouth two (2) times a day for 10 days. 20 Tablet 0    pregabalin (LYRICA) 200 mg capsule TAKE 1 TABLET BY MOUTH TWICE DAILY . DO NOT EXCEED 2 PER 24 HOURS 60 Capsule 0    metFORMIN ER (GLUCOPHAGE XR) 500 mg tablet Take 2 tablets by mouth twice daily 120 Tablet 5    montelukast (SINGULAIR) 10 mg tablet TAKE 1 TABLET BY MOUTH ONCE DAILY FOR ALLERGIES AND FOR ASTHMA 90 Tab 3    lidocaine (LIDODERM) 5 % Apply patch to the affected area for 12 hours a day and remove for 12 hours a day. 15 Each 0    acetaminophen (TYLENOL) 500 mg tablet Take 2 Tabs by mouth every six (6) hours as needed for Pain. 20 Tab 0    cyclobenzaprine (FLEXERIL) 10 mg tablet Take 1 Tab by mouth three (3) times daily as needed for Muscle Spasm(s). 15 Tab 0    buPROPion SR (WELLBUTRIN SR) 150 mg SR tablet Take 1 Tab by mouth two (2) times a day. 60 Tab 3    folic acid (FOLVITE) 1 mg tablet Take 1 mg by mouth daily.  methotrexate (RHEUMATREX) 2.5 mg tablet 20 mg every Wednesday. Pt takes 8 pills for 20 mg total.      fluticasone propion-salmeteroL (Advair Diskus) 500-50 mcg/dose diskus inhaler Take 1 Puff by inhalation every twelve (12) hours. Asthma, Dx: J45.30 60 Each 11    albuterol (PROVENTIL HFA, VENTOLIN HFA, PROAIR HFA) 90 mcg/actuation inhaler Take 2 Puffs by inhalation every six (6) hours as needed for Wheezing. Asthma, Dx: J45.30 1 Inhaler 11    glipiZIDE (GLUCOTROL) 10 mg tablet Take 1/2 tablet by mouth twice daily 180 Tab 1    valsartan (DIOVAN) 40 mg tablet Take 2 tablets every day by oral route.  180 Tab 0    atorvastatin (LIPITOR) 10 mg tablet Take 1 tablet by mouth once daily 90 Tab 3    amitriptyline (ELAVIL) 50 mg tablet Take 1 Tab by mouth nightly. For numbness at hands and feet. (Patient taking differently: Take 50 mg by mouth nightly as needed. For numbness at hands and feet PRN) 30 Tab 3    ibuprofen (MOTRIN) 600 mg tablet Take 1 Tab by mouth every six (6) hours as needed for Pain. 60 Tab 2    meclizine (ANTIVERT) 25 mg tablet Take 1 Tab by mouth three (3) times daily as needed for Dizziness. 20 Tab 1    nitroglycerin (NITROSTAT) 0.4 mg SL tablet Take 1 Tab by mouth every five (5) minutes as needed for Chest Pain. Sit/Lay down then put one tab under the tongue every 5 minutes as needed for chest pain for 3 doses 1 Bottle 0    albuterol (PROVENTIL VENTOLIN) 2.5 mg /3 mL (0.083 %) nebulizer solution USE ONE VIAL IN NEBULIZER EVERY 4 HOURS AS NEEDED FOR WHEEZING 30 Each 3         PHYSICAL EXAM  Visit Vitals  /68 (BP 1 Location: Right arm, BP Patient Position: Sitting, BP Cuff Size: Large adult)   Pulse 73   Temp 98 °F (36.7 °C) (Oral)   Resp 16   Ht 5' 4\" (1.626 m)   Wt 292 lb (132.5 kg)   SpO2 97%   BMI 50.12 kg/m²       General: Obese, no distress. HEENT:  Head normocephalic/atraumatic, no scleral icterus. Oropharynx very erythematous with no discharge. Lungs:  Clear to ausculation bilaterally. Good air movement. Heart:  Regular rate and rhythm, normal S1 and S2, no murmur, gallop, or rub        ASSESSMENT AND PLAN    ICD-10-CM ICD-9-CM    1. Pharyngitis, unspecified etiology  J02.9 462 AMB POC RAPID STREP A      azithromycin (Zithromax Z-Sav) 250 mg tablet     Diagnoses and all orders for this visit:    1. Pharyngitis, unspecified etiology  Will treat with antibiotic since it feels similar to previous strep throat infections. Recommended sore throat lozenges, warm salt water rinses, and Chloraseptic as needed. -     AMB POC RAPID STREP A  -     Start azithromycin (Zithromax Z-Sav) 250 mg tablet;  Take 2 tablets on day 1 then 1 tablet daily on days 2-5      Follow-up and Dispositions    · Return if symptoms worsen or fail to improve, for Scheduled appointment on 10/27/21. I have discussed the diagnosis with the patient and the intended plan as seen in the above orders. Patient is in agreement. The patient has received an after-visit summary and questions were answered concerning future plans. I have discussed medication side effects and warnings with the patient as well.

## 2021-07-29 RX ORDER — ADALIMUMAB 40MG/0.4ML
40 KIT SUBCUTANEOUS
COMMUNITY
End: 2022-05-12 | Stop reason: ALTCHOICE

## 2021-07-29 NOTE — PERIOP NOTES
Hibiclens/Chlorhexidine    Preventing Infections Before and After - Your Surgery    IMPORTANT INSTRUCTIONS    Please read and follow these instructions carefully. If you are unable to comply with the below instructions your procedure will be cancelled. Every Night for Three (3) nights before your surgery:  1. Shower with an antibacterial soap, such as Dial, or the soap provided at your preassessment appointment. A shower is better than a bath for cleaning your skin. 2. If needed, ask someone to help you reach all areas of your body. Dont forget to clean your belly button with every shower. The night before your surgery: If you lose your Hibiclens/chlorhexidine please contact surgery center or you can purchase it at a local pharmacy  1. On the night before your surgery, shower with an antibacterial soap, such as Dial, or the soap provided at your preassessment appointment. 2. With one packet of Hibiclens/Chlorhexidine in hand, turn water off.  3. Apply Hibiclens antiseptic skin cleanser with a clean, freshly washed washcloth. ? Gently apply to your body from chin to toes (except the genital area) and especially the area(s) where your incision(s) will be. ? Leave Hibiclens/Chlorhexidine on your skin for at least 20 seconds. CAUTION: If needed, Hibiclens/chlorhexidine may be used to clean the folds of skin of the legs (such as in the area of the groin) and on your buttocks and hips. However, do not use Hibiclens/Chlorhexidine above the neck or in the genital area (your bottom) or put inside any area of your body. 4. Turn the water back on and rinse. 5. Dry gently with a clean, freshly washed towel. 6. After your shower, do not use any powder, deodorant, perfumes or lotion. 7. Use clean, freshly washed towels and washcloths every time you shower. 8. Wear clean, freshly washed pajamas to bed the night before surgery. 9. Sleep on clean, freshly washed sheets.   10. Do not allow pets to sleep in your bed with you. The Morning of your surgery:  1. Shower again thoroughly with an antibacterial soap, such as Dial or the soap provided at your preassessment appointment. If needed, ask someone for help to reach all areas of your body. Dont forget to clean your belly button! Rinse. 2. Dry gently with a clean, freshly washed towel. 3. After your shower, do not use any powder, deodorant, perfumes or lotion prior to surgery. 4. Put on clean, freshly washed clothing. Tips to help prevent infections after your surgery:  1. Protect your surgical wound from germs:  ? Hand washing is the most important thing you and your caregivers can do to prevent infections. ? Keep your bandage clean and dry! ? Do not touch your surgical wound. 2. Use clean, freshly washed towels and washcloths every time you shower; do not share bath linens with others. 3. Until your surgical wound is healed, wear clothing and sleep on bed linens each day that are clean and freshly washed. 4. Do not allow pets to sleep in your bed with you or touch your surgical wound. 5. Do not smoke - smoking delays wound healing. This may be a good time to stop smoking. 6. If you have diabetes, it is important for you to manage your blood sugar levels properly before your surgery as well as after your surgery. Poorly managed blood sugar levels slow down wound healing and prevent you from healing completely. If you lose your Hibiclens/chlorhexidine, please call the [facility], or it is available for purchase at your pharmacy.                ___________________      ___________________      ________________  (Signature of Patient)          (Witness)                   (Date and Time)

## 2021-07-29 NOTE — PERIOP NOTES
[facility]  Ambulatory Surgery Unit  Pre-operative Instructions    Surgery/Procedure Date  Monday August 23, 2021            Tentative Arrival Time TBD      1. On the day of your surgery/procedure, please report to the Ambulatory Surgery Unit Registration Desk and sign in at your designated time. The Ambulatory Surgery Unit is located in [facility] on the Critical access hospital side of the [facility] across from the [address] [place] [address]. Please have all of your health insurance cards and a photo ID. 2. You must have someone with you to drive you home, as you should not drive a car for 24 hours following anesthesia. Please make arrangements for a responsible adult friend or family member to stay with you for at least the first 24 hours after your surgery. 3. Do not have anything to eat or drink (including water, gum, mints, coffee, juice) after 11:59 PM  Sunday August 22nd. This may not apply to medications prescribed by your physician. (Please note below the special instructions with medications to take the morning of surgery, if applicable.)    4. We recommend you do not drink any alcoholic beverages for 24 hours before and after your surgery. 5. Contact your surgeons office for instructions on the following medications: non-steroidal anti-inflammatory drugs (i.e. Advil, Aleve), vitamins, and supplements. (Some surgeons will want you to stop these medications prior to surgery and others may allow you to take them)   **If you are currently taking Plavix, Coumadin, Aspirin and/or other blood-thinning agents, contact your surgeon for instructions. ** Your surgeon will partner with the physician prescribing these medications to determine if it is safe to stop or if you need to continue taking. Please do not stop taking these medications without instructions from your surgeon. 6. See following instructions for bathing    7. Wear comfortable clothes. Wear glasses instead of contacts.  Do not bring any jewelry or money (other than copays or fees as instructed). Do not wear make-up, particularly mascara, the morning of your surgery. Do not wear nail polish, particularly if you are having foot /hand surgery. Wear your hair loose or down, no ponytails, buns, snehal pins or clips. All body piercings must be removed. 8. You should understand that if you do not follow these instructions your surgery may be cancelled. If your physical condition changes (i.e. fever, cold or flu) please contact your surgeon as soon as possible. 9. It is important that you be on time. If a situation occurs where you may be late, or if you have any questions or problems, please call [phone].    10. Your surgery time may be subject to change. You will receive a phone call the day prior to surgery to confirm your arrival time. 11. Pediatric patients: please bring a change of clothes, diapers, bottle/sippy cup, pacifier, etc.      Special Instructions: Take all medications and inhalers, as prescribed, on the morning of surgery with a sip of water EXCEPT: diabetic medication       Insulin Dependent Diabetic patients: Take your diabetic medications as prescribed the day before surgery. Hold all diabetic medications the day of surgery. If you are scheduled to arrive for surgery after 8:00 AM, and your AM blood sugar is >200, please call Ambulatory Surgery. I understand a pre-operative phone call will be made to verify my surgery time. In the event that I am not available, I give permission for a message to be left on my answering service and/or with another person?       yes         ___________________      ___________________      ________________  (Signature of Patient)          (Witness)                   (Date and Time)

## 2021-08-09 ENCOUNTER — HOSPITAL ENCOUNTER (OUTPATIENT)
Dept: SURGERY | Age: 46
Setting detail: OUTPATIENT SURGERY
Discharge: HOME | End: 2021-08-09
Payer: MEDICAID

## 2021-08-09 VITALS
HEART RATE: 83 BPM | TEMPERATURE: 98.3 F | RESPIRATION RATE: 20 BRPM | DIASTOLIC BLOOD PRESSURE: 74 MMHG | OXYGEN SATURATION: 98 % | SYSTOLIC BLOOD PRESSURE: 158 MMHG

## 2021-08-09 LAB
ABO + RH BLD: NORMAL
APPEARANCE UR: CLEAR
BACTERIA URNS QL MICRO: NEGATIVE /HPF
BILIRUB UR QL: NEGATIVE
BLOOD GROUP ANTIBODIES SERPL: NORMAL
COLOR UR: ABNORMAL
EPITH CASTS URNS QL MICRO: ABNORMAL /LPF
ERYTHROCYTE [DISTWIDTH] IN BLOOD BY AUTOMATED COUNT: 17.1 % (ref 11.5–14.5)
GLUCOSE UR STRIP.AUTO-MCNC: >1000 MG/DL
HCT VFR BLD AUTO: 34.1 % (ref 35–47)
HGB BLD-MCNC: 10.7 G/DL (ref 11.5–16)
HGB UR QL STRIP: NEGATIVE
HYALINE CASTS URNS QL MICRO: ABNORMAL /LPF (ref 0–5)
KETONES UR QL STRIP.AUTO: 15 MG/DL
LEUKOCYTE ESTERASE UR QL STRIP.AUTO: NEGATIVE
MCH RBC QN AUTO: 28.2 PG (ref 26–34)
MCHC RBC AUTO-ENTMCNC: 31.4 G/DL (ref 30–36.5)
MCV RBC AUTO: 89.7 FL (ref 80–99)
NITRITE UR QL STRIP.AUTO: NEGATIVE
NRBC # BLD: 0.03 K/UL (ref 0–0.01)
NRBC BLD-RTO: 0.5 PER 100 WBC
PH UR STRIP: 5.5 [PH] (ref 5–8)
PLATELET # BLD AUTO: 237 K/UL (ref 150–400)
PMV BLD AUTO: 12 FL (ref 8.9–12.9)
PROT UR STRIP-MCNC: NEGATIVE MG/DL
RBC # BLD AUTO: 3.8 M/UL (ref 3.8–5.2)
RBC #/AREA URNS HPF: ABNORMAL /HPF (ref 0–5)
SP GR UR REFRACTOMETRY: 1.03 (ref 1–1.03)
SPECIMEN EXP DATE BLD: NORMAL
UA: UC IF INDICATED,UAUC: ABNORMAL
UROBILINOGEN UR QL STRIP.AUTO: 0.2 EU/DL (ref 0.2–1)
WBC # BLD AUTO: 6 K/UL (ref 3.6–11)
WBC URNS QL MICRO: ABNORMAL /HPF (ref 0–4)

## 2021-08-09 PROCEDURE — 81001 URINALYSIS AUTO W/SCOPE: CPT

## 2021-08-09 PROCEDURE — 85027 COMPLETE CBC AUTOMATED: CPT

## 2021-08-09 PROCEDURE — 36415 COLL VENOUS BLD VENIPUNCTURE: CPT

## 2021-08-09 PROCEDURE — 86901 BLOOD TYPING SEROLOGIC RH(D): CPT

## 2021-08-09 NOTE — PERIOP NOTES
1150 BRANDON Yung Challenger NP notified of glucose >1000 in urine. Message to [facility] in Dr. Sarah Pineda office to [facility] to [facility] re abnormal labs. 1155 call to patient. Reports that she has had random glucose levels in the 400's. Was previously seen by Dr. Andre Burnham, but now meds are rx'd by PCP. Pt reports recent tx with prednisone. Confirmed diabetic meds that pt takes. Advised pt to reach out to pcp for diabetic control  Pt aware that Dr. Sarah Pineda office will be notified. [address] with [facility] in Dr. Sarah Pineda office. Notified of above. She will notify Dr. Chasidy Jimenes and contact patient. Will notify [facility]SU of response from Dr. Chasidy Jimenes. 1630  Call to patient. Notified of above.   Advised pt to reach ou to pcp office for follow up

## 2021-08-10 ENCOUNTER — TELEPHONE (OUTPATIENT)
Dept: INTERNAL MEDICINE CLINIC | Age: 46
End: 2021-08-10

## 2021-08-10 ENCOUNTER — OFFICE VISIT (OUTPATIENT)
Dept: INTERNAL MEDICINE CLINIC | Age: 46
End: 2021-08-10
Payer: MEDICAID

## 2021-08-10 VITALS
HEART RATE: 82 BPM | SYSTOLIC BLOOD PRESSURE: 114 MMHG | OXYGEN SATURATION: 97 % | RESPIRATION RATE: 18 BRPM | HEIGHT: 64 IN | DIASTOLIC BLOOD PRESSURE: 77 MMHG | WEIGHT: 293 LBS | TEMPERATURE: 98.2 F | BODY MASS INDEX: 50.02 KG/M2

## 2021-08-10 DIAGNOSIS — E11.42 TYPE 2 DIABETES MELLITUS WITH DIABETIC POLYNEUROPATHY, WITHOUT LONG-TERM CURRENT USE OF INSULIN (HCC): Primary | ICD-10-CM

## 2021-08-10 PROCEDURE — 3052F HG A1C>EQUAL 8.0%<EQUAL 9.0%: CPT | Performed by: INTERNAL MEDICINE

## 2021-08-10 PROCEDURE — 99213 OFFICE O/P EST LOW 20 MIN: CPT | Performed by: INTERNAL MEDICINE

## 2021-08-10 NOTE — TELEPHONE ENCOUNTER
Pt called and requested a callback to discuss diabetes that is \"out of control\" and threatening cx of surgery. Please return call at [phone]. Offered pt in person appt for today with Dr. Wilber Apple, pt elected to schedule appt instead.

## 2021-08-10 NOTE — PROGRESS NOTES
CC:   Chief Complaint   Patient presents with    Diabetes     -400       HISTORY OF PRESENT ILLNESS  Natasha Her is a 48 y.o. female. Presents for evaluation of type 2 diabetes with hyperglycemia. She is scheduled for a hysterectomy on 8/23/21 and was told her DM needs to be better controlled to promote healing and avoid infection. Last A1c 8.5% on 4/27/21. -400. Has been on prednisone on and off. Stopped it 1 week ago. FBS: 276 this am  Afternoon: 331 yest  PM (bedtime): 303 yest  Reports compliance with metformin 1000 mg BID and glipizide 10 mg BID.     Patient Active Problem List   Diagnosis Code    HTN (hypertension) I10    Moderate episode of recurrent major depressive disorder ([facility] F33.1    FE (obstructive sleep apnea) G47.33    Asthma J45.909    Iron deficiency anemia D50.9    Hyperlipidemia E78.5    Obesity, morbid, BMI 50 or higher ([facility]) E66.01    Type 2 diabetes mellitus with diabetic polyneuropathy, without long-term current use of insulin ([facility]) E11.42    Coronary artery disease involving native coronary artery of native heart without angina pectoris I25.10    Chronic midline low back pain without sciatica M54.5, G89.29    Atypical squamous cells of undetermined significance (ASCUS) on Papanicolaou smear of cervix R87.610    Type 2 diabetes with nephropathy ([facility]) E11.21    Seronegative rheumatoid arthritis ([facility] M06.00     Past Medical History:   Diagnosis Date    Anemia (iron deficiency)     Anxiety     per pt on 7/29/2021    Asthma     DDD (degenerative disc disease), lumbar 2014    Depression     DM type 2 (diabetes mellitus, type 2) ([facility] 11/23/2011    GERD (gastroesophageal reflux disease)     GI bleed 2015    Hepatic steatosis 11/2016    confirmed by US    HTN (hypertension)     Irregular menses     Irritable bowel     Kidney mass     5/28/19: US showed rigth renal cystic nephroma, follows with Urology (Dr. Jackie Gardiner)    Long-term use of immunosuppressant medication       per pt on 7/29/2021    Morbid obesity ([facility]     FE on CPAP     cpap complaint  per pt on 7/29/2021    PUD (peptic ulcer disease) 2015    Renal cancer, right ([facility]     tx surgery    Rheumatoid arthritis ([facility]     per pt on 7/29/2021     Allergies   Allergen Reactions    Lisinopril Cough    Pcn [Penicillins] Hives       Current Outpatient Medications   Medication Sig Dispense Refill    adalimumab (Humira,CF,) 40 mg/0.4 mL sykt 40 mg by SubCUTAneous route every fourteen (14) days. Indications: rheumatoid arthritis      dicyclomine (BENTYL) 10 mg/5 mL soln oral solution Take 10 mL by mouth four (4) times daily. 200 mL 0    pregabalin (LYRICA) 200 mg capsule TAKE 1 TABLET BY MOUTH TWICE DAILY . DO NOT EXCEED 2 PER 24 HOURS (Patient taking differently: 300 mg. TAKE 1 TABLET BY MOUTH TWICE DAILY . DO NOT EXCEED 2 PER 24 HOURS) 60 Capsule 0    metFORMIN ER (GLUCOPHAGE XR) 500 mg tablet Take 2 tablets by mouth twice daily 120 Tablet 5    montelukast (SINGULAIR) 10 mg tablet TAKE 1 TABLET BY MOUTH ONCE DAILY FOR ALLERGIES AND FOR ASTHMA 90 Tab 3    lidocaine (LIDODERM) 5 % Apply patch to the affected area for 12 hours a day and remove for 12 hours a day. 15 Each 0    acetaminophen (TYLENOL) 500 mg tablet Take 2 Tabs by mouth every six (6) hours as needed for Pain. 20 Tab 0    cyclobenzaprine (FLEXERIL) 10 mg tablet Take 1 Tab by mouth three (3) times daily as needed for Muscle Spasm(s). 15 Tab 0    buPROPion SR (WELLBUTRIN SR) 150 mg SR tablet Take 1 Tab by mouth two (2) times a day. 60 Tab 3    folic acid (FOLVITE) 1 mg tablet Take 1 mg by mouth daily.  methotrexate (RHEUMATREX) 2.5 mg tablet 20 mg every Wednesday. Pt takes 8 pills for 20 mg total.      fluticasone propion-salmeteroL (Advair Diskus) 500-50 mcg/dose diskus inhaler Take 1 Puff by inhalation every twelve (12) hours.  Asthma, Dx: J45.30 60 Each 11    albuterol (PROVENTIL HFA, VENTOLIN HFA, PROAIR HFA) 90 mcg/actuation inhaler Take 2 Puffs by inhalation every six (6) hours as needed for Wheezing. Asthma, Dx: J45.30 1 Inhaler 11    glipiZIDE (GLUCOTROL) 10 mg tablet Take 1/2 tablet by mouth twice daily (Patient taking differently: 10 mg two (2) times a day. BID) 180 Tab 1    valsartan (DIOVAN) 40 mg tablet Take 2 tablets every day by oral route. 180 Tab 0    atorvastatin (LIPITOR) 10 mg tablet Take 1 tablet by mouth once daily 90 Tab 3    amitriptyline (ELAVIL) 50 mg tablet Take 1 Tab by mouth nightly. For numbness at hands and feet. (Patient taking differently: Take 50 mg by mouth nightly as needed. For numbness at hands and feet PRN) 30 Tab 3    ibuprofen (MOTRIN) 600 mg tablet Take 1 Tab by mouth every six (6) hours as needed for Pain. 60 Tab 2    meclizine (ANTIVERT) 25 mg tablet Take 1 Tab by mouth three (3) times daily as needed for Dizziness. 20 Tab 1    nitroglycerin (NITROSTAT) 0.4 mg SL tablet Take 1 Tab by mouth every five (5) minutes as needed for Chest Pain. Sit/Lay down then put one tab under the tongue every 5 minutes as needed for chest pain for 3 doses 1 Bottle 0    albuterol (PROVENTIL VENTOLIN) 2.5 mg /3 mL (0.083 %) nebulizer solution USE ONE VIAL IN NEBULIZER EVERY 4 HOURS AS NEEDED FOR WHEEZING 30 Each 3         PHYSICAL EXAM  Visit Vitals  /77 (BP 1 Location: Left upper arm, BP Patient Position: Sitting, BP Cuff Size: Large adult)   Pulse 82   Temp 98.2 °F (36.8 °C) (Oral)   Resp 18   Ht 5' 4\" (1.626 m)   Wt 297 lb (134.7 kg)   SpO2 97%   BMI 50.98 kg/m²       General: Morbidly obese, no distress. HEENT:  Head normocephalic/atraumatic, no scleral icterus  Neurological: Alert and oriented. Psychiatric: Normal mood and affect. Behavior is normal.         ASSESSMENT AND PLAN    ICD-10-CM ICD-9-CM    1.  Type 2 diabetes mellitus with diabetic polyneuropathy, without long-term current use of insulin ([facility])  E11.42 250.60 insulin detemir U-100 (LEVEMIR FLEXTOUCH) 100 unit/mL (3 mL) inpn 357.2      Diagnoses and all orders for this visit:    1. Type 2 diabetes mellitus with diabetic polyneuropathy, without long-term current use of insulin (HCC)  Uncontrolled. Will start on long-acting insulin to improve basal control.  -     Start insulin detemir U-100 (LEVEMIR FLEXTOUCH) 100 unit/mL (3 mL) inpn; 20 Units by SubCUTAneous route nightly. Dx: E11.42  Indications: type 2 diabetes mellitus  Patient Instructions  1. Check your blood sugar at least twice a day: before breakfast and at bedtime. 2. Also check your blood sugar whenever you think it is getting too low. 3. Send me a message in 2-3 days with your blood sugar readings. Follow-up and Dispositions    · Return if symptoms worsen or fail to improve, for Scheduled appointment on 10/27/21. I have discussed the diagnosis with the patient and the intended plan as seen in the above orders. Patient is in agreement. The patient has received an after-visit summary and questions were answered concerning future plans. I have discussed medication side effects and warnings with the patient as well.

## 2021-08-10 NOTE — PERIOP NOTES
Call from Carmela in 's office. She spoke with Dr. Caitlyn Ratliff, and has spoken with patient. Pt is being seen by her pcp today for surgery clearance.

## 2021-08-10 NOTE — PATIENT INSTRUCTIONS
Patient Instructions  1. Check your blood sugar at least twice a day: before breakfast and at bedtime. 2. Also check your blood sugar whenever you think it is getting too low. 3. Send me a message in 2-3 days with your blood sugar readings.

## 2021-08-10 NOTE — PROGRESS NOTES
Identified pt with two pt identifiers. Reviewed record in preparation for visit and have obtained necessary documentation. All patient medications has been reviewed. Chief Complaint   Patient presents with    Diabetes     -400     Additional information about chief complaint:    Visit Vitals  /77 (BP 1 Location: Left upper arm, BP Patient Position: Sitting, BP Cuff Size: Large adult)   Pulse 82   Temp 98.2 °F (36.8 °C) (Oral)   Resp 18   Ht 5' 4\" (1.626 m)   Wt 297 lb (134.7 kg)   SpO2 97%   BMI 50.98 kg/m²       Health Maintenance Due   Topic    Hepatitis C Screening     Colorectal Cancer Screening Combo     Eye Exam Retinal or Dilated        1. Have you been to the ER, urgent care clinic since your last visit? Hospitalized since your last visit? Yes ER 6/29/21 abdominal pain     2. Have you seen or consulted any other health care providers outside of the [address] [place] [address] since your last visit? Include any pap smears or colon screening.  NO     bdg

## 2021-08-11 ENCOUNTER — TELEPHONE (OUTPATIENT)
Dept: INTERNAL MEDICINE CLINIC | Age: 46
End: 2021-08-11

## 2021-08-11 DIAGNOSIS — E11.42 TYPE 2 DIABETES MELLITUS WITH DIABETIC POLYNEUROPATHY, WITHOUT LONG-TERM CURRENT USE OF INSULIN (HCC): Primary | ICD-10-CM

## 2021-08-11 RX ORDER — PEN NEEDLE, DIABETIC 30 GX3/16"
NEEDLE, DISPOSABLE MISCELLANEOUS
Qty: 100 PACKAGE | Refills: 3 | Status: SHIPPED | OUTPATIENT
Start: 2021-08-11 | End: 2022-04-27 | Stop reason: SDUPTHER

## 2021-08-11 NOTE — TELEPHONE ENCOUNTER
The patient called and stated that she picked up the insulin today, but she needs the needles. She wanted to know if Dr. Amelia Myles could send in the prescription for them right now.

## 2021-08-16 ENCOUNTER — DOCUMENTATION ONLY (OUTPATIENT)
Dept: SLEEP MEDICINE | Age: 46
End: 2021-08-16

## 2021-08-16 ENCOUNTER — VIRTUAL VISIT (OUTPATIENT)
Dept: SLEEP MEDICINE | Age: 46
End: 2021-08-16
Payer: MEDICAID

## 2021-08-16 DIAGNOSIS — Z79.4 TYPE 2 DIABETES MELLITUS WITH DIABETIC POLYNEUROPATHY, WITH LONG-TERM CURRENT USE OF INSULIN (HCC): ICD-10-CM

## 2021-08-16 DIAGNOSIS — E11.42 TYPE 2 DIABETES MELLITUS WITH DIABETIC POLYNEUROPATHY, WITH LONG-TERM CURRENT USE OF INSULIN (HCC): ICD-10-CM

## 2021-08-16 DIAGNOSIS — I10 ESSENTIAL HYPERTENSION: ICD-10-CM

## 2021-08-16 DIAGNOSIS — G47.33 OBSTRUCTIVE SLEEP APNEA (ADULT) (PEDIATRIC): Primary | ICD-10-CM

## 2021-08-16 PROCEDURE — 99213 OFFICE O/P EST LOW 20 MIN: CPT | Performed by: INTERNAL MEDICINE

## 2021-08-16 PROCEDURE — 3052F HG A1C>EQUAL 8.0%<EQUAL 9.0%: CPT | Performed by: INTERNAL MEDICINE

## 2021-08-16 NOTE — PROGRESS NOTES
[address] [facility] [place] Antonio. [place], [address]  Tel.  [phone]  Fax. 100 [facility] 60  [place], [address] [facility]  Tel.  [phone]  Fax. [phone] 9[address] [facility] [place], [facility]  Tel.  [phone]  Fax. [phone]       Telemedicine visit performed with verbal consent of the patient. Patient called and identity confirmed with 2 patient identifers    Patient was seen at home  Zbigniew Head is a 48 y.o. female who was seen by synchronous (real-time) audio-video technology on 8/16/2021. Consent:  She and/or her healthcare decision maker is aware that this patient-initiated Telehealth encounter is the equivalent to a face to face encounter in the sleep disorder center and has provided verbal consent to proceed: Yes    I was in the office while conducting this encounter. Trent Noonan is a 48 y.o. female seen at this telemedicine visit for a positive airway pressure follow-up. She reports no problems using the device. She is 100% compliant over the past 30 days. The following problems are identified:    Drowsiness At times but improved with PAP Problems exhaling no   Snoring no Forget to put on no   Mask Comfortable yes  Can't fall asleep no   Dry Mouth no Mask falls off no   Air Leaking Yes-needs new cushion- she says she never received her replacement supplies Frequent awakenings no       Download reviewed.     She admits that her sleep has improved on PAP therapy using nasal mask     Allergies   Allergen Reactions    Lisinopril Cough    Pcn [Penicillins] Hives       She has a current medication list which includes the following prescription(s): insulin needles (disposable), insulin detemir u-100, humira(cf), dicyclomine, pregabalin, metformin er, montelukast, lidocaine, acetaminophen, cyclobenzaprine, bupropion sr, folic acid, methotrexate, fluticasone propion-salmeterol, albuterol, glipizide, valsartan, atorvastatin, amitriptyline, ibuprofen, meclizine, nitroglycerin, and albuterol. .      She  has a past medical history of Anemia (iron deficiency), Anxiety, Asthma, DDD (degenerative disc disease), lumbar (2014), Depression, DM type 2 (diabetes mellitus, type 2) ([facility] (11/23/2011), GERD (gastroesophageal reflux disease), GI bleed (2015), Hepatic steatosis (11/2016), HTN (hypertension), Irregular menses, Irritable bowel, Kidney mass, Long-term use of immunosuppressant medication, Morbid obesity ([facility] FE on CPAP, PUD (peptic ulcer disease) (2015), Renal cancer, right ([facility] and Rheumatoid arthritis ([facility] Levels Sleepiness Score: 18        O>      Weight 295 lb  Vital Signs: (As obtained by patient/caregiver at home)        Constitutional: [x] Appears well-developed and well-nourished [x] No apparent distress      [] Abnormal -     Mental status: [x] Alert and awake  [x] Oriented to person/place/time [x] Able to follow commands    [] Abnormal -     Eyes:   EOM    [x]  Normal    [] Abnormal -   Sclera  [x]  Normal    [] Abnormal -          Discharge [x]  None visible   [] Abnormal -     HENT: [x] Normocephalic, atraumatic  [] Abnormal -     External Ears [x] Normal  [] Abnormal -    Neck: [x] No visualized mass [] Abnormal -     Pulmonary/Chest: [x] Respiratory effort normal   [x] No visualized signs of difficulty breathing or respiratory distress        [] Abnormal -       Neurological:        [x] No Facial Asymmetry (Cranial nerve 7 motor function) (limited exam due to video visit)          [x] No gaze palsy        [] Abnormal -          Skin:        [x] No significant exanthematous lesions or discoloration noted on facial skin         [] Abnormal -            Psychiatric:       [x] Normal Affect [] Abnormal -        Other pertinent observable physical exam findings:-            A>    ICD-10-CM ICD-9-CM    1. Obstructive sleep apnea (adult) (pediatric)  G47.33 327.23 AMB SUPPLY ORDER   2. Essential hypertension  I10 401.9    3.  Type 2 diabetes mellitus with diabetic polyneuropathy, with long-term current use of insulin ([facility])  E11.42 250.60     Z79.4 357.2      V58.67      AHI = 13 (12-17). On CPAP, Resmed :  10-15 cmH2O. Compliant:      yes    Therapeutic Response:  Positive    P>    she is compliant with PAP therapy and PAP continues to benefit patient and remains necessary for control of her sleep apnea. CPAP setting - she will continue on her current pressure settings. * We have recommended a dedicated weight loss through appropriate diet and an exercise regimen as significant weight reduction has been shown to reduce severity of obstructive sleep apnea. *   Follow-up and Dispositions    · Return in about 1 year (around 8/16/2022). I have ordered replacement supplies      * She was asked to contact our office for any problems regarding PAP therapy. * Counseling was provided regarding the importance of regular PAP use and on proper sleep hygiene and safe driving. * Re-enforced proper and regular cleaning for the device. 2. Hypertension - she continues on her current regimen. I have reviewed the relationship between hypertension as it relates to sleep-disordered breathing. 3. Type II diabetes - she continues on her current regimen. I have reviewed the relationship between sleep disordered breathing as it relates to diabetes. All of her questions were addressed. Pursuant to the emergency declaration under the [facility] waiver authority and the [employer] Act, this Virtual  Visit was conducted, with patient's consent, to reduce the patient's risk of exposure to COVID-19 and provide continuity of care for an established patient. Services were provided through a video synchronous discussion virtually to substitute for in-person clinic visit.     Yeimi Solomon MD    Electronically signed by    Greg Almanzar Gonzalo Owens MD  Diplomate in Sleep Medicine  [facility]

## 2021-08-16 NOTE — PATIENT INSTRUCTIONS
[address] [facility] [address] [place]. [place], [address]  Tel.  [phone]  Fax. 100 [facility] 60  [address] [facility] [address], [address]  Tel.  [phone]  Fax. [phone] [address] [facility] [place]  Tel.  [phone]  Fax. [phone]     PROPER SLEEP HYGIENE    What to avoid  · Do not have drinks with caffeine, such as coffee or black tea, for 8 hours before bed. · Do not smoke or use other types of tobacco near bedtime. Nicotine is a stimulant and can keep you awake. · Avoid drinking alcohol late in the evening, because it can cause you to wake in the middle of the night. · Do not eat a big meal close to bedtime. If you are hungry, eat a light snack. · Do not drink a lot of water close to bedtime, because the need to urinate may wake you up during the night. · Do not read or watch TV in bed. Use the bed only for sleeping and sexual activity. What to try  · Go to bed at the same time every night, and wake up at the same time every morning. Do not take naps during the day. · Keep your bedroom quiet, dark, and cool. · Get regular exercise, but not within 3 to 4 hours of your bedtime. .  · Sleep on a comfortable pillow and mattress. · If watching the clock makes you anxious, turn it facing away from you so you cannot see the time. · If you worry when you lie down, start a worry book. Well before bedtime, write down your worries, and then set the book and your concerns aside. · Try meditation or other relaxation techniques before you go to bed. · If you cannot fall asleep, get up and go to another room until you feel sleepy. Do something relaxing. Repeat your bedtime routine before you go to bed again. · Make your house quiet and calm about an hour before bedtime. Turn down the lights, turn off the TV, log off the computer, and turn down the volume on music. This can help you relax after a busy day.     Drowsy Driving  The [address] [place] [address] [place] Traffic Safety Administration cites drowsiness as a causing factor in more than 435,830 police reported crashes annually, resulting in 76,000 injuries and 1,500 deaths. Other surveys suggest 55% of people polled have driven while drowsy in the past year, 23% had fallen asleep but not crashed, 3% crashed, and 2% had and accident due to drowsy driving. Who is at risk? Young Drivers: One study of drowsy driving accidents states that 55% of the drivers were under 25 years. Of those, 75% were male. Shift Workers and Travelers: People who work overnight or travel across time zones frequently are at higher risk of experiencing Circadian Rhythm Disorders. They are trying to work and function when their body is programed to sleep. Sleep Deprived: Lack of sleep has a serious impact on your ability to pay attention or focus on a task. Consistently getting less than the average of 8 hours your body needs creates partial or cumulative sleep deprivation. Untreated Sleep Disorders: Sleep Apnea, Narcolepsy, R.L.S., and other sleep disorders (untreated) prevent a person from getting enough restful sleep. This leads to excessive daytime sleepiness and increases the risk for drowsy driving accidents by up to 7 times. Medications / Alcohol: Even over the counter medications can cause drowsiness. Medications that impair a drivers attention should have a warning label. Alcohol naturally makes you sleepy and on its own can cause accidents. Combined with excessive drowsiness its effects are amplified. Signs of Drowsy Driving:   * You don't remember driving the last few miles   * You may drift out of your kyaw   * You are unable to focus and your thoughts wander   * You may yawn more often than normal   * You have difficulty keeping your eyes open / nodding off   * Missing traffic signs, speeding, or tailgating  Prevention-   Good sleep hygiene, lifestyle and behavioral choices have the most impact on drowsy driving.  There is no substitute for sleep and the average person requires 8 hours nightly. If you find yourself driving drowsy, stop and sleep. Consider the sleep hygiene tips provided during your visit as well. Medication Refill Policy: Refills for all medications require 1 week advance notice. Please have your pharmacy fax a refill request. We are unable to fax, or call in \"controled substance\" medications and you will need to pick these prescriptions up from our office. nap- [employer] Activation    Thank you for requesting access to [employer]. Please follow the instructions below to securely access and download your online medical record. [employer] allows you to send messages to your doctor, view your test results, renew your prescriptions, schedule appointments, and more. How Do I Sign Up? 1. In your internet browser, go to [email or web address]. 2. Click on the First Time User? Click Here link in the Sign In box. You will see the New Member Sign Up page. 3. Enter your [employer] Access Code exactly as it appears below. You will not need to use this code after youve completed the sign-up process. If you do not sign up before the expiration date, you must request a new code. [employer] Access Code: Activation code not generated  Current [employer] Status: Active (This is the date your [employer] access code will )    4. Enter the last four digits of your Social Security Number (xxxx) and Date of Birth (mm/dd/yyyy) as indicated and click Submit. You will be taken to the next sign-up page. 5. Create a [employer] ID. This will be your [employer] login ID and cannot be changed, so think of one that is secure and easy to remember. 6. Create a [employer] password. You can change your password at any time. 7. Enter your Password Reset Question and Answer. This can be used at a later time if you forget your password. 8. Enter your e-mail address. You will receive e-mail notification when new information is available in [address]   9. Click Sign Up. You can now view and download portions of your medical record. 10. Click the Download Summary menu link to download a portable copy of your medical information. Additional Information    If you have questions, please call [phone]. Remember, [employer] is NOT to be used for urgent needs. For medical emergencies, dial 911.

## 2021-08-17 ENCOUNTER — TELEPHONE (OUTPATIENT)
Dept: INTERNAL MEDICINE CLINIC | Age: 46
End: 2021-08-17

## 2021-08-17 NOTE — TELEPHONE ENCOUNTER
Preop form faxed to [facility] [phone], confirmation received.

## 2021-08-17 NOTE — TELEPHONE ENCOUNTER
Pt called to follow up on request for pre-op report to be faxed to surgeon (surgery on Monday). Pt also wanted to report that her sugar readings have been below 200 for the last two days.

## 2021-08-17 NOTE — PERIOP NOTES
Called lm for pt to call back. Last note in CC shows Dr. Lashonda Rodriguez wanted readings given to him yesterday to determine if pt could proceed, no numbers called in. Pt has called PCP though asking for clearance.

## 2021-08-17 NOTE — TELEPHONE ENCOUNTER
Pt called and stated that she needs a call back today because she needs to know if Dr. Daxa Alaniz is going to sign off for her surgery that is on Monday.

## 2021-08-19 NOTE — H&P
Pre-operative Evaluation / History & Physical    Sent From: Sent To: [address] [facility], [facility]  Phone: [phone] Fax: [phone]      Patient Information  Patient Name Hugo Cheney Sex F    1975 Age 46yo   Address [address] [facility]  [address] [facility], [address] Phone H: [phone]  M: [phone]   Primary Insurance [employer] (Medicaid Replacement - HMO)  ID: [id number]  [id number]  Policy Patel: Everardo Siegel   Secondary Insurance None recorded. Pre-Op Visit and Medical History  Chief Complaint Pre-op   History of Present Illness Room 3  48year old patient Nabeel Vann presents today for pre op for TLH, bilateral salpingectomy 21 at [facility]. She was found to have thickened endometrium, endometrial biopsy was nil, and D&C was attempted, however, only a single strand of stromal tissue was returned. Her stripe remained thickened after that and hysterectomy was recommended  US was normal except thickened endo. Prior CS x 2. Past Medical History Past Medical History not reviewed (last reviewed 2021)  Anxiety Disorder: Y  Asthma: Y  Diabetes Mellitus: Y - On Metformin  Hypertension (high blood pressure): Y  Obesity: Y   Surgical History Surgical History not reviewed (last reviewed 2021)     Procedure on kidney - parts of kidney removed   Tubal Ligation    delivery - x2   Hysteroscopy biopsy - 2021 - Hysteroscopy D&C for thickened endometrium and cervical stenosis.  Endometrial Ablation - 2014 - Hysteroscopy, D&C, Novasure   Gynecological / Obstetrical History Reviewed GYN History  Date of LMP: (Notes: Ablation - sporadic spotting/off and on). Menses Monthly: N. Date of Last Pap Smear: 2020 (Notes: NIL). Date of HPV testin2020 (Notes: Negative). Abnormal Pap: N.  HPV Vaccine: N. Sexually Active?: Y.   Sexual Problems?: Yes (Notes: Pain/Dryness). STIs/STDs: N.  Current Birth Control Method: Tubal Ligation. Date of Last Mammogram: 11/30/2020 (Notes: BIRADS 1 / TC 23.9% LIFETIME RISK).    Social History Social History not reviewed (last reviewed 06/28/2021)    Substance Use  Do you or have you ever smoked tobacco?: Never smoker  Do you or have you ever used e-cigarettes or vape?: Never used electronic cigarettes  How much tobacco do you chew?: none  What was the date of your most recent tobacco screening?: 79/06/3839  Which illicit or recreational drugs have you used?: No  Marriage and Sexuality  How many children do you have?: 2  OB/GYN Social History  Are you working: Unemployed  On average, how many days per week do you engage in moderate to strenuous EXERCISE (like walking fast, running, jogging, dancing, swimming, biking, or other activities that cause a light or heavy sweat)?: 0  How often do you have a DRINK containing ALCOHOL?: Never  Gender Identity and LGBTQ Identity   Family History Family History not reviewed (last reviewed 06/28/2021)    Mother - Malignant tumor of ovary (onset age: 39)   Father - Heart disease     - Diabetes mellitus     - Hypertensive disorder      Allergies List Reviewed Allergies     PENICILLIN G POT IN DEXTROSE: Rash       Medications Reviewed Medications     Advair Diskus 500 mcg-50 mcg/dose powder for inhalation  Prescribed by lauren 608/566 Megan Rooney MD  Internal Note: Entered By: Myke Quezada MASigned By: Erlinda Doan MDUncoded: NBMN: N, start 08/07/2015 08/07/15   started    albuterol sulfate 0.63 mg/3 mL solution for nebulization  Prescribed by lauren Sharam/70Leslie Rooney MD  Internal Note: Entered By: Stephanie Bell MA - Team 2 MECSigned By: Jef Larsen MDUncoded: NBMN: N, start 06/06/2014 06/06/14   started    glimepiride  Internal Note: non vwc md 03/28/19   entered    Humira 08/06/21   entered    Lyrica  Internal Note: non vwc md 03/28/19   entered    metFORMIN  Internal Note: non vwc md 03/28/19 entered    metHOTREXate 04/21/21   entered    predniSONE 04/21/21   entered    Singulair 10 mg tablet  Prescribed by non 609/706 Megan Rooney MD  Internal Note: Entered By: Abel Garcia MA - Team 2 MECSigned By: Samantha Guerrero MDUncoded: NBMN: N, start 06/06/2014 06/06/14   started    valsartan 40 mg tablet  Take 1 tablet(s) every day by oral route. 07/01/20   entered    Wellbutrin SR 04/21/21   entered      Medicine w/ methotrexate to prevent mouth ulcers   Review of Systems None recorded   Vital Signs Ht: 5 ft 4 in (162.56 cm) 08/06/2021 09:24 am Wt: 294 lbs (133.36 kg) 08/06/2021 09:24 am BMI: 50.5 08/06/2021 09:24 am   BP: 122/84 08/06/2021 09:28 am          Physical Exam Patient is a 57-year-old female. Constitutional: General Appearance: well developed and nourished and pleasant. Level of Distress: no acute distress. Ambulation: ambulating normally. Head: Head: normal scalp and examination of the face and normocephalic, atraumatic, and no temporal wasting. Eyes: External Eye no discharge or eye discharge, proptosis, or ptosis. Sclera: non-icteric. Extraocular Movements extraocular movements intact. Ears, Nose, Mouth, Throat: Ears normal hearing. Nose: no external nose lesions. Neck: Appearance trachea midline. Thyroid: no enlargement. Lungs / Chest: Respiratory effort: unlabored. Auscultation: no wheezing, rales/crackles, or rhonchi. Cardiovascular: Rate And Rhythm: regular. Heart Sounds: no murmurs. Extremities: no clubbing, cyanosis, or edema. Abdomen: Inspection and Palpation: no tenderness, guarding, masses, or rebound tenderness and soft and non-distended. Hernia: none palpable. Lymphatics: General Lymphatics: no lymphadenopathy. Extremities: Extremities: no swelling or inflammation of extremities. Musculoskeletal System: General Musculoskeletal full range of motion. Skin: General Skin no rash or suspicious lesions. Neurologic: Gait and Station: normal gait and station. Cranial Nerves: grossly intact. Mental Status Exam: Orientation oriented to person, place, and time. Affect: appropriate affect. Language and Speech: normal speech and comprehension. Lab Results    Assessment and Plan 1. Endometrium thickened -    We will plan total laparoscopic hysterectomy. We discussed the risks and benefits of ovarian conservation and the pt would like to have her ovaries left in place as long as they are normal. She understands that oopherectomy would mean immediate menopause. We also discussed supracervical vs total laparoscopic hyst, and she would like to proceed with the total procedure. She understands that if surgical complications necessitate supracervical hyst, that there is a risk of cyclic menstrual bleeding. I reviewed with the patient indications, alternatives, risks, and benefits of proposed procedure, the risks of which include injury to bowel, bladder, nerves, blood vessels, or ureters, injury to any other intraabdominal structure, risk of bleeding and infection, and inherent risks of anesthesia, including death. The patient indicates understanding of these risks, and agrees to the proposed procedure. She is instructed to stay NPO after midnight the night before surgery.   R93.89: Abnormal findings on diagnostic imaging of other specified body structures      Return to Office   Zachary Nielsen MD for Surgery at [facility] on 08/23/2021 at 10:00 AM   Zachary Nielsen MD for [place] [address] Virtual Visit at [facility] Office on 09/10/2021 at 09:30 AM   Zachary Nielsen MD for Post Op Exam at [facility] Office on 10/04/2021 at 01:30 PM   Current Problems (Diagnoses) Reviewed Problems     Obesity - Onset: 05/19/2017 - Entered By: Jane Fernandez MA - Team 4 KLMSigned By: Rickie Modi MD Description: Obesity (BMI > 29.99) code: 278.00   Acute vulvitis - Onset: 05/19/2017 - Entered By: Rickie Modi MDSigned By: Vida Salazar Georgina PEREYRA Description: Vulvar cellulitis acute code: 616.10   Atypical squamous cells of undetermined significance on cervical Papanicolaou smear - Onset: 04/03/2019   Gynecologic examination - Onset: 07/02/2020   At high risk for breast cancer - Onset: 12/02/2020 - TC 23.9% LIFETIME RISK    TLH, bilateral salpingectomy/ [place]/ [facility]-/ [facility] to call for PAT and covid appt  Hysteroscopy, D&C (Myosure available) [id number] 11:10am JERICHO Webb per anesthesia         Electronically Signed by: Alyson Pham MD    _____________________________________________  Ordered/Documented by:  Visit Date: 08/06/2021     The History and Physical is reviewed today. The patient is seen and examined and no changes are required.   Rachele Miner MD  8/23/2021  9:09 AM

## 2021-08-20 ENCOUNTER — ANESTHESIA EVENT (OUTPATIENT)
Dept: SURGERY | Age: 46
End: 2021-08-20
Payer: MEDICAID

## 2021-08-20 RX ORDER — ACETAMINOPHEN 500 MG
1000 TABLET ORAL ONCE
Status: COMPLETED | OUTPATIENT
Start: 2021-08-23 | End: 2021-08-23

## 2021-08-23 ENCOUNTER — ANESTHESIA (OUTPATIENT)
Dept: SURGERY | Age: 46
End: 2021-08-23
Payer: MEDICAID

## 2021-08-23 ENCOUNTER — HOSPITAL ENCOUNTER (OUTPATIENT)
Age: 46
Setting detail: OUTPATIENT SURGERY
Discharge: HOME | End: 2021-08-23
Attending: OBSTETRICS & GYNECOLOGY | Admitting: OBSTETRICS & GYNECOLOGY
Payer: MEDICAID

## 2021-08-23 VITALS
RESPIRATION RATE: 16 BRPM | SYSTOLIC BLOOD PRESSURE: 143 MMHG | HEIGHT: 64 IN | HEART RATE: 90 BPM | TEMPERATURE: 97.3 F | OXYGEN SATURATION: 95 % | BODY MASS INDEX: 50.02 KG/M2 | DIASTOLIC BLOOD PRESSURE: 90 MMHG | WEIGHT: 293 LBS

## 2021-08-23 DIAGNOSIS — R93.89 THICKENED ENDOMETRIUM: Primary | ICD-10-CM

## 2021-08-23 LAB
GLUCOSE BLD STRIP.AUTO-MCNC: 173 MG/DL (ref 65–117)
GLUCOSE BLD STRIP.AUTO-MCNC: 255 MG/DL (ref 65–117)
HCG UR QL: NEGATIVE
SERVICE CMNT-IMP: ABNORMAL
SERVICE CMNT-IMP: ABNORMAL

## 2021-08-23 PROCEDURE — 77030018684: Performed by: OBSTETRICS & GYNECOLOGY

## 2021-08-23 PROCEDURE — 77030008771 HC TU NG SALEM SUMP -A: Performed by: NURSE ANESTHETIST, CERTIFIED REGISTERED

## 2021-08-23 PROCEDURE — 77030008606 HC TRCR ENDOSC KII AMR -B: Performed by: OBSTETRICS & GYNECOLOGY

## 2021-08-23 PROCEDURE — 77030041236 HC APPL SURG ENDO JNJ -B: Performed by: OBSTETRICS & GYNECOLOGY

## 2021-08-23 PROCEDURE — 77030039147 HC PWDR HEMSTS SURGICEL JNJ -D: Performed by: OBSTETRICS & GYNECOLOGY

## 2021-08-23 PROCEDURE — 74011250636 HC RX REV CODE- 250/636: Performed by: NURSE ANESTHETIST, CERTIFIED REGISTERED

## 2021-08-23 PROCEDURE — 77030026438 HC STYL ET INTUB CARD -A: Performed by: NURSE ANESTHETIST, CERTIFIED REGISTERED

## 2021-08-23 PROCEDURE — 77030018778 HC MANIP UTER VCAR CNMD -B: Performed by: OBSTETRICS & GYNECOLOGY

## 2021-08-23 PROCEDURE — 74011250636 HC RX REV CODE- 250/636: Performed by: OBSTETRICS & GYNECOLOGY

## 2021-08-23 PROCEDURE — 77030025625 HC DEV TISS SEAL J&J -F: Performed by: OBSTETRICS & GYNECOLOGY

## 2021-08-23 PROCEDURE — 74011250637 HC RX REV CODE- 250/637

## 2021-08-23 PROCEDURE — 76210000035 HC AMBSU PH I REC 1 TO 1.5 HR: Performed by: OBSTETRICS & GYNECOLOGY

## 2021-08-23 PROCEDURE — 88307 TISSUE EXAM BY PATHOLOGIST: CPT

## 2021-08-23 PROCEDURE — 74011000258 HC RX REV CODE- 258: Performed by: OBSTETRICS & GYNECOLOGY

## 2021-08-23 PROCEDURE — 77030013079 HC BLNKT BAIR HGGR 3M -A: Performed by: NURSE ANESTHETIST, CERTIFIED REGISTERED

## 2021-08-23 PROCEDURE — 77030003029 HC SUT VCRL J&J -B: Performed by: OBSTETRICS & GYNECOLOGY

## 2021-08-23 PROCEDURE — 74011000250 HC RX REV CODE- 250: Performed by: OBSTETRICS & GYNECOLOGY

## 2021-08-23 PROCEDURE — 74011250636 HC RX REV CODE- 250/636

## 2021-08-23 PROCEDURE — 77030020263 HC SOL INJ SOD CL0.9% LFCR 1000ML: Performed by: OBSTETRICS & GYNECOLOGY

## 2021-08-23 PROCEDURE — 77030021352 HC CBL LD SYS DISP COVD -B: Performed by: OBSTETRICS & GYNECOLOGY

## 2021-08-23 PROCEDURE — 76030000007 HC AMB SURG OR TIME 3.5 TO 4: Performed by: OBSTETRICS & GYNECOLOGY

## 2021-08-23 PROCEDURE — 76060000067 HC AMB SURG ANES 3.5 TO 4 HR: Performed by: OBSTETRICS & GYNECOLOGY

## 2021-08-23 PROCEDURE — 2709999900 HC NON-CHARGEABLE SUPPLY: Performed by: OBSTETRICS & GYNECOLOGY

## 2021-08-23 PROCEDURE — 77030002933 HC SUT MCRYL J&J -A: Performed by: OBSTETRICS & GYNECOLOGY

## 2021-08-23 PROCEDURE — 77030038613 HC SUT PDS STRATA SPIRL J&J -B: Performed by: OBSTETRICS & GYNECOLOGY

## 2021-08-23 PROCEDURE — 77030016151 HC PROTCTR LNS DFOG COVD -B: Performed by: OBSTETRICS & GYNECOLOGY

## 2021-08-23 PROCEDURE — 77030008684 HC TU ET CUF COVD -B: Performed by: NURSE ANESTHETIST, CERTIFIED REGISTERED

## 2021-08-23 PROCEDURE — 81025 URINE PREGNANCY TEST: CPT

## 2021-08-23 PROCEDURE — 82962 GLUCOSE BLOOD TEST: CPT

## 2021-08-23 PROCEDURE — 77030021678 HC GLIDESCP STAT DISP VERT -B: Performed by: ANESTHESIOLOGY

## 2021-08-23 PROCEDURE — 77030019908 HC STETH ESOPH SIMS -A: Performed by: ANESTHESIOLOGY

## 2021-08-23 PROCEDURE — 74011000250 HC RX REV CODE- 250: Performed by: NURSE ANESTHETIST, CERTIFIED REGISTERED

## 2021-08-23 PROCEDURE — 77030037241 HC PRT ACC BLDLSS AIRSEAL CNMD -B: Performed by: OBSTETRICS & GYNECOLOGY

## 2021-08-23 PROCEDURE — 77030010032 HC SCLPL DISECT HARM J&J -C: Performed by: OBSTETRICS & GYNECOLOGY

## 2021-08-23 PROCEDURE — 74011250636 HC RX REV CODE- 250/636: Performed by: ANESTHESIOLOGY

## 2021-08-23 PROCEDURE — 76210000050 HC AMBSU PH II REC 0.5 TO 1 HR: Performed by: OBSTETRICS & GYNECOLOGY

## 2021-08-23 PROCEDURE — 77030020702 HC ADAPT HARM DISP J&J -B: Performed by: OBSTETRICS & GYNECOLOGY

## 2021-08-23 RX ORDER — OXYCODONE HYDROCHLORIDE 5 MG/1
5 TABLET ORAL
Qty: 20 TABLET | Refills: 0 | Status: SHIPPED | OUTPATIENT
Start: 2021-08-23 | End: 2021-08-26

## 2021-08-23 RX ORDER — LIDOCAINE HYDROCHLORIDE 10 MG/ML
0.1 INJECTION, SOLUTION EPIDURAL; INFILTRATION; INTRACAUDAL; PERINEURAL AS NEEDED
Status: DISCONTINUED | OUTPATIENT
Start: 2021-08-23 | End: 2021-08-23 | Stop reason: HOSPADM

## 2021-08-23 RX ORDER — HYDRALAZINE HYDROCHLORIDE 20 MG/ML
10 INJECTION INTRAMUSCULAR; INTRAVENOUS ONCE
Status: COMPLETED | OUTPATIENT
Start: 2021-08-23 | End: 2021-08-23

## 2021-08-23 RX ORDER — SODIUM CHLORIDE 0.9 % (FLUSH) 0.9 %
5-40 SYRINGE (ML) INJECTION EVERY 8 HOURS
Status: DISCONTINUED | OUTPATIENT
Start: 2021-08-23 | End: 2021-08-23 | Stop reason: HOSPADM

## 2021-08-23 RX ORDER — PROPOFOL 10 MG/ML
INJECTION, EMULSION INTRAVENOUS AS NEEDED
Status: DISCONTINUED | OUTPATIENT
Start: 2021-08-23 | End: 2021-08-23 | Stop reason: HOSPADM

## 2021-08-23 RX ORDER — OXYCODONE AND ACETAMINOPHEN 5; 325 MG/1; MG/1
1 TABLET ORAL
Status: DISCONTINUED | OUTPATIENT
Start: 2021-08-23 | End: 2021-08-23 | Stop reason: HOSPADM

## 2021-08-23 RX ORDER — SODIUM CHLORIDE, SODIUM LACTATE, POTASSIUM CHLORIDE, CALCIUM CHLORIDE 600; 310; 30; 20 MG/100ML; MG/100ML; MG/100ML; MG/100ML
25 INJECTION, SOLUTION INTRAVENOUS CONTINUOUS
Status: DISCONTINUED | OUTPATIENT
Start: 2021-08-23 | End: 2021-08-23 | Stop reason: HOSPADM

## 2021-08-23 RX ORDER — GLYCOPYRROLATE 0.2 MG/ML
INJECTION INTRAMUSCULAR; INTRAVENOUS AS NEEDED
Status: DISCONTINUED | OUTPATIENT
Start: 2021-08-23 | End: 2021-08-23 | Stop reason: HOSPADM

## 2021-08-23 RX ORDER — ROCURONIUM BROMIDE 10 MG/ML
INJECTION, SOLUTION INTRAVENOUS AS NEEDED
Status: DISCONTINUED | OUTPATIENT
Start: 2021-08-23 | End: 2021-08-23 | Stop reason: HOSPADM

## 2021-08-23 RX ORDER — NEOSTIGMINE METHYLSULFATE 1 MG/ML
INJECTION, SOLUTION INTRAVENOUS AS NEEDED
Status: DISCONTINUED | OUTPATIENT
Start: 2021-08-23 | End: 2021-08-23 | Stop reason: HOSPADM

## 2021-08-23 RX ORDER — HYDROMORPHONE HYDROCHLORIDE 2 MG/ML
INJECTION, SOLUTION INTRAMUSCULAR; INTRAVENOUS; SUBCUTANEOUS AS NEEDED
Status: DISCONTINUED | OUTPATIENT
Start: 2021-08-23 | End: 2021-08-23 | Stop reason: HOSPADM

## 2021-08-23 RX ORDER — KETOROLAC TROMETHAMINE 30 MG/ML
INJECTION, SOLUTION INTRAMUSCULAR; INTRAVENOUS AS NEEDED
Status: DISCONTINUED | OUTPATIENT
Start: 2021-08-23 | End: 2021-08-23 | Stop reason: HOSPADM

## 2021-08-23 RX ORDER — SODIUM CHLORIDE 0.9 % (FLUSH) 0.9 %
5-40 SYRINGE (ML) INJECTION AS NEEDED
Status: DISCONTINUED | OUTPATIENT
Start: 2021-08-23 | End: 2021-08-23 | Stop reason: HOSPADM

## 2021-08-23 RX ORDER — ESMOLOL HYDROCHLORIDE 10 MG/ML
INJECTION INTRAVENOUS AS NEEDED
Status: DISCONTINUED | OUTPATIENT
Start: 2021-08-23 | End: 2021-08-23 | Stop reason: HOSPADM

## 2021-08-23 RX ORDER — HYDROMORPHONE HYDROCHLORIDE 1 MG/ML
.2-.5 INJECTION, SOLUTION INTRAMUSCULAR; INTRAVENOUS; SUBCUTANEOUS ONCE
Status: DISCONTINUED | OUTPATIENT
Start: 2021-08-23 | End: 2021-08-23 | Stop reason: HOSPADM

## 2021-08-23 RX ORDER — MIDAZOLAM HYDROCHLORIDE 1 MG/ML
INJECTION, SOLUTION INTRAMUSCULAR; INTRAVENOUS AS NEEDED
Status: DISCONTINUED | OUTPATIENT
Start: 2021-08-23 | End: 2021-08-23 | Stop reason: HOSPADM

## 2021-08-23 RX ORDER — ACETAMINOPHEN 500 MG
TABLET ORAL
Status: COMPLETED
Start: 2021-08-23 | End: 2021-08-23

## 2021-08-23 RX ORDER — FENTANYL CITRATE 50 UG/ML
25 INJECTION, SOLUTION INTRAMUSCULAR; INTRAVENOUS
Status: DISCONTINUED | OUTPATIENT
Start: 2021-08-23 | End: 2021-08-23 | Stop reason: HOSPADM

## 2021-08-23 RX ORDER — DIPHENHYDRAMINE HYDROCHLORIDE 50 MG/ML
12.5 INJECTION, SOLUTION INTRAMUSCULAR; INTRAVENOUS AS NEEDED
Status: DISCONTINUED | OUTPATIENT
Start: 2021-08-23 | End: 2021-08-23 | Stop reason: HOSPADM

## 2021-08-23 RX ORDER — MORPHINE SULFATE 10 MG/ML
2 INJECTION, SOLUTION INTRAMUSCULAR; INTRAVENOUS
Status: DISCONTINUED | OUTPATIENT
Start: 2021-08-23 | End: 2021-08-23 | Stop reason: HOSPADM

## 2021-08-23 RX ORDER — CLINDAMYCIN PHOSPHATE 900 MG/50ML
900 INJECTION INTRAVENOUS ONCE
Status: COMPLETED | OUTPATIENT
Start: 2021-08-23 | End: 2021-08-23

## 2021-08-23 RX ORDER — IBUPROFEN 800 MG/1
TABLET ORAL
Qty: 30 TABLET | Refills: 1 | Status: SHIPPED | OUTPATIENT
Start: 2021-08-23

## 2021-08-23 RX ORDER — HYDRALAZINE HYDROCHLORIDE 20 MG/ML
INJECTION INTRAMUSCULAR; INTRAVENOUS
Status: COMPLETED
Start: 2021-08-23 | End: 2021-08-23

## 2021-08-23 RX ORDER — BUPIVACAINE HYDROCHLORIDE AND EPINEPHRINE 5; 5 MG/ML; UG/ML
INJECTION, SOLUTION EPIDURAL; INTRACAUDAL; PERINEURAL AS NEEDED
Status: DISCONTINUED | OUTPATIENT
Start: 2021-08-23 | End: 2021-08-23 | Stop reason: HOSPADM

## 2021-08-23 RX ORDER — HYDRALAZINE HYDROCHLORIDE 20 MG/ML
20 INJECTION INTRAMUSCULAR; INTRAVENOUS ONCE
Status: DISCONTINUED | OUTPATIENT
Start: 2021-08-23 | End: 2021-08-23

## 2021-08-23 RX ORDER — FENTANYL CITRATE 50 UG/ML
INJECTION, SOLUTION INTRAMUSCULAR; INTRAVENOUS AS NEEDED
Status: DISCONTINUED | OUTPATIENT
Start: 2021-08-23 | End: 2021-08-23 | Stop reason: HOSPADM

## 2021-08-23 RX ORDER — ONDANSETRON 2 MG/ML
INJECTION INTRAMUSCULAR; INTRAVENOUS AS NEEDED
Status: DISCONTINUED | OUTPATIENT
Start: 2021-08-23 | End: 2021-08-23 | Stop reason: HOSPADM

## 2021-08-23 RX ORDER — SUCCINYLCHOLINE CHLORIDE 20 MG/ML
INJECTION INTRAMUSCULAR; INTRAVENOUS AS NEEDED
Status: DISCONTINUED | OUTPATIENT
Start: 2021-08-23 | End: 2021-08-23 | Stop reason: HOSPADM

## 2021-08-23 RX ORDER — LIDOCAINE HYDROCHLORIDE 20 MG/ML
INJECTION, SOLUTION EPIDURAL; INFILTRATION; INTRACAUDAL; PERINEURAL AS NEEDED
Status: DISCONTINUED | OUTPATIENT
Start: 2021-08-23 | End: 2021-08-23 | Stop reason: HOSPADM

## 2021-08-23 RX ORDER — DROPERIDOL 2.5 MG/ML
0.62 INJECTION, SOLUTION INTRAMUSCULAR; INTRAVENOUS AS NEEDED
Status: DISCONTINUED | OUTPATIENT
Start: 2021-08-23 | End: 2021-08-23 | Stop reason: HOSPADM

## 2021-08-23 RX ORDER — DEXAMETHASONE SODIUM PHOSPHATE 4 MG/ML
INJECTION, SOLUTION INTRA-ARTICULAR; INTRALESIONAL; INTRAMUSCULAR; INTRAVENOUS; SOFT TISSUE AS NEEDED
Status: DISCONTINUED | OUTPATIENT
Start: 2021-08-23 | End: 2021-08-23 | Stop reason: HOSPADM

## 2021-08-23 RX ORDER — ONDANSETRON 4 MG/1
4 TABLET, ORALLY DISINTEGRATING ORAL
Qty: 20 TABLET | Refills: 5 | Status: SHIPPED | OUTPATIENT
Start: 2021-08-23 | End: 2022-01-27 | Stop reason: SDUPTHER

## 2021-08-23 RX ADMIN — LIDOCAINE HYDROCHLORIDE 100 MG: 20 INJECTION, SOLUTION INTRAVENOUS at 09:51

## 2021-08-23 RX ADMIN — HYDRALAZINE HYDROCHLORIDE 10 MG: 20 INJECTION INTRAMUSCULAR; INTRAVENOUS at 14:04

## 2021-08-23 RX ADMIN — DEXAMETHASONE SODIUM PHOSPHATE 8 MG: 4 INJECTION, SOLUTION INTRAMUSCULAR; INTRAVENOUS at 10:00

## 2021-08-23 RX ADMIN — ROCURONIUM BROMIDE 10 MG: 10 INJECTION INTRAVENOUS at 12:29

## 2021-08-23 RX ADMIN — CLINDAMYCIN PHOSPHATE 900 MG: 18 INJECTION, SOLUTION INTRAVENOUS at 10:00

## 2021-08-23 RX ADMIN — HYDRALAZINE HYDROCHLORIDE 10 MG: 20 INJECTION INTRAMUSCULAR; INTRAVENOUS at 14:39

## 2021-08-23 RX ADMIN — ROCURONIUM BROMIDE 10 MG: 10 INJECTION INTRAVENOUS at 09:54

## 2021-08-23 RX ADMIN — ROCURONIUM BROMIDE 30 MG: 10 INJECTION INTRAVENOUS at 09:57

## 2021-08-23 RX ADMIN — NEOSTIGMINE METHYLSULFATE 4 MG: 1 INJECTION, SOLUTION INTRAVENOUS at 12:54

## 2021-08-23 RX ADMIN — HYDROMORPHONE HYDROCHLORIDE 0.2 MG: 2 INJECTION, SOLUTION INTRAMUSCULAR; INTRAVENOUS; SUBCUTANEOUS at 12:40

## 2021-08-23 RX ADMIN — GLYCOPYRROLATE 0.6 MG: 0.2 INJECTION, SOLUTION INTRAMUSCULAR; INTRAVENOUS at 12:54

## 2021-08-23 RX ADMIN — ESMOLOL HYDROCHLORIDE 10 MG: 10 INJECTION, SOLUTION INTRAVENOUS at 10:49

## 2021-08-23 RX ADMIN — ESMOLOL HYDROCHLORIDE 10 MG: 10 INJECTION, SOLUTION INTRAVENOUS at 12:07

## 2021-08-23 RX ADMIN — SUCCINYLCHOLINE CHLORIDE 200 MG: 20 INJECTION, SOLUTION INTRAMUSCULAR; INTRAVENOUS at 09:54

## 2021-08-23 RX ADMIN — ONDANSETRON HYDROCHLORIDE 4 MG: 2 INJECTION, SOLUTION INTRAMUSCULAR; INTRAVENOUS at 12:41

## 2021-08-23 RX ADMIN — KETOROLAC TROMETHAMINE 30 MG: 30 INJECTION, SOLUTION INTRAMUSCULAR; INTRAVENOUS at 12:50

## 2021-08-23 RX ADMIN — GENTAMICIN SULFATE 422.4 MG: 40 INJECTION, SOLUTION INTRAMUSCULAR; INTRAVENOUS at 09:36

## 2021-08-23 RX ADMIN — ESMOLOL HYDROCHLORIDE 20 MG: 10 INJECTION, SOLUTION INTRAVENOUS at 12:45

## 2021-08-23 RX ADMIN — ESMOLOL HYDROCHLORIDE 10 MG: 10 INJECTION, SOLUTION INTRAVENOUS at 11:13

## 2021-08-23 RX ADMIN — FENTANYL CITRATE 100 MCG: 50 INJECTION, SOLUTION INTRAMUSCULAR; INTRAVENOUS at 09:51

## 2021-08-23 RX ADMIN — HYDROMORPHONE HYDROCHLORIDE 0.5 MG: 2 INJECTION, SOLUTION INTRAMUSCULAR; INTRAVENOUS; SUBCUTANEOUS at 10:03

## 2021-08-23 RX ADMIN — HYDROMORPHONE HYDROCHLORIDE 0.5 MG: 2 INJECTION, SOLUTION INTRAMUSCULAR; INTRAVENOUS; SUBCUTANEOUS at 10:25

## 2021-08-23 RX ADMIN — Medication 1000 MG: at 08:43

## 2021-08-23 RX ADMIN — SODIUM CHLORIDE, POTASSIUM CHLORIDE, SODIUM LACTATE AND CALCIUM CHLORIDE 25 ML/HR: 600; 310; 30; 20 INJECTION, SOLUTION INTRAVENOUS at 08:54

## 2021-08-23 RX ADMIN — PROPOFOL 250 MG: 10 INJECTION, EMULSION INTRAVENOUS at 09:54

## 2021-08-23 RX ADMIN — SODIUM CHLORIDE, POTASSIUM CHLORIDE, SODIUM LACTATE AND CALCIUM CHLORIDE: 600; 310; 30; 20 INJECTION, SOLUTION INTRAVENOUS at 12:19

## 2021-08-23 RX ADMIN — MIDAZOLAM HYDROCHLORIDE 2 MG: 1 INJECTION, SOLUTION INTRAMUSCULAR; INTRAVENOUS at 09:43

## 2021-08-23 NOTE — PERIOP NOTES
Zbigniew Head  1975  [id number]    Situation:  Verbal report given from: RN and CRNA  Procedure: Procedure(s):  TOTAL LAPAROSCOPIC HYSTERECTOMY, BILATERAL SALPINGECTOMY    Background:    Preoperative diagnosis: THICKENED ENDOMETRIUM, PELVIC PAIN    Postoperative diagnosis: THICKENED ENDOMETRIUM, PELVIC PAIN    :  Dr. Earnestine Cordero    Assistant(s): Circ-1: Lucille Robbins RN  Circ-Relief: Aissatou Edwards RN  Scrub Tech-1: Edu Calvin  Scrub RN-Relief: Marina Jensen RN  Surg Asst-1: Charan Michel  Surg Asst-Relief: Snow HERNANDEZ    Specimens:   ID Type Source Tests Collected by Time Destination   1 : Uterus, cervix, bilateral fallopian tubes Preservative Uterus with Bilateral Fallopian Tubes  Edgar Peralta MD 8/23/2021 1152 Pathology       Assessment:  Intra-procedure medications         Anesthesia gave intra-procedure sedation and medications, see anesthesia flow sheet     Intravenous fluids: LR@ KVO     Vital signs stable. Pt has oral airway and only taking shallow breaths. Face mask applied with good results.        Recommendation:    Permission to share finding with caregiver : yes

## 2021-08-23 NOTE — OP NOTES
Operative Note    Patient ID:   Name: [address] [place] [address] 14 Record Number: [id number]    YOB: 1975    Preoperative Diagnosis: THICKENED ENDOMETRIUM, PELVIC PAIN    Postoperative Diagnosis: THICKENED ENDOMETRIUM, PELVIC PAIN    Surgeon:  Shimon Ye MD     Assistant:  OR assistant    Anesthesia: General    Procedure: Total Laparoscopic Hysterectomy, bilateral salpingectomy, cystoscopy  Findings: The uterus is normal in size. The ovaries appears normal.  She is s/p tubal lgation. There ismarked scarring of the vesicouterine peritoneum, attenuating the left anterior broad ligament completely. There is also a dense adhesive band connecting the fundus to the anterior abdominal wall. On vaginal exam, the vaginais shortened significantly and the cervix is fully flush with the vaginal wall at the apex. Estimated Blood Loss: 100      Drains: none    Pathology /Specimens:     ID Type Source Tests Collected by Time Destination   1 : Uterus, cervix, bilateral fallopian tubes Preservative Uterus with Bilateral Fallopian Tubes  Paresh Mast MD 8/23/2021 1154 Pathology       DVT Prophylaxis: SCD Hose    Antibiotic Prophylaxis: gent, clinda    After understanding the risks, benefits, and alternatives to the proposed procedure, the patient was taken to the operating room, where she was administered general anesthesia in the supine position. She was then placed in the dorsal lithotomy position and prepped and draped in usual sterile fashion. A simpson catheter is placed. A sidearm speculum is introduced into the vagina. There cervix is grasped with a single tooth tenaculum. A VCare uterine manipulator is placed and the balloon inflated with air. Gloves are changed and attention is turned to the abdomen. An umbilical incision is made, and access is gained using the optiview technique with a 5 mm trocar. Pneumoperitoneum is obtained and intraabdominal placement is verified.   There was no bleeding and no evidence of injury to the bowel. 5 mm incisions were then made in the left and right lower quadrants and 5 mm ports placed in each of those under direct visualization. Findings were noted as above. Additionally, it was discovered that the [facility] had not entered the body of the uterus, as is consistent with her prior hx of cervixcal stenosis. Instead, this had perforated outside of the cervix at a very distal point. The tip was visible under the peritoneum in what appeared to be the vesicouterine peritoneum. Note was made of this, and the manipulator wasn't used until it's location could be confirmed later in the case. The articulating EnSeal was used. The ureters were identified on either side. On the left hand side, the tube and uteroovarian ligament was clamped and divided using the EnSeal. The dissection was then taken down through the round ligament. The anterior peritoneum was somewhat obliterated by scarring at this point. Significant difficulty, and incidental creation of the uterine artery pedicles with the bipolar and the enseal is encountered and eventually, the peritoneum over the [facility] was opened. What appears to be the bladder is dissected free of it and it appears as though the manipulator indeed wound up in the plane between uterus and bladder instead of the bladder itself. At this point, the bladder is backfilled with 100 cc sterile water. The bladder is observed with the laparoscope during this and there is no evidence of leakage around the [facility]. Gloves are changed and the procedure is carried on. The bladder was dissected off the lower uterine segment and cervix. Posterior peritoneum was taken down, skeletonizing the uterine arteries. The uterine arteries were clamped, coagulated and cut across the ascending branches using EnSeal. Cardinal ligament was developed. The same steps were followed on the right side. The fornices of the vagina are identified via the VCare cup. Colpotomy was made with the Harmonic Hook and carried around the VCare cup. When the specimen was free, it was delivered through the vagina. The bilateral mesosalpinges are divided with the EnSeal to remove both fallopian tubes which are then withdrawn through the trocar. The vaginal cuff was then identified. Significant exposure difficulties were encountered during the attempt at laparoscopic closure, so it isclosed vaginally with 0 vicryls in figure 8 fashion. Hemostasis was achieved. The pelvis was irrigated with copious amounts of saline and suctioned away. Hemostasis was assured. Attention is then turned to cystoscopy. The bladder was backfilled with 180 cc sterile saline. The simpson was removed and the 5 mm 30 degree laparoscope was introduced into the bladder. The dome was identified and the bladder wall appears to be uninjured. The trigone was then identified and the bilateral ureteral orifices were noted to efflux urine briskly. Attention was turned back to the pelvis. Again it is irrigated and surgicell powder is placed over the raw areas. Hemostasis was excellent. The left and right lower trocars were removed under direct visualization. Once the pneumoperitoneum was completely reduced and hemostasis is still effective, the final trocar was removed. Skin of all incisions was closed with 4-0 Monocryl in a subcuticular closure. 0.5% Marcaine with epinephrine is injected at each incision site. Dermabond was applied to the skin. All sponge, lap, needle, and instrument counts were correct times two. Subsequently, the patient was cleaned up, the simpson was removed, and she was returned to the supine position, awakened, and taken to the recovery room in stable condition.      Signed By: Rosalind Dumont MD

## 2021-08-23 NOTE — PERIOP NOTES
Permission received to review discharge instructions and discuss private health information with , Hanna Cuellar. Patient states that  will be with them for at least 24 hours following today's procedure. Mistral-Air warming blanket applied at this time. Set to appropriate setting that is comfortable to patient. Will continue to monitor.

## 2021-08-23 NOTE — ANESTHESIA PREPROCEDURE EVALUATION
Anesthetic History   No history of anesthetic complications            Review of Systems / Medical History  Patient summary reviewed, nursing notes reviewed and pertinent labs reviewed    Pulmonary        Sleep apnea: CPAP    Asthma        Neuro/Psych         Psychiatric history     Cardiovascular    Hypertension: well controlled              Exercise tolerance: >4 METS  Comments: h/o chest pain evaluated by Cariologist 03/2021. No further w/u needed. GI/Hepatic/Renal     GERD (some breakthrough)      PUD and liver disease (fatty liver)    Comments: IBS Endo/Other    Diabetes: well controlled, type 2    Morbid obesity, arthritis ( Rheumatoid) and cancer (right renal)     Other Findings   Comments: Pelvic pain  Thickened endometrium         Physical Exam    Airway  Mallampati: III  TM Distance: > 6 cm  Neck ROM: decreased range of motion, short neck   Mouth opening: Normal     Cardiovascular    Rhythm: regular  Rate: normal         Dental    Dentition: Caps/crowns     Pulmonary  Breath sounds clear to auscultation               Abdominal  GI exam deferred       Other Findings            Anesthetic Plan    ASA: 3  Anesthesia type: general    Monitoring Plan: BIS      Induction: Intravenous  Anesthetic plan and risks discussed with: Patient      Tylenol po preop.  Glidescope available

## 2021-08-23 NOTE — DISCHARGE INSTRUCTIONS
After Care Instructions for Your Laparoscopic Hysterectomy      1. When you are discharged from the hospital, you should plan to eat a bland, light diet for the first 1-2 days. Avoid spicy or greasy foods and high roughage foods such as salads and raw vegetables (these can cause gas and bloating). Drink plenty of non-caffeinated fluids (water is best). You may resume your usual diet gradually. 2. Avoid heavy lifting or straining. Gradually increase your activity. First try walking and doing light activity around the house. Most women can return to work 2-3 weeks after the procedure. You should speak with your doctor about your individual return to work plan and any other restrictions. 3. You may take showers. Please do not take baths until you are seen for your post-operative visit. 4. It is not unusual to experience some discomfort under your ribs and/or soreness of your neck and shoulders over the first 1-2 days. This is due to the gas used in your abdomen during the surgery to help your doctor see your pelvic organs. This gas gets naturally reabsorbed. The right shoulder is often more sore than the left. 5. It is not unusual to have vaginal spotting lasting up to 2 weeks off and on. Some women do not experience any bleeding at all. You should call your doctor immediately if you ever experience heavy vaginal bleeding or gushes of any liquid coming from your vagina that does not seem like the amount you would expect for your normal vaginal discharge. 6. Bruises may appear around the incisions and will gradually resolve as they heal.    7. There are several ways that your incisions may be closed. Most of these do not require the removal of any sutures. Some patients may have skin glue, Dermabond, placed over the incisions. Do not try to peel this off, it will gradually wear off on its own. Other patients will have small paper strips placed over the incisions.   Allow these to fall off on their own or your doctor will remove them. No other special dressing is required. 8. Call the office at [phone] to report any of the following problems: abdominal pain that is increasing in severity despite using the prescribed pain medication, heavy vaginal bleeding, drainage or separation of your incision(s), temperature greater than 100.4 or persistent nausea and vomiting. 9. You should be seen in the office following your surgery. Call for an appointment if this has not already been arranged. At this appointment, the findings noted at the time of your procedure and /or pathology reports will be reviewed. YOU MAY TAKE TYLENOL 1,000MG EVERY 6 HOURS TO HELP WITH PAIN. DO NOT TAKE UNTIL AFTER 2:45 PM TODAY    You received Toradol during your surgery. You may not take any form of NSAIDS (non steroidal anti inflammatory drugs) such as Advil, Ibuprofen, Aleve, Motrin until AFTER 6:50PM    TAKE NARCOTIC PAIN MEDICATIONS WITH FOOD     Narcotics tend to be constipating, we suggest taking a stool softener such as Colace or Miralax (follow package instructions). DO NOT DRIVE WHILE TAKING NARCOTIC PAIN MEDICATIONS. DO NOT TAKE SLEEPING MEDICATIONS OR ANTIANXIETY MEDICATIONS WHILE TAKING NARCOTIC PAIN MEDICATIONS,  ESPECIALLY THE NIGHT OF ANESTHESIA! CPAP PATIENTS BE SURE TO WEAR MACHINE WHENEVER NAPPING OR SLEEPING! DISCHARGE SUMMARY from Nurse    The following personal items collected during your admission are returned to you:   Dental Appliance: Dental Appliances: None  Vision: Visual Aid: Other (comment) (legally blind in left eye per pt on 7/29/2021)  Hearing Aid:    Jewelry: Jewelry: None  Clothing: Clothing: With patient  Other Valuables:  Other Valuables: Cell Phone (Cell phone in PACU)  Valuables sent to safe:        PATIENT INSTRUCTIONS:    After General Anesthesia or Intravenous Sedation, for 24 hours or while taking prescription Narcotics:        Someone should be with you for the next 24 hours. For your own safety, a responsible adult must drive you home. · Limit your activities  · Recommended activity: Rest today, up with assistance today. Do not climb stairs or shower unattended for the next 24 hours. · Please start with a soft bland diet and advance as tolerated (no nausea) to regular diet. · If you have a sore throat you should try the following: fluids, warm salt water gargles, or throat lozenges. If it does not improve after several days please follow up with your primary physician. · Do not drive and operate hazardous machinery  · Do not make important personal or business decisions  · Do  not drink alcoholic beverages  · If you have not urinated within 8 hours after discharge, please contact your surgeon on call. Report the following to your surgeon:  · Excessive pain, swelling, redness or odor of or around the surgical area  · Temperature over 100.5  · Nausea and vomiting lasting longer than 4 hours or if unable to take medications  · Any signs of decreased circulation or nerve impairment to extremity: change in color, persistent  numbness, tingling, coldness or increase pain      · You will receive a Post Operative Call from one of the Recovery Room Nurses on the day after your surgery to check on you. It is very important for us to know how you are recovering after your surgery. If you have an issue or need to speak with someone, please call your surgeon, do not wait for the post operative call. · You may receive an e-mail or letter in the mail from [employer] regarding your experience with us in the Ambulatory Surgery Unit. Your feedback is valuable to us and we appreciate your participation in the survey. · If the above instructions are not adequate or you are having problems after your surgery, call the physician at their office number. · We wish you a speedy recovery ? TO PREVENT AN INFECTION      1.  [address] YOUR HANDS     To prevent infection, good handwashing is the most important thing you or your caregiver can do.  Wash your hands with soap and water or use the hand  we gave you before you touch any wounds. 2. SHOWER     Use the antibacterial soap we gave you when you take a shower.  Shower with this soap until your wounds are healed.  To reach all areas of your body, you may need someone to help you.  Dont forget to clean your belly button with every shower. 3.  USE CLEAN SHEETS     Use freshly cleaned sheets on your bed after surgery.  To keep the surgery site clean, do not allow pets to sleep with you while your wound is still healing. 4. STOP SMOKING     Stop smoking, or at least cut back on smoking     Smoking slows your healing. 5.  CONTROL YOUR BLOOD SUGAR     High blood sugars slow wound healing.  If you are diabetic, control your blood sugar levels before and after your surgery.    What to do at Home:      *  Please give a list of your current medications to your Primary Care Provider. *  Please update this list whenever your medications are discontinued, doses are      changed, or new medications (including over-the-counter products) are added. *  Please carry medication information at all times in case of emergency situations. If you have not received your influenza and/or pneumococcal vaccine, please follow up with your primary care physician. The discharge information has been reviewed with the patient and caregiver. The patient and caregiver verbalized understanding.

## 2021-08-23 NOTE — ANESTHESIA POSTPROCEDURE EVALUATION
Procedure(s):  TOTAL LAPAROSCOPIC HYSTERECTOMY WITH VAGINAL APPROACH TO CUFF CLOSURE, BILATERAL SALPINGECTOMY, CYSTOSCOPY. general    Anesthesia Post Evaluation      Multimodal analgesia: multimodal analgesia used between 6 hours prior to anesthesia start to PACU discharge  Patient location during evaluation: PACU  Patient participation: complete - patient participated  Level of consciousness: awake  Pain score: 0  Anesthetic complications: no  Cardiovascular status: acceptable  Respiratory status: acceptable  Hydration status: acceptable  Comments: Pt did not take am BP medication and was hypertensive postop. Treated with IV hydralazine plus pt will take BP med when she gets home. Post anesthesia nausea and vomiting:  none  Final Post Anesthesia Temperature Assessment:  Normothermia (36.0-37.5 degrees C)      INITIAL Post-op Vital signs:   Vitals Value Taken Time   /90 08/23/21 1512   Temp 36.3 °C (97.3 °F) 08/23/21 1400   Pulse 98 08/23/21 1518   Resp 15 08/23/21 1518   SpO2 94 % 08/23/21 1518   Vitals shown include unvalidated device data.

## 2021-08-23 NOTE — PERIOP NOTES
Pt denies pain or chill. Denies feeling of full bladder. More responsive and taking deeper breaths. Dr. Jean Gather at bedside and ordered 2nd dose of Hydralysine 10mg IVP (/105)  1530 Pt's BP down to baseline. Pt. Alert. Denies pain or chill. Discharge instructions reviewed with caregiver and patient. Allowed and answered questions. Tolerating PO fluids. Both state ready for discharge. 1550 Escorted to BR-voided good amount and only spotting on peripad. Discharged to car without incident. Pt and  instructed to take BP med when gets home and take evening blood surgar meds. Both state understanding.

## 2021-08-27 DIAGNOSIS — E11.42 TYPE 2 DIABETES MELLITUS WITH DIABETIC POLYNEUROPATHY, WITHOUT LONG-TERM CURRENT USE OF INSULIN (HCC): ICD-10-CM

## 2021-08-27 DIAGNOSIS — F33.1 MODERATE EPISODE OF RECURRENT MAJOR DEPRESSIVE DISORDER (HCC): ICD-10-CM

## 2021-08-27 RX ORDER — GLIPIZIDE 10 MG/1
TABLET ORAL
Qty: 180 TABLET | Refills: 0 | Status: SHIPPED | OUTPATIENT
Start: 2021-08-27 | End: 2021-12-22 | Stop reason: SDUPTHER

## 2021-08-27 RX ORDER — BUPROPION HYDROCHLORIDE 150 MG/1
TABLET, EXTENDED RELEASE ORAL
Qty: 60 TABLET | Refills: 0 | Status: SHIPPED | OUTPATIENT
Start: 2021-08-27 | End: 2021-10-04

## 2021-09-08 ENCOUNTER — TRANSCRIBE ORDER (OUTPATIENT)
Dept: SCHEDULING | Age: 46
End: 2021-09-08

## 2021-09-08 DIAGNOSIS — M79.18 MYOFASCIAL PAIN: ICD-10-CM

## 2021-09-08 DIAGNOSIS — M54.12 CERVICAL RADICULOPATHY: ICD-10-CM

## 2021-09-08 DIAGNOSIS — M54.2 CERVICALGIA: Primary | ICD-10-CM

## 2021-09-08 DIAGNOSIS — M47.22 OSTEOARTHRITIS OF SPINE WITH RADICULOPATHY, CERVICAL REGION: ICD-10-CM

## 2021-09-08 DIAGNOSIS — M47.26 OSTEOARTHRITIS OF SPINE WITH RADICULOPATHY, LUMBAR REGION: ICD-10-CM

## 2021-09-08 DIAGNOSIS — M54.50 LUMBAR PAIN: ICD-10-CM

## 2021-09-24 ENCOUNTER — HOSPITAL ENCOUNTER (OUTPATIENT)
Dept: MRI IMAGING | Age: 46
Discharge: HOME | End: 2021-09-24
Attending: ORTHOPAEDIC SURGERY
Payer: MEDICAID

## 2021-09-24 DIAGNOSIS — M47.26 OSTEOARTHRITIS OF SPINE WITH RADICULOPATHY, LUMBAR REGION: ICD-10-CM

## 2021-09-24 DIAGNOSIS — M54.50 LUMBAR PAIN: ICD-10-CM

## 2021-09-24 DIAGNOSIS — M54.2 CERVICALGIA: ICD-10-CM

## 2021-09-24 DIAGNOSIS — M79.18 MYOFASCIAL PAIN: ICD-10-CM

## 2021-09-24 DIAGNOSIS — M47.22 OSTEOARTHRITIS OF SPINE WITH RADICULOPATHY, CERVICAL REGION: ICD-10-CM

## 2021-09-24 DIAGNOSIS — M54.12 CERVICAL RADICULOPATHY: ICD-10-CM

## 2021-09-24 PROCEDURE — 72141 MRI NECK SPINE W/O DYE: CPT

## 2021-10-07 ENCOUNTER — TRANSCRIBE ORDER (OUTPATIENT)
Dept: SCHEDULING | Age: 46
End: 2021-10-07

## 2021-10-07 DIAGNOSIS — G89.18 POST-OP PAIN: Primary | ICD-10-CM

## 2021-10-15 ENCOUNTER — HOSPITAL ENCOUNTER (OUTPATIENT)
Dept: CT IMAGING | Age: 46
Discharge: HOME | End: 2021-10-15
Attending: OBSTETRICS & GYNECOLOGY
Payer: MEDICAID

## 2021-10-15 DIAGNOSIS — G89.18 POST-OP PAIN: ICD-10-CM

## 2021-10-15 LAB — CREAT BLD-MCNC: 0.7 MG/DL (ref 0.6–1.3)

## 2021-10-15 PROCEDURE — 82565 ASSAY OF CREATININE: CPT

## 2021-10-15 PROCEDURE — 74011000636 HC RX REV CODE- 636: Performed by: OBSTETRICS & GYNECOLOGY

## 2021-10-15 PROCEDURE — 74177 CT ABD & PELVIS W/CONTRAST: CPT

## 2021-10-15 RX ADMIN — IOPAMIDOL 100 ML: 755 INJECTION, SOLUTION INTRAVENOUS at 14:36

## 2021-10-15 RX ADMIN — IOHEXOL 50 ML: 240 INJECTION, SOLUTION INTRATHECAL; INTRAVASCULAR; INTRAVENOUS; ORAL at 14:36

## 2021-10-21 LAB
ALBUMIN SERPL-MCNC: 4.1 G/DL (ref 3.8–4.8)
ALBUMIN/CREAT UR: 9 MG/G CREAT (ref 0–29)
ALBUMIN/GLOB SERPL: 1.3 {RATIO} (ref 1.2–2.2)
ALP SERPL-CCNC: 115 IU/L (ref 44–121)
ALT SERPL-CCNC: 65 IU/L (ref 0–32)
AST SERPL-CCNC: 48 IU/L (ref 0–40)
BASOPHILS # BLD AUTO: 0 X10E3/UL (ref 0–0.2)
BASOPHILS NFR BLD AUTO: 1 %
BILIRUB SERPL-MCNC: <0.2 MG/DL (ref 0–1.2)
BUN SERPL-MCNC: 8 MG/DL (ref 6–24)
BUN/CREAT SERPL: 8 (ref 9–23)
CALCIUM SERPL-MCNC: 9.5 MG/DL (ref 8.7–10.2)
CHLORIDE SERPL-SCNC: 100 MMOL/L (ref 96–106)
CHOLEST SERPL-MCNC: 193 MG/DL (ref 100–199)
CO2 SERPL-SCNC: 23 MMOL/L (ref 20–29)
CREAT SERPL-MCNC: 1 MG/DL (ref 0.57–1)
CREAT UR-MCNC: 424.2 MG/DL
EOSINOPHIL # BLD AUTO: 0.5 X10E3/UL (ref 0–0.4)
EOSINOPHIL NFR BLD AUTO: 7 %
ERYTHROCYTE [DISTWIDTH] IN BLOOD BY AUTOMATED COUNT: 13.7 % (ref 11.7–15.4)
EST. AVERAGE GLUCOSE BLD GHB EST-MCNC: 220 MG/DL
GLOBULIN SER CALC-MCNC: 3.1 G/DL (ref 1.5–4.5)
GLUCOSE SERPL-MCNC: 215 MG/DL (ref 65–99)
HBA1C MFR BLD: 9.3 % (ref 4.8–5.6)
HCT VFR BLD AUTO: 39.6 % (ref 34–46.6)
HCV AB S/CO SERPL IA: <0.1 S/CO RATIO (ref 0–0.9)
HDLC SERPL-MCNC: 43 MG/DL
HGB BLD-MCNC: 12.4 G/DL (ref 11.1–15.9)
IMM GRANULOCYTES # BLD AUTO: 0 X10E3/UL (ref 0–0.1)
IMM GRANULOCYTES NFR BLD AUTO: 0 %
LDLC SERPL CALC-MCNC: 130 MG/DL (ref 0–99)
LYMPHOCYTES # BLD AUTO: 3.2 X10E3/UL (ref 0.7–3.1)
LYMPHOCYTES NFR BLD AUTO: 46 %
MCH RBC QN AUTO: 28 PG (ref 26.6–33)
MCHC RBC AUTO-ENTMCNC: 31.3 G/DL (ref 31.5–35.7)
MCV RBC AUTO: 89 FL (ref 79–97)
MICROALBUMIN UR-MCNC: 36.5 UG/ML
MONOCYTES # BLD AUTO: 0.4 X10E3/UL (ref 0.1–0.9)
MONOCYTES NFR BLD AUTO: 6 %
NEUTROPHILS # BLD AUTO: 2.8 X10E3/UL (ref 1.4–7)
NEUTROPHILS NFR BLD AUTO: 40 %
PLATELET # BLD AUTO: 279 X10E3/UL (ref 150–450)
POTASSIUM SERPL-SCNC: 4.4 MMOL/L (ref 3.5–5.2)
PROT SERPL-MCNC: 7.2 G/DL (ref 6–8.5)
RBC # BLD AUTO: 4.43 X10E6/UL (ref 3.77–5.28)
SODIUM SERPL-SCNC: 137 MMOL/L (ref 134–144)
TRIGL SERPL-MCNC: 112 MG/DL (ref 0–149)
VLDLC SERPL CALC-MCNC: 20 MG/DL (ref 5–40)
WBC # BLD AUTO: 6.9 X10E3/UL (ref 3.4–10.8)

## 2021-10-24 NOTE — PROGRESS NOTES
Will discuss at 10/27/21 appt. A1c 9.3%, meaning uncontrolled type 2 DM; was 8/5% on 4/27/21. Avg glucose 220. Blood glucose high at 215, and liver tests mildly high. Total chol normal at 193 but LDL high at 130. Plan to increase atorvastatin from 10 to 20 mg daily.  Rest of labs normal, including kidney tests, blood counts, urine microalbumin, and hepatitis C screening test.

## 2021-10-27 ENCOUNTER — OFFICE VISIT (OUTPATIENT)
Dept: INTERNAL MEDICINE CLINIC | Age: 46
End: 2021-10-27
Payer: MEDICAID

## 2021-10-27 VITALS
RESPIRATION RATE: 18 BRPM | HEIGHT: 64 IN | WEIGHT: 289 LBS | BODY MASS INDEX: 49.34 KG/M2 | HEART RATE: 85 BPM | TEMPERATURE: 98.1 F | SYSTOLIC BLOOD PRESSURE: 114 MMHG | OXYGEN SATURATION: 98 % | DIASTOLIC BLOOD PRESSURE: 62 MMHG

## 2021-10-27 DIAGNOSIS — E78.2 MIXED HYPERLIPIDEMIA: ICD-10-CM

## 2021-10-27 DIAGNOSIS — E11.42 TYPE 2 DIABETES MELLITUS WITH DIABETIC POLYNEUROPATHY, WITHOUT LONG-TERM CURRENT USE OF INSULIN (HCC): Primary | ICD-10-CM

## 2021-10-27 DIAGNOSIS — Z23 NEEDS FLU SHOT: ICD-10-CM

## 2021-10-27 DIAGNOSIS — M06.00 SERONEGATIVE RHEUMATOID ARTHRITIS (HCC): ICD-10-CM

## 2021-10-27 DIAGNOSIS — Z90.710 HISTORY OF TOTAL HYSTERECTOMY: ICD-10-CM

## 2021-10-27 DIAGNOSIS — F33.1 MODERATE EPISODE OF RECURRENT MAJOR DEPRESSIVE DISORDER (HCC): ICD-10-CM

## 2021-10-27 DIAGNOSIS — Z12.11 SCREENING FOR COLON CANCER: ICD-10-CM

## 2021-10-27 DIAGNOSIS — I10 PRIMARY HYPERTENSION: ICD-10-CM

## 2021-10-27 PROCEDURE — 90471 IMMUNIZATION ADMIN: CPT | Performed by: INTERNAL MEDICINE

## 2021-10-27 PROCEDURE — 90686 IIV4 VACC NO PRSV 0.5 ML IM: CPT | Performed by: INTERNAL MEDICINE

## 2021-10-27 PROCEDURE — 99214 OFFICE O/P EST MOD 30 MIN: CPT | Performed by: INTERNAL MEDICINE

## 2021-10-27 RX ORDER — ATORVASTATIN CALCIUM 10 MG/1
20 TABLET, FILM COATED ORAL DAILY
Qty: 90 TABLET | Refills: 3
Start: 2021-10-27 | End: 2021-12-15

## 2021-10-27 NOTE — PATIENT INSTRUCTIONS
Vaccine Information Statement    Influenza (Flu) Vaccine (Inactivated or Recombinant): What You Need to Know    Many vaccine information statements are available in Swedish and other languages. See www.immunize.org/vis. Hojas de información sobre vacunas están disponibles en español y en muchos otros idiomas. Visite www.immunize.org/vis. 1. Why get vaccinated? Influenza vaccine can prevent influenza (flu). Flu is a contagious disease that spreads around the [facility] every year, usually between October and May. Anyone can get the flu, but it is more dangerous for some people. Infants and young children, people 72 years and older, pregnant people, and people with certain health conditions or a weakened immune system are at greatest risk of flu complications. Pneumonia, bronchitis, sinus infections, and ear infections are examples of flu-related complications. If you have a medical condition, such as heart disease, cancer, or diabetes, flu can make it worse. Flu can cause fever and chills, sore throat, muscle aches, fatigue, cough, headache, and runny or stuffy nose. Some people may have vomiting and diarrhea, though this is more common in children than adults. In an average year, thousands of people in the [facility] die from flu, and many more are hospitalized. Flu vaccine prevents millions of illnesses and flu-related visits to the doctor each year. 2. Influenza vaccines     CDC recommends everyone 6 months and older get vaccinated every flu season. Children 6 months through 6years of age may need 2 doses during a single flu season. Everyone else needs only 1 dose each flu season. It takes about 2 weeks for protection to develop after vaccination. There are many flu viruses, and they are always changing. Each year a new flu vaccine is made to protect against the influenza viruses believed to be likely to cause disease in the upcoming flu season.  Even when the vaccine doesnt exactly match these viruses, it may still provide some protection. Influenza vaccine does not cause flu. Influenza vaccine may be given at the same time as other vaccines. 3. Talk with your health care provider    Tell your vaccination provider if the person getting the vaccine:   Has had an allergic reaction after a previous dose of influenza vaccine, or has any severe, life-threatening allergies    Has ever had Guillain-Barré Syndrome (also called GBS)    In some cases, your health care provider may decide to postpone influenza vaccination until a future visit. Influenza vaccine can be administered at any time during pregnancy. People who are or will be pregnant during influenza season should receive inactivated influenza vaccine. People with minor illnesses, such as a cold, may be vaccinated. People who are moderately or severely ill should usually wait until they recover before getting influenza vaccine. Your health care provider can give you more information. 4. Risks of a vaccine reaction     Soreness, redness, and swelling where the shot is given, fever, muscle aches, and headache can happen after influenza vaccination.  There may be a very small increased risk of Guillain-Barré Syndrome (GBS) after inactivated influenza vaccine (the flu shot). Marilin Speck children who get the flu shot along with pneumococcal vaccine (PCV13) and/or DTaP vaccine at the same time might be slightly more likely to have a seizure caused by fever. Tell your health care provider if a child who is getting flu vaccine has ever had a seizure. People sometimes faint after medical procedures, including vaccination. Tell your provider if you feel dizzy or have vision changes or ringing in the ears. As with any medicine, there is a very remote chance of a vaccine causing a severe allergic reaction, other serious injury, or death. 5. What if there is a serious problem?     An allergic reaction could occur after the vaccinated person leaves the clinic. If you see signs of a severe allergic reaction (hives, swelling of the face and throat, difficulty breathing, a fast heartbeat, dizziness, or weakness), call 9-1-1 and get the person to the nearest hospital.    For other signs that concern you, call your health care provider. Adverse reactions should be reported to the Vaccine Adverse Event Reporting System (VAERS). Your health care provider will usually file this report, or you can do it yourself. Visit the VAERS website at www.vaers. [facility].gov or call 0-660.317.9304. VAERS is only for reporting reactions, and VAERS staff members do not give medical advice. 6. The National Vaccine Injury Compensation Program    The [facility] Vaccine Injury Compensation Program (VICP) is a federal program that was created to compensate people who may have been injured by certain vaccines. Claims regarding alleged injury or death due to vaccination have a time limit for filing, which may be as short as two years. Visit the VICP website at www.[facility].gov/vaccinecompensation or call 0-502.218.7285 to learn about the program and about filing a claim. 7. How can I learn more?  Ask your health care provider.  Call your local or state health department.  Visit the website of the Food and Drug Administration (FDA) for vaccine package inserts and additional information at www.fda.gov/vaccines-blood-biologics/vaccines.  Contact the Centers for Disease Control and Prevention (CDC):  - Call 9-924.342.5500 (1-800-CDC-INFO) or  - Visit CDCs influenza website at www.cdc.gov/flu. Vaccine Information Statement   Inactivated Influenza Vaccine   8/6/2021  42 JUANJOSE Schreiber [id number]   Department of Health and Human Services  Centers for Disease Control and Prevention    Office Use Only      Patient Instructions  For a therapist, call:    [address] [place] [place] [address] [address]  [phone]

## 2021-10-27 NOTE — PROGRESS NOTES
CC:   Chief Complaint   Patient presents with    Diabetes    Hypertension       HISTORY OF PRESENT ILLNESS  Ayesha Holland is a 48 y.o. female. Presents for follow up evaluation. She has type 2 DM with neuropathy, HTN, hyperlipidemia, seronegative RA, asthma, allergic rhinitis, FE, depression, and morbid obesity.     Complains of having much abdominal pain and new abdominal muscle spasms since having JUNIE. Saw Dr. Ezra Simons recently; said it might be from muscles healing after surgery. Had already prescribed oxycodone and ibuprofen 800 mg TID, added Flexeril. Has diffuse joint pains. On MTX and Humira. Thinks they are helping a little. MTX causes much nausea for 2 days after taking it. Received several rounds of prednisone for pain over the past few months. Complains of feeling depressed. Feels worthless. Diffuse pain is making her feel bad about herself. Says she has good days and bad days; today is a bad day. Denies SI or HI. Denies polydipsia, polyuria, or hypoglycemia. Home FBS: 127 this am. PM: 220-230. Admits to eating more comfort foods like bread and pasta; denies eating sweets. A1c 9.3% on 10/20/21, was 8.5% on 4/27/21 and 6.3% on 1/26/21. Last eye exam: 10/21, Dr. Willam Longo, was told she had high pressures in both eyes that would be monitored. Fam Hx: Colon cancer in sister    ROS  A complete review of systems was performed and is negative except for those mentioned in the HPI.     Patient Active Problem List   Diagnosis Code    HTN (hypertension) I10    Moderate episode of recurrent major depressive disorder ([facility] F33.1    FE (obstructive sleep apnea) G47.33    Asthma J45.909    Iron deficiency anemia D50.9    Hyperlipidemia E78.5    Obesity, morbid, BMI 50 or higher (HCC) E66.01    Type 2 diabetes mellitus with diabetic polyneuropathy, without long-term current use of insulin (HCC) E11.42    Coronary artery disease involving native coronary artery of native heart without angina pectoris I25.10    Chronic midline low back pain without sciatica M54.50, G89.29    Atypical squamous cells of undetermined significance (ASCUS) on Papanicolaou smear of cervix R87.610    Type 2 diabetes with nephropathy (HCC) E11.21    Seronegative rheumatoid arthritis ([facility] M06.00     Past Medical History:   Diagnosis Date    Anemia (iron deficiency)     Anxiety     per pt on 7/29/2021    Asthma     DDD (degenerative disc disease), lumbar 2014    Depression     DM type 2 (diabetes mellitus, type 2) ([facility] 11/23/2011    GERD (gastroesophageal reflux disease)     GI bleed 2015    Hepatic steatosis 11/2016    confirmed by US    HTN (hypertension)     Irregular menses     Irritable bowel     Kidney mass     5/28/19: US showed rigth renal cystic nephroma, follows with Urology (Dr. Caden Robles)    Long-term use of immunosuppressant medication       per pt on 7/29/2021    Morbid obesity ([facility]     FE on CPAP     cpap complaint  per pt on 7/29/2021    PUD (peptic ulcer disease) 2015    Renal cancer, right ([facility]     tx surgery    Rheumatoid arthritis ([facility]     per pt on 7/29/2021     Allergies   Allergen Reactions    Lisinopril Cough    Pcn [Penicillins] Hives       Current Outpatient Medications   Medication Sig Dispense Refill    buPROPion SR (WELLBUTRIN SR) 150 mg SR tablet Take 1 tablet by mouth twice daily 60 Tablet 5    glipiZIDE (GLUCOTROL) 10 mg tablet Take 1 tablet by mouth twice daily 180 Tablet 0    ibuprofen (MOTRIN) 800 mg tablet Take one tablet every 8 hours for three days, and then every 8 hours as needed for pain thereafter 30 Tablet 1    ondansetron (Zofran ODT) 4 mg disintegrating tablet Take 1 Tablet by mouth every eight (8) hours as needed for Nausea for up to 360 days. 20 Tablet 5    Insulin Needles, Disposable, 31 gauge x 5/16\" ndle Use as directed once daily with Levemir Flextouch insulin pens.  100 Package 3    insulin detemir U-100 (LEVEMIR FLEXTOUCH) 100 unit/mL (3 mL) inpn 20 Units by SubCUTAneous route nightly. Dx: E11.42  Indications: type 2 diabetes mellitus 5 Adjustable Dose Pre-filled Pen Syringe 0    adalimumab (Humira,CF,) 40 mg/0.4 mL sykt 40 mg by SubCUTAneous route every fourteen (14) days. Indications: rheumatoid arthritis      dicyclomine (BENTYL) 10 mg/5 mL soln oral solution Take 10 mL by mouth four (4) times daily. 200 mL 0    pregabalin (LYRICA) 200 mg capsule TAKE 1 TABLET BY MOUTH TWICE DAILY . DO NOT EXCEED 2 PER 24 HOURS (Patient taking differently: 300 mg. TAKE 1 TABLET BY MOUTH TWICE DAILY . DO NOT EXCEED 2 PER 24 HOURS) 60 Capsule 0    metFORMIN ER (GLUCOPHAGE XR) 500 mg tablet Take 2 tablets by mouth twice daily 120 Tablet 5    montelukast (SINGULAIR) 10 mg tablet TAKE 1 TABLET BY MOUTH ONCE DAILY FOR ALLERGIES AND FOR ASTHMA 90 Tab 3    lidocaine (LIDODERM) 5 % Apply patch to the affected area for 12 hours a day and remove for 12 hours a day. 15 Each 0    acetaminophen (TYLENOL) 500 mg tablet Take 2 Tabs by mouth every six (6) hours as needed for Pain. 20 Tab 0    cyclobenzaprine (FLEXERIL) 10 mg tablet Take 1 Tab by mouth three (3) times daily as needed for Muscle Spasm(s). 15 Tab 0    folic acid (FOLVITE) 1 mg tablet Take 1 mg by mouth daily.  methotrexate (RHEUMATREX) 2.5 mg tablet 20 mg every Wednesday. Pt takes 8 pills for 20 mg total.      fluticasone propion-salmeteroL (Advair Diskus) 500-50 mcg/dose diskus inhaler Take 1 Puff by inhalation every twelve (12) hours. Asthma, Dx: J45.30 60 Each 11    albuterol (PROVENTIL HFA, VENTOLIN HFA, PROAIR HFA) 90 mcg/actuation inhaler Take 2 Puffs by inhalation every six (6) hours as needed for Wheezing. Asthma, Dx: J45.30 1 Inhaler 11    valsartan (DIOVAN) 40 mg tablet Take 2 tablets every day by oral route. 180 Tab 0    atorvastatin (LIPITOR) 10 mg tablet Take 1 tablet by mouth once daily 90 Tab 3    amitriptyline (ELAVIL) 50 mg tablet Take 1 Tab by mouth nightly.  For numbness at hands and feet. (Patient taking differently: Take 50 mg by mouth nightly as needed. For numbness at hands and feet PRN) 30 Tab 3    meclizine (ANTIVERT) 25 mg tablet Take 1 Tab by mouth three (3) times daily as needed for Dizziness. 20 Tab 1    nitroglycerin (NITROSTAT) 0.4 mg SL tablet Take 1 Tab by mouth every five (5) minutes as needed for Chest Pain. Sit/Lay down then put one tab under the tongue every 5 minutes as needed for chest pain for 3 doses 1 Bottle 0    albuterol (PROVENTIL VENTOLIN) 2.5 mg /3 mL (0.083 %) nebulizer solution USE ONE VIAL IN NEBULIZER EVERY 4 HOURS AS NEEDED FOR WHEEZING 30 Each 3         PHYSICAL EXAM  Visit Vitals  /62 (BP 1 Location: Left upper arm, BP Patient Position: Sitting, BP Cuff Size: Large adult)   Pulse 85   Temp 98.1 °F (36.7 °C) (Oral)   Resp 18   Ht 5' 4\" (1.626 m)   Wt 289 lb (131.1 kg)   SpO2 98%   BMI 49.61 kg/m²       General: Obese, no distress. HEENT:  Head normocephalic/atraumatic, no scleral icterus  Neck: Supple. No JVD, lymphadenopathy, or thyromegaly. Lungs:  Clear to ausculation bilaterally. Good air movement. Heart:  Regular rate and rhythm, normal S1 and S2, no murmur, gallop, or rub  Abdomen: Soft, non-distended, obese, normal bowel sounds, no tenderness, no guarding, masses, rebound tenderness, or HSM. Extremities: No clubbing, cyanosis, or edema. 2+ pedal pulses. Neurological: Alert and oriented. Psychiatric: Normal mood and affect. Behavior is normal.         ASSESSMENT AND PLAN    ICD-10-CM ICD-9-CM    1. Type 2 diabetes mellitus with diabetic polyneuropathy, without long-term current use of insulin ([facility])  E11.42 250.60 CANCELED:  DIABETES FOOT EXAM     357.2    2. Primary hypertension  I10 401.9    3. Mixed hyperlipidemia  E78.2 272.2 atorvastatin (LIPITOR) 10 mg tablet   4. Moderate episode of recurrent major depressive disorder ([facility])  F33.1 296.32    5. Seronegative rheumatoid arthritis ([facility])  M06.00 714.0    6.  History of total hysterectomy  Z90.710 V88.01    7. Needs flu shot  Z23 V04.81 INFLUENZA VIRUS VAC QUAD,SPLIT,PRESV FREE SYRINGE IM   8. Screening for colon cancer  Z12.11 V76.51 REFERRAL TO GASTROENTEROLOGY     Discussed lab results from 10/20/21. A1c 9.3%, meaning uncontrolled type 2 DM; was 8/5% on 4/27/21. Avg glucose 220. Blood glucose high at 215, and liver tests mildly high. Total chol normal at 193 but LDL high at 130. Plan to increase atorvastatin from 10 to 20 mg daily. Rest of labs normal, including kidney tests, blood counts, urine microalbumin, and hepatitis C screening test.    Diagnoses and all orders for this visit:    1. Type 2 diabetes mellitus with diabetic polyneuropathy, without long-term current use of insulin ([facility])  A1c 9.3% on 10/20/21. Uncontrolled. -     Increase Levemir insulin dose from 20 to 25 mg units nightly. 2. Primary hypertension  Controlled. Continue present management. 3. Mixed hyperlipidemia  Not controlled. -     Increase to: atorvastatin (LIPITOR) 10 mg tablet; Take 2 Tablets by mouth daily. 4. Moderate episode of recurrent major depressive disorder (HCC)  Not well-controlled. Continue Wellbutrin  mg BID. Strongly recommended seeing a therapist also. Patient Instructions  For a therapist, call:    [address], [facility]  [phone]    [facility]  [address] [place] [address], [address] Board ([facility] [address], [facility] [address]  [phone]    5. Seronegative rheumatoid arthritis (HCC)  Continue MTX and Humira and management with Dr. Dwayne Bruno. 6. History of total hysterectomy  Advised her to take oxycodone 1 tab only at bedtime if her pain is severe. Otherwise, continue ibuprofen and Flexeril.     7. Needs flu shot  -     INFLUENZA VIRUS VAC QUAD,SPLIT,PRESV FREE SYRINGE IM    8. Screening for colon cancer  -     REFERRAL TO GASTROENTEROLOGY    Time Spent: 40 mins on medical record review, exam, history, discussing problems and plan, counseling, placing orders, and documentation. Follow-up and Dispositions    · Return in about 3 months (around 1/27/2022), or if symptoms worsen or fail to improve, for DM, HTN, chol, depr, foot exam.           I have discussed the diagnosis with the patient and the intended plan as seen in the above orders. Patient is in agreement. The patient has received an after-visit summary and questions were answered concerning future plans. I have discussed medication side effects and warnings with the patient as well.

## 2021-10-27 NOTE — PROGRESS NOTES
Identified pt with two pt identifiers. Reviewed record in preparation for visit and have obtained necessary documentation. All patient medications has been reviewed. Chief Complaint   Patient presents with    Diabetes    Hypertension     Additional information about chief complaint:    Visit Vitals  /62 (BP 1 Location: Left upper arm, BP Patient Position: Sitting, BP Cuff Size: Large adult)   Pulse 85   Temp 98.1 °F (36.7 °C) (Oral)   Resp 18   Ht 5' 4\" (1.626 m)   Wt 289 lb (131.1 kg)   SpO2 98%   BMI 49.61 kg/m²       Health Maintenance Due   Topic    Colorectal Cancer Screening Combo     Eye Exam Retinal or Dilated     Flu Vaccine (1)    Foot Exam Q1        1. Have you been to the ER, urgent care clinic since your last visit? Hospitalized since your last visit? Yes 8/23/21TL, bilateral salpingectomy 8/23/21 at [facility]    2. Have you seen or consulted any other health care providers outside of the [address] [place] [address] since your last visit? Include any pap smears or colon screening.   No   bdg

## 2021-11-24 ENCOUNTER — HOSPITAL ENCOUNTER (OUTPATIENT)
Dept: PREADMISSION TESTING | Age: 46
Discharge: HOME | End: 2021-11-24
Payer: MEDICAID

## 2021-11-24 PROCEDURE — U0005 INFEC AGEN DETEC AMPLI PROBE: HCPCS

## 2021-11-26 LAB
SARS-COV-2, XPLCVT: NOT DETECTED
SOURCE, COVRS: NORMAL

## 2021-11-29 ENCOUNTER — HOSPITAL ENCOUNTER (OUTPATIENT)
Age: 46
Setting detail: OUTPATIENT SURGERY
Discharge: HOME | End: 2021-11-29
Attending: INTERNAL MEDICINE | Admitting: INTERNAL MEDICINE
Payer: MEDICAID

## 2021-11-29 ENCOUNTER — ANESTHESIA EVENT (OUTPATIENT)
Dept: ENDOSCOPY | Age: 46
End: 2021-11-29
Payer: MEDICAID

## 2021-11-29 ENCOUNTER — ANESTHESIA (OUTPATIENT)
Dept: ENDOSCOPY | Age: 46
End: 2021-11-29
Payer: MEDICAID

## 2021-11-29 VITALS
HEIGHT: 65 IN | OXYGEN SATURATION: 99 % | RESPIRATION RATE: 16 BRPM | BODY MASS INDEX: 48.82 KG/M2 | HEART RATE: 86 BPM | WEIGHT: 293 LBS | DIASTOLIC BLOOD PRESSURE: 90 MMHG | SYSTOLIC BLOOD PRESSURE: 164 MMHG | TEMPERATURE: 98.3 F

## 2021-11-29 LAB
GLUCOSE BLD STRIP.AUTO-MCNC: 284 MG/DL (ref 65–117)
SERVICE CMNT-IMP: ABNORMAL

## 2021-11-29 PROCEDURE — 74011000250 HC RX REV CODE- 250: Performed by: NURSE ANESTHETIST, CERTIFIED REGISTERED

## 2021-11-29 PROCEDURE — 74011250636 HC RX REV CODE- 250/636: Performed by: INTERNAL MEDICINE

## 2021-11-29 PROCEDURE — 82962 GLUCOSE BLOOD TEST: CPT

## 2021-11-29 PROCEDURE — 2709999900 HC NON-CHARGEABLE SUPPLY: Performed by: INTERNAL MEDICINE

## 2021-11-29 PROCEDURE — 77030039825 HC MSK NSL PAP SUPERNO2VA VYRM -B: Performed by: NURSE ANESTHETIST, CERTIFIED REGISTERED

## 2021-11-29 PROCEDURE — 74011250636 HC RX REV CODE- 250/636: Performed by: NURSE ANESTHETIST, CERTIFIED REGISTERED

## 2021-11-29 PROCEDURE — 76040000007: Performed by: INTERNAL MEDICINE

## 2021-11-29 PROCEDURE — 76060000032 HC ANESTHESIA 0.5 TO 1 HR: Performed by: INTERNAL MEDICINE

## 2021-11-29 RX ORDER — DEXTROMETHORPHAN/PSEUDOEPHED 2.5-7.5/.8
1.2 DROPS ORAL
Status: DISCONTINUED | OUTPATIENT
Start: 2021-11-29 | End: 2021-11-29 | Stop reason: HOSPADM

## 2021-11-29 RX ORDER — SODIUM CHLORIDE 0.9 % (FLUSH) 0.9 %
5-40 SYRINGE (ML) INJECTION AS NEEDED
Status: DISCONTINUED | OUTPATIENT
Start: 2021-11-29 | End: 2021-11-29 | Stop reason: HOSPADM

## 2021-11-29 RX ORDER — LIDOCAINE HYDROCHLORIDE 20 MG/ML
INJECTION, SOLUTION EPIDURAL; INFILTRATION; INTRACAUDAL; PERINEURAL AS NEEDED
Status: DISCONTINUED | OUTPATIENT
Start: 2021-11-29 | End: 2021-11-29 | Stop reason: HOSPADM

## 2021-11-29 RX ORDER — FLUMAZENIL 0.1 MG/ML
0.2 INJECTION INTRAVENOUS
Status: DISCONTINUED | OUTPATIENT
Start: 2021-11-29 | End: 2021-11-29 | Stop reason: HOSPADM

## 2021-11-29 RX ORDER — NALOXONE HYDROCHLORIDE 0.4 MG/ML
0.4 INJECTION, SOLUTION INTRAMUSCULAR; INTRAVENOUS; SUBCUTANEOUS
Status: DISCONTINUED | OUTPATIENT
Start: 2021-11-29 | End: 2021-11-29 | Stop reason: HOSPADM

## 2021-11-29 RX ORDER — SODIUM CHLORIDE 0.9 % (FLUSH) 0.9 %
5-40 SYRINGE (ML) INJECTION EVERY 8 HOURS
Status: DISCONTINUED | OUTPATIENT
Start: 2021-11-29 | End: 2021-11-29 | Stop reason: HOSPADM

## 2021-11-29 RX ORDER — SODIUM CHLORIDE 9 MG/ML
25 INJECTION, SOLUTION INTRAVENOUS CONTINUOUS
Status: DISCONTINUED | OUTPATIENT
Start: 2021-11-29 | End: 2021-11-29 | Stop reason: HOSPADM

## 2021-11-29 RX ORDER — ATROPINE SULFATE 0.1 MG/ML
0.5 INJECTION INTRAVENOUS
Status: DISCONTINUED | OUTPATIENT
Start: 2021-11-29 | End: 2021-11-29 | Stop reason: HOSPADM

## 2021-11-29 RX ORDER — PROPOFOL 10 MG/ML
INJECTION, EMULSION INTRAVENOUS AS NEEDED
Status: DISCONTINUED | OUTPATIENT
Start: 2021-11-29 | End: 2021-11-29 | Stop reason: HOSPADM

## 2021-11-29 RX ORDER — EPINEPHRINE 0.1 MG/ML
1 INJECTION INTRACARDIAC; INTRAVENOUS
Status: DISCONTINUED | OUTPATIENT
Start: 2021-11-29 | End: 2021-11-29 | Stop reason: HOSPADM

## 2021-11-29 RX ADMIN — PROPOFOL 50 MG: 10 INJECTION, EMULSION INTRAVENOUS at 11:49

## 2021-11-29 RX ADMIN — LIDOCAINE HYDROCHLORIDE 100 MG: 20 INJECTION, SOLUTION EPIDURAL; INFILTRATION; INTRACAUDAL; PERINEURAL at 11:39

## 2021-11-29 RX ADMIN — SODIUM CHLORIDE 25 ML/HR: 900 INJECTION, SOLUTION INTRAVENOUS at 11:00

## 2021-11-29 RX ADMIN — PROPOFOL 100 MG: 10 INJECTION, EMULSION INTRAVENOUS at 11:39

## 2021-11-29 RX ADMIN — PROPOFOL 50 MG: 10 INJECTION, EMULSION INTRAVENOUS at 11:45

## 2021-11-29 RX ADMIN — PROPOFOL 50 MG: 10 INJECTION, EMULSION INTRAVENOUS at 11:42

## 2021-11-29 NOTE — PROCEDURES
NAME:  Amena Jaimes   :   1975   MRN:   [id number]     Date/Time:  2021 11:33 AM    Colonoscopy Operative Report    Procedure Type:  Colonoscopy --screening     Indications: CRC screening  Pre-operative Diagnosis: see indication above  Post-operative Diagnosis:  See findings below  :  Teri Sno MD  Referring Provider: Viky Foster MD    Exam:  Airway: clear, no airway problems anticipated  Heart: RRR, without gallops or rubs  Lungs: clear bilaterally without wheezes, crackles, or rhonchi  Abdomen: soft, nontender, nondistended, bowel sounds present  Mental Status: awake, alert and oriented to person, place and time    Sedation:  MAC anesthesia Propofol  Procedure Details:  After informed consent was obtained with all risks and benefits of procedure explained and preoperative exam completed, the patient was taken to the endoscopy suite and placed in the left lateral decubitus position. Upon sequential sedation as per above, a digital rectal exam was performed demonstrating internal and external hemorrhoids. The Olympus videocolonoscope  was inserted in the rectum and carefully advanced to the terminal ileum. The quality of preparation was good. The colonoscope was slowly withdrawn with careful evaluation between folds. Retroflexion in the rectum was completed demonstrating internal and external hemorrhoids. Findings:   ANUS: Anal exam reveals no masses but hemorrhoids, sphincter tone is normal.   RECTUM: Rectal exam reveals no masses but hemorrhoids. SIGMOID COLON: Diverticulosis, but otherwise normal.  DESCENDING COLON: The mucosa is normal with good vascular pattern and without ulcers, diverticula, and polyps. TRANSVERSE COLON: The mucosa is normal with good vascular pattern and without ulcers, diverticula, and polyps. ASCENDING COLON: The mucosa is normal with good vascular pattern and without ulcers, diverticula, and polyps.    CECUM: The appendiceal orifice appears normal. The ileocecal valve appears normal.   TERMINAL ILEUM: The terminal ileum was normal.    Specimen Removed:  none  Complications:  None. EBL:     None. Impression:  -- sigmoid diverticulosis  -- internal and external hemorrhoids  -- family history of colon cancer    Recommendations:   -- await pathology  -- high fiber diet  -- repeat colonoscopy in 5 years    Discharge Disposition:  Home in the company of a  when able to ambulate.       Griselda Ming, MD

## 2021-11-29 NOTE — H&P
Gastroenterology Outpatient History and Physical    Patient: Julissa Charlton    Physician: Will Francis MD    Chief Complaint: CRC screening  History of Present Illness: 49 yo F here for CRC screening    History:  Past Medical History:   Diagnosis Date    Anemia (iron deficiency)     Anxiety     per pt on 2021    Asthma     DDD (degenerative disc disease), lumbar     Depression     DM type 2 (diabetes mellitus, type 2) ([address] 2011    GERD (gastroesophageal reflux disease)     GI bleed     Hepatic steatosis 2016    confirmed by US    HTN (hypertension)     Irregular menses     Irritable bowel     Kidney mass     19: US showed rigth renal cystic nephroma, follows with Urology (Dr. Claudette Gibes)    Long-term use of immunosuppressant medication       per pt on 2021    Morbid obesity ([address]     FE on CPAP     cpap complaint  per pt on 2021    PUD (peptic ulcer disease) 2015    Renal cancer, right ([address]     tx surgery    Rheumatoid arthritis ([address]     per pt on 2021      Past Surgical History:   Procedure Laterality Date    HX  SECTION      x 2    HX COLONOSCOPY  2015    HX DILATION AND CURETTAGE  2021    HX ENDOSCOPY  2015    HX HYSTEROSCOPY WITH ENDOMETRIAL ABLATION  2014    HX TUBAL LIGATION      HX TUMOR REMOVAL  2016    right kidney, Dr Dipak Cruz removed from right kidney on 02/15/2016       Social History     Socioeconomic History    Marital status:    Tobacco Use    Smoking status: Never Smoker    Smokeless tobacco: Never Used   Vaping Use    Vaping Use: Never used   Substance and Sexual Activity    Alcohol use:  Yes     Alcohol/week: 1.0 standard drink     Types: 1 Glasses of wine per week     Comment: wine coolers per month 1-2 per pt on 2021    Drug use: No    Sexual activity: Yes     Partners: Male      Family History   Problem Relation Age of Onset    Cancer Mother Ovarian Cancer /breast ca    Heart Attack Father     Heart Disease Father     Diabetes Father     Other Father         pancreatitis    Cancer Sister     Other Sister         chrons    Heart Attack Maternal Grandfather     Diabetes Paternal [de-identified]     HIV/AIDS Son       Patient Active Problem List   Diagnosis Code    HTN (hypertension) I10    Moderate episode of recurrent major depressive disorder ([facility] F33.1    FE (obstructive sleep apnea) G47.33    Asthma J45.909    Iron deficiency anemia D50.9    Hyperlipidemia E78.5    Obesity, morbid, BMI 50 or higher [facility] E66.01    Type 2 diabetes mellitus with diabetic polyneuropathy, without long-term current use of insulin (HCC) E11.42    Coronary artery disease involving native coronary artery of native heart without angina pectoris I25.10    Chronic midline low back pain without sciatica M54.50, G89.29    Atypical squamous cells of undetermined significance (ASCUS) on Papanicolaou smear of cervix R87.610    Type 2 diabetes with nephropathy (HCC) E11.21    Seronegative rheumatoid arthritis ([facility]) M06.00       Allergies: Allergies   Allergen Reactions    Lisinopril Cough    Pcn [Penicillins] Hives     Medications:   Prior to Admission medications    Medication Sig Start Date End Date Taking? Authorizing Provider   insulin detemir U-100 (Levemir FlexTouch U-100 Insuln) 100 unit/mL (3 mL) inpn 25 Units by SubCUTAneous route nightly. Dx: E11.42 11/3/21  Yes Nichole Chance MD   atorvastatin (LIPITOR) 10 mg tablet Take 2 Tablets by mouth daily.  10/27/21  Yes Vance England MD   buPROPion SR [facility] SR) 150 mg SR tablet Take 1 tablet by mouth twice daily 10/4/21  Yes Nichole Chance MD   glipiZIDE (GLUCOTROL) 10 mg tablet Take 1 tablet by mouth twice daily 8/27/21  Yes Al Rodas MD   ibuprofen (MOTRIN) 800 mg tablet Take one tablet every 8 hours for three days, and then every 8 hours as needed for pain thereafter 8/23/21  Yes Alhaji Shaikh Moishe Bloch, MD   ondansetron (Zofran ODT) 4 mg disintegrating tablet Take 1 Tablet by mouth every eight (8) hours as needed for Nausea for up to 360 days. 8/23/21 8/18/22 Yes Fawad Bahena MD   Insulin Needles, Disposable, 31 gauge x 5/16\" ndle Use as directed once daily with Levemir Flextouch insulin pens. 8/11/21  Yes Julio England MD   adalimumab (Humira,CF,) 40 mg/0.4 mL sykt 40 mg by SubCUTAneous route every seven (7) days. Indications: rheumatoid arthritis   Yes Provider, Historical   pregabalin (LYRICA) 200 mg capsule TAKE 1 TABLET BY MOUTH TWICE DAILY . DO NOT EXCEED 2 PER 24 HOURS  Patient taking differently: 300 mg. TAKE 1 TABLET BY MOUTH TWICE DAILY . DO NOT EXCEED 2 PER 24 HOURS 6/15/21  Yes Nadeem Whitney MD   metFORMIN ER (GLUCOPHAGE XR) 500 mg tablet Take 2 tablets by mouth twice daily 6/14/21  Yes Nadeem Whitney MD   montelukast (SINGULAIR) 10 mg tablet TAKE 1 TABLET BY MOUTH ONCE DAILY FOR ALLERGIES AND FOR ASTHMA 4/15/21  Yes Nadeem Whitney MD   lidocaine (LIDODERM) 5 % Apply patch to the affected area for 12 hours a day and remove for 12 hours a day. 4/6/21  Yes Norma Woods PA-C   cyclobenzaprine (FLEXERIL) 10 mg tablet Take 1 Tab by mouth three (3) times daily as needed for Muscle Spasm(s). 4/6/21  Yes Norma Woods PA-C   folic acid (FOLVITE) 1 mg tablet Take 1 mg by mouth daily. 1/19/21  Yes Provider, Historical   methotrexate (RHEUMATREX) 2.5 mg tablet 20 mg every Wednesday. Pt takes 8 pills for 20 mg total. 1/19/21  Yes Provider, Historical   fluticasone propion-salmeteroL (Advair Diskus) 500-50 mcg/dose diskus inhaler Take 1 Puff by inhalation every twelve (12) hours. Asthma, Dx: J45.30 1/26/21  Yes Nadeem Whitney MD   albuterol (PROVENTIL HFA, VENTOLIN HFA, PROAIR HFA) 90 mcg/actuation inhaler Take 2 Puffs by inhalation every six (6) hours as needed for Wheezing.  Asthma, Dx: J45.30 1/26/21  Yes Nadeem Whitney MD   valsartan (DIOVAN) 40 mg tablet Take 2 tablets every day by oral route. 1/26/21  Yes Martha England MD   amitriptyline (ELAVIL) 50 mg tablet Take 1 Tab by mouth nightly. For numbness at hands and feet. Patient taking differently: Take 50 mg by mouth nightly as needed. For numbness at hands and feet PRN 12/4/20  Yes Martha England MD   meclizine (ANTIVERT) 25 mg tablet Take 1 Tab by mouth three (3) times daily as needed for Dizziness. 2/28/20  Yes Martha England MD   albuterol (PROVENTIL VENTOLIN) 2.5 mg /3 mL (0.083 %) nebulizer solution USE ONE VIAL IN NEBULIZER EVERY 4 HOURS AS NEEDED FOR WHEEZING 3/5/18  Yes Karis Sandoval, NP   dicyclomine (BENTYL) 10 mg/5 mL soln oral solution Take 10 mL by mouth four (4) times daily. 6/29/21   Radha Bedolla MD   acetaminophen (TYLENOL) 500 mg tablet Take 2 Tabs by mouth every six (6) hours as needed for Pain. 4/6/21   Asha Lara PA-C   nitroglycerin (NITROSTAT) 0.4 mg SL tablet Take 1 Tab by mouth every five (5) minutes as needed for Chest Pain. Sit/Lay down then put one tab under the tongue every 5 minutes as needed for chest pain for 3 doses 6/8/18   Meg Barone MD     Physical Exam:   Vital Signs: Blood pressure (!) 150/81, pulse 98, temperature 98.3 °F (36.8 °C), resp. rate 16, height 5' 4.5\" (1.638 m), weight 133.4 kg (294 lb), SpO2 99 %, not currently breastfeeding.   General: well developed, well nourished   HEENT: unremarkable   Heart: regular rhythm no mumur    Lungs: clear   Abdominal:  benign   Neurological: unremarkable   Extremities: no edema     Findings/Diagnosis: CRC screening  Plan of Care/Planned Procedure: Colonoscopy with conscious/deep sedation    Signed:  Taye Godoy MD 11/29/2021

## 2021-11-29 NOTE — DISCHARGE INSTRUCTIONS
Rodriguez Mcneill  [id number]  1975    COLON DISCHARGE INSTRUCTIONS  Discomfort:  Redness at IV site- apply warm compress to area; if redness or soreness persist- contact your physician  There may be a slight amount of blood passed from the rectum  Gaseous discomfort- walking, belching will help relieve any discomfort  You may not operate a vehicle for 12 hours  You may not engage in an occupation involving machinery or appliances for rest of today  You may not drink alcoholic beverages for at least 12 hours  Avoid making any critical decisions for at least 24 hour  DIET:   Regular diet. - however -  remember your colon is empty and a heavy meal will produce gas. Avoid these foods:  vegetables, fried / greasy foods, carbonated drinks for today  MEDICATION:  (See attached)     ACTIVITY:  You may not resume your normal daily activities until tomorrow AM; it is recommended that you spend the remainder of the day resting -  avoid any strenuous activity. CALL M.D. ANY SIGN OF:   Increasing pain, nausea, vomiting  Abdominal distension (swelling)  New increased bleeding (oral or rectal)  Fever (chills)  Pain in chest area  Bloody discharge from nose or mouth  Shortness of breath      IMPRESSION:  -- no polyps!!!  -- diverticulosis, which is when small out-pouchings in the inner lining of the colon form, little stretched out pockets. This is very common, and a diet high in fiber with the addition of a daily fiber supplement (like 2 teaspoons of metamucil or psyllium husk fiber powder mixed in water) can help treat this! -- we saw some hemorrhoids, which are very common, and these are swollen veins at the anal canal or end of the rectum, which can bulge with constipation and straining or frequent bowel movements. Sometimes they cause bleeding, burning, pressure, or even pain.  A diet high in fiber helps, but using a daily fiber supplement (like 2 teaspoons of psyllium husk powder or metamucil) can help treat these.    Follow-up Instructions:   Call Dr. Palomo Perez for the results of procedure / biopsy in 7-10 days  Telephone # [phone]  Repeat colonoscopy in 5 years    Senait Blevins MD

## 2021-11-29 NOTE — ANESTHESIA PREPROCEDURE EVALUATION
Anesthetic History   No history of anesthetic complications            Review of Systems / Medical History  Patient summary reviewed, nursing notes reviewed and pertinent labs reviewed    Pulmonary        Sleep apnea: CPAP    Asthma : well controlled       Neuro/Psych         Psychiatric history  Pertinent negatives: No seizures and CVA   Cardiovascular    Hypertension: well controlled          CAD  Pertinent negatives: No past MI and CHF  Exercise tolerance: >4 METS  Comments: h/o chest pain evaluated by Cardiologist 03/2021. No further w/u needed. GI/Hepatic/Renal     GERD (some breakthrough)      PUD and liver disease (fatty liver)  Pertinent negatives: No renal disease  Comments: IBS Endo/Other    Diabetes: well controlled, type 2    Morbid obesity, arthritis ( Rheumatoid) and cancer (right renal)     Other Findings   Comments: Pelvic pain  Thickened endometrium           Physical Exam    Airway  Mallampati: III  TM Distance: > 6 cm  Neck ROM: decreased range of motion, short neck   Mouth opening: Normal     Cardiovascular    Rhythm: regular  Rate: normal         Dental    Dentition: Caps/crowns     Pulmonary  Breath sounds clear to auscultation               Abdominal  GI exam deferred       Other Findings            Anesthetic Plan    ASA: 3  Anesthesia type: MAC          Induction: Intravenous  Anesthetic plan and risks discussed with: Patient      Supernova CPAP  POC glucose 284; patient states that this is lower than usual, she will treat at home per insulin protocol.

## 2021-11-29 NOTE — ROUTINE PROCESS
Lashae Lopez  1975  [id number]    Situation:  Verbal report received from: KATJA Ortiz, RN  Procedure: Procedure(s):  COLONOSCOPY    Background:    Preoperative diagnosis: SCREENING  Postoperative diagnosis: Diverticulosis, Hemorrhoids    :  Dr. Irina Pichardo  Assistant(s): Endoscopy Technician-1: Thai Barakat  Endoscopy RN-1: Dru Maher    Specimens: * No specimens in log *  H. Pylori  no    Assessment:  Intra-procedure medications     Anesthesia gave intra-procedure sedation and medications, see anesthesia flow sheet yes    Intravenous fluids: NS@ KVO     Vital signs stable   yes    Abdominal assessment: round and soft   yes    Recommendation:  Discharge patient per MD order  yes.     Family or Friend , Raymond Gonzalez to share finding with family or friend yes

## 2021-11-29 NOTE — ANESTHESIA POSTPROCEDURE EVALUATION
Procedure(s):  COLONOSCOPY. MAC    Anesthesia Post Evaluation      Multimodal analgesia: multimodal analgesia used between 6 hours prior to anesthesia start to PACU discharge  Patient location during evaluation: PACU  Patient participation: complete - patient participated  Level of consciousness: awake and alert  Pain management: adequate  Airway patency: patent  Anesthetic complications: no  Cardiovascular status: acceptable, hemodynamically stable and blood pressure returned to baseline  Respiratory status: acceptable and room air  Hydration status: euvolemic  Post anesthesia nausea and vomiting:  none  Final Post Anesthesia Temperature Assessment:  Normothermia (36.0-37.5 degrees C)      INITIAL Post-op Vital signs:   Vitals Value Taken Time   /90 11/29/21 1227   Temp 36.8 °C (98.3 °F) 11/29/21 1209   Pulse 86 11/29/21 1227   Resp 16 11/29/21 1227   SpO2 99 % 11/29/21 1227   Vitals shown include unvalidated device data.

## 2021-12-14 DIAGNOSIS — E78.2 MIXED HYPERLIPIDEMIA: ICD-10-CM

## 2021-12-15 RX ORDER — ATORVASTATIN CALCIUM 20 MG/1
20 TABLET, FILM COATED ORAL DAILY
Qty: 90 TABLET | Refills: 1 | Status: SHIPPED | OUTPATIENT
Start: 2021-12-15 | End: 2022-08-09

## 2021-12-22 ENCOUNTER — VIRTUAL VISIT (OUTPATIENT)
Dept: INTERNAL MEDICINE CLINIC | Age: 46
End: 2021-12-22
Payer: MEDICAID

## 2021-12-22 DIAGNOSIS — E11.42 TYPE 2 DIABETES MELLITUS WITH DIABETIC POLYNEUROPATHY, WITHOUT LONG-TERM CURRENT USE OF INSULIN (HCC): ICD-10-CM

## 2021-12-22 DIAGNOSIS — F33.1 MODERATE EPISODE OF RECURRENT MAJOR DEPRESSIVE DISORDER (HCC): ICD-10-CM

## 2021-12-22 DIAGNOSIS — J45.31 MILD PERSISTENT ASTHMA WITH ACUTE EXACERBATION: Primary | ICD-10-CM

## 2021-12-22 PROCEDURE — 99213 OFFICE O/P EST LOW 20 MIN: CPT | Performed by: INTERNAL MEDICINE

## 2021-12-22 RX ORDER — GLIPIZIDE 10 MG/1
TABLET ORAL
Qty: 180 TABLET | Refills: 3 | Status: SHIPPED | OUTPATIENT
Start: 2021-12-22

## 2021-12-22 RX ORDER — PREDNISONE 20 MG/1
TABLET ORAL
Qty: 10 TABLET | Refills: 0 | Status: SHIPPED | OUTPATIENT
Start: 2021-12-22 | End: 2022-05-27

## 2021-12-22 RX ORDER — IBUPROFEN 600 MG/1
TABLET ORAL
COMMUNITY
End: 2022-01-27 | Stop reason: ALTCHOICE

## 2021-12-22 RX ORDER — BUPROPION HYDROCHLORIDE 150 MG/1
150 TABLET, EXTENDED RELEASE ORAL 2 TIMES DAILY
Qty: 60 TABLET | Refills: 5 | Status: SHIPPED | OUTPATIENT
Start: 2021-12-22 | End: 2022-01-27 | Stop reason: SDUPTHER

## 2021-12-22 NOTE — PROGRESS NOTES
Mayra Morales  Identified pt with two pt identifiers(name and ). Chief Complaint   Patient presents with    Asthma     Slight cough, dry. Reviewed record In preparation for visit and have obtained necessary documentation. 1. Have you been to the ER, urgent care clinic or hospitalized since your last visit? No     2. Have you seen or consulted any other health care providers outside of the [address] [place] [address] since your last visit? Include any pap smears or colon screening. No    Patient does not have an advance directive. Vitals reviewed with provider. Health Maintenance reviewed:     Health Maintenance Due   Topic    Foot Exam Q1           Wt Readings from Last 3 Encounters:   21 294 lb (133.4 kg)   10/27/21 289 lb (131.1 kg)   21 296 lb (134.3 kg)        Temp Readings from Last 3 Encounters:   21 98.3 °F (36.8 °C)   10/27/21 98.1 °F (36.7 °C) (Oral)   21 97.3 °F (36.3 °C)        BP Readings from Last 3 Encounters:   21 (!) 164/90   10/27/21 114/62   21 (!) 143/90        Pulse Readings from Last 3 Encounters:   21 86   10/27/21 85   21 90      There were no vitals filed for this visit.        Learning Assessment:   :       Learning Assessment 2018   PRIMARY LEARNER Patient Patient Patient   HIGHEST LEVEL OF EDUCATION - PRIMARY LEARNER  - > 4 YEARS OF COLLEGE -   BARRIERS PRIMARY LEARNER - NONE -   CO-LEARNER CAREGIVER - No -   PRIMARY LANGUAGE ENGLISH ENGLISH ENGLISH   LEARNER PREFERENCE PRIMARY READING OTHER (COMMENT) LISTENING   ANSWERED BY patient patient self     - - self   RELATIONSHIP SELF SELF SELF        Depression Screening:   :       3 most recent PHQ Screens 3/31/2021   PHQ Not Done Active Diagnosis of Depression or Bipolar Disorder   Little interest or pleasure in doing things -   Feeling down, depressed, irritable, or hopeless -   Total Score PHQ 2 -        Fall Risk Assessment:   :       Fall Risk Assessment, last 12 mths 2/23/2021   Able to walk? Yes   Fall in past 12 months? 1   Do you feel unsteady? 1   Are you worried about falling 1   Number of falls in past 12 months -   Fall with injury? 1        Abuse Screening:   :       Abuse Screening Questionnaire 8/14/2020 5/9/2014   Do you ever feel afraid of your partner? N N   Are you in a relationship with someone who physically or mentally threatens you? N N   Is it safe for you to go home?  Y Y        ADL Screening:   :       ADL Assessment 5/2/2018   Feeding yourself No Help Needed   Getting from bed to chair No Help Needed   Getting dressed No Help Needed   Bathing or showering No Help Needed   Walk across the room (includes cane/walker) No Help Needed   Using the telphone No Help Needed   Taking your medications No Help Needed   Preparing meals No Help Needed   Managing money (expenses/bills) No Help Needed   Moderately strenuous housework (laundry) No Help Needed   Shopping for personal items (toiletries/medicines) No Help Needed   Shopping for groceries No Help Needed   Driving No Help Needed   Climbing a flight of stairs No Help Needed   Getting to places beyond walking distances No Help Needed

## 2022-01-19 PROBLEM — R74.8 ELEVATED LIVER ENZYMES: Status: ACTIVE | Noted: 2022-01-19

## 2022-01-27 ENCOUNTER — VIRTUAL VISIT (OUTPATIENT)
Dept: INTERNAL MEDICINE CLINIC | Age: 47
End: 2022-01-27
Payer: MEDICAID

## 2022-01-27 DIAGNOSIS — I10 ESSENTIAL HYPERTENSION: ICD-10-CM

## 2022-01-27 DIAGNOSIS — K58.9 IRRITABLE BOWEL SYNDROME, UNSPECIFIED TYPE: ICD-10-CM

## 2022-01-27 DIAGNOSIS — J45.40 MODERATE PERSISTENT ASTHMA WITHOUT COMPLICATION: ICD-10-CM

## 2022-01-27 DIAGNOSIS — F33.1 MODERATE EPISODE OF RECURRENT MAJOR DEPRESSIVE DISORDER (HCC): ICD-10-CM

## 2022-01-27 DIAGNOSIS — E11.42 TYPE 2 DIABETES MELLITUS WITH DIABETIC POLYNEUROPATHY, WITHOUT LONG-TERM CURRENT USE OF INSULIN (HCC): Primary | ICD-10-CM

## 2022-01-27 DIAGNOSIS — E78.2 MIXED HYPERLIPIDEMIA: ICD-10-CM

## 2022-01-27 DIAGNOSIS — R11.0 NAUSEA: ICD-10-CM

## 2022-01-27 DIAGNOSIS — R74.8 ELEVATED LIVER ENZYMES: ICD-10-CM

## 2022-01-27 PROCEDURE — 99214 OFFICE O/P EST MOD 30 MIN: CPT | Performed by: INTERNAL MEDICINE

## 2022-01-27 RX ORDER — VALSARTAN 80 MG/1
80 TABLET ORAL DAILY
Qty: 90 TABLET | Refills: 3 | Status: SHIPPED | OUTPATIENT
Start: 2022-01-27

## 2022-01-27 RX ORDER — METFORMIN HYDROCHLORIDE 500 MG/1
1000 TABLET, EXTENDED RELEASE ORAL 2 TIMES DAILY
Qty: 360 TABLET | Refills: 3 | Status: SHIPPED | OUTPATIENT
Start: 2022-01-27

## 2022-01-27 RX ORDER — ONDANSETRON 4 MG/1
4 TABLET, ORALLY DISINTEGRATING ORAL
Qty: 60 TABLET | Refills: 3 | Status: SHIPPED | OUTPATIENT
Start: 2022-01-27 | End: 2023-01-22

## 2022-01-27 RX ORDER — INSULIN LISPRO 100 [IU]/ML
INJECTION, SOLUTION INTRAVENOUS; SUBCUTANEOUS
Qty: 15 ADJUSTABLE DOSE PRE-FILLED PEN SYRINGE | Refills: 0 | Status: SHIPPED | OUTPATIENT
Start: 2022-01-27 | End: 2022-02-02 | Stop reason: ALTCHOICE

## 2022-01-27 RX ORDER — BUPROPION HYDROCHLORIDE 150 MG/1
150 TABLET, EXTENDED RELEASE ORAL 2 TIMES DAILY
Qty: 180 TABLET | Refills: 3 | Status: SHIPPED | OUTPATIENT
Start: 2022-01-27

## 2022-01-27 RX ORDER — FLUTICASONE PROPIONATE AND SALMETEROL 500; 50 UG/1; UG/1
1 POWDER RESPIRATORY (INHALATION) EVERY 12 HOURS
Qty: 180 EACH | Refills: 3 | Status: SHIPPED | OUTPATIENT
Start: 2022-01-27 | End: 2022-02-09 | Stop reason: SDUPTHER

## 2022-01-27 RX ORDER — MONTELUKAST SODIUM 10 MG/1
TABLET ORAL
Qty: 90 TABLET | Refills: 3 | Status: SHIPPED | OUTPATIENT
Start: 2022-01-27

## 2022-01-27 RX ORDER — DICYCLOMINE HYDROCHLORIDE 10 MG/5ML
20 SOLUTION ORAL
Qty: 473 ML | Refills: 5 | Status: SHIPPED | OUTPATIENT
Start: 2022-01-27

## 2022-01-27 RX ORDER — ALBUTEROL SULFATE 90 UG/1
2 AEROSOL, METERED RESPIRATORY (INHALATION)
Qty: 54 G | Refills: 3 | Status: SHIPPED | OUTPATIENT
Start: 2022-01-27 | End: 2022-02-09 | Stop reason: SDUPTHER

## 2022-01-27 RX ORDER — HYDROCODONE BITARTRATE AND ACETAMINOPHEN 5; 325 MG/1; MG/1
TABLET ORAL
COMMUNITY
Start: 2022-01-12

## 2022-01-27 RX ORDER — ALBUTEROL SULFATE 0.83 MG/ML
SOLUTION RESPIRATORY (INHALATION)
Qty: 90 EACH | Refills: 3 | Status: SHIPPED | OUTPATIENT
Start: 2022-01-27

## 2022-01-27 RX ORDER — INSULIN DETEMIR 100 [IU]/ML
25 INJECTION, SOLUTION SUBCUTANEOUS 2 TIMES DAILY
Qty: 45 ML | Refills: 1 | Status: SHIPPED | OUTPATIENT
Start: 2022-01-27 | End: 2022-04-09

## 2022-01-27 NOTE — LETTER
4/14/2022 8:22 AM    Ms. Yoana Rubi [place] [id number]    Results for orders placed or performed in visit on 07/28/69   METABOLIC PANEL, COMPREHENSIVE   Result Value Ref Range    Glucose 265 (H) 65 - 99 mg/dL    BUN 7 6 - 24 mg/dL    Creatinine 0.86 0.57 - 1.00 mg/dL    eGFR 83 >59 mL/min/1.73    BUN/Creatinine ratio 8 (L) 9 - 23    Sodium 140 134 - 144 mmol/L    Potassium 4.8 3.5 - 5.2 mmol/L    Chloride 100 96 - 106 mmol/L    CO2 21 20 - 29 mmol/L    Calcium 9.6 8.7 - 10.2 mg/dL    Protein, total 7.2 6.0 - 8.5 g/dL    Albumin 4.2 3.8 - 4.8 g/dL    GLOBULIN, TOTAL 3.0 1.5 - 4.5 g/dL    A-G Ratio 1.4 1.2 - 2.2    Bilirubin, total 0.3 0.0 - 1.2 mg/dL    Alk. phosphatase 118 44 - 121 IU/L    AST (SGOT) 70 (H) 0 - 40 IU/L    ALT (SGPT) 92 (H) 0 - 32 IU/L   HEMOGLOBIN A1C WITH EAG   Result Value Ref Range    Hemoglobin A1c 11.7 (H) 4.8 - 5.6 %    Estimated average glucose 289 mg/dL   LIPID PANEL   Result Value Ref Range    Cholesterol, total 136 100 - 199 mg/dL    Triglyceride 82 0 - 149 mg/dL    HDL Cholesterol 36 (L) >39 mg/dL    VLDL, calculated 16 5 - 40 mg/dL    LDL, calculated 84 0 - 99 mg/dL     RECOMMENDATIONS  Message sent to patient by My Chart:  Your labs showed A1c 11.7%, meaning uncontrolled diabetes. Your last A1c was 9.3% in 10/21, and goal A1c is less than 7.0%. Your glucose was high at 265. Increase Humalog insulin from 10 to 15 units before breakfast and dinner. Also increase insulin detemir (Levemir) from 25 units to 30 units twice daily. Two liver tests were a little high due to non-alcoholic fatty liver disease. Your other labs were normal, including kidney tests and cholesterol.            Sincerely,      Tao Mendez MD

## 2022-01-27 NOTE — PROGRESS NOTES
Bard Chang  Identified pt with two pt identifiers(name and ). Chief Complaint   Patient presents with    Diabetes     pain - 7 (back)     Hypertension    Cholesterol Problem    Depression       Reviewed record In preparation for visit and have obtained necessary documentation. 1. Have you been to the ER, urgent care clinic or hospitalized since your last visit? No     2. Have you seen or consulted any other health care providers outside of the [address] [place] [address] since your last visit? Include any pap smears or colon screening. No    Patient does not have an advance directive. Vitals reviewed with provider. Health Maintenance reviewed:     Health Maintenance Due   Topic    Foot Exam Q1     A1C test (Diabetic or Prediabetic)           Wt Readings from Last 3 Encounters:   21 294 lb (133.4 kg)   10/27/21 289 lb (131.1 kg)   21 296 lb (134.3 kg)        Temp Readings from Last 3 Encounters:   21 98.3 °F (36.8 °C)   10/27/21 98.1 °F (36.7 °C) (Oral)   21 97.3 °F (36.3 °C)        BP Readings from Last 3 Encounters:   21 (!) 164/90   10/27/21 114/62   21 (!) 143/90        Pulse Readings from Last 3 Encounters:   21 86   10/27/21 85   21 90      There were no vitals filed for this visit.        Learning Assessment:   :       Learning Assessment 2018   PRIMARY LEARNER Patient Patient Patient   HIGHEST LEVEL OF EDUCATION - PRIMARY LEARNER  - > 4 YEARS OF COLLEGE -   BARRIERS PRIMARY LEARNER - NONE -   CO-LEARNER CAREGIVER - No -   PRIMARY LANGUAGE ENGLISH ENGLISH ENGLISH   LEARNER PREFERENCE PRIMARY READING OTHER (COMMENT) LISTENING   ANSWERED BY patient patient self     - - self   RELATIONSHIP SELF SELF SELF        Depression Screening:   :       3 most recent PHQ Screens 2022   PHQ Not Done -   Little interest or pleasure in doing things Nearly every day   Feeling down, depressed, irritable, or hopeless Nearly every day   Total Score PHQ 2 6   Trouble falling or staying asleep, or sleeping too much Nearly every day   Feeling tired or having little energy Nearly every day   Poor appetite, weight loss, or overeating Nearly every day   Feeling bad about yourself - or that you are a failure or have let yourself or your family down Nearly every day   Trouble concentrating on things such as school, work, reading, or watching TV Nearly every day   Moving or speaking so slowly that other people could have noticed; or the opposite being so fidgety that others notice Nearly every day   Thoughts of being better off dead, or hurting yourself in some way Not at all   PHQ 9 Score 24        Fall Risk Assessment:   :       Fall Risk Assessment, last 12 mths 2/23/2021   Able to walk? Yes   Fall in past 12 months? 1   Do you feel unsteady? 1   Are you worried about falling 1   Number of falls in past 12 months -   Fall with injury? 1        Abuse Screening:   :       Abuse Screening Questionnaire 8/14/2020 5/9/2014   Do you ever feel afraid of your partner? N N   Are you in a relationship with someone who physically or mentally threatens you? N N   Is it safe for you to go home?  Y Y        ADL Screening:   :       ADL Assessment 5/2/2018   Feeding yourself No Help Needed   Getting from bed to chair No Help Needed   Getting dressed No Help Needed   Bathing or showering No Help Needed   Walk across the room (includes cane/walker) No Help Needed   Using the telphone No Help Needed   Taking your medications No Help Needed   Preparing meals No Help Needed   Managing money (expenses/bills) No Help Needed   Moderately strenuous housework (laundry) No Help Needed   Shopping for personal items (toiletries/medicines) No Help Needed   Shopping for groceries No Help Needed   Driving No Help Needed   Climbing a flight of stairs No Help Needed   Getting to places beyond walking distances No Help Needed

## 2022-01-27 NOTE — PROGRESS NOTES
Tina Evans is a 48 y.o. female who was seen by synchronous (real-time) audio-video technology on 1/27/2022 for Diabetes (pain - 7 (back) ), Hypertension, Cholesterol Problem, and Depression        Assessment & Plan:     Diagnoses and all orders for this visit:    1. Type 2 diabetes mellitus with diabetic polyneuropathy, without long-term current use of insulin (HCC)  Uncontrolled. Last A1c 9.3% on 10/20/21. Increase Levemir insulin to 25 units twice daily and start insulin lispro for meal coverage. Continue metformin and glipizide.  -     HEMOGLOBIN A1C WITH EAG; Future  -     Start insulin lispro (HUMALOG) 100 unit/mL kwikpen; Inject 10 units under the skin before breakfast and dinner. Dx: E11.42  -     Increase to: insulin detemir U-100 (Levemir FlexTouch U-100 Insuln) 100 unit/mL (3 mL) inpn; 25 Units by SubCUTAneous route two (2) times a day. Dx: E11.42  -     Refill metFORMIN ER (GLUCOPHAGE XR) 500 mg tablet; Take 2 Tablets by mouth two (2) times a day. 2. Severe episode of recurrent major depressive disorder ([address]  Instructed her to  -     Refill buPROPion SR (WELLBUTRIN SR) 150 mg SR tablet; Take 1 Tablet by mouth two (2) times a day. 3. Essential hypertension  -     METABOLIC PANEL, COMPREHENSIVE; Future  -     Refill valsartan (DIOVAN) 80 mg tablet; Take 1 Tablet by mouth daily. Indications: high blood pressure    4. Mixed hyperlipidemia  -     LIPID PANEL; Future    5. Elevated liver enzymes  -     METABOLIC PANEL, COMPREHENSIVE; Future    6. Moderate persistent asthma without complication  -     Refill montelukast (SINGULAIR) 10 mg tablet; TAKE 1 TABLET BY MOUTH ONCE DAILY FOR ALLERGIES AND FOR ASTHMA  -     Refill fluticasone propion-salmeteroL (Advair Diskus) 500-50 mcg/dose diskus inhaler; Take 1 Puff by inhalation every twelve (12) hours. Asthma, Dx: J45.30  -     Refill albuterol (PROVENTIL HFA, VENTOLIN HFA, PROAIR HFA) 90 mcg/actuation inhaler;  Take 2 Puffs by inhalation every six (6) hours as needed for Wheezing. Asthma, Dx: J45.40  -     Refill albuterol (PROVENTIL VENTOLIN) 2.5 mg /3 mL (0.083 %) nebu; USE ONE VIAL IN NEBULIZER EVERY 4 HOURS AS NEEDED FOR WHEEZING. Dx: J45.40    7. Nausea  -     Refill ondansetron (Zofran ODT) 4 mg disintegrating tablet; Take 1 Tablet by mouth every eight (8) hours as needed for Nausea for up to 360 days. 8. Irritable bowel syndrome, unspecified type  -     Refill dicyclomine (BENTYL) 10 mg/5 mL soln oral solution; Take 10 mL by mouth four (4) times daily as needed (abd pain or diarrhea). Follow-up and Dispositions    · Return in about 3 months (around 4/27/2022), or if symptoms worsen or fail to improve, for DM, HTN, depression; have fasting labs done within the next 2 weeks. Routing History         712  Subjective:     Presents for follow up evaluation. She has type 2 DM with neuropathy, HTN, hyperlipidemia, seronegative RA, asthma, allergic rhinitis, FE, depression, and morbid obesity. Reports BS's have been high, in the 300's. Was 278 this am. Denies polydipsia, polyuria, or fatigue. Takes Levemir 25 units nightly, metformin 100 mg BID, and glipizide 10 mg BID. Usually compliant with diabetic diet. Last A1c 9.3% on 10/20/21. Breathing has improved. Had asthma exacerbation in late Dec. Never took all the 5-day prednisone course she was given due to concerns about her high BS's. Takes Advair 500/50 diskus BID and Singulair. Complains of feeling very depressed. Denies SI. Has not met with a therapist yet. Says [facility] was the only place on the list of places I gave her that accepted her Medicaid insurance. Did not schedule an appointment with [facility] because they wanted to schedule her with a therapist located 2 hrs away. Planned to go to another place where initial visit takes 2 hrs but has not found time to go. See PHQ-9 questionnaire below. Complains of diffuse joint pains and 7/10 back pain. On MTX and Humira.  Thinks they are helping a little. Wants 90-day supply on all the medications I prescribe her. 3 most recent PHQ Screens 1/27/2022   PHQ Not Done -   Little interest or pleasure in doing things Nearly every day   Feeling down, depressed, irritable, or hopeless Nearly every day   Total Score PHQ 2 6   Trouble falling or staying asleep, or sleeping too much Nearly every day   Feeling tired or having little energy Nearly every day   Poor appetite, weight loss, or overeating Nearly every day   Feeling bad about yourself - or that you are a failure or have let yourself or your family down Nearly every day   Trouble concentrating on things such as school, work, reading, or watching TV Nearly every day   Moving or speaking so slowly that other people could have noticed; or the opposite being so fidgety that others notice Nearly every day   Thoughts of being better off dead, or hurting yourself in some way Not at all   PHQ 9 Score 24   How difficult have these problems made it for you to do your work, take care of your home and get along with others Very difficult       Prior to Admission medications    Medication Sig Start Date End Date Taking? Authorizing Provider   HYDROcodone-acetaminophen (NORCO) 5-325 mg per tablet TAKE 1 TO 2 TABLETS BY MOUTH 4 TIMES DAILY AS NEEDED 1/12/22  Yes Provider, Historical   ibuprofen (MOTRIN) 600 mg tablet Take  by mouth every six (6) hours as needed for Pain. Yes Provider, Historical   buPROPion SR (WELLBUTRIN SR) 150 mg SR tablet Take 1 Tablet by mouth two (2) times a day. 12/22/21  Yes Jonathon England MD   glipiZIDE (GLUCOTROL) 10 mg tablet Take 1 tablet by mouth twice daily 12/22/21  Yes [place]oacano Cristina MD   predniSONE (DELTASONE) 20 mg tablet Take 2 tablets by mouth once daily for 5 days. 12/22/21  Yes Jonathon England MD   atorvastatin (LIPITOR) 20 mg tablet Take 1 Tablet by mouth daily.  Indications: high cholesterol 12/15/21  Yes Jonathon England MD   insulin detemir U-100 (Levemir FlexTouch U-100 Insuln) 100 unit/mL (3 mL) inpn 25 Units by SubCUTAneous route nightly. Dx: E11.42 11/3/21  Yes Andrew Dyson MD   ibuprofen (MOTRIN) 800 mg tablet Take one tablet every 8 hours for three days, and then every 8 hours as needed for pain thereafter 8/23/21  Yes Yaakov Lewis MD   ondansetron (Zofran ODT) 4 mg disintegrating tablet Take 1 Tablet by mouth every eight (8) hours as needed for Nausea for up to 360 days. 8/23/21 8/18/22 Yes Yaakov Lewis MD   Insulin Needles, Disposable, 31 gauge x 5/16\" ndle Use as directed once daily with Levemir Flextouch insulin pens. 8/11/21  Yes Anita England MD   adalimumab (Humira,CF,) 40 mg/0.4 mL sykt 40 mg by SubCUTAneous route every seven (7) days. Indications: rheumatoid arthritis   Yes Provider, Historical   dicyclomine (BENTYL) 10 mg/5 mL soln oral solution Take 10 mL by mouth four (4) times daily. 6/29/21  Yes Rob Lacy MD   pregabalin (LYRICA) 200 mg capsule TAKE 1 TABLET BY MOUTH TWICE DAILY . DO NOT EXCEED 2 PER 24 HOURS  Patient taking differently: 300 mg. TAKE 1 TABLET BY MOUTH TWICE DAILY . DO NOT EXCEED 2 PER 24 HOURS 6/15/21  Yes Andrew Dyson MD   metFORMIN ER (GLUCOPHAGE XR) 500 mg tablet Take 2 tablets by mouth twice daily 6/14/21  Yes Andrew Dyson MD   montelukast (SINGULAIR) 10 mg tablet TAKE 1 TABLET BY MOUTH ONCE DAILY FOR ALLERGIES AND FOR ASTHMA 4/15/21  Yes Andrew Dyson MD   lidocaine (LIDODERM) 5 % Apply patch to the affected area for 12 hours a day and remove for 12 hours a day. 4/6/21  Yes Nicolas Izaguirre PA-C   acetaminophen (TYLENOL) 500 mg tablet Take 2 Tabs by mouth every six (6) hours as needed for Pain. 4/6/21  Yes Nicolas Izaguirre PA-C   cyclobenzaprine (FLEXERIL) 10 mg tablet Take 1 Tab by mouth three (3) times daily as needed for Muscle Spasm(s). 4/6/21  Yes Nicolas Izaguirre PA-C   folic acid (FOLVITE) 1 mg tablet Take 1 mg by mouth daily.  1/19/21  Yes Provider, Historical   methotrexate (RHEUMATREX) 2.5 mg tablet 20 mg every Wednesday. Pt takes 8 pills for 20 mg total. 1/19/21  Yes Provider, Historical   fluticasone propion-salmeteroL (Advair Diskus) 500-50 mcg/dose diskus inhaler Take 1 Puff by inhalation every twelve (12) hours. Asthma, Dx: J45.30 1/26/21  Yes Marla Pompa MD   albuterol (PROVENTIL HFA, VENTOLIN HFA, PROAIR HFA) 90 mcg/actuation inhaler Take 2 Puffs by inhalation every six (6) hours as needed for Wheezing. Asthma, Dx: J45.30 1/26/21  Yes Marla Pompa MD   valsartan (DIOVAN) 40 mg tablet Take 2 tablets every day by oral route. 1/26/21  Yes Abby England MD   amitriptyline (ELAVIL) 50 mg tablet Take 1 Tab by mouth nightly. For numbness at hands and feet. Patient taking differently: Take 50 mg by mouth nightly as needed. For numbness at hands and feet PRN 12/4/20  Yes Abby England MD   meclizine (ANTIVERT) 25 mg tablet Take 1 Tab by mouth three (3) times daily as needed for Dizziness. 2/28/20  Yes Abby England MD   nitroglycerin (NITROSTAT) 0.4 mg SL tablet Take 1 Tab by mouth every five (5) minutes as needed for Chest Pain.  Sit/Lay down then put one tab under the tongue every 5 minutes as needed for chest pain for 3 doses 6/8/18  Yes Mayda Dixon MD   albuterol (PROVENTIL VENTOLIN) 2.5 mg /3 mL (0.083 %) nebulizer solution USE ONE VIAL IN NEBULIZER EVERY 4 HOURS AS NEEDED FOR WHEEZING 3/5/18  Yes Florin Cerrato NP     Patient Active Problem List   Diagnosis Code    HTN (hypertension) I10    Moderate episode of recurrent major depressive disorder ([facility] F33.1    FE (obstructive sleep apnea) G47.33    Asthma J45.909    Iron deficiency anemia D50.9    Hyperlipidemia E78.5    Obesity, morbid, BMI 50 or higher (HCC) E66.01    Type 2 diabetes mellitus with diabetic polyneuropathy, without long-term current use of insulin ([facility]) E11.42    Coronary artery disease involving native coronary artery of native heart without angina pectoris I25.10    Chronic midline low back pain without sciatica M54.50, G89.29    Atypical squamous cells of undetermined significance (ASCUS) on Papanicolaou smear of cervix R87.610    Type 2 diabetes with nephropathy (HCC) E11.21    Seronegative rheumatoid arthritis ([facility] [address] M06.00    Elevated liver enzymes R74.8       Objective:     Patient-Reported Vitals 1/27/2022   Patient-Reported Weight 283lb   Patient-Reported Pulse -   Patient-Reported Temperature -   Patient-Reported SpO2 -   Patient-Reported Systolic  -   Patient-Reported Diastolic -   Patient-Reported LMP -      General: alert, cooperative, no distress   Mental  status: normal mood, behavior, speech, dress, motor activity, and thought processes, able to follow commands   HENT: NCAT   Neck: no visualized mass   Resp: no respiratory distress   Neuro: no gross deficits   Skin: no discoloration or lesions of concern on visible areas   Psychiatric: normal affect, consistent with stated mood, no evidence of hallucinations     Additional exam findings: Morbidly obese      We discussed the expected course, resolution and complications of the diagnosis(es) in detail. Medication risks, benefits, costs, interactions, and alternatives were discussed as indicated. I advised her to contact the office if her condition worsens, changes or fails to improve as anticipated. She expressed understanding with the diagnosis(es) and plan. THIS VISIT WAS COMPLETED VIRTUALLY USING MY CHART TELEMEDICINE    Ariadna Pino, was evaluated through a synchronous (real-time) audio-video encounter. The patient (or guardian if applicable) is aware that this is a billable service, which includes applicable co-pays. Verbal consent to proceed has been obtained. The visit was conducted pursuant to the emergency declaration under the [address] [place] [address], [address] [place] [address] authority and the [employer] Act.   Patient identification was verified, and a caregiver was present when appropriate. The patient was located at home in a state where the provider was licensed to provide care.     Patria Boo MD

## 2022-02-02 ENCOUNTER — TELEPHONE (OUTPATIENT)
Dept: INTERNAL MEDICINE CLINIC | Age: 47
End: 2022-02-02

## 2022-02-02 DIAGNOSIS — E11.42 TYPE 2 DIABETES MELLITUS WITH DIABETIC POLYNEUROPATHY, WITHOUT LONG-TERM CURRENT USE OF INSULIN (HCC): Primary | ICD-10-CM

## 2022-02-02 RX ORDER — INSULIN LISPRO 100 [IU]/ML
INJECTION, SOLUTION INTRAVENOUS; SUBCUTANEOUS
Qty: 15 ADJUSTABLE DOSE PRE-FILLED PEN SYRINGE | Refills: 0 | Status: SHIPPED | OUTPATIENT
Start: 2022-02-02 | End: 2022-04-09

## 2022-02-02 NOTE — TELEPHONE ENCOUNTER
We received a PA request from [facility] for the Insulin Lispro. It is not covered by insurance, but the following medications do not require PA's.     · HumaLog KwikPen  · Novolog FlexPen  · HumaLog  · NovoLog  · NovoLog PenFill  · HumaLog Sundar KwikPen  · HumaLog Mix 75/25 KwikPen  · Humulin R U-500 KwikPen

## 2022-02-09 ENCOUNTER — TELEPHONE (OUTPATIENT)
Dept: INTERNAL MEDICINE CLINIC | Age: 47
End: 2022-02-09

## 2022-02-09 DIAGNOSIS — E11.42 TYPE 2 DIABETES MELLITUS WITH DIABETIC POLYNEUROPATHY, WITHOUT LONG-TERM CURRENT USE OF INSULIN (HCC): ICD-10-CM

## 2022-02-09 DIAGNOSIS — J45.40 MODERATE PERSISTENT ASTHMA WITHOUT COMPLICATION: ICD-10-CM

## 2022-02-09 RX ORDER — AMITRIPTYLINE HYDROCHLORIDE 50 MG/1
TABLET, FILM COATED ORAL
Qty: 30 TABLET | Refills: 0 | Status: SHIPPED | OUTPATIENT
Start: 2022-02-09 | End: 2022-09-21

## 2022-02-09 NOTE — TELEPHONE ENCOUNTER
Lindsey from [facility] would like update on fax they sent on 2/2/2022. It would have been a Certificate of medical necessity. Please advise.      # [phone]

## 2022-02-09 NOTE — TELEPHONE ENCOUNTER
Requested Prescriptions     Pending Prescriptions Disp Refills    pregabalin (LYRICA) 200 mg capsule 60 Capsule 0     Sig: TAKE 1 TABLET BY MOUTH TWICE DAILY . DO NOT EXCEED 2 PER 24 HOURS    albuterol (PROVENTIL HFA, VENTOLIN HFA, PROAIR HFA) 90 mcg/actuation inhaler 54 g 3     Sig: Take 2 Puffs by inhalation every six (6) hours as needed for Wheezing.  Asthma, Dx: J45.40   also need advair  Diskus 500/50      Could all meds be 90 day supply

## 2022-02-09 NOTE — TELEPHONE ENCOUNTER
PCP: Hamzah Lozada MD     Last appt: 1/27/2022   No future appointments. Requested Prescriptions     Pending Prescriptions Disp Refills    pregabalin (LYRICA) 200 mg capsule 60 Capsule 0     Sig: TAKE 1 TABLET BY MOUTH TWICE DAILY . DO NOT EXCEED 2 PER 24 HOURS    albuterol (PROVENTIL HFA, VENTOLIN HFA, PROAIR HFA) 90 mcg/actuation inhaler 54 g 3     Sig: Take 2 Puffs by inhalation every six (6) hours as needed for Wheezing. Asthma, Dx: J45.40    fluticasone propion-salmeteroL (Advair Diskus) 500-50 mcg/dose diskus inhaler 180 Each 3     Sig: Take 1 Puff by inhalation every twelve (12) hours.  Asthma, Dx: V75.50

## 2022-02-10 RX ORDER — PREGABALIN 200 MG/1
CAPSULE ORAL
Qty: 60 CAPSULE | Refills: 1 | Status: SHIPPED | OUTPATIENT
Start: 2022-02-10 | End: 2022-03-24 | Stop reason: SDUPTHER

## 2022-02-10 RX ORDER — ALBUTEROL SULFATE 90 UG/1
2 AEROSOL, METERED RESPIRATORY (INHALATION)
Qty: 54 G | Refills: 3 | Status: SHIPPED | OUTPATIENT
Start: 2022-02-10

## 2022-02-10 RX ORDER — FLUTICASONE PROPIONATE AND SALMETEROL 500; 50 UG/1; UG/1
1 POWDER RESPIRATORY (INHALATION) EVERY 12 HOURS
Qty: 180 EACH | Refills: 3 | Status: SHIPPED | OUTPATIENT
Start: 2022-02-10

## 2022-02-14 ENCOUNTER — HOSPITAL ENCOUNTER (EMERGENCY)
Age: 47
Discharge: HOME | End: 2022-02-14
Attending: STUDENT IN AN ORGANIZED HEALTH CARE EDUCATION/TRAINING PROGRAM
Payer: MEDICAID

## 2022-02-14 ENCOUNTER — APPOINTMENT (OUTPATIENT)
Dept: CT IMAGING | Age: 47
End: 2022-02-14
Attending: STUDENT IN AN ORGANIZED HEALTH CARE EDUCATION/TRAINING PROGRAM
Payer: MEDICAID

## 2022-02-14 VITALS
BODY MASS INDEX: 48.93 KG/M2 | DIASTOLIC BLOOD PRESSURE: 80 MMHG | TEMPERATURE: 98 F | RESPIRATION RATE: 16 BRPM | OXYGEN SATURATION: 100 % | HEIGHT: 64 IN | WEIGHT: 286.6 LBS | HEART RATE: 65 BPM | SYSTOLIC BLOOD PRESSURE: 147 MMHG

## 2022-02-14 DIAGNOSIS — R10.9 ACUTE RIGHT FLANK PAIN: Primary | ICD-10-CM

## 2022-02-14 LAB
ALBUMIN SERPL-MCNC: 3.5 G/DL (ref 3.5–5)
ALBUMIN/GLOB SERPL: 0.9 {RATIO} (ref 1.1–2.2)
ALP SERPL-CCNC: 101 U/L (ref 45–117)
ALT SERPL-CCNC: 64 U/L (ref 12–78)
ANION GAP SERPL CALC-SCNC: 4 MMOL/L (ref 5–15)
APPEARANCE UR: CLEAR
AST SERPL-CCNC: 45 U/L (ref 15–37)
ATRIAL RATE: 64 BPM
BASOPHILS # BLD: 0.1 K/UL (ref 0–0.1)
BASOPHILS NFR BLD: 1 % (ref 0–1)
BILIRUB SERPL-MCNC: 0.4 MG/DL (ref 0.2–1)
BILIRUB UR QL: NEGATIVE
BUN SERPL-MCNC: 9 MG/DL (ref 6–20)
BUN/CREAT SERPL: 9 (ref 12–20)
CALCIUM SERPL-MCNC: 9 MG/DL (ref 8.5–10.1)
CALCULATED P AXIS, ECG09: 42 DEGREES
CALCULATED R AXIS, ECG10: 19 DEGREES
CALCULATED T AXIS, ECG11: 21 DEGREES
CHLORIDE SERPL-SCNC: 104 MMOL/L (ref 97–108)
CO2 SERPL-SCNC: 30 MMOL/L (ref 21–32)
COLOR UR: ABNORMAL
COMMENT, HOLDF: NORMAL
CREAT SERPL-MCNC: 0.96 MG/DL (ref 0.55–1.02)
DIAGNOSIS, 93000: NORMAL
DIFFERENTIAL METHOD BLD: ABNORMAL
EOSINOPHIL # BLD: 0.3 K/UL (ref 0–0.4)
EOSINOPHIL NFR BLD: 4 % (ref 0–7)
ERYTHROCYTE [DISTWIDTH] IN BLOOD BY AUTOMATED COUNT: 16.6 % (ref 11.5–14.5)
GLOBULIN SER CALC-MCNC: 4.1 G/DL (ref 2–4)
GLUCOSE BLD STRIP.AUTO-MCNC: 149 MG/DL (ref 65–117)
GLUCOSE SERPL-MCNC: 165 MG/DL (ref 65–100)
GLUCOSE UR STRIP.AUTO-MCNC: NEGATIVE MG/DL
HCT VFR BLD AUTO: 37.4 % (ref 35–47)
HGB BLD-MCNC: 11.9 G/DL (ref 11.5–16)
HGB UR QL STRIP: NEGATIVE
IMM GRANULOCYTES # BLD AUTO: 0 K/UL (ref 0–0.04)
IMM GRANULOCYTES NFR BLD AUTO: 0 % (ref 0–0.5)
KETONES UR QL STRIP.AUTO: ABNORMAL MG/DL
LEUKOCYTE ESTERASE UR QL STRIP.AUTO: NEGATIVE
LYMPHOCYTES # BLD: 2.8 K/UL (ref 0.8–3.5)
LYMPHOCYTES NFR BLD: 40 % (ref 12–49)
MCH RBC QN AUTO: 28.3 PG (ref 26–34)
MCHC RBC AUTO-ENTMCNC: 31.8 G/DL (ref 30–36.5)
MCV RBC AUTO: 88.8 FL (ref 80–99)
MONOCYTES # BLD: 0.5 K/UL (ref 0–1)
MONOCYTES NFR BLD: 7 % (ref 5–13)
NEUTS SEG # BLD: 3.3 K/UL (ref 1.8–8)
NEUTS SEG NFR BLD: 48 % (ref 32–75)
NITRITE UR QL STRIP.AUTO: NEGATIVE
NRBC # BLD: 0 K/UL (ref 0–0.01)
NRBC BLD-RTO: 0 PER 100 WBC
P-R INTERVAL, ECG05: 156 MS
PH UR STRIP: 6 [PH] (ref 5–8)
PLATELET # BLD AUTO: 228 K/UL (ref 150–400)
PMV BLD AUTO: 12.1 FL (ref 8.9–12.9)
POTASSIUM SERPL-SCNC: 4.3 MMOL/L (ref 3.5–5.1)
PROT SERPL-MCNC: 7.6 G/DL (ref 6.4–8.2)
PROT UR STRIP-MCNC: NEGATIVE MG/DL
Q-T INTERVAL, ECG07: 450 MS
QRS DURATION, ECG06: 80 MS
QTC CALCULATION (BEZET), ECG08: 464 MS
RBC # BLD AUTO: 4.21 M/UL (ref 3.8–5.2)
SAMPLES BEING HELD,HOLD: NORMAL
SERVICE CMNT-IMP: ABNORMAL
SODIUM SERPL-SCNC: 138 MMOL/L (ref 136–145)
SP GR UR REFRACTOMETRY: 1.03 (ref 1–1.03)
UROBILINOGEN UR QL STRIP.AUTO: 0.2 EU/DL (ref 0.2–1)
VENTRICULAR RATE, ECG03: 64 BPM
WBC # BLD AUTO: 6.9 K/UL (ref 3.6–11)

## 2022-02-14 PROCEDURE — 80053 COMPREHEN METABOLIC PANEL: CPT

## 2022-02-14 PROCEDURE — 74176 CT ABD & PELVIS W/O CONTRAST: CPT

## 2022-02-14 PROCEDURE — 99284 EMERGENCY DEPT VISIT MOD MDM: CPT

## 2022-02-14 PROCEDURE — 82962 GLUCOSE BLOOD TEST: CPT

## 2022-02-14 PROCEDURE — 93005 ELECTROCARDIOGRAM TRACING: CPT

## 2022-02-14 PROCEDURE — 74011250636 HC RX REV CODE- 250/636: Performed by: STUDENT IN AN ORGANIZED HEALTH CARE EDUCATION/TRAINING PROGRAM

## 2022-02-14 PROCEDURE — 36415 COLL VENOUS BLD VENIPUNCTURE: CPT

## 2022-02-14 PROCEDURE — 74011250636 HC RX REV CODE- 250/636

## 2022-02-14 PROCEDURE — 81003 URINALYSIS AUTO W/O SCOPE: CPT

## 2022-02-14 PROCEDURE — 85025 COMPLETE CBC W/AUTO DIFF WBC: CPT

## 2022-02-14 PROCEDURE — 96375 TX/PRO/DX INJ NEW DRUG ADDON: CPT

## 2022-02-14 PROCEDURE — 96374 THER/PROPH/DIAG INJ IV PUSH: CPT

## 2022-02-14 RX ORDER — CYCLOBENZAPRINE HCL 5 MG
5 TABLET ORAL
Qty: 12 TABLET | Refills: 0 | Status: SHIPPED | OUTPATIENT
Start: 2022-02-14 | End: 2022-04-18 | Stop reason: SDUPTHER

## 2022-02-14 RX ORDER — NAPROXEN 500 MG/1
500 TABLET ORAL
Qty: 20 TABLET | Refills: 0 | Status: SHIPPED | OUTPATIENT
Start: 2022-02-14

## 2022-02-14 RX ORDER — ONDANSETRON 2 MG/ML
4 INJECTION INTRAMUSCULAR; INTRAVENOUS
Status: COMPLETED | OUTPATIENT
Start: 2022-02-14 | End: 2022-02-14

## 2022-02-14 RX ORDER — ONDANSETRON 2 MG/ML
INJECTION INTRAMUSCULAR; INTRAVENOUS
Status: COMPLETED
Start: 2022-02-14 | End: 2022-02-14

## 2022-02-14 RX ORDER — KETOROLAC TROMETHAMINE 30 MG/ML
15 INJECTION, SOLUTION INTRAMUSCULAR; INTRAVENOUS
Status: COMPLETED | OUTPATIENT
Start: 2022-02-14 | End: 2022-02-14

## 2022-02-14 RX ADMIN — KETOROLAC TROMETHAMINE 15 MG: 30 INJECTION, SOLUTION INTRAMUSCULAR at 09:59

## 2022-02-14 RX ADMIN — ONDANSETRON 4 MG: 2 INJECTION INTRAMUSCULAR; INTRAVENOUS at 10:00

## 2022-02-14 NOTE — ED NOTES
I have reviewed discharge instructions with the patient. The patient verbalized understanding. Pt walked with steady gait out of ER.

## 2022-02-14 NOTE — ED PROVIDER NOTES
EMERGENCY DEPARTMENT HISTORY AND PHYSICAL EXAM      Date: 2/14/2022  Patient Name: Payton Delgado    History of Presenting Illness     Chief Complaint   Patient presents with    Back Pain     Pt ambulatory into triage with a cc of back pain x 1-2 days    Dizziness     Pt is also complaining of lightheadedness since yesterday         HPI: Payton Delgado, 48 y.o. female presents to the ED with cc of right-sided back pain. This has been going on for the past 2 days. She describes it as a sharp constant pain in the flank region without any identifiable exacerbating or alleviating factors. She reports some associated nausea, no vomiting, no diarrhea, no abdominal pain. She denies any lifting, twisting, falls or other trauma. No dysuria or hematuria. She denies any weakness or numbness in the arms or legs, no difficulty urinating. She also states that over the last couple days she has been getting lightheaded. Cannot identify anything in particular that brings this on. No chest pain or shortness of breath, no fevers or coughing. There are no other complaints, changes, or physical findings at this time. PCP: Ministerio Paredes MD    No current facility-administered medications on file prior to encounter. Current Outpatient Medications on File Prior to Encounter   Medication Sig Dispense Refill    pregabalin (LYRICA) 200 mg capsule TAKE 1 TABLET BY MOUTH TWICE DAILY . DO NOT EXCEED 2 PER 24 HOURS 60 Capsule 1    albuterol (PROVENTIL HFA, VENTOLIN HFA, PROAIR HFA) 90 mcg/actuation inhaler Take 2 Puffs by inhalation every six (6) hours as needed for Wheezing. Asthma, Dx: J45.40 54 g 3    fluticasone propion-salmeteroL (Advair Diskus) 500-50 mcg/dose diskus inhaler Take 1 Puff by inhalation every twelve (12) hours.  Asthma, Dx: J45.30 180 Each 3    amitriptyline (ELAVIL) 50 mg tablet TAKE 1 TABLET BY MOUTH NIGHTLY FOR  NUMBNESS AT  HANDS  AND  FEET 30 Tablet 0    HumaLOG KwikPen Insulin 100 unit/mL kwikpen Inject 10 units under the skin before breakfast and dinner. 15 Adjustable Dose Pre-filled Pen Syringe 0    HYDROcodone-acetaminophen (NORCO) 5-325 mg per tablet TAKE 1 TO 2 TABLETS BY MOUTH 4 TIMES DAILY AS NEEDED      insulin detemir U-100 (Levemir FlexTouch U-100 Insuln) 100 unit/mL (3 mL) inpn 25 Units by SubCUTAneous route two (2) times a day. Dx: E11.42 45 mL 1    buPROPion SR (WELLBUTRIN SR) 150 mg SR tablet Take 1 Tablet by mouth two (2) times a day. 180 Tablet 3    ondansetron (Zofran ODT) 4 mg disintegrating tablet Take 1 Tablet by mouth every eight (8) hours as needed for Nausea for up to 360 days. 60 Tablet 3    dicyclomine (BENTYL) 10 mg/5 mL soln oral solution Take 10 mL by mouth four (4) times daily as needed (abd pain or diarrhea). 473 mL 5    metFORMIN ER (GLUCOPHAGE XR) 500 mg tablet Take 2 Tablets by mouth two (2) times a day. 360 Tablet 3    montelukast (SINGULAIR) 10 mg tablet TAKE 1 TABLET BY MOUTH ONCE DAILY FOR ALLERGIES AND FOR ASTHMA 90 Tablet 3    albuterol (PROVENTIL VENTOLIN) 2.5 mg /3 mL (0.083 %) nebu USE ONE VIAL IN NEBULIZER EVERY 4 HOURS AS NEEDED FOR WHEEZING. Dx: J45.40 90 Each 3    valsartan (DIOVAN) 80 mg tablet Take 1 Tablet by mouth daily. Indications: high blood pressure 90 Tablet 3    glipiZIDE (GLUCOTROL) 10 mg tablet Take 1 tablet by mouth twice daily 180 Tablet 3    predniSONE (DELTASONE) 20 mg tablet Take 2 tablets by mouth once daily for 5 days. 10 Tablet 0    atorvastatin (LIPITOR) 20 mg tablet Take 1 Tablet by mouth daily. Indications: high cholesterol 90 Tablet 1    ibuprofen (MOTRIN) 800 mg tablet Take one tablet every 8 hours for three days, and then every 8 hours as needed for pain thereafter 30 Tablet 1    Insulin Needles, Disposable, 31 gauge x 5/16\" ndle Use as directed once daily with Levemir Flextouch insulin pens. 100 Package 3    adalimumab (Humira,CF,) 40 mg/0.4 mL sykt 40 mg by SubCUTAneous route every seven (7) days. Indications: rheumatoid arthritis      lidocaine (LIDODERM) 5 % Apply patch to the affected area for 12 hours a day and remove for 12 hours a day. 15 Each 0    acetaminophen (TYLENOL) 500 mg tablet Take 2 Tabs by mouth every six (6) hours as needed for Pain. 20 Tab 0    cyclobenzaprine (FLEXERIL) 10 mg tablet Take 1 Tab by mouth three (3) times daily as needed for Muscle Spasm(s). 15 Tab 0    folic acid (FOLVITE) 1 mg tablet Take 1 mg by mouth daily.  methotrexate (RHEUMATREX) 2.5 mg tablet 20 mg every Wednesday. Pt takes 8 pills for 20 mg total.      meclizine (ANTIVERT) 25 mg tablet Take 1 Tab by mouth three (3) times daily as needed for Dizziness. 20 Tab 1    nitroglycerin (NITROSTAT) 0.4 mg SL tablet Take 1 Tab by mouth every five (5) minutes as needed for Chest Pain. Sit/Lay down then put one tab under the tongue every 5 minutes as needed for chest pain for 3 doses 1 Bottle 0       Past History     Past Medical History:  Past Medical History:   Diagnosis Date    Anemia (iron deficiency)     Anxiety     per pt on 7/29/2021    Asthma     DDD (degenerative disc disease), lumbar 2014    Depression     DM type 2 (diabetes mellitus, type 2) ([address] 11/23/2011    GERD (gastroesophageal reflux disease)     GI bleed 2015    Hepatic steatosis 11/2016    confirmed by US    HTN (hypertension)     Irregular menses     Irritable bowel     Kidney mass     5/28/19: US showed rigth renal cystic nephroma, follows with Urology (Dr. Luci Phelan)    Long-term use of immunosuppressant medication       per pt on 7/29/2021    Morbid obesity ([address]     FE on CPAP     cpap complaint  per pt on 7/29/2021    PUD (peptic ulcer disease) 2015    Renal cancer, right ([address]     tx surgery    Rheumatoid arthritis ([address]     per pt on 7/29/2021       Past Surgical History:  Past Surgical History:   Procedure Laterality Date    COLONOSCOPY N/A 11/29/2021    COLONOSCOPY performed by Keny [facility]ajera MD at [facility] ENDOSCOPY. Sigmoid diverticulosis, int/ext hemorrhoids. Repeat in 5 yrs due to fam hx of colon ca    COLONOSCOPY,DIAGNOSTIC  2021         HX  SECTION      x 2    HX COLONOSCOPY  2015    HX DILATION AND CURETTAGE  2021    HX ENDOSCOPY  2015    HX HYSTERECTOMY  2021    HX HYSTEROSCOPY WITH ENDOMETRIAL ABLATION  2014    HX TUBAL LIGATION      HX TUMOR REMOVAL  2016    right kidney, Dr Adali Grant UROLOGICAL      Mass removed from right kidney on 02/15/2016        Family History:  Family History   Problem Relation Age of Onset    Cancer Mother         Ovarian Cancer /breast ca    Heart Attack Father     Heart Disease Father     Diabetes Father     Other Father         pancreatitis    Cancer Sister     Other Sister         chrons    Heart Attack Maternal Grandfather     Diabetes Paternal Grandmother     HIV/AIDS Son        Social History:  Social History     Tobacco Use    Smoking status: Never Smoker    Smokeless tobacco: Never Used   Vaping Use    Vaping Use: Never used   Substance Use Topics    Alcohol use: Yes     Alcohol/week: 1.0 standard drink     Types: 1 Glasses of wine per week     Comment: wine coolers per month 1-2 per pt on 2021    Drug use: No       Allergies: Allergies   Allergen Reactions    Lisinopril Cough    Pcn [Penicillins] Hives         Review of Systems   no fever  No ear pain  No eye pain  no shortness of breath  no chest pain  no abdominal pain  no dysuria  no leg pain  No rash  No lymphadenopathy  No weight loss    Physical Exam   Physical Exam  Constitutional:       General: She is not in acute distress. Appearance: She is not toxic-appearing. HENT:      Head: Normocephalic. Eyes:      Extraocular Movements: Extraocular movements intact. Cardiovascular:      Rate and Rhythm: Normal rate and regular rhythm. Pulmonary:      Effort: Pulmonary effort is normal.      Breath sounds: Normal breath sounds.    Abdominal:      Palpations: Abdomen is soft. Tenderness: There is no abdominal tenderness. There is right CVA tenderness. Musculoskeletal:         General: No tenderness or deformity. Cervical back: Neck supple. Comments: Tender to palpation in the right flank region, no midline spinal tenderness   Skin:     General: Skin is warm and dry. Neurological:      General: No focal deficit present. Mental Status: She is alert and oriented to person, place, and time. Cranial Nerves: No cranial nerve deficit. Sensory: No sensory deficit. Motor: No weakness. Comments: Sensation to light touch intact over upper and lower extremities bilaterally, 5/5 strength with ankle flexion bilaterally   Psychiatric:         Mood and Affect: Mood normal.         Diagnostic Study Results     Labs -   No results found for this or any previous visit (from the past 24 hour(s)). Radiologic Studies -   CT ABD PELV WO CONT    (Results Pending)     CT Results  (Last 48 hours)    None        CXR Results  (Last 48 hours)    None            Medical Decision Making   I am the first provider for this patient. I reviewed the vital signs, available nursing notes, past medical history, past surgical history, family history and social history. Vital Signs-Reviewed the patient's vital signs. Patient Vitals for the past 24 hrs:   Temp Pulse Resp BP SpO2   02/14/22 0906 98 °F (36.7 °C) 65 16 (!) 147/80 100 %         Provider Notes (Medical Decision Making):   68-year-old female presenting with right-sided flank pain and lightheadedness. Concern for possible muscle strain or spasm, kidney stone, with lightheadedness differential includes orthostatic hypotension, arrhythmia, anemia, electrolyte or metabolic abnormalities. She otherwise denies any cardiopulmonary complaints, neuro exam unremarkable. ED Course:     Initial assessment performed.  The patients presenting problems have been discussed, and they are in agreement with the care plan formulated and outlined with them. I have encouraged them to ask questions as they arise throughout their visit. EKG is performed at 9: 16, shows sinus rhythm at a rate of 64, , QRS 80, QTc 464, axis upright, no ST segment elevation or depression concerning for ACS, this is interpreted as normal sinus rhythm    CBC negative for leukocytosis or anemia, basic metabolic panel with normal renal function, no worrisome electrolyte abnormalities, UA is not suggestive of UTI. CT scan shows no acute abnormality. On reevaluation, patient is resting comfortably, states she feels improved. Patient is counseled on supportive care and return precautions. Will return to the ED for any worsening pain, fevers, intractable vomiting, or any new or worrisome symptoms. Will followup with primary care doctor within 5 days. Critical Care Time:         Disposition:  Home    PLAN:  1. Current Discharge Medication List        2.    Follow-up Information    None       Return to ED if worse     Diagnosis     Clinical Impression: Acute right flank pain, acute lightheadedness

## 2022-03-18 PROBLEM — M06.00 SERONEGATIVE RHEUMATOID ARTHRITIS (HCC): Status: ACTIVE | Noted: 2020-11-15

## 2022-03-19 PROBLEM — E11.21 TYPE 2 DIABETES WITH NEPHROPATHY (HCC): Status: ACTIVE | Noted: 2020-10-19

## 2022-03-19 PROBLEM — I25.10 CORONARY ARTERY DISEASE INVOLVING NATIVE CORONARY ARTERY OF NATIVE HEART WITHOUT ANGINA PECTORIS: Status: ACTIVE | Noted: 2018-08-02

## 2022-03-19 PROBLEM — G89.29 CHRONIC MIDLINE LOW BACK PAIN WITHOUT SCIATICA: Status: ACTIVE | Noted: 2019-03-15

## 2022-03-19 PROBLEM — M54.50 CHRONIC MIDLINE LOW BACK PAIN WITHOUT SCIATICA: Status: ACTIVE | Noted: 2019-03-15

## 2022-03-19 PROBLEM — R74.8 ELEVATED LIVER ENZYMES: Status: ACTIVE | Noted: 2022-01-19

## 2022-03-19 PROBLEM — R11.0 NAUSEA: Status: ACTIVE | Noted: 2022-01-27

## 2022-03-20 PROBLEM — R87.610 ATYPICAL SQUAMOUS CELLS OF UNDETERMINED SIGNIFICANCE (ASCUS) ON PAPANICOLAOU SMEAR OF CERVIX: Status: ACTIVE | Noted: 2019-04-03

## 2022-03-20 PROBLEM — K58.9 IRRITABLE BOWEL SYNDROME: Status: ACTIVE | Noted: 2022-01-27

## 2022-03-24 DIAGNOSIS — E11.42 TYPE 2 DIABETES MELLITUS WITH DIABETIC POLYNEUROPATHY, WITHOUT LONG-TERM CURRENT USE OF INSULIN (HCC): ICD-10-CM

## 2022-03-25 RX ORDER — PREGABALIN 200 MG/1
CAPSULE ORAL
Qty: 60 CAPSULE | Refills: 1 | Status: SHIPPED | OUTPATIENT
Start: 2022-03-25 | End: 2022-05-26

## 2022-03-31 ENCOUNTER — TELEPHONE (OUTPATIENT)
Dept: INTERNAL MEDICINE CLINIC | Age: 47
End: 2022-03-31

## 2022-03-31 NOTE — TELEPHONE ENCOUNTER
Patient called and applied for disability and has an  and would like to know would Dr Vivienne Valle support her.

## 2022-04-09 DIAGNOSIS — E11.42 TYPE 2 DIABETES MELLITUS WITH DIABETIC POLYNEUROPATHY, WITHOUT LONG-TERM CURRENT USE OF INSULIN (HCC): ICD-10-CM

## 2022-04-09 LAB
ALBUMIN SERPL-MCNC: 4.2 G/DL (ref 3.8–4.8)
ALBUMIN/GLOB SERPL: 1.4 {RATIO} (ref 1.2–2.2)
ALP SERPL-CCNC: 118 IU/L (ref 44–121)
ALT SERPL-CCNC: 92 IU/L (ref 0–32)
AST SERPL-CCNC: 70 IU/L (ref 0–40)
BILIRUB SERPL-MCNC: 0.3 MG/DL (ref 0–1.2)
BUN SERPL-MCNC: 7 MG/DL (ref 6–24)
BUN/CREAT SERPL: 8 (ref 9–23)
CALCIUM SERPL-MCNC: 9.6 MG/DL (ref 8.7–10.2)
CHLORIDE SERPL-SCNC: 100 MMOL/L (ref 96–106)
CHOLEST SERPL-MCNC: 136 MG/DL (ref 100–199)
CO2 SERPL-SCNC: 21 MMOL/L (ref 20–29)
CREAT SERPL-MCNC: 0.86 MG/DL (ref 0.57–1)
EGFR: 83 ML/MIN/1.73
EST. AVERAGE GLUCOSE BLD GHB EST-MCNC: 289 MG/DL
GLOBULIN SER CALC-MCNC: 3 G/DL (ref 1.5–4.5)
GLUCOSE SERPL-MCNC: 265 MG/DL (ref 65–99)
HBA1C MFR BLD: 11.7 % (ref 4.8–5.6)
HDLC SERPL-MCNC: 36 MG/DL
LDLC SERPL CALC-MCNC: 84 MG/DL (ref 0–99)
POTASSIUM SERPL-SCNC: 4.8 MMOL/L (ref 3.5–5.2)
PROT SERPL-MCNC: 7.2 G/DL (ref 6–8.5)
SODIUM SERPL-SCNC: 140 MMOL/L (ref 134–144)
TRIGL SERPL-MCNC: 82 MG/DL (ref 0–149)
VLDLC SERPL CALC-MCNC: 16 MG/DL (ref 5–40)

## 2022-04-09 RX ORDER — INSULIN LISPRO 100 [IU]/ML
INJECTION, SOLUTION INTRAVENOUS; SUBCUTANEOUS
Qty: 15 ADJUSTABLE DOSE PRE-FILLED PEN SYRINGE | Refills: 0
Start: 2022-04-09 | End: 2022-04-27 | Stop reason: SDUPTHER

## 2022-04-09 RX ORDER — INSULIN DETEMIR 100 [IU]/ML
30 INJECTION, SOLUTION SUBCUTANEOUS 2 TIMES DAILY
Qty: 45 ML | Refills: 1
Start: 2022-04-09 | End: 2022-04-27 | Stop reason: SDUPTHER

## 2022-04-09 NOTE — PROGRESS NOTES
Message sent to patient by My Chart:  Your labs showed A1c 11.7%, meaning uncontrolled diabetes. Your last A1c was 9.3% in 10/21, and goal A1c is less than 7.0%. Your glucose was high at 265. Increase Humalog insulin from 10 to 15 units before breakfast and dinner. Also increase insulin detemir (Levemir) from 25 units to 30 units twice daily. Two liver tests were a little high due to non-alcoholic fatty liver disease. Your other labs were normal, including kidney tests and cholesterol.      Dr. Rucker Puls

## 2022-04-13 ENCOUNTER — TELEPHONE (OUTPATIENT)
Dept: INTERNAL MEDICINE CLINIC | Age: 47
End: 2022-04-13

## 2022-04-13 NOTE — TELEPHONE ENCOUNTER
Royal Romero from [employer] called and stated they faxed over on 4/11/22 an eval regarding the patient, wanted to know did we get that yet?     Cb# [phone]

## 2022-04-14 NOTE — PATIENT INSTRUCTIONS
Preventing Falls: Care Instructions  Your Care Instructions     Getting around your home safely can be a challenge if you have injuries or health problems that make it easy for you to fall. Loose rugs and furniture in walkways are among the dangers for many older people who have problems walking or who have poor eyesight. People who have conditions such as arthritis, osteoporosis, or dementia also have to be careful not to fall. You can make your home safer with a few simple measures. Follow-up care is a key part of your treatment and safety. Be sure to make and go to all appointments, and call your doctor if you are having problems. It's also a good idea to know your test results and keep a list of the medicines you take. How can you care for yourself at home? Taking care of yourself  · Exercise regularly to improve your strength, muscle tone, and balance. Walk if you can. Swimming may be a good choice if you cannot walk easily. · Have your vision and hearing checked each year or any time you notice a change. If you have trouble seeing and hearing, you might not be able to avoid objects and could lose your balance. · Know the side effects of the medicines you take. Ask your doctor or pharmacist whether the medicines you take can affect your balance. Sleeping pills or sedatives can affect your balance. · Limit the amount of alcohol you drink. Alcohol can impair your balance and other senses. · Ask your doctor whether calluses or corns on your feet need to be removed. If you wear loose-fitting shoes because of calluses or corns, you can lose your balance and fall. · Talk to your doctor if you have numbness in your feet. · You may get dizzy if you do not drink enough water. To prevent dehydration, drink plenty of fluids. Choose water and other clear liquids.  If you have kidney, heart, or liver disease and have to limit fluids, talk with your doctor before you increase the amount of fluids you drink.  Preventing falls at home  · Remove raised doorway thresholds, throw rugs, and clutter. Repair loose carpet or raised areas in the floor. · Move furniture and electrical cords to keep them out of walking paths. · Use nonskid floor wax, and wipe up spills right away, especially on ceramic tile floors. · If you use a walker or cane, put rubber tips on it. If you use crutches, clean the bottoms of them regularly with an abrasive pad, such as steel wool. · Keep your house well lit, especially [facility], and outside walkways. Use night-lights in areas such as hallways and bathrooms. Add extra light switches or use remote switches (such as switches that go on or off when you clap your hands) to make it easier to turn lights on if you have to get up during the night. · Install sturdy handrails on stairways. · Move items in your cabinets so that the things you use a lot are on the lower shelves (about waist level). · Keep a cordless phone and a flashlight with new batteries by your bed. If possible, put a phone in each of the main rooms of your house, or carry a cell phone in case you fall and cannot reach a phone. Or, you can wear a device around your neck or wrist. You push a button that sends a signal for help. · Wear low-heeled shoes that fit well and give your feet good support. Use footwear with nonskid soles. Check the heels and soles of your shoes for wear. Repair or replace worn heels or soles. · Do not wear socks without shoes on wood floors. · Walk on the grass when the sidewalks are slippery. If you live in an area that gets snow and ice in the winter, sprinkle salt on slippery steps and sidewalks. Or ask a family member or friend to do this for you. Preventing falls in the bath  · Install grab bars and nonskid mats inside and outside your shower or tub and near the toilet and sinks. · Use shower chairs and bath benches.   · Use a hand-held shower head that will allow you to sit while showering. · Get into a tub or shower by putting the weaker leg in first. Get out of a tub or shower with your strong side first.  · Repair loose toilet seats and consider installing a raised toilet seat to make getting on and off the toilet easier. · Keep your bathroom door unlocked while you are in the shower. Where can you learn more? Go to http://www.barrett.com/  Enter G117 in the search box to learn more about \"Preventing Falls: Care Instructions. \"  Current as of: September 8, 2021               Content Version: 13.2  © 4682-9709 [employer], [employer]. Care instructions adapted under license by [employer] (which disclaims liability or warranty for this information). If you have questions about a medical condition or this instruction, always ask your healthcare professional. [address] any warranty or liability for your use of this information.

## 2022-04-18 RX ORDER — CYCLOBENZAPRINE HCL 5 MG
5 TABLET ORAL
Qty: 21 TABLET | Refills: 0 | Status: SHIPPED | OUTPATIENT
Start: 2022-04-18

## 2022-04-18 NOTE — TELEPHONE ENCOUNTER
Requested Prescriptions     Pending Prescriptions Disp Refills    cyclobenzaprine (FLEXERIL) 5 mg tablet 12 Tablet 0     Sig: Take 1 Tablet by mouth three (3) times daily as needed for Muscle Spasm(s).

## 2022-04-18 NOTE — TELEPHONE ENCOUNTER
PCP: Kaitlin Damon MD     Last appt: 1/27/2022   Future Appointments   Date Time Provider Dwight Spencer   5/12/2022 11:30 AM Kaitlin Damon MD [facility] AMB        Requested Prescriptions     Pending Prescriptions Disp Refills    cyclobenzaprine (FLEXERIL) 5 mg tablet 12 Tablet 0     Sig: Take 1 Tablet by mouth three (3) times daily as needed for Muscle Spasm(s).

## 2022-04-25 NOTE — PROGRESS NOTES
I called and verified the patient by name and date of birth, then proceeded with the message from the provider. The patient understood and had no questions at this time. Patient with continued memory loss per wife over the past few years  Complaints include deficits in short term memory as well as not recognizing familiar places  Has had some increased difficulty following simple direction per wife  Labs and MRI brain with NQ in the past with no significant findings, he did score poorly on MOCA testing in the past 16/30  We did discuss the role of anxiety and poor sleep on cognition, however cannot fully rule out an underlying cognitive disorder at this time    Plan for further assessment through neuropsychology to better determine deficits

## 2022-04-27 ENCOUNTER — OFFICE VISIT (OUTPATIENT)
Dept: ENDOCRINOLOGY | Age: 47
End: 2022-04-27
Payer: MEDICAID

## 2022-04-27 VITALS
WEIGHT: 284.8 LBS | DIASTOLIC BLOOD PRESSURE: 65 MMHG | HEART RATE: 56 BPM | HEIGHT: 64 IN | SYSTOLIC BLOOD PRESSURE: 121 MMHG | BODY MASS INDEX: 48.62 KG/M2

## 2022-04-27 DIAGNOSIS — E78.2 MIXED HYPERLIPIDEMIA: ICD-10-CM

## 2022-04-27 DIAGNOSIS — Z79.4 TYPE 2 DIABETES MELLITUS WITH DIABETIC POLYNEUROPATHY, WITH LONG-TERM CURRENT USE OF INSULIN (HCC): Primary | ICD-10-CM

## 2022-04-27 DIAGNOSIS — I10 ESSENTIAL HYPERTENSION: ICD-10-CM

## 2022-04-27 DIAGNOSIS — E66.01 MORBID OBESITY (HCC): ICD-10-CM

## 2022-04-27 DIAGNOSIS — Z79.4 TYPE 2 DIABETES MELLITUS WITH DIABETIC POLYNEUROPATHY, WITH LONG-TERM CURRENT USE OF INSULIN (HCC): ICD-10-CM

## 2022-04-27 DIAGNOSIS — E11.42 TYPE 2 DIABETES MELLITUS WITH DIABETIC POLYNEUROPATHY, WITH LONG-TERM CURRENT USE OF INSULIN (HCC): ICD-10-CM

## 2022-04-27 DIAGNOSIS — R74.8 ELEVATED LIVER ENZYMES: ICD-10-CM

## 2022-04-27 DIAGNOSIS — E11.42 TYPE 2 DIABETES MELLITUS WITH DIABETIC POLYNEUROPATHY, WITH LONG-TERM CURRENT USE OF INSULIN (HCC): Primary | ICD-10-CM

## 2022-04-27 PROCEDURE — 99204 OFFICE O/P NEW MOD 45 MIN: CPT | Performed by: GENERAL ACUTE CARE HOSPITAL

## 2022-04-27 PROCEDURE — 3046F HEMOGLOBIN A1C LEVEL >9.0%: CPT | Performed by: GENERAL ACUTE CARE HOSPITAL

## 2022-04-27 RX ORDER — EXENATIDE 2 MG/.85ML
2 INJECTION, SUSPENSION, EXTENDED RELEASE SUBCUTANEOUS
Qty: 4 EACH | Refills: 5 | Status: SHIPPED | OUTPATIENT
Start: 2022-04-27 | End: 2022-04-27

## 2022-04-27 RX ORDER — DULAGLUTIDE 1.5 MG/.5ML
1.5 INJECTION, SOLUTION SUBCUTANEOUS
Qty: 4 EACH | Refills: 5 | Status: SHIPPED | OUTPATIENT
Start: 2022-04-27 | End: 2022-05-12 | Stop reason: ALTCHOICE

## 2022-04-27 RX ORDER — DULAGLUTIDE 0.75 MG/.5ML
0.75 INJECTION, SOLUTION SUBCUTANEOUS
Qty: 4 PEN | Refills: 0 | Status: SHIPPED | OUTPATIENT
Start: 2022-04-27 | End: 2022-06-30

## 2022-04-27 RX ORDER — PEN NEEDLE, DIABETIC 30 GX3/16"
NEEDLE, DISPOSABLE MISCELLANEOUS
Qty: 100 PEN NEEDLE | Refills: 5 | Status: SHIPPED | OUTPATIENT
Start: 2022-04-27 | End: 2022-04-28

## 2022-04-27 RX ORDER — EXENATIDE 2 MG/.65ML
2 INJECTION, SUSPENSION, EXTENDED RELEASE SUBCUTANEOUS
Qty: 4 PEN | Refills: 5 | Status: SHIPPED | OUTPATIENT
Start: 2022-04-27 | End: 2022-04-27

## 2022-04-27 RX ORDER — INSULIN DETEMIR 100 [IU]/ML
INJECTION, SOLUTION SUBCUTANEOUS
Qty: 10 PEN | Refills: 4 | Status: SHIPPED | OUTPATIENT
Start: 2022-04-27 | End: 2022-04-28

## 2022-04-27 RX ORDER — INSULIN LISPRO 100 [IU]/ML
INJECTION, SOLUTION INTRAVENOUS; SUBCUTANEOUS
Qty: 20 ADJUSTABLE DOSE PRE-FILLED PEN SYRINGE | Refills: 5 | Status: SHIPPED | OUTPATIENT
Start: 2022-04-27

## 2022-04-27 NOTE — PATIENT INSTRUCTIONS
Continue Metformin 1000mg twice daily and Glipizide 10mg twice daily. Start Bydureon 2mg weekly injection    Take Levemir insulin 34 units in the morning and 32 units in the evening  Take Humalog 15 units before Breakfast, Lunch, Dinner please take 15 mins before your meal    Take insulin from sliding scale in addition to the 15 units of Humalog before meals as follows:    150-200 = 1 units  200-250 = 2 units  250-300= 3 units  300-350 = 4 units  350-400 = 5 units  400-450= 6 units  450-500= 7 units  >500 = 8 units and call physician      Lifestyle modifications discussed, including healthy dietary choices and physical activity, recommended referrral to DM education. To log blood sugar readings taken 3 times a day. Hypoglycemia management discussed and glucagon inj sent. Foot care and Dental care discussed. To keep Ophthalm and Podiatry appts. Labs scheduled prior to next visit. Learning About Meal Planning for Diabetes  Why plan your meals? Meal planning can be a key part of managing diabetes. Planning meals and snacks with the right balance of carbohydrate, protein, and fat can help you keep your blood sugar at the target level you set with your doctor. You don't have to eat special foods. You can eat what your family eats, including sweets once in a while. But you do have to pay attention to how often you eat and how much you eat of certain foods. You may want to work with a dietitian or a diabetes educator. They can give you tips and meal ideas and can answer your questions about meal planning. This health professional can also help you reach a healthy weight if that is one of your goals. What plan is right for you? Your dietitian or diabetes educator may suggest that you start with the plate format or carbohydrate counting. The plate format  The plate format is a simple way to help you manage how you eat. You plan meals by learning how much space each food should take on a plate.  Using the plate format helps you manage the amount of carbohydrate you eat. It can make it easier to keep your blood sugar level within your target range. It also helps you see if you're eating healthy portion sizes. To use the plate format, you put non-starchy vegetables on half your plate. Add lean protein foods, such as fish, lean meats and poultry, or soy products, on one-quarter of the plate. Put a grain or starchy vegetable (such as brown rice or a potato) on the final quarter of the plate. You can add a small piece of fruit and some low-fat or fat-free milk or yogurt, depending on your carbohydrate goal for each meal.  Here are some tips for using the plate format:  · Make sure that you are not using an oversized plate. A 9-inch plate is best. Many restaurants use larger plates. · Get used to using the plate format at home. Then you can use it when you eat out. · Write down your questions about using the plate format. Talk to your doctor, a dietitian, or a diabetes educator about your concerns. Carbohydrate counting  With carbohydrate counting, you plan meals based on the amount of carbohydrate in each food. Carbohydrate raises blood sugar higher and more quickly than any other nutrient. It is found in desserts, breads and cereals, and fruit. It's also found in starchy vegetables such as potatoes and corn, grains such as rice and pasta, and milk and yogurt. You can help keep your blood sugar levels within your target range by planning how much carbohydrate to have at meals and snacks. The amount you need depends on several things. These include your weight, how active you are, which diabetes medicines you take, and what your goals are for your blood sugar levels. A registered dietitian or diabetes educator can help you plan how much carbohydrate to include in each meal and snack.   An example of a carbohydrate counting plan is:  · 45 to 60 grams at each meal. That's about the same as 3 to 4 carbohydrate servings. · 15 to 20 grams at each snack. That's about the same as 1 carbohydrate serving. The Nutrition Facts label on packaged foods tells you how much carbohydrate is in a serving of the food. First, look at the serving size on the food label. Is that the amount you eat in a serving? All of the nutrition information on a food label is based on that serving size. So if you eat more or less than that, you'll need to adjust the other numbers. Total carbohydrate is the next thing you need to look for on the label. If you count carbohydrate servings, one serving of carbohydrate is 15 grams. For foods that don't come with labels, such as fresh fruits and vegetables, you'll need a guide that lists carbohydrate in these foods. Ask your doctor, dietitian, or diabetes educator about books or other nutrition guides you can use. If you take insulin, you need to know how many grams of carbohydrate are in a meal. This lets you know how much rapid-acting insulin to take before you eat. If you use an insulin pump, you get a constant rate of insulin during the day. So the pump must be programmed at meals to give you extra insulin to cover the rise in blood sugar after meals. When you know how much carbohydrate you will eat, you can take the right amount of insulin. Or, if you always use the same amount of insulin, you need to make sure that you eat the same amount of carbohydrate at meals. If you need more help to understand carbohydrate counting and food labels, ask your doctor, dietitian, or diabetes educator. How can you plan healthy meals? Here are some tips to get started:  · Plan your meals a week at a time. Don't forget to include snacks too. · Use cookbooks or online recipes to plan several main meals. Plan some quick meals for busy nights. You also can double some recipes that freeze well. Then you can save half for other busy nights when you don't have time to cook.   · Make sure you have the ingredients you need for your recipes. If you're running low on basic items, put these items on your shopping list too. · List foods that you use to make breakfasts, lunches, and snacks. List plenty of fruits and vegetables. · Post this list on the refrigerator. Add to it as you think of more things you need. · Take the list to the store to do your weekly shopping. Follow-up care is a key part of your treatment and safety. Be sure to make and go to all appointments, and call your doctor if you are having problems. It's also a good idea to know your test results and keep a list of the medicines you take. Where can you learn more? Go to http://www.gray.com/  Enter T540 in the search box to learn more about \"Learning About Meal Planning for Diabetes. \"  Current as of: September 8, 2021               Content Version: 13.2  © 9500-6692 [employer]. Care instructions adapted under license by [employer] (which disclaims liability or warranty for this information). If you have questions about a medical condition or this instruction, always ask your healthcare professional. [place] [address] any warranty or liability for your use of this information. Learning About Diabetes and Your Teeth  How does diabetes affect your teeth and gums? When you have diabetes, managing blood sugar levels and taking good care of your teeth and gums are both important. When blood sugar levels are high, there's a greater risk for:  · Gum (periodontal) disease. · Tooth decay. · Fungal infections in the mouth, like thrush. · Dry mouth, or xerostomia (say \"edwina-STO-kristie-\"). The mouth needs saliva to neutralize the acids in your mouth. These acids can lead to gum disease and tooth decay. Keeping your blood sugar levels in your target range can help prevent problems with the teeth and gums.  If you have any problems with your teeth or gums, see your dentist.  How do you care for your teeth and gums when you have diabetes? · Brush your teeth twice a day. · Floss daily. Make sure to press the floss against your teeth and not your gums. · Check each day for areas where your gums might be red or painful. Be sure to let your dentist know of any sores in your mouth. · See your dentist regularly for professional cleaning of your teeth and to look for gum problems. Many dentists recommend getting checkups twice a year. Remind your dentist that you have diabetes before any work is done. · Don't smoke or use smo     · Hypoglycemia: Care Instructions  · Overview  ·    · Hypoglycemia means that your blood sugar is low and your body is not getting enough fuel. Some people get low blood sugar from not eating often enough. Some medicines to treat diabetes can cause low blood sugar. People who have had surgery on their stomachs or intestines may get hypoglycemia. Problems with the pancreas, kidneys, or liver also can cause low blood sugar. · A snack or drink with sugar in it will raise your blood sugar and should ease your symptoms right away. · Your doctor may recommend that you change or stop your medicines until you can get your blood sugar levels under control. In the long run, you may need to change your diet and eating habits so that you get enough fuel for your body throughout the day. · Follow-up care is a key part of your treatment and safety. Be sure to make and go to all appointments, and call your doctor if you are having problems. It's also a good idea to know your test results and keep a list of the medicines you take. · How can you care for yourself at home? · Learn your signs of low blood sugar. They are different for everyone. Some common early signs include:  · Nausea. · Hunger. · Feeling nervous, irritable, or shaky. · Cold, clammy skin. · Sweating (when you're not exercising). · Use the \"rule of 15\" to treat low blood sugar.  This includes eating 15 grams of carbohydrate from a quick-sugar food, such as 3 or 4 glucose tablets or ½ cup of juice. Wait 15 minutes and check your blood sugar. If it is still below 70 mg/dL, eat another 15 grams of carbohydrate. Repeat this every 15 minutes until your blood sugar is in a safe target range. · Once your blood sugar is in a safe range, eat a snack or meal to prevent recurrent low blood sugar. · Make sure family, friends, and coworkers know the symptoms of low blood sugar and know how to get your sugar level up. · If you were prescribed a glucagon kit, always have it with you. Make sure friends and family know how to use it. · When should you call for help? ·  Call 911 anytime you think you may need emergency care. For example, call if:  ·   · You passed out (lost consciousness). ·   · You are confused or cannot think clearly. ·   · Your blood sugar is very high or very low. · Watch closely for changes in your health, and be sure to contact your doctor if:  ·   · Your blood sugar stays outside the level your doctor set for you. ·   · You have any problems. · Where can you learn more? · Go to http://www.gray.com/  · Enter A755 in the search box to learn more about \"Hypoglycemia: Care Instructions. \"  · Current as of: July 28, 2021               Content Version: 13.2  · © 3284-3126 [employer]. · Care instructions adapted under license by [employer] (which disclaims liability or warranty for this information). If you have questions about a medical condition or this instruction, always ask your healthcare professional. [place] [address] any warranty or liability for your use of this information. ·    ·      · Diabetes Foot Health: Care Instructions  · Your Care Instructions  ·    · When you have diabetes, your feet need extra care and attention.  Diabetes can damage the nerve endings and blood vessels in your feet, making you less likely to notice when your feet are injured. Diabetes also limits your body's ability to fight infection and get blood to areas that need it. If you get a minor foot injury, it could become an ulcer or a serious infection. With good foot care, you can prevent most of these problems. · Caring for your feet can be quick and easy. Most of the care can be done when you are bathing or getting ready for bed. · Follow-up care is a key part of your treatment and safety. Be sure to make and go to all appointments, and call your doctor if you are having problems. It's also a good idea to know your test results and keep a list of the medicines you take. · How can you care for yourself at home? · Keep your blood sugar close to normal by watching what and how much you eat, monitoring blood sugar, taking medicines if prescribed, and getting regular exercise. · Do not smoke. Smoking affects blood flow and can make foot problems worse. If you need help quitting, talk to your doctor about stop-smoking programs and medicines. These can increase your chances of quitting for good. · Eat a diet that is low in fats. High fat intake can cause fat to build up in your blood vessels and decrease blood flow. · Inspect your feet daily for blisters, cuts, cracks, or sores. If you cannot see well, use a mirror or have someone help you. · Take care of your feet:  · Wash your feet every day. Use warm (not hot) water. Check the water temperature with your wrists or other part of your body, not your feet. · Dry your feet well. Pat them dry. Do not rub the skin on your feet too hard. Dry well between your toes. If the skin on your feet stays moist, bacteria or a fungus can grow, which can lead to infection. · Keep your skin soft. Use moisturizing skin cream to keep the skin on your feet soft and prevent calluses and cracks. But do not put the cream between your toes, and stop using any cream that causes a rash. · Clean underneath your toenails carefully. Do not use a sharp object to clean underneath your toenails. Use the blunt end of a nail file or other rounded tool. · Trim and file your toenails straight across to prevent ingrown toenails. Use a nail clipper, not scissors. Use an emery board to smooth the edges. · Change socks daily. Socks without seams are best, because seams often rub the feet. You can find socks for people with diabetes from specialty catalogs. · Look inside your shoes every day for things like gravel or torn linings, which could cause blisters or sores. · Buy shoes that fit well:  · Look for shoes that have plenty of space around the toes. This helps prevent bunions and blisters. · Try on shoes while wearing the kind of socks you will usually wear with the shoes. · Avoid plastic shoes. They may rub your feet and cause blisters. Good shoes should be made of materials that are flexible and breathable, such as leather or cloth. · Break in new shoes slowly by wearing them for no more than an hour a day for several days. Take extra time to check your feet for red areas, blisters, or other problems after you wear new shoes. · Do not go barefoot. Do not wear sandals, and do not wear shoes with very thin soles. Thin soles are easy to puncture. They also do not protect your feet from hot pavement or cold weather. · Have your doctor check your feet during each visit. If you have a foot problem, see your doctor. Do not try to treat an early foot problem at home. Home remedies or treatments that you can buy without a prescription (such as corn removers) can be harmful. · Always get early treatment for foot problems. A minor irritation can lead to a major problem if not properly cared for early. · When should you call for help? ·  Call your doctor now or seek immediate medical care if:  ·   · You have a foot sore, an ulcer or break in the skin that is not healing after 4 days, bleeding corns or calluses, or an ingrown toenail.    ·   · You have blue or black areas, which can mean bruising or blood flow problems. ·   · You have peeling skin or tiny blisters between your toes or cracking or oozing of the skin. ·   · You have a fever for more than 24 hours and a foot sore. ·   · You have new numbness or tingling in your feet that does not go away after you move your feet or change positions. ·   · You have unexplained or unusual swelling of the foot or ankle. · Watch closely for changes in your health, and be sure to contact your doctor if:  ·   · You cannot do proper foot care. · Where can you learn more? · Go to http://www.gray.com/  · Enter A739 in the search box to learn more about \"Diabetes Foot Health: Care Instructions. \"  · Current as of: July 28, 2021               Content Version: 13.2  · © 2006-2022 [employer]. · Care instructions adapted under license by [employer] (which disclaims liability or warranty for this information). If you have questions about a medical condition or this instruction, always ask your healthcare professional. [address] any warranty or liability for your use of this information. ·    · keless tobacco. Tobacco use with diabetes can lead to a greater risk of severe gum disease. If you need help quitting, talk to your doctor about stop-smoking programs and medicines. These can increase your chances of quitting for good. Follow-up care is a key part of your treatment and safety. Be sure to make and go to all appointments, and call your doctor if you are having problems. It's also a good idea to know your test results and keep a list of the medicines you take. Where can you learn more? Go to http://www.barrett.com/  Enter H523 in the search box to learn more about \"Learning About Diabetes and Your Teeth. \"  Current as of: July 28, 2021               Content Version: 13.2  © 0378-9263 [employer]. Care instructions adapted under license by [employer] (which disclaims liability or warranty for this information). If you have questions about a medical condition or this instruction, always ask your healthcare professional. [address] any warranty or liability for your use of this information.

## 2022-04-27 NOTE — PROGRESS NOTES
Chief Complaint   Patient presents with    Diabetes     pcp and pharmacy verified     History of Present Illness: Eddie Hodgson is a 50 y.o. female who is a new patient for evaluation of diabetes. Was diagnosed with diabetes type 2 on 2016 . Current regimen is  Metformin 1g BID  Glipizide 10mg BID  humalog 15 units before breakfast and dinner, levemir 30 units BID . Checks blood sugars 3-4 times per day and readings run 200-500 . Most recent Hgb A1c was 11.7 in 04/08/2022. (was on actos but was discontinued, patient unsure why)    A typical day is as follows:  - breakfast:coffee, cheese +toast, butter, eggs, hough, buiscuit, coffee with evaporated milk  - lunch:salad, ham/turkey, cheese, apple, pear, dressing, sandwiches  - dinner: 3 course meal, baked meats, greens, bread, pasta (stopped pasta for 4 months now)  - beverages: mainly water, coffee in AM  - snacks: chocolate, chips, pretzels  Bread every meal, eats a lot of FRUIT (orange with breakfast, apple and pear with lunch, bowl of grapes for dinner)    Exercise consists of limited due to RA . No history of vascular disease. No history of retinopathy, or nephropathy.    Has neuropathy  Hx of kidney Ca in 2015  Hx of SILVA  On Methotrexate (9 tabs/week) and Etanercept for RA, sometimes takes prednisone for RA flares, not recently  Hx of Asthma, well controlled on preventive inhalers     Last eye exam was last sept, annual visits    Past Medical History:   Diagnosis Date    Anemia (iron deficiency)     Anxiety     per pt on 7/29/2021    Asthma     DDD (degenerative disc disease), lumbar 2014    Depression     DM type 2 (diabetes mellitus, type 2) ([facility] [place] [address] 11/23/2011    GERD (gastroesophageal reflux disease)     GI bleed 2015    Hepatic steatosis 11/2016    confirmed by US    HTN (hypertension)     Irregular menses     Irritable bowel     Kidney mass     5/28/19: US showed rigth renal cystic nephroma, follows with Urology (Dr. Tha Plasencia)  Long-term use of immunosuppressant medication       per pt on 2021    Morbid obesity ([address]     FE on CPAP     cpap complaint  per pt on 2021    PUD (peptic ulcer disease)     Renal cancer, right ([address]     tx surgery    Rheumatoid arthritis ([address]     per pt on 2021     Past Surgical History:   Procedure Laterality Date    COLONOSCOPY N/A 2021    COLONOSCOPY performed by Tabatha Vaughn MD at [facility]. Sigmoid diverticulosis, int/ext hemorrhoids. Repeat in 5 yrs due to fam hx of colon ca    COLONOSCOPY,DIAGNOSTIC  2021         HX  SECTION      x 2    HX COLONOSCOPY  2015    HX DILATION AND CURETTAGE  2021    HX ENDOSCOPY  2015    HX HYSTERECTOMY  2021    HX HYSTEROSCOPY WITH ENDOMETRIAL ABLATION  2014    HX TUBAL LIGATION      HX TUMOR REMOVAL  2016    right kidney, Dr Mitali Dave removed from right kidney on 02/15/2016      Current Outpatient Medications   Medication Sig    Insulin Needles, Disposable, 31 gauge x 5/16\" ndle Use as directed once daily with Levemir Flextouch insulin pens.  insulin detemir U-100 (Levemir FlexTouch U-100 Insuln) 100 unit/mL (3 mL) inpn Dx: E11.42  Indications: type 2 diabetes mellitus    HumaLOG KwikPen Insulin 100 unit/mL kwikpen Inject 15 units under the skin before breakfast, lunch and dinner. Use sliding scale insulin also    dulaglutide (Trulicity) 4.04 QN/0.4 mL sub-q pen 0.5 mL by SubCUTAneous route every seven (7) days. Take 0.75mg weekly for 4 weeks then increase to 1.5mg weekly after that    dulaglutide (Trulicity) 1.5 WW/0.2 mL sub-q pen 0.5 mL by SubCUTAneous route every seven (7) days. Take 0.75mg weekly for 4 weeks then increase to 1.5mg weekly after that    cyclobenzaprine (FLEXERIL) 5 mg tablet Take 1 Tablet by mouth three (3) times daily as needed for Muscle Spasm(s).  pregabalin (LYRICA) 200 mg capsule TAKE 1 TABLET BY MOUTH TWICE DAILY .  DO NOT EXCEED 2 PER 24 HOURS    naproxen (NAPROSYN) 500 mg tablet Take 1 Tablet by mouth every twelve (12) hours as needed for Pain.  albuterol (PROVENTIL HFA, VENTOLIN HFA, PROAIR HFA) 90 mcg/actuation inhaler Take 2 Puffs by inhalation every six (6) hours as needed for Wheezing. Asthma, Dx: J45.40    fluticasone propion-salmeteroL (Advair Diskus) 500-50 mcg/dose diskus inhaler Take 1 Puff by inhalation every twelve (12) hours. Asthma, Dx: J45.30    amitriptyline (ELAVIL) 50 mg tablet TAKE 1 TABLET BY MOUTH NIGHTLY FOR  NUMBNESS AT  HANDS  AND  FEET    HYDROcodone-acetaminophen (NORCO) 5-325 mg per tablet TAKE 1 TO 2 TABLETS BY MOUTH 4 TIMES DAILY AS NEEDED    buPROPion SR (WELLBUTRIN SR) 150 mg SR tablet Take 1 Tablet by mouth two (2) times a day.  ondansetron (Zofran ODT) 4 mg disintegrating tablet Take 1 Tablet by mouth every eight (8) hours as needed for Nausea for up to 360 days.  dicyclomine (BENTYL) 10 mg/5 mL soln oral solution Take 10 mL by mouth four (4) times daily as needed (abd pain or diarrhea).  metFORMIN ER (GLUCOPHAGE XR) 500 mg tablet Take 2 Tablets by mouth two (2) times a day.  montelukast (SINGULAIR) 10 mg tablet TAKE 1 TABLET BY MOUTH ONCE DAILY FOR ALLERGIES AND FOR ASTHMA    albuterol (PROVENTIL VENTOLIN) 2.5 mg /3 mL (0.083 %) nebu USE ONE VIAL IN NEBULIZER EVERY 4 HOURS AS NEEDED FOR WHEEZING. Dx: J45.40    valsartan (DIOVAN) 80 mg tablet Take 1 Tablet by mouth daily. Indications: high blood pressure    glipiZIDE (GLUCOTROL) 10 mg tablet Take 1 tablet by mouth twice daily    predniSONE (DELTASONE) 20 mg tablet Take 2 tablets by mouth once daily for 5 days.  atorvastatin (LIPITOR) 20 mg tablet Take 1 Tablet by mouth daily.  Indications: high cholesterol    ibuprofen (MOTRIN) 800 mg tablet Take one tablet every 8 hours for three days, and then every 8 hours as needed for pain thereafter    lidocaine (LIDODERM) 5 % Apply patch to the affected area for 12 hours a day and remove for 12 hours a day.  acetaminophen (TYLENOL) 500 mg tablet Take 2 Tabs by mouth every six (6) hours as needed for Pain.  folic acid (FOLVITE) 1 mg tablet Take 1 mg by mouth daily.  methotrexate (RHEUMATREX) 2.5 mg tablet every Wednesday. Pt takes 9 pills for 20 mg total.    meclizine (ANTIVERT) 25 mg tablet Take 1 Tab by mouth three (3) times daily as needed for Dizziness.  nitroglycerin (NITROSTAT) 0.4 mg SL tablet Take 1 Tab by mouth every five (5) minutes as needed for Chest Pain. Sit/Lay down then put one tab under the tongue every 5 minutes as needed for chest pain for 3 doses    adalimumab (Humira,CF,) 40 mg/0.4 mL sykt 40 mg by SubCUTAneous route every seven (7) days. Indications: rheumatoid arthritis (Patient not taking: Reported on 4/27/2022)     No current facility-administered medications for this visit. Allergies   Allergen Reactions    Lisinopril Cough    Pcn [Penicillins] Hives     Family History   Problem Relation Age of Onset    Cancer Mother         Ovarian Cancer /breast ca    Heart Attack Father     Heart Disease Father     Diabetes Father     Other Father         pancreatitis    Cancer Sister     Other Sister         chrons    Heart Attack Maternal Grandfather     Diabetes Paternal Grandmother     HIV/AIDS Son      Social History     Socioeconomic History    Marital status:      Spouse name: Not on file    Number of children: Not on file    Years of education: Not on file    Highest education level: Not on file   Occupational History    Not on file   Tobacco Use    Smoking status: Never Smoker    Smokeless tobacco: Never Used   Vaping Use    Vaping Use: Never used   Substance and Sexual Activity    Alcohol use:  Yes     Alcohol/week: 1.0 standard drink     Types: 1 Glasses of wine per week     Comment: wine coolers per month 1-2 per pt on 7/29/2021    Drug use: No    Sexual activity: Yes     Partners: Male   Other Topics Concern    Not on file   Social History Narrative    Not on file     Social Determinants of Health     Financial Resource Strain:     Difficulty of Paying Living Expenses: Not on file   Food Insecurity:     Worried About Running Out of Food in the Last Year: Not on file    Oscar of Food in the Last Year: Not on file   Transportation Needs:     Lack of Transportation (Medical): Not on file    Lack of Transportation (Non-Medical): Not on file   Physical Activity:     Days of Exercise per Week: Not on file    Minutes of Exercise per Session: Not on file   Stress:     Feeling of Stress : Not on file   Social Connections:     Frequency of Communication with Friends and Family: Not on file    Frequency of Social Gatherings with Friends and Family: Not on file    Attends Alevism Services: Not on file    Active Member of [address] [place] [address] [employer] or Organizations: Not on file    Attends Club or Organization Meetings: Not on file    Marital Status: Not on file   Intimate Partner Violence:     Fear of Current or Ex-Partner: Not on file    Emotionally Abused: Not on file    Physically Abused: Not on file    Sexually Abused: Not on file   Housing Stability:     Unable to Pay for Housing in the Last Year: Not on file    Number of [place] in the Last Year: Not on file    Unstable Housing in the Last Year: Not on file     Review of Systems: per HPI  - Constitutional Symptoms: no fevers, chills, weight loss  - Eyes: no blurry vision or double vision  - Cardiovascular: no chest pain or palpitations  - Respiratory: no cough or shortness of breath  - Gastrointestinal: no dysphagia or abdominal pain  - Musculoskeletal: no joint pains or weakness  - Integumentary: no rashes  - Neurological: no numbness, tingling, or headaches  - Psychiatric: no depression or anxiety  - Endocrine: no heat or cold intolerance, no polyuria or polydipsia    Physical Examination:  Blood pressure 121/65, pulse (!) 56, height 5' 4\" (1.626 m), weight 284 lb 12.8 oz (129.2 kg). - General: pleasant, no distress, good eye contact, feeling tearful when discussing her quality of life, having difficulty due to RA and being home bound, also very worried about lan Covid-19  -   - HEENT: no exopthalmos, no periorbital edema, no scleral/conjunctival injection, EOMI, no lid lag or stare  - Neck: supple, no thyromegaly, masses, lymph nodes, or carotid bruits, no supraclavicular or dorsocervical fat pads  - Cardiovascular: regular, normal rate, normal S1 and S2, no murmurs/rubs/gallops,  - Respiratory: clear to auscultation bilaterally  - Gastrointestinal: soft, nontender, nondistended, no masses, no hepatosplenomegaly  - Musculoskeletal: no proximal muscle weakness in upper or lower extremities  - Integumentary: no acanthosis nigricans, no abdominal striae, no rashes, no edema, no foot ulcers  - Neurological: see foot exam  - Psychiatric: normal mood and affect    Diabetic foot exam:     Left Foot:   Visual Exam: normal    Pulse DP: 2+ (normal)   Filament test: reduced sensation    Vibratory sensation: Vibratory sensation: diminished       Right Foot:   Visual Exam: normal    Pulse DP: 2+ (normal)   Filament test: reduced sensation    Vibratory sensation: Vibratory sensation: diminished             Data Reviewed:   - Hgb A1c 11.7%  - lipids: total 136 ,  HDL 36, TG 82, LDL 84  - ALT 64, AST 45  - BUN/Cr -/0.96 GFR >60    Assessment/Plan:   1.  Type 2 diabetes mellitus with diabetic polyneuropathy, with long-term current use of insulin ([facility] [address]    Patient with poor diabetes control due to noncompliance with lifestyle modifications, she is compliant with her meds  We had the pleasure of discussing specific lifestyle modifications and patient referred for diabetic education  Risks of uncontrolled DM discussed  Plan to Continue MTF 1g BID and Glipizide 10mg BID  Start Bydureon 2mg weekly, discussed benefits and possible SE, pt has no hx of pancreatitis or familial thyroid Ca  Would not consider Pioglitazone given risk of weakening bone, pt with hx of RA and prn steroids  Increase levemir from 30 BID to 34 in AM and 32 in PM  Humalog was taking 15 units for breakfast and dinner only and after meals, plan to take for breakfast LUNCH and dinner and to add using sliding scale provided, also to take insulin before the meal  SE of new meds discussed and patient indicates understanding and in agreement to start new meds. Lifestyle modifications discussed with patient, including healthy dietary choices and physical activity,   To log blood sugar readings taken 3 times a day, given log sheets  Hypoglycemia management discussed and glucagon inj sent. Foot care and Dental care discussed. To keep Ophthalm and Podiatry appts. Lab scheduled prior to next visit. -     MICROALBUMIN, UR, RAND W/ MICROALB/CREAT RATIO; Future  -     HumaLOG KwikPen Insulin 100 unit/mL kwikpen; Inject 15 units under the skin before breakfast, lunch and dinner. Use sliding scale insulin also, Normal, Disp-20 Adjustable Dose Pre-filled Pen Syringe, R-5, PATRICIA  -     REFERRAL TO DIABETIC EDUCATION  -     dulaglutide (Trulicity) 4.20 SS/8.2 mL sub-q pen; 0.5 mL by SubCUTAneous route every seven (7) days. Take 0.75mg weekly for 4 weeks then increase to 1.5mg weekly after that, Normal, Disp-4 Pen, R-0  -     dulaglutide (Trulicity) 1.5 ZE/7.3 mL sub-q pen; 0.5 mL by SubCUTAneous route every seven (7) days. Take 0.75mg weekly for 4 weeks then increase to 1.5mg weekly after that, Normal, Disp-4 Each, R-5  -     Insulin Needles, Disposable, 31 gauge x 5/16\" ndle; Use as directed once daily with Levemir Flextouch insulin pens. , Normal, Disp-100 Pen Needle, R-5  -     insulin detemir U-100 (Levemir FlexTouch U-100 Insuln) 100 unit/mL (3 mL) inpn; Dx: E11.42  Indications: type 2 diabetes mellitus, Normal, Disp-10 Pen, R-4Note: dose increase. Weight 294 lbs.       2. Mixed hyperlipidemia  On lipitor 20mg daily, LDL 84 slightly above goal, discussed lifestyle modifications     3. Elevated liver enzymes  Hx of SILVA per patient, fatty liver seen on imaging, also she is on methotrexate with recent dose increase, repeat LFTs in next visit    4. Essential hypertension  She is on valsartan with good control and is renoprotective    5. Morbid obesity  Discussed lifestyle modif and benefits of wt loss on sugar control, started on GLP1 agonist Bydureon and referred to diabetic/nutrition education         Addendum 04/27/2022: Insurance does not cover Bydureon, plan to try on Trulicity if covered, contacted patient on how to take it approp and dose increment 0.75mg weekly for 4 weeks then increase to 1.5mg weekly. If she has any trouble taking the injection advised to come to the office for demonstration     Patient Instructions     Continue Metformin 1000mg twice daily and Glipizide 10mg twice daily. Start Bydureon 2mg weekly injection    Take Levemir insulin 34 units in the morning and 32 units in the evening  Take Humalog 15 units before Breakfast, Lunch, Dinner please take 15 mins before your meal    Take insulin from sliding scale in addition to the 15 units of Humalog before meals as follows:    150-200 = 1 units  200-250 = 2 units  250-300= 3 units  300-350 = 4 units  350-400 = 5 units  400-450= 6 units  450-500= 7 units  >500 = 8 units and call physician      Lifestyle modifications discussed, including healthy dietary choices and physical activity, recommended referrral to DM education. To log blood sugar readings taken 3 times a day. Hypoglycemia management discussed and glucagon inj sent. Foot care and Dental care discussed. To keep Ophthalm and Podiatry appts. Labs scheduled prior to next visit. Learning About Meal Planning for Diabetes  Why plan your meals? Meal planning can be a key part of managing diabetes.  Planning meals and snacks with the right balance of carbohydrate, protein, and fat can help you keep your blood sugar at the target level you set with your doctor. You don't have to eat special foods. You can eat what your family eats, including sweets once in a while. But you do have to pay attention to how often you eat and how much you eat of certain foods. You may want to work with a dietitian or a diabetes educator. They can give you tips and meal ideas and can answer your questions about meal planning. This health professional can also help you reach a healthy weight if that is one of your goals. What plan is right for you? Your dietitian or diabetes educator may suggest that you start with the plate format or carbohydrate counting. The plate format  The plate format is a simple way to help you manage how you eat. You plan meals by learning how much space each food should take on a plate. Using the plate format helps you manage the amount of carbohydrate you eat. It can make it easier to keep your blood sugar level within your target range. It also helps you see if you're eating healthy portion sizes. To use the plate format, you put non-starchy vegetables on half your plate. Add lean protein foods, such as fish, lean meats and poultry, or soy products, on one-quarter of the plate. Put a grain or starchy vegetable (such as brown rice or a potato) on the final quarter of the plate. You can add a small piece of fruit and some low-fat or fat-free milk or yogurt, depending on your carbohydrate goal for each meal.  Here are some tips for using the plate format:  · Make sure that you are not using an oversized plate. A 9-inch plate is best. Many restaurants use larger plates. · Get used to using the plate format at home. Then you can use it when you eat out. · Write down your questions about using the plate format. Talk to your doctor, a dietitian, or a diabetes educator about your concerns. Carbohydrate counting  With carbohydrate counting, you plan meals based on the amount of carbohydrate in each food.  Carbohydrate raises blood sugar higher and more quickly than any other nutrient. It is found in desserts, breads and cereals, and fruit. It's also found in starchy vegetables such as potatoes and corn, grains such as rice and pasta, and milk and yogurt. You can help keep your blood sugar levels within your target range by planning how much carbohydrate to have at meals and snacks. The amount you need depends on several things. These include your weight, how active you are, which diabetes medicines you take, and what your goals are for your blood sugar levels. A registered dietitian or diabetes educator can help you plan how much carbohydrate to include in each meal and snack. An example of a carbohydrate counting plan is:  · 45 to 60 grams at each meal. That's about the same as 3 to 4 carbohydrate servings. · 15 to 20 grams at each snack. That's about the same as 1 carbohydrate serving. The Nutrition Facts label on packaged foods tells you how much carbohydrate is in a serving of the food. First, look at the serving size on the food label. Is that the amount you eat in a serving? All of the nutrition information on a food label is based on that serving size. So if you eat more or less than that, you'll need to adjust the other numbers. Total carbohydrate is the next thing you need to look for on the label. If you count carbohydrate servings, one serving of carbohydrate is 15 grams. For foods that don't come with labels, such as fresh fruits and vegetables, you'll need a guide that lists carbohydrate in these foods. Ask your doctor, dietitian, or diabetes educator about books or other nutrition guides you can use. If you take insulin, you need to know how many grams of carbohydrate are in a meal. This lets you know how much rapid-acting insulin to take before you eat. If you use an insulin pump, you get a constant rate of insulin during the day.  So the pump must be programmed at meals to give you extra insulin to cover the rise in blood sugar after meals. When you know how much carbohydrate you will eat, you can take the right amount of insulin. Or, if you always use the same amount of insulin, you need to make sure that you eat the same amount of carbohydrate at meals. If you need more help to understand carbohydrate counting and food labels, ask your doctor, dietitian, or diabetes educator. How can you plan healthy meals? Here are some tips to get started:  · Plan your meals a week at a time. Don't forget to include snacks too. · Use cookbooks or online recipes to plan several main meals. Plan some quick meals for busy nights. You also can double some recipes that freeze well. Then you can save half for other busy nights when you don't have time to cook. · Make sure you have the ingredients you need for your recipes. If you're running low on basic items, put these items on your shopping list too. · List foods that you use to make breakfasts, lunches, and snacks. List plenty of fruits and vegetables. · Post this list on the refrigerator. Add to it as you think of more things you need. · Take the list to the store to do your weekly shopping. Follow-up care is a key part of your treatment and safety. Be sure to make and go to all appointments, and call your doctor if you are having problems. It's also a good idea to know your test results and keep a list of the medicines you take. Where can you learn more? Go to http://www.gray.com/  Enter U936 in the search box to learn more about \"Learning About Meal Planning for Diabetes. \"  Current as of: September 8, 2021               Content Version: 13.2  © 1195-5855 [employer]. Care instructions adapted under license by [employer] (which disclaims liability or warranty for this information).  If you have questions about a medical condition or this instruction, always ask your healthcare professional. Carolyn Welsh any warranty or liability for your use of this information. Learning About Diabetes and Your Teeth  How does diabetes affect your teeth and gums? When you have diabetes, managing blood sugar levels and taking good care of your teeth and gums are both important. When blood sugar levels are high, there's a greater risk for:  · Gum (periodontal) disease. · Tooth decay. · Fungal infections in the mouth, like thrush. · Dry mouth, or xerostomia (say \"zee-ashley-STO-kristie-uh\"). The mouth needs saliva to neutralize the acids in your mouth. These acids can lead to gum disease and tooth decay. Keeping your blood sugar levels in your target range can help prevent problems with the teeth and gums. If you have any problems with your teeth or gums, see your dentist.  How do you care for your teeth and gums when you have diabetes? · Brush your teeth twice a day. · Floss daily. Make sure to press the floss against your teeth and not your gums. · Check each day for areas where your gums might be red or painful. Be sure to let your dentist know of any sores in your mouth. · See your dentist regularly for professional cleaning of your teeth and to look for gum problems. Many dentists recommend getting checkups twice a year. Remind your dentist that you have diabetes before any work is done. · Don't smoke or use smo     · Hypoglycemia: Care Instructions  · Overview  ·    · Hypoglycemia means that your blood sugar is low and your body is not getting enough fuel. Some people get low blood sugar from not eating often enough. Some medicines to treat diabetes can cause low blood sugar. People who have had surgery on their stomachs or intestines may get hypoglycemia. Problems with the pancreas, kidneys, or liver also can cause low blood sugar. · A snack or drink with sugar in it will raise your blood sugar and should ease your symptoms right away.   · Your doctor may recommend that you change or stop your medicines until you can get your blood sugar levels under control. In the long run, you may need to change your diet and eating habits so that you get enough fuel for your body throughout the day. · Follow-up care is a key part of your treatment and safety. Be sure to make and go to all appointments, and call your doctor if you are having problems. It's also a good idea to know your test results and keep a list of the medicines you take. · How can you care for yourself at home? · Learn your signs of low blood sugar. They are different for everyone. Some common early signs include:  · Nausea. · Hunger. · Feeling nervous, irritable, or shaky. · Cold, clammy skin. · Sweating (when you're not exercising). · Use the \"rule of 15\" to treat low blood sugar. This includes eating 15 grams of carbohydrate from a quick-sugar food, such as 3 or 4 glucose tablets or ½ cup of juice. Wait 15 minutes and check your blood sugar. If it is still below 70 mg/dL, eat another 15 grams of carbohydrate. Repeat this every 15 minutes until your blood sugar is in a safe target range. · Once your blood sugar is in a safe range, eat a snack or meal to prevent recurrent low blood sugar. · Make sure family, friends, and coworkers know the symptoms of low blood sugar and know how to get your sugar level up. · If you were prescribed a glucagon kit, always have it with you. Make sure friends and family know how to use it. · When should you call for help? ·  Call 911 anytime you think you may need emergency care. For example, call if:  ·   · You passed out (lost consciousness). ·   · You are confused or cannot think clearly. ·   · Your blood sugar is very high or very low. · Watch closely for changes in your health, and be sure to contact your doctor if:  ·   · Your blood sugar stays outside the level your doctor set for you. ·   · You have any problems. · Where can you learn more?   · Go to http://www.gray.com/  · Enter L386 in the search box to learn more about \"Hypoglycemia: Care Instructions. \"  · Current as of: July 28, 2021               Content Version: 13.2  · © 2006-2022 Healthwise, Incorporated. · Care instructions adapted under license by [employer] (which disclaims liability or warranty for this information). If you have questions about a medical condition or this instruction, always ask your healthcare professional. [address] any warranty or liability for your use of this information. ·    ·      · Diabetes Foot Health: Care Instructions  · Your Care Instructions  ·    · When you have diabetes, your feet need extra care and attention. Diabetes can damage the nerve endings and blood vessels in your feet, making you less likely to notice when your feet are injured. Diabetes also limits your body's ability to fight infection and get blood to areas that need it. If you get a minor foot injury, it could become an ulcer or a serious infection. With good foot care, you can prevent most of these problems. · Caring for your feet can be quick and easy. Most of the care can be done when you are bathing or getting ready for bed. · Follow-up care is a key part of your treatment and safety. Be sure to make and go to all appointments, and call your doctor if you are having problems. It's also a good idea to know your test results and keep a list of the medicines you take. · How can you care for yourself at home? · Keep your blood sugar close to normal by watching what and how much you eat, monitoring blood sugar, taking medicines if prescribed, and getting regular exercise. · Do not smoke. Smoking affects blood flow and can make foot problems worse. If you need help quitting, talk to your doctor about stop-smoking programs and medicines. These can increase your chances of quitting for good. · Eat a diet that is low in fats.  High fat intake can cause fat to build up in your blood vessels and decrease blood flow. · Inspect your feet daily for blisters, cuts, cracks, or sores. If you cannot see well, use a mirror or have someone help you. · Take care of your feet:  · Wash your feet every day. Use warm (not hot) water. Check the water temperature with your wrists or other part of your body, not your feet. · Dry your feet well. Pat them dry. Do not rub the skin on your feet too hard. Dry well between your toes. If the skin on your feet stays moist, bacteria or a fungus can grow, which can lead to infection. · Keep your skin soft. Use moisturizing skin cream to keep the skin on your feet soft and prevent calluses and cracks. But do not put the cream between your toes, and stop using any cream that causes a rash. · Clean underneath your toenails carefully. Do not use a sharp object to clean underneath your toenails. Use the blunt end of a nail file or other rounded tool. · Trim and file your toenails straight across to prevent ingrown toenails. Use a nail clipper, not scissors. Use an emery board to smooth the edges. · Change socks daily. Socks without seams are best, because seams often rub the feet. You can find socks for people with diabetes from specialty catalogs. · Look inside your shoes every day for things like gravel or torn linings, which could cause blisters or sores. · Buy shoes that fit well:  · Look for shoes that have plenty of space around the toes. This helps prevent bunions and blisters. · Try on shoes while wearing the kind of socks you will usually wear with the shoes. · Avoid plastic shoes. They may rub your feet and cause blisters. Good shoes should be made of materials that are flexible and breathable, such as leather or cloth. · Break in new shoes slowly by wearing them for no more than an hour a day for several days. Take extra time to check your feet for red areas, blisters, or other problems after you wear new shoes. · Do not go barefoot.  Do not wear sandals, and do not wear shoes with very thin soles. Thin soles are easy to puncture. They also do not protect your feet from hot pavement or cold weather. · Have your doctor check your feet during each visit. If you have a foot problem, see your doctor. Do not try to treat an early foot problem at home. Home remedies or treatments that you can buy without a prescription (such as corn removers) can be harmful. · Always get early treatment for foot problems. A minor irritation can lead to a major problem if not properly cared for early. · When should you call for help? ·  Call your doctor now or seek immediate medical care if:  ·   · You have a foot sore, an ulcer or break in the skin that is not healing after 4 days, bleeding corns or calluses, or an ingrown toenail. ·   · You have blue or black areas, which can mean bruising or blood flow problems. ·   · You have peeling skin or tiny blisters between your toes or cracking or oozing of the skin. ·   · You have a fever for more than 24 hours and a foot sore. ·   · You have new numbness or tingling in your feet that does not go away after you move your feet or change positions. ·   · You have unexplained or unusual swelling of the foot or ankle. · Watch closely for changes in your health, and be sure to contact your doctor if:  ·   · You cannot do proper foot care. · Where can you learn more? · Go to http://www.gray.com/  · Enter A739 in the search box to learn more about \"Diabetes Foot Health: Care Instructions. \"  · Current as of: July 28, 2021               Content Version: 13.2  · © 4959-4641 Healthwise, Incorporated. · Care instructions adapted under license by [employer] (which disclaims liability or warranty for this information).  If you have questions about a medical condition or this instruction, always ask your healthcare professional. [address] any warranty or liability for your use of this information. ·    · keless tobacco. Tobacco use with diabetes can lead to a greater risk of severe gum disease. If you need help quitting, talk to your doctor about stop-smoking programs and medicines. These can increase your chances of quitting for good. Follow-up care is a key part of your treatment and safety. Be sure to make and go to all appointments, and call your doctor if you are having problems. It's also a good idea to know your test results and keep a list of the medicines you take. Where can you learn more? Go to http://camelia-konrad.info/  Enter H523 in the search box to learn more about \"Learning About Diabetes and Your Teeth. \"  Current as of: July 28, 2021               Content Version: 13.2  © 7468-8651 Healthwise, Incorporated. Care instructions adapted under license by [employer] (which disclaims liability or warranty for this information). If you have questions about a medical condition or this instruction, always ask your healthcare professional. [place] [address] any warranty or liability for your use of this information. Follow-up and Dispositions    · Return in about 4 weeks (around 5/25/2022).          Copy sent to:    Susan Stearns MD

## 2022-04-28 RX ORDER — INSULIN DETEMIR 100 [IU]/ML
INJECTION, SOLUTION SUBCUTANEOUS
Qty: 10 PEN | Refills: 4 | Status: SHIPPED | OUTPATIENT
Start: 2022-04-28 | End: 2022-04-28

## 2022-04-28 RX ORDER — PEN NEEDLE, DIABETIC 30 GX3/16"
NEEDLE, DISPOSABLE MISCELLANEOUS
Qty: 100 PEN NEEDLE | Refills: 5 | Status: SHIPPED | OUTPATIENT
Start: 2022-04-28 | End: 2022-05-06

## 2022-04-28 RX ORDER — INSULIN DETEMIR 100 [IU]/ML
INJECTION, SOLUTION SUBCUTANEOUS
Qty: 10 PEN | Refills: 4 | Status: SHIPPED | OUTPATIENT
Start: 2022-04-28 | End: 2022-05-06 | Stop reason: SDUPTHER

## 2022-04-28 RX ORDER — PEN NEEDLE, DIABETIC 30 GX3/16"
NEEDLE, DISPOSABLE MISCELLANEOUS
Qty: 100 PEN NEEDLE | Refills: 5 | Status: SHIPPED | OUTPATIENT
Start: 2022-04-28 | End: 2022-04-28

## 2022-05-02 ENCOUNTER — VIRTUAL VISIT (OUTPATIENT)
Dept: DIABETES SERVICES | Age: 47
End: 2022-05-02
Payer: MEDICAID

## 2022-05-02 DIAGNOSIS — E11.42 TYPE 2 DIABETES MELLITUS WITH DIABETIC POLYNEUROPATHY, WITHOUT LONG-TERM CURRENT USE OF INSULIN (HCC): Primary | ICD-10-CM

## 2022-05-02 PROCEDURE — G0108 DIAB MANAGE TRN  PER INDIV: HCPCS | Performed by: DIETITIAN, REGISTERED

## 2022-05-02 NOTE — PROGRESS NOTES
[facility]  Diabetes Self-Management Education & Support Program  Pre-program Assessment    Reason for Referral: diabetes education  Referral Source: Archie Wilder MD  Services requested: DSMES    ASSESSMENT    From my perspective, the participant would benefit from [facility] specifically related to Reducing risks, Healthy eating, Monitoring, Physical activity, Taking medications, Healthy coping and Problem solving. Will adapt DSMES program to build on participant's skills score, confidence score and preparedness score as noted in the Diabetes Skills, Confidence, and Preparedness Index. During the program, we will focus on providing DSMES that specifically addresses participant's interest in Healthy eating, as shown by their reported readiness to change. The participant would be best served by attending weekly individual sessions. Diabetes Self-Management Education Follow-up Visit: 5/10/2022       Clinical Presentation  Alix Gunter is a 50 y.o.  female referred for diabetes self-management education. Participant has Type 2 DM on insulin for 1-10 years. Family history positive for diabetes. Patient reports receiving DSMES services in the past.     Most recent A1c value:   Lab Results   Component Value Date/Time    Hemoglobin A1c 11.7 (H) 04/08/2022 11:26 AM    Hemoglobin A1c (POC) 8.5 04/27/2021 09:23 AM        Diabetes-related medical history: neuropathy, generalized pain, sleep apnea    Diabetes-related medications:  Current dosing:   Key Antihyperglycemic Medications             insulin detemir U-100 (Levemir FlexTouch U-100 Insuln) 100 unit/mL (3 mL) inpn Dx: E11.42  Indications: type 2 diabetes mellitus  Currently taking 34 units in the morning, 32 units at night    HumaLOG KwikPen Insulin 100 unit/mL kwikpen Inject 15 units under the skin before breakfast, lunch and dinner.  Use sliding scale insulin also    dulaglutide (Trulicity) 5.35 AP/0.7 mL sub-q pen 0.5 mL by SubCUTAneous route every seven (7) days. Take 0.75mg weekly for 4 weeks then increase to 1.5mg weekly after that    metFORMIN ER (GLUCOPHAGE XR) 500 mg tablet Take 2 Tablets by mouth two (2) times a day. glipiZIDE (GLUCOTROL) 10 mg tablet Take 1 tablet by mouth twice daily          Blood Pressure Management  Key ACE/ARB Medications             valsartan (DIOVAN) 80 mg tablet Take 1 Tablet by mouth daily. Indications: high blood pressure          Lipid Management  Key Antihyperlipidemia Meds             atorvastatin (LIPITOR) 20 mg tablet Take 1 Tablet by mouth daily. Indications: high cholesterol          Clot Prevention  Key Anti-Platelet Anticoagulant Meds     The patient is on no antiplatelet meds or anticoagulants. Learning Assessment  Learning objectives Educator assessment (5/2/2022)   Diabetes Disease Process  The participant can   A) describe diabetes in basic terms;   B) state the type of diabetes they have; &   C) state accepted blood glucose targets. Healthy Eating  The participant can   A) identify carbohydrate foods; &   B) accurately read food labels. Being Active  The participant can  A) state the benefits of physical activity;  B) report their current PA practices;  C) identify PA they would consider incorporating in their lives; &  D) develop an implementation plan. Monitoring  The participant can  A) operate their blood glucose meter; &  B) describe how they log their blood glucoses to share with their provider. Taking Medications  The participant can  A) name their diabetes medications;  B) state the purpose and dose;  C) note side effects; &  D) describe proper storage, disposal & transport (if appropriate). Healthy Coping  The participant can    A) describe their response to diabetes diagnosis;   B) describe their specific coping mechanisms;  C) identify supportive people and/or other resources that positively support their diabetes self-care and health. Reducing Risks  The participant can describe the preventive measures used by providers to promote health and prevent diabetes complications. Problem Solving  The participant can   A) identify signs, symptoms & treatment of hypoglycemia;   B) identify signs, symptoms & treatment of hyperglycemia;  C) describe their sick day plan; &  D) identify BG patterns to discuss with their provider.        Yes  Yes  Yes        Yes  No        Yes  Yes  Yes  Yes         Yes  Yes        Yes  Yes  Yes  Yes        Yes  Yes  Yes        Yes          Yes  Yes  Yes  Yes     Characteristics to Learning   Barriers to Learning   [] Cognitive loss  [] Mental retardation   [] Intellectual delay/cognitive impairment  [] Psychiatric disorder  [] Visually impaired  [] Hearing loss                 [] Low literacy (difficulty with written text)  [] Low numeracy (difficulty with mathematical information  [] Low health literacy (difficulty with understanding health information & services  [] Language  [] Functional limitation  [x] Pain   [] Financial  [] Transportation  [x] None   Favorite Ways to Learn   [x] Lecture  [x] Slides  [x] Reading [] Video-Internet  [] Cassettes/CDs/MP3's  [x] Interactive Small Groups [] Other       Behavioral Assessment  Current self-care practices  Educator assessment (5/2/2022)   Healthy Eating  Current practices  24-hour Dietary Recall:  Breakfast: cucumber, grapes (10-15), 1 shrimp, water  Lunch: 1/2 stuffed pepper, strawberries and blueberries (1 1/2 cup total), water  Dinner: none  Snacks: none  Beverages: water  Alcohol: none  Loves fruit     Would benefit from DSMES related to Healthy Eating: Yes      Eats a carbohydrate controlled diet: No      Stage of change: Preparation   Being Active  Current practices  How many days during the past week have you performed physical activity where your heart beats faster and your breathing is harder than normal for 30 minutes or more?  0 days    How many days in a typical week do you perform activity such as this?  0 days     Limited due to pain   Would benefit from [facility] related to Being Active: Yes      Exercises 150 minutes/week: No      Stage of change: Precontemplation     Monitoring  Current practices  Do you monitor your blood sugar? Yes    How often do you monitor? 4x/day    What are the range of readings? Fasting last 3 mornings: 162, 224, 205 mg/dL  Before Lunch: 136, 124 mg/dL  Before Dinner: 174 mg/dL  Bedtime: typical 250-300 mg/dL    Do you know your last A1c measurement? Yes    Do you know the meaning of the A1c? Yes     Would benefit from [facility] related to Monitoring: Yes      Uses BG readings to establish trends and understand BG patterns: Yes      Stage of change: Action   Taking Medication  Current practices  Do you understand what your diabetes medications do? Yes    How often do you miss doses of your diabetes medications? Never    Can you afford your diabetes medications? Yes   Would benefit from [facility] related to Taking Medication: Yes      Takes medications consistently to receive full benefit: Yes      Stage of change: Action       Healthy Coping   Current state  Diabetes Skills, Confidence and Preparedness Index:   Total score: 4.0  Skills: 5.0  Confidence: 2.4  Preparedness: 4.4   Would benefit from DSMES related to Healthy Coping: Yes      Identifies specific people, organizations,etc, that actively support their diabetes self-care efforts: Yes      Stage of change: Contemplation     Reducing Risks  Current state  Vaccines:  Influenza:   Immunization History   Administered Date(s) Administered    Influenza Vaccine 10/21/2014, 10/05/2015, 09/29/2016    Influenza Vaccine Innotas) PF (>6 Mo Flulaval, Fluarix, and >3 Yrs Afluria, Fluzone 50910) 10/18/2018, 10/19/2020, 10/27/2021       Pneumococcal:   Immunization History   Administered Date(s) Administered    (RETIRED) Pneumococcal Vaccine (Unspecified Type) 03/31/2011    Pneumococcal Vaccine (Unspecified Type) 09/29/2016        Hepatitis: There is no immunization history for the selected administration types on file for this patient. Examinations:  Diabetic Foot and Eye Exam HM Status   Topic Date Due    Diabetic Foot Care  04/27/2023    Eye Exam  09/21/2023        Dental exam: Last appointment was: 4/2022        Heart Protection:  BP Readings from Last 2 Encounters:   04/27/22 121/65   02/14/22 (!) 147/80        Lab Results   Component Value Date/Time    LDL, calculated 84 04/08/2022 11:26 AM    LDL, calculated 87 03/11/2019 08:24 AM        Kidney Protection:  Lab Results   Component Value Date/Time    Microalb/Creat ratio (ug/mg creat.) 9 10/20/2021 10:27 AM    Microalbumin urine (POC) 80 02/17/2020 11:05 AM        Would benefit from DSMES related to Reducing Risks: Yes      Actively participates in decision-making with provider regarding secondary prevention:  Yes      Stage of change: Preparation   Problem Solving  Current state  Hypoglycemia Management:  What are signs and symptoms of hypoglycemia that you experience? light headed, known to faint    How do you prevent hypoglycemia? Consistent meals/snack times    How do you treat hypoglycemia? Rule of 15    Hyperglycemia Management:  What are signs and symptoms of hyperglycemia that you experience? headache    How can you prevent hyperglycemia? Engage in regular physically active , eat better    Sick Day Management:  What do you do differently on sick days? Pt reported being unaware of self-management on sick days    Pattern Management:  Do you notice blood glucose patterns when you look at the readings in your meter or logbook? Yes    How do you use the blood glucose readings from your meter or logbook?  Understand how body responds to meals     Would benefit from [facility] related to Problem Solving: Yes      Articulates appropriate strategies to address hypoglycemia, hyperglycemia, sick day care and BG pattern: No      Stage of change: Preparation     Note: Content derived from the American Association of Diabetes Educators' Diabetes Education Curriculum: A Guide to Successful Self-Management (3rd edition)      Keith Borden RD on 5/2/2022 at 1:08 PM    I have personally reviewed the health record, including provider notes, laboratory data and current medications before making these care and education recommendations. The time spent in this effort is included in the total time. Total minutes: 39    [id number] [facility] Emergency Adaptations for Telehealth:  Ruthy Rooney, was evaluated through a synchronous (real-time) audio-video encounter. The patient (or guardian if applicable) is aware that this is a billable service, which includes applicable co-pays. This Virtual Visit was conducted with patient's (and/or legal guardian's) consent. The visit was conducted pursuant to the emergency declaration under the [address] [place] [address], [address] [place] [address] authority and the [employer] Act. Patient identification was verified, and a caregiver was present when appropriate. The patient was located in a state where the provider was licensed to provide care. [facility] Logo  Diabetes Skills, Confidence & Preparedness Index (SCPI) ©  Thank you for completing the Skills, Confidence & Preparedness Index! Below are your scores. All scales and questions are out of 7. If you would like these results emailed, please enter your email address along with some identifying patient information.   Email:    Patient Identifier:        Overall SCPI score: 4.0 Skills Score: 5.0  Low: Problem Solving(Q6) Confidence Score: 2.4  Low: Healthy Coping(Q2),Healthy Eating(Q4),Being Active(Q5) Preparedness Score: 4.4  Low: Being Active(Q2),Reducing Risks(Q4)  Healthy Eating Score: 3.3  Low: Confidence(Q4) Taking Medication Score: 6.5  Low: Skills(Q2) Blood Sugar Monitoring Score: 5.8  Low: Confidence(Q6) Reducing Risks Score: 4.3  Low: Preparedness(Q4)  Problem Solving Score: 3.3  Low: Skills(Q6),Confidence(Q7) Healthy Coping Score: 3.0  Low: Confidence(Q2) Being Active Score: 1.0  Low: Confidence(Q5),Preparedness(Q2)    Skills/Knowledge Questions  1. I know how to plan meals that have the best balance between carbohydrates, proteins and vegetables. 3  2. I know how my diabetes medications (pills, injectables and/or insulin) work in my body. 6  3. I know when to check my blood sugar if I want to see how my body responded to a meal. 6  4. I know when to check my blood sugars to determine if my medication or insulin doses are correct. 6  5. I know what to do to prevent a low blood sugar when I exercise (either before, during, or after). 6  6. When I am sick, I know what to do differently with my diabetes management. 2  7. I know how stress can affect my diabetes management. 6  8. When I look at my blood sugars over a given week, I can explain what my blood sugar pattern is. 6  9. I know what my target levels are for A1c, blood pressure and cholesterol. 4  Confidence Questions  1. I am confident that I can plan balanced meals and snacks. 2  2. I am confident that I can manage my stress. 1  3. I am confident that I can prevent a low blood sugar during or after exercise. 6  4. I am confident that the next time I eat out, I will be able to choose foods that best keep my blood sugars in target. 1  5. I am confident I can include exercise into my schedule. 1  6. I am confident that I can use my daily blood sugars to adjust my diet, my activity, and/or my insulin. 4  7. When something out of my normal routine happens, I am confident that I can problem-solve and keep my diabetes on track. 2  Preparedness Questions  1. Within the next month, I will begin to eat more balanced meals and snacks. 7  2. Within the next month, I will choose an exercise activity and I will start fitting it into my schedule. 1  3. Within the next month, I will make a list of stress management options that work for me. 2  4. Within the next month, I will consistently plan ahead to prevent low blood sugars. 1  5. Within the next month, I will start adjusting my insulin doses on my own. 8  6. Within the next month, I will begin making changes to my diabetes management based on my daily blood sugars (eg - eating, activity and/or insulin). 7  7. Within the next month, I will begin making changes to my diabetes management to meet my overall goals (eg - eating, activity and/or insulin).  6

## 2022-05-05 ENCOUNTER — TELEPHONE (OUTPATIENT)
Dept: ENDOCRINOLOGY | Age: 47
End: 2022-05-05

## 2022-05-05 DIAGNOSIS — E11.42 TYPE 2 DIABETES MELLITUS WITH DIABETIC POLYNEUROPATHY, WITH LONG-TERM CURRENT USE OF INSULIN (HCC): Primary | ICD-10-CM

## 2022-05-05 DIAGNOSIS — Z79.4 TYPE 2 DIABETES MELLITUS WITH DIABETIC POLYNEUROPATHY, WITH LONG-TERM CURRENT USE OF INSULIN (HCC): Primary | ICD-10-CM

## 2022-05-05 NOTE — TELEPHONE ENCOUNTER
Received  Fax from [facility] stating that they need a new prescription for the pt's Levemir insulin stating the directions on it. They also stated that the pens are in a quantity of 15 mL.

## 2022-05-06 RX ORDER — INSULIN DETEMIR 100 [IU]/ML
33 INJECTION, SOLUTION SUBCUTANEOUS 2 TIMES DAILY
Qty: 100 ML | Refills: 3 | Status: SHIPPED | OUTPATIENT
Start: 2022-05-06

## 2022-05-07 RX ORDER — PEN NEEDLE, DIABETIC 31 GX3/16"
NEEDLE, DISPOSABLE MISCELLANEOUS
Qty: 100 PEN NEEDLE | Refills: 5 | Status: SHIPPED | OUTPATIENT
Start: 2022-05-07

## 2022-05-07 NOTE — TELEPHONE ENCOUNTER
Spoke with ms. Bre cMdaniels that her insulin levemir prescription was resent. Also she asked for 4mm pen needles than 8 mm that was sent.

## 2022-05-10 ENCOUNTER — VIRTUAL VISIT (OUTPATIENT)
Dept: DIABETES SERVICES | Age: 47
End: 2022-05-10
Payer: MEDICAID

## 2022-05-10 DIAGNOSIS — E11.42 TYPE 2 DIABETES MELLITUS WITH DIABETIC POLYNEUROPATHY, WITHOUT LONG-TERM CURRENT USE OF INSULIN (HCC): Primary | ICD-10-CM

## 2022-05-10 LAB
ALBUMIN/CREAT UR: 3 MG/G CREAT (ref 0–29)
CREAT UR-MCNC: 146.2 MG/DL
MICROALBUMIN UR-MCNC: 4.9 UG/ML

## 2022-05-10 PROCEDURE — G0108 DIAB MANAGE TRN  PER INDIV: HCPCS | Performed by: DIETITIAN, REGISTERED

## 2022-05-10 NOTE — PROGRESS NOTES
[address] [place] [address] for Diabetes Health  Diabetes Self-Management Education & Support Program  Encounter Note    SUMMARY  Diabetes self-care management training was completed related to reducing risks. he participant will return on May 27 to continue DSMES related to healthy eating. The participant did identify SMART Goal(s) and will practice knowledge and skills related to reducing risks to improve diabetes self-management. EVALUATION:  States she is a stress eater. The four triggers that lead to overeating are financial, health limitations, pain, overprotective as it relates to family. Discussed healthier options for snacking like non starchy vegetables. She voiced frustration that her family and others are contributing all of her conditions to weight. She states this just causes her to eat more. Provided emotional support and encouragement. States she has been taking Trulicity for the past 2 weeks- has noticed a decrease in appetite. RECOMMENDATIONS:  Eat meals mindfully- snack on non-starchy vegetables if hungry or stressed     TOPICS DISCUSSED TODAY:  WHAT IS DIABETES? Minutes: 60      Next provider visit is scheduled for 5/27/2022       SMART GOAL(S)  1.  would like to lose weight by eating every 4-5 hours every day this week         DATE DSMES TOPIC EVALUATION     5/10/2022 WHAT IS DIABETES?   a. Role of the normal pancreas in energy balance and blood glucose control   b. The defect seen in diabetes   c. Signs & symptoms of diabetes   d. Diagnosis of diabetes   e. Types of diabetes   f. Blood glucose targets in non-pregnant & non-pregnant adults       The participant knows   Their type of diabetes: Yes   The basic physiologic defect: Yes   Blood glucose targets: Yes       DATE DSMES TOPIC EVALUATION     5/10/2022 HOW DO I STAY HEALTHY?   a. Prevention    Vaccinations    Preconception care (if applicable)  b.  Examinations    Eye     Foot   c. Diabetic complications' prevention [address]      The participant has a personal diabetes care record to keep abreast of diabetes health Yes            Lenard Elise RD on 5/13/2022 at 4:10 PM    I have personally reviewed the health record, including provider notes, laboratory data and current medications before making these care and education recommendations. The time spent in this effort is included in the total time. Total minutes: 61    [id number] for Telehealth:  Zaira Frias, was evaluated through a synchronous (real-time) audio-video encounter. The patient (or guardian if applicable) is aware that this is a billable service, which includes applicable co-pays. This Virtual Visit was conducted with patient's (and/or legal guardian's) consent. The visit was conducted pursuant to the emergency declaration under the [facility], [address] [place] [address] authority and the [employer] and [employer] General Act. Patient identification was verified, and a caregiver was present when appropriate. The patient was located in a state where the provider was licensed to provide care.

## 2022-05-11 NOTE — PROGRESS NOTES
Urine microalbuminuria is below 30 which is a normal and is a good indicator that diabetes has not started to affect your kidneys.

## 2022-05-12 ENCOUNTER — VIRTUAL VISIT (OUTPATIENT)
Dept: INTERNAL MEDICINE CLINIC | Age: 47
End: 2022-05-12
Payer: MEDICAID

## 2022-05-12 VITALS — BODY MASS INDEX: 48.89 KG/M2 | HEIGHT: 64 IN

## 2022-05-12 DIAGNOSIS — Z02.89 ENCOUNTER FOR COMPLETION OF FORM WITH PATIENT: Primary | ICD-10-CM

## 2022-05-12 PROCEDURE — 99213 OFFICE O/P EST LOW 20 MIN: CPT | Performed by: INTERNAL MEDICINE

## 2022-05-12 NOTE — PROGRESS NOTES
Vale Chambers  Identified pt with two pt identifiers(name and ). Chief Complaint   Patient presents with    Form Completion       Reviewed record In preparation for visit and have obtained necessary documentation. 1. Have you been to the ER, urgent care clinic or hospitalized since your last visit? No     2. Have you seen or consulted any other health care providers outside of the [address] [place] [address] since your last visit? Include any pap smears or colon screening. No    Vitals reviewed with provider. Health Maintenance reviewed:     Health Maintenance Due   Topic    Cervical cancer screen         Wt Readings from Last 3 Encounters:   22 284 lb 12.8 oz (129.2 kg)   22 286 lb 9.6 oz (130 kg)   21 294 lb (133.4 kg)      Temp Readings from Last 3 Encounters:   22 98 °F (36.7 °C)   21 98.3 °F (36.8 °C)   10/27/21 98.1 °F (36.7 °C) (Oral)      BP Readings from Last 3 Encounters:   22 121/65   22 (!) 147/80   21 (!) 164/90      Pulse Readings from Last 3 Encounters:   22 (!) 56   22 65   21 86      There were no vitals filed for this visit.        Learning Assessment:   :     Learning Assessment 2018   PRIMARY LEARNER Patient Patient Patient   HIGHEST LEVEL OF EDUCATION - PRIMARY LEARNER  - > 4 YEARS OF COLLEGE -   BARRIERS PRIMARY LEARNER - NONE -   CO-LEARNER CAREGIVER - No -   PRIMARY LANGUAGE ENGLISH ENGLISH ENGLISH   LEARNER PREFERENCE PRIMARY READING OTHER (COMMENT) LISTENING   ANSWERED BY patient patient self     - - self   RELATIONSHIP SELF SELF SELF        Depression Screening:   :     3 most recent PHQ Screens 2022   PHQ Not Done -   Little interest or pleasure in doing things Nearly every day   Feeling down, depressed, irritable, or hopeless Nearly every day   Total Score PHQ 2 6   Trouble falling or staying asleep, or sleeping too much Nearly every day   Feeling tired or having little energy Nearly every day   Poor appetite, weight loss, or overeating Nearly every day   Feeling bad about yourself - or that you are a failure or have let yourself or your family down Nearly every day   Trouble concentrating on things such as school, work, reading, or watching TV Nearly every day   Moving or speaking so slowly that other people could have noticed; or the opposite being so fidgety that others notice Nearly every day   Thoughts of being better off dead, or hurting yourself in some way Not at all   PHQ 9 Score 24   How difficult have these problems made it for you to do your work, take care of your home and get along with others Very difficult        Fall Risk Assessment:   :     Fall Risk Assessment, last 12 mths 2/23/2021   Able to walk? Yes   Fall in past 12 months? 1   Do you feel unsteady? 1   Are you worried about falling 1   Number of falls in past 12 months -   Fall with injury? 1        Abuse Screening:   :     Abuse Screening Questionnaire 8/14/2020 5/9/2014   Do you ever feel afraid of your partner? N N   Are you in a relationship with someone who physically or mentally threatens you? N N   Is it safe for you to go home?  Y Y        ADL Screening:   :     ADL Assessment 5/2/2018   Feeding yourself No Help Needed   Getting from bed to chair No Help Needed   Getting dressed No Help Needed   Bathing or showering No Help Needed   Walk across the room (includes cane/walker) No Help Needed   Using the telphone No Help Needed   Taking your medications No Help Needed   Preparing meals No Help Needed   Managing money (expenses/bills) No Help Needed   Moderately strenuous housework (laundry) No Help Needed   Shopping for personal items (toiletries/medicines) No Help Needed   Shopping for groceries No Help Needed   Driving No Help Needed   Climbing a flight of stairs No Help Needed   Getting to places beyond walking distances No Help Needed

## 2022-05-12 NOTE — PROGRESS NOTES
Isidoro Carrasco is a 50 y.o. female who was seen by synchronous (real-time) audio-video technology on 5/12/2022 for Form Completion        Assessment & Plan:     Diagnoses and all orders for this visit:    1. Encounter for completion of form with patient  Form completed. Will be faxed to her . Follow-up and Dispositions    · Return in about 4 months (around 9/12/2022), or if symptoms worsen or fail to improve, for DM, HTN, depresison. Routing History         712  Subjective:     Presents for disability form completion. Last worked in Nov 2019. Has been in severe pain since April 2020. Diagnosed with RA. Ambulates with cane. Falls occasionally. Left knee or ankles sometimes give out. Cannot sit in one position or stand for more than 15 mins at a time. Now following with Dr. Ruby Turk for DM. Prior to Admission medications    Medication Sig Start Date End Date Taking? Authorizing Provider   Insulin Needles, Disposable, 32 gauge x 5/32\" ndle Please use with your insulin pens as directed 5/7/22  Yes Bharathi Caba MD   Levemir FlexTouch U-100 Insuln 100 unit/mL (3 mL) inpn 33 Units by SubCUTAneous route two (2) times a day. Dx: E11.42   Please take 34 Units in the morning and 32 Units in the evening  Indications: type 2 diabetes mellitus 5/6/22  Yes Bharathi Caba MD   HumaLOG KwikPen Insulin 100 unit/mL kwikpen Inject 15 units under the skin before breakfast, lunch and dinner. Use sliding scale insulin also 4/27/22  Yes Bharathi Caba MD   dulaglutide (Trulicity) 0.06 FS/6.6 mL sub-q pen 0.5 mL by SubCUTAneous route every seven (7) days. Take 0.75mg weekly for 4 weeks then increase to 1.5mg weekly after that 4/27/22  Yes Bharathi Caba MD   cyclobenzaprine (FLEXERIL) 5 mg tablet Take 1 Tablet by mouth three (3) times daily as needed for Muscle Spasm(s). 4/18/22  Yes Gurvinder England MD   pregabalin (LYRICA) 200 mg capsule TAKE 1 TABLET BY MOUTH TWICE DAILY .  DO NOT EXCEED 2 PER 24 HOURS 3/25/22  Yes Edouard Berry MD   naproxen (NAPROSYN) 500 mg tablet Take 1 Tablet by mouth every twelve (12) hours as needed for Pain. 2/14/22  Yes Marla Rosen MD   albuterol (PROVENTIL HFA, VENTOLIN HFA, PROAIR HFA) 90 mcg/actuation inhaler Take 2 Puffs by inhalation every six (6) hours as needed for Wheezing. Asthma, Dx: J45.40 2/10/22  Yes Edouard Berry MD   fluticasone propion-salmeteroL (Advair Diskus) 500-50 mcg/dose diskus inhaler Take 1 Puff by inhalation every twelve (12) hours. Asthma, Dx: J45.30 2/10/22  Yes Edouard Berry MD   amitriptyline (ELAVIL) 50 mg tablet TAKE 1 TABLET BY MOUTH NIGHTLY FOR  NUMBNESS AT  HANDS  AND  FEET 2/9/22  Yes Edouard Berry MD   HYDROcodone-acetaminophen (NORCO) 5-325 mg per tablet TAKE 1 TO 2 TABLETS BY MOUTH 4 TIMES DAILY AS NEEDED 1/12/22  Yes Provider, Historical   buPROPion SR (WELLBUTRIN SR) 150 mg SR tablet Take 1 Tablet by mouth two (2) times a day. 1/27/22  Yes Dennis England MD   ondansetron (Zofran ODT) 4 mg disintegrating tablet Take 1 Tablet by mouth every eight (8) hours as needed for Nausea for up to 360 days. 1/27/22 1/22/23 Yes Dennis England MD   dicyclomine (BENTYL) 10 mg/5 mL soln oral solution Take 10 mL by mouth four (4) times daily as needed (abd pain or diarrhea). 1/27/22  Yes Edouard Berry MD   metFORMIN ER (GLUCOPHAGE XR) 500 mg tablet Take 2 Tablets by mouth two (2) times a day. 1/27/22  Yes Dnenis England MD   montelukast (SINGULAIR) 10 mg tablet TAKE 1 TABLET BY MOUTH ONCE DAILY FOR ALLERGIES AND FOR ASTHMA 1/27/22  Yes Dennis England MD   albuterol (PROVENTIL VENTOLIN) 2.5 mg /3 mL (0.083 %) nebu USE ONE VIAL IN NEBULIZER EVERY 4 HOURS AS NEEDED FOR WHEEZING. Dx: J45.40 1/27/22  Yes Edouard Berry MD   valsartan (DIOVAN) 80 mg tablet Take 1 Tablet by mouth daily.  Indications: high blood pressure 1/27/22  Yes Dennis England MD   glipiZIDE (GLUCOTROL) 10 mg tablet Take 1 tablet by mouth twice daily 12/22/21  Yes Edouard Berry MD predniSONE (DELTASONE) 20 mg tablet Take 2 tablets by mouth once daily for 5 days. 12/22/21  Yes Lloyd England MD   atorvastatin (LIPITOR) 20 mg tablet Take 1 Tablet by mouth daily. Indications: high cholesterol 12/15/21  Yes Lloyd England MD   ibuprofen (MOTRIN) 800 mg tablet Take one tablet every 8 hours for three days, and then every 8 hours as needed for pain thereafter 8/23/21  Yes Aga Goodson MD   lidocaine (LIDODERM) 5 % Apply patch to the affected area for 12 hours a day and remove for 12 hours a day. 4/6/21  Yes Tej Rowan PA-C   acetaminophen (TYLENOL) 500 mg tablet Take 2 Tabs by mouth every six (6) hours as needed for Pain. 4/6/21  Yes Tej Rowan PA-C   folic acid (FOLVITE) 1 mg tablet Take 1 mg by mouth daily. 1/19/21  Yes Provider, Historical   methotrexate (RHEUMATREX) 2.5 mg tablet every Wednesday. Pt takes 9 pills for 20 mg total. 1/19/21  Yes Provider, Historical   meclizine (ANTIVERT) 25 mg tablet Take 1 Tab by mouth three (3) times daily as needed for Dizziness. 2/28/20  Yes Lloyd England MD   nitroglycerin (NITROSTAT) 0.4 mg SL tablet Take 1 Tab by mouth every five (5) minutes as needed for Chest Pain.  Sit/Lay down then put one tab under the tongue every 5 minutes as needed for chest pain for 3 doses 6/8/18  Yes Alyssa Farrell MD     Patient Active Problem List   Diagnosis Code    Essential hypertension I10    Moderate episode of recurrent major depressive disorder ([facility] [address] F33.1    FE (obstructive sleep apnea) G47.33    Asthma J45.909    Iron deficiency anemia D50.9    Hyperlipidemia E78.5    Morbid obesity (HCC) E66.01    Type 2 diabetes mellitus with diabetic polyneuropathy, without long-term current use of insulin (HCC) E11.42    Coronary artery disease involving native coronary artery of native heart without angina pectoris I25.10    Chronic midline low back pain without sciatica M54.50, G89.29    Atypical squamous cells of undetermined significance (ASCUS) on Papanicolaou smear of cervix R87.610    Type 2 diabetes with nephropathy (HCC) E11.21    Seronegative rheumatoid arthritis ([facility] [address] M06.00    Elevated liver enzymes R74.8    Nausea R11.0    Irritable bowel syndrome K58.9       Objective:     Patient-Reported Vitals 5/12/2022   Patient-Reported Weight 284   Patient-Reported Pulse -   Patient-Reported Temperature 97.6   Patient-Reported SpO2 -   Patient-Reported Systolic  479   Patient-Reported Diastolic 75   Patient-Reported LMP -      General: alert, cooperative, no distress   Mental  status: normal mood, behavior, speech, dress, motor activity, and thought processes, able to follow commands   HENT: NCAT   Neck: no visualized mass   Resp: no respiratory distress   Neuro: no gross deficits   Skin: no discoloration or lesions of concern on visible areas   Psychiatric: normal affect, consistent with stated mood, no evidence of hallucinations     Additional exam findings: morbidly obese      We discussed the expected course, resolution and complications of the diagnosis(es) in detail. Medication risks, benefits, costs, interactions, and alternatives were discussed as indicated. I advised her to contact the office if her condition worsens, changes or fails to improve as anticipated. She expressed understanding with the diagnosis(es) and plan. THIS VISIT WAS COMPLETED VIRTUALLY USING MY CHART TELEMEDICINE    Karine Saab, was evaluated through a synchronous (real-time) audio-video encounter. The patient (or guardian if applicable) is aware that this is a billable service, which includes applicable co-pays. Verbal consent to proceed has been obtained. The visit was conducted pursuant to the emergency declaration under the [address] [place] [address], [address] [place] [address] authority and the [employer] General Act. Patient identification was verified, and a caregiver was present when appropriate. The patient was located at home in a state where the provider was licensed to provide care.     Niki Fiore MD

## 2022-05-25 DIAGNOSIS — E11.42 TYPE 2 DIABETES MELLITUS WITH DIABETIC POLYNEUROPATHY, WITHOUT LONG-TERM CURRENT USE OF INSULIN (HCC): ICD-10-CM

## 2022-05-26 RX ORDER — PREGABALIN 200 MG/1
CAPSULE ORAL
Qty: 60 CAPSULE | Refills: 0 | Status: SHIPPED | OUTPATIENT
Start: 2022-05-26 | End: 2022-07-04

## 2022-05-27 ENCOUNTER — VIRTUAL VISIT (OUTPATIENT)
Dept: ENDOCRINOLOGY | Age: 47
End: 2022-05-27
Payer: MEDICAID

## 2022-05-27 DIAGNOSIS — Z79.4 TYPE 2 DIABETES MELLITUS WITH DIABETIC POLYNEUROPATHY, WITH LONG-TERM CURRENT USE OF INSULIN (HCC): Primary | ICD-10-CM

## 2022-05-27 DIAGNOSIS — E11.42 TYPE 2 DIABETES MELLITUS WITH DIABETIC POLYNEUROPATHY, WITH LONG-TERM CURRENT USE OF INSULIN (HCC): Primary | ICD-10-CM

## 2022-05-27 DIAGNOSIS — E66.01 CLASS 3 SEVERE OBESITY DUE TO EXCESS CALORIES WITH BODY MASS INDEX (BMI) OF 45.0 TO 49.9 IN ADULT, UNSPECIFIED WHETHER SERIOUS COMORBIDITY PRESENT (HCC): ICD-10-CM

## 2022-05-27 DIAGNOSIS — E78.2 MIXED HYPERLIPIDEMIA: ICD-10-CM

## 2022-05-27 DIAGNOSIS — I10 ESSENTIAL HYPERTENSION: ICD-10-CM

## 2022-05-27 PROCEDURE — 3046F HEMOGLOBIN A1C LEVEL >9.0%: CPT | Performed by: GENERAL ACUTE CARE HOSPITAL

## 2022-05-27 PROCEDURE — 99212 OFFICE O/P EST SF 10 MIN: CPT | Performed by: GENERAL ACUTE CARE HOSPITAL

## 2022-06-06 LAB — CREATININE, EXTERNAL: 0.92

## 2022-06-30 ENCOUNTER — OFFICE VISIT (OUTPATIENT)
Dept: ENDOCRINOLOGY | Age: 47
End: 2022-06-30
Payer: MEDICAID

## 2022-06-30 VITALS
BODY MASS INDEX: 47.5 KG/M2 | SYSTOLIC BLOOD PRESSURE: 114 MMHG | DIASTOLIC BLOOD PRESSURE: 68 MMHG | HEART RATE: 82 BPM | HEIGHT: 64 IN | WEIGHT: 278.2 LBS

## 2022-06-30 DIAGNOSIS — R74.8 ELEVATED LIVER ENZYMES: ICD-10-CM

## 2022-06-30 DIAGNOSIS — Z79.4 TYPE 2 DIABETES MELLITUS WITH DIABETIC POLYNEUROPATHY, WITH LONG-TERM CURRENT USE OF INSULIN (HCC): Primary | ICD-10-CM

## 2022-06-30 DIAGNOSIS — E66.01 CLASS 3 SEVERE OBESITY DUE TO EXCESS CALORIES WITH BODY MASS INDEX (BMI) OF 45.0 TO 49.9 IN ADULT, UNSPECIFIED WHETHER SERIOUS COMORBIDITY PRESENT (HCC): ICD-10-CM

## 2022-06-30 DIAGNOSIS — E11.42 TYPE 2 DIABETES MELLITUS WITH DIABETIC POLYNEUROPATHY, WITH LONG-TERM CURRENT USE OF INSULIN (HCC): Primary | ICD-10-CM

## 2022-06-30 DIAGNOSIS — E78.2 MIXED HYPERLIPIDEMIA: ICD-10-CM

## 2022-06-30 DIAGNOSIS — I10 ESSENTIAL HYPERTENSION: ICD-10-CM

## 2022-06-30 PROCEDURE — 3046F HEMOGLOBIN A1C LEVEL >9.0%: CPT | Performed by: GENERAL ACUTE CARE HOSPITAL

## 2022-06-30 PROCEDURE — 99214 OFFICE O/P EST MOD 30 MIN: CPT | Performed by: GENERAL ACUTE CARE HOSPITAL

## 2022-06-30 NOTE — LETTER
7/5/2022    Patient: Dev Shaver   YOB: 1975   Date of Visit: 6/30/2022     Aj Finnegan MD  [facility] [address]  [address] [facility] [address]    Dear Aj Finnegan MD,      Thank you for referring Ms. Jossue Robles to [facility] for evaluation. My notes for this consultation are attached. If you have questions, please do not hesitate to call me. I look forward to following your patient along with you.       Sincerely,    Jolie Hurley MD

## 2022-06-30 NOTE — PROGRESS NOTES
CHIEF COMPLAINT: f/u evaluation for uncontrolled type 2 diabetes    HISTORY OF PRESENT ILLNESS:   Zeeshan Michel is a 50 y.o. female with a PMHx as noted below who presents to the endocrinology clinic for f/u evaluation of uncontrolled type 2 diabetes. Diagnosed in 2016  Most recent Hgb A1c was 11.7 in 04/08/2022. Patient reports that her sugar control has been excellent on current regimen with fasting and postprandial number ranging from . She states she feels a lot better and that she has also learnt a lot from Diabetes education classes and she is working her way to get through the entire diabetes education book. She is very compliant with her meds and lifestyle changes she is implementing. She is struggling with limited mobility due to RA but she is feeling better overall. AM: 130, 220 (ate trail mix overnight, 10PM), Sunday 203   2 hours lunch: 110-160    Hypoglycemia: before lunch 59 missed a meal     Currently taking the following meds:  -Metformin 1000mg twice daily  -Glipizide 10mg twice daily  -Trulicity 8.24MK weekly injection, she will be starting the 1.5mg dose in few days  -Levemir insulin 34 units in the morning and 32 units in the evening  -Humalog 15 units before Breakfast, Lunch, Dinner without requiring SS for past 3 weeks, humalog increased by 2 units     Exercise consists of limited due to RA . No history of vascular disease. No history of retinopathy, or nephropathy.    Has neuropathy  Hx of kidney Ca in 2015  Hx of SILVA  On Methotrexate (9 tabs/week) and Etanercept for RA, sometimes takes prednisone for RA flares, not recently  Hx of Asthma, well controlled on preventive inhalers      Last eye exam was last sept, annual visits      Review of most recent diabetes-related labs:  Lab Results   Component Value Date    HBA1C 11.7 (H) 04/08/2022    HBA1C 9.3 (H) 10/20/2021    HBA1C 8.3 (H) 03/11/2019    GZT8ALCN 8.5 04/27/2021    WKZ5VMBC 6.3 01/26/2021    YLE4WGGT 9.4 10/19/2020 CHOL 136 2022    LDLC 84 2022    GFRAA >60 2022    GFRNA >60 2022    CREATEXT 0.99 2022    MCACR 3 2022    TSH 1.290 2019    B12LT 423 2016     Lab Key:  [id number] = IA-2 pancreatic islet cell autoantibody  CPEPL = C-peptide level  :EXT = External Lab  GADLT = CAILIN-65 autoantibody   INSUL = Insulin level  MCACR (or MALBEXT) = Urine Microalbumin (or External UM)  B12LT = B12 level    PAST MEDICAL/SURGICAL HISTORY:   Past Medical History:   Diagnosis Date    Anemia (iron deficiency)     Anxiety     per pt on 2021    Asthma     DDD (degenerative disc disease), lumbar     Depression     DM type 2 (diabetes mellitus, type 2) ([address] 2011    GERD (gastroesophageal reflux disease)     GI bleed     Hepatic steatosis 2016    confirmed by US    HTN (hypertension)     Irregular menses     Irritable bowel     Kidney mass     19: US showed rigth renal cystic nephroma, follows with Urology (Dr. Neha Rojas)    Long-term use of immunosuppressant medication       per pt on 2021    Morbid obesity ([address]     FE on CPAP     cpap complaint  per pt on 2021    PUD (peptic ulcer disease) 2015    Renal cancer, right ([address]     tx surgery    Rheumatoid arthritis ([facility]     per pt on 2021     Past Surgical History:   Procedure Laterality Date    COLONOSCOPY N/A 2021    COLONOSCOPY performed by Julian Burk MD at [facility]. Sigmoid diverticulosis, int/ext hemorrhoids.  Repeat in 5 yrs due to fam hx of colon ca    COLONOSCOPY,DIAGNOSTIC  2021         HX  SECTION      x 2    HX COLONOSCOPY  2015    HX DILATION AND CURETTAGE  2021    HX ENDOSCOPY  2015    HX HYSTERECTOMY  2021    HX HYSTEROSCOPY WITH ENDOMETRIAL ABLATION  2014    HX TUBAL LIGATION      HX TUMOR REMOVAL  2016    right kidney, Dr Luca Mcdaniels removed from right kidney on 02/15/2016        ALLERGIES: Allergies   Allergen Reactions    Lisinopril Cough    Pcn [Penicillins] Hives       MEDICATIONS ON ADMISSION:     Current Outpatient Medications:     etanercept (ENBREL SC), by SubCUTAneous route every seven (7) days. , Disp: , Rfl:     pregabalin (LYRICA) 200 mg capsule, TAKE 1 TABLET BY MOUTH TWICE DAILY . DO NOT EXCEED 2 PER 24 HOURS, Disp: 60 Capsule, Rfl: 0    Insulin Needles, Disposable, 32 gauge x 5/32\" ndle, Please use with your insulin pens as directed, Disp: 100 Pen Needle, Rfl: 5    Levemir FlexTouch U-100 Insuln 100 unit/mL (3 mL) inpn, 33 Units by SubCUTAneous route two (2) times a day. Dx: E11.42   Please take 34 Units in the morning and 32 Units in the evening  Indications: type 2 diabetes mellitus, Disp: 100 mL, Rfl: 3    HumaLOG KwikPen Insulin 100 unit/mL kwikpen, Inject 15 units under the skin before breakfast, lunch and dinner. Use sliding scale insulin also, Disp: 20 Adjustable Dose Pre-filled Pen Syringe, Rfl: 5    dulaglutide (Trulicity) 7.40 FN/9.4 mL sub-q pen, 0.5 mL by SubCUTAneous route every seven (7) days. Take 0.75mg weekly for 4 weeks then increase to 1.5mg weekly after that, Disp: 4 Pen, Rfl: 0    cyclobenzaprine (FLEXERIL) 5 mg tablet, Take 1 Tablet by mouth three (3) times daily as needed for Muscle Spasm(s). , Disp: 21 Tablet, Rfl: 0    naproxen (NAPROSYN) 500 mg tablet, Take 1 Tablet by mouth every twelve (12) hours as needed for Pain., Disp: 20 Tablet, Rfl: 0    albuterol (PROVENTIL HFA, VENTOLIN HFA, PROAIR HFA) 90 mcg/actuation inhaler, Take 2 Puffs by inhalation every six (6) hours as needed for Wheezing. Asthma, Dx: J45.40, Disp: 54 g, Rfl: 3    fluticasone propion-salmeteroL (Advair Diskus) 500-50 mcg/dose diskus inhaler, Take 1 Puff by inhalation every twelve (12) hours.  Asthma, Dx: J45.30, Disp: 180 Each, Rfl: 3    amitriptyline (ELAVIL) 50 mg tablet, TAKE 1 TABLET BY MOUTH NIGHTLY FOR  NUMBNESS AT  HANDS  AND  FEET, Disp: 30 Tablet, Rfl: 0   HYDROcodone-acetaminophen (NORCO) 5-325 mg per tablet, TAKE 1 TO 2 TABLETS BY MOUTH 4 TIMES DAILY AS NEEDED, Disp: , Rfl:     buPROPion SR (WELLBUTRIN SR) 150 mg SR tablet, Take 1 Tablet by mouth two (2) times a day., Disp: 180 Tablet, Rfl: 3    ondansetron (Zofran ODT) 4 mg disintegrating tablet, Take 1 Tablet by mouth every eight (8) hours as needed for Nausea for up to 360 days. , Disp: 60 Tablet, Rfl: 3    dicyclomine (BENTYL) 10 mg/5 mL soln oral solution, Take 10 mL by mouth four (4) times daily as needed (abd pain or diarrhea). , Disp: 473 mL, Rfl: 5    metFORMIN ER (GLUCOPHAGE XR) 500 mg tablet, Take 2 Tablets by mouth two (2) times a day., Disp: 360 Tablet, Rfl: 3    montelukast (SINGULAIR) 10 mg tablet, TAKE 1 TABLET BY MOUTH ONCE DAILY FOR ALLERGIES AND FOR ASTHMA, Disp: 90 Tablet, Rfl: 3    albuterol (PROVENTIL VENTOLIN) 2.5 mg /3 mL (0.083 %) nebu, USE ONE VIAL IN NEBULIZER EVERY 4 HOURS AS NEEDED FOR WHEEZING. Dx: J45.40, Disp: 90 Each, Rfl: 3    valsartan (DIOVAN) 80 mg tablet, Take 1 Tablet by mouth daily. Indications: high blood pressure, Disp: 90 Tablet, Rfl: 3    glipiZIDE (GLUCOTROL) 10 mg tablet, Take 1 tablet by mouth twice daily, Disp: 180 Tablet, Rfl: 3    atorvastatin (LIPITOR) 20 mg tablet, Take 1 Tablet by mouth daily. Indications: high cholesterol, Disp: 90 Tablet, Rfl: 1    ibuprofen (MOTRIN) 800 mg tablet, Take one tablet every 8 hours for three days, and then every 8 hours as needed for pain thereafter, Disp: 30 Tablet, Rfl: 1    lidocaine (LIDODERM) 5 %, Apply patch to the affected area for 12 hours a day and remove for 12 hours a day., Disp: 15 Each, Rfl: 0    acetaminophen (TYLENOL) 500 mg tablet, Take 2 Tabs by mouth every six (6) hours as needed for Pain., Disp: 20 Tab, Rfl: 0    folic acid (FOLVITE) 1 mg tablet, Take 1 mg by mouth daily. , Disp: , Rfl:     methotrexate (RHEUMATREX) 2.5 mg tablet, every Wednesday.  Pt takes 9 pills for 20 mg total., Disp: , Rfl:    meclizine (ANTIVERT) 25 mg tablet, Take 1 Tab by mouth three (3) times daily as needed for Dizziness. , Disp: 20 Tab, Rfl: 1    nitroglycerin (NITROSTAT) 0.4 mg SL tablet, Take 1 Tab by mouth every five (5) minutes as needed for Chest Pain. Sit/Lay down then put one tab under the tongue every 5 minutes as needed for chest pain for 3 doses, Disp: 1 Bottle, Rfl: 0    SOCIAL HISTORY:   Social History     Socioeconomic History    Marital status:      Spouse name: Not on file    Number of children: Not on file    Years of education: Not on file    Highest education level: Not on file   Occupational History    Not on file   Tobacco Use    Smoking status: Never Smoker    Smokeless tobacco: Never Used   Vaping Use    Vaping Use: Never used   Substance and Sexual Activity    Alcohol use: Yes     Alcohol/week: 1.0 standard drink     Types: 1 Glasses of wine per week     Comment: wine coolers per month 1-2 per pt on 7/29/2021    Drug use: No    Sexual activity: Yes     Partners: Male   Other Topics Concern    Not on file   Social History Narrative    Not on file     Social Determinants of Health     Financial Resource Strain:     Difficulty of Paying Living Expenses: Not on file   Food Insecurity:     Worried About Running Out of Food in the Last Year: Not on file    Oscar of Food in the Last Year: Not on file   Transportation Needs:     Lack of Transportation (Medical): Not on file    Lack of Transportation (Non-Medical):  Not on file   Physical Activity:     Days of Exercise per Week: Not on file    Minutes of Exercise per Session: Not on file   Stress:     Feeling of Stress : Not on file   Social Connections:     Frequency of Communication with Friends and Family: Not on file    Frequency of Social Gatherings with Friends and Family: Not on file    Attends Catholic Services: Not on file    Active Member of Clubs or Organizations: Not on file    Attends Club or Organization Meetings: Not on file    Marital Status: Not on file   Intimate Partner Violence:     Fear of Current or Ex-Partner: Not on file    Emotionally Abused: Not on file    Physically Abused: Not on file    Sexually Abused: Not on file   Housing Stability:     Unable to Pay for Housing in the Last Year: Not on file    Number of [place] in the Last Year: Not on file    Unstable Housing in the Last Year: Not on file       FAMILY HISTORY:  Family History   Problem Relation Age of Onset    Cancer Mother         Ovarian Cancer /breast ca    Heart Attack Father     Heart Disease Father     Diabetes Father     Other Father         pancreatitis    Cancer Sister     Other Sister         chrons    Heart Attack Maternal Grandfather     Diabetes Paternal Grandmother     HIV/AIDS Son        REVIEW OF SYSTEMS: Complete ROS assessed and noted for that which is described above, all else are negative.   Eyes: normal  ENT: normal  CVS: normal  Resp: normal  GI: normal  : normal  GYN: normal  Endocrine: normal  Integument: normal  Musculoskeletal: normal  Neuro: normal  Psych: normal      PHYSICAL EXAMINATION: Telehealth appointment    VITAL SIGNS:  Visit Vitals  /68   Pulse 82   Ht 5' 4\" (1.626 m)   Wt 278 lb 3.2 oz (126.2 kg)   BMI 47.75 kg/m²     Last 3 Recorded Weights in this Encounter    06/30/22 1013   Weight: 278 lb 3.2 oz (126.2 kg)      General: pleasant, no distress, good eye contact  · HEENT: no exopthalmos, no periorbital edema, no scleral/conjunctival injection, EOMI, no lid lag or stare  · Neck: supple, no thyromegaly, masses, lymph nodes, or carotid bruits, no supraclavicular or dorsocervical fat pads  · Cardiovascular: regular, normal rate, normal S1 and S2, no murmurs/rubs/gallops,  · Respiratory: clear to auscultation bilaterally  · Gastrointestinal: soft, nontender, nondistended, no masses, no hepatosplenomegaly  · Musculoskeletal: no proximal muscle weakness in upper or lower extremities  · Integumentary: no acanthosis nigricans, no abdominal striae, no rashes, no edema, no foot ulcers  · Neurological: see foot exam  · Psychiatric: normal mood and affect         REVIEW OF LABORATORY AND RADIOLOGY DATA:   Labs and documentation have been reviewed as described above. ASSESSMENT AND PLAN:   Angeline Wiggins is a 50 y.o. female with a PMHx as noted above who presents to the endocrinology clinic for f/u evaluation of type 2 diabetes. DM2, better controlled   HTN  HLD      DM2:  Patient with much improved blood sugar numbers, but has few readings above 200 when she eats high carb food  Plan to Continue MTF 1g BID and Glipizide 10mg BID  Levemir 34 in AM and 32 in PM  Humalog 15 units before meals  Trulicity increase from 1.5mg to 3mg weekly option of going up on Tulicity dose further to 4.5mg discussed and patient is in agreement with plan, will decide to do that in the coming visit, also plan to get off glipizide in the next visit        Continue to check glucose 3x/day        Continue attending diabetic ed classes  To keep her Ophthalm and Podiatry appts  RTC in 4weeks for repeat a1c       HTN: BP stable continue current meds  HLD: stable, slightly above goal, will obtain lipid panel repeats in 6 months after lifestyle modif implemented  Obesity: GLP-1 dose increase, will also go up as tolerated which will help with weight loss, will discuss bariatric surgery in the next visit  Transaminitis: ?underlying SILVA, will repeat levels    Labs: up to date    We discussed the expected course, resolution and complications of the diagnosis(es) in detail. Medication risks, benefits, costs, interactions, and alternatives were discussed as indicated. I advised Angeline Wiggins to contact the office if her condition worsens, changes or fails to improve as anticipated. Patient expressed understanding with the diagnosis(es) and plan.             Kwame Lyons MD   [facility]    Please see patient instructions.

## 2022-06-30 NOTE — PATIENT INSTRUCTIONS
Continue Metformin 1000mg twice daily and Glipizide 10mg twice daily  Increase Trulicity to 3 mg weekly    Take Levemir insulin 34 units in the morning and 32 units in the evening  Take Humalog 15 units before Breakfast, Lunch, Dinner please take 15 mins before your meal    Take insulin from sliding scale in addition to the 15 units of Humalog before meals as follows:    150-200 = 1 units  200-250 = 2 units  250-300= 3 units  300-350 = 4 units  350-400 = 5 units  400-450= 6 units  450-500= 7 units  >500 = 8 units and call physician      To keep Ophthalm and Podiatry appts. Plan to repeat your diabetes eye exam in the near future. Please be sure to have the eye clinic / office fax us the report of your latest eye exam to our clinic to fax # [phone]. This is very important for us to keep track of any effect of diabetes on your vision as part of our wholesome diabetes care that we provide. Diabetes and Meal Planning    Meal planning can be a key part of managing diabetes. Planning meals and snacks with the right balance of carbohydrate, protein, and fat can help you keep your blood sugar at the target level. You don't have to eat special foods. You can eat what your family eats, including sweets once in a while. But you do have to pay attention to how often you eat and how much you eat of certain foods. Your plate  The plate format is a simple way to help you manage how you eat. You plan meals by learning how much space each food should take on a plate. It can make it easier to keep your blood sugar level within your target range. It also helps you see if you're eating healthy portion sizes. To use the plate format, you put non-starchy vegetables on half your plate. Add lean protein foods, such as fish, lean meats and poultry, or soy products, on one-quarter of the plate. Put a grain or starchy vegetable (such as brown rice or a potato) on the final quarter of the plate.  You can add a small piece of fruit and some low-fat or fat-free milk or yogurt, depending on your carbohydrate goal for each meal.  Make sure that you are not using an oversized plate. A 9-inch plate is best.    Carbohydrates  Carbohydrate raises blood sugar higher and more quickly than any other nutrient. It is found in desserts, breads and cereals, and fruit. It's also found in starchy vegetables such as potatoes and corn, grains such as rice and pasta, and milk and yogurt. You can help keep your blood sugar levels within your target range by planning how much carbohydrate to have at meals and snacks. The amount you need depends on several things. These include your weight, how active you are, which diabetes medicines you take, and what your goals are for your blood sugar levels. An example of a carbohydrate counting plan is:  · 45 to 60 grams at each meal. That's about the same as 3 to 4 carbohydrate servings. · 15 to 20 grams at each snack. That's about the same as 1 carbohydrate serving. The Nutrition Facts label on packaged foods tells you how much carbohydrate is in a serving of the food. First, look at the serving size on the food label. All of the nutrition information on a food label is based on that serving size. For foods that don't come with labels, such as fresh fruits and vegetables, you'll need a guide that lists carbohydrate in these foods. You may use an onelia on your smart phone called [employer]. How can you plan healthy meals? Here are some tips to get started:  · Plan your meals a week at a time. Don't forget to include snacks too. · Use cookbooks or online recipes to plan several main meals. Plan some quick meals for busy nights. You also can double some recipes that freeze well. Then you can save half for other busy nights when you don't have time to cook. · Make sure you have the ingredients you need for your recipes.  If you're running low on basic items, put these items on your shopping list too.  · List foods that you use to make breakfasts, lunches, and snacks. List plenty of fruits and vegetables. · Post this list on the refrigerator. Add to it as you think of more things you need. · Take the list to the store to do your weekly shopping. Follow-up care is a key part of your treatment and safety. Be sure to make and go to all appointments, and call your doctor if you are having problems. It's also a good idea to know your test results and keep a list of the medicines you take. Food Tips    Back to basics:    When you have diabetes or pre-diabetes, as you know, your body has difficulty dealing with the sugar it absorbs from your meals. An unrelated but very relevant term you may have heard before, quantitative easing, has a new meaning: By gradually reducing the load of sugars entering the body, you ease the stress on the body and allow it to catch up with the work it must do. This in turn will allow your body to work better. On top of this, remember one point, the more sugar stress your body has, the more you enter a state of glucose toxicity and this is a state where you become even more resistant. Thats right higher sugar = more resistance, not only to your own bodies attempt to fix the problem but also resistance to your medications. This is why medications work best when a proper diet is followed. Tip 1. Dont forget the protein. When you add a portion of meat or other low carb protein food in your meal, it provides healthy calories which contribute to reducing that feeling of hunger that drives you to eat what you dont want. Tip 2. Portions are a real thing. Before you eat, stop and look at your plate/table. Count how many items have sugars/carbs in them. A sandwich (bread) ? Samuella Coffee ? Sweet drink ? Potatoe ? Pasta ? Rice ? You would be surprised when you become aware.  Aim for a reasonable portion of carbs, and if you feel you absolutely cannot do this, at least start working toward this. 45-60 grams is usually more than enough in one meal. And YES, than includes the desert! Tip 3. Three meals per day, snack-free in between! Your body needs a break. Eating an adequate meal keeps you from getting hungry and reaching for a snack in between meals. Recall that most of the time, diabetic patients are not treating these snacks. Dont eat dinner late at night, but rather allow more overnight fasting time for your body to recover. If you eat dinner at 8 PM, try 6 PM.  Sporadic eating is the opposite of what you need, and adjusting to a regular eating schedule such as this will not only be a great benefit to your body, but your medications will also tend to work better at keeping your diabetes under control. Tip 4. There is no best diet when it comes to weight loss. When comparing diets and outcomes, the main ingredient when searching for weight loss was calories ! Lower calories = better weight loss. Pick a healthy diet thats right for you, i.e. diabetes friendly, and evaluate your daily calorie intake. And of course this would be of no use without exercise. Calories in need calories out.     --------------------------------------------------------------------------------------------------------------------------------------------------------------------------------  Diabetes Dental Care  When you have diabetes, managing blood sugar levels and taking good care of your teeth and gums are both important. When blood sugar levels are high, there's a greater risk for Gum (periodontal) disease. Tooth decay. Fungal infections in the mouth, like thrush. Dry mouth. Keeping your blood sugar levels in your target range can help prevent problems with the teeth and gums. If you have any problems with your teeth or gums, it is important see your dentist.  How do you care for your teeth and gums when you have diabetes? · Brush your teeth twice a day. · Floss daily.  Make sure to press the floss against your teeth and not your gums. · Check each day for areas where your gums might be red or painful. Be sure to let your dentist know of any sores in your mouth. · See your dentist regularly for professional cleaning of your teeth and to look for gum problems. Many dentists recommend getting checkups twice a year. Remind your dentist that you have diabetes before any work is done. · Don't smoke or use smokeless tobacco.    --------------------------------------------------------------------------------------------------------------------------------------------------------------------------------  Diabetes Foot Care    When you have diabetes, your feet need extra care and attention. Diabetes can damage the nerve endings and blood vessels in your feet, making you less likely to notice when your feet are injured. Diabetes also limits your body's ability to fight infection. If you get a minor foot injury, it could become an ulcer or a serious infection. With good foot care, you can prevent most of these problems. Caring for your feet can be quick and easy. Most of the care can be done when you are bathing or getting ready for bed. · Keep your blood sugar close to normal by watching what and how much you eat, monitoring blood sugar, taking medicines if prescribed, and getting regular exercise. · Do not smoke. Smoking affects blood flow and can make foot problems worse. · Eat a diet that is low in fats. High fat intake can cause fat to build up in your blood vessels and decrease blood flow. · Inspect your feet daily for blisters, cuts, cracks, or sores. If you cannot see well, use a mirror or have someone help you. Take care of your feet:  · Wash your feet every day. Use warm (not hot) water. Check the water temperature with your wrists or other part of your body, not your feet. · Dry your feet well. If the skin on your feet stays moist, bacteria or a fungus can grow, which can lead to infection.   · Use moisturizing skin cream to keep the skin on your feet soft and prevent calluses and cracks. Stop using any cream that causes a rash. · Clean underneath your toenails carefully. Do not use a sharp object to clean underneath your toenails. · Change socks daily. · Look inside your shoes every day for things like gravel or torn linings, which could cause blisters or sores. · Buy shoes that fit well but not too tightly to prevent bunions and blisters. Shoes should be flexible and breathable but prevent from injury. · Do not go barefoot, especially at night, to prevent injury. ·  Do not try to treat an early foot problem at home. Home remedies or treatments that you can buy without a prescription (such as corn removers) can be harmful. · Seek immediate help if:   · You have a foot sore, an ulcer or break in the skin that is not healing after 4 days, bleeding corns or calluses, or an ingrown toenail. · You have blue or black areas. · You have peeling skin or tiny blisters between your toes or cracking or oozing of the skin. · You have a fever for more than 24 hours and a foot sore. · You have new numbness or tingling in your feet that does not go away after you move your feet or change positions. · You have new unexplained or unusual swelling of the foot or ankle.

## 2022-07-01 LAB
ALT SERPL-CCNC: 69 IU/L (ref 0–32)
AST SERPL-CCNC: 59 IU/L (ref 0–40)

## 2022-07-03 DIAGNOSIS — E11.42 TYPE 2 DIABETES MELLITUS WITH DIABETIC POLYNEUROPATHY, WITHOUT LONG-TERM CURRENT USE OF INSULIN (HCC): ICD-10-CM

## 2022-07-04 RX ORDER — PREGABALIN 200 MG/1
CAPSULE ORAL
Qty: 60 CAPSULE | Refills: 0 | Status: SHIPPED | OUTPATIENT
Start: 2022-07-04 | End: 2022-08-09

## 2022-07-11 ENCOUNTER — TELEPHONE (OUTPATIENT)
Dept: ENDOCRINOLOGY | Age: 47
End: 2022-07-11

## 2022-07-13 NOTE — TELEPHONE ENCOUNTER
Informed patient that a PA is being submitted for her Tianadevin Oglesby and once we have an answer we will let her know. Patient voiced okay.

## 2022-07-13 NOTE — TELEPHONE ENCOUNTER
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 Dinesh Jay for completion.

## 2022-07-13 NOTE — TELEPHONE ENCOUNTER
7/13/2022  12:24 PM      Pt called and stated her free style [place] is being rejected at [address] [facility] because  needs to do a prior authorization with the insurance company. Pt pharmacy is the [address] [place] [address] on [facility] in [facility]. Pharmacy#[phone]  BS#[phone]      Thanks,  Laly Bello

## 2022-07-18 NOTE — TELEPHONE ENCOUNTER
Spoke with ms. Marielle Ross that PA for Tamy Forte was submitted pending insurance decision.  We also discussed her LFT results how they improved but still above normal. She saw a GI specialist few years ago but is unsure if she was supposed to go back, which I advised patient it will require monitoring

## 2022-08-01 ENCOUNTER — OFFICE VISIT (OUTPATIENT)
Dept: ENDOCRINOLOGY | Age: 47
End: 2022-08-01
Payer: MEDICAID

## 2022-08-01 VITALS
SYSTOLIC BLOOD PRESSURE: 119 MMHG | WEIGHT: 281.8 LBS | BODY MASS INDEX: 46.95 KG/M2 | HEIGHT: 65 IN | HEART RATE: 84 BPM | DIASTOLIC BLOOD PRESSURE: 73 MMHG

## 2022-08-01 DIAGNOSIS — Z79.4 TYPE 2 DIABETES MELLITUS WITH DIABETIC POLYNEUROPATHY, WITH LONG-TERM CURRENT USE OF INSULIN (HCC): Primary | ICD-10-CM

## 2022-08-01 DIAGNOSIS — I10 ESSENTIAL HYPERTENSION: ICD-10-CM

## 2022-08-01 DIAGNOSIS — E66.01 CLASS 3 SEVERE OBESITY DUE TO EXCESS CALORIES WITH BODY MASS INDEX (BMI) OF 45.0 TO 49.9 IN ADULT, UNSPECIFIED WHETHER SERIOUS COMORBIDITY PRESENT (HCC): ICD-10-CM

## 2022-08-01 DIAGNOSIS — R74.8 ELEVATED LIVER ENZYMES: ICD-10-CM

## 2022-08-01 DIAGNOSIS — E78.5 DYSLIPIDEMIA: ICD-10-CM

## 2022-08-01 DIAGNOSIS — E11.42 TYPE 2 DIABETES MELLITUS WITH DIABETIC POLYNEUROPATHY, WITH LONG-TERM CURRENT USE OF INSULIN (HCC): Primary | ICD-10-CM

## 2022-08-01 PROCEDURE — 3046F HEMOGLOBIN A1C LEVEL >9.0%: CPT | Performed by: GENERAL ACUTE CARE HOSPITAL

## 2022-08-01 PROCEDURE — 99214 OFFICE O/P EST MOD 30 MIN: CPT | Performed by: GENERAL ACUTE CARE HOSPITAL

## 2022-08-01 PROCEDURE — 83036 HEMOGLOBIN GLYCOSYLATED A1C: CPT | Performed by: GENERAL ACUTE CARE HOSPITAL

## 2022-08-01 NOTE — PROGRESS NOTES
CHIEF COMPLAINT: f/u evaluation for uncontrolled type 2 diabetes    HISTORY OF PRESENT ILLNESS:   Nathalie Oliveros is a 50 y.o. female with a PMHx as noted below who presents to the endocrinology clinic for f/u evaluation of uncontrolled type 2 diabetes. Diagnosed in 2016  Most recent Hgb A1c was 11.7 in 04/08/2022. 7.4% 08/01/22    Patient reports that her sugar control has been less well controlled sugar as she has been having a lot of pain from RA and has been avoiding the opioids as she is afraid of side effects. She is compliant with her meds and lifestyle changes she is implementing. She is struggling with limited mobility due to RA but she is feeling better overall, but her vision has started to get blurred which is a sign that her average sugar is rising. Blood sugar log        Hypoglycemia: before lunch 69 07/27 missed a meal     Currently taking the following meds:  -Metformin 1000mg twice daily  -Glipizide 10mg twice daily  -Trulicity 1.1BJ weekly injection, she will be starting the 3mg dose in 1 week  -Levemir insulin 34 units in the morning and 32 units in the evening  -Humalog 15 units before Breakfast, Lunch, Dinner with SS, humalog increased by 2 units     Exercise consists of limited due to RA . No history of vascular disease. No history of retinopathy, or nephropathy.    Has neuropathy    Hx of kidney Ca in 2015  Hx of SILVA    On Methotrexate (9 tabs/week) and Etanercept for RA, sometimes takes prednisone for RA flares, not recently  Hx of Asthma, well controlled on preventive inhalers      Last eye exam was last sept, annual visits      Review of most recent diabetes-related labs:  Lab Results   Component Value Date    HBA1C 11.7 (H) 04/08/2022    HBA1C 9.3 (H) 10/20/2021    HBA1C 8.3 (H) 03/11/2019    DFZ5XOVB 8.5 04/27/2021    VAR9JQBE 6.3 01/26/2021    SEC2YQES 9.4 10/19/2020    CHOL 136 04/08/2022    LDLC 84 04/08/2022    GFRAA >60 02/14/2022    GFRNA >60 02/14/2022    CREATEXT 0.99 2022    MCACR 3 2022    TSH 1.290 2019    B12LT 423 2016     Lab Key:  [id number] = IA-2 pancreatic islet cell autoantibody  CPEPL = C-peptide level  :EXT = External Lab  GADLT = CAILIN-65 autoantibody   INSUL = Insulin level  MCACR (or MALBEXT) = Urine Microalbumin (or External UM)  B12LT = B12 level    PAST MEDICAL/SURGICAL HISTORY:   Past Medical History:   Diagnosis Date    Anemia (iron deficiency)     Anxiety     per pt on 2021    Asthma     DDD (degenerative disc disease), lumbar     Depression     DM type 2 (diabetes mellitus, type 2) ([address] 2011    GERD (gastroesophageal reflux disease)     GI bleed     Hepatic steatosis 2016    confirmed by US    HTN (hypertension)     Irregular menses     Irritable bowel     Kidney mass     19: US showed rigth renal cystic nephroma, follows with Urology (Dr. Caprice Barthel)    Long-term use of immunosuppressant medication       per pt on 2021    Morbid obesity ([address]     FE on CPAP     cpap complaint  per pt on 2021    PUD (peptic ulcer disease) 2015    Renal cancer, right ([address]     tx surgery    Rheumatoid arthritis ([address]     per pt on 2021     Past Surgical History:   Procedure Laterality Date    COLONOSCOPY N/A 2021    COLONOSCOPY performed by Kat Winchester MD at [facility] ENDOSCOPY. Sigmoid diverticulosis, int/ext hemorrhoids.  Repeat in 5 yrs due to fam hx of colon ca    COLONOSCOPY,DIAGNOSTIC  2021         HX  SECTION      x 2    HX COLONOSCOPY  2015    HX DILATION AND CURETTAGE  2021    HX ENDOSCOPY  2015    HX HYSTERECTOMY  2021    HX HYSTEROSCOPY WITH ENDOMETRIAL ABLATION  2014    HX TUBAL LIGATION      HX TUMOR REMOVAL  2016    right kidney, Dr Christin Loomis UROLOGICAL      Mass removed from right kidney on 02/15/2016        ALLERGIES:   Allergies   Allergen Reactions    Lisinopril Cough    Pcn [Penicillins] Hives       MEDICATIONS ON ADMISSION:     Current Outpatient Medications:     pregabalin (LYRICA) 200 mg capsule, TAKE 1 CAPSULE BY MOUTH TWICE DAILY . DO NOT EXCEED 2 PER 24 HOURS, Disp: 60 Capsule, Rfl: 0    etanercept (ENBREL SC), by SubCUTAneous route every seven (7) days. , Disp: , Rfl:     dulaglutide 3 mg/0.5 mL pnij, 4 Each by SubCUTAneous route every seven (7) days. , Disp: 4 Each, Rfl: 1    Insulin Needles, Disposable, 32 gauge x 5/32\" ndle, Please use with your insulin pens as directed, Disp: 100 Pen Needle, Rfl: 5    Levemir FlexTouch U-100 Insuln 100 unit/mL (3 mL) inpn, 33 Units by SubCUTAneous route two (2) times a day. Dx: E11.42   Please take 34 Units in the morning and 32 Units in the evening  Indications: type 2 diabetes mellitus, Disp: 100 mL, Rfl: 3    HumaLOG KwikPen Insulin 100 unit/mL kwikpen, Inject 15 units under the skin before breakfast, lunch and dinner. Use sliding scale insulin also, Disp: 20 Adjustable Dose Pre-filled Pen Syringe, Rfl: 5    cyclobenzaprine (FLEXERIL) 5 mg tablet, Take 1 Tablet by mouth three (3) times daily as needed for Muscle Spasm(s). , Disp: 21 Tablet, Rfl: 0    naproxen (NAPROSYN) 500 mg tablet, Take 1 Tablet by mouth every twelve (12) hours as needed for Pain., Disp: 20 Tablet, Rfl: 0    albuterol (PROVENTIL HFA, VENTOLIN HFA, PROAIR HFA) 90 mcg/actuation inhaler, Take 2 Puffs by inhalation every six (6) hours as needed for Wheezing. Asthma, Dx: J45.40, Disp: 54 g, Rfl: 3    fluticasone propion-salmeteroL (Advair Diskus) 500-50 mcg/dose diskus inhaler, Take 1 Puff by inhalation every twelve (12) hours.  Asthma, Dx: J45.30, Disp: 180 Each, Rfl: 3    amitriptyline (ELAVIL) 50 mg tablet, TAKE 1 TABLET BY MOUTH NIGHTLY FOR  NUMBNESS AT  HANDS  AND  FEET, Disp: 30 Tablet, Rfl: 0    HYDROcodone-acetaminophen (NORCO) 5-325 mg per tablet, TAKE 1 TO 2 TABLETS BY MOUTH 4 TIMES DAILY AS NEEDED, Disp: , Rfl:     buPROPion SR (WELLBUTRIN SR) 150 mg SR tablet, Take 1 Tablet by mouth two (2) times a day., Disp: 180 Tablet, Rfl: 3 ondansetron (Zofran ODT) 4 mg disintegrating tablet, Take 1 Tablet by mouth every eight (8) hours as needed for Nausea for up to 360 days. , Disp: 60 Tablet, Rfl: 3    dicyclomine (BENTYL) 10 mg/5 mL soln oral solution, Take 10 mL by mouth four (4) times daily as needed (abd pain or diarrhea). , Disp: 473 mL, Rfl: 5    metFORMIN ER (GLUCOPHAGE XR) 500 mg tablet, Take 2 Tablets by mouth two (2) times a day., Disp: 360 Tablet, Rfl: 3    montelukast (SINGULAIR) 10 mg tablet, TAKE 1 TABLET BY MOUTH ONCE DAILY FOR ALLERGIES AND FOR ASTHMA, Disp: 90 Tablet, Rfl: 3    albuterol (PROVENTIL VENTOLIN) 2.5 mg /3 mL (0.083 %) nebu, USE ONE VIAL IN NEBULIZER EVERY 4 HOURS AS NEEDED FOR WHEEZING. Dx: J45.40, Disp: 90 Each, Rfl: 3    valsartan (DIOVAN) 80 mg tablet, Take 1 Tablet by mouth daily. Indications: high blood pressure, Disp: 90 Tablet, Rfl: 3    glipiZIDE (GLUCOTROL) 10 mg tablet, Take 1 tablet by mouth twice daily, Disp: 180 Tablet, Rfl: 3    atorvastatin (LIPITOR) 20 mg tablet, Take 1 Tablet by mouth daily. Indications: high cholesterol, Disp: 90 Tablet, Rfl: 1    ibuprofen (MOTRIN) 800 mg tablet, Take one tablet every 8 hours for three days, and then every 8 hours as needed for pain thereafter, Disp: 30 Tablet, Rfl: 1    lidocaine (LIDODERM) 5 %, Apply patch to the affected area for 12 hours a day and remove for 12 hours a day., Disp: 15 Each, Rfl: 0    acetaminophen (TYLENOL) 500 mg tablet, Take 2 Tabs by mouth every six (6) hours as needed for Pain., Disp: 20 Tab, Rfl: 0    folic acid (FOLVITE) 1 mg tablet, Take 1 mg by mouth daily. , Disp: , Rfl:     methotrexate (RHEUMATREX) 2.5 mg tablet, every Wednesday. Pt takes 9 pills for 20 mg total., Disp: , Rfl:     meclizine (ANTIVERT) 25 mg tablet, Take 1 Tab by mouth three (3) times daily as needed for Dizziness. , Disp: 20 Tab, Rfl: 1    nitroglycerin (NITROSTAT) 0.4 mg SL tablet, Take 1 Tab by mouth every five (5) minutes as needed for Chest Pain.  Sit/Lay down then put one tab under the tongue every 5 minutes as needed for chest pain for 3 doses, Disp: 1 Bottle, Rfl: 0    FreeStyle Deonna 2 Sensor kit, Use as directed to test blood sugars at least 4 times daily. Replace every 14 days (Patient not taking: Reported on 8/1/2022), Disp: 2 Kit, Rfl: 11    FreeStyle Deonna 2 [place] misc, Use as directed with freestyle deonna 2 sensors (Patient not taking: Reported on 8/1/2022), Disp: 1 Each, Rfl: 0    SOCIAL HISTORY:   Social History     Socioeconomic History    Marital status:      Spouse name: Not on file    Number of children: Not on file    Years of education: Not on file    Highest education level: Not on file   Occupational History    Not on file   Tobacco Use    Smoking status: Never    Smokeless tobacco: Never   Vaping Use    Vaping Use: Never used   Substance and Sexual Activity    Alcohol use: Yes     Alcohol/week: 1.0 standard drink     Types: 1 Glasses of wine per week     Comment: wine coolers per month 1-2 per pt on 7/29/2021    Drug use: No    Sexual activity: Yes     Partners: Male   Other Topics Concern    Not on file   Social History Narrative    Not on file     Social Determinants of Health     Financial Resource Strain: Not on file   Food Insecurity: Not on file   Transportation Needs: Not on file   Physical Activity: Not on file   Stress: Not on file   Social Connections: Not on file   Intimate Partner Violence: Not on file   Housing Stability: Not on file       FAMILY HISTORY:  Family History   Problem Relation Age of Onset    Cancer Mother         Ovarian Cancer /breast ca    Heart Attack Father     Heart Disease Father     Diabetes Father     Other Father         pancreatitis    Cancer Sister     Other Sister         chrons    Heart Attack Maternal Grandfather     Diabetes Paternal Grandmother     HIV/AIDS Son        REVIEW OF SYSTEMS: Complete ROS assessed and noted for that which is described above, all else are negative.   Eyes: normal  ENT: normal  CVS: normal  Resp: normal  GI: normal  : normal  GYN: normal  Endocrine: normal  Integument: normal  Musculoskeletal: normal  Neuro: normal  Psych: normal      PHYSICAL EXAMINATION: Telehealth appointment    VITAL SIGNS:  Visit Vitals  /73   Pulse 84   Ht 5' 5\" (1.651 m)   Wt 281 lb 12.8 oz (127.8 kg)   BMI 46.89 kg/m²       Last 3 Recorded Weights in this Encounter    08/01/22 1100   Weight: 281 lb 12.8 oz (127.8 kg)        General: pleasant, no distress, good eye contact  HEENT: no exopthalmos, no periorbital edema, no scleral/conjunctival injection, EOMI, no lid lag or stare  Neck: supple, no thyromegaly, masses, lymph nodes, or carotid bruits, no supraclavicular or dorsocervical fat pads  Cardiovascular: regular, normal rate, normal S1 and S2, no murmurs/rubs/gallops,  Respiratory: clear to auscultation bilaterally  Gastrointestinal: soft, nontender, nondistended, no masses, no hepatosplenomegaly  Musculoskeletal: no proximal muscle weakness in upper or lower extremities  Integumentary: no acanthosis nigricans, no abdominal striae, no rashes, no edema, no foot ulcers  Neurological: see foot exam  Psychiatric: normal mood and affect         REVIEW OF LABORATORY AND RADIOLOGY DATA:   Labs and documentation have been reviewed as described above. ASSESSMENT AND PLAN:   Toño Skinner is a 50 y.o. female with a PMHx as noted above who presents to the endocrinology clinic for f/u evaluation of type 2 diabetes.      DM2, better controlled   HTN  HLD    DM2:    Patient with much improved blood sugar numbers, but has few readings above 200 when she eats high carb food  Plan to Continue MTF 1g BID and Glipizide 10mg BID  Levemir 34 >> 36 in AM and 32 >> 34 in PM  Humalog 15 units before meals  Trulicity increase from 1.5mg to 3mg weekly   Patient with widely fluctuating sugars and therefore will will see if we can get her on CGM, will give sample Freestyle charo 2 and showed how to send readings in 2 weeks to [facility] for review  Continue to check glucose 3x/day  Continue attending diabetic ed classes  To keep her Ophthalm and Podiatry appts      HTN: BP stable continue current meds  HLD: stable, slightly above goal, will obtain lipid panel repeats in 6 months after lifestyle modif implemented  Obesity: GLP-1 dose increase, will also go up as tolerated which will help with weight loss, discussed bariatric surgery  Transaminitis: ?underlying SILVA, repeat levels improved, advised will likely require follow up with GI (she was following in the past)    RTC in 6 weeks    We discussed the expected course, resolution and complications of the diagnosis(es) in detail. Medication risks, benefits, costs, interactions, and alternatives were discussed as indicated. I advised Myke Craig to contact the office if her condition worsens, changes or fails to improve as anticipated. Patient expressed understanding with the diagnosis(es) and plan. MEDICAL DECISION MAKING OR COUNSELING:  This is a visit of 32minutes and 18minutes of the time was spent counseling the patient and/or coordinating care. I have met with the patient and participated in the MDM for their plan of care. MD Chava [facility]    Please see patient instructions.

## 2022-08-01 NOTE — LETTER
8/1/2022    Patient: Fortunato Villegas   YOB: 1975   Date of Visit: 8/1/2022     Zenaida Rivera MD  [address] [facility] [id number] [place] [address]    Dear Zenaida Rivera MD,      Thank you for referring Ms. Tk Cooney to [facility] for evaluation. My notes for this consultation are attached. If you have questions, please do not hesitate to call me. I look forward to following your patient along with you.       Sincerely,    Amanda Cazares MD

## 2022-08-01 NOTE — PATIENT INSTRUCTIONS
Continue Metformin 1000mg twice daily and Glipizide 10mg twice daily  Increase Trulicity to 3 mg weekly in 1 week    Take Levemir insulin 36 units in the morning and 34 units in the evening  Take Humalog 15 units before Breakfast, Lunch, Dinner please take 15 mins before your meal    Take insulin from sliding scale in addition to the 15 units of Humalog before meals as follows:    150-200 = 1 units  200-250 = 2 units  250-300= 3 units  300-350 = 4 units  350-400 = 5 units  400-450= 6 units  450-500= 7 units  >500 = 8 units and call physician    Plan to repeat your diabetes eye exam in the near future. Please be sure to have the eye clinic / office fax us the report of your latest eye exam to our clinic to fax # [phone]. This is very important for us to keep track of any effect of diabetes on your vision as part of our wholesome diabetes care that we provide.      Please send your Freestyle Deonna readings in 2 weeks

## 2022-08-02 LAB — HBA1C MFR BLD HPLC: 7.4 %

## 2022-08-03 PROBLEM — E78.5 DYSLIPIDEMIA: Status: ACTIVE | Noted: 2022-08-03

## 2022-08-03 RX ORDER — BLOOD-GLUCOSE METER
EACH MISCELLANEOUS
Qty: 1 EACH | Refills: 0 | Status: SHIPPED | OUTPATIENT
Start: 2022-08-03

## 2022-08-03 RX ORDER — LANCETS
EACH MISCELLANEOUS
Qty: 1 EACH | Refills: 11 | Status: SHIPPED | OUTPATIENT
Start: 2022-08-03

## 2022-08-03 RX ORDER — LANCING DEVICE
EACH MISCELLANEOUS
Qty: 120 STRIP | Refills: 5 | Status: SHIPPED | OUTPATIENT
Start: 2022-08-03

## 2022-08-08 PROBLEM — M51.36 DDD (DEGENERATIVE DISC DISEASE), LUMBAR: Status: ACTIVE | Noted: 2022-08-08

## 2022-08-08 PROBLEM — M51.369 DDD (DEGENERATIVE DISC DISEASE), LUMBAR: Status: ACTIVE | Noted: 2022-08-08

## 2022-08-12 ENCOUNTER — TELEPHONE (OUTPATIENT)
Dept: ENDOCRINOLOGY | Age: 47
End: 2022-08-12

## 2022-08-12 NOTE — TELEPHONE ENCOUNTER
Patient was notified that she was denied for the [facility] and that the form that Gilberto Landin sent to us was faxed back and we are currently awaiting a response. Patient voiced okay. Patient stated that for the past three day her sugar has been up and down and she has had some low readings so she has not had any insulin for 3 days. She stated that today her sensor read ERROR so I had her to check her sugar while she was on the phone with me with her glucose meter and it was 209. Patient stated that she ate a peanut butter and jelly sandwich one hour prior to checking her sugar. She would like to know what she should do please advise.       Her sugar readings for today     53  53  53  157  168  173  69    8/11  68  84  151  174  193  169  100  137  109

## 2022-08-12 NOTE — TELEPHONE ENCOUNTER
Spoke with ms. Elliot Mccoy, she had stopped using her insulin for 3 days as she is not feeling well, says she feels nausea and had vomiting 2 days ago and headache (says her BP at home was 118/80) and is not feeling better, said her  told her she \"looks dehydrated\", she has not taken her insulin for 3 days, and her blood sugar number by sensor and glucometer are low 70s-60s. I advised ms. Elliot Mccoy to seek urgent/emergent care to be evaluated, advised her guy since she had elevated liver enzymes in the past to have her labs checked. She indicates understanding and says she will seek urgent/emergent care.

## 2022-08-12 NOTE — TELEPHONE ENCOUNTER
8/12/2022  1:40 PM    Good Afternoon,    Pt called and wanted to ask if Dr. Negar Montgomery can call the free style charo vendor and verify over the phone what had been requested in the letter so that she maybe able to receive it. Pt also stated that her sugars have been running low and wanted to speak ot the nurse. The pt did leave the numbers on the voicemail but I couldn't hear it the voicemail was cutting out. Please call the pt back at [phone].     Thanks  Diana Osler

## 2022-08-22 ENCOUNTER — NURSE TRIAGE (OUTPATIENT)
Dept: OTHER | Facility: CLINIC | Age: 47
End: 2022-08-22

## 2022-08-22 NOTE — TELEPHONE ENCOUNTER
Received call from [facility] [address] at [facility] with Red Flag Complaint. Subjective: Caller states \"I don't have any appetite. It feels like bad menstrual cramps. I had a hysterectomy and haven't had issues. \"     Current Symptoms: generalized abd cramping. Onset: 3 days ago; constant    Associated Symptoms: nausea without emesis. Diarrhea, one episode today. Decreased appetite. Pain Severity: 8.5/10; cramping; constant Unknown triggers. Temperature: denies     What has been tried: Dicyclomine. Hydrocodone. Ibuprofen. Help slightly. LMP:  Hysterectomy  Pregnant: No    Recommended disposition: Go to ED Now    Care advice provided, patient verbalizes understanding; denies any other questions or concerns; instructed to call back for any new or worsening symptoms. Patient/caller agrees to proceed to nearest Emergency Department    Attention Provider: Thank you for allowing me to participate in the care of your patient. The patient was connected to triage in response to information provided to the [facility]. Please do not respond through this encounter as the response is not directed to a shared pool.         Reason for Disposition   SEVERE abdominal pain (e.g., excruciating)    Protocols used: Abdominal Pain - Female-ADULT-[place]

## 2022-08-29 ENCOUNTER — DOCUMENTATION ONLY (OUTPATIENT)
Dept: SLEEP MEDICINE | Age: 47
End: 2022-08-29

## 2022-08-29 ENCOUNTER — OFFICE VISIT (OUTPATIENT)
Dept: SLEEP MEDICINE | Age: 47
End: 2022-08-29
Payer: MEDICAID

## 2022-08-29 VITALS
HEART RATE: 76 BPM | BODY MASS INDEX: 45.42 KG/M2 | WEIGHT: 272.6 LBS | SYSTOLIC BLOOD PRESSURE: 120 MMHG | TEMPERATURE: 97.7 F | DIASTOLIC BLOOD PRESSURE: 83 MMHG | HEIGHT: 65 IN | OXYGEN SATURATION: 98 % | RESPIRATION RATE: 20 BRPM

## 2022-08-29 DIAGNOSIS — E11.42 TYPE 2 DIABETES MELLITUS WITH DIABETIC POLYNEUROPATHY, WITH LONG-TERM CURRENT USE OF INSULIN (HCC): ICD-10-CM

## 2022-08-29 DIAGNOSIS — E66.01 CLASS 3 SEVERE OBESITY DUE TO EXCESS CALORIES WITH SERIOUS COMORBIDITY AND BODY MASS INDEX (BMI) OF 45.0 TO 49.9 IN ADULT (HCC): ICD-10-CM

## 2022-08-29 DIAGNOSIS — I10 ESSENTIAL HYPERTENSION: ICD-10-CM

## 2022-08-29 DIAGNOSIS — Z79.4 TYPE 2 DIABETES MELLITUS WITH DIABETIC POLYNEUROPATHY, WITH LONG-TERM CURRENT USE OF INSULIN (HCC): ICD-10-CM

## 2022-08-29 DIAGNOSIS — G47.33 OBSTRUCTIVE SLEEP APNEA (ADULT) (PEDIATRIC): Primary | ICD-10-CM

## 2022-08-29 PROCEDURE — 3051F HG A1C>EQUAL 7.0%<8.0%: CPT | Performed by: INTERNAL MEDICINE

## 2022-08-29 PROCEDURE — 99213 OFFICE O/P EST LOW 20 MIN: CPT | Performed by: INTERNAL MEDICINE

## 2022-08-29 NOTE — PROGRESS NOTES
[address] [facility] [facility]. [place], [address]  Tel.  [phone]  Fax. [address] [facility] [address]  [place], [address] [facility]  Tel.  [phone]  Fax. [phone] [address]   Tel.  [phone]  Fax. [phone]     Sara Jim is a 50 y.o. female seen for a positive airway pressure follow-up. She reports no problems using the device. The following problems are identified:    Drowsiness Yes at times. She has a lot going on ( very ill) Problems exhaling no   Snoring no Forget to put on no   Mask Comfortable yes Can't fall asleep no   Dry Mouth no Mask falls off no   Air Leaking no Frequent awakenings no     Download reviewed. Weight is down 20 pounds  She admits that her sleep has improved. Therapy Apnea Index averaged over PAP use: 1 /hr which reflects improved sleep breathing condition. Allergies   Allergen Reactions    Lisinopril Cough    Pcn [Penicillins] Hives       She has a current medication list which includes the following prescription(s): atorvastatin, pregabalin, lancets, pharmacist choice, blood-glucose meter, etanercept, dulaglutide, insulin needles (disposable), levemir flextouch u-100 insuln, humalog kwikpen insulin, cyclobenzaprine, naproxen, albuterol, fluticasone propion-salmeterol, amitriptyline, hydrocodone-acetaminophen, bupropion sr, ondansetron, dicyclomine, metformin er, montelukast, albuterol, valsartan, glipizide, ibuprofen, lidocaine, acetaminophen, folic acid, methotrexate, meclizine, nitroglycerin, freestyle charo 2 sensor, and freestyle charo 2 reader. .      She  has a past medical history of Anemia (iron deficiency), Anxiety, Asthma, DDD (degenerative disc disease), lumbar (2014), Depression, DM type 2 (diabetes mellitus, type 2) ([facility] (11/23/2011), GERD (gastroesophageal reflux disease), GI bleed (2015), Hepatic steatosis (11/2016), HTN (hypertension), Irregular menses, Irritable bowel, Kidney mass, Long-term use of immunosuppressant medication, Morbid obesity ([facility] FE on CPAP, PUD (peptic ulcer disease) (2015), Renal cancer, right ([facility] and Rheumatoid arthritis ([facility] [place] Sleepiness Score: 19   and Modified F.O.S.Q. Score Total / 2: 6        O>    Visit Vitals  /83 (BP 1 Location: Right arm, BP Patient Position: Sitting, BP Cuff Size: Large adult)   Pulse 76   Temp 97.7 °F (36.5 °C) (Temporal)   Resp 20   Ht 5' 5\" (1.651 m)   Wt 272 lb 9.6 oz (123.7 kg)   SpO2 98%   BMI 45.36 kg/m²           General:   Alert, oriented, not in distress   Neck:   No JVD    Chest/Lungs:  symetrical lung expansion , no accessory muscle use    Extremities:  no obvious rashes , negative edema    Neuro:  No focal deficits ; No obvious tremor    Psych:  Normal affect ,  Normal countenance ;         A>    ICD-10-CM ICD-9-CM    1. Obstructive sleep apnea (adult) (pediatric)  G47.33 327.23 AMB SUPPLY ORDER      2. Essential hypertension  I10 401.9       3. Type 2 diabetes mellitus with diabetic polyneuropathy, with long-term current use of insulin ([facility])  E11.42 250.60     Z79.4 357.2      V58.67       4. Class 3 severe obesity due to excess calories with serious comorbidity and body mass index (BMI) of 45.0 to 49.9 in adult ([facility])  E66.01 278.01     Z68.42 V85.42         AHI = 13(12-17). On CPAP, Resmed :  10-15 cmH2O. Compliant:      yes    Therapeutic Response:  Positive    P>      *   Follow-up and Dispositions    Return in about 1 year (around 8/29/2023). she is compliant with PAP therapy and PAP continues to benefit patient and remains necessary for control of her sleep apnea. she will continue on her current pressure settings. I have ordered replacement supplies    She understands some of her medications can contribute to her feeling sleepy. She can discuss with her PMD if any of her medications can be tapered.  She will try to increase her sleep time to 7-8 hours nightly  * She was asked to contact our office for any problems regarding PAP therapy. * Counseling was provided regarding the importance of regular PAP use and on proper sleep hygiene and safe driving. * Re-enforced proper and regular cleaning for the device. 2. Hypertension - she continues on her current regimen. I have reviewed the relationship between hypertension as it relates to sleep-disordered breathing. 3. Type II diabetes - she continues on her current regimen. I have reviewed the relationship between sleep disordered breathing as it relates to diabetes. 4. Obesity - I have counseled the patient regarding the benefits of ongoing weight loss.   Electronically signed by    Jose F Sauer MD  Diplomate in Sleep Medicine  [facility]  8/29/2022

## 2022-08-29 NOTE — PATIENT INSTRUCTIONS
217 [facility]., [place]. [place], [address]  Tel.  [phone]  Fax. 100 [facility] 60  [place], [address]  Tel.  [phone]  Fax. [phone] [address]  Tel.  [phone]  Fax. [phone]     PROPER SLEEP HYGIENE    What to avoid  Do not have drinks with caffeine, such as coffee or black tea, for 8 hours before bed. Do not smoke or use other types of tobacco near bedtime. Nicotine is a stimulant and can keep you awake. Avoid drinking alcohol late in the evening, because it can cause you to wake in the middle of the night. Do not eat a big meal close to bedtime. If you are hungry, eat a light snack. Do not drink a lot of water close to bedtime, because the need to urinate may wake you up during the night. Do not read or watch TV in bed. Use the bed only for sleeping and sexual activity. What to try  Go to bed at the same time every night, and wake up at the same time every morning. Do not take naps during the day. Keep your bedroom quiet, dark, and cool. Get regular exercise, but not within 3 to 4 hours of your bedtime. .  Sleep on a comfortable pillow and mattress. If watching the clock makes you anxious, turn it facing away from you so you cannot see the time. If you worry when you lie down, start a worry book. Well before bedtime, write down your worries, and then set the book and your concerns aside. Try meditation or other relaxation techniques before you go to bed. If you cannot fall asleep, get up and go to another room until you feel sleepy. Do something relaxing. Repeat your bedtime routine before you go to bed again. Make your house quiet and calm about an hour before bedtime. Turn down the lights, turn off the TV, log off the computer, and turn down the volume on music. This can help you relax after a busy day.     Drowsy Driving  The [address] [place] [address] Road Traffic Safety Administration cites drowsiness as a causing factor in more than 828,350 police reported crashes annually, resulting in 76,000 injuries and 1,500 deaths. Other surveys suggest 55% of people polled have driven while drowsy in the past year, 23% had fallen asleep but not crashed, 3% crashed, and 2% had and accident due to drowsy driving. Who is at risk? Young Drivers: One study of drowsy driving accidents states that 55% of the drivers were under 25 years. Of those, 75% were male. Shift Workers and Travelers: People who work overnight or travel across time zones frequently are at higher risk of experiencing Circadian Rhythm Disorders. They are trying to work and function when their body is programed to sleep. Sleep Deprived: Lack of sleep has a serious impact on your ability to pay attention or focus on a task. Consistently getting less than the average of 8 hours your body needs creates partial or cumulative sleep deprivation. Untreated Sleep Disorders: Sleep Apnea, Narcolepsy, R.L.S., and other sleep disorders (untreated) prevent a person from getting enough restful sleep. This leads to excessive daytime sleepiness and increases the risk for drowsy driving accidents by up to 7 times. Medications / Alcohol: Even over the counter medications can cause drowsiness. Medications that impair a drivers attention should have a warning label. Alcohol naturally makes you sleepy and on its own can cause accidents. Combined with excessive drowsiness its effects are amplified. Signs of Drowsy Driving:   * You don't remember driving the last few miles   * You may drift out of your kyaw   * You are unable to focus and your thoughts wander   * You may yawn more often than normal   * You have difficulty keeping your eyes open / nodding off   * Missing traffic signs, speeding, or tailgating  Prevention-   Good sleep hygiene, lifestyle and behavioral choices have the most impact on drowsy driving.  There is no substitute for sleep and the average person requires 8 hours nightly. If you find yourself driving drowsy, stop and sleep. Consider the sleep hygiene tips provided during your visit as well. Medication Refill Policy: Refills for all medications require 1 week advance notice. Please have your pharmacy fax a refill request. We are unable to fax, or call in \"controled substance\" medications and you will need to pick these prescriptions up from our office. Phybridge Activation    Thank you for requesting access to Phybridge. Please follow the instructions below to securely access and download your online medical record. Phybridge allows you to send messages to your doctor, view your test results, renew your prescriptions, schedule appointments, and more. How Do I Sign Up? In your internet browser, go to [email or web address]. Click on the First Time User? Click Here link in the Sign In box. You will see the New Member Sign Up page. Enter your Phybridge Access Code exactly as it appears below. You will not need to use this code after youve completed the sign-up process. If you do not sign up before the expiration date, you must request a new code. Phybridge Access Code: Activation code not generated  Current Phybridge Status: Active (This is the date your Phybridge access code will )    Enter the last four digits of your Social Security Number (xxxx) and Date of Birth (mm/dd/yyyy) as indicated and click Submit. You will be taken to the next sign-up page. Create a Phybridge ID. This will be your Phybridge login ID and cannot be changed, so think of one that is secure and easy to remember. Create a Phybridge password. You can change your password at any time. Enter your Password Reset Question and Answer. This can be used at a later time if you forget your password. Enter your e-mail address. You will receive e-mail notification when new information is available in [address] Click Sign Up.  You can now view and download portions of your medical record. Click the Journalism Online link to download a portable copy of your medical information. Additional Information    If you have questions, please call [phone]. Remember, Kippt is NOT to be used for urgent needs. For medical emergencies, dial 911.

## 2022-09-12 ENCOUNTER — TRANSCRIBE ORDER (OUTPATIENT)
Dept: SCHEDULING | Age: 47
End: 2022-09-12

## 2022-09-12 DIAGNOSIS — Z79.4 TYPE 2 DIABETES MELLITUS WITH DIABETIC POLYNEUROPATHY, WITH LONG-TERM CURRENT USE OF INSULIN (HCC): ICD-10-CM

## 2022-09-12 DIAGNOSIS — R10.13 EPIGASTRIC PAIN: Primary | ICD-10-CM

## 2022-09-12 DIAGNOSIS — E11.42 TYPE 2 DIABETES MELLITUS WITH DIABETIC POLYNEUROPATHY, WITH LONG-TERM CURRENT USE OF INSULIN (HCC): ICD-10-CM

## 2022-09-12 DIAGNOSIS — R19.4 CHANGE IN BOWEL HABITS: ICD-10-CM

## 2022-09-13 RX ORDER — DULAGLUTIDE 3 MG/.5ML
INJECTION, SOLUTION SUBCUTANEOUS
Qty: 4 EACH | Refills: 3 | Status: SHIPPED | OUTPATIENT
Start: 2022-09-13 | End: 2022-09-14 | Stop reason: ALTCHOICE

## 2022-09-14 ENCOUNTER — OFFICE VISIT (OUTPATIENT)
Dept: ENDOCRINOLOGY | Age: 47
End: 2022-09-14
Payer: MEDICAID

## 2022-09-14 VITALS
WEIGHT: 273.2 LBS | HEIGHT: 65 IN | BODY MASS INDEX: 45.52 KG/M2 | SYSTOLIC BLOOD PRESSURE: 108 MMHG | DIASTOLIC BLOOD PRESSURE: 65 MMHG | HEART RATE: 73 BPM

## 2022-09-14 DIAGNOSIS — E66.01 CLASS 3 SEVERE OBESITY DUE TO EXCESS CALORIES WITH BODY MASS INDEX (BMI) OF 45.0 TO 49.9 IN ADULT, UNSPECIFIED WHETHER SERIOUS COMORBIDITY PRESENT (HCC): ICD-10-CM

## 2022-09-14 DIAGNOSIS — R74.8 ELEVATED LIVER ENZYMES: ICD-10-CM

## 2022-09-14 DIAGNOSIS — E78.5 DYSLIPIDEMIA: ICD-10-CM

## 2022-09-14 DIAGNOSIS — Z79.4 TYPE 2 DIABETES MELLITUS WITH DIABETIC POLYNEUROPATHY, WITH LONG-TERM CURRENT USE OF INSULIN (HCC): Primary | ICD-10-CM

## 2022-09-14 DIAGNOSIS — E11.42 TYPE 2 DIABETES MELLITUS WITH DIABETIC POLYNEUROPATHY, WITH LONG-TERM CURRENT USE OF INSULIN (HCC): Primary | ICD-10-CM

## 2022-09-14 DIAGNOSIS — I10 ESSENTIAL HYPERTENSION: ICD-10-CM

## 2022-09-14 PROCEDURE — 3051F HG A1C>EQUAL 7.0%<8.0%: CPT | Performed by: GENERAL ACUTE CARE HOSPITAL

## 2022-09-14 PROCEDURE — 99214 OFFICE O/P EST MOD 30 MIN: CPT | Performed by: GENERAL ACUTE CARE HOSPITAL

## 2022-09-14 RX ORDER — DULAGLUTIDE 4.5 MG/.5ML
4.5 INJECTION, SOLUTION SUBCUTANEOUS
Qty: 4 EACH | Refills: 4 | Status: SHIPPED | OUTPATIENT
Start: 2022-09-14

## 2022-09-14 NOTE — PATIENT INSTRUCTIONS
Continue Metformin 1000mg twice daily and Glipizide 10mg twice daily  Increase Trulicity to 4.5 mg weekly    Take Levemir insulin 36 units in the morning and 34 units in the evening  Take Humalog 15 units before Breakfast, Lunch, Dinner please take 15 mins before your meal    Take insulin from sliding scale in addition to the 15 units of Humalog before meals as follows:    150-200 = 1 units  200-250 = 2 units  250-300= 3 units  300-350 = 4 units  350-400 = 5 units  400-450= 6 units  450-500= 7 units  >500 = 8 units and call physician    Please adjust the dose as we discussed. Plan to repeat your diabetes eye exam in the near future. Please be sure to have the eye clinic / office fax us the report of your latest eye exam to our clinic to fax # [phone]. This is very important for us to keep track of any effect of diabetes on your vision as part of our wholesome diabetes care that we provide. Please continue using the Dexcom.

## 2022-09-14 NOTE — LETTER
9/14/2022    Patient: Cole Dumas   YOB: 1975   Date of Visit: 9/14/2022     Juel Jeans, MD  [address] [address]  [address] [facility] [address]    Dear Juel Jeans, MD,      Thank you for referring Ms. Karl Hernandez to [facility] for evaluation. My notes for this consultation are attached. If you have questions, please do not hesitate to call me. I look forward to following your patient along with you.       Sincerely,    Elpidio Hinds MD

## 2022-09-14 NOTE — PROGRESS NOTES
CHIEF COMPLAINT: f/u evaluation for uncontrolled type 2 diabetes    HISTORY OF PRESENT ILLNESS:   Giselle Sales is a 50 y.o. female with a PMHx as noted below who presents to the endocrinology clinic for f/u evaluation of uncontrolled type 2 diabetes. In the last visit we had increased ms Malini Butler Trulicity from 6.0DN to 3mg weekly and increased her insulin as follows:  Levemir 34 >> 36 in AM and 32 >> 34 in PM  And continued on:  Humalog 15 units TIDAC , MTF 1g BID and Glipizide 10mg BID. She was able to get the [facility] but she had a gap when wearing it. She indicates hypoglycemia episode after breakfast if she is not eating much that day. She also has hyperglycemia after meals to 230s, but overall her sugar control has improved compared to before. She has also lost 10lbs since 04/2022 and she is happy with the results. She had made significant dietary changes and is happy with the results. She wants to do keto diet or intermittent fasting. She has upcoming appt with GI to see why she is having bouts of illness associated with reduced appetite, says they will be evaluating for more and following her SILVA. Denies other complaints at this time. Diabetes history:  Diagnosed in 2016  Most recent Hgb A1c was 11.7 in 04/08/2022. 7.4% 08/1/22    Patient reports that her sugar control has been less well controlled sugar as she has been having a lot of pain from RA and has been avoiding the opioids as she is afraid of side effects. She is compliant with her meds and lifestyle changes she is implementing. She is struggling with limited mobility due to RA but she is feeling better overall, but her vision has started to get blurred which is a sign that her average sugar is rising.      Blood sugar log  See scanned    Hypoglycemia: as above    Currently taking the following meds:  -Metformin 1000mg twice daily  -Glipizide 10mg twice daily  -Trulicity 3mg weekly injection  -Levemir insulin 36 units in the morning and 34 units in the evening  -Humalog 15 units before Breakfast, Lunch, Dinner with SS, humalog increased by 2 units     Exercise consists of limited due to RA . No history of vascular disease. No history of retinopathy, or nephropathy.    Has neuropathy    Hx of kidney Ca in 2015  Hx of SILVA    On Methotrexate (9 tabs/week) and Etanercept for RA, sometimes takes prednisone for RA flares, not recently  Hx of Asthma, well controlled on preventive inhalers      Last eye exam was last sept, annual visits, she had not made an appointment yet due to financial reasons      Review of most recent diabetes-related labs:  Lab Results   Component Value Date    HBA1C 11.7 (H) 04/08/2022    HBA1C 9.3 (H) 10/20/2021    HBA1C 8.3 (H) 03/11/2019    QUV1QWLS 7.4 08/01/2022    XNE0WKUS 8.5 04/27/2021    FCG3WNUX 6.3 01/26/2021    CHOL 136 04/08/2022    LDLC 84 04/08/2022    GFRAA >60 02/14/2022    GFRNA >60 02/14/2022    CREATEXT 0.92 06/06/2022    MCACR 3 05/09/2022    TSH 1.290 03/11/2019    B12LT 423 08/17/2016     Lab Key:  [id number] = IA-2 pancreatic islet cell autoantibody  CPEPL = C-peptide level  :EXT = External Lab  GADLT = CAILIN-65 autoantibody   INSUL = Insulin level  MCACR (or MALBEXT) = Urine Microalbumin (or External UM)  B12LT = B12 level    PAST MEDICAL/SURGICAL HISTORY:   Past Medical History:   Diagnosis Date    Anemia (iron deficiency)     Anxiety     per pt on 7/29/2021    Asthma     DDD (degenerative disc disease), lumbar 2014    Depression     DM type 2 (diabetes mellitus, type 2) ([facility] [address] 11/23/2011    GERD (gastroesophageal reflux disease)     GI bleed 2015    Hepatic steatosis 11/2016    confirmed by US    HTN (hypertension)     Irregular menses     Irritable bowel     Kidney mass     5/28/19: US showed rigth renal cystic nephroma, follows with Urology (Dr. Rivera Lloyd)    Long-term use of immunosuppressant medication       per pt on 7/29/2021    Morbid obesity ([facility] [address]     FE on CPAP     cpap complaint per pt on 2021    PUD (peptic ulcer disease)     Renal cancer, right ([facility] [address]     tx surgery    Rheumatoid arthritis ([facility] [address]     per pt on 2021     Past Surgical History:   Procedure Laterality Date    COLONOSCOPY N/A 2021    COLONOSCOPY performed by Arina Garcia MD at [facility] ENDOSCOPY. Sigmoid diverticulosis, int/ext hemorrhoids. Repeat in 5 yrs due to fam hx of colon ca    COLONOSCOPY,DIAGNOSTIC  2021         HX  SECTION      x 2    HX COLONOSCOPY  2015    HX DILATION AND CURETTAGE  2021    HX ENDOSCOPY  2015    HX HYSTERECTOMY  2021    HX HYSTEROSCOPY WITH ENDOMETRIAL ABLATION  2014    HX TUBAL LIGATION      HX TUMOR REMOVAL  2016    right kidney, Dr Finesse Shane UROLOGICAL      Mass removed from right kidney on 02/15/2016        ALLERGIES:   Allergies   Allergen Reactions    Lisinopril Cough    Pcn [Penicillins] Hives       MEDICATIONS ON ADMISSION:     Current Outpatient Medications:     blood-glucose meter,continuous (DEXCOM G6 ), by Does Not Apply route., Disp: , Rfl:     dulaglutide (Trulicity) 4.5 FK/0.8 mL pnij, 4.5 mg by SubCUTAneous route every seven (7) days. , Disp: 4 Each, Rfl: 4    atorvastatin (LIPITOR) 20 mg tablet, Take 1 tablet by mouth once daily. , Disp: 90 Tablet, Rfl: 3    pregabalin (LYRICA) 200 mg capsule, TAKE 1 CAPSULE BY MOUTH TWICE DAILY . DO NOT EXCEED 2 PER 24 HOURS, Disp: 60 Capsule, Rfl: 1    lancets misc, Use preferred brand; Check glucose 3 times daily, Diagnosis E11.65, Disp: 1 Each, Rfl: 11    Blood-Glucose Meter (True Metrix Glucose Meter) misc, Use for checking blood glucose E11.65, Disp: 1 Each, Rfl: 0    etanercept (ENBREL SC), by SubCUTAneous route every seven (7) days. , Disp: , Rfl:     Insulin Needles, Disposable, 32 gauge x \" ndle, Please use with your insulin pens as directed, Disp: 100 Pen Needle, Rfl: 5    Levemir FlexTouch U-100 Insuln 100 unit/mL (3 mL) inpn, 33 Units by SubCUTAneous route two (2) times a day. Dx: E11.42   Please take 34 Units in the morning and 32 Units in the evening  Indications: type 2 diabetes mellitus, Disp: 100 mL, Rfl: 3    HumaLOG KwikPen Insulin 100 unit/mL kwikpen, Inject 15 units under the skin before breakfast, lunch and dinner. Use sliding scale insulin also, Disp: 20 Adjustable Dose Pre-filled Pen Syringe, Rfl: 5    cyclobenzaprine (FLEXERIL) 5 mg tablet, Take 1 Tablet by mouth three (3) times daily as needed for Muscle Spasm(s). , Disp: 21 Tablet, Rfl: 0    naproxen (NAPROSYN) 500 mg tablet, Take 1 Tablet by mouth every twelve (12) hours as needed for Pain., Disp: 20 Tablet, Rfl: 0    albuterol (PROVENTIL HFA, VENTOLIN HFA, PROAIR HFA) 90 mcg/actuation inhaler, Take 2 Puffs by inhalation every six (6) hours as needed for Wheezing. Asthma, Dx: J45.40, Disp: 54 g, Rfl: 3    fluticasone propion-salmeteroL (Advair Diskus) 500-50 mcg/dose diskus inhaler, Take 1 Puff by inhalation every twelve (12) hours. Asthma, Dx: J45.30, Disp: 180 Each, Rfl: 3    amitriptyline (ELAVIL) 50 mg tablet, TAKE 1 TABLET BY MOUTH NIGHTLY FOR  NUMBNESS AT  HANDS  AND  FEET, Disp: 30 Tablet, Rfl: 0    HYDROcodone-acetaminophen (NORCO) 5-325 mg per tablet, TAKE 1 TO 2 TABLETS BY MOUTH 4 TIMES DAILY AS NEEDED, Disp: , Rfl:     buPROPion SR (WELLBUTRIN SR) 150 mg SR tablet, Take 1 Tablet by mouth two (2) times a day., Disp: 180 Tablet, Rfl: 3    ondansetron (Zofran ODT) 4 mg disintegrating tablet, Take 1 Tablet by mouth every eight (8) hours as needed for Nausea for up to 360 days. , Disp: 60 Tablet, Rfl: 3    dicyclomine (BENTYL) 10 mg/5 mL soln oral solution, Take 10 mL by mouth four (4) times daily as needed (abd pain or diarrhea). , Disp: 473 mL, Rfl: 5    metFORMIN ER (GLUCOPHAGE XR) 500 mg tablet, Take 2 Tablets by mouth two (2) times a day., Disp: 360 Tablet, Rfl: 3    montelukast (SINGULAIR) 10 mg tablet, TAKE 1 TABLET BY MOUTH ONCE DAILY FOR ALLERGIES AND FOR ASTHMA, Disp: 90 Tablet, Rfl: 3    albuterol (PROVENTIL VENTOLIN) 2.5 mg /3 mL (0.083 %) nebu, USE ONE VIAL IN NEBULIZER EVERY 4 HOURS AS NEEDED FOR WHEEZING. Dx: J45.40, Disp: 90 Each, Rfl: 3    valsartan (DIOVAN) 80 mg tablet, Take 1 Tablet by mouth daily. Indications: high blood pressure, Disp: 90 Tablet, Rfl: 3    glipiZIDE (GLUCOTROL) 10 mg tablet, Take 1 tablet by mouth twice daily, Disp: 180 Tablet, Rfl: 3    ibuprofen (MOTRIN) 800 mg tablet, Take one tablet every 8 hours for three days, and then every 8 hours as needed for pain thereafter, Disp: 30 Tablet, Rfl: 1    acetaminophen (TYLENOL) 500 mg tablet, Take 2 Tabs by mouth every six (6) hours as needed for Pain., Disp: 20 Tab, Rfl: 0    folic acid (FOLVITE) 1 mg tablet, Take 1 mg by mouth daily. , Disp: , Rfl:     methotrexate (RHEUMATREX) 2.5 mg tablet, every Wednesday. Pt takes 9 pills for 20 mg total., Disp: , Rfl:     meclizine (ANTIVERT) 25 mg tablet, Take 1 Tab by mouth three (3) times daily as needed for Dizziness. , Disp: 20 Tab, Rfl: 1    nitroglycerin (NITROSTAT) 0.4 mg SL tablet, Take 1 Tab by mouth every five (5) minutes as needed for Chest Pain. Sit/Lay down then put one tab under the tongue every 5 minutes as needed for chest pain for 3 doses, Disp: 1 Bottle, Rfl: 0    glucose blood VI test strips (Pharmacist Choice) strip, Use preferred brand; Check glucose 3 times daily, Diagnosis E11.65, Give Truemetrix Glucometer (Patient not taking: Reported on 9/14/2022), Disp: 120 Strip, Rfl: 5    FreeStyle Deonna 2 Sensor kit, Use as directed to test blood sugars at least 4 times daily.   Replace every 14 days (Patient not taking: No sig reported), Disp: 2 Kit, Rfl: 11    FreeStyle Deonna 2 [place] misc, Use as directed with freestyle deonna 2 sensors (Patient not taking: No sig reported), Disp: 1 Each, Rfl: 0    SOCIAL HISTORY:   Social History     Socioeconomic History    Marital status:      Spouse name: Not on file    Number of children: Not on file    Years of education: Not on file    Highest education level: Not on file   Occupational History    Not on file   Tobacco Use    Smoking status: Never    Smokeless tobacco: Never   Vaping Use    Vaping Use: Never used   Substance and Sexual Activity    Alcohol use: Yes     Alcohol/week: 1.0 standard drink     Types: 1 Glasses of wine per week     Comment: wine coolers per month 1-2 per pt on 7/29/2021    Drug use: No    Sexual activity: Yes     Partners: Male   Other Topics Concern    Not on file   Social History Narrative    Not on file     Social Determinants of Health     Financial Resource Strain: Not on file   Food Insecurity: Not on file   Transportation Needs: Not on file   Physical Activity: Not on file   Stress: Not on file   Social Connections: Not on file   Intimate Partner Violence: Not on file   Housing Stability: Not on file       FAMILY HISTORY:  Family History   Problem Relation Age of Onset    Cancer Mother         Ovarian Cancer /breast ca    Heart Attack Father     Heart Disease Father     Diabetes Father     Other Father         pancreatitis    Cancer Sister     Other Sister         chrons    Heart Attack Maternal Grandfather     Diabetes Paternal Grandmother     HIV/AIDS Son        REVIEW OF SYSTEMS: Complete ROS assessed and noted for that which is described above, all else are negative.   Eyes: normal  ENT: normal  CVS: normal  Resp: normal  GI: normal  : normal  GYN: normal  Endocrine: normal  Integument: normal  Musculoskeletal: normal  Neuro: normal  Psych: normal      PHYSICAL EXAMINATION: Telehealth appointment    VITAL SIGNS:  Visit Vitals  /65   Pulse 73   Ht 5' 5\" (1.651 m)   Wt 273 lb 3.2 oz (123.9 kg)   BMI 45.46 kg/m²         Last 3 Recorded Weights in this Encounter    09/14/22 1002   Weight: 273 lb 3.2 oz (123.9 kg)          General: pleasant, no distress, good eye contact, obesity  HEENT: no exopthalmos, no periorbital edema, no scleral/conjunctival injection, EOMI, no lid lag or stare  Neck: supple, no thyromegaly, masses, lymph nodes, or carotid bruits, no supraclavicular or dorsocervical fat pads  Cardiovascular: regular, normal rate, normal S1 and S2, no murmurs/rubs/gallops,  Respiratory: clear to auscultation bilaterally  Gastrointestinal: soft, nontender, nondistended, no masses, no hepatosplenomegaly  Musculoskeletal: no proximal muscle weakness in upper or lower extremities  Integumentary: no acanthosis nigricans, no abdominal striae, no rashes, no edema, no foot ulcers  Neurological: see foot exam  Psychiatric: normal mood and affect         REVIEW OF LABORATORY AND RADIOLOGY DATA:   Labs and documentation have been reviewed as described above. ASSESSMENT AND PLAN:   Yue Puri is a 50 y.o. female with a PMHx as noted above who presents to the endocrinology clinic for f/u evaluation of type 2 diabetes.      DM2, better controlled   HTN  HLD    DM2:    Patient with much improved blood sugar numbers, but has few readings above 200 when she eats high carb food  Plan to Continue MTF 1g BID and Glipizide 10mg BID  Levemir 36 in AM and 34 in PM  Humalog 15 units before meals (we discussed adjusting if eating more or less per meal)  Trulicity increase from 3mg to 4mg weekly   Will decide on SGLT-2 inhibitors in the next visit    Continue to check glucose 4-6x/day with CGM  Continue attending diabetic ed classes  To keep her Ophthalm and Podiatry appts      HTN: BP stable continue current meds, valsartan 80mg daily  HLD: stable, slightly above goal, will obtain lipid panel repeats in 6 months after lifestyle modif implemented    Obesity: GLP-1 dose increase, will also go up as tolerated which will help with weight loss, discussed bariatric surgery, she filled out paper work and is waiting on a response    Transaminitis: ?underlying SILVA vs effect of methotrexate, repeat levels improved, advised will likely require follow up with GI she has an upcoming appt     RTC in 8 weeks    We discussed the expected course, resolution and complications of the diagnosis(es) in detail. Medication risks, benefits, costs, interactions, and alternatives were discussed as indicated. I advised America Galvez to contact the office if her condition worsens, changes or fails to improve as anticipated. Patient expressed understanding with the diagnosis(es) and plan. MD [facility]    Please see patient instructions.

## 2022-09-16 ENCOUNTER — TELEPHONE (OUTPATIENT)
Dept: INTERNAL MEDICINE CLINIC | Age: 47
End: 2022-09-16

## 2022-09-16 NOTE — TELEPHONE ENCOUNTER
----- Message from Jh Strong sent at 9/15/2022 11:44 AM EDT -----  Subject: Appointment Request    Reason for Call: Established Patient Appointment needed: Routine Physical   Exam    QUESTIONS    Reason for appointment request? No appointments available during search     Additional Information for Provider? Patient states she received a message   through her patient portal to schedule her annual physical with Dr. Tomas Pike. Nothing available when searched. Please call patient to schedule.  Thanks!  ---------------------------------------------------------------------------  --------------  Jennifer Chacon INFO  [phone]; OK to leave message on voicemail  ---------------------------------------------------------------------------  --------------  SCRIPT ANSWERS  COVID Screen: Sri Starr

## 2022-09-19 DIAGNOSIS — Z76.89 ENCOUNTER FOR WEIGHT MANAGEMENT: Primary | ICD-10-CM

## 2022-09-19 DIAGNOSIS — E66.01 MORBID OBESITY (HCC): ICD-10-CM

## 2022-09-20 NOTE — PROGRESS NOTES
Patient attended our VIRTUAL Medically Supervised Weight Loss New Patient Orientation on Monday September 19, 2022 where we discussed:  New Direction Very Low and the Low Calorie diet details  Medical Supervision  Nutrition education  Cost of Meal Replacements

## 2022-09-30 ENCOUNTER — HOSPITAL ENCOUNTER (OUTPATIENT)
Dept: NUCLEAR MEDICINE | Age: 47
Discharge: HOME | End: 2022-09-30
Attending: NURSE PRACTITIONER
Payer: MEDICAID

## 2022-09-30 VITALS — WEIGHT: 271 LBS | BODY MASS INDEX: 45.1 KG/M2

## 2022-09-30 DIAGNOSIS — R10.13 EPIGASTRIC PAIN: ICD-10-CM

## 2022-09-30 DIAGNOSIS — R19.4 CHANGE IN BOWEL HABITS: ICD-10-CM

## 2022-09-30 PROCEDURE — 78227 HEPATOBIL SYST IMAGE W/DRUG: CPT

## 2022-09-30 PROCEDURE — 74011250636 HC RX REV CODE- 250/636: Performed by: NURSE PRACTITIONER

## 2022-09-30 RX ORDER — KIT FOR THE PREPARATION OF TECHNETIUM TC 99M MEBROFENIN 45 MG/10ML
5.2 INJECTION, POWDER, LYOPHILIZED, FOR SOLUTION INTRAVENOUS
Status: COMPLETED | OUTPATIENT
Start: 2022-09-30 | End: 2022-09-30

## 2022-09-30 RX ADMIN — KIT FOR THE PREPARATION OF TECHNETIUM TC 99M MEBROFENIN 5.2 MILLICURIE: 45 INJECTION, POWDER, LYOPHILIZED, FOR SOLUTION INTRAVENOUS at 09:00

## 2022-09-30 RX ADMIN — SINCALIDE 2.46 MCG: 5 INJECTION, POWDER, LYOPHILIZED, FOR SOLUTION INTRAVENOUS at 09:00

## 2022-10-13 ENCOUNTER — TRANSCRIBE ORDER (OUTPATIENT)
Dept: SCHEDULING | Age: 47
End: 2022-10-13

## 2022-10-13 DIAGNOSIS — K92.0 COFFEE GROUND EMESIS: ICD-10-CM

## 2022-10-13 DIAGNOSIS — E11.42 TYPE 2 DIABETES MELLITUS WITH DIABETIC POLYNEUROPATHY, WITHOUT LONG-TERM CURRENT USE OF INSULIN (HCC): ICD-10-CM

## 2022-10-13 DIAGNOSIS — R19.4 CHANGE IN BOWEL HABITS: ICD-10-CM

## 2022-10-13 DIAGNOSIS — R10.13 EPIGASTRIC PAIN: Primary | ICD-10-CM

## 2022-10-14 RX ORDER — PREGABALIN 200 MG/1
CAPSULE ORAL
Qty: 60 CAPSULE | Refills: 2 | Status: SHIPPED | OUTPATIENT
Start: 2022-10-14

## 2022-10-24 ENCOUNTER — HOSPITAL ENCOUNTER (OUTPATIENT)
Dept: NUCLEAR MEDICINE | Age: 47
Discharge: HOME | End: 2022-10-24
Attending: NURSE PRACTITIONER
Payer: MEDICAID

## 2022-10-24 DIAGNOSIS — K92.0 COFFEE GROUND EMESIS: ICD-10-CM

## 2022-10-24 DIAGNOSIS — R19.4 CHANGE IN BOWEL HABITS: ICD-10-CM

## 2022-10-24 DIAGNOSIS — R10.13 EPIGASTRIC PAIN: ICD-10-CM

## 2022-10-24 PROCEDURE — 78264 GASTRIC EMPTYING IMG STUDY: CPT

## 2022-10-24 RX ORDER — TECHNETIUM TC 99M SULFUR COLLOID 2 MG
0.54 KIT MISCELLANEOUS
Status: COMPLETED | OUTPATIENT
Start: 2022-10-24 | End: 2022-10-24

## 2022-10-24 RX ADMIN — TECHNETIUM TC 99M SULFUR COLLOID 0.54 MILLICURIE: KIT at 08:45

## 2022-11-09 ENCOUNTER — HOSPITAL ENCOUNTER (OUTPATIENT)
Dept: CT IMAGING | Age: 47
Discharge: HOME | End: 2022-11-09
Attending: UROLOGY
Payer: MEDICAID

## 2022-11-09 ENCOUNTER — TRANSCRIBE ORDER (OUTPATIENT)
Dept: SCHEDULING | Age: 47
End: 2022-11-09

## 2022-11-09 ENCOUNTER — OFFICE VISIT (OUTPATIENT)
Dept: ENDOCRINOLOGY | Age: 47
End: 2022-11-09

## 2022-11-09 DIAGNOSIS — E11.42 TYPE 2 DIABETES MELLITUS WITH DIABETIC POLYNEUROPATHY, WITH LONG-TERM CURRENT USE OF INSULIN (HCC): Primary | ICD-10-CM

## 2022-11-09 DIAGNOSIS — Z79.4 TYPE 2 DIABETES MELLITUS WITH DIABETIC POLYNEUROPATHY, WITH LONG-TERM CURRENT USE OF INSULIN (HCC): Primary | ICD-10-CM

## 2022-11-09 DIAGNOSIS — R10.9 STOMACH ACHE: ICD-10-CM

## 2022-11-09 DIAGNOSIS — R10.9 STOMACH ACHE: Primary | ICD-10-CM

## 2022-11-09 PROCEDURE — 74176 CT ABD & PELVIS W/O CONTRAST: CPT

## 2022-11-09 NOTE — PROGRESS NOTES
Patient was feeling sick before being seen for her appointment. She had nausea and vomiting attributing it to lower back pain, she has a CT Abdomen/Pelvis scheduled. Asked if she would like for us to take her to the emergency room and she says she was not going and that she will go with her  who is in the waiting room and just needs some rest. Advised if feeling worse, or has symptoms radiating down her legs to seek emergency medical care, she indicates understanding. Also she is being rescheduled for a follow up appointment VV for next week.     A1c today 6.5%

## 2022-11-14 ENCOUNTER — VIRTUAL VISIT (OUTPATIENT)
Dept: ENDOCRINOLOGY | Age: 47
End: 2022-11-14
Payer: MEDICAID

## 2022-11-14 ENCOUNTER — ANESTHESIA EVENT (OUTPATIENT)
Dept: ENDOSCOPY | Age: 47
End: 2022-11-14
Payer: MEDICAID

## 2022-11-14 DIAGNOSIS — E11.42 TYPE 2 DIABETES MELLITUS WITH DIABETIC POLYNEUROPATHY, WITH LONG-TERM CURRENT USE OF INSULIN (HCC): Primary | ICD-10-CM

## 2022-11-14 DIAGNOSIS — E66.01 CLASS 3 SEVERE OBESITY DUE TO EXCESS CALORIES WITH BODY MASS INDEX (BMI) OF 45.0 TO 49.9 IN ADULT, UNSPECIFIED WHETHER SERIOUS COMORBIDITY PRESENT (HCC): ICD-10-CM

## 2022-11-14 DIAGNOSIS — Z79.4 TYPE 2 DIABETES MELLITUS WITH DIABETIC POLYNEUROPATHY, WITH LONG-TERM CURRENT USE OF INSULIN (HCC): Primary | ICD-10-CM

## 2022-11-14 DIAGNOSIS — E78.5 DYSLIPIDEMIA: ICD-10-CM

## 2022-11-14 PROCEDURE — 99214 OFFICE O/P EST MOD 30 MIN: CPT | Performed by: GENERAL ACUTE CARE HOSPITAL

## 2022-11-14 PROCEDURE — 3051F HG A1C>EQUAL 7.0%<8.0%: CPT | Performed by: GENERAL ACUTE CARE HOSPITAL

## 2022-11-14 RX ORDER — GLIPIZIDE 10 MG/1
TABLET ORAL
Qty: 180 TABLET | Refills: 3 | Status: SHIPPED | OUTPATIENT
Start: 2022-11-14

## 2022-11-14 RX ORDER — METFORMIN HYDROCHLORIDE 500 MG/1
1000 TABLET, EXTENDED RELEASE ORAL 2 TIMES DAILY
Qty: 360 TABLET | Refills: 3 | Status: SHIPPED | OUTPATIENT
Start: 2022-11-14

## 2022-11-14 RX ORDER — INSULIN LISPRO 100 [IU]/ML
INJECTION, SOLUTION INTRAVENOUS; SUBCUTANEOUS
Qty: 90 ML | Refills: 5 | Status: SHIPPED | OUTPATIENT
Start: 2022-11-14

## 2022-11-14 RX ORDER — EPHEDRINE SULFATE/0.9% NACL/PF 50 MG/5 ML
5 SYRINGE (ML) INTRAVENOUS AS NEEDED
Status: CANCELLED | OUTPATIENT
Start: 2022-11-14

## 2022-11-14 RX ORDER — ONDANSETRON 2 MG/ML
4 INJECTION INTRAMUSCULAR; INTRAVENOUS AS NEEDED
Status: CANCELLED | OUTPATIENT
Start: 2022-11-14

## 2022-11-14 RX ORDER — PEN NEEDLE, DIABETIC 31 GX3/16"
NEEDLE, DISPOSABLE MISCELLANEOUS
Qty: 120 PEN NEEDLE | Refills: 5 | Status: SHIPPED | OUTPATIENT
Start: 2022-11-14 | End: 2022-11-14

## 2022-11-14 RX ORDER — DULAGLUTIDE 4.5 MG/.5ML
4.5 INJECTION, SOLUTION SUBCUTANEOUS
Qty: 4 EACH | Refills: 4 | Status: SHIPPED | OUTPATIENT
Start: 2022-11-14

## 2022-11-14 RX ORDER — INSULIN DETEMIR 100 [IU]/ML
INJECTION, SOLUTION SUBCUTANEOUS
Qty: 100 ML | Refills: 3 | Status: SHIPPED | OUTPATIENT
Start: 2022-11-14

## 2022-11-14 NOTE — ANESTHESIA PREPROCEDURE EVALUATION
Anesthetic History   No history of anesthetic complications            Review of Systems / Medical History  Patient summary reviewed, nursing notes reviewed and pertinent labs reviewed    Pulmonary        Sleep apnea: CPAP    Asthma : well controlled       Neuro/Psych         Psychiatric history  Pertinent negatives: No seizures and CVA   Cardiovascular    Hypertension: well controlled          CAD  Pertinent negatives: No past MI and CHF  Exercise tolerance: >4 METS  Comments: h/o chest pain evaluated by Cardiologist 03/2021. No further w/u needed.    GI/Hepatic/Renal     GERD (some breakthrough)      PUD and liver disease (fatty liver)  Pertinent negatives: No renal disease  Comments: IBS Endo/Other    Diabetes: well controlled, type 2    Morbid obesity, arthritis ( Rheumatoid) and cancer (right renal)     Other Findings   Comments: Pelvic pain  Thickened endometrium           Physical Exam    Airway  Mallampati: III  TM Distance: > 6 cm  Neck ROM: decreased range of motion, short neck   Mouth opening: Normal     Cardiovascular    Rhythm: regular  Rate: normal         Dental    Dentition: Caps/crowns     Pulmonary  Breath sounds clear to auscultation               Abdominal  GI exam deferred       Other Findings            Anesthetic Plan    ASA: 3  Anesthesia type: MAC          Induction: Intravenous  Anesthetic plan and risks discussed with: Patient      Supernova CPAP

## 2022-11-14 NOTE — PROGRESS NOTES
Vj Seaman  was evaluated through a synchronous (real-time) audio-video encounter. The patient (or guardian if applicable) is aware that this is a billable service, which includes applicable co-pays. Verbal consent to proceed has been obtained. The visit was conducted pursuant to the emergency declaration under the [facility], [address] [place] [address] and the [employer] General Act. Patient identification was verified, and a caregiver was present when appropriate. The patient was located at home in a state where the provider was licensed to provide care. CHIEF COMPLAINT: f/u evaluation for uncontrolled type 2 diabetes    HISTORY OF PRESENT ILLNESS:   Vj Seaman is a 50 y.o. female with a PMHx as noted below who presents to the endocrinology clinic for f/u evaluation of uncontrolled type 2 diabetes. In the last visit we had increased ms July Daniels Trulicity from 3mg to 3.8UQ weekly and continued insulin as follows  Levemir 36 in AM and 34 in PM,   Humalog 15 units TIDAC , MTF 1g BID and Glipizide 10mg BID. She takes the Humalog before [address] [place] [address]  She was able to get the [facility] but she had a gap when wearing it due to being on CT scan and she is scheduled tomorrow for EGD as she continues to have episodes of nausea and vomiting without specific trigger and ms July Daniels does not attribute it to the change in her Trulicity dose. She indicates hypoglycemia episode after eating meal and taking humalog 15 units then has nausea and vomiting after meal    She has also lost 12lbs since 08/2022 and she is happy with the results. She had made significant dietary changes. She has upcoming appt with GI to see why she is having bouts of illness associated with reduced appetite, says they will be evaluating for more and following her SILVA. Denies other complaints at this time.    She continues to struggle with mobility due to RA but she is feeling better overall. She has not seen ophthalm this year yet      Diabetes history:  Diagnosed in 2016  Most recent Hgb A1c was 11.7 in 04/08/2022. 7.4% 08/1/22, 6.5% 11/08/2022      Blood sugar log  See scanned    Hypoglycemia: as above    Currently taking the following meds:  -Metformin 1000mg twice daily  -Glipizide 10mg twice daily  -Trulicity 6.7NZ weekly injection  -Levemir insulin 36 units in the morning and 34 units in the evening  -Humalog 15 units before Breakfast, Dinner with SS, humalog increased by 2 units     Exercise consists of limited due to RA . No history of vascular disease. No history of retinopathy, or nephropathy.    Has neuropathy    Hx of kidney Ca in 2015  Hx of SILVA    On Methotrexate (9 tabs/week) and Etanercept for RA, sometimes takes prednisone for RA flares, not recently  Hx of Asthma, well controlled on preventive inhalers      Last eye exam was last sept, annual visits, she had not made an appointment yet due to financial reasons      Review of most recent diabetes-related labs:  Lab Results   Component Value Date    HBA1C 11.7 (H) 04/08/2022    HBA1C 9.3 (H) 10/20/2021    HBA1C 8.3 (H) 03/11/2019    SXI2FQYC 7.4 08/01/2022    EKN1SSQZ 8.5 04/27/2021    GUQ9QUHI 6.3 01/26/2021    CHOL 136 04/08/2022    LDLC 84 04/08/2022    GFRAA >60 02/14/2022    GFRNA >60 02/14/2022    CREATEXT 0.92 06/06/2022    MCACR 3 05/09/2022    TSH 1.290 03/11/2019    B12LT 423 08/17/2016     Lab Key:  [id number] = IA-2 pancreatic islet cell autoantibody  CPEPL = C-peptide level  :EXT = External Lab  GADLT = CAILIN-65 autoantibody   INSUL = Insulin level  MCACR (or MALBEXT) = Urine Microalbumin (or External UM)  B12LT = B12 level    PAST MEDICAL/SURGICAL HISTORY:   Past Medical History:   Diagnosis Date    Anemia (iron deficiency)     Anxiety     per pt on 7/29/2021    Asthma     DDD (degenerative disc disease), lumbar 2014    Depression     DM type 2 (diabetes mellitus, type 2) ([facility] 2011    GERD (gastroesophageal reflux disease)     GI bleed     Hepatic steatosis 2016    confirmed by US    HTN (hypertension)     Irregular menses     Irritable bowel     Kidney mass     19: US showed rigth renal cystic nephroma, follows with Urology (Dr. Efrem Vail)    Long-term use of immunosuppressant medication       per pt on 2021    Morbid obesity ([address]     FE on CPAP     cpap complaint  per pt on 2021    PUD (peptic ulcer disease) 2015    Renal cancer, right ([address]     tx surgery    Rheumatoid arthritis ([address]     per pt on 2021     Past Surgical History:   Procedure Laterality Date    COLONOSCOPY N/A 2021    COLONOSCOPY performed by Analilia Callaway MD at [facility] ENDOSCOPY. Sigmoid diverticulosis, int/ext hemorrhoids. Repeat in 5 yrs due to fam hx of colon ca    COLONOSCOPY,DIAGNOSTIC  2021         HX  SECTION      x 2    HX COLONOSCOPY  2015    HX DILATION AND CURETTAGE  2021    HX ENDOSCOPY  2015    HX HYSTERECTOMY  2021    HX HYSTEROSCOPY WITH ENDOMETRIAL ABLATION  2014    HX TUBAL LIGATION      HX TUMOR REMOVAL  2016    right kidney, Dr Sinai Thomas UROLOGICAL      Mass removed from right kidney on 02/15/2016        ALLERGIES:   Allergies   Allergen Reactions    Lisinopril Cough    Pcn [Penicillins] Hives       MEDICATIONS ON ADMISSION:     Current Outpatient Medications:     Dexcom G6 Transmitter grecia, For use with DEXCOM SENSOR E11.42, Disp: 1 Each, Rfl: 3    Dexcom G6  misc, Use as directed  E11.42, Disp: 1 Each, Rfl: 0    Dexcom G6 Sensor grecia, Use as directed every 10 days  E11.42, Disp: 3 Each, Rfl: 11    pregabalin (LYRICA) 200 mg capsule, TAKE 1 CAPSULE BY MOUTH TWICE DAILY .  DO NOT EXCEED 2 PER 24 HOURS, Disp: 60 Capsule, Rfl: 2    amitriptyline (ELAVIL) 50 mg tablet, TAKE 1 TABLET BY MOUTH NIGHTLY FOR  NUMBNESS  AT  HANDS  AND  FEET (Patient taking differently: nightly as needed.), Disp: 30 Tablet, Rfl: 2    dulaglutide (Trulicity) 4.5 MQ/3.5 mL pnij, 4.5 mg by SubCUTAneous route every seven (7) days. , Disp: 4 Each, Rfl: 4    atorvastatin (LIPITOR) 20 mg tablet, Take 1 tablet by mouth once daily. , Disp: 90 Tablet, Rfl: 3    lancets misc, Use preferred brand; Check glucose 3 times daily, Diagnosis E11.65, Disp: 1 Each, Rfl: 11    Blood-Glucose Meter (True Metrix Glucose Meter) misc, Use for checking blood glucose E11.65, Disp: 1 Each, Rfl: 0    etanercept (ENBREL SC), by SubCUTAneous route every seven (7) days. , Disp: , Rfl:     Insulin Needles, Disposable, 32 gauge x 5/32\" ndle, Please use with your insulin pens as directed, Disp: 100 Pen Needle, Rfl: 5    Levemir FlexTouch U-100 Insuln 100 unit/mL (3 mL) inpn, 33 Units by SubCUTAneous route two (2) times a day. Dx: E11.42   Please take 34 Units in the morning and 32 Units in the evening  Indications: type 2 diabetes mellitus, Disp: 100 mL, Rfl: 3    HumaLOG KwikPen Insulin 100 unit/mL kwikpen, Inject 15 units under the skin before breakfast, lunch and dinner. Use sliding scale insulin also, Disp: 20 Adjustable Dose Pre-filled Pen Syringe, Rfl: 5    cyclobenzaprine (FLEXERIL) 5 mg tablet, Take 1 Tablet by mouth three (3) times daily as needed for Muscle Spasm(s). , Disp: 21 Tablet, Rfl: 0    naproxen (NAPROSYN) 500 mg tablet, Take 1 Tablet by mouth every twelve (12) hours as needed for Pain., Disp: 20 Tablet, Rfl: 0    albuterol (PROVENTIL HFA, VENTOLIN HFA, PROAIR HFA) 90 mcg/actuation inhaler, Take 2 Puffs by inhalation every six (6) hours as needed for Wheezing. Asthma, Dx: J45.40, Disp: 54 g, Rfl: 3    fluticasone propion-salmeteroL (Advair Diskus) 500-50 mcg/dose diskus inhaler, Take 1 Puff by inhalation every twelve (12) hours.  Asthma, Dx: J45.30, Disp: 180 Each, Rfl: 3    HYDROcodone-acetaminophen (NORCO) 5-325 mg per tablet, TAKE 1 TO 2 TABLETS BY MOUTH 4 TIMES DAILY AS NEEDED, Disp: , Rfl:     buPROPion SR (WELLBUTRIN SR) 150 mg SR tablet, Take 1 Tablet by mouth two (2) times a day., Disp: 180 Tablet, Rfl: 3    ondansetron (Zofran ODT) 4 mg disintegrating tablet, Take 1 Tablet by mouth every eight (8) hours as needed for Nausea for up to 360 days. , Disp: 60 Tablet, Rfl: 3    dicyclomine (BENTYL) 10 mg/5 mL soln oral solution, Take 10 mL by mouth four (4) times daily as needed (abd pain or diarrhea). , Disp: 473 mL, Rfl: 5    metFORMIN ER (GLUCOPHAGE XR) 500 mg tablet, Take 2 Tablets by mouth two (2) times a day., Disp: 360 Tablet, Rfl: 3    montelukast (SINGULAIR) 10 mg tablet, TAKE 1 TABLET BY MOUTH ONCE DAILY FOR ALLERGIES AND FOR ASTHMA, Disp: 90 Tablet, Rfl: 3    albuterol (PROVENTIL VENTOLIN) 2.5 mg /3 mL (0.083 %) nebu, USE ONE VIAL IN NEBULIZER EVERY 4 HOURS AS NEEDED FOR WHEEZING. Dx: J45.40, Disp: 90 Each, Rfl: 3    valsartan (DIOVAN) 80 mg tablet, Take 1 Tablet by mouth daily. Indications: high blood pressure, Disp: 90 Tablet, Rfl: 3    glipiZIDE (GLUCOTROL) 10 mg tablet, Take 1 tablet by mouth twice daily, Disp: 180 Tablet, Rfl: 3    ibuprofen (MOTRIN) 800 mg tablet, Take one tablet every 8 hours for three days, and then every 8 hours as needed for pain thereafter, Disp: 30 Tablet, Rfl: 1    acetaminophen (TYLENOL) 500 mg tablet, Take 2 Tabs by mouth every six (6) hours as needed for Pain., Disp: 20 Tab, Rfl: 0    folic acid (FOLVITE) 1 mg tablet, Take 1 mg by mouth daily. , Disp: , Rfl:     methotrexate (RHEUMATREX) 2.5 mg tablet, every Wednesday. Pt takes 9 pills for 20 mg total., Disp: , Rfl:     meclizine (ANTIVERT) 25 mg tablet, Take 1 Tab by mouth three (3) times daily as needed for Dizziness. , Disp: 20 Tab, Rfl: 1    nitroglycerin (NITROSTAT) 0.4 mg SL tablet, Take 1 Tab by mouth every five (5) minutes as needed for Chest Pain. Sit/Lay down then put one tab under the tongue every 5 minutes as needed for chest pain for 3 doses, Disp: 1 Bottle, Rfl: 0    glucose blood VI test strips (Pharmacist Choice) strip, Use preferred brand;  Check glucose 3 times daily, Diagnosis E11.65, Give Truemetrix Glucometer (Patient not taking: No sig reported), Disp: 120 Strip, Rfl: 5    FreeStyle Deonna 2 Sensor kit, Use as directed to test blood sugars at least 4 times daily. Replace every 14 days (Patient not taking: No sig reported), Disp: 2 Kit, Rfl: 11    FreeStyle Deonna 2 [place] misc, Use as directed with freestyle deonna 2 sensors (Patient not taking: No sig reported), Disp: 1 Each, Rfl: 0    SOCIAL HISTORY:   Social History     Socioeconomic History    Marital status:      Spouse name: Not on file    Number of children: Not on file    Years of education: Not on file    Highest education level: Not on file   Occupational History    Not on file   Tobacco Use    Smoking status: Never    Smokeless tobacco: Never   Vaping Use    Vaping Use: Never used   Substance and Sexual Activity    Alcohol use: Yes     Alcohol/week: 1.0 standard drink     Types: 1 Glasses of wine per week     Comment: wine coolers per month 1-2 per pt on 7/29/2021    Drug use: No    Sexual activity: Yes     Partners: Male   Other Topics Concern    Not on file   Social History Narrative    Not on file     Social Determinants of Health     Financial Resource Strain: Not on file   Food Insecurity: Not on file   Transportation Needs: Not on file   Physical Activity: Not on file   Stress: Not on file   Social Connections: Not on file   Intimate Partner Violence: Not on file   Housing Stability: Not on file       FAMILY HISTORY:  Family History   Problem Relation Age of Onset    Cancer Mother         Ovarian Cancer /breast ca    Heart Attack Father     Heart Disease Father     Diabetes Father     Other Father         pancreatitis    Cancer Sister     Other Sister         chrons    Heart Attack Maternal Grandfather     Diabetes Paternal Grandmother     HIV/AIDS Son        REVIEW OF SYSTEMS: Complete ROS assessed and noted for that which is described above, all else are negative.   Eyes: normal  ENT: normal  CVS: normal  Resp: normal  GI: normal  : normal  GYN: normal  Endocrine: normal  Integument: normal  Musculoskeletal: normal  Neuro: normal  Psych: normal      PHYSICAL EXAMINATION:   Telemedicine Visit    GENERAL: NCAT, Appears well nourished  EYES: EOMI, non-icteric, no proptosis  Ear/Nose/Throat: NCAT, no visible inflammation or masses  CARDIOVASCULAR: no cyanosis, no visible JVD  RESPIRATORY: comfortable respirations observed, no cyanosis  MUSCULOSKELETAL: Normal ROM of upper extremities observed  SKIN: No edema, rash, or other significant changes observed  NEUROLOGIC:  AAOx3  PSYCHIATRIC: Normal affect, Normal insight and judgement          REVIEW OF LABORATORY AND RADIOLOGY DATA:   Labs and documentation have been reviewed as described above. ASSESSMENT AND PLAN:   Kelsey Acevedo is a 50 y.o. female with a PMHx as noted above who presents to the endocrinology clinic for f/u evaluation of type 2 diabetes.      DM2, better controlled , last a1c 6.5%  HTN  HLD    DM2:    Patient with much improved blood sugar numbers  Plan to Continue MTF 1g BID and Glipizide 10mg BID  Levemir 36 in AM and 34 in PM  Humalog 15 units before meals (we discussed adjusting if eating more or less per meal and if N/V to take half dose before meal only)  Trulicity 6.8VZ weekly, she will let us know if she wants to cut back on the dose to 3mg given nausea and vomiting  Will decide on SGLT-2 inhibitors in the next visit    Continue to check glucose 4-6x/day with CGM  Continue attending diabetic ed classes  To keep her Ophthalm and Podiatry appts      HTN: Telehealth appt, on valsartan 80mg daily  HLD: stable, slightly above goal, will obtain lipid panel repeats in 6 months after lifestyle modif implemented  Lab Results   Component Value Date/Time    Cholesterol, total 136 04/08/2022 11:26 AM    HDL Cholesterol 36 (L) 04/08/2022 11:26 AM    LDL, calculated 84 04/08/2022 11:26 AM    LDL, calculated 87 03/11/2019 08:24 AM    VLDL, calculated 16 04/08/2022 11:26 AM    VLDL, calculated 17 03/11/2019 08:24 AM    Triglyceride 82 04/08/2022 11:26 AM        Obesity: GLP-1 dose increase, will also go up as tolerated which will help with weight loss, discussed bariatric surgery, she filled out paper work and is waiting on a response    Transaminitis: ?underlying SILVA vs effect of methotrexate, repeat levels improved, is following up with GI    RTC in 12 weeks    We discussed the expected course, resolution and complications of the diagnosis(es) in detail. Medication risks, benefits, costs, interactions, and alternatives were discussed as indicated. I advised Nadir Sherman to contact the office if her condition worsens, changes or fails to improve as anticipated. Patient expressed understanding with the diagnosis(es) and plan. Antonio Green MD   [facility]    Please see patient instructions.

## 2022-11-15 ENCOUNTER — HOSPITAL ENCOUNTER (OUTPATIENT)
Age: 47
Setting detail: OUTPATIENT SURGERY
Discharge: HOME | End: 2022-11-15
Attending: INTERNAL MEDICINE | Admitting: INTERNAL MEDICINE
Payer: MEDICAID

## 2022-11-15 ENCOUNTER — ANESTHESIA (OUTPATIENT)
Dept: ENDOSCOPY | Age: 47
End: 2022-11-15
Payer: MEDICAID

## 2022-11-15 VITALS
WEIGHT: 279.9 LBS | RESPIRATION RATE: 18 BRPM | SYSTOLIC BLOOD PRESSURE: 150 MMHG | HEIGHT: 65 IN | TEMPERATURE: 97.4 F | BODY MASS INDEX: 46.63 KG/M2 | OXYGEN SATURATION: 99 % | HEART RATE: 76 BPM | DIASTOLIC BLOOD PRESSURE: 77 MMHG

## 2022-11-15 LAB
GLUCOSE BLD STRIP.AUTO-MCNC: 123 MG/DL (ref 65–117)
SERVICE CMNT-IMP: ABNORMAL

## 2022-11-15 PROCEDURE — 2709999900 HC NON-CHARGEABLE SUPPLY: Performed by: INTERNAL MEDICINE

## 2022-11-15 PROCEDURE — 77030039825 HC MSK NSL PAP SUPERNO2VA VYRM -B: Performed by: STUDENT IN AN ORGANIZED HEALTH CARE EDUCATION/TRAINING PROGRAM

## 2022-11-15 PROCEDURE — 82962 GLUCOSE BLOOD TEST: CPT

## 2022-11-15 PROCEDURE — 74011000250 HC RX REV CODE- 250: Performed by: NURSE ANESTHETIST, CERTIFIED REGISTERED

## 2022-11-15 PROCEDURE — 74011250636 HC RX REV CODE- 250/636: Performed by: INTERNAL MEDICINE

## 2022-11-15 PROCEDURE — 76040000019: Performed by: INTERNAL MEDICINE

## 2022-11-15 PROCEDURE — 74011250636 HC RX REV CODE- 250/636: Performed by: NURSE ANESTHETIST, CERTIFIED REGISTERED

## 2022-11-15 RX ORDER — LIDOCAINE HYDROCHLORIDE 20 MG/ML
INJECTION, SOLUTION EPIDURAL; INFILTRATION; INTRACAUDAL; PERINEURAL AS NEEDED
Status: DISCONTINUED | OUTPATIENT
Start: 2022-11-15 | End: 2022-11-15 | Stop reason: HOSPADM

## 2022-11-15 RX ORDER — DEXTROMETHORPHAN/PSEUDOEPHED 2.5-7.5/.8
1.2 DROPS ORAL
Status: DISCONTINUED | OUTPATIENT
Start: 2022-11-15 | End: 2022-11-15 | Stop reason: HOSPADM

## 2022-11-15 RX ORDER — ATROPINE SULFATE 0.1 MG/ML
0.5 INJECTION INTRAVENOUS
Status: DISCONTINUED | OUTPATIENT
Start: 2022-11-15 | End: 2022-11-15 | Stop reason: HOSPADM

## 2022-11-15 RX ORDER — NALOXONE HYDROCHLORIDE 0.4 MG/ML
0.4 INJECTION, SOLUTION INTRAMUSCULAR; INTRAVENOUS; SUBCUTANEOUS
Status: DISCONTINUED | OUTPATIENT
Start: 2022-11-15 | End: 2022-11-15 | Stop reason: HOSPADM

## 2022-11-15 RX ORDER — SODIUM CHLORIDE 0.9 % (FLUSH) 0.9 %
5-40 SYRINGE (ML) INJECTION EVERY 8 HOURS
Status: DISCONTINUED | OUTPATIENT
Start: 2022-11-15 | End: 2022-11-15 | Stop reason: HOSPADM

## 2022-11-15 RX ORDER — FLUMAZENIL 0.1 MG/ML
0.2 INJECTION INTRAVENOUS
Status: DISCONTINUED | OUTPATIENT
Start: 2022-11-15 | End: 2022-11-15 | Stop reason: HOSPADM

## 2022-11-15 RX ORDER — SODIUM CHLORIDE 9 MG/ML
25 INJECTION, SOLUTION INTRAVENOUS CONTINUOUS
Status: DISCONTINUED | OUTPATIENT
Start: 2022-11-15 | End: 2022-11-15 | Stop reason: HOSPADM

## 2022-11-15 RX ORDER — PROPOFOL 10 MG/ML
INJECTION, EMULSION INTRAVENOUS AS NEEDED
Status: DISCONTINUED | OUTPATIENT
Start: 2022-11-15 | End: 2022-11-15 | Stop reason: HOSPADM

## 2022-11-15 RX ORDER — SODIUM CHLORIDE 0.9 % (FLUSH) 0.9 %
5-40 SYRINGE (ML) INJECTION AS NEEDED
Status: DISCONTINUED | OUTPATIENT
Start: 2022-11-15 | End: 2022-11-15 | Stop reason: HOSPADM

## 2022-11-15 RX ORDER — EPINEPHRINE 0.1 MG/ML
1 INJECTION INTRACARDIAC; INTRAVENOUS
Status: DISCONTINUED | OUTPATIENT
Start: 2022-11-15 | End: 2022-11-15 | Stop reason: HOSPADM

## 2022-11-15 RX ADMIN — PROPOFOL 120 MG: 10 INJECTION, EMULSION INTRAVENOUS at 07:47

## 2022-11-15 RX ADMIN — LIDOCAINE HYDROCHLORIDE 100 MG: 20 INJECTION, SOLUTION EPIDURAL; INFILTRATION; INTRACAUDAL; PERINEURAL at 07:47

## 2022-11-15 RX ADMIN — SODIUM CHLORIDE 25 ML/HR: 9 INJECTION, SOLUTION INTRAVENOUS at 07:47

## 2022-11-15 NOTE — DISCHARGE INSTRUCTIONS
Lawyer Dean  [id number]  1975    EGD DISCHARGE INSTRUCTIONS  Discomfort:  Sore throat- throat lozenges or warm salt water gargle  redness at IV site- apply warm compress to area; if redness or soreness persist- contact your physician  Gaseous discomfort- walking, belching will help relieve any discomfort  You may not operate a vehicle for 12 hours  You may not engage in an occupation involving machinery or appliances for rest of today  You may not drink alcoholic beverages for at least 12 hours  Avoid making any critical decisions for at least 24 hour  DIET  You may have minimal sips at this time-- do not eat or drink for two hours. You may eat and drink after you wake up  You may resume your regular diet - however -  remember your colon is empty and a heavy meal will produce gas. Avoid these foods:  vegetables, fried / greasy foods, carbonated drinks    MEDICATIONS:  See attached    ACTIVITY  You may resume your normal daily activities until tomorrow AM;  Spend the remainder of the day resting -  avoid any strenuous activity. CALL M.D.   ANY SIGN OF   Increasing pain, nausea, vomiting  Abdominal distension (swelling)  New increased bleeding (oral or rectal)  Fever (chills)  Pain in chest area  Bloody discharge from nose or mouth  Shortness of breath    IMPRESSION:  -- retained food in the stomach, which is called \"gastroparesis\" and this could be causing the symptoms  -- otherwise, no ulcers or blockage    Follow-up Instructions:   Call Dr. Freda Huston for the results of procedure / biopsy in 7-10 days   Telephone # [phone]  Appointment in 3-4 weeks, please call to set this up (ask to speak to [facility], my assistant, about working you in)    Taylor Spencer MD    Patient Education on Sedation / Analgesia Administered for Procedure      For 24 hours after general anesthesia or intravenous analgesia / sedation:  Have someone responsible help you with your care  Limit your activities  Do not drive and operate hazardous machinery  Do not make important personal, legal or business decisions  Do not drink alcoholic beverages  If you have not urinated within 8 hours after discharge, please contact your physician  Resume your medications unless otherwise instructed    For 24 hours after general anesthesia or intravenous analgesia / sedation  you may experience:  Drowsiness, dizziness, sleepiness, or confusion  Difficulty remembering or delayed reaction times  Difficulty with your balance, especially while walking, move slowly and carefully, do not make sudden position changes  Difficulty focusing or blurred vision    You may not be aware of slight changes in your behavior and/or your reaction time because of the medication used during and after your procedure.     Report the following to your physician:  Excessive pain, swelling, redness or odor of or around the surgical area  Temperature over 100.5  Nausea and vomiting lasting longer than 4 hours or if unable to take medications  Any signs of decreased circulation or nerve impairment to extremity: change in color, persistent numbness, tingling, coldness or increase pain  Any questions or concerns    IF YOU REPORT TO AN EMERGENCY ROOM, DOCTOR'S OFFICE OR HOSPITAL WITHIN 24 HOURS AFTER YOUR PROCEDURE, BRING THIS SHEET AND YOUR AFTER VISIT SUMMARY WITH YOU AND GIVE IT TO THE PHYSICIAN OR NURSE ATTENDING YOU.

## 2022-11-15 NOTE — ANESTHESIA POSTPROCEDURE EVALUATION
Procedure(s):  ESOPHAGOGASTRODUODENOSCOPY (EGD). MAC    Anesthesia Post Evaluation      Multimodal analgesia: multimodal analgesia used between 6 hours prior to anesthesia start to PACU discharge  Patient location during evaluation: PACU  Patient participation: complete - patient participated  Level of consciousness: sleepy but conscious  Pain management: adequate  Airway patency: patent  Anesthetic complications: no  Cardiovascular status: acceptable, blood pressure returned to baseline and hemodynamically stable  Respiratory status: acceptable and nasal cannula  Hydration status: acceptable  Post anesthesia nausea and vomiting:  none  Final Post Anesthesia Temperature Assessment:  Normothermia (36.0-37.5 degrees C)      INITIAL Post-op Vital signs:   Vitals Value Taken Time   /85 11/15/22 0809   Temp     Pulse 72 11/15/22 0811   Resp 19 11/15/22 0811   SpO2 100 % 11/15/22 0811   Vitals shown include unvalidated device data.

## 2022-11-15 NOTE — PROCEDURES
NAME:  Tina Evans   :   1975   MRN:   [id number]     Date/Time:  11/15/2022 7:52 AM    Esophagogastroduodenoscopy (EGD) Procedure Note    Procedure: Esophagogastroduodenoscopy --diagnostic    Indication: Epigastric pain  Pre-operative Diagnosis: see indication above  Post-operative Diagnosis: see findings below  :  Beverly Duque MD  Referring Provider:   Pb Agustin MD    Exam:  Airway: clear, no airway problems anticipated  Heart: RRR, without gallops or rubs  Lungs: clear bilaterally without wheezes, crackles, or rhonchi  Abdomen: soft, nontender, nondistended, bowel sounds present  Mental Status: awake, alert and oriented to person, place and time     Anethesia/Sedation:  MAC anesthesia Propofol  Procedure Details   After informed consent was obtained for the procedure, with all risks and benefits of procedure explained the patient was taken to the endoscopy suite and placed in the left lateral decubitus position. Following sequential administration of sedation as per above, the [id number] gastroscope was inserted into the mouth and advanced under direct vision to duodenal bulb. A careful inspection was made as the gastroscope was withdrawn, including a retroflexed view of the proximal stomach; findings and interventions are described below. Findings:   OROPHARYNX: Cords and pyriform recesses normal.   ESOPHAGUS: The esophagus is normal. The proximal, mid, and distal portions are normal. The Z-Line is intact. STOMACH: solid food in gastric body, antrum and fundus. Normal underlying mucosa. Pylorus is patent. DUODENUM: The bulb is normal.    Therapies:   no mucosal lesion appreciated    Specimens: none    EBL:  None. Complications:   procedure aborted when food in stomach noted.            Impression:  -- retained food in stomach, suggestive of gastroparesis; a likely cause of symptoms  -- otherwise, limited exam due to above, but without nabila abnormalities    Recommendations:  -- gastroparesis diet education  -- follow up in office for management    Discharge disposition:  Home in the company of  when able to ambulate    Srinivasa Parr MD

## 2022-11-15 NOTE — H&P
Gastroenterology Outpatient History and Physical    Patient: Oneita Bud    Physician: Cecille Arthur MD    Chief Complaint: epigastric pain  History of Present Illness: 49 yo F here with epigastric pain    History:  Past Medical History:   Diagnosis Date    Anemia (iron deficiency)     Anxiety     Asthma     Depression     DM type 2 (diabetes mellitus, type 2) ([address] 2011    GERD (gastroesophageal reflux disease)     GI bleed     Hepatic steatosis 2016    confirmed by US    HTN (hypertension)     Irritable bowel     Kidney mass     19: US showed rigth renal cystic nephroma, follows with Urology (Dr. Maralee Klinefelter)    Morbid obesity ([address]     FE on CPAP     PUD (peptic ulcer disease) 2015    Renal cancer, right ([address]     tx surgery    Rheumatoid arthritis ([address]     per pt on 2021      Past Surgical History:   Procedure Laterality Date    COLONOSCOPY N/A 2021    COLONOSCOPY performed by Red Dietz MD at [facility]. Sigmoid diverticulosis, int/ext hemorrhoids.  Repeat in 5 yrs due to fam hx of colon ca    COLONOSCOPY,DIAGNOSTIC  2021         HX  SECTION      x 2    HX COLONOSCOPY  2015    HX DILATION AND CURETTAGE  2021    HX ENDOSCOPY  2015    HX HYSTERECTOMY  2021    HX HYSTEROSCOPY WITH ENDOMETRIAL ABLATION  2014    HX TUBAL LIGATION      HX TUMOR REMOVAL  2016    right kidney, Dr Zulema Carrillo      Social History     Socioeconomic History    Marital status:    Tobacco Use    Smoking status: Never    Smokeless tobacco: Never   Vaping Use    Vaping Use: Never used   Substance and Sexual Activity    Alcohol use: Not Currently    Drug use: Not Currently      Family History   Problem Relation Age of Onset    Cancer Mother         Ovarian Cancer /breast ca    Heart Attack Father     Heart Disease Father     Diabetes Father     Other Father         pancreatitis    Cancer Sister         colon    Other Sister         fibromyalgia    Colon Cancer Sister     Crohn's Disease Sister     Other cancer Sister     Heart Attack Maternal Grandfather     Diabetes Paternal Grandmother     HIV/AIDS Son       Patient Active Problem List   Diagnosis Code    Essential hypertension I10    Moderate episode of recurrent major depressive disorder (HCC) F33.1    FE (obstructive sleep apnea) G47.33    Asthma J45.909    Iron deficiency anemia D50.9    Mixed hyperlipidemia E78.2    Class 3 severe obesity due to excess calories with body mass index (BMI) of 45.0 to 49.9 in adult [facility]) E66.01, Z68.42    Coronary artery disease involving native coronary artery of native heart without angina pectoris I25.10    Chronic midline low back pain without sciatica M54.50, G89.29    Atypical squamous cells of undetermined significance (ASCUS) on Papanicolaou smear of cervix R87.610    Type 2 diabetes with nephropathy ([facility]) E11.21    Seronegative rheumatoid arthritis ([facility] M06.00    Elevated liver enzymes R74.8    Nausea R11.0    Irritable bowel syndrome K58.9    Type 2 diabetes mellitus with diabetic polyneuropathy, with long-term current use of insulin ([facility]) E11.42, Z79.4    Dyslipidemia E78.5    DDD (degenerative disc disease), lumbar M51.36       Allergies: Allergies   Allergen Reactions    Lisinopril Cough    Pcn [Penicillins] Hives     Medications:   Prior to Admission medications    Medication Sig Start Date End Date Taking?  Authorizing Provider   glipiZIDE (GLUCOTROL) 10 mg tablet Take 1 tablet by mouth twice daily 11/14/22  Yes Bharathi Caba MD   HumaLOG KwikPen Insulin 100 unit/mL kwikpen Inject 15 units under the skin before breakfast, lunch and dinner, max dose of 60 units per day  Patient taking differently: Inject 16 units under the skin before breakfast, lunch and dinner; and a sliding scale up to 10 units 11/14/22  Yes Bharathi Caba MD   Levemir FlexTouch U-100 Insuln 100 unit/mL (3 mL) inpn Dx: K44.27   Please take 36 Units in the morning and 34 Units in the evening Indications: type 2 diabetes mellitus  Patient taking differently: 34 units every morning and 36 Units in evening  Indications: type 2 diabetes mellitus 11/14/22  Yes Bharathi Caba MD   metFORMIN ER (GLUCOPHAGE XR) 500 mg tablet Take 2 Tablets by mouth two (2) times a day. 11/14/22  Yes Bharathi Caba MD   pregabalin (LYRICA) 200 mg capsule TAKE 1 CAPSULE BY MOUTH TWICE DAILY . DO NOT EXCEED 2 PER 24 HOURS 10/14/22  Yes Emanuel England MD   atorvastatin (LIPITOR) 20 mg tablet Take 1 tablet by mouth once daily. 8/9/22  Yes Emanuel England MD   albuterol (PROVENTIL HFA, VENTOLIN HFA, PROAIR HFA) 90 mcg/actuation inhaler Take 2 Puffs by inhalation every six (6) hours as needed for Wheezing. Asthma, Dx: J45.40 2/10/22  Yes Erin Weiss MD   fluticasone propion-salmeteroL (Advair Diskus) 500-50 mcg/dose diskus inhaler Take 1 Puff by inhalation every twelve (12) hours. Asthma, Dx: J45.30 2/10/22  Yes Erin Weiss MD   buPROPion SR (WELLBUTRIN SR) 150 mg SR tablet Take 1 Tablet by mouth two (2) times a day. 1/27/22  Yes Emanuel England MD   ondansetron (Zofran ODT) 4 mg disintegrating tablet Take 1 Tablet by mouth every eight (8) hours as needed for Nausea for up to 360 days. 1/27/22 1/22/23 Yes Eamnuel England MD   dicyclomine (BENTYL) 10 mg/5 mL soln oral solution Take 10 mL by mouth four (4) times daily as needed (abd pain or diarrhea). 1/27/22  Yes Emanuel England MD   montelukast (SINGULAIR) 10 mg tablet TAKE 1 TABLET BY MOUTH ONCE DAILY FOR ALLERGIES AND FOR ASTHMA 1/27/22  Yes Emanuel England MD   albuterol (PROVENTIL VENTOLIN) 2.5 mg /3 mL (0.083 %) nebu USE ONE VIAL IN NEBULIZER EVERY 4 HOURS AS NEEDED FOR WHEEZING. Dx: J45.40 1/27/22  Yes Erin Weiss MD   valsartan (DIOVAN) 80 mg tablet Take 1 Tablet by mouth daily.  Indications: high blood pressure 1/27/22  Yes Emanuel England MD   ibuprofen (MOTRIN) 800 mg tablet Take one tablet every 8 hours for three days, and then every 8 hours as needed for pain thereafter 8/23/21  Yes Елена Bateman MD   folic acid (FOLVITE) 1 mg tablet Take 1 mg by mouth daily. 1/19/21  Yes Provider, Historical   meclizine (ANTIVERT) 25 mg tablet Take 1 Tab by mouth three (3) times daily as needed for Dizziness. 2/28/20  Yes Vel England MD   dulaglutide (Trulicity) 4.5 DU/3.4 mL pnij 4.5 mg by SubCUTAneous route every seven (7) days. 11/14/22   Bharathi Caba MD   amitriptyline (ELAVIL) 50 mg tablet TAKE 1 TABLET BY MOUTH NIGHTLY FOR  NUMBNESS  AT  HANDS  AND  FEET  Patient taking differently: nightly as needed. 9/21/22   Conchita Trent MD   etanercept (ENBREL SC) by SubCUTAneous route every seven (7) days. Provider, Historical   cyclobenzaprine (FLEXERIL) 5 mg tablet Take 1 Tablet by mouth three (3) times daily as needed for Muscle Spasm(s). 4/18/22   Conchita Trent MD   HYDROcodone-acetaminophen (NORCO) 5-325 mg per tablet TAKE 1 TO 2 TABLETS BY MOUTH 4 TIMES DAILY AS NEEDED  Patient not taking: Reported on 11/14/2022 1/12/22   Provider, Historical   acetaminophen (TYLENOL) 500 mg tablet Take 2 Tabs by mouth every six (6) hours as needed for Pain. 4/6/21   Chano Bolden PA-C   methotrexate (RHEUMATREX) 2.5 mg tablet every Wednesday. Pt takes 9 pills for 20 mg total. 1/19/21   Provider, Historical   nitroglycerin (NITROSTAT) 0.4 mg SL tablet Take 1 Tab by mouth every five (5) minutes as needed for Chest Pain. Sit/Lay down then put one tab under the tongue every 5 minutes as needed for chest pain for 3 doses 6/8/18   Zully Rios MD     Physical Exam:   Vital Signs: Blood pressure (!) 153/93, pulse 71, temperature 98.3 °F (36.8 °C), resp. rate 12, height 5' 5\" (1.651 m), weight 127 kg (279 lb 14.4 oz), last menstrual period 08/17/2014, SpO2 100 %, not currently breastfeeding.   General: well developed, well nourished   HEENT: unremarkable   Heart: regular rhythm no mumur    Lungs: clear   Abdominal:  benign   Neurological: unremarkable   Extremities: no edema Findings/Diagnosis: epigastric pain  Plan of Care/Planned Procedure: EGD with conscious/deep sedation    Signed:  Vinny Gimenez MD 11/15/2022

## 2022-11-15 NOTE — ROUTINE PROCESS
Yoana Mitchell  1975  [id number]    Situation:  Verbal report received from: Shelbi Aggarwal  Procedure: Procedure(s):  ESOPHAGOGASTRODUODENOSCOPY (EGD)    Background:    Preoperative diagnosis: Epigastric pain [R10.13]  Change in bowel habits [R19.4]  Postoperative diagnosis: Delayed gastric emptying    :  Dr. Anna Pacheco  Assistant(s): Endoscopy Technician-1: Milton Pederson  Endoscopy RN-1: Mireya Penaloza RN    Specimens: * No specimens in log *  H. Pylori  no    Assessment:    Anesthesia gave intra-procedure sedation and medications, see anesthesia flow sheet yes    Intravenous fluids: NS@ KVO     Vital signs stable yes    Abdominal assessment: round and soft yes    Recommendation:

## 2022-11-16 DIAGNOSIS — E11.42 TYPE 2 DIABETES MELLITUS WITH DIABETIC POLYNEUROPATHY, WITH LONG-TERM CURRENT USE OF INSULIN (HCC): ICD-10-CM

## 2022-11-16 DIAGNOSIS — Z79.4 TYPE 2 DIABETES MELLITUS WITH DIABETIC POLYNEUROPATHY, WITH LONG-TERM CURRENT USE OF INSULIN (HCC): ICD-10-CM

## 2022-11-16 RX ORDER — PEN NEEDLE, DIABETIC 31 GX3/16"
NEEDLE, DISPOSABLE MISCELLANEOUS
Qty: 150 PEN NEEDLE | Refills: 5 | Status: SHIPPED | OUTPATIENT
Start: 2022-11-16

## 2022-11-17 ENCOUNTER — OFFICE VISIT (OUTPATIENT)
Dept: NEUROLOGY | Age: 47
End: 2022-11-17
Payer: MEDICAID

## 2022-11-17 VITALS
OXYGEN SATURATION: 100 % | WEIGHT: 278.8 LBS | SYSTOLIC BLOOD PRESSURE: 152 MMHG | HEART RATE: 65 BPM | TEMPERATURE: 98 F | BODY MASS INDEX: 46.45 KG/M2 | DIASTOLIC BLOOD PRESSURE: 88 MMHG | RESPIRATION RATE: 18 BRPM | HEIGHT: 65 IN

## 2022-11-17 DIAGNOSIS — M48.062 SPINAL STENOSIS OF LUMBAR REGION WITH NEUROGENIC CLAUDICATION: Primary | ICD-10-CM

## 2022-11-17 DIAGNOSIS — M79.2 RADICULAR PAIN IN LEFT ARM: ICD-10-CM

## 2022-11-17 PROCEDURE — 99214 OFFICE O/P EST MOD 30 MIN: CPT | Performed by: INTERNAL MEDICINE

## 2022-11-17 PROCEDURE — 3074F SYST BP LT 130 MM HG: CPT | Performed by: INTERNAL MEDICINE

## 2022-11-17 PROCEDURE — 3078F DIAST BP <80 MM HG: CPT | Performed by: INTERNAL MEDICINE

## 2022-11-17 RX ORDER — PREGABALIN 300 MG/1
300 CAPSULE ORAL 2 TIMES DAILY
Qty: 120 CAPSULE | Refills: 3 | Status: SHIPPED | OUTPATIENT
Start: 2022-11-17

## 2022-11-17 RX ORDER — CYCLOBENZAPRINE HCL 10 MG
10 TABLET ORAL
Qty: 120 TABLET | Refills: 1 | Status: SHIPPED | OUTPATIENT
Start: 2022-11-17

## 2022-11-17 RX ORDER — LIDOCAINE 50 MG/G
1 PATCH TOPICAL EVERY 24 HOURS
Qty: 10 EACH | Refills: 2 | Status: SHIPPED | OUTPATIENT
Start: 2022-11-17

## 2022-11-17 NOTE — PROGRESS NOTES
Neurology Note    Patient ID:  Kelsey Acevedo  [id number]  77 y.o.  1975      Date of Consultation:  November 17, 2022      Assessment and Plan:  53-year-old female presenting with significant low back pain with radicular symptoms in the left lower extremity as well as neck pain and tightness with radicular symptoms in the left upper extremity. I am concerned based on her exam that she may have left lumbosacral radiculopathy concerning for possible nerve root irritation. I will obtain a lumbar MRI without contrast to evaluate this further. I would like to send her to orthopedics for evaluation for possible steroid injections or possible surgery, if warranted based on neuroimaging. Regarding her neck pain and tightness, most likely this is myofascial or musculoskeletal however she does have neuroimaging from 2021 which shows severe central stenosis and bilateral neural foraminal stenosis at C5 and C6 as well as left neural foraminal stenosis at C6-C7. It is possible that she has a flareup or worsening of cervical radiculopathy. I will increase Flexeril to 10 mg 3 times daily as needed for neck tightness and pain. Would also refer her to orthopedics to be evaluated for this. She has an order already for physical therapy for neck and low back pain. I will increase her Lyrica to 300 mg twice daily for neuropathic pain and instructed her to take amitriptyline as scheduled at bedtime. We went over the side effects of the medications and she is aware that it can cause drowsiness. We discussed also using heat as well as lidocaine patches. Problem was discussed at length with the patient. We reviewed the results of the study in detail. We also discussed prognosis and treatment options. The patient had opportunity today to ask all questions, expressed understanding of the instructions provided, and agreed with the plan of treatment. Follow up in 8 months.     I spent 40 minutes providing care to this patient with >50% of the time spent counseling. History of Present Illness:   Buddy Pascual is a 50 y.o. female presenting with neck and low back pain. She states that about 1 month ago, she woke up with significant left back pain that had shooting electrical pain down the left side of her leg and the back which stops at the knee. She denies any focal weakness other than pain limiting weakness. She does have neuropathy, and has baseline numbness and tingling in her bilateral feet and fingertips. She states that the low back pain is worsened with moving, sitting, standing for longer than 5 to 15 minutes. She also states that breathing makes it worse. She states that walking or bending over is \"miserable\". She also notes that laying down on her back is also very painful. She has seen several doctors including an orthopedist who told her she may have a pinched nerve but no neuroimaging was done. 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 She has tried heat packs which initially helped but has not been very useful in the last few weeks. She does not feel that ice helps. She was prescribed lidocaine patches 5% which she did not feel helped. She has tried Tylenol and ibuprofen which helped to get some of the edge off but she does not feel that it completely limits the pain. She was prescribed hydrocodone and she took 1 but did not like it because it made her feel \"loopy\". She denies any bowel or bladder issues such as loss of control of urination or defecation. Regarding her neck pain, she states that she is always had some neck tightness with occasional numbness in her left hand. However, in the last couple weeks, she describes shooting pain and constant numbness in her left hand.   Of note, she did have an MRI of her cervical spine in 2019 which showed moderate broad-based protrusion of the disc resulting in moderately severe central stenosis and bilateral neural foraminal stenosis at C5-C6 and left neural foraminal stenosis at C6-C7. She denies any hand or arm weakness. She is having difficulty sleeping at night and this is causing her to feel depressed. She is not suicidal or homicidal.  She was referred to physical therapy however they are booked out for another 4 to 6 weeks. She has not had any falls in the last month. She has tried gabapentin in the past for neuropathic pain which has not helped. She does still have some at home. She is on Lyrica 200 mg twice daily but does not feel like this dose is sufficient for her current pain. Denies smoking, alcohol use, drug use. Past Medical History:   Diagnosis Date    Anemia (iron deficiency)     Anxiety     Asthma     Depression     DM type 2 (diabetes mellitus, type 2) ([address] 2011    GERD (gastroesophageal reflux disease)     GI bleed     Hepatic steatosis 2016    confirmed by US    HTN (hypertension)     Irritable bowel     Kidney mass     19: US showed rigth renal cystic nephroma, follows with Urology (Dr. Myke Gottlieb)    Morbid obesity ([address]     FE on CPAP     PUD (peptic ulcer disease)     Renal cancer, right ([address]     tx surgery    Rheumatoid arthritis ([address]     per pt on 2021        Past Surgical History:   Procedure Laterality Date    COLONOSCOPY N/A 2021    COLONOSCOPY performed by Elizabet Ely MD at [facility] ENDOSCOPY. Sigmoid diverticulosis, int/ext hemorrhoids.  Repeat in 5 yrs due to fam hx of colon ca    COLONOSCOPY,DIAGNOSTIC  2021         HX  SECTION      x 2    HX COLONOSCOPY  2015    HX DILATION AND CURETTAGE  2021    HX ENDOSCOPY  2015    HX HYSTERECTOMY  2021    HX HYSTEROSCOPY WITH ENDOMETRIAL ABLATION  2014    HX TUBAL LIGATION      HX TUMOR REMOVAL  2016    right kidney, Dr Henderson Mayor        Family History   Problem Relation Age of Onset    Cancer Mother         Ovarian Cancer /breast ca    Heart Attack Father     Heart Disease Father     Diabetes Father     Other Father         pancreatitis    Cancer Sister         colon    Other Sister         fibromyalgia    Colon Cancer Sister     Crohn's Disease Sister     Other cancer Sister     Heart Attack Maternal Grandfather     Diabetes Paternal Grandmother     HIV/AIDS Son         Social History     Tobacco Use    Smoking status: Never    Smokeless tobacco: Never   Substance Use Topics    Alcohol use: Not Currently        Allergies   Allergen Reactions    Lisinopril Cough    Pcn [Penicillins] Hives        Prior to Admission medications    Medication Sig Start Date End Date Taking? Authorizing Provider   Insulin Needles, Disposable, (BD Juliet 2nd Gen Pen Needle) 32 gauge x 5/32\" ndle For use with insulin pens for 5x per day E11.65 11/16/22   Bharathi Caba MD   dulaglutide (Trulicity) 4.5 VN/4.7 mL pnij 4.5 mg by SubCUTAneous route every seven (7) days. 11/14/22   Ave Caba MD   glipiZIDE (GLUCOTROL) 10 mg tablet Take 1 tablet by mouth twice daily 11/14/22   Bharathi Caba MD   HumaLOG KwikPen Insulin 100 unit/mL kwikpen Inject 15 units under the skin before breakfast, lunch and dinner, max dose of 60 units per day  Patient taking differently: Inject 16 units under the skin before breakfast, lunch and dinner; and a sliding scale up to 10 units 11/14/22   Bharathi Caba MD   Levemir FlexTouch U-100 Insuln 100 unit/mL (3 mL) inpn Dx: O49.47   Please take 36 Units in the morning and 34 Units in the evening  Indications: type 2 diabetes mellitus  Patient taking differently: 34 units every morning and 36 Units in evening  Indications: type 2 diabetes mellitus 11/14/22   Bharathi Caba MD   metFORMIN ER (GLUCOPHAGE XR) 500 mg tablet Take 2 Tablets by mouth two (2) times a day. 11/14/22   Bharathi Caba MD   pregabalin (LYRICA) 200 mg capsule TAKE 1 CAPSULE BY MOUTH TWICE DAILY .  DO NOT EXCEED 2 PER 24 HOURS 10/14/22   Tomás MD Tish   amitriptyline (ELAVIL) 50 mg tablet TAKE 1 TABLET BY MOUTH NIGHTLY FOR  NUMBNESS  AT  HANDS  AND  FEET  Patient taking differently: nightly as needed. 9/21/22   Gatito Paiz MD   atorvastatin (LIPITOR) 20 mg tablet Take 1 tablet by mouth once daily. 8/9/22   Gatito Paiz MD   etanercept (ENBREL SC) by SubCUTAneous route every seven (7) days. Provider, Historical   cyclobenzaprine (FLEXERIL) 5 mg tablet Take 1 Tablet by mouth three (3) times daily as needed for Muscle Spasm(s). 4/18/22   Gatito Paiz MD   albuterol (PROVENTIL HFA, VENTOLIN HFA, PROAIR HFA) 90 mcg/actuation inhaler Take 2 Puffs by inhalation every six (6) hours as needed for Wheezing. Asthma, Dx: J45.40 2/10/22   Gatito Paiz MD   fluticasone propion-salmeteroL (Advair Diskus) 500-50 mcg/dose diskus inhaler Take 1 Puff by inhalation every twelve (12) hours. Asthma, Dx: J45.30 2/10/22   Gatito Paiz MD   HYDROcodone-acetaminophen (NORCO) 5-325 mg per tablet TAKE 1 TO 2 TABLETS BY MOUTH 4 TIMES DAILY AS NEEDED  Patient not taking: Reported on 11/14/2022 1/12/22   Provider, Historical   buPROPion SR (WELLBUTRIN SR) 150 mg SR tablet Take 1 Tablet by mouth two (2) times a day. 1/27/22   Gatito Paiz MD   ondansetron (Zofran ODT) 4 mg disintegrating tablet Take 1 Tablet by mouth every eight (8) hours as needed for Nausea for up to 360 days. 1/27/22 1/22/23  Gatito Paiz MD   dicyclomine (BENTYL) 10 mg/5 mL soln oral solution Take 10 mL by mouth four (4) times daily as needed (abd pain or diarrhea). 1/27/22   Gatito Paiz MD   montelukast (SINGULAIR) 10 mg tablet TAKE 1 TABLET BY MOUTH ONCE DAILY FOR ALLERGIES AND FOR ASTHMA 1/27/22   Gatito Paiz MD   albuterol (PROVENTIL VENTOLIN) 2.5 mg /3 mL (0.083 %) nebu USE ONE VIAL IN NEBULIZER EVERY 4 HOURS AS NEEDED FOR WHEEZING. Dx: J45.40 1/27/22   Gatito Paiz MD   valsartan (DIOVAN) 80 mg tablet Take 1 Tablet by mouth daily.  Indications: high blood pressure 1/27/22 David Raman MD   ibuprofen (MOTRIN) 800 mg tablet Take one tablet every 8 hours for three days, and then every 8 hours as needed for pain thereafter 8/23/21   Thao Banegas MD   acetaminophen (TYLENOL) 500 mg tablet Take 2 Tabs by mouth every six (6) hours as needed for Pain. 4/6/21   Zully Salazar PA-C   folic acid (FOLVITE) 1 mg tablet Take 1 mg by mouth daily. 1/19/21   Provider, Historical   methotrexate (RHEUMATREX) 2.5 mg tablet every Wednesday. Pt takes 9 pills for 20 mg total. 1/19/21   Provider, Historical   meclizine (ANTIVERT) 25 mg tablet Take 1 Tab by mouth three (3) times daily as needed for Dizziness. 2/28/20   David Raman MD   nitroglycerin (NITROSTAT) 0.4 mg SL tablet Take 1 Tab by mouth every five (5) minutes as needed for Chest Pain. Sit/Lay down then put one tab under the tongue every 5 minutes as needed for chest pain for 3 doses 6/8/18   Noemí Petty MD       Review of Systems:    General, constitutional: negative  Eyes, vision: negative  Ears, nose, throat: negative  Cardiovascular, heart: negative  Respiratory: negative  Gastrointestinal: negative  Genitourinary: negative  Musculoskeletal: negative  Skin and integumentary: negative  Psychiatric: negative  Endocrine: negative  Neurological: negative, except for HPI  Hematologic/lymphatic: negative  Allergy/immunology: negative    []Unable to obtain  ROS due to  []mental status change  []sedated   []intubated    Objective:     Visit Vitals  LMP 08/17/2014       Physical Exam:  General:  appears well nourished; Appears to be in significant distress and discomfort. Tearful throughout exam.  Neck: no obvious deformity or masses  Lungs: comfortable on room air  Heart:  well-perfused   Lower extremity: no edema  Skin: intact    Neurological exam:  Awake, alert, oriented to person, place and time  Recent and remote memory were normal  Attention and concentration were intact  Language was intact.   There was no aphasia  Speech: no dysarthria  Fund of knowledge was preserved    Cranial nerves:   II-XII were tested    PERRRLA. Legally blind in left eye. Left pupil delayed constriction. EOMI, no evidence of nystagmus  Facial sensation:  normal and symmetric  Facial motor: normal and symmetric  Hearing intact  SCM strength intact  Tongue: midline without fasciculations    Motor: Tone normal in upper and lower extremities     Strength testing: Effort limited by pain in the left lower extremity. deltoid triceps biceps Wrist ext. Wrist flex. intrinsics Hip flex. Hip ext. Knee ext. Knee flex Dorsi flex Plantar flex   Right 5 5 5 5 5 5 5 NT 5 5 5 5   Left 5 5 5 5 5 5 5 NT 5 5 5 5   Toe extensors R 5/5 L 5/5     Sensory:  Upper extremity: Diminished to pinprick in the left upper extremity in the C5-C6-C7 distribution, intact to light touch, and vibration > 10 seconds, and proprioception  Lower extremity: Diminished to pinprick distal to the midshin bilaterally, intact to light touch, and diminished to vibration > 10 seconds bilaterally, absent vibration in the left great toe and medial malleolus,, and proprioception intact in the right toe absent in the left      Reflexes:    Right Left  Biceps  2 2  Triceps  2 2  Brachiorad.  2 2  Patella  2 2  Achilles 2 absent  No ankle clonus    Plantar response:  flexor bilaterally    Cerebellar testing:  no tremor apparent    Gait: Antalgic gait    Labs:     Lab Results   Component Value Date/Time    Hemoglobin A1c 11.7 (H) 04/08/2022 11:26 AM    Sodium 140 04/08/2022 11:26 AM    Potassium 4.8 04/08/2022 11:26 AM    Chloride 100 04/08/2022 11:26 AM    Glucose 265 (H) 04/08/2022 11:26 AM    BUN 7 04/08/2022 11:26 AM    Creatinine 0.86 04/08/2022 11:26 AM    Calcium 9.6 04/08/2022 11:26 AM    WBC 6.9 02/14/2022 09:52 AM    HCT 37.4 02/14/2022 09:52 AM    HGB 11.9 02/14/2022 09:52 AM    PLATELET 560 83/11/0694 09:52 AM       Imaging:    Results from Hospital Encounter encounter on 09/24/21    MRI CERV SPINE WO CONT    Narrative  EXAM:  MRI CERV SPINE WO CONT    INDICATION:   Neck pain    COMPARISON: None. TECHNIQUE: MR imaging of the cervical spine was performed including sagittal T1,  T2, STIR;  axial GRE, T2, T1. Contrast was not administered. FINDINGS:  There is normal alignment of the cervical spine. Vertebral body heights are  maintained. Marrow signal is normal.  The craniocervical junction is intact. The course, caliber, and signal intensity of the spinal cord are normal.    C2/3:   The spinal canal and neural foramina are widely patent. C3/4:   The spinal canal and neural foramina are widely patent. C4/5:  There is slight bulging of the disc with canal lower limits of normal in  size. Neural foramina are patent. C5/6:  There is moderate protrusion of the disc slightly more prominent to the  right with moderately severe central stenosis and bilateral neural foraminal  narrowing. C6/7:  There is moderate protrusion of the disc slightly more prominent to the  left with moderately severe central stenosis and narrowing of the left neural  foramen. C7/T1:   The spinal canal and neural foramina are widely patent. Impression  1. At C5-C6 and C6-C7 levels, there are moderate broad-based protrusion of the  disc resulting in moderately severe central stenosis and bilateral neural  foraminal stenosis at C5-C6 and left neural foraminal stenosis at C6-C7      Results from Hospital Encounter encounter on 11/09/22    CT ABD PELV WO CONT    Narrative  EXAM: CT ABD PELV WO CONT    INDICATION: Stomach ache    COMPARISON: 2/14/2022    IV CONTRAST: None. ORAL CONTRAST: None    TECHNIQUE:  Thin axial images were obtained through the abdomen and pelvis. Coronal and  sagittal reformats were generated. CT dose reduction was achieved through use of  a standardized protocol tailored for this examination and automatic exposure  control for dose modulation.     The absence of intravenous contrast material reduces the sensitivity for  evaluation of the vasculature and solid organs. FINDINGS:  LOWER THORAX: No significant abnormality in the incidentally imaged lower chest.  LIVER: No mass. BILIARY TREE: Gallbladder is within normal limits. CBD is not dilated. SPLEEN: within normal limits. PANCREAS: No focal abnormality. ADRENALS: Unremarkable. KIDNEYS/URETERS: No calculus or hydronephrosis. Prior right partial nephrectomy. STOMACH: Unremarkable. SMALL BOWEL: No dilatation or wall thickening. COLON: No dilatation or wall thickening. Sigmoid diverticulosis. APPENDIX: Unremarkable. PERITONEUM: No ascites or pneumoperitoneum. RETROPERITONEUM: No lymphadenopathy or aortic aneurysm. REPRODUCTIVE ORGANS: Hysterectomy. URINARY BLADDER: No mass or calculus. BONES: No destructive bone lesion. ABDOMINAL WALL: No mass or hernia. ADDITIONAL COMMENTS: N/A    Impression  1. Unremarkable. 2.  Status post right partial nephrectomy and hysterectomy. 3.  Sigmoid diverticulosis without diverticulitis.              Patient Active Problem List   Diagnosis Code    Essential hypertension I10    Moderate episode of recurrent major depressive disorder (HCC) F33.1    FE (obstructive sleep apnea) G47.33    Asthma J45.909    Iron deficiency anemia D50.9    Mixed hyperlipidemia E78.2    Class 3 severe obesity due to excess calories with body mass index (BMI) of 45.0 to 49.9 in adult [facility]) E66.01, Z68.42    Coronary artery disease involving native coronary artery of native heart without angina pectoris I25.10    Chronic midline low back pain without sciatica M54.50, G89.29    Atypical squamous cells of undetermined significance (ASCUS) on Papanicolaou smear of cervix R87.610    Type 2 diabetes with nephropathy (HCC) E11.21    Seronegative rheumatoid arthritis (HCC) M06.00    Elevated liver enzymes R74.8    Nausea R11.0    Irritable bowel syndrome K58.9    Type 2 diabetes mellitus with diabetic polyneuropathy, with long-term current use of insulin (HCC) E11.42, Z79.4    Dyslipidemia E78.5    DDD (degenerative disc disease), lumbar M51.36                   Signed By:   Daya Young DO  Neurophysiology      November 17, 2022

## 2022-11-17 NOTE — PROGRESS NOTES
Chief Complaint   Patient presents with    Back Pain     States GI and Ortho told her she has a pinched nerve - has been going on for a month - in Extreme pain - movement increases it - even hurts to breath at times         Altered mental status    Neck Pain     Left side      1. Have you been to the ER, urgent care clinic since your last visit? Hospitalized since your last visit? No     2. Have you seen or consulted any other health care providers outside of the [address] [place] [address] since your last visit? Include any pap smears or colon screening.   Yes Ortho and GI

## 2022-11-23 ENCOUNTER — HOSPITAL ENCOUNTER (OUTPATIENT)
Dept: MRI IMAGING | Age: 47
Discharge: HOME | End: 2022-11-23
Attending: INTERNAL MEDICINE
Payer: MEDICAID

## 2022-11-23 DIAGNOSIS — M48.062 SPINAL STENOSIS OF LUMBAR REGION WITH NEUROGENIC CLAUDICATION: ICD-10-CM

## 2022-11-23 DIAGNOSIS — M79.2 RADICULAR PAIN IN LEFT ARM: ICD-10-CM

## 2022-11-23 PROCEDURE — 72141 MRI NECK SPINE W/O DYE: CPT

## 2022-11-23 PROCEDURE — 72148 MRI LUMBAR SPINE W/O DYE: CPT

## 2022-11-28 ENCOUNTER — TELEPHONE (OUTPATIENT)
Dept: NEUROLOGY | Age: 47
End: 2022-11-28

## 2022-11-28 NOTE — TELEPHONE ENCOUNTER
Message  Received: 4 days ago  Eriberto Parham, DO sent to Lissette Edouard LPN  Cc: Светлана Calvin LPN  Please call the patient regarding her abnormal result. Patient has severe canal and bilateral nerve/spinal stenosis at C5-C6 and C6-C7 on the left side. She does have some mild to moderate compression of her spinal cord without any swelling around it. Again, she needs to follow with ortho to see if they would do any interventions.

## 2022-11-28 NOTE — TELEPHONE ENCOUNTER
Called pt,verified pt with two pt identifiers, relayed both results for MRI cervical and MRI lumber to pt and  on the phone. Advised I did fax the referral to [address] [place] [address] and received confirmation. Gave her name, number to reach out to the office to get herself scheduled. Advised they should be able to view the results in her chart but if not to let us know and I will fax any info needed to them. Advised she should also be able to view on my chart as well. Pt verbalized understanding of everything and had no further questions. Put referral in mail to pt.

## 2022-11-28 NOTE — TELEPHONE ENCOUNTER
----- Message from Parminder Dong DO sent at 11/24/2022  7:31 AM EST -----  Please call the patient regarding her abnormal result. She has moderate to severe canal stenosis that is pressing on her spinal cord slightly and a slipped disk around her mid to low back. She also has several severe pinched nerves at L3-L4 and L5-S1. She needs to see an orthopedist specialist to determine if she needs surgery or steroid injections. I have placed the referral order.

## 2022-12-01 ENCOUNTER — TELEPHONE (OUTPATIENT)
Dept: NEUROLOGY | Age: 47
End: 2022-12-01

## 2022-12-01 DIAGNOSIS — M48.062 SPINAL STENOSIS OF LUMBAR REGION WITH NEUROGENIC CLAUDICATION: Primary | ICD-10-CM

## 2022-12-01 NOTE — TELEPHONE ENCOUNTER
Pt would like a referral to neurosurgery. After receiving the findings from the MRI she does not believe orthopedics is the way to go. Pt does not have a specific surgeon in mind and is looking for a referral from us.  Please call [phone]

## 2022-12-05 ENCOUNTER — TELEPHONE (OUTPATIENT)
Dept: NEUROLOGY | Age: 47
End: 2022-12-05

## 2022-12-05 NOTE — TELEPHONE ENCOUNTER
Pt called to check on status of new referral.     Please call when referral is ready so pt can come .  If Dr. Dunia Rapp does not want her to see a neurosurgeon, please call pt to explain why. [phone]

## 2022-12-05 NOTE — TELEPHONE ENCOUNTER
Verified patient with 2 identifiers   Advised I faxed referral , last OV note, copy of insurance card and demographics to Dr Eddie Zacarias office. Patient verified understanding.  Advised they should call her to schedule an appt however if she does not hear from them in the next couple of day, call them at [phone]

## 2022-12-07 ENCOUNTER — TELEPHONE (OUTPATIENT)
Dept: NEUROLOGY | Age: 47
End: 2022-12-07

## 2022-12-07 DIAGNOSIS — M48.062 SPINAL STENOSIS OF LUMBAR REGION WITH NEUROGENIC CLAUDICATION: Primary | ICD-10-CM

## 2022-12-07 NOTE — TELEPHONE ENCOUNTER
[employer]    Neurosurgeon we recommended is not in pt's network.  Please send new referral to Dr. Manjit Ahumada at [facility]    Fax: [phone]

## 2022-12-08 NOTE — TELEPHONE ENCOUNTER
12/8/2022  11:10 AM      Ute THRASHER from [employer] called on behalf of pt. Maud Boeck stated the pt needs a new prescription for her dexcom sensors for 90 days. Maud Boeck would also like a prescription sent to [facility] on [facility] since they are a home delivery and it may take some time. Pt is on her last sensor. [id number]#[phone]  Ute#[phone]  WIX#[phone]      Thanks,  Diallo Branch

## 2022-12-10 RX ORDER — BLOOD-GLUCOSE SENSOR
EACH MISCELLANEOUS
Qty: 9 EACH | Refills: 3 | Status: SHIPPED | OUTPATIENT
Start: 2022-12-10

## 2022-12-12 NOTE — TELEPHONE ENCOUNTER
Referral - last OV notes-Demo and copy of insurance cards faxed to Dr Sandra Rosales. Consent: Written consent obtained. Risks include but not limited to muscle weakness, bruising, swelling, temporary effect, incomplete chemical denervation of sweat glands. Expiration Date (Month Year): 02/22 Axilla Units: 100 Bill For J-Code?: no Detail Level: Detailed Medical Necessity Information: LCD Guidelines vary from payer to payer. Please check with your payer's policy to determine medical necessity. 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 Patient instructed not to travel by airplane for 48 hours.

## 2022-12-20 LAB — HBA1C MFR BLD HPLC: 5.9 %

## 2022-12-28 ENCOUNTER — OFFICE VISIT (OUTPATIENT)
Dept: INTERNAL MEDICINE CLINIC | Age: 47
End: 2022-12-28
Payer: MEDICAID

## 2022-12-28 VITALS
TEMPERATURE: 97.9 F | OXYGEN SATURATION: 97 % | SYSTOLIC BLOOD PRESSURE: 116 MMHG | HEIGHT: 65 IN | RESPIRATION RATE: 20 BRPM | DIASTOLIC BLOOD PRESSURE: 73 MMHG | WEIGHT: 276.4 LBS | HEART RATE: 96 BPM | BODY MASS INDEX: 46.05 KG/M2

## 2022-12-28 DIAGNOSIS — M48.02 CERVICAL STENOSIS OF SPINE: ICD-10-CM

## 2022-12-28 DIAGNOSIS — E11.42 TYPE 2 DIABETES MELLITUS WITH DIABETIC POLYNEUROPATHY, WITH LONG-TERM CURRENT USE OF INSULIN (HCC): ICD-10-CM

## 2022-12-28 DIAGNOSIS — Z23 NEEDS FLU SHOT: ICD-10-CM

## 2022-12-28 DIAGNOSIS — F33.1 MODERATE EPISODE OF RECURRENT MAJOR DEPRESSIVE DISORDER (HCC): ICD-10-CM

## 2022-12-28 DIAGNOSIS — E78.2 MIXED HYPERLIPIDEMIA: ICD-10-CM

## 2022-12-28 DIAGNOSIS — M06.00 SERONEGATIVE RHEUMATOID ARTHRITIS (HCC): ICD-10-CM

## 2022-12-28 DIAGNOSIS — K31.84 GASTROPARESIS: ICD-10-CM

## 2022-12-28 DIAGNOSIS — Z79.4 TYPE 2 DIABETES MELLITUS WITH DIABETIC POLYNEUROPATHY, WITH LONG-TERM CURRENT USE OF INSULIN (HCC): ICD-10-CM

## 2022-12-28 DIAGNOSIS — I10 ESSENTIAL HYPERTENSION: Primary | ICD-10-CM

## 2022-12-28 PROCEDURE — 90686 IIV4 VACC NO PRSV 0.5 ML IM: CPT | Performed by: INTERNAL MEDICINE

## 2022-12-28 PROCEDURE — 99214 OFFICE O/P EST MOD 30 MIN: CPT | Performed by: INTERNAL MEDICINE

## 2022-12-28 PROCEDURE — 3074F SYST BP LT 130 MM HG: CPT | Performed by: INTERNAL MEDICINE

## 2022-12-28 PROCEDURE — 3078F DIAST BP <80 MM HG: CPT | Performed by: INTERNAL MEDICINE

## 2022-12-28 PROCEDURE — 3051F HG A1C>EQUAL 7.0%<8.0%: CPT | Performed by: INTERNAL MEDICINE

## 2022-12-28 RX ORDER — GABAPENTIN 100 MG/1
100 CAPSULE ORAL AT BEDTIME
COMMUNITY
Start: 2022-10-11

## 2022-12-28 RX ORDER — OMEPRAZOLE 40 MG/1
CAPSULE, DELAYED RELEASE ORAL
COMMUNITY
Start: 2022-12-21

## 2022-12-28 RX ORDER — METOCLOPRAMIDE 5 MG/1
5 TABLET ORAL 4 TIMES DAILY
COMMUNITY

## 2022-12-28 RX ORDER — HYDROCODONE BITARTRATE AND ACETAMINOPHEN 10; 325 MG/1; MG/1
TABLET ORAL
COMMUNITY
Start: 2022-12-07

## 2022-12-28 NOTE — PROGRESS NOTES
CC:   Chief Complaint   Patient presents with    Annual Wellness Visit       HISTORY OF PRESENT ILLNESS  Sylvester Mobley is a 50 y.o. female. Presents for physical exam. Last had virtual visit on 1/27/22. She has type 2 DM with neuropathy, HTN, hyperlipidemia, seronegative RA, asthma, allergic rhinitis, FE, depression, and morbid obesity. Complains of severe neck pain that radiates to left arm with numbness and arm weakness. Scheduled for anterior cervical discectomy surgery with Dr. Jairon Claudio ([facility]) on 1/6/23. Already had pre-op evaluation. Follows with Dr. Argenis Miranda for RA. On MTX and Enbrel. Follows with Dr. Jania Adames for DM. Last A1c 6.5% on 11/9/22. Reports she was recently diagnosed with gastroparesis. Causes left-sided abdominal pain, nausea, and bloating. Now taking Reglan 4 times a day. Follows with Dr. Harvey Leyva. Has been feeling a little depressed due to having a lot of health issues and experiencing chronic pain. Denies CP, SOB, dizziness, or leg swelling.     Flu vaccine: today  COVID vaccine: due for 2nd booster vaccine  Mammogram: 2021, [facility]       Patient Active Problem List   Diagnosis Code    Essential hypertension I10    Moderate episode of recurrent major depressive disorder ([facility] [address] F33.1    FE (obstructive sleep apnea) G47.33    Asthma J45.909    Iron deficiency anemia D50.9    Mixed hyperlipidemia E78.2    Class 3 severe obesity due to excess calories with body mass index (BMI) of 45.0 to 49.9 in adult [facility]) E66.01, Z68.42    Coronary artery disease involving native coronary artery of native heart without angina pectoris I25.10    Chronic midline low back pain without sciatica M54.50, G89.29    Atypical squamous cells of undetermined significance (ASCUS) on Papanicolaou smear of cervix R87.610    Type 2 diabetes with nephropathy (HCC) E11.21    Seronegative rheumatoid arthritis ([facility] [address] M06.00    Elevated liver enzymes R74.8    Nausea R11.0    Irritable bowel syndrome K58.9    Type 2 diabetes mellitus with diabetic polyneuropathy, with long-term current use of insulin (HCC) E11.42, Z79.4    Dyslipidemia E78.5    DDD (degenerative disc disease), lumbar M51.36     Past Medical History:   Diagnosis Date    Anemia (iron deficiency)     Anxiety     Asthma     Depression     DM type 2 (diabetes mellitus, type 2) ([facility] [address] 11/23/2011    GERD (gastroesophageal reflux disease)     GI bleed 2015    Hepatic steatosis 11/2016    confirmed by US    HTN (hypertension)     Irritable bowel     Kidney mass     5/28/19: US showed rigth renal cystic nephroma, follows with Urology (Dr. Jamin Blas)    Morbid obesity ([facility] [address]     FE on CPAP     PUD (peptic ulcer disease) 2015    Renal cancer, right ([facility] [address]     tx surgery    Rheumatoid arthritis ([facility] [address]     per pt on 7/29/2021     Allergies   Allergen Reactions    Lisinopril Cough    Pcn [Penicillins] Hives       Current Outpatient Medications   Medication Sig Dispense Refill    gabapentin (NEURONTIN) 100 mg capsule Take 100 mg by mouth At bedtime. metoclopramide HCl (REGLAN) 5 mg tablet Take 5 mg by mouth four (4) times daily. omeprazole (PRILOSEC) 40 mg capsule       HYDROcodone-acetaminophen (NORCO)  mg tablet TAKE 1 TO 2 TABLETS BY MOUTH 4 TIMES DAILY AS NEEDED      Dexcom G6 Sensor grecia Use 1 sensor every 10 days E11.65 9 Each 3    Dexcom G6 Sensor grecia DEXCOM G6 SENSORS Use 1 sensor every 10 days   E11.65 9 Each 3    pregabalin (LYRICA) 300 mg capsule Take 1 Capsule by mouth two (2) times a day. Max Daily Amount: 600 mg. 120 Capsule 3    lidocaine (LIDODERM) 5 % 1 Patch by TransDERmal route every twenty-four (24) hours. Apply patch to the affected area for 12 hours a day and remove for 12 hours a day. 10 Each 2    cyclobenzaprine (FLEXERIL) 10 mg tablet Take 1 Tablet by mouth three (3) times daily as needed for Muscle Spasm(s).  120 Tablet 1    Insulin Needles, Disposable, (BD Juliet 2nd Gen Pen Needle) 32 gauge x 5/32\" ndle For use with insulin pens for 5x per day E11.65 150 Pen Needle 5    dulaglutide (Trulicity) 4.5 ON/7.7 mL pnij 4.5 mg by SubCUTAneous route every seven (7) days. 4 Each 4    glipiZIDE (GLUCOTROL) 10 mg tablet Take 1 tablet by mouth twice daily 180 Tablet 3    HumaLOG KwikPen Insulin 100 unit/mL kwikpen Inject 15 units under the skin before breakfast, lunch and dinner, max dose of 60 units per day (Patient taking differently: Inject 16 units under the skin before breakfast, lunch and dinner; and a sliding scale up to 10 units) 90 mL 5    Levemir FlexTouch U-100 Insuln 100 unit/mL (3 mL) inpn Dx: H86.56   Please take 36 Units in the morning and 34 Units in the evening  Indications: type 2 diabetes mellitus (Patient taking differently: 34 units every morning and 36 Units in evening  Indications: type 2 diabetes mellitus) 100 mL 3    metFORMIN ER (GLUCOPHAGE XR) 500 mg tablet Take 2 Tablets by mouth two (2) times a day. 360 Tablet 3    amitriptyline (ELAVIL) 50 mg tablet TAKE 1 TABLET BY MOUTH NIGHTLY FOR  NUMBNESS  AT  HANDS  AND  FEET (Patient taking differently: nightly as needed.) 30 Tablet 2    atorvastatin (LIPITOR) 20 mg tablet Take 1 tablet by mouth once daily. 90 Tablet 3    etanercept (ENBREL SC) by SubCUTAneous route every seven (7) days. albuterol (PROVENTIL HFA, VENTOLIN HFA, PROAIR HFA) 90 mcg/actuation inhaler Take 2 Puffs by inhalation every six (6) hours as needed for Wheezing. Asthma, Dx: J45.40 54 g 3    fluticasone propion-salmeteroL (Advair Diskus) 500-50 mcg/dose diskus inhaler Take 1 Puff by inhalation every twelve (12) hours. Asthma, Dx: J45.30 180 Each 3    buPROPion SR (WELLBUTRIN SR) 150 mg SR tablet Take 1 Tablet by mouth two (2) times a day. 180 Tablet 3    ondansetron (Zofran ODT) 4 mg disintegrating tablet Take 1 Tablet by mouth every eight (8) hours as needed for Nausea for up to 360 days.  60 Tablet 3    dicyclomine (BENTYL) 10 mg/5 mL soln oral solution Take 10 mL by mouth four (4) times daily as needed (abd pain or diarrhea). 473 mL 5    montelukast (SINGULAIR) 10 mg tablet TAKE 1 TABLET BY MOUTH ONCE DAILY FOR ALLERGIES AND FOR ASTHMA 90 Tablet 3    albuterol (PROVENTIL VENTOLIN) 2.5 mg /3 mL (0.083 %) nebu USE ONE VIAL IN NEBULIZER EVERY 4 HOURS AS NEEDED FOR WHEEZING. Dx: J45.40 90 Each 3    valsartan (DIOVAN) 80 mg tablet Take 1 Tablet by mouth daily. Indications: high blood pressure 90 Tablet 3    ibuprofen (MOTRIN) 800 mg tablet Take one tablet every 8 hours for three days, and then every 8 hours as needed for pain thereafter 30 Tablet 1    acetaminophen (TYLENOL) 500 mg tablet Take 2 Tabs by mouth every six (6) hours as needed for Pain. 20 Tab 0    folic acid (FOLVITE) 1 mg tablet Take 1 mg by mouth daily. methotrexate (RHEUMATREX) 2.5 mg tablet every Wednesday. Pt takes 9 pills for 20 mg total.      meclizine (ANTIVERT) 25 mg tablet Take 1 Tab by mouth three (3) times daily as needed for Dizziness. 20 Tab 1    nitroglycerin (NITROSTAT) 0.4 mg SL tablet Take 1 Tab by mouth every five (5) minutes as needed for Chest Pain. Sit/Lay down then put one tab under the tongue every 5 minutes as needed for chest pain for 3 doses 1 Bottle 0         PHYSICAL EXAM  Visit Vitals  /73 (BP 1 Location: Left upper arm, BP Patient Position: Sitting, BP Cuff Size: Large adult)   Pulse 96   Temp 97.9 °F (36.6 °C) (Oral)   Resp 20   Ht 5' 5\" (1.651 m)   Wt 276 lb 6.4 oz (125.4 kg)   LMP 08/17/2014   SpO2 97%   BMI 46.00 kg/m²   13 lb weight loss since last in-person appointment 14 months ago    General: Morbidly obese, no distress. HEENT:  Head normocephalic/atraumatic, no scleral icterus  Neck: Supple. No carotid bruits, JVD, lymphadenopathy, or thyromegaly. Lungs:  Clear to ausculation bilaterally. Good air movement. Heart:  Regular rate and rhythm, normal S1 and S2, no murmur, gallop, or rub  Abdomen: Soft, non-distended, normal bowel sounds, mild LUQ tenderness, no masses.   Extremities: No clubbing, cyanosis, or edema. Neurological: Alert and oriented. Psychiatric: Normal mood and affect. Behavior is normal.     Results for orders placed or performed during the hospital encounter of 11/15/22   GLUCOSE, POC   Result Value Ref Range    Glucose (POC) 123 (H) 65 - 117 mg/dL    Performed by [address] [place] [address] ICD-9-CM    1. Essential hypertension  J92 391.7 METABOLIC PANEL, COMPREHENSIVE      CBC WITH AUTOMATED DIFF      METABOLIC PANEL, COMPREHENSIVE      CBC WITH AUTOMATED DIFF      2. Cervical stenosis of spine  M48.02 723.0       3. Type 2 diabetes mellitus with diabetic polyneuropathy, with long-term current use of insulin ([facility])  E11.42 250.60     Z79.4 357.2      V58.67       4. Mixed hyperlipidemia  E78.2 272.2 LIPID PANEL      LIPID PANEL      5. Moderate episode of recurrent major depressive disorder ([facility])  F33.1 296.32 THYROID CASCADE PROFILE      THYROID CASCADE PROFILE      6. Seronegative rheumatoid arthritis ([facility])  M06.00 714.0       7. Gastroparesis  K31.84 536.3       8. Needs flu shot  Z23 V04.81 INFLUENZA, FLUARIX, FLULAVAL, FLUZONE (AGE 6 MO+), AFLURIA(AGE 3Y+) IM, PF, 0.5 ML        Diagnoses and all orders for this visit:    1. Essential hypertension  -     METABOLIC PANEL, COMPREHENSIVE; Future  -     CBC WITH AUTOMATED DIFF; Future    2. Cervical stenosis of spine    3. Type 2 diabetes mellitus with diabetic polyneuropathy, with long-term current use of insulin ([facility] [address]    4. Mixed hyperlipidemia  -     LIPID PANEL; Future    5. Moderate episode of recurrent major depressive disorder ([facility] [address]  -     THYROID CASCADE PROFILE; Future    6. Seronegative rheumatoid arthritis ([facility] [address]    7. Gastroparesis    8. Needs flu shot  -     INFLUENZA, FLUARIX, FLULAVAL, FLUZONE (AGE 6 MO+), AFLURIA(AGE 3Y+) IM, PF, 0.5 ML      HTN controlled on valsartan 80 mg daily. Last lipid panel 4/8/22: tot chol 136, LDL 84.  Hyperlipidemia controlled on atorvastatin 20 mg daily. To have cervical discectomy surgery on 1/6/23 at [facility] for cervical stenosis with radiculopathy. DM well-controlled. Continue following with Dr. Manoj Vela. Continue Wellbutrin  mg twice daily for depression. I will address her depression more at next visit. Flu vaccine today. Fasting labs 1 week before next appointment. Follow-up and Dispositions    Return in about 4 months (around 4/28/2023), or if symptoms worsen or fail to improve, for HTN, depression; have fasting labs 1 week before appointment. I have discussed the diagnosis with the patient and the intended plan as seen in the above orders. Patient is in agreement. The patient has received an after-visit summary and questions were answered concerning future plans. I have discussed medication side effects and warnings with the patient as well.

## 2022-12-28 NOTE — PATIENT INSTRUCTIONS
Vaccine Information Statement    Influenza (Flu) Vaccine (Inactivated or Recombinant): What You Need to Know    Many vaccine information statements are available in [place] and other languages. See www.immunize.org/vis. Hojas de información sobre vacunas están disponibles en español y en muchos otros idiomas. Visite www.immunize.org/vis. 1. Why get vaccinated? Influenza vaccine can prevent influenza (flu). Flu is a contagious disease that spreads around the [place] [facility] every year, usually between October and May. Anyone can get the flu, but it is more dangerous for some people. Infants and young children, people 72 years and older, pregnant people, and people with certain health conditions or a weakened immune system are at greatest risk of flu complications. Pneumonia, bronchitis, sinus infections, and ear infections are examples of flu-related complications. If you have a medical condition, such as heart disease, cancer, or diabetes, flu can make it worse. Flu can cause fever and chills, sore throat, muscle aches, fatigue, cough, headache, and runny or stuffy nose. Some people may have vomiting and diarrhea, though this is more common in children than adults. In an average year, thousands of people in the [facility] die from flu, and many more are hospitalized. Flu vaccine prevents millions of illnesses and flu-related visits to the doctor each year. 2. Influenza vaccines     CDC recommends everyone 6 months and older get vaccinated every flu season. Children 6 months through 6years of age may need 2 doses during a single flu season. Everyone else needs only 1 dose each flu season. It takes about 2 weeks for protection to develop after vaccination. There are many flu viruses, and they are always changing. Each year a new flu vaccine is made to protect against the influenza viruses believed to be likely to cause disease in the upcoming flu season.  Even when the vaccine doesnt exactly match these viruses, it may still provide some protection. Influenza vaccine does not cause flu. Influenza vaccine may be given at the same time as other vaccines. 3. Talk with your health care provider    Tell your vaccination provider if the person getting the vaccine:  Has had an allergic reaction after a previous dose of influenza vaccine, or has any severe, life-threatening allergies   Has ever had Guillain-Barré Syndrome (also called GBS)    In some cases, your health care provider may decide to postpone influenza vaccination until a future visit. Influenza vaccine can be administered at any time during pregnancy. People who are or will be pregnant during influenza season should receive inactivated influenza vaccine. People with minor illnesses, such as a cold, may be vaccinated. People who are moderately or severely ill should usually wait until they recover before getting influenza vaccine. Your health care provider can give you more information. 4. Risks of a vaccine reaction    Soreness, redness, and swelling where the shot is given, fever, muscle aches, and headache can happen after influenza vaccination. There may be a very small increased risk of Guillain-Barré Syndrome (GBS) after inactivated influenza vaccine (the flu shot). [facility] children who get the flu shot along with pneumococcal vaccine (PCV13) and/or DTaP vaccine at the same time might be slightly more likely to have a seizure caused by fever. Tell your health care provider if a child who is getting flu vaccine has ever had a seizure. People sometimes faint after medical procedures, including vaccination. Tell your provider if you feel dizzy or have vision changes or ringing in the ears. As with any medicine, there is a very remote chance of a vaccine causing a severe allergic reaction, other serious injury, or death. 5. What if there is a serious problem?     An allergic reaction could occur after the vaccinated person leaves the clinic. If you see signs of a severe allergic reaction (hives, swelling of the face and throat, difficulty breathing, a fast heartbeat, dizziness, or weakness), call 9-1-1 and get the person to the nearest hospital.    For other signs that concern you, call your health care provider. Adverse reactions should be reported to the Vaccine Adverse Event Reporting System (VAERS). Your health care provider will usually file this report, or you can do it yourself. Visit the VAERS website at www.vaers. [facility].gov or call 0-600.186.9534. VAERS is only for reporting reactions, and VAERS staff members do not give medical advice. 6. The National Vaccine Injury Compensation Program    The [facility] Vaccine Injury Compensation Program (VICP) is a federal program that was created to compensate people who may have been injured by certain vaccines. Claims regarding alleged injury or death due to vaccination have a time limit for filing, which may be as short as two years. Visit the VICP website at www.[facility].gov/vaccinecompensation or call 7-216.587.6350 to learn about the program and about filing a claim. 7. How can I learn more? Ask your health care provider. Call your local or state health department. Visit the website of the Food and Drug Administration (FDA) for vaccine package inserts and additional information at www.fda.gov/vaccines-blood-biologics/vaccines. Contact the Centers for Disease Control and Prevention (CDC): Call 9-430.918.4631 (1-800-CDC-INFO) or  Visit CDCs influenza website at www.cdc.gov/flu. Vaccine Information Statement   Inactivated Influenza Vaccine   8/6/2021  42 JUANJOSE Woodward [id number]-[id number]   Department of Health and Human Services  Centers for Disease Control and Prevention    Office Use Only

## 2022-12-28 NOTE — PROGRESS NOTES
Vijay [place]  Identified pt with two pt identifiers(name and ). Chief Complaint   Patient presents with    Annual Wellness Visit       Reviewed record In preparation for visit and have obtained necessary documentation. 1. Have you been to the ER, urgent care clinic or hospitalized since your last visit? No     2. Have you seen or consulted any other health care providers outside of the [address] [place] [address] since your last visit? Include any pap smears or colon screening. No    Vitals reviewed with provider. Health Maintenance reviewed: After verbal order read back of Dr. Ariel Chu, patient received Flu Shot in Right deltoid. Ilia Thomas 47: [id number] Lot: [id number]. Patient tolerated procedure without complaints and received VIS.      Health Maintenance Due   Topic    Hepatitis B Vaccine (1 of 3 - Risk 3-dose series)    COVID-19 Vaccine (4 - Booster for Pfizer series)          Wt Readings from Last 3 Encounters:   22 276 lb 6.4 oz (125.4 kg)   22 278 lb 12.8 oz (126.5 kg)   11/15/22 279 lb 14.4 oz (127 kg)        Temp Readings from Last 3 Encounters:   22 97.9 °F (36.6 °C) (Oral)   22 98 °F (36.7 °C) (Temporal)   11/15/22 97.4 °F (36.3 °C)        BP Readings from Last 3 Encounters:   22 116/73   22 (!) 152/88   11/15/22 (!) 150/77        Pulse Readings from Last 3 Encounters:   22 96   22 65   11/15/22 76        Vitals:    22 1442   BP: 116/73   Pulse: 96   Resp: 20   Temp: 97.9 °F (36.6 °C)   TempSrc: Oral   SpO2: 97%   Weight: 276 lb 6.4 oz (125.4 kg)   Height: 5' 5\" (1.651 m)   PainSc:   6   PainLoc: Neck   LMP: 2014          Learning Assessment:   :       Learning Assessment 2018   PRIMARY LEARNER Patient Patient Patient   HIGHEST LEVEL OF EDUCATION - PRIMARY LEARNER  - > 4 YEARS OF COLLEGE -   BARRIERS PRIMARY LEARNER - NONE -   CO-LEARNER CAREGIVER - No -   PRIMARY LANGUAGE ENGLISH ENGLISH ENGLISH   LEARNER PREFERENCE PRIMARY READING OTHER (COMMENT) LISTENING   ANSWERED BY patient patient self     - - self   RELATIONSHIP SELF SELF SELF        Depression Screening:   :       3 most recent PHQ Screens 12/28/2022   PHQ Not Done -   Little interest or pleasure in doing things Not at all   Feeling down, depressed, irritable, or hopeless Not at all   Total Score PHQ 2 0   Trouble falling or staying asleep, or sleeping too much -   Feeling tired or having little energy -   Poor appetite, weight loss, or overeating -   Feeling bad about yourself - or that you are a failure or have let yourself or your family down -   Trouble concentrating on things such as school, work, reading, or watching TV -   Moving or speaking so slowly that other people could have noticed; or the opposite being so fidgety that others notice -   Thoughts of being better off dead, or hurting yourself in some way -   PHQ 9 Score -   How difficult have these problems made it for you to do your work, take care of your home and get along with others -        Fall Risk Assessment:   :       Fall Risk Assessment, last 12 mths 2/23/2021   Able to walk? Yes   Fall in past 12 months? 1   Do you feel unsteady? 1   Are you worried about falling 1   Number of falls in past 12 months -   Fall with injury? 1        Abuse Screening:   :       Abuse Screening Questionnaire 8/14/2020 5/9/2014   Do you ever feel afraid of your partner? N N   Are you in a relationship with someone who physically or mentally threatens you? N N   Is it safe for you to go home?  Y Y        ADL Screening:   :       ADL Assessment 5/2/2018   Feeding yourself No Help Needed   Getting from bed to chair No Help Needed   Getting dressed No Help Needed   Bathing or showering No Help Needed   Walk across the room (includes cane/walker) No Help Needed   Using the telphone No Help Needed   Taking your medications No Help Needed   Preparing meals No Help Needed   Managing money (expenses/bills) No Help Needed   Moderately strenuous housework (laundry) No Help Needed   Shopping for personal items (toiletries/medicines) No Help Needed   Shopping for groceries No Help Needed   Driving No Help Needed   Climbing a flight of stairs No Help Needed   Getting to places beyond walking distances No Help Needed

## 2022-12-29 RX ORDER — IBUPROFEN 800 MG/1
TABLET ORAL
Qty: 90 TABLET | Refills: 0 | OUTPATIENT
Start: 2022-12-29

## 2022-12-29 NOTE — TELEPHONE ENCOUNTER
Requested Prescriptions     Pending Prescriptions Disp Refills    ibuprofen (MOTRIN) 800 mg tablet 30 Tablet 1     Sig: Take one tablet every 8 hours for three days, and then every 8 hours as needed for pain thereafter

## 2022-12-29 NOTE — TELEPHONE ENCOUNTER
PCP: Kelly Villa MD     Last appt: 12/28/2022     Future Appointments   Date Time Provider Dwight Spencer   2/9/2023 10:10 AM Ingrid Manning MD RDE MICHAEL [address] BS [facility]   5/1/2023  7:50 AM Kelly Villa MD BSIMA [facility]   7/25/2023 10:00 AM DO KYLEE Birmingham BS [facility]   8/29/2023 10:40 AM Roxy Urrutia MD [facility] 39 [facility]          Requested Prescriptions     Pending Prescriptions Disp Refills    ibuprofen (MOTRIN) 800 mg tablet 30 Tablet 1     Sig: Take one tablet every 8 hours for three days, and then every 8 hours as needed for pain thereafter

## 2022-12-29 NOTE — TELEPHONE ENCOUNTER
Let patient know that ibuprofen is not safe to take while on methotrexate because it can increase blood levels of methotrexate.

## 2023-01-14 DIAGNOSIS — E11.42 TYPE 2 DIABETES MELLITUS WITH DIABETIC POLYNEUROPATHY, WITH LONG-TERM CURRENT USE OF INSULIN (HCC): ICD-10-CM

## 2023-01-14 DIAGNOSIS — Z79.4 TYPE 2 DIABETES MELLITUS WITH DIABETIC POLYNEUROPATHY, WITH LONG-TERM CURRENT USE OF INSULIN (HCC): ICD-10-CM

## 2023-01-15 RX ORDER — GLIPIZIDE 10 MG/1
TABLET ORAL
Qty: 180 TABLET | Refills: 1 | Status: SHIPPED | OUTPATIENT
Start: 2023-01-15

## 2023-01-24 RX ORDER — BLOOD-GLUCOSE TRANSMITTER
EACH MISCELLANEOUS
Qty: 1 EACH | Refills: 3 | Status: SHIPPED | OUTPATIENT
Start: 2023-01-24

## 2023-01-24 RX ORDER — BLOOD-GLUCOSE TRANSMITTER
EACH MISCELLANEOUS
Status: CANCELLED | OUTPATIENT
Start: 2023-01-24

## 2023-01-24 NOTE — TELEPHONE ENCOUNTER
1/24/2023  10:03 AM    Patient called and left a voice mail at 9:21 am.Pt stated she needs a prescription for her transmitter for her dexcom g6. Pt stated her transmitter have timed out. Pt stated she thinks her pharmacy is [facility] because her pharmacy switched over.     #[phone]    Thanks,  Livier Levin

## 2023-02-06 RX ORDER — CYCLOBENZAPRINE HCL 10 MG
10 TABLET ORAL
Qty: 120 TABLET | Refills: 1 | Status: SHIPPED | OUTPATIENT
Start: 2023-02-06

## 2023-02-06 NOTE — TELEPHONE ENCOUNTER
Received refill request from [employer] requesting refill on cyclobenzaprine .  Last OV was 11/17/22  Last filled on 11/17/22 # 120 with one refill

## 2023-02-09 ENCOUNTER — OFFICE VISIT (OUTPATIENT)
Dept: ENDOCRINOLOGY | Age: 48
End: 2023-02-09
Payer: MEDICAID

## 2023-02-09 VITALS
BODY MASS INDEX: 43.52 KG/M2 | SYSTOLIC BLOOD PRESSURE: 129 MMHG | WEIGHT: 261.2 LBS | HEART RATE: 106 BPM | DIASTOLIC BLOOD PRESSURE: 70 MMHG | HEIGHT: 65 IN

## 2023-02-09 DIAGNOSIS — R74.8 ELEVATED LIVER ENZYMES: ICD-10-CM

## 2023-02-09 DIAGNOSIS — E11.42 TYPE 2 DIABETES MELLITUS WITH DIABETIC POLYNEUROPATHY, WITH LONG-TERM CURRENT USE OF INSULIN (HCC): Primary | ICD-10-CM

## 2023-02-09 DIAGNOSIS — Z79.4 TYPE 2 DIABETES MELLITUS WITH DIABETIC POLYNEUROPATHY, WITH LONG-TERM CURRENT USE OF INSULIN (HCC): Primary | ICD-10-CM

## 2023-02-09 DIAGNOSIS — E66.01 CLASS 3 SEVERE OBESITY DUE TO EXCESS CALORIES WITH SERIOUS COMORBIDITY AND BODY MASS INDEX (BMI) OF 40.0 TO 44.9 IN ADULT (HCC): ICD-10-CM

## 2023-02-09 DIAGNOSIS — E78.2 MIXED HYPERLIPIDEMIA: ICD-10-CM

## 2023-02-09 DIAGNOSIS — I10 PRIMARY HYPERTENSION: ICD-10-CM

## 2023-02-09 PROCEDURE — 3078F DIAST BP <80 MM HG: CPT | Performed by: GENERAL ACUTE CARE HOSPITAL

## 2023-02-09 PROCEDURE — 3074F SYST BP LT 130 MM HG: CPT | Performed by: GENERAL ACUTE CARE HOSPITAL

## 2023-02-09 PROCEDURE — 99214 OFFICE O/P EST MOD 30 MIN: CPT | Performed by: GENERAL ACUTE CARE HOSPITAL

## 2023-02-09 RX ORDER — METFORMIN HYDROCHLORIDE 500 MG/1
1000 TABLET, EXTENDED RELEASE ORAL 2 TIMES DAILY
Qty: 360 TABLET | Refills: 3 | Status: SHIPPED | OUTPATIENT
Start: 2023-02-09

## 2023-02-09 RX ORDER — INSULIN LISPRO 100 [IU]/ML
INJECTION, SOLUTION INTRAVENOUS; SUBCUTANEOUS
Qty: 90 ML | Refills: 5 | Status: SHIPPED | OUTPATIENT
Start: 2023-02-09

## 2023-02-09 RX ORDER — DULAGLUTIDE 4.5 MG/.5ML
4.5 INJECTION, SOLUTION SUBCUTANEOUS
Qty: 4 EACH | Refills: 4 | Status: SHIPPED | OUTPATIENT
Start: 2023-02-09

## 2023-02-09 RX ORDER — GLIPIZIDE 10 MG/1
10 TABLET ORAL 2 TIMES DAILY
Qty: 180 TABLET | Refills: 3 | Status: SHIPPED | OUTPATIENT
Start: 2023-02-09

## 2023-02-09 RX ORDER — INSULIN DETEMIR 100 [IU]/ML
INJECTION, SOLUTION SUBCUTANEOUS
Qty: 100 ML | Refills: 3 | Status: SHIPPED | OUTPATIENT
Start: 2023-02-09

## 2023-02-09 NOTE — LETTER
2/9/2023    Patient: Moraima Goyal   YOB: 1975   Date of Visit: 2/9/2023     Stefanie Tadeo MD  [address] [facility] [id number] [place] [address]    Dear Stefanie Tadeo MD,      Thank you for referring Ms. Mandy Hodgkin to [facility] for evaluation. My notes for this consultation are attached. If you have questions, please do not hesitate to call me. I look forward to following your patient along with you.       Sincerely,    Alondra Andrade MD

## 2023-02-09 NOTE — PROGRESS NOTES
CHIEF COMPLAINT: f/u evaluation for uncontrolled type 2 diabetes    HISTORY OF PRESENT ILLNESS:   Azra Nieves is a 50 y.o. female with a PMHx as noted below who presents to the endocrinology clinic for f/u evaluation of uncontrolled type 2 diabetes. Since the last visit ms Emre Mancera had Orthopedics cervical spinal surgery on 01/06/2023 and is recovering well, has neck brace on and feels the numbness on her left side has resolved. She has been compliant with her medication and has lost 15 pounds since the last visit. She indicates that she has been having diarrhea for the last few days she usually has constipation, advised to follow-up with gastroenterologist regarding that or if she is having a lot of fluid lost that she is not able to replace to go to the emergency room department. Current regimen:   Trulicity 4.4YT weekly  Levemir 36 in AM and 34 in PM  Humalog 15 units TIDAC  MTF 1g BID  Glipizide 10mg BID      She takes the Humalog before BREAKFAST AND DINNER ONLY  She was able to get the [facility] but she had a gap when wearing it due to wanting to give her skin 'rest' as she uses patches on top of the dexcom    Denies hypoglycemia episodes but she is waking up in the morning with -160, raising question of somogyi effect    She has also lost 15lbs since last visit and she is happy with the results. She had made significant dietary changes, adopting vegetarian lifestyle. She has upcoming appt with GI to see why she is having bouts of illness associated with reduced appetite, says they will be evaluating for more and following her SILVA. Denies other complaints at this time. She continues to struggle with mobility due to RA but she is feeling better overall. She is utd with ophthalm      Diabetes history:  Diagnosed in 2016  Most recent Hgb A1c was 11.7 in 04/08/2022.  7.4% 08/1/22, 6.5% 11/08/2022  5.9% 12/20/2022      Blood sugar log  AM: 130-160  Lowest: 83, none below 70   Highest 213 when she had 2 birthday cake slices    Hypoglycemia: none    Currently taking the following meds:  -Metformin 1000mg twice daily  -Glipizide 10mg twice daily  -Trulicity 4.6YN weekly injection  -Levemir insulin 36 units in the morning and 34 units in the evening  -Humalog 15 units before Breakfast, Dinner with SS, humalog increased by 2 units     Exercise consists of limited due to RA . No history of vascular disease. No history of retinopathy, or nephropathy.    Has neuropathy    Hx of kidney Ca in 2015  Hx of SILVA    On Methotrexate (9 tabs/week) and Etanercept for RA, sometimes takes prednisone for RA flares, not recently  Hx of Asthma, well controlled on preventive inhalers     Last eye exam was last sept, annual visits, UTD with ophthalm, no retinpathy in left eye per patient  Right eye legally blind      Review of most recent diabetes-related labs:  Lab Results   Component Value Date    HBA1C 11.7 (H) 04/08/2022    HBA1C 9.3 (H) 10/20/2021    HBA1C 8.3 (H) 03/11/2019    VYP3YJKF 7.4 08/01/2022    QQA9XASR 8.5 04/27/2021    QSJ3ZSXB 6.3 01/26/2021    CHOL 136 04/08/2022    LDLC 84 04/08/2022    GFRAA >60 02/14/2022    GFRNA >60 02/14/2022    CREATEXT 0.92 06/06/2022    MCACR 3 05/09/2022    TSH 1.290 03/11/2019    B12LT 423 08/17/2016     Lab Key:  [id number] = IA-2 pancreatic islet cell autoantibody  CPEPL = C-peptide level  :EXT = External Lab  GADLT = CAILIN-65 autoantibody   INSUL = Insulin level  MCACR (or MALBEXT) = Urine Microalbumin (or External UM)  B12LT = B12 level    PAST MEDICAL/SURGICAL HISTORY:   Past Medical History:   Diagnosis Date    Anemia (iron deficiency)     Anxiety     Asthma     Depression     DM type 2 (diabetes mellitus, type 2) ([facility] [address] 11/23/2011    GERD (gastroesophageal reflux disease)     GI bleed 2015    Hepatic steatosis 11/2016    confirmed by US    HTN (hypertension)     Irritable bowel     Kidney mass     5/28/19: US showed rigth renal cystic nephroma, follows with Urology ( [facility])    Morbid obesity ([address]     FE on CPAP     PUD (peptic ulcer disease)     Renal cancer, right ([address]     tx surgery    Rheumatoid arthritis ([facility] [address]     per pt on 2021     Past Surgical History:   Procedure Laterality Date    COLONOSCOPY N/A 2021    COLONOSCOPY performed by Rob Rangel MD at \[facility]\"" ENDOSCOPY. Sigmoid diverticulosis, int/ext hemorrhoids. Repeat in 5 yrs due to fam hx of colon ca    COLONOSCOPY,DIAGNOSTIC  2021         HX  SECTION      x 2    HX COLONOSCOPY  2015    HX DILATION AND CURETTAGE  2021    HX ENDOSCOPY  2015    HX HYSTERECTOMY  2021    HX HYSTEROSCOPY WITH ENDOMETRIAL ABLATION  2014    HX TUBAL LIGATION      HX TUMOR REMOVAL  2016    right kidney, Dr Radha Chao:   Allergies   Allergen Reactions    Lisinopril Cough    Pcn [Penicillins] Hives       MEDICATIONS ON ADMISSION:     Current Outpatient Medications:     cyclobenzaprine (FLEXERIL) 10 mg tablet, Take 1 Tablet by mouth three (3) times daily as needed for Muscle Spasm(s). , Disp: 120 Tablet, Rfl: 1    Blood-Glucose Transmitter (Dexcom G6 Transmitter) grecia, For use with DEXCOM SENSOR  E11.65, Disp: 1 Each, Rfl: 3    glipiZIDE (GLUCOTROL) 10 mg tablet, Take 1 tablet by mouth twice daily, Disp: 180 Tablet, Rfl: 1    gabapentin (NEURONTIN) 100 mg capsule, Take 100 mg by mouth At bedtime. , Disp: , Rfl:     metoclopramide HCl (REGLAN) 5 mg tablet, Take 5 mg by mouth four (4) times daily. , Disp: , Rfl:     omeprazole (PRILOSEC) 40 mg capsule, , Disp: , Rfl:     HYDROcodone-acetaminophen (NORCO)  mg tablet, TAKE 1 TO 2 TABLETS BY MOUTH 4 TIMES DAILY AS NEEDED, Disp: , Rfl:     Dexcom G6 Sensor grecia, Use 1 sensor every 10 days E11.65, Disp: 9 Each, Rfl: 3    Dexcom G6 Sensor grecia, DEXCOM G6 SENSORS Use 1 sensor every 10 days   E11.65, Disp: 9 Each, Rfl: 3    pregabalin (LYRICA) 300 mg capsule, Take 1 Capsule by mouth two (2) times a day.  Max Daily Amount: 600 mg., Disp: 120 Capsule, Rfl: 3    lidocaine (LIDODERM) 5 %, 1 Patch by TransDERmal route every twenty-four (24) hours. Apply patch to the affected area for 12 hours a day and remove for 12 hours a day., Disp: 10 Each, Rfl: 2    Insulin Needles, Disposable, (BD Juliet 2nd Gen Pen Needle) 32 gauge x 5/32\" ndle, For use with insulin pens for 5x per day E11.65, Disp: 150 Pen Needle, Rfl: 5    dulaglutide (Trulicity) 4.5 AE/6.9 mL pnij, 4.5 mg by SubCUTAneous route every seven (7) days. , Disp: 4 Each, Rfl: 4    HumaLOG KwikPen Insulin 100 unit/mL kwikpen, Inject 15 units under the skin before breakfast, lunch and dinner, max dose of 60 units per day (Patient taking differently: Inject 16 units under the skin before breakfast, lunch and dinner; and a sliding scale up to 10 units), Disp: 90 mL, Rfl: 5    Levemir FlexTouch U-100 Insuln 100 unit/mL (3 mL) inpn, Dx: O67.22   Please take 36 Units in the morning and 34 Units in the evening  Indications: type 2 diabetes mellitus (Patient taking differently: 34 units every morning and 36 Units in evening  Indications: type 2 diabetes mellitus), Disp: 100 mL, Rfl: 3    metFORMIN ER (GLUCOPHAGE XR) 500 mg tablet, Take 2 Tablets by mouth two (2) times a day., Disp: 360 Tablet, Rfl: 3    amitriptyline (ELAVIL) 50 mg tablet, TAKE 1 TABLET BY MOUTH NIGHTLY FOR  NUMBNESS  AT  HANDS  AND  FEET (Patient taking differently: nightly as needed.), Disp: 30 Tablet, Rfl: 2    atorvastatin (LIPITOR) 20 mg tablet, Take 1 tablet by mouth once daily. , Disp: 90 Tablet, Rfl: 3    etanercept (ENBREL SC), by SubCUTAneous route every seven (7) days. , Disp: , Rfl:     albuterol (PROVENTIL HFA, VENTOLIN HFA, PROAIR HFA) 90 mcg/actuation inhaler, Take 2 Puffs by inhalation every six (6) hours as needed for Wheezing. Asthma, Dx: J45.40, Disp: 54 g, Rfl: 3    fluticasone propion-salmeteroL (Advair Diskus) 500-50 mcg/dose diskus inhaler, Take 1 Puff by inhalation every twelve (12) hours.  Asthma, Dx: J45.30, Disp: 180 Each, Rfl: 3    buPROPion SR (WELLBUTRIN SR) 150 mg SR tablet, Take 1 Tablet by mouth two (2) times a day., Disp: 180 Tablet, Rfl: 3    dicyclomine (BENTYL) 10 mg/5 mL soln oral solution, Take 10 mL by mouth four (4) times daily as needed (abd pain or diarrhea). , Disp: 473 mL, Rfl: 5    montelukast (SINGULAIR) 10 mg tablet, TAKE 1 TABLET BY MOUTH ONCE DAILY FOR ALLERGIES AND FOR ASTHMA, Disp: 90 Tablet, Rfl: 3    albuterol (PROVENTIL VENTOLIN) 2.5 mg /3 mL (0.083 %) nebu, USE ONE VIAL IN NEBULIZER EVERY 4 HOURS AS NEEDED FOR WHEEZING. Dx: J45.40, Disp: 90 Each, Rfl: 3    valsartan (DIOVAN) 80 mg tablet, Take 1 Tablet by mouth daily. Indications: high blood pressure, Disp: 90 Tablet, Rfl: 3    ibuprofen (MOTRIN) 800 mg tablet, Take one tablet every 8 hours for three days, and then every 8 hours as needed for pain thereafter, Disp: 30 Tablet, Rfl: 1    acetaminophen (TYLENOL) 500 mg tablet, Take 2 Tabs by mouth every six (6) hours as needed for Pain., Disp: 20 Tab, Rfl: 0    folic acid (FOLVITE) 1 mg tablet, Take 1 mg by mouth daily. , Disp: , Rfl:     methotrexate (RHEUMATREX) 2.5 mg tablet, every Wednesday. Pt takes 9 pills for 20 mg total., Disp: , Rfl:     meclizine (ANTIVERT) 25 mg tablet, Take 1 Tab by mouth three (3) times daily as needed for Dizziness. , Disp: 20 Tab, Rfl: 1    nitroglycerin (NITROSTAT) 0.4 mg SL tablet, Take 1 Tab by mouth every five (5) minutes as needed for Chest Pain.  Sit/Lay down then put one tab under the tongue every 5 minutes as needed for chest pain for 3 doses, Disp: 1 Bottle, Rfl: 0    SOCIAL HISTORY:   Social History     Socioeconomic History    Marital status:      Spouse name: Not on file    Number of children: Not on file    Years of education: Not on file    Highest education level: Not on file   Occupational History    Not on file   Tobacco Use    Smoking status: Never    Smokeless tobacco: Never   Vaping Use    Vaping Use: Never used   Substance and Sexual Activity Alcohol use: Not Currently    Drug use: Not Currently    Sexual activity: Not on file   Other Topics Concern    Not on file   Social History Narrative    Not on file     Social Determinants of Health     Financial Resource Strain: Not on file   Food Insecurity: Not on file   Transportation Needs: Not on file   Physical Activity: Not on file   Stress: Not on file   Social Connections: Not on file   Intimate Partner Violence: Not on file   Housing Stability: Not on file       FAMILY HISTORY:  Family History   Problem Relation Age of Onset    Cancer Mother         Ovarian Cancer /breast ca    Heart Attack Father     Heart Disease Father     Diabetes Father     Other Father         pancreatitis    Cancer Sister         colon    Other Sister         fibromyalgia    Colon Cancer Sister     Crohn's Disease Sister     Other cancer Sister     Heart Attack Maternal Grandfather     Diabetes Paternal Grandmother     HIV/AIDS Son        REVIEW OF SYSTEMS: Complete ROS assessed and noted for that which is described above, all else are negative.   Eyes: normal  ENT: normal  CVS: normal  Resp: normal  GI: normal  : normal  GYN: normal  Endocrine: normal  Integument: normal  Musculoskeletal: normal  Neuro: normal  Psych: normal      PHYSICAL EXAMINATION:   VITAL SIGNS:  Visit Vitals  /70   Pulse (!) 106   Ht 5' 5\" (1.651 m)   Wt 261 lb 3.2 oz (118.5 kg)   LMP 08/17/2014   BMI 43.47 kg/m²       GENERAL: NCAT, Sitting comfortably, NAD, has neckbrace  EYES: EOMI, non-icteric, no proptosis  Ear/Nose/Throat: NCAT, no inflammation, no masses  LYMPH NODES: No LAD  CARDIOVASCULAR: S1 S2, RRR, No murmur, 2+ radial pulses  RESPIRATORY: CTA b/l, no wheeze/rales  GASTROINTESTINAL:  ND  MUSCULOSKELETAL: Normal ROM, no atrophy  SKIN: warm, no edema/rash/ or other skin changes  NEUROLOGIC: 5/5 power all extremities, no tremors, AAOx3  PSYCHIATRIC: Normal affect, Normal insight and judgement           REVIEW OF LABORATORY AND RADIOLOGY DATA: Labs and documentation have been reviewed as described above. ASSESSMENT AND PLAN:   Ruma Martinez is a 50 y.o. female with a PMHx as noted above who presents to the endocrinology clinic for f/u evaluation of type 2 diabetes. DM2, well controlled , last a1c 5.9%  HTN  HLD    DM2:    Patient with much improved blood sugar numbers  Concern for somogyi effect, will cut back on long acting insulin    Plan to Continue MTF 1g BID and Glipizide 10mg BID  Levemir 36>>>30 units in AM and 34>>>26 units in PM  Humalog 15 units before meals (we discussed adjusting if eating more or less per meal and if N/V to take half dose before meal only)  Trulicity 2.5RE weekly    Continue to check glucose 4-6x/day with CGM  Completed DM ed classes  To keep her Ophthalm and Podiatry appts      HTN: well controlled on valsartan 80mg daily  HLD: stable, slightly above goal, will obtain lipid panel   Lab Results   Component Value Date/Time    Cholesterol, total 136 04/08/2022 11:26 AM    HDL Cholesterol 36 (L) 04/08/2022 11:26 AM    LDL, calculated 84 04/08/2022 11:26 AM    LDL, calculated 87 03/11/2019 08:24 AM    VLDL, calculated 16 04/08/2022 11:26 AM    VLDL, calculated 17 03/11/2019 08:24 AM    Triglyceride 82 04/08/2022 11:26 AM        Obesity: GLP-1 max dose, discussed bariatric surgery she will decide after lifestyle modif implemented    Transaminitis: ?underlying SILVA vs effect of methotrexate, repeat levels improved, is following up with GI    RTC in 12 weeks    We discussed the expected course, resolution and complications of the diagnosis(es) in detail. Medication risks, benefits, costs, interactions, and alternatives were discussed as indicated. I advised Ruma Martinez to contact the office if her condition worsens, changes or fails to improve as anticipated. Patient expressed understanding with the diagnosis(es) and plan.        Please note that this dictation was completed with Dayron, the computer voice recognition software. Quite often unanticipated grammatical, syntax, homophones, and other interpretive errors are inadvertently transcribed by the computer software. Efforts were made to correct these errors in proofreading. Please excuse any errors that have escaped final proofreading. Thank you. MD [facility]    Please see patient instructions.

## 2023-02-09 NOTE — PATIENT INSTRUCTIONS
Continue Metformin 1000mg twice daily and Glipizide 10mg twice daily and Trulicity to 4.5 mg weekly  Take Levemir insulin 30 units in the morning and 26 units in the evening  Take Humalog 15 units before Breakfast, Lunch, Dinner please take 15 mins before your meal    Take insulin from sliding scale in addition to the 15 units of Humalog before meals as follows:    150-200 = 1 units  200-250 = 2 units  250-300= 3 units  300-350 = 4 units  350-400 = 5 units  400-450= 6 units  450-500= 7 units  >500 = 8 units and call physician    Please adjust the dose as we discussed. Plan to repeat your diabetes eye exam in the near future. Please be sure to have the eye clinic / office fax us the report of your latest eye exam to our clinic to fax # [phone]. This is very important for us to keep track of any effect of diabetes on your vision as part of our wholesome diabetes care that we provide. Please continue using the Dexcom.

## 2023-02-10 LAB
ALBUMIN SERPL-MCNC: 4.3 G/DL (ref 3.8–4.8)
ALBUMIN/CREAT UR: 42 MG/G CREAT (ref 0–29)
ALBUMIN/GLOB SERPL: 1.3 {RATIO} (ref 1.2–2.2)
ALP SERPL-CCNC: 135 IU/L (ref 44–121)
ALT SERPL-CCNC: 73 IU/L (ref 0–32)
AST SERPL-CCNC: 61 IU/L (ref 0–40)
BILIRUB SERPL-MCNC: 0.3 MG/DL (ref 0–1.2)
BUN SERPL-MCNC: 6 MG/DL (ref 6–24)
BUN/CREAT SERPL: 6 (ref 9–23)
CALCIUM SERPL-MCNC: 10 MG/DL (ref 8.7–10.2)
CHLORIDE SERPL-SCNC: 100 MMOL/L (ref 96–106)
CHOLEST SERPL-MCNC: 194 MG/DL (ref 100–199)
CO2 SERPL-SCNC: 20 MMOL/L (ref 20–29)
CREAT SERPL-MCNC: 0.93 MG/DL (ref 0.57–1)
CREAT UR-MCNC: 666.1 MG/DL
EGFRCR SERPLBLD CKD-EPI 2021: 75 ML/MIN/1.73
GLOBULIN SER CALC-MCNC: 3.4 G/DL (ref 1.5–4.5)
GLUCOSE SERPL-MCNC: 256 MG/DL (ref 70–99)
HDLC SERPL-MCNC: 37 MG/DL
IMP & REVIEW OF LAB RESULTS: NORMAL
LDLC SERPL CALC-MCNC: 135 MG/DL (ref 0–99)
MICROALBUMIN UR-MCNC: 279.2 UG/ML
POTASSIUM SERPL-SCNC: 4.3 MMOL/L (ref 3.5–5.2)
PROT SERPL-MCNC: 7.7 G/DL (ref 6–8.5)
SODIUM SERPL-SCNC: 140 MMOL/L (ref 134–144)
TRIGL SERPL-MCNC: 121 MG/DL (ref 0–149)
VLDLC SERPL CALC-MCNC: 22 MG/DL (ref 5–40)

## 2023-02-26 PROBLEM — M48.02 CERVICAL STENOSIS OF SPINAL CANAL: Status: ACTIVE | Noted: 2023-02-26

## 2023-02-27 DIAGNOSIS — R11.0 NAUSEA: ICD-10-CM

## 2023-02-27 RX ORDER — ONDANSETRON 4 MG/1
TABLET, ORALLY DISINTEGRATING ORAL
Qty: 60 TABLET | Refills: 0 | Status: SHIPPED | OUTPATIENT
Start: 2023-02-27

## 2023-03-01 DIAGNOSIS — J45.40 MODERATE PERSISTENT ASTHMA WITHOUT COMPLICATION: ICD-10-CM

## 2023-03-01 DIAGNOSIS — R42 VERTIGO: ICD-10-CM

## 2023-03-01 RX ORDER — FLUTICASONE PROPIONATE AND SALMETEROL 500; 50 UG/1; UG/1
1 POWDER RESPIRATORY (INHALATION) EVERY 12 HOURS
Qty: 180 EACH | Refills: 3 | Status: SHIPPED | OUTPATIENT
Start: 2023-03-01

## 2023-03-01 RX ORDER — ALBUTEROL SULFATE 90 UG/1
2 AEROSOL, METERED RESPIRATORY (INHALATION)
Qty: 54 G | Refills: 3 | Status: SHIPPED | OUTPATIENT
Start: 2023-03-01

## 2023-03-01 RX ORDER — MECLIZINE HYDROCHLORIDE 25 MG/1
25 TABLET ORAL
Qty: 20 TABLET | Refills: 1 | Status: SHIPPED | OUTPATIENT
Start: 2023-03-01

## 2023-03-01 NOTE — TELEPHONE ENCOUNTER
PCP: Luis Campbell MD     Last appt: Visit date not found     Future Appointments   Date Time Provider Dwight Spencer   5/1/2023  7:50 AM Luis Campbell MD [facility] BS AMB   5/10/2023 10:10 AM Shiva Caba MD [facility] BS AMB   7/25/2023 10:00 AM Romi Castaneda DO [facility] BS AMB   8/29/2023 10:40 AM Rosalio Goyal MD [facility] BS AMB          Requested Prescriptions     Pending Prescriptions Disp Refills    meclizine (ANTIVERT) 25 mg tablet 20 Tablet 1     Sig: Take 1 Tablet by mouth three (3) times daily as needed for Dizziness.

## 2023-03-01 NOTE — TELEPHONE ENCOUNTER
PCP: Heydi Diggs MD     Last appt: 12/28/2022     Future Appointments   Date Time Provider Dwight Spencer   5/1/2023  7:50 AM Heydi Diggs MD [facility] BS AMB   5/10/2023 10:10 AM Greg Caba MD [facility] [address] BS AMB   7/25/2023 10:00 AM Romi Castaneda DO NE[facility] BS AMB   8/29/2023 10:40 AM Zeyad Ordonez MD [facility] AMB          Requested Prescriptions     Pending Prescriptions Disp Refills    fluticasone propion-salmeteroL (Advair Diskus) 500-50 mcg/dose diskus inhaler 180 Each 3     Sig: Take 1 Puff by inhalation every twelve (12) hours.  Asthma, Dx: H17.16

## 2023-03-01 NOTE — TELEPHONE ENCOUNTER
PCP: Amy Hernandez MD     Last appt: 12/28/2022     Future Appointments   Date Time Provider Dwight Spencer   5/1/2023  7:50 AM Amy Hernandez MD [facility] BS AMB   5/10/2023 10:10 AM Leodan Caba MD [facility] [address] BS AMB   7/25/2023 10:00 AM Romi Castaneda DO [facility] BS AMB   8/29/2023 10:40 AM Ann Guy MD [facility] BS AMB          Requested Prescriptions     Pending Prescriptions Disp Refills    albuterol (PROVENTIL HFA, VENTOLIN HFA, PROAIR HFA) 90 mcg/actuation inhaler 54 g 3     Sig: Take 2 Puffs by inhalation every six (6) hours as needed for Wheezing.  Asthma, Dx: J45.40

## 2023-03-21 ENCOUNTER — OFFICE VISIT (OUTPATIENT)
Dept: INTERNAL MEDICINE CLINIC | Age: 48
End: 2023-03-21
Payer: MEDICAID

## 2023-03-21 VITALS
HEART RATE: 69 BPM | TEMPERATURE: 98.1 F | DIASTOLIC BLOOD PRESSURE: 83 MMHG | OXYGEN SATURATION: 97 % | BODY MASS INDEX: 44.82 KG/M2 | HEIGHT: 65 IN | RESPIRATION RATE: 20 BRPM | WEIGHT: 269 LBS | SYSTOLIC BLOOD PRESSURE: 128 MMHG

## 2023-03-21 DIAGNOSIS — E11.42 TYPE 2 DIABETES MELLITUS WITH DIABETIC POLYNEUROPATHY, WITH LONG-TERM CURRENT USE OF INSULIN (HCC): ICD-10-CM

## 2023-03-21 DIAGNOSIS — G43.009 MIGRAINE WITHOUT AURA AND WITHOUT STATUS MIGRAINOSUS, NOT INTRACTABLE: Primary | ICD-10-CM

## 2023-03-21 DIAGNOSIS — I10 ESSENTIAL HYPERTENSION: ICD-10-CM

## 2023-03-21 DIAGNOSIS — Z79.4 TYPE 2 DIABETES MELLITUS WITH DIABETIC POLYNEUROPATHY, WITH LONG-TERM CURRENT USE OF INSULIN (HCC): ICD-10-CM

## 2023-03-21 PROCEDURE — 99213 OFFICE O/P EST LOW 20 MIN: CPT | Performed by: INTERNAL MEDICINE

## 2023-03-21 PROCEDURE — 3079F DIAST BP 80-89 MM HG: CPT | Performed by: INTERNAL MEDICINE

## 2023-03-21 PROCEDURE — 3074F SYST BP LT 130 MM HG: CPT | Performed by: INTERNAL MEDICINE

## 2023-03-21 RX ORDER — IBUPROFEN 800 MG/1
TABLET ORAL
Qty: 30 TABLET | Refills: 0 | Status: SHIPPED | OUTPATIENT
Start: 2023-03-21

## 2023-03-21 NOTE — PROGRESS NOTES
CC:   Chief Complaint   Patient presents with    Headache    Shoulder Pain     left       HISTORY OF PRESENT ILLNESS  Maite Steen is a 46 y.o. female. Complains of a left sided headache that radiates to left side of neck and shoulder. Started 2 days ago. Feels like a tightness, has photophobia. Denies aura or nausea. Not sleeping well. Took Tylenol with no relief. Did not take hydrocodone-APAP. Had migraine headaches when she was a teenager. Had spine surgery on 1/6/23. Was in [address] on 12/29/23.        Patient Active Problem List   Diagnosis Code    Essential hypertension I10    Moderate episode of recurrent major depressive disorder (HCC) F33.1    FE (obstructive sleep apnea) G47.33    Asthma J45.909    Iron deficiency anemia D50.9    Mixed hyperlipidemia E78.2    Class 3 severe obesity due to excess calories with serious comorbidity and body mass index (BMI) of 40.0 to 44.9 in adult [facility]) E66.01, Z68.41    Coronary artery disease involving native coronary artery of native heart without angina pectoris I25.10    Chronic midline low back pain without sciatica M54.50, G89.29    Atypical squamous cells of undetermined significance (ASCUS) on Papanicolaou smear of cervix R87.610    Type 2 diabetes with nephropathy (HCC) E11.21    Seronegative rheumatoid arthritis ([facility] [address] M06.00    Elevated liver enzymes R74.8    Nausea R11.0    Irritable bowel syndrome K58.9    Type 2 diabetes mellitus with diabetic polyneuropathy, with long-term current use of insulin (HCC) E11.42, Z79.4    Dyslipidemia E78.5    DDD (degenerative disc disease), lumbar M51.36    Cervical stenosis of spinal canal M48.02     Past Medical History:   Diagnosis Date    Anemia (iron deficiency)     Anxiety     Asthma     Depression     DM type 2 (diabetes mellitus, type 2) ([facility] [address] 11/23/2011    GERD (gastroesophageal reflux disease)     GI bleed 2015    Hepatic steatosis 11/2016    confirmed by US    HTN (hypertension)     Irritable bowel     Kidney mass     5/28/19: US showed rigth renal cystic nephroma, follows with Urology (Dr. Vu Quinn)    Morbid obesity ([facility] [address]     FE on CPAP     PUD (peptic ulcer disease) 2015    Renal cancer, right ([facility] [address]     tx surgery    Rheumatoid arthritis ([facility] [address]     per pt on 7/29/2021     Allergies   Allergen Reactions    Lisinopril Cough    Pcn [Penicillins] Hives       Current Outpatient Medications   Medication Sig Dispense Refill    montelukast (SINGULAIR) 10 mg tablet TAKE 1 TABLET BY MOUTH ONCE DAILY FOR ALLERGIES AND FOR ASTHMA 90 Tablet 3    amitriptyline (ELAVIL) 50 mg tablet Take 1 Tablet by mouth nightly. 90 Tablet 1    fluticasone propion-salmeteroL (Advair Diskus) 500-50 mcg/dose diskus inhaler Take 1 Puff by inhalation every twelve (12) hours. Asthma, Dx: J45.30 180 Each 3    albuterol (PROVENTIL HFA, VENTOLIN HFA, PROAIR HFA) 90 mcg/actuation inhaler Take 2 Puffs by inhalation every six (6) hours as needed for Wheezing. Asthma, Dx: J45.40 54 g 3    meclizine (ANTIVERT) 25 mg tablet Take 1 Tablet by mouth three (3) times daily as needed for Dizziness. 20 Tablet 1    ondansetron (ZOFRAN ODT) 4 mg disintegrating tablet DISSOLVE 1 TABLET IN MOUTH EVERY 8 HOURS AS NEEDED FOR NAUSEA 60 Tablet 0    valsartan (DIOVAN) 80 mg tablet TAKE 1 TABLET BY MOUTH ONCE DAILY FOR HIGH BLOOD PRESSURE 90 Tablet 1    glipiZIDE (GLUCOTROL) 10 mg tablet Take 1 Tablet by mouth two (2) times a day. 180 Tablet 3    Levemir FlexTouch U-100 Insuln 100 unit/mL (3 mL) inpn Dx: G14.88   Please take 30 Units in the morning and 26 Units in the evening  Indications: type 2 diabetes mellitus 100 mL 3    metFORMIN ER (GLUCOPHAGE XR) 500 mg tablet Take 2 Tablets by mouth two (2) times a day. 360 Tablet 3    HumaLOG KwikPen Insulin 100 unit/mL kwikpen Inject 15 units under the skin before breakfast, lunch and dinner, max dose of 60 units per day 90 mL 5    dulaglutide (Trulicity) 4.5 EE/7.3 mL pnij 4.5 mg by SubCUTAneous route every seven (7) days.  4 Each 4    cyclobenzaprine (FLEXERIL) 10 mg tablet Take 1 Tablet by mouth three (3) times daily as needed for Muscle Spasm(s). 120 Tablet 1    Blood-Glucose Transmitter (Dexcom G6 Transmitter) grecia For use with DEXCOM SENSOR  E11.65 1 Each 3    gabapentin (NEURONTIN) 100 mg capsule Take 100 mg by mouth At bedtime. metoclopramide HCl (REGLAN) 5 mg tablet Take 5 mg by mouth four (4) times daily. omeprazole (PRILOSEC) 40 mg capsule       Dexcom G6 Sensor grecia DEXCOM G6 SENSORS Use 1 sensor every 10 days   E11.65 9 Each 3    pregabalin (LYRICA) 300 mg capsule Take 1 Capsule by mouth two (2) times a day. Max Daily Amount: 600 mg. 120 Capsule 3    lidocaine (LIDODERM) 5 % 1 Patch by TransDERmal route every twenty-four (24) hours. Apply patch to the affected area for 12 hours a day and remove for 12 hours a day. 10 Each 2    Insulin Needles, Disposable, (BD Juliet 2nd Gen Pen Needle) 32 gauge x 5/32\" ndle For use with insulin pens for 5x per day E11.65 150 Pen Needle 5    atorvastatin (LIPITOR) 20 mg tablet Take 1 tablet by mouth once daily. 90 Tablet 3    etanercept (ENBREL SC) by SubCUTAneous route every seven (7) days. buPROPion SR (WELLBUTRIN SR) 150 mg SR tablet Take 1 Tablet by mouth two (2) times a day. 180 Tablet 3    dicyclomine (BENTYL) 10 mg/5 mL soln oral solution Take 10 mL by mouth four (4) times daily as needed (abd pain or diarrhea). 473 mL 5    albuterol (PROVENTIL VENTOLIN) 2.5 mg /3 mL (0.083 %) nebu USE ONE VIAL IN NEBULIZER EVERY 4 HOURS AS NEEDED FOR WHEEZING. Dx: J45.40 90 Each 3    methotrexate (RHEUMATREX) 2.5 mg tablet every Wednesday. Pt takes 9 pills for 20 mg total.      nitroglycerin (NITROSTAT) 0.4 mg SL tablet Take 1 Tab by mouth every five (5) minutes as needed for Chest Pain.  Sit/Lay down then put one tab under the tongue every 5 minutes as needed for chest pain for 3 doses 1 Bottle 0    HYDROcodone-acetaminophen (NORCO)  mg tablet TAKE 1 TO 2 TABLETS BY MOUTH 4 TIMES DAILY AS NEEDED (Patient not taking: Reported on 3/21/2023)      ibuprofen (MOTRIN) 800 mg tablet Take one tablet every 8 hours for three days, and then every 8 hours as needed for pain thereafter 30 Tablet 1    acetaminophen (TYLENOL) 500 mg tablet Take 2 Tabs by mouth every six (6) hours as needed for Pain. (Patient not taking: Reported on 3/18/9214) 20 Tab 0    folic acid (FOLVITE) 1 mg tablet Take 1 mg by mouth daily. (Patient not taking: Reported on 3/21/2023)           PHYSICAL EXAM  Visit Vitals  /83 (BP 1 Location: Right upper arm, BP Patient Position: Sitting, BP Cuff Size: Large adult)   Pulse 69   Temp 98.1 °F (36.7 °C) (Oral)   Resp 20   Ht 5' 5\" (1.651 m)   Wt 269 lb (122 kg)   LMP 08/17/2014   SpO2 97%   BMI 44.76 kg/m²       General: Morbidly obese, no distress. HEENT:  Head normocephalic/atraumatic, no scleral icterus. No sinus tenderness. TM's and ear canals normal bilaterally. Lungs:  Clear to ausculation bilaterally. Good air movement. Heart:  Regular rate and rhythm, normal S1 and S2, no murmur, gallop, or rub  Musculoskeletal: Minimal muscle tenderness at left trapezius muscles. Neurological: Alert and oriented. Psychiatric: Normal mood and affect. Behavior is normal.         ASSESSMENT AND PLAN    ICD-10-CM ICD-9-CM    1. Migraine without aura and without status migrainosus, not intractable  G43.009 346.10 ibuprofen (MOTRIN) 800 mg tablet      2. Type 2 diabetes mellitus with diabetic polyneuropathy, with long-term current use of insulin ([facility])  E11.42 250.60 THYROID CASCADE PROFILE    Z79.4 357.2 HEMOGLOBIN A1C WITH EAG     V58.67 THYROID CASCADE PROFILE      HEMOGLOBIN A1C WITH EAG      3. Essential hypertension  A28 926.8 METABOLIC PANEL, COMPREHENSIVE      CBC WITH AUTOMATED DIFF      METABOLIC PANEL, COMPREHENSIVE      CBC WITH AUTOMATED DIFF        Diagnoses and all orders for this visit:    1.  Migraine without aura and without status migrainosus, not intractable  -     ibuprofen (MOTRIN) 800 mg tablet; Take one tablet every 8 hours as needed for headache or pain    2. Type 2 diabetes mellitus with diabetic polyneuropathy, with long-term current use of insulin ([address]  -     THYROID CASCADE PROFILE; Future  -     HEMOGLOBIN A1C WITH EAG; Future    3. Essential hypertension  -     METABOLIC PANEL, COMPREHENSIVE; Future  -     CBC WITH AUTOMATED DIFF; Future      Probable migraine headache. Will treat with short course of ibuprofen. Avoid long-term ibuprofen due to poor interaction with methotrexate (increased risk of neurotoxicity and myelosuppression) and due to her having CAD. Scheduled for appointment on 5/1/23. Have labs prior to appointment. Follow-up and Dispositions    Return if symptoms worsen or fail to improve, for Scheduled appointmenton 5/1/23 with labs 1 week before appointment. I have discussed the diagnosis with the patient and the intended plan as seen in the above orders. Patient is in agreement. The patient has received an after-visit summary and questions were answered concerning future plans. I have discussed medication side effects and warnings with the patient as well.

## 2023-03-21 NOTE — PROGRESS NOTES
Angela Pederson  Identified pt with two pt identifiers(name and ). Chief Complaint   Patient presents with    Headache    Shoulder Pain     left       Reviewed record In preparation for visit and have obtained necessary documentation. 1. Have you been to the ER, urgent care clinic or hospitalized since your last visit? No     2. Have you seen or consulted any other health care providers outside of the [address] [place] [address] since your last visit? Include any pap smears or colon screening. No    Vitals reviewed with provider.     Health Maintenance reviewed:     Health Maintenance Due   Topic    Hepatitis B Vaccine (1 of 3 - Risk 3-dose series)    COVID-19 Vaccine (4 - Booster for Pfizer series)    Shingles Vaccine (1 of 2)    Breast Cancer Screen Mammogram           Wt Readings from Last 3 Encounters:   23 269 lb (122 kg)   23 261 lb 3.2 oz (118.5 kg)   22 276 lb 6.4 oz (125.4 kg)        Temp Readings from Last 3 Encounters:   23 98.1 °F (36.7 °C) (Oral)   22 97.9 °F (36.6 °C) (Oral)   22 98 °F (36.7 °C) (Temporal)        BP Readings from Last 3 Encounters:   23 128/83   23 129/70   22 116/73        Pulse Readings from Last 3 Encounters:   23 69   23 (!) 106   22 96        Vitals:    23 1048   BP: 128/83   Pulse: 69   Resp: 20   Temp: 98.1 °F (36.7 °C)   TempSrc: Oral   SpO2: 97%   Weight: 269 lb (122 kg)   Height: 5' 5\" (1.651 m)   PainSc:   7   PainLoc: Head   LMP: 2014          Learning Assessment:   :       Learning Assessment 2018   PRIMARY LEARNER Patient Patient Patient   HIGHEST LEVEL OF EDUCATION - PRIMARY LEARNER  - > 4 YEARS OF COLLEGE -   BARRIERS PRIMARY LEARNER - NONE -   CO-LEARNER CAREGIVER - No -   PRIMARY LANGUAGE ENGLISH ENGLISH ENGLISH   LEARNER PREFERENCE PRIMARY READING OTHER (COMMENT) LISTENING   ANSWERED BY patient patient self     - - self   RELATIONSHIP SELF SELF SELF Depression Screening:   :       3 most recent PHQ Screens 12/28/2022   PHQ Not Done -   Little interest or pleasure in doing things Not at all   Feeling down, depressed, irritable, or hopeless Not at all   Total Score PHQ 2 0   Trouble falling or staying asleep, or sleeping too much -   Feeling tired or having little energy -   Poor appetite, weight loss, or overeating -   Feeling bad about yourself - or that you are a failure or have let yourself or your family down -   Trouble concentrating on things such as school, work, reading, or watching TV -   Moving or speaking so slowly that other people could have noticed; or the opposite being so fidgety that others notice -   Thoughts of being better off dead, or hurting yourself in some way -   PHQ 9 Score -   How difficult have these problems made it for you to do your work, take care of your home and get along with others -        Fall Risk Assessment:   :       Fall Risk Assessment, last 12 mths 2/23/2021   Able to walk? Yes   Fall in past 12 months? 1   Do you feel unsteady? 1   Are you worried about falling 1   Number of falls in past 12 months -   Fall with injury? 1        Abuse Screening:   :       Abuse Screening Questionnaire 8/14/2020 5/9/2014   Do you ever feel afraid of your partner? N N   Are you in a relationship with someone who physically or mentally threatens you? N N   Is it safe for you to go home?  Y Y        ADL Screening:   :       ADL Assessment 5/2/2018   Feeding yourself No Help Needed   Getting from bed to chair No Help Needed   Getting dressed No Help Needed   Bathing or showering No Help Needed   Walk across the room (includes cane/walker) No Help Needed   Using the telphone No Help Needed   Taking your medications No Help Needed   Preparing meals No Help Needed   Managing money (expenses/bills) No Help Needed   Moderately strenuous housework (laundry) No Help Needed   Shopping for personal items (toiletries/medicines) No Help Needed Shopping for groceries No Help Needed   Driving No Help Needed   Climbing a flight of stairs No Help Needed   Getting to places beyond walking distances No Help Needed

## 2023-04-24 DIAGNOSIS — Z79.4 TYPE 2 DIABETES MELLITUS WITH DIABETIC POLYNEUROPATHY, WITH LONG-TERM CURRENT USE OF INSULIN (HCC): Primary | ICD-10-CM

## 2023-04-24 DIAGNOSIS — E11.42 TYPE 2 DIABETES MELLITUS WITH DIABETIC POLYNEUROPATHY, WITH LONG-TERM CURRENT USE OF INSULIN (HCC): Primary | ICD-10-CM

## 2023-04-28 DIAGNOSIS — E11.42 TYPE 2 DIABETES MELLITUS WITH DIABETIC POLYNEUROPATHY, WITH LONG-TERM CURRENT USE OF INSULIN (HCC): Primary | ICD-10-CM

## 2023-04-28 DIAGNOSIS — Z79.4 TYPE 2 DIABETES MELLITUS WITH DIABETIC POLYNEUROPATHY, WITH LONG-TERM CURRENT USE OF INSULIN (HCC): Primary | ICD-10-CM

## 2023-05-01 ENCOUNTER — OFFICE VISIT (OUTPATIENT)
Dept: INTERNAL MEDICINE CLINIC | Age: 48
End: 2023-05-01
Payer: MEDICAID

## 2023-05-01 VITALS
HEART RATE: 85 BPM | HEIGHT: 65 IN | TEMPERATURE: 98.1 F | OXYGEN SATURATION: 98 % | DIASTOLIC BLOOD PRESSURE: 72 MMHG | RESPIRATION RATE: 16 BRPM | WEIGHT: 276 LBS | BODY MASS INDEX: 45.98 KG/M2 | SYSTOLIC BLOOD PRESSURE: 108 MMHG

## 2023-05-01 DIAGNOSIS — F33.1 MODERATE EPISODE OF RECURRENT MAJOR DEPRESSIVE DISORDER (HCC): ICD-10-CM

## 2023-05-01 DIAGNOSIS — Z79.899 POLYPHARMACY: ICD-10-CM

## 2023-05-01 DIAGNOSIS — I10 ESSENTIAL HYPERTENSION: Primary | ICD-10-CM

## 2023-05-01 PROBLEM — I25.10 CORONARY ARTERY DISEASE INVOLVING NATIVE CORONARY ARTERY OF NATIVE HEART WITHOUT ANGINA PECTORIS: Status: RESOLVED | Noted: 2018-08-02 | Resolved: 2023-05-01

## 2023-05-01 PROBLEM — E78.5 DYSLIPIDEMIA: Status: RESOLVED | Noted: 2022-08-03 | Resolved: 2023-05-01

## 2023-05-01 PROCEDURE — 3074F SYST BP LT 130 MM HG: CPT | Performed by: INTERNAL MEDICINE

## 2023-05-01 PROCEDURE — 99214 OFFICE O/P EST MOD 30 MIN: CPT | Performed by: INTERNAL MEDICINE

## 2023-05-01 PROCEDURE — 3078F DIAST BP <80 MM HG: CPT | Performed by: INTERNAL MEDICINE

## 2023-05-01 RX ORDER — SENNOSIDES 8.6 MG/1
17.2 TABLET ORAL AT BEDTIME
Qty: 60 TABLET | Refills: 11 | COMMUNITY
Start: 2023-01-07 | End: 2024-01-07

## 2023-05-01 RX ORDER — ETANERCEPT 50 MG/ML
SOLUTION SUBCUTANEOUS
COMMUNITY
Start: 2023-03-23

## 2023-05-01 NOTE — PROGRESS NOTES
Laquita Lombard  Identified pt with two pt identifiers(name and ). Chief Complaint   Patient presents with    Hypertension    Depression       Reviewed record In preparation for visit and have obtained necessary documentation. 1. Have you been to the ER, urgent care clinic or hospitalized since your last visit? No     2. Have you seen or consulted any other health care providers outside of the [address] [place] [address] since your last visit? Include any pap smears or colon screening. No    Vitals reviewed with provider.     Health Maintenance reviewed:     Health Maintenance Due   Topic    Hepatitis B Vaccine (1 of 3 - Risk 3-dose series)    COVID-19 Vaccine (4 - Booster for MYFX series)    Shingles Vaccine (1 of 2)    Breast Cancer Screen Mammogram     Foot Exam Q1           Wt Readings from Last 3 Encounters:   23 276 lb (125.2 kg)   23 269 lb (122 kg)   23 261 lb 3.2 oz (118.5 kg)        Temp Readings from Last 3 Encounters:   23 98.1 °F (36.7 °C) (Oral)   23 98.1 °F (36.7 °C) (Oral)   22 97.9 °F (36.6 °C) (Oral)        BP Readings from Last 3 Encounters:   23 108/72   23 128/83   23 129/70        Pulse Readings from Last 3 Encounters:   23 85   23 69   23 (!) 106        Vitals:    23 0809   BP: 108/72   Pulse: 85   Resp: 16   Temp: 98.1 °F (36.7 °C)   TempSrc: Oral   SpO2: 98%   Weight: 276 lb (125.2 kg)   Height: 5' 5\" (1.651 m)   PainSc:   7   PainLoc: Generalized   LMP: 08/15/2014          Learning Assessment:   :       Learning Assessment 2018   PRIMARY LEARNER Patient Patient Patient   HIGHEST LEVEL OF EDUCATION - PRIMARY LEARNER  - > 4 YEARS OF COLLEGE -   BARRIERS PRIMARY LEARNER - NONE -   CO-LEARNER CAREGIVER - No -   PRIMARY LANGUAGE ENGLISH ENGLISH ENGLISH   LEARNER PREFERENCE PRIMARY READING OTHER (COMMENT) LISTENING   ANSWERED BY patient patient self     - - self   RELATIONSHIP SELF SELF SELF        Depression Screening:   :       3 most recent PHQ Screens 12/28/2022   PHQ Not Done -   Little interest or pleasure in doing things Not at all   Feeling down, depressed, irritable, or hopeless Not at all   Total Score PHQ 2 0   Trouble falling or staying asleep, or sleeping too much -   Feeling tired or having little energy -   Poor appetite, weight loss, or overeating -   Feeling bad about yourself - or that you are a failure or have let yourself or your family down -   Trouble concentrating on things such as school, work, reading, or watching TV -   Moving or speaking so slowly that other people could have noticed; or the opposite being so fidgety that others notice -   Thoughts of being better off dead, or hurting yourself in some way -   PHQ 9 Score -   How difficult have these problems made it for you to do your work, take care of your home and get along with others -        Fall Risk Assessment:   :       Fall Risk Assessment, last 12 mths 2/23/2021   Able to walk? Yes   Fall in past 12 months? 1   Do you feel unsteady? 1   Are you worried about falling 1   Number of falls in past 12 months -   Fall with injury? 1        Abuse Screening:   :       Abuse Screening Questionnaire 5/1/2023 8/14/2020 5/9/2014   Do you ever feel afraid of your partner? N N N   Are you in a relationship with someone who physically or mentally threatens you? N N N   Is it safe for you to go home?  Y Y Y        ADL Screening:   :       ADL Assessment 5/1/2023   Feeding yourself No Help Needed   Getting from bed to chair No Help Needed   Getting dressed No Help Needed   Bathing or showering No Help Needed   Walk across the room (includes cane/walker) No Help Needed   Using the telphone No Help Needed   Taking your medications No Help Needed   Preparing meals No Help Needed   Managing money (expenses/bills) No Help Needed   Moderately strenuous housework (laundry) No Help Needed   Shopping for personal items (toiletries/medicines) No Help Needed   Shopping for groceries No Help Needed   Driving No Help Needed   Climbing a flight of stairs No Help Needed   Getting to places beyond walking distances No Help Needed

## 2023-05-01 NOTE — PROGRESS NOTES
Chief Complaint   Patient presents with    Hypertension    Depression       HISTORY OF PRESENT ILLNESS  Maite Steen is a 46 y.o. female    Presents for 4 month follow up evaluation of HTN and depression. She has type 2 DM with neuropathy, HTN, HLD, seronegative RA, asthma, allergic rhinitis, FE, depression, and morbid obesity. Complains of right-sided low back pain. Hurts when she breathes deeply. Tested positive for COVID 2 weeks ago; was negative on re-test a few days ago. Denies CP, SOB, dizziness, or leg swelling. Still with some depressed mood. Loss of interest improved. Denies SI. Takes Wellbutrin  mg twice daily. Follows with Dr. Sarah Beth French for DM. Last A1c 5.9% on 12/20/22. Follows with Dr. Tina Mckenna for RA. Takes MTX and Enbrel. Concerned that she is on so many medications. Takes Zofran as needed.      Other Providers: Dr. Tina Mckenna (Rheumatology), Dr. Sarah Beth French (Endocrinology), Dr. Kvng Shaver (Gastroenterology), Dr. Philip Cruz (Neurology)    Mammogram: 3/3/23, [facility]  Pap smear: 3/3/23, [facility]  Shingles vaccine: due  COVID vaccine: due to 2nd booster vaccine in 3 months    Patient Active Problem List   Diagnosis Code    Essential hypertension I10    Moderate episode of recurrent major depressive disorder ([facility] F33.1    FE (obstructive sleep apnea) G47.33    Asthma J45.909    Iron deficiency anemia D50.9    Mixed hyperlipidemia E78.2    Class 3 severe obesity due to excess calories with serious comorbidity and body mass index (BMI) of 40.0 to 44.9 in adult [facility]) E66.01, Z68.41    Coronary artery disease involving native coronary artery of native heart without angina pectoris I25.10    Chronic midline low back pain without sciatica M54.50, G89.29    Atypical squamous cells of undetermined significance (ASCUS) on Papanicolaou smear of cervix R87.610    Type 2 diabetes with nephropathy (HCC) E11.21    Seronegative rheumatoid arthritis ([facility] M06.00    Elevated liver enzymes R74.8 Nausea R11.0    Irritable bowel syndrome K58.9    Type 2 diabetes mellitus with diabetic polyneuropathy, with long-term current use of insulin (HCC) E11.42, Z79.4    Dyslipidemia E78.5    DDD (degenerative disc disease), lumbar M51.36    Cervical stenosis of spinal canal M48.02     Past Medical History:   Diagnosis Date    Anemia (iron deficiency)     Anxiety     Asthma     Depression     DM type 2 (diabetes mellitus, type 2) ([facility] 11/23/2011    GERD (gastroesophageal reflux disease)     GI bleed 2015    Hepatic steatosis 11/2016    confirmed by US    HTN (hypertension)     Irritable bowel     Kidney mass     5/28/19: US showed rigth renal cystic nephroma, follows with Urology (Dr. Remedios Ponce)    Morbid obesity ([facility]     FE on CPAP     PUD (peptic ulcer disease) 2015    Renal cancer, right ([facility] [address]     tx surgery    Rheumatoid arthritis ([facility]     per pt on 7/29/2021     Allergies   Allergen Reactions    Lisinopril Cough    Pcn [Penicillins] Hives       Current Outpatient Medications   Medication Sig Dispense Refill    senna (SENOKOT) 8.6 mg tablet Take 2 Tablets by mouth At bedtime. 60 Tablet 11    EnbreL SureClick 50 mg/mL (1 mL) injection       ibuprofen (MOTRIN) 800 mg tablet Take one tablet every 8 hours as needed for headache or pain 30 Tablet 0    montelukast (SINGULAIR) 10 mg tablet TAKE 1 TABLET BY MOUTH ONCE DAILY FOR ALLERGIES AND FOR ASTHMA 90 Tablet 3    amitriptyline (ELAVIL) 50 mg tablet Take 1 Tablet by mouth nightly. 90 Tablet 1    fluticasone propion-salmeteroL (Advair Diskus) 500-50 mcg/dose diskus inhaler Take 1 Puff by inhalation every twelve (12) hours. Asthma, Dx: J45.30 180 Each 3    albuterol (PROVENTIL HFA, VENTOLIN HFA, PROAIR HFA) 90 mcg/actuation inhaler Take 2 Puffs by inhalation every six (6) hours as needed for Wheezing. Asthma, Dx: J45.40 54 g 3    meclizine (ANTIVERT) 25 mg tablet Take 1 Tablet by mouth three (3) times daily as needed for Dizziness.  20 Tablet 1    ondansetron (ZOFRAN ODT) 4 mg disintegrating tablet DISSOLVE 1 TABLET IN MOUTH EVERY 8 HOURS AS NEEDED FOR NAUSEA 60 Tablet 0    valsartan (DIOVAN) 80 mg tablet TAKE 1 TABLET BY MOUTH ONCE DAILY FOR HIGH BLOOD PRESSURE 90 Tablet 1    glipiZIDE (GLUCOTROL) 10 mg tablet Take 1 Tablet by mouth two (2) times a day. 180 Tablet 3    Levemir FlexTouch U-100 Insuln 100 unit/mL (3 mL) inpn Dx: R57.81   Please take 30 Units in the morning and 26 Units in the evening  Indications: type 2 diabetes mellitus 100 mL 3    metFORMIN ER (GLUCOPHAGE XR) 500 mg tablet Take 2 Tablets by mouth two (2) times a day. 360 Tablet 3    HumaLOG KwikPen Insulin 100 unit/mL kwikpen Inject 15 units under the skin before breakfast, lunch and dinner, max dose of 60 units per day 90 mL 5    dulaglutide (Trulicity) 4.5 DH/1.6 mL pnij 4.5 mg by SubCUTAneous route every seven (7) days. 4 Each 4    cyclobenzaprine (FLEXERIL) 10 mg tablet Take 1 Tablet by mouth three (3) times daily as needed for Muscle Spasm(s). 120 Tablet 1    Blood-Glucose Transmitter (Dexcom G6 Transmitter) grecia For use with DEXCOM SENSOR  E11.65 1 Each 3    gabapentin (NEURONTIN) 100 mg capsule Take 1 Capsule by mouth At bedtime. metoclopramide HCl (REGLAN) 5 mg tablet Take 1 Tablet by mouth four (4) times daily. omeprazole (PRILOSEC) 40 mg capsule       HYDROcodone-acetaminophen (NORCO)  mg tablet       Dexcom G6 Sensor grecia DEXCOM G6 SENSORS Use 1 sensor every 10 days   E11.65 9 Each 3    pregabalin (LYRICA) 300 mg capsule Take 1 Capsule by mouth two (2) times a day. Max Daily Amount: 600 mg. 120 Capsule 3    lidocaine (LIDODERM) 5 % 1 Patch by TransDERmal route every twenty-four (24) hours. Apply patch to the affected area for 12 hours a day and remove for 12 hours a day.  10 Each 2    Insulin Needles, Disposable, (BD Juliet 2nd Gen Pen Needle) 32 gauge x 5/32\" ndle For use with insulin pens for 5x per day E11.65 150 Pen Needle 5    atorvastatin (LIPITOR) 20 mg tablet Take 1 tablet by mouth once daily. 90 Tablet 3    etanercept (ENBREL SC) by SubCUTAneous route every seven (7) days. buPROPion SR (WELLBUTRIN SR) 150 mg SR tablet Take 1 Tablet by mouth two (2) times a day. 180 Tablet 3    dicyclomine (BENTYL) 10 mg/5 mL soln oral solution Take 10 mL by mouth four (4) times daily as needed (abd pain or diarrhea). 473 mL 5    albuterol (PROVENTIL VENTOLIN) 2.5 mg /3 mL (0.083 %) nebu USE ONE VIAL IN NEBULIZER EVERY 4 HOURS AS NEEDED FOR WHEEZING. Dx: J45.40 90 Each 3    acetaminophen (TYLENOL) 500 mg tablet Take 2 Tabs by mouth every six (6) hours as needed for Pain. 20 Tab 0    folic acid (FOLVITE) 1 mg tablet Take 1 Tablet by mouth daily. methotrexate (RHEUMATREX) 2.5 mg tablet every Wednesday. Pt takes 9 pills for 20 mg total.      nitroglycerin (NITROSTAT) 0.4 mg SL tablet Take 1 Tab by mouth every five (5) minutes as needed for Chest Pain. Sit/Lay down then put one tab under the tongue every 5 minutes as needed for chest pain for 3 doses 1 Bottle 0         PHYSICAL EXAM  Visit Vitals  /72 (BP 1 Location: Left upper arm, BP Patient Position: Sitting)   Pulse 85   Temp 98.1 °F (36.7 °C) (Oral)   Resp 16   Ht 5' 5\" (1.651 m)   Wt 276 lb (125.2 kg)   LMP 08/15/2014   SpO2 98%   BMI 45.93 kg/m²       General: Morbidly obese, no distress. HEENT:  Head normocephalic/atraumatic, no scleral icterus. Lungs:  Clear to ausculation bilaterally. Good air movement. Heart:  Regular rate and rhythm, normal S1 and S2, no murmur, gallop, or rub  Ext: No clubbing, cyanosis, or edema. Back: Symmetric. No spinal or paraspinal tenderness. Neurological: Alert and oriented. Psychiatric: Normal mood and affect.  Behavior is normal.       Results for orders placed or performed in visit on 77/65/25   METABOLIC PANEL, COMPREHENSIVE   Result Value Ref Range    Glucose 256 (H) 70 - 99 mg/dL    BUN 6 6 - 24 mg/dL    Creatinine 0.93 0.57 - 1.00 mg/dL    eGFR 75 >59 mL/min/1.73    BUN/Creatinine ratio 6 (L) 9 - 23    Sodium 140 134 - 144 mmol/L    Potassium 4.3 3.5 - 5.2 mmol/L    Chloride 100 96 - 106 mmol/L    CO2 20 20 - 29 mmol/L    Calcium 10.0 8.7 - 10.2 mg/dL    Protein, total 7.7 6.0 - 8.5 g/dL    Albumin 4.3 3.8 - 4.8 g/dL    GLOBULIN, TOTAL 3.4 1.5 - 4.5 g/dL    A-G Ratio 1.3 1.2 - 2.2    Bilirubin, total 0.3 0.0 - 1.2 mg/dL    Alk. phosphatase 135 (H) 44 - 121 IU/L    AST (SGOT) 61 (H) 0 - 40 IU/L    ALT (SGPT) 73 (H) 0 - 32 IU/L   LIPID PANEL   Result Value Ref Range    Cholesterol, total 194 100 - 199 mg/dL    Triglyceride 121 0 - 149 mg/dL    HDL Cholesterol 37 (L) >39 mg/dL    VLDL, calculated 22 5 - 40 mg/dL    LDL, calculated 135 (H) 0 - 99 mg/dL   MICROALBUMIN, UR, RAND W/ MICROALB/CREAT RATIO   Result Value Ref Range    Creatinine, urine random 666.1 Not Estab. mg/dL    Microalbumin, urine 279.2 Not Estab. ug/mL    Microalb/Creat ratio (ug/mg creat.) 42 (H) 0 - 29 mg/g creat   CVD REPORT   Result Value Ref Range    INTERPRETATION Note          ASSESSMENT AND PLAN    ICD-10-CM ICD-9-CM    1. Essential hypertension  I10 401.9       2. Moderate episode of recurrent major depressive disorder (HCC)  F33.1 296.32       3. Polypharmacy  Z79.899 V58.69         Diagnoses and all orders for this visit:    1. Essential hypertension    2. Moderate episode of recurrent major depressive disorder ([facility] [address]    3. Polypharmacy    HTN well-controlled. Continue valsartan 80 mg daily. Depression not optimally controlled. I recommended she see a therapist to help with coping skills. Being longer able to work due to chronic pain is a stressor for her. She forgot to have lab done before this appointment. Lab orders reprinted. Reviewed her medications. Stop  gabapentin, ibuprofen, and SL NTG. Already on pregabalin and amitriptyline for pain. Cardiac evaluation showed no CAD.    Instructed her to discuss reducing DM medications with Dr. Tressa Quintanilla (on 5 meds for DM) and to discuss reducing GI medications with Dr. John De Santiago (on 4 GI-related meds). Follow-up and Dispositions    Return in about 4 months (around 9/1/2023), or if symptoms worsen or fail to improve, for Depression, pain. I have discussed the diagnosis with the patient and the intended plan as seen in the above orders. Patient is in agreement. The patient has received an after-visit summary and questions were answered concerning future plans. I have discussed medication side effects and warnings with the patient as well.

## 2023-05-05 ENCOUNTER — APPOINTMENT (OUTPATIENT)
Dept: GENERAL RADIOLOGY | Age: 48
End: 2023-05-05
Attending: EMERGENCY MEDICINE
Payer: MEDICAID

## 2023-05-05 ENCOUNTER — APPOINTMENT (OUTPATIENT)
Dept: CT IMAGING | Age: 48
End: 2023-05-05
Attending: EMERGENCY MEDICINE
Payer: MEDICAID

## 2023-05-05 ENCOUNTER — HOSPITAL ENCOUNTER (EMERGENCY)
Age: 48
Discharge: HOME | End: 2023-05-05
Attending: EMERGENCY MEDICINE
Payer: MEDICAID

## 2023-05-05 VITALS
WEIGHT: 282.19 LBS | BODY MASS INDEX: 47.02 KG/M2 | HEART RATE: 84 BPM | DIASTOLIC BLOOD PRESSURE: 72 MMHG | OXYGEN SATURATION: 100 % | TEMPERATURE: 98.7 F | RESPIRATION RATE: 16 BRPM | HEIGHT: 65 IN | SYSTOLIC BLOOD PRESSURE: 120 MMHG

## 2023-05-05 DIAGNOSIS — R07.9 ACUTE CHEST PAIN: Primary | ICD-10-CM

## 2023-05-05 LAB
ALBUMIN SERPL-MCNC: 3.3 G/DL (ref 3.5–5)
ALBUMIN/GLOB SERPL: 0.8 (ref 1.1–2.2)
ALP SERPL-CCNC: 113 U/L (ref 45–117)
ALT SERPL-CCNC: 86 U/L (ref 12–78)
ANION GAP SERPL CALC-SCNC: 9 MMOL/L (ref 5–15)
APPEARANCE UR: CLEAR
AST SERPL-CCNC: 69 U/L (ref 15–37)
ATRIAL RATE: 78 BPM
BACTERIA URNS QL MICRO: NEGATIVE /HPF
BASOPHILS # BLD: 0.1 K/UL (ref 0–0.1)
BASOPHILS NFR BLD: 1 % (ref 0–1)
BILIRUB SERPL-MCNC: 0.2 MG/DL (ref 0.2–1)
BILIRUB UR QL: NEGATIVE
BUN SERPL-MCNC: 11 MG/DL (ref 6–20)
BUN/CREAT SERPL: 12 (ref 12–20)
CALCIUM SERPL-MCNC: 8.8 MG/DL (ref 8.5–10.1)
CALCULATED P AXIS, ECG09: 56 DEGREES
CALCULATED R AXIS, ECG10: 29 DEGREES
CALCULATED T AXIS, ECG11: 32 DEGREES
CHLORIDE SERPL-SCNC: 102 MMOL/L (ref 97–108)
CO2 SERPL-SCNC: 29 MMOL/L (ref 21–32)
COLOR UR: NORMAL
CREAT SERPL-MCNC: 0.89 MG/DL (ref 0.55–1.02)
D DIMER PPP FEU-MCNC: 0.76 MG/L FEU (ref 0–0.65)
DIAGNOSIS, 93000: NORMAL
DIFFERENTIAL METHOD BLD: ABNORMAL
EOSINOPHIL # BLD: 0.2 K/UL (ref 0–0.4)
EOSINOPHIL NFR BLD: 4 % (ref 0–7)
EPITH CASTS URNS QL MICRO: NORMAL /LPF
ERYTHROCYTE [DISTWIDTH] IN BLOOD BY AUTOMATED COUNT: 17.1 % (ref 11.5–14.5)
GLOBULIN SER CALC-MCNC: 4.4 G/DL (ref 2–4)
GLUCOSE SERPL-MCNC: 221 MG/DL (ref 65–100)
GLUCOSE UR STRIP.AUTO-MCNC: NEGATIVE MG/DL
HCT VFR BLD AUTO: 33.5 % (ref 35–47)
HGB BLD-MCNC: 10.4 G/DL (ref 11.5–16)
HGB UR QL STRIP: NEGATIVE
IMM GRANULOCYTES # BLD AUTO: 0 K/UL (ref 0–0.04)
IMM GRANULOCYTES NFR BLD AUTO: 0 % (ref 0–0.5)
KETONES UR QL STRIP.AUTO: NEGATIVE MG/DL
LEUKOCYTE ESTERASE UR QL STRIP.AUTO: NEGATIVE
LYMPHOCYTES # BLD: 1.9 K/UL (ref 0.8–3.5)
LYMPHOCYTES NFR BLD: 39 % (ref 12–49)
MCH RBC QN AUTO: 25.2 PG (ref 26–34)
MCHC RBC AUTO-ENTMCNC: 31 G/DL (ref 30–36.5)
MCV RBC AUTO: 81.3 FL (ref 80–99)
MONOCYTES # BLD: 0.3 K/UL (ref 0–1)
MONOCYTES NFR BLD: 7 % (ref 5–13)
NEUTS SEG # BLD: 2.4 K/UL (ref 1.8–8)
NEUTS SEG NFR BLD: 49 % (ref 32–75)
NITRITE UR QL STRIP.AUTO: NEGATIVE
NRBC # BLD: 0 K/UL (ref 0–0.01)
NRBC BLD-RTO: 0 PER 100 WBC
P-R INTERVAL, ECG05: 156 MS
PH UR STRIP: 6 (ref 5–8)
PLATELET # BLD AUTO: 330 K/UL (ref 150–400)
PMV BLD AUTO: 11 FL (ref 8.9–12.9)
POTASSIUM SERPL-SCNC: 4.7 MMOL/L (ref 3.5–5.1)
PROT SERPL-MCNC: 7.7 G/DL (ref 6.4–8.2)
PROT UR STRIP-MCNC: NEGATIVE MG/DL
Q-T INTERVAL, ECG07: 382 MS
QRS DURATION, ECG06: 86 MS
QTC CALCULATION (BEZET), ECG08: 435 MS
RBC # BLD AUTO: 4.12 M/UL (ref 3.8–5.2)
RBC #/AREA URNS HPF: NORMAL /HPF (ref 0–5)
RBC MORPH BLD: ABNORMAL
SODIUM SERPL-SCNC: 140 MMOL/L (ref 136–145)
SP GR UR REFRACTOMETRY: 1.01 (ref 1–1.03)
UR CULT HOLD, URHOLD: NORMAL
UROBILINOGEN UR QL STRIP.AUTO: 0.2 EU/DL (ref 0.2–1)
VENTRICULAR RATE, ECG03: 78 BPM
WBC # BLD AUTO: 4.9 K/UL (ref 3.6–11)
WBC URNS QL MICRO: NORMAL /HPF (ref 0–4)

## 2023-05-05 PROCEDURE — 85025 COMPLETE CBC W/AUTO DIFF WBC: CPT

## 2023-05-05 PROCEDURE — 80053 COMPREHEN METABOLIC PANEL: CPT

## 2023-05-05 PROCEDURE — 96374 THER/PROPH/DIAG INJ IV PUSH: CPT

## 2023-05-05 PROCEDURE — 81001 URINALYSIS AUTO W/SCOPE: CPT

## 2023-05-05 PROCEDURE — 85379 FIBRIN DEGRADATION QUANT: CPT

## 2023-05-05 PROCEDURE — 99285 EMERGENCY DEPT VISIT HI MDM: CPT

## 2023-05-05 PROCEDURE — 36415 COLL VENOUS BLD VENIPUNCTURE: CPT

## 2023-05-05 PROCEDURE — 74011000636 HC RX REV CODE- 636: Performed by: EMERGENCY MEDICINE

## 2023-05-05 PROCEDURE — 93005 ELECTROCARDIOGRAM TRACING: CPT

## 2023-05-05 PROCEDURE — 71101 X-RAY EXAM UNILAT RIBS/CHEST: CPT

## 2023-05-05 PROCEDURE — 74011250636 HC RX REV CODE- 250/636: Performed by: EMERGENCY MEDICINE

## 2023-05-05 PROCEDURE — 71275 CT ANGIOGRAPHY CHEST: CPT

## 2023-05-05 RX ORDER — SODIUM CHLORIDE 0.9 % (FLUSH) 0.9 %
5-40 SYRINGE (ML) INJECTION EVERY 8 HOURS
Status: DISCONTINUED | OUTPATIENT
Start: 2023-05-05 | End: 2023-05-05 | Stop reason: HOSPADM

## 2023-05-05 RX ORDER — SODIUM CHLORIDE 0.9 % (FLUSH) 0.9 %
5-40 SYRINGE (ML) INJECTION AS NEEDED
Status: DISCONTINUED | OUTPATIENT
Start: 2023-05-05 | End: 2023-05-05 | Stop reason: HOSPADM

## 2023-05-05 RX ORDER — KETOROLAC TROMETHAMINE 30 MG/ML
30 INJECTION, SOLUTION INTRAMUSCULAR; INTRAVENOUS
Status: COMPLETED | OUTPATIENT
Start: 2023-05-05 | End: 2023-05-05

## 2023-05-05 RX ADMIN — IOPAMIDOL 100 ML: 755 INJECTION, SOLUTION INTRAVENOUS at 10:53

## 2023-05-05 RX ADMIN — KETOROLAC TROMETHAMINE 30 MG: 30 INJECTION, SOLUTION INTRAMUSCULAR; INTRAVENOUS at 09:13

## 2023-06-07 RX ORDER — DULAGLUTIDE 4.5 MG/.5ML
INJECTION, SOLUTION SUBCUTANEOUS
Qty: 2 ML | Refills: 4 | Status: SHIPPED | OUTPATIENT
Start: 2023-06-07

## 2023-07-02 PROBLEM — K31.84 GASTROPARESIS: Status: ACTIVE | Noted: 2022-01-27

## 2023-07-22 ENCOUNTER — APPOINTMENT (OUTPATIENT)
Facility: HOSPITAL | Age: 48
End: 2023-07-22
Payer: MEDICAID

## 2023-07-22 ENCOUNTER — HOSPITAL ENCOUNTER (EMERGENCY)
Facility: HOSPITAL | Age: 48
Discharge: HOME | End: 2023-07-22
Attending: EMERGENCY MEDICINE
Payer: MEDICAID

## 2023-07-22 VITALS
HEIGHT: 65 IN | DIASTOLIC BLOOD PRESSURE: 89 MMHG | SYSTOLIC BLOOD PRESSURE: 171 MMHG | WEIGHT: 286.6 LBS | TEMPERATURE: 98.2 F | OXYGEN SATURATION: 100 % | RESPIRATION RATE: 22 BRPM | HEART RATE: 82 BPM | BODY MASS INDEX: 47.75 KG/M2

## 2023-07-22 DIAGNOSIS — R10.9 LEFT FLANK PAIN: Primary | ICD-10-CM

## 2023-07-22 LAB
ALBUMIN SERPL-MCNC: 3.4 G/DL (ref 3.5–5)
ALBUMIN/GLOB SERPL: 0.7 (ref 1.1–2.2)
ALP SERPL-CCNC: 118 U/L (ref 45–117)
ALT SERPL-CCNC: 68 U/L (ref 12–78)
ANION GAP SERPL CALC-SCNC: 10 MMOL/L (ref 5–15)
APPEARANCE UR: CLEAR
AST SERPL-CCNC: 47 U/L (ref 15–37)
BACTERIA URNS QL MICRO: NEGATIVE /HPF
BASOPHILS # BLD: 0 K/UL (ref 0–0.1)
BASOPHILS NFR BLD: 0 % (ref 0–1)
BILIRUB SERPL-MCNC: 0.3 MG/DL (ref 0.2–1)
BILIRUB UR QL: NEGATIVE
BUN SERPL-MCNC: 9 MG/DL (ref 6–20)
BUN/CREAT SERPL: 10 (ref 12–20)
CALCIUM SERPL-MCNC: 9.2 MG/DL (ref 8.5–10.1)
CHLORIDE SERPL-SCNC: 104 MMOL/L (ref 97–108)
CO2 SERPL-SCNC: 27 MMOL/L (ref 21–32)
COLOR UR: ABNORMAL
CREAT SERPL-MCNC: 0.9 MG/DL (ref 0.55–1.02)
DIFFERENTIAL METHOD BLD: ABNORMAL
EOSINOPHIL # BLD: 0.3 K/UL (ref 0–0.4)
EOSINOPHIL NFR BLD: 3 % (ref 0–7)
EPITH CASTS URNS QL MICRO: ABNORMAL /LPF
ERYTHROCYTE [DISTWIDTH] IN BLOOD BY AUTOMATED COUNT: 18.6 % (ref 11.5–14.5)
GLOBULIN SER CALC-MCNC: 4.8 G/DL (ref 2–4)
GLUCOSE SERPL-MCNC: 132 MG/DL (ref 65–100)
GLUCOSE UR STRIP.AUTO-MCNC: NEGATIVE MG/DL
HCT VFR BLD AUTO: 34.3 % (ref 35–47)
HGB BLD-MCNC: 10.6 G/DL (ref 11.5–16)
HGB UR QL STRIP: NEGATIVE
IMM GRANULOCYTES # BLD AUTO: 0 K/UL (ref 0–0.04)
IMM GRANULOCYTES NFR BLD AUTO: 0 % (ref 0–0.5)
KETONES UR QL STRIP.AUTO: NEGATIVE MG/DL
LEUKOCYTE ESTERASE UR QL STRIP.AUTO: NEGATIVE
LYMPHOCYTES # BLD: 3.7 K/UL (ref 0.8–3.5)
LYMPHOCYTES NFR BLD: 46 % (ref 12–49)
MAGNESIUM SERPL-MCNC: 1.7 MG/DL (ref 1.6–2.4)
MCH RBC QN AUTO: 24.9 PG (ref 26–34)
MCHC RBC AUTO-ENTMCNC: 30.9 G/DL (ref 30–36.5)
MCV RBC AUTO: 80.5 FL (ref 80–99)
MONOCYTES # BLD: 0.6 K/UL (ref 0–1)
MONOCYTES NFR BLD: 7 % (ref 5–13)
NEUTS SEG # BLD: 3.7 K/UL (ref 1.8–8)
NEUTS SEG NFR BLD: 44 % (ref 32–75)
NITRITE UR QL STRIP.AUTO: NEGATIVE
NRBC # BLD: 0 K/UL (ref 0–0.01)
NRBC BLD-RTO: 0 PER 100 WBC
PH UR STRIP: 6 (ref 5–8)
PLATELET # BLD AUTO: 374 K/UL (ref 150–400)
PMV BLD AUTO: 11 FL (ref 8.9–12.9)
POTASSIUM SERPL-SCNC: 4 MMOL/L (ref 3.5–5.1)
PROT SERPL-MCNC: 8.2 G/DL (ref 6.4–8.2)
PROT UR STRIP-MCNC: NEGATIVE MG/DL
RBC # BLD AUTO: 4.26 M/UL (ref 3.8–5.2)
RBC #/AREA URNS HPF: ABNORMAL /HPF (ref 0–5)
RBC MORPH BLD: ABNORMAL
SODIUM SERPL-SCNC: 141 MMOL/L (ref 136–145)
SP GR UR REFRACTOMETRY: 1.02 (ref 1–1.03)
URINE CULTURE IF INDICATED: ABNORMAL
UROBILINOGEN UR QL STRIP.AUTO: 0.2 EU/DL (ref 0.2–1)
WBC # BLD AUTO: 8.3 K/UL (ref 3.6–11)
WBC URNS QL MICRO: ABNORMAL /HPF (ref 0–4)

## 2023-07-22 PROCEDURE — 6360000002 HC RX W HCPCS: Performed by: EMERGENCY MEDICINE

## 2023-07-22 PROCEDURE — 6360000004 HC RX CONTRAST MEDICATION: Performed by: EMERGENCY MEDICINE

## 2023-07-22 PROCEDURE — 2580000003 HC RX 258: Performed by: EMERGENCY MEDICINE

## 2023-07-22 PROCEDURE — 81001 URINALYSIS AUTO W/SCOPE: CPT

## 2023-07-22 PROCEDURE — 36415 COLL VENOUS BLD VENIPUNCTURE: CPT

## 2023-07-22 PROCEDURE — 80053 COMPREHEN METABOLIC PANEL: CPT

## 2023-07-22 PROCEDURE — 85025 COMPLETE CBC W/AUTO DIFF WBC: CPT

## 2023-07-22 PROCEDURE — 71275 CT ANGIOGRAPHY CHEST: CPT

## 2023-07-22 PROCEDURE — 83735 ASSAY OF MAGNESIUM: CPT

## 2023-07-22 PROCEDURE — 74177 CT ABD & PELVIS W/CONTRAST: CPT

## 2023-07-22 PROCEDURE — 96374 THER/PROPH/DIAG INJ IV PUSH: CPT

## 2023-07-22 PROCEDURE — 99285 EMERGENCY DEPT VISIT HI MDM: CPT

## 2023-07-22 RX ORDER — 0.9 % SODIUM CHLORIDE 0.9 %
1000 INTRAVENOUS SOLUTION INTRAVENOUS ONCE
Status: COMPLETED | OUTPATIENT
Start: 2023-07-22 | End: 2023-07-22

## 2023-07-22 RX ORDER — KETOROLAC TROMETHAMINE 30 MG/ML
30 INJECTION, SOLUTION INTRAMUSCULAR; INTRAVENOUS
Status: COMPLETED | OUTPATIENT
Start: 2023-07-22 | End: 2023-07-22

## 2023-07-22 RX ADMIN — SODIUM CHLORIDE 1000 ML: 9 INJECTION, SOLUTION INTRAVENOUS at 11:20

## 2023-07-22 RX ADMIN — IOPAMIDOL 100 ML: 755 INJECTION, SOLUTION INTRAVENOUS at 11:59

## 2023-07-22 RX ADMIN — KETOROLAC TROMETHAMINE 30 MG: 30 INJECTION, SOLUTION INTRAMUSCULAR; INTRAVENOUS at 11:20

## 2023-07-22 ASSESSMENT — PAIN DESCRIPTION - ORIENTATION
ORIENTATION: LEFT

## 2023-07-22 ASSESSMENT — PAIN DESCRIPTION - LOCATION
LOCATION: RIB CAGE

## 2023-07-22 ASSESSMENT — PAIN SCALES - GENERAL
PAINLEVEL_OUTOF10: 10
PAINLEVEL_OUTOF10: 5
PAINLEVEL_OUTOF10: 10

## 2023-07-22 ASSESSMENT — PAIN DESCRIPTION - DESCRIPTORS
DESCRIPTORS: ACHING
DESCRIPTORS: SHARP
DESCRIPTORS: ACHING

## 2023-07-22 NOTE — ED NOTES
Patient given discharge papers and instructions by this RN. Patient verbalized understanding and stated not having questions or concerns regarding her care. Patient ambulatory out of ED in no new acute distress.      Kulwinder Earl RN  07/22/23 [id number]

## 2023-07-22 NOTE — ED PROVIDER NOTES
TO CULTURE   CBC WITH AUTO DIFFERENTIAL   COMPREHENSIVE METABOLIC PANEL       All other labs were unremarkable or not returned as of this dictation. EMERGENCY DEPARTMENT COURSE and DIFFERENTIAL DIAGNOSIS/MDM:   Medical Decision Making  Patient presents with flank plain/chest wall pain. Differentials include but not limited to ureterolithiasis, pyelonephritis, PE, pneumonia among others. CTA of the chest, CT abdomen and pelvis with IV contrast, labs all unremarkable. Discussed my clinical impression(s), any labs and/or radiology results with the patient. I answered any questions and addressed any concerns. Discussed the importance of following up with their primary care physician and/or specialist(s). Discussed signs or symptoms that would warrant return back to the ER for further evaluation. The patient is agreeable with discharge. Amount and/or Complexity of Data Reviewed  Labs: ordered. Radiology: ordered. Risk  Prescription drug management. EKG: All EKG's are interpreted by the Emergency Department Physician who either signs or Co-signs this chart in the absence of a cardiologist.         [address] [place] [address] Street time was 0 minutes, excluding separately reportable procedures. There was a high probability of clinically significant/life threatening deterioration in the patient's condition which required my urgent intervention. CONSULTS:  None    PROCEDURES:  Unless otherwise noted below, none     Procedures    FINAL IMPRESSION    No diagnosis found. DISPOSITION/PLAN   DISPOSITION      PATIENT REFERRED TO:  No follow-up provider specified. DISCHARGE MEDICATIONS:  New Prescriptions    No medications on file     Controlled Substances Monitoring:     No flowsheet data found.     (Please note that portions of this note were completed with a voice recognition program.  Efforts were made to edit the dictations but occasionally words are mis-transcribed.)    Winter Godfrey MD (electronically signed)  Attending Emergency Physician           Jim Redmond MD  07/22/23 0162

## 2023-07-22 NOTE — DISCHARGE INSTRUCTIONS
Thank you for allowing us to provide you with medical care today. We realize that you have many choices for your emergency care needs. We thank you for choosing [facility]. Please choose us in the future for any continued health care needs. The exam and treatment you received in the Emergency Department were for an emergent problem and are not intended as complete care. It is important that you follow up with a doctor, nurse practitioner, or physician's assistant for ongoing care. If your symptoms worsen or you do not improve as expected and you are unable to reach your usual health care provider, you should return to the Emergency Department. We are available 24 hours a day. Please make an appointment with your health care provider(s) for follow up of your Emergency Department visit. Take this sheet with you when you go to your follow-up visit.

## 2023-07-22 NOTE — ED TRIAGE NOTES
Triage: pt arrives to the ER unaccompanied for c/o left lateral rib cage x1.5 months. Triage nurse referred pt to ER. Pt was dx w/pneumonia x1 month ago but XR revealed getting better but still hasn't cleared up at this time. Pt was referred to pulmonologist and has an appointment August 6th. Pt then developed non-radiating pain to left lateral rib cage starting x1.5 months ago. Denies chest pain, fall/injury, N/V/D, long car/plane rides.

## 2023-07-25 ENCOUNTER — TELEPHONE (OUTPATIENT)
Facility: CLINIC | Age: 48
End: 2023-07-25

## 2023-07-25 ENCOUNTER — OFFICE VISIT (OUTPATIENT)
Age: 48
End: 2023-07-25
Payer: MEDICAID

## 2023-07-25 VITALS
SYSTOLIC BLOOD PRESSURE: 122 MMHG | DIASTOLIC BLOOD PRESSURE: 80 MMHG | HEIGHT: 65 IN | TEMPERATURE: 98 F | HEART RATE: 74 BPM | RESPIRATION RATE: 18 BRPM | BODY MASS INDEX: 47.92 KG/M2 | OXYGEN SATURATION: 99 % | WEIGHT: 287.6 LBS

## 2023-07-25 DIAGNOSIS — R10.12 LEFT UPPER QUADRANT ABDOMINAL PAIN: Primary | ICD-10-CM

## 2023-07-25 DIAGNOSIS — M48.062 LUMBAR STENOSIS WITH NEUROGENIC CLAUDICATION: Primary | ICD-10-CM

## 2023-07-25 PROCEDURE — 3074F SYST BP LT 130 MM HG: CPT | Performed by: INTERNAL MEDICINE

## 2023-07-25 PROCEDURE — 99215 OFFICE O/P EST HI 40 MIN: CPT | Performed by: INTERNAL MEDICINE

## 2023-07-25 PROCEDURE — 3079F DIAST BP 80-89 MM HG: CPT | Performed by: INTERNAL MEDICINE

## 2023-07-25 ASSESSMENT — PATIENT HEALTH QUESTIONNAIRE - PHQ9
SUM OF ALL RESPONSES TO PHQ QUESTIONS 1-9: 0
2. FEELING DOWN, DEPRESSED OR HOPELESS: 0
SUM OF ALL RESPONSES TO PHQ QUESTIONS 1-9: 0
1. LITTLE INTEREST OR PLEASURE IN DOING THINGS: 0
SUM OF ALL RESPONSES TO PHQ QUESTIONS 1-9: 0
SUM OF ALL RESPONSES TO PHQ9 QUESTIONS 1 & 2: 0
SUM OF ALL RESPONSES TO PHQ QUESTIONS 1-9: 0

## 2023-07-25 NOTE — TELEPHONE ENCOUNTER
PCP: Eh Villatoro MD     Last appt: 1/27/2022   Future Appointments   Date Time Provider Dwight Spencer   5/12/2022 11:30 AM Eh Villatoro MD [facility] BS AMB        Requested Prescriptions     Pending Prescriptions Disp Refills    pregabalin (LYRICA) 200 mg capsule 180 Capsule 0     Sig: TAKE 1 TABLET BY MOUTH TWICE DAILY .  DO NOT EXCEED 2 PER 24 HOURS
Requested Prescriptions     Pending Prescriptions Disp Refills    pregabalin (LYRICA) 200 mg capsule 60 Capsule 1     Sig: TAKE 1 TABLET BY MOUTH TWICE DAILY . DO NOT EXCEED 2 PER 24 HOURS     Pharmacy needs 90  days of RX.
Quality 130: Documentation Of Current Medications In The Medical Record: Current Medications Documented
Detail Level: Detailed
Quality 110: Preventive Care And Screening: Influenza Immunization: Influenza Immunization Administered during Influenza season
Quality 226: Preventive Care And Screening: Tobacco Use: Screening And Cessation Intervention: Patient screened for tobacco use and is an ex/non-smoker
Quality 431: Preventive Care And Screening: Unhealthy Alcohol Use - Screening: Patient not identified as an unhealthy alcohol user when screened for unhealthy alcohol use using a systematic screening method

## 2023-07-25 NOTE — PROGRESS NOTES
Chief Complaint   Patient presents with    Neurologic Problem     Having pain on right side between hip and rib cage - went to ER and all tests negative      1. Have you been to the ER, urgent care clinic since your last visit? Hospitalized since your last visit? Yes [facility] ER in [place] for pain     2. Have you seen or consulted any other health care providers outside of the [address] [place] [address] Avenue since your last visit? Include any pap smears or colon screening.   Yes [facility] twice
and body mass index (BMI) of 40.0 to 44.9 in adult [facility])    Cervical stenosis of spinal canal                   Signed By:   Shavonne Alexis DO  Neurophysiology      July 25, 2023

## 2023-07-25 NOTE — TELEPHONE ENCOUNTER
Patient says she is having pain upper left abdomen for about 1 month. Patient had lab drawn today at [facility] and want to know if you can order/add an order to check her pancreas.

## 2023-07-26 DIAGNOSIS — R10.12 LEFT UPPER QUADRANT ABDOMINAL PAIN: ICD-10-CM

## 2023-07-26 LAB
ALBUMIN SERPL-MCNC: 3.9 G/DL (ref 3.9–4.9)
ALBUMIN/GLOB SERPL: 1.1 {RATIO} (ref 1.2–2.2)
ALP SERPL-CCNC: 119 IU/L (ref 44–121)
ALT SERPL-CCNC: 40 IU/L (ref 0–32)
AST SERPL-CCNC: 32 IU/L (ref 0–40)
BASOPHILS # BLD AUTO: 0 X10E3/UL (ref 0–0.2)
BASOPHILS NFR BLD AUTO: 0 %
BILIRUB SERPL-MCNC: <0.2 MG/DL (ref 0–1.2)
BUN SERPL-MCNC: 10 MG/DL (ref 6–24)
BUN/CREAT SERPL: 11 (ref 9–23)
CALCIUM SERPL-MCNC: 9.2 MG/DL (ref 8.7–10.2)
CHLORIDE SERPL-SCNC: 101 MMOL/L (ref 96–106)
CO2 SERPL-SCNC: 24 MMOL/L (ref 20–29)
CREAT SERPL-MCNC: 0.88 MG/DL (ref 0.57–1)
EGFRCR SERPLBLD CKD-EPI 2021: 80 ML/MIN/1.73
EOSINOPHIL # BLD AUTO: 0.2 X10E3/UL (ref 0–0.4)
EOSINOPHIL NFR BLD AUTO: 3 %
ERYTHROCYTE [DISTWIDTH] IN BLOOD BY AUTOMATED COUNT: 17.2 % (ref 11.7–15.4)
EST. AVERAGE GLUCOSE BLD GHB EST-MCNC: 171 MG/DL
GLOBULIN SER CALC-MCNC: 3.4 G/DL (ref 1.5–4.5)
GLUCOSE SERPL-MCNC: 128 MG/DL (ref 70–99)
HBA1C MFR BLD: 7.6 % (ref 4.8–5.6)
HCT VFR BLD AUTO: 31.8 % (ref 34–46.6)
HGB BLD-MCNC: 10.3 G/DL (ref 11.1–15.9)
IMM GRANULOCYTES # BLD AUTO: 0 X10E3/UL (ref 0–0.1)
IMM GRANULOCYTES NFR BLD AUTO: 1 %
LYMPHOCYTES # BLD AUTO: 3.3 X10E3/UL (ref 0.7–3.1)
LYMPHOCYTES NFR BLD AUTO: 41 %
MCH RBC QN AUTO: 25.6 PG (ref 26.6–33)
MCHC RBC AUTO-ENTMCNC: 32.4 G/DL (ref 31.5–35.7)
MCV RBC AUTO: 79 FL (ref 79–97)
MONOCYTES # BLD AUTO: 0.5 X10E3/UL (ref 0.1–0.9)
MONOCYTES NFR BLD AUTO: 7 %
NEUTROPHILS # BLD AUTO: 3.9 X10E3/UL (ref 1.4–7)
NEUTROPHILS NFR BLD AUTO: 48 %
PLATELET # BLD AUTO: 370 X10E3/UL (ref 150–450)
POTASSIUM SERPL-SCNC: 4.6 MMOL/L (ref 3.5–5.2)
PROT SERPL-MCNC: 7.3 G/DL (ref 6–8.5)
RBC # BLD AUTO: 4.02 X10E6/UL (ref 3.77–5.28)
SODIUM SERPL-SCNC: 139 MMOL/L (ref 134–144)
TSH SERPL DL<=0.005 MIU/L-ACNC: 3.06 UIU/ML (ref 0.45–4.5)
WBC # BLD AUTO: 8 X10E3/UL (ref 3.4–10.8)

## 2023-07-26 RX ORDER — BUPROPION HYDROCHLORIDE 150 MG/1
TABLET, EXTENDED RELEASE ORAL
Qty: 180 TABLET | Refills: 3 | Status: SHIPPED | OUTPATIENT
Start: 2023-07-26 | End: 2023-09-01 | Stop reason: SDUPTHER

## 2023-07-26 NOTE — TELEPHONE ENCOUNTER
PCP: Vasquez Davies MD     Last appt:  5/1/2023    Future Appointments   Date Time Provider [address]   7/27/2023  1:40 PM Carlos Turner MD [facility] BS AMB   8/29/2023 10:40 AM Slime Trimble MD [address] [facility] BS AMB   8/29/2023  2:10 PM Jeremías Michelle MD [address] BS AMB   9/1/2023  8:10 AM Vasquez Davies MD [facility] AMB          Requested Prescriptions     Pending Prescriptions Disp Refills    buPROPion (WELLBUTRIN SR) 150 MG extended release tablet [Pharmacy Med Name: buPROPion HCl ER (SR) 150 MG Oral Tablet Extended Release 12 Hour] 180 tablet 0     Sig: Take 1 tablet by mouth twice daily

## 2023-07-27 LAB — SPECIMEN STATUS REPORT: NORMAL

## 2023-07-28 LAB
AMYLASE SERPL-CCNC: 50 U/L (ref 31–110)
LIPASE SERPL-CCNC: 21 U/L (ref 14–72)

## 2023-08-23 RX ORDER — VALSARTAN 80 MG/1
TABLET ORAL
Qty: 90 TABLET | Refills: 1 | Status: SHIPPED | OUTPATIENT
Start: 2023-08-23 | End: 2023-09-01 | Stop reason: SDUPTHER

## 2023-08-23 NOTE — TELEPHONE ENCOUNTER
PCP: Antonina Caal MD     Last appt:  5/1/2023    Future Appointments   Date Time Provider [address]   8/29/2023 10:40 AM Irma Vega MD [address] BS AMB   8/29/2023  2:10 PM Estefany Foley MD [address] BS AMB   9/1/2023  8:10 AM Antonina Caal MD [facility] BS AMB          Requested Prescriptions     Pending Prescriptions Disp Refills    valsartan (DIOVAN) 80 MG tablet [Pharmacy Med Name: Valsartan 80 MG Oral Tablet] 90 tablet 0     Sig: TAKE 1 TABLET BY MOUTH ONCE DAILY FOR HIGH BLOOD PRESSURE

## 2023-08-25 RX ORDER — PEN NEEDLE, DIABETIC 32GX 5/32"
NEEDLE, DISPOSABLE MISCELLANEOUS
Qty: 150 EACH | Refills: 5 | Status: SHIPPED | OUTPATIENT
Start: 2023-08-25 | End: 2023-08-29 | Stop reason: SDUPTHER

## 2023-08-27 NOTE — PROGRESS NOTES
[address] [facility].[place], [address]  Tel.  [phone]  Fax. [address] [facility], [address] [place] [address]  Tel.  [phone]  Fax. [phone] [facility], [address] [facility]  Tel.  [phone]  Fax. [phone]     Kailash Montano is a 46 y.o. female seen for a positive airway pressure follow-up. She reports no problems using the device. The following problems are identified:    Drowsiness At times Problems exhaling no   Snoring no Forget to put on no   Mask Comfortable yes Can't fall asleep no   Dry Mouth no Mask falls off no   Air Leaking At times Frequent awakenings no     Estimated AHI flow from download shows 1/hour. some significant leak  Averaging 7.5/hours use on nights used  Days with usage 100%  Adherence 97%  Weight from last visit 272 lb       She admits that her sleep has significantly improved. Allergies   Allergen Reactions    Lisinopril Cough    Penicillins Hives       She has a current medication list which includes the following prescription(s): atorvastatin, bd pen needle zacarias 2nd gen, valsartan, bupropion, certolizumab pegol, pregabalin, trulicity, acetaminophen, albuterol sulfate hfa, albuterol, amitriptyline, cyclobenzaprine, dicyclomine, fluticasone-salmeterol, folic acid, gabapentin, glipizide, hydrocodone-acetaminophen, ibuprofen, insulin detemir, insulin lispro (1 unit dial), lidocaine, meclizine, metformin, methotrexate, metoclopramide, montelukast, nitroglycerin, omeprazole, and ondansetron. .      She  has a past medical history of Anxiety, Asthma, DM type 2 (diabetes mellitus, type 2) ([address]), GERD (gastroesophageal reflux disease), GI bleed, Hepatic steatosis, HTN (hypertension), Irritable bowel, Kidney mass, Morbid obesity ([address]), CARLITO on CPAP, PUD (peptic ulcer disease), Renal cancer, right ([address]), and Rheumatoid arthritis ([address]).     Sleep Medicine 8/29/2023 8/26/2022 8/16/2021 4/22/2021   Sitting and reading 3 3 3 3

## 2023-08-28 DIAGNOSIS — E78.2 MIXED HYPERLIPIDEMIA: Primary | ICD-10-CM

## 2023-08-28 RX ORDER — ATORVASTATIN CALCIUM 40 MG/1
40 TABLET, FILM COATED ORAL DAILY
Qty: 90 TABLET | Refills: 1 | Status: SHIPPED | OUTPATIENT
Start: 2023-08-28 | End: 2023-08-29 | Stop reason: SDUPTHER

## 2023-08-29 ENCOUNTER — TELEPHONE (OUTPATIENT)
Age: 48
End: 2023-08-29

## 2023-08-29 ENCOUNTER — OFFICE VISIT (OUTPATIENT)
Age: 48
End: 2023-08-29
Payer: MEDICAID

## 2023-08-29 ENCOUNTER — CLINICAL DOCUMENTATION (OUTPATIENT)
Age: 48
End: 2023-08-29

## 2023-08-29 VITALS
WEIGHT: 278 LBS | SYSTOLIC BLOOD PRESSURE: 135 MMHG | HEIGHT: 64 IN | DIASTOLIC BLOOD PRESSURE: 81 MMHG | HEART RATE: 74 BPM | OXYGEN SATURATION: 95 % | BODY MASS INDEX: 47.46 KG/M2

## 2023-08-29 VITALS
WEIGHT: 280.2 LBS | DIASTOLIC BLOOD PRESSURE: 72 MMHG | BODY MASS INDEX: 47.84 KG/M2 | HEIGHT: 64 IN | HEART RATE: 103 BPM | SYSTOLIC BLOOD PRESSURE: 138 MMHG

## 2023-08-29 DIAGNOSIS — E11.42 TYPE 2 DIABETES MELLITUS WITH DIABETIC POLYNEUROPATHY, WITH LONG-TERM CURRENT USE OF INSULIN (HCC): Primary | ICD-10-CM

## 2023-08-29 DIAGNOSIS — Z79.4 TYPE 2 DIABETES MELLITUS WITH DIABETIC POLYNEUROPATHY, WITH LONG-TERM CURRENT USE OF INSULIN (HCC): Primary | ICD-10-CM

## 2023-08-29 DIAGNOSIS — G47.33 OBSTRUCTIVE SLEEP APNEA (ADULT) (PEDIATRIC): Primary | ICD-10-CM

## 2023-08-29 DIAGNOSIS — E78.2 MIXED HYPERLIPIDEMIA: ICD-10-CM

## 2023-08-29 DIAGNOSIS — I10 ESSENTIAL (PRIMARY) HYPERTENSION: ICD-10-CM

## 2023-08-29 PROCEDURE — 99214 OFFICE O/P EST MOD 30 MIN: CPT | Performed by: GENERAL ACUTE CARE HOSPITAL

## 2023-08-29 PROCEDURE — 3078F DIAST BP <80 MM HG: CPT | Performed by: GENERAL ACUTE CARE HOSPITAL

## 2023-08-29 PROCEDURE — 99213 OFFICE O/P EST LOW 20 MIN: CPT | Performed by: INTERNAL MEDICINE

## 2023-08-29 PROCEDURE — 3074F SYST BP LT 130 MM HG: CPT | Performed by: GENERAL ACUTE CARE HOSPITAL

## 2023-08-29 PROCEDURE — 3051F HG A1C>EQUAL 7.0%<8.0%: CPT | Performed by: GENERAL ACUTE CARE HOSPITAL

## 2023-08-29 PROCEDURE — 3079F DIAST BP 80-89 MM HG: CPT | Performed by: INTERNAL MEDICINE

## 2023-08-29 PROCEDURE — 3075F SYST BP GE 130 - 139MM HG: CPT | Performed by: INTERNAL MEDICINE

## 2023-08-29 RX ORDER — ATORVASTATIN CALCIUM 40 MG/1
TABLET, FILM COATED ORAL
Qty: 90 TABLET | Refills: 3 | Status: SHIPPED | OUTPATIENT
Start: 2023-08-29

## 2023-08-29 RX ORDER — INSULIN DETEMIR 100 [IU]/ML
INJECTION, SOLUTION SUBCUTANEOUS
Qty: 45 ML | Refills: 5 | Status: SHIPPED | OUTPATIENT
Start: 2023-08-29

## 2023-08-29 RX ORDER — DULAGLUTIDE 4.5 MG/.5ML
INJECTION, SOLUTION SUBCUTANEOUS
Qty: 6 ML | Refills: 3 | Status: SHIPPED | OUTPATIENT
Start: 2023-08-29

## 2023-08-29 RX ORDER — GLIPIZIDE 10 MG/1
TABLET, FILM COATED, EXTENDED RELEASE ORAL
Qty: 180 TABLET | Refills: 3 | Status: SHIPPED | OUTPATIENT
Start: 2023-08-29

## 2023-08-29 RX ORDER — INSULIN LISPRO 100 [IU]/ML
INJECTION, SOLUTION INTRAVENOUS; SUBCUTANEOUS
Qty: 45 ML | Refills: 5 | Status: SHIPPED | OUTPATIENT
Start: 2023-08-29

## 2023-08-29 RX ORDER — PEN NEEDLE, DIABETIC 32GX 5/32"
NEEDLE, DISPOSABLE MISCELLANEOUS
Qty: 450 EACH | Refills: 3 | Status: SHIPPED | OUTPATIENT
Start: 2023-08-29

## 2023-08-29 RX ORDER — METFORMIN HYDROCHLORIDE 500 MG/1
1000 TABLET, EXTENDED RELEASE ORAL 2 TIMES DAILY
Qty: 360 TABLET | Refills: 3 | Status: SHIPPED | OUTPATIENT
Start: 2023-08-29

## 2023-08-29 ASSESSMENT — SLEEP AND FATIGUE QUESTIONNAIRES
ESS TOTAL SCORE: 16
HOW LIKELY ARE YOU TO NOD OFF OR FALL ASLEEP IN A CAR, WHILE STOPPED FOR A FEW MINUTES IN TRAFFIC: 1
HOW LIKELY ARE YOU TO NOD OFF OR FALL ASLEEP WHILE SITTING QUIETLY AFTER LUNCH WITHOUT ALCOHOL: 1
HOW LIKELY ARE YOU TO NOD OFF OR FALL ASLEEP WHILE WATCHING TV: 3
HOW LIKELY ARE YOU TO NOD OFF OR FALL ASLEEP WHILE SITTING INACTIVE IN A PUBLIC PLACE: 1
HOW LIKELY ARE YOU TO NOD OFF OR FALL ASLEEP WHILE SITTING AND READING: 3
HOW LIKELY ARE YOU TO NOD OFF OR FALL ASLEEP WHEN YOU ARE A PASSENGER IN A CAR FOR AN HOUR WITHOUT A BREAK: 3
HOW LIKELY ARE YOU TO NOD OFF OR FALL ASLEEP WHILE SITTING AND TALKING TO SOMEONE: 1
HOW LIKELY ARE YOU TO NOD OFF OR FALL ASLEEP WHILE LYING DOWN TO REST IN THE AFTERNOON WHEN CIRCUMSTANCES PERMIT: 3

## 2023-08-29 NOTE — TELEPHONE ENCOUNTER
Received a fax from [facility] requesting clarification for The Dexcom G6 transmitter. It stated that they need a new script with up dated sig according to Ms. Yarelis Grover

## 2023-08-29 NOTE — PATIENT INSTRUCTIONS
PLAN FOR TODAY    We will plan to make the following changes to your diabetes medications:  Metformin 1000mg twice daily  Glipizide SR 10 mg take 2 tabs at the same time every morning  Trulicity 7.5BY weekly  Levemir 34 units in AM and 26 units in PM  Humalog 15 units before meals + Correction scale  Correction Scale:   1 unit for every 40 above 150    IF GLUCOSE IS:                 THEN TAKE:      0   Extra Unit  151-190   1   Extra Unit  191-230   2   Extra Units  231-270   3   Extra Units  271-310   4   Extra Units  311-350   5   Extra Units  >350    6   Extra Units    Example: My planned insulin dose:    ____ Units    +    ____ Extra Correction Units  =  ____ total units to take together as one injection. It will be important to continue checking your glucose just as you did previously. I would like you at the very least to check you glucose during:    AM fasting before breakfast  Before Lunch  Before Dinner  Any other time that you are not feeling well. Always provide a glucose log that is completed at every visit so that we can review the results of your home glucose together. Without this, it is not possible to make accurate changes to your diabetes regimen. Jose Dunbar MD  [facility]       Hypoglycemia:    Hypoglycemia means that your blood sugar is low and your body is not getting enough fuel. Some people get low blood sugar from not eating often enough. Some medicines to treat diabetes can cause low blood sugar. People who have had surgery on their stomachs or intestines may get hypoglycemia. Problems with the pancreas, kidneys, or liver also can cause low blood sugar. A snack or drink with sugar in it will raise your blood sugar and should ease your symptoms right away. How can you care for yourself at home? Learn your signs of low blood sugar. They are different for everyone. Some common early signs include:  Nausea. Hunger.   Feeling nervous, irritable, or

## 2023-08-29 NOTE — PATIENT INSTRUCTIONS
[address] [facility]., Ceferino Carter, [address]  Tel.  [phone]  Fax. [address] [facility], [address] [facility]  Tel.  [phone]  Fax. [phone] [facility], [address] [facility]  Tel.  [phone]  Fax. [phone]     PROPER SLEEP HYGIENE    What to avoid  Do not have drinks with caffeine, such as coffee or black tea, for 8 hours before bed. Do not smoke or use other types of tobacco near bedtime. Nicotine is a stimulant and can keep you awake. Avoid drinking alcohol late in the evening, because it can cause you to wake in the middle of the night. Do not eat a big meal close to bedtime. If you are hungry, eat a light snack. Do not drink a lot of water close to bedtime, because the need to urinate may wake you up during the night. Do not read or watch TV in bed. Use the bed only for sleeping and sexual activity. What to try  Go to bed at the same time every night, and wake up at the same time every morning. Do not take naps during the day. Keep your bedroom quiet, dark, and cool. Get regular exercise, but not within 3 to 4 hours of your bedtime. .  Sleep on a comfortable pillow and mattress. If watching the clock makes you anxious, turn it facing away from you so you cannot see the time. If you worry when you lie down, start a worry book. Well before bedtime, write down your worries, and then set the book and your concerns aside. Try meditation or other relaxation techniques before you go to bed. If you cannot fall asleep, get up and go to another room until you feel sleepy. Do something relaxing. Repeat your bedtime routine before you go to bed again. Make your house quiet and calm about an hour before bedtime. Turn down the lights, turn off the TV, log off the computer, and turn down the volume on music. This can help you relax after a busy day.     Drowsy Driving  The [address] [place] [address] Traffic Safety Administration cites drowsiness as a

## 2023-08-29 NOTE — PROGRESS NOTES
[facility]  DR CHRISTIAN LEIJA     CHIEF COMPLAINT: f/u evaluation for uncontrolled type 2 diabetes    HISTORY OF PRESENT ILLNESS:   Yayo Contreras is a 46 y.o. female with a PMHx as noted below who presents to the endocrinology clinic for f/u evaluation of uncontrolled type 2 diabetes. 8/29/2023  Feels slightly under the weather today. Denies new complaints. Compliant with her medications. She has gained some weight back of 6-7 lbs over past 5 months. 02/9/2023  Since the last visit ms Mark Pino had Orthopedics cervical spinal surgery on 01/06/2023 and is recovering well, has neck brace on and feels the numbness on her left side has resolved. She has been compliant with her medication and has lost 15 pounds since the last visit. She indicates that she has been having diarrhea for the last few days she usually has constipation, advised to follow-up with gastroenterologist regarding that or if she is having a lot of fluid lost that she is not able to replace to go to the emergency room department. Current regimen:   Trulicity 0.9PY weekly  Levemir 36 in AM and 34 in PM  Humalog 15 units TIDAC  MTF 1g BID  Glipizide 10mg twice daily     She takes the Humalog before BREAKFAST AND DINNER ONLY  She was able to get the Dexcom and continues to use it  Denies hypoglycemia episodes and she is waking up in the morning with BS <140    She had made significant dietary changes, adopting vegetarian lifestyle. She has upcoming appt with GI to see why she is having bouts of illness associated with reduced appetite and following her FIELDS. Denies other complaints at this time. She continues to struggle with mobility due to RA but she is feeling better overall. She is up-to-date with ophthalm    Diabetes history:  Diagnosed in 2016  Most recent Hgb A1c was 11.7 in 04/08/2022.  7.4% 08/1/22, 6.5% 11/08/2022  5.9% 12/20/2022  Review of most recent hemoglobin A1c :  Lab Results

## 2023-09-01 ENCOUNTER — OFFICE VISIT (OUTPATIENT)
Facility: CLINIC | Age: 48
End: 2023-09-01
Payer: MEDICAID

## 2023-09-01 VITALS
HEIGHT: 63 IN | SYSTOLIC BLOOD PRESSURE: 122 MMHG | BODY MASS INDEX: 49.43 KG/M2 | TEMPERATURE: 98.1 F | HEART RATE: 79 BPM | WEIGHT: 279 LBS | RESPIRATION RATE: 16 BRPM | DIASTOLIC BLOOD PRESSURE: 79 MMHG | OXYGEN SATURATION: 96 %

## 2023-09-01 DIAGNOSIS — R07.9 CHEST PAIN, UNSPECIFIED TYPE: ICD-10-CM

## 2023-09-01 DIAGNOSIS — I10 ESSENTIAL HYPERTENSION: Primary | ICD-10-CM

## 2023-09-01 DIAGNOSIS — E78.2 MIXED HYPERLIPIDEMIA: ICD-10-CM

## 2023-09-01 DIAGNOSIS — F33.1 MODERATE EPISODE OF RECURRENT MAJOR DEPRESSIVE DISORDER (HCC): ICD-10-CM

## 2023-09-01 DIAGNOSIS — M51.36 DDD (DEGENERATIVE DISC DISEASE), LUMBAR: ICD-10-CM

## 2023-09-01 DIAGNOSIS — E11.42 TYPE 2 DIABETES MELLITUS WITH DIABETIC POLYNEUROPATHY, WITH LONG-TERM CURRENT USE OF INSULIN (HCC): ICD-10-CM

## 2023-09-01 DIAGNOSIS — J45.40 MODERATE PERSISTENT ASTHMA WITHOUT COMPLICATION: ICD-10-CM

## 2023-09-01 DIAGNOSIS — R42 VERTIGO: ICD-10-CM

## 2023-09-01 DIAGNOSIS — R11.0 NAUSEA: ICD-10-CM

## 2023-09-01 DIAGNOSIS — Z79.4 TYPE 2 DIABETES MELLITUS WITH DIABETIC POLYNEUROPATHY, WITH LONG-TERM CURRENT USE OF INSULIN (HCC): ICD-10-CM

## 2023-09-01 PROCEDURE — 3074F SYST BP LT 130 MM HG: CPT | Performed by: INTERNAL MEDICINE

## 2023-09-01 PROCEDURE — 3051F HG A1C>EQUAL 7.0%<8.0%: CPT | Performed by: INTERNAL MEDICINE

## 2023-09-01 PROCEDURE — 99215 OFFICE O/P EST HI 40 MIN: CPT | Performed by: INTERNAL MEDICINE

## 2023-09-01 PROCEDURE — 3078F DIAST BP <80 MM HG: CPT | Performed by: INTERNAL MEDICINE

## 2023-09-01 RX ORDER — NITROGLYCERIN 0.4 MG/1
0.4 TABLET SUBLINGUAL EVERY 5 MIN PRN
Qty: 25 TABLET | Refills: 3 | Status: SHIPPED | OUTPATIENT
Start: 2023-09-01

## 2023-09-01 RX ORDER — ONDANSETRON 4 MG/1
TABLET, ORALLY DISINTEGRATING ORAL
Qty: 30 TABLET | Refills: 3 | Status: SHIPPED | OUTPATIENT
Start: 2023-09-01

## 2023-09-01 RX ORDER — CERTOLIZUMAB PEGOL 200 MG/ML
INJECTION, SOLUTION SUBCUTANEOUS
COMMUNITY
Start: 2023-07-25

## 2023-09-01 RX ORDER — LIDOCAINE 50 MG/G
1 PATCH TOPICAL EVERY 24 HOURS
Qty: 30 PATCH | Refills: 3 | Status: SHIPPED | OUTPATIENT
Start: 2023-09-01

## 2023-09-01 RX ORDER — ALBUTEROL SULFATE 2.5 MG/3ML
SOLUTION RESPIRATORY (INHALATION)
Qty: 120 EACH | Refills: 3 | Status: SHIPPED | OUTPATIENT
Start: 2023-09-01

## 2023-09-01 RX ORDER — MECLIZINE HYDROCHLORIDE 25 MG/1
25 TABLET ORAL 3 TIMES DAILY PRN
Qty: 90 TABLET | Refills: 3 | Status: SHIPPED | OUTPATIENT
Start: 2023-09-01

## 2023-09-01 RX ORDER — VALSARTAN 80 MG/1
80 TABLET ORAL DAILY
Qty: 90 TABLET | Refills: 3 | Status: SHIPPED | OUTPATIENT
Start: 2023-09-01

## 2023-09-01 RX ORDER — AMITRIPTYLINE HYDROCHLORIDE 50 MG/1
50 TABLET, FILM COATED ORAL NIGHTLY
Qty: 90 TABLET | Refills: 3 | Status: SHIPPED | OUTPATIENT
Start: 2023-09-01

## 2023-09-01 RX ORDER — MONTELUKAST SODIUM 10 MG/1
TABLET ORAL
Qty: 90 TABLET | Refills: 3 | Status: SHIPPED | OUTPATIENT
Start: 2023-09-01

## 2023-09-01 RX ORDER — FLUTICASONE PROPIONATE AND SALMETEROL 500; 50 UG/1; UG/1
1 POWDER RESPIRATORY (INHALATION) EVERY 12 HOURS
Qty: 180 EACH | Refills: 3 | Status: SHIPPED | OUTPATIENT
Start: 2023-09-01

## 2023-09-01 RX ORDER — BUPROPION HYDROCHLORIDE 150 MG/1
150 TABLET, EXTENDED RELEASE ORAL 2 TIMES DAILY
Qty: 180 TABLET | Refills: 3 | Status: SHIPPED | OUTPATIENT
Start: 2023-09-01

## 2023-09-01 RX ORDER — PROCHLORPERAZINE 25 MG/1
SUPPOSITORY RECTAL
COMMUNITY
Start: 2023-08-19

## 2023-09-01 SDOH — ECONOMIC STABILITY: INCOME INSECURITY: HOW HARD IS IT FOR YOU TO PAY FOR THE VERY BASICS LIKE FOOD, HOUSING, MEDICAL CARE, AND HEATING?: SOMEWHAT HARD

## 2023-09-01 SDOH — ECONOMIC STABILITY: HOUSING INSECURITY
IN THE LAST 12 MONTHS, WAS THERE A TIME WHEN YOU DID NOT HAVE A STEADY PLACE TO SLEEP OR SLEPT IN A SHELTER (INCLUDING NOW)?: NO

## 2023-09-01 SDOH — ECONOMIC STABILITY: FOOD INSECURITY: WITHIN THE PAST 12 MONTHS, YOU WORRIED THAT YOUR FOOD WOULD RUN OUT BEFORE YOU GOT MONEY TO BUY MORE.: NEVER TRUE

## 2023-09-01 SDOH — ECONOMIC STABILITY: FOOD INSECURITY: WITHIN THE PAST 12 MONTHS, THE FOOD YOU BOUGHT JUST DIDN'T LAST AND YOU DIDN'T HAVE MONEY TO GET MORE.: NEVER TRUE

## 2023-09-11 ENCOUNTER — TELEPHONE (OUTPATIENT)
Facility: CLINIC | Age: 48
End: 2023-09-11

## 2023-09-11 NOTE — TELEPHONE ENCOUNTER
Pharmacy called and needed clarification on the lidocaine patches please call to advise.      Call at [phone] ref # [id number]

## 2023-09-12 RX ORDER — PROCHLORPERAZINE 25 MG/1
SUPPOSITORY RECTAL
Qty: 1 EACH | Refills: 3 | Status: SHIPPED | OUTPATIENT
Start: 2023-09-12

## 2023-09-12 NOTE — TELEPHONE ENCOUNTER
Spoke with Chanel from [facility]. Rx reads to use 1 patch every 24 hours. Patches should not be on the skin for more than 12 hours at a time. Clarification was changes to 1 patch every 24 hrs, 12 hours on and 12 hours off.

## 2023-09-14 ENCOUNTER — APPOINTMENT (OUTPATIENT)
Facility: HOSPITAL | Age: 48
End: 2023-09-14
Payer: MEDICAID

## 2023-09-14 ENCOUNTER — HOSPITAL ENCOUNTER (EMERGENCY)
Facility: HOSPITAL | Age: 48
Discharge: HOME | End: 2023-09-14
Attending: EMERGENCY MEDICINE
Payer: MEDICAID

## 2023-09-14 VITALS
SYSTOLIC BLOOD PRESSURE: 166 MMHG | RESPIRATION RATE: 14 BRPM | BODY MASS INDEX: 48.73 KG/M2 | OXYGEN SATURATION: 100 % | HEART RATE: 66 BPM | WEIGHT: 275 LBS | DIASTOLIC BLOOD PRESSURE: 71 MMHG | HEIGHT: 63 IN | TEMPERATURE: 98.2 F

## 2023-09-14 DIAGNOSIS — R11.0 NAUSEA: ICD-10-CM

## 2023-09-14 DIAGNOSIS — R74.8 ELEVATED LIVER ENZYMES: ICD-10-CM

## 2023-09-14 DIAGNOSIS — R11.2 NAUSEA AND VOMITING, UNSPECIFIED VOMITING TYPE: Primary | ICD-10-CM

## 2023-09-14 LAB
ALBUMIN SERPL-MCNC: 3.6 G/DL (ref 3.5–5)
ALBUMIN/GLOB SERPL: 0.8 (ref 1.1–2.2)
ALP SERPL-CCNC: 133 U/L (ref 45–117)
ALT SERPL-CCNC: 102 U/L (ref 12–78)
ANION GAP SERPL CALC-SCNC: 5 MMOL/L (ref 5–15)
APPEARANCE UR: CLEAR
AST SERPL-CCNC: 101 U/L (ref 15–37)
BACTERIA URNS QL MICRO: NEGATIVE /HPF
BASOPHILS # BLD: 0 K/UL (ref 0–0.1)
BASOPHILS NFR BLD: 0 % (ref 0–1)
BILIRUB SERPL-MCNC: 0.4 MG/DL (ref 0.2–1)
BILIRUB UR QL: NEGATIVE
BUN SERPL-MCNC: 8 MG/DL (ref 6–20)
BUN/CREAT SERPL: 7 (ref 12–20)
CALCIUM SERPL-MCNC: 9.3 MG/DL (ref 8.5–10.1)
CHLORIDE SERPL-SCNC: 104 MMOL/L (ref 97–108)
CO2 SERPL-SCNC: 28 MMOL/L (ref 21–32)
COLOR UR: ABNORMAL
CREAT SERPL-MCNC: 1.13 MG/DL (ref 0.55–1.02)
DIFFERENTIAL METHOD BLD: ABNORMAL
EOSINOPHIL # BLD: 0.1 K/UL (ref 0–0.4)
EOSINOPHIL NFR BLD: 1 % (ref 0–7)
EPITH CASTS URNS QL MICRO: ABNORMAL /LPF
ERYTHROCYTE [DISTWIDTH] IN BLOOD BY AUTOMATED COUNT: 18.4 % (ref 11.5–14.5)
FLUAV AG NPH QL IA: NEGATIVE
FLUBV AG NOSE QL IA: NEGATIVE
GLOBULIN SER CALC-MCNC: 4.5 G/DL (ref 2–4)
GLUCOSE SERPL-MCNC: 155 MG/DL (ref 65–100)
GLUCOSE UR STRIP.AUTO-MCNC: NEGATIVE MG/DL
HCT VFR BLD AUTO: 35.4 % (ref 35–47)
HGB BLD-MCNC: 10.9 G/DL (ref 11.5–16)
HGB UR QL STRIP: NEGATIVE
HYALINE CASTS URNS QL MICRO: ABNORMAL /LPF (ref 0–2)
IMM GRANULOCYTES # BLD AUTO: 0 K/UL (ref 0–0.04)
IMM GRANULOCYTES NFR BLD AUTO: 0 % (ref 0–0.5)
KETONES UR QL STRIP.AUTO: ABNORMAL MG/DL
LEUKOCYTE ESTERASE UR QL STRIP.AUTO: NEGATIVE
LIPASE SERPL-CCNC: 88 U/L (ref 73–393)
LYMPHOCYTES # BLD: 2.9 K/UL (ref 0.8–3.5)
LYMPHOCYTES NFR BLD: 33 % (ref 12–49)
MCH RBC QN AUTO: 24.7 PG (ref 26–34)
MCHC RBC AUTO-ENTMCNC: 30.8 G/DL (ref 30–36.5)
MCV RBC AUTO: 80.3 FL (ref 80–99)
MONOCYTES # BLD: 0.5 K/UL (ref 0–1)
MONOCYTES NFR BLD: 6 % (ref 5–13)
NEUTS SEG # BLD: 5.2 K/UL (ref 1.8–8)
NEUTS SEG NFR BLD: 60 % (ref 32–75)
NITRITE UR QL STRIP.AUTO: NEGATIVE
NRBC # BLD: 0 K/UL (ref 0–0.01)
NRBC BLD-RTO: 0 PER 100 WBC
PH UR STRIP: 6.5 (ref 5–8)
PLATELET # BLD AUTO: 361 K/UL (ref 150–400)
PMV BLD AUTO: 11.5 FL (ref 8.9–12.9)
POTASSIUM SERPL-SCNC: 4.4 MMOL/L (ref 3.5–5.1)
PROT SERPL-MCNC: 8.1 G/DL (ref 6.4–8.2)
PROT UR STRIP-MCNC: NEGATIVE MG/DL
RBC # BLD AUTO: 4.41 M/UL (ref 3.8–5.2)
RBC #/AREA URNS HPF: ABNORMAL /HPF (ref 0–5)
SARS-COV-2 RDRP RESP QL NAA+PROBE: NOT DETECTED
SODIUM SERPL-SCNC: 137 MMOL/L (ref 136–145)
SOURCE: NORMAL
SP GR UR REFRACTOMETRY: 1.03
TROPONIN I SERPL HS-MCNC: 9 NG/L (ref 0–51)
URINE CULTURE IF INDICATED: ABNORMAL
UROBILINOGEN UR QL STRIP.AUTO: 1 EU/DL (ref 0.2–1)
WBC # BLD AUTO: 8.7 K/UL (ref 3.6–11)
WBC URNS QL MICRO: ABNORMAL /HPF (ref 0–4)

## 2023-09-14 PROCEDURE — 36415 COLL VENOUS BLD VENIPUNCTURE: CPT

## 2023-09-14 PROCEDURE — 96361 HYDRATE IV INFUSION ADD-ON: CPT

## 2023-09-14 PROCEDURE — 93005 ELECTROCARDIOGRAM TRACING: CPT | Performed by: EMERGENCY MEDICINE

## 2023-09-14 PROCEDURE — 83690 ASSAY OF LIPASE: CPT

## 2023-09-14 PROCEDURE — 6360000002 HC RX W HCPCS: Performed by: EMERGENCY MEDICINE

## 2023-09-14 PROCEDURE — 74177 CT ABD & PELVIS W/CONTRAST: CPT

## 2023-09-14 PROCEDURE — 87635 SARS-COV-2 COVID-19 AMP PRB: CPT

## 2023-09-14 PROCEDURE — 85025 COMPLETE CBC W/AUTO DIFF WBC: CPT

## 2023-09-14 PROCEDURE — 2580000003 HC RX 258: Performed by: EMERGENCY MEDICINE

## 2023-09-14 PROCEDURE — 99285 EMERGENCY DEPT VISIT HI MDM: CPT

## 2023-09-14 PROCEDURE — 80053 COMPREHEN METABOLIC PANEL: CPT

## 2023-09-14 PROCEDURE — 84484 ASSAY OF TROPONIN QUANT: CPT

## 2023-09-14 PROCEDURE — 81001 URINALYSIS AUTO W/SCOPE: CPT

## 2023-09-14 PROCEDURE — 87804 INFLUENZA ASSAY W/OPTIC: CPT

## 2023-09-14 PROCEDURE — 96374 THER/PROPH/DIAG INJ IV PUSH: CPT

## 2023-09-14 PROCEDURE — 6360000004 HC RX CONTRAST MEDICATION: Performed by: EMERGENCY MEDICINE

## 2023-09-14 PROCEDURE — 96375 TX/PRO/DX INJ NEW DRUG ADDON: CPT

## 2023-09-14 PROCEDURE — 96376 TX/PRO/DX INJ SAME DRUG ADON: CPT

## 2023-09-14 RX ORDER — 0.9 % SODIUM CHLORIDE 0.9 %
500 INTRAVENOUS SOLUTION INTRAVENOUS ONCE
Status: COMPLETED | OUTPATIENT
Start: 2023-09-14 | End: 2023-09-14

## 2023-09-14 RX ORDER — 0.9 % SODIUM CHLORIDE 0.9 %
1000 INTRAVENOUS SOLUTION INTRAVENOUS ONCE
Status: COMPLETED | OUTPATIENT
Start: 2023-09-14 | End: 2023-09-14

## 2023-09-14 RX ORDER — METOCLOPRAMIDE HYDROCHLORIDE 5 MG/ML
10 INJECTION INTRAMUSCULAR; INTRAVENOUS
Status: COMPLETED | OUTPATIENT
Start: 2023-09-14 | End: 2023-09-14

## 2023-09-14 RX ORDER — ONDANSETRON 2 MG/ML
4 INJECTION INTRAMUSCULAR; INTRAVENOUS ONCE
Status: COMPLETED | OUTPATIENT
Start: 2023-09-14 | End: 2023-09-14

## 2023-09-14 RX ORDER — ONDANSETRON 4 MG/1
TABLET, ORALLY DISINTEGRATING ORAL
Qty: 20 TABLET | Refills: 3 | Status: SHIPPED | OUTPATIENT
Start: 2023-09-14

## 2023-09-14 RX ADMIN — SODIUM CHLORIDE 500 ML: 9 INJECTION, SOLUTION INTRAVENOUS at 12:09

## 2023-09-14 RX ADMIN — ONDANSETRON 4 MG: 2 INJECTION INTRAMUSCULAR; INTRAVENOUS at 12:09

## 2023-09-14 RX ADMIN — SODIUM CHLORIDE 1000 ML: 9 INJECTION, SOLUTION INTRAVENOUS at 09:29

## 2023-09-14 RX ADMIN — ONDANSETRON 4 MG: 2 INJECTION INTRAMUSCULAR; INTRAVENOUS at 09:29

## 2023-09-14 RX ADMIN — METOCLOPRAMIDE 10 MG: 5 INJECTION, SOLUTION INTRAMUSCULAR; INTRAVENOUS at 12:09

## 2023-09-14 RX ADMIN — IOPAMIDOL 100 ML: 755 INJECTION, SOLUTION INTRAVENOUS at 09:48

## 2023-09-14 NOTE — ED PROVIDER NOTES
Year: No       PHYSICAL EXAM   Physical Exam  Vitals and nursing note reviewed. Constitutional:       General: She is in acute distress. Appearance: Normal appearance. She is normal weight. She is not ill-appearing. HENT:      Head: Normocephalic and atraumatic. Nose: Nose normal.      Mouth/Throat:      Mouth: Mucous membranes are dry. Eyes:      Conjunctiva/sclera: Conjunctivae normal.   Cardiovascular:      Rate and Rhythm: Normal rate. Pulses: Normal pulses. Pulmonary:      Effort: Pulmonary effort is normal. No respiratory distress. Abdominal:      Palpations: Abdomen is soft. Tenderness: There is abdominal tenderness (Mild, diffuse). There is no guarding or rebound. Musculoskeletal:         General: No swelling or deformity. Normal range of motion. Neurological:      General: No focal deficit present. Mental Status: She is alert.    Psychiatric:         Mood and Affect: Mood normal.         Behavior: Behavior normal.           SCREENINGS               LAB, EKG AND DIAGNOSTIC RESULTS   Labs:  Recent Results (from the past 12 hour(s))   CBC with Auto Differential    Collection Time: 09/14/23  9:12 AM   Result Value Ref Range    WBC 8.7 3.6 - 11.0 K/uL    RBC 4.41 3.80 - 5.20 M/uL    Hemoglobin 10.9 (L) 11.5 - 16.0 g/dL    Hematocrit 35.4 35.0 - 47.0 %    MCV 80.3 80.0 - 99.0 FL    MCH 24.7 (L) 26.0 - 34.0 PG    MCHC 30.8 30.0 - 36.5 g/dL    RDW 18.4 (H) 11.5 - 14.5 %    Platelets 883 550 - 045 K/uL    MPV 11.5 8.9 - 12.9 FL    Nucleated RBCs 0.0 0  WBC    nRBC 0.00 0.00 - 0.01 K/uL    Neutrophils % 60 32 - 75 %    Lymphocytes % 33 12 - 49 %    Monocytes % 6 5 - 13 %    Eosinophils % 1 0 - 7 %    Basophils % 0 0 - 1 %    Immature Granulocytes 0 0.0 - 0.5 %    Neutrophils Absolute 5.2 1.8 - 8.0 K/UL    Lymphocytes Absolute 2.9 0.8 - 3.5 K/UL    Monocytes Absolute 0.5 0.0 - 1.0 K/UL    Eosinophils Absolute 0.1 0.0 - 0.4 K/UL    Basophils Absolute 0.0 0.0 - 0.1 K/UL

## 2023-09-14 NOTE — DISCHARGE INSTRUCTIONS
Thank you! Thank you for allowing me to care for you in the emergency department. It is my goal to provide you with excellent care. If you have not received excellent quality care, please ask to speak to the nurse manager. Please fill out the survey that will come to you by mail or email since we listen to your feedback! Below you will find a list of your tests from today's visit. Should you have any questions, please do not hesitate to call the emergency department.     Labs  Recent Results (from the past 12 hour(s))   CBC with Auto Differential    Collection Time: 09/14/23  9:12 AM   Result Value Ref Range    WBC 8.7 3.6 - 11.0 K/uL    RBC 4.41 3.80 - 5.20 M/uL    Hemoglobin 10.9 (L) 11.5 - 16.0 g/dL    Hematocrit 35.4 35.0 - 47.0 %    MCV 80.3 80.0 - 99.0 FL    MCH 24.7 (L) 26.0 - 34.0 PG    MCHC 30.8 30.0 - 36.5 g/dL    RDW 18.4 (H) 11.5 - 14.5 %    Platelets 753 862 - 671 K/uL    MPV 11.5 8.9 - 12.9 FL    Nucleated RBCs 0.0 0  WBC    nRBC 0.00 0.00 - 0.01 K/uL    Neutrophils % 60 32 - 75 %    Lymphocytes % 33 12 - 49 %    Monocytes % 6 5 - 13 %    Eosinophils % 1 0 - 7 %    Basophils % 0 0 - 1 %    Immature Granulocytes 0 0.0 - 0.5 %    Neutrophils Absolute 5.2 1.8 - 8.0 K/UL    Lymphocytes Absolute 2.9 0.8 - 3.5 K/UL    Monocytes Absolute 0.5 0.0 - 1.0 K/UL    Eosinophils Absolute 0.1 0.0 - 0.4 K/UL    Basophils Absolute 0.0 0.0 - 0.1 K/UL    Absolute Immature Granulocyte 0.0 0.00 - 0.04 K/UL    Differential Type AUTOMATED     CMP    Collection Time: 09/14/23  9:12 AM   Result Value Ref Range    Sodium 137 136 - 145 mmol/L    Potassium 4.4 3.5 - 5.1 mmol/L    Chloride 104 97 - 108 mmol/L    CO2 28 21 - 32 mmol/L    Anion Gap 5 5 - 15 mmol/L    Glucose 155 (H) 65 - 100 mg/dL    BUN 8 6 - 20 MG/DL    Creatinine 1.13 (H) 0.55 - 1.02 MG/DL    Bun/Cre Ratio 7 (L) 12 - 20      Est, Glom Filt Rate 59 (L) >60 ml/min/1.73m2    Calcium 9.3 8.5 - 10.1 MG/DL    Total Bilirubin 0.4 0.2 - 1.0 MG/DL    ALT

## 2023-09-14 NOTE — ED NOTES
Pt discharge paperwork given and reviewed. Opportunity to ask questions or concerns was provided which there were none at this time.      Ivanna Coker RN  09/14/23 [id number]

## 2023-09-15 LAB
EKG ATRIAL RATE: 62 BPM
EKG DIAGNOSIS: NORMAL
EKG P AXIS: 49 DEGREES
EKG P-R INTERVAL: 168 MS
EKG Q-T INTERVAL: 458 MS
EKG QRS DURATION: 74 MS
EKG QTC CALCULATION (BAZETT): 464 MS
EKG R AXIS: 21 DEGREES
EKG T AXIS: 41 DEGREES
EKG VENTRICULAR RATE: 62 BPM

## 2023-09-20 DIAGNOSIS — J45.40 MODERATE PERSISTENT ASTHMA WITHOUT COMPLICATION: ICD-10-CM

## 2023-09-20 NOTE — TELEPHONE ENCOUNTER
fluticasone-salmeterol (ADVAIR) 500-50 MCG/ACT AEPB diskus inhaler    Pt stated that she received a letter from her pharmacy stating that she is requiring a PA for this medication but they have not heard from us.

## 2023-09-21 RX ORDER — FLUTICASONE PROPIONATE AND SALMETEROL 500; 50 UG/1; UG/1
1 POWDER RESPIRATORY (INHALATION) EVERY 12 HOURS
Qty: 180 EACH | Refills: 3 | Status: SHIPPED | OUTPATIENT
Start: 2023-09-21 | End: 2023-10-21

## 2023-09-21 NOTE — TELEPHONE ENCOUNTER
I called the mail order pharmacy and they confirmed the prescription needs to be a quantity of 180 each for 30 days. The prescription will need to be resent. I called the patient and verified them by name and date of birth. I informed her on the update. She stated understanding and had no further questions. PCP: Serafin Sanford MD     Last appt:  9/1/2023      Future Appointments   Date Time Provider [address] [place] [address]   11/20/2023 11:50 AM Farhad Del Rio MD [facility] [address] BS AMB   1/3/2024  8:10 AM Serafin Sanford MD [facility] BS AMB   9/3/2024  9:20 AM Mack Whittington MD [facility]          Requested Prescriptions     Pending Prescriptions Disp Refills    fluticasone-salmeterol (ADVAIR) 500-50 MCG/ACT AEPB diskus inhaler 180 each 3     Sig: Inhale 1 puff into the lungs in the morning and 1 puff in the evening.

## 2023-09-26 ENCOUNTER — TELEPHONE (OUTPATIENT)
Facility: CLINIC | Age: 48
End: 2023-09-26

## 2023-09-26 NOTE — TELEPHONE ENCOUNTER
[facility] nurse to give her a call back pertaing  2 medication that interact together   Ref number [id number] phone number  [phone]

## 2023-09-29 NOTE — TELEPHONE ENCOUNTER
Called pharmacy verified pt name and , informed pharmacy tech of Dr. Zeenat Rivera note.  Pharmacist agreed to refill Rx

## 2023-09-29 NOTE — TELEPHONE ENCOUNTER
[address] spoke with the tech Barbara Marie , verified pt name and . Told Barbara Marie that I was returning a phone call. Barbara Marie stated that pt Rx Bupropion 150 mg sr tablet and amitriptyline 50mg have an interaction with each other. Pharmacy wants to know is it ok to fill both Rx. Please advise.

## 2023-09-29 NOTE — TELEPHONE ENCOUNTER
Tell pharmacist that it is okay to fill both. Dr. Walter Corley has been monitoring for any adverse reactions.

## 2023-10-11 ENCOUNTER — TELEPHONE (OUTPATIENT)
Age: 48
End: 2023-10-11

## 2023-10-11 NOTE — TELEPHONE ENCOUNTER
Approved  PA Detail   Prior authorization approved Case ID: Karley Osei      Payer:  [address] Medicaid   PA Case: [id number], Status: Approved, Coverage Starts on: 10/11/2023 12:00:00 AM, Coverage Ends on: 10/10/2024 12:00:00 AM.     Approval Details    Authorized from October 11, 2023 to October 10, 2024      Electronic appeal:  Not supported   View History     Medication Being Authorized     Continuous Blood Gluc Transmit (DEXCOM G6 TRANSMITTER) MISC    Use 1 transmitter for 90 days with Dexcom G6 sensors E11.65    Dispense: 1 each Refills: 3 VIOLA    Start: 9/12/2023      Class: Normal      This order has been released to its destination.    To be filled at: [facility] [address] NEVILLE JAMES [address] [facility] [address]

## 2023-11-08 DIAGNOSIS — J45.40 MODERATE PERSISTENT ASTHMA WITHOUT COMPLICATION: ICD-10-CM

## 2023-11-08 RX ORDER — ALBUTEROL SULFATE 90 UG/1
2 AEROSOL, METERED RESPIRATORY (INHALATION) EVERY 6 HOURS PRN
Qty: 18 G | Refills: 3 | Status: SHIPPED | OUTPATIENT
Start: 2023-11-08

## 2023-11-08 RX ORDER — FLUTICASONE PROPIONATE AND SALMETEROL 500; 50 UG/1; UG/1
1 POWDER RESPIRATORY (INHALATION) EVERY 12 HOURS
Qty: 180 EACH | Refills: 3 | Status: SHIPPED | OUTPATIENT
Start: 2023-11-08 | End: 2023-12-08

## 2023-11-08 NOTE — TELEPHONE ENCOUNTER
albuterol sulfate HFA (PROVENTIL;VENTOLIN;PROAIR) 108 (90 Base) MCG/ACT inhaler  fluticasone-salmeterol (ADVAIR) 500-50 MCG/ACT AEPB diskus inhaler       [facility] mail order pharmacy

## 2023-11-08 NOTE — TELEPHONE ENCOUNTER
PCP: Serafin Sanford MD     Last appt:  9/1/2023      Future Appointments   Date Time Provider [address]   11/13/2023  8:00 AM ARNOLD Avina [facility] BS AMB   11/20/2023 11:50 AM Yuriy Mohan MD [facility] BS AMB   1/3/2024  8:10 AM Deniz Rodas MD [facility] BS AMB   9/3/2024  9:20 AM Mack Whittington MD [facility] BS AMB          Requested Prescriptions     Pending Prescriptions Disp Refills    albuterol sulfate HFA (PROVENTIL;VENTOLIN;PROAIR) 108 (90 Base) MCG/ACT inhaler 18 g 3     Sig: Inhale 2 puffs into the lungs every 6 hours as needed for Shortness of Breath or Wheezing    fluticasone-salmeterol (ADVAIR) 500-50 MCG/ACT AEPB diskus inhaler 180 each 3     Sig: Inhale 1 puff into the lungs in the morning and 1 puff in the evening.

## 2023-11-10 ENCOUNTER — TELEPHONE (OUTPATIENT)
Facility: CLINIC | Age: 48
End: 2023-11-10

## 2023-11-10 NOTE — TELEPHONE ENCOUNTER
[facility] delivered called following up on Certificate of medical necesity for pt.  Please take care of this  and fax to HomeCare

## 2023-11-12 NOTE — PROGRESS NOTES
intact bilateral    Gait: Ambulates independently. Has a slight right antalgic gait with ambulation due to the fear of her leg giving out. Reflexes: Decreased throughout    Fall risk assessment  No flowsheet data found. Return in about 6 months (around 5/13/2024) for In Office, With me. This medical record was transcribed using an electronic medical records system. Although proofread, it may and can contain electronic and spelling errors. Other human spelling and other errors may be present. Corrections may be executed at a later time. Please feel free to contact us for any clarifications as needed.          ARNOLD Mclean - NP

## 2023-11-13 ENCOUNTER — TELEPHONE (OUTPATIENT)
Age: 48
End: 2023-11-13

## 2023-11-13 ENCOUNTER — OFFICE VISIT (OUTPATIENT)
Age: 48
End: 2023-11-13
Payer: MEDICAID

## 2023-11-13 VITALS
SYSTOLIC BLOOD PRESSURE: 128 MMHG | DIASTOLIC BLOOD PRESSURE: 86 MMHG | HEART RATE: 76 BPM | BODY MASS INDEX: 49.22 KG/M2 | HEIGHT: 63 IN | RESPIRATION RATE: 16 BRPM | WEIGHT: 277.8 LBS | OXYGEN SATURATION: 99 % | TEMPERATURE: 97.4 F

## 2023-11-13 DIAGNOSIS — M54.2 NECK PAIN: ICD-10-CM

## 2023-11-13 DIAGNOSIS — M48.062 LUMBAR STENOSIS WITH NEUROGENIC CLAUDICATION: Primary | ICD-10-CM

## 2023-11-13 PROCEDURE — 3074F SYST BP LT 130 MM HG: CPT

## 2023-11-13 PROCEDURE — 99215 OFFICE O/P EST HI 40 MIN: CPT

## 2023-11-13 PROCEDURE — 3079F DIAST BP 80-89 MM HG: CPT

## 2023-11-13 RX ORDER — ABATACEPT 125 MG/ML
INJECTION, SOLUTION SUBCUTANEOUS
COMMUNITY
Start: 2023-11-08

## 2023-11-13 ASSESSMENT — PATIENT HEALTH QUESTIONNAIRE - PHQ9
1. LITTLE INTEREST OR PLEASURE IN DOING THINGS: 0
SUM OF ALL RESPONSES TO PHQ QUESTIONS 1-9: 0
SUM OF ALL RESPONSES TO PHQ QUESTIONS 1-9: 0
2. FEELING DOWN, DEPRESSED OR HOPELESS: 0
SUM OF ALL RESPONSES TO PHQ QUESTIONS 1-9: 0
SUM OF ALL RESPONSES TO PHQ QUESTIONS 1-9: 0
SUM OF ALL RESPONSES TO PHQ9 QUESTIONS 1 & 2: 0

## 2023-11-13 ASSESSMENT — ENCOUNTER SYMPTOMS
BACK PAIN: 1
ALLERGIC/IMMUNOLOGIC NEGATIVE: 1
GASTROINTESTINAL NEGATIVE: 1
RESPIRATORY NEGATIVE: 1
EYES NEGATIVE: 1

## 2023-11-13 NOTE — PATIENT INSTRUCTIONS
As per our discussion,    I will not make any changes on your medication at this time. I encourage you to continue to take Lyrica 300 mg twice a day and amitriptyline 50 mg at bedtime. As for the Flexeril, I understand you do not take it very often, I would encourage you to take it at least 2-3 times a day as needed to help alleviate your symptoms. For symptoms management, I will recommend that you follow-up with orthopedic surgery for evaluation and also with physical therapy. Will go ahead and renew the orders from the last visit for orthopedic surgery and physical therapy. As per your request, we will send you to [facility] for physical therapy. It was a pleasure meeting you today    I will see you back in 6 months or sooner if needed. Please do not hesitate to reach out for any questions or concerns.

## 2023-11-13 NOTE — TELEPHONE ENCOUNTER
Called pt and verified with 2 identifiers. Informed pt that I was calling to clarify if okay to send PT and orthopedic surgery referral to [facility]. She stated that was fine but she did not want to have to travel all the way to [facility] for each PT session. I stated we have an office at the [address] [place] [address] location and to clarify that when scheduling an appointment. Pt verbalized understanding and stated referral okay to send.

## 2023-11-14 NOTE — ASSESSMENT & PLAN NOTE
Status post cervical fusion    Patient verbalized she has been doing well since surgery. No concerns today. Her symptoms are stable at this time.

## 2023-11-14 NOTE — ASSESSMENT & PLAN NOTE
MRI lumbar spine completed on 11/23/2022 showed interval degenerative change progression in the lower thoracic spine and lumbar spine. Interval moderate craniocaudal disc extrusion at T10 as T11 with moderate to severe canal stenosis with moderate bilateral foraminal stenosis, minimal cord compression, no associated cord edema. Severe left and mild right foraminal stenosis at L4-5 increased. Severe bilateral neural foraminal stenosis at L5-S1. Patient was referred to orthopedic surgery for evaluation but never followed through. She was also referred to physical therapy but never followed through. It was explained to patient that the worsening on her symptoms may be related due to her lumbar stenosis and recommended that she sees by orthopedic surgery for evaluation. It was also explained to patient that physical therapy may be beneficial to help alleviating her symptoms. She verbalized understanding and agreed to follow-up orthopedic surgery and physical therapy. We will refer her to orthopedic surgery today and she requested to go back to [facility] for physical therapy. She is to continue amitriptyline 50 mg at bedtime, Lyrica 300 mg twice a day. She was advised to restart taking Flexeril 10 mg 3 times a day as needed to help with symptoms management. Fall safety was extensively reviewed with patient today.

## 2023-11-18 LAB
ALBUMIN/CREAT UR: 5 MG/G CREAT (ref 0–29)
CHOLEST SERPL-MCNC: 161 MG/DL (ref 100–199)
CREAT UR-MCNC: 217.5 MG/DL
HBA1C MFR BLD: 7.7 % (ref 4.8–5.6)
HDLC SERPL-MCNC: 41 MG/DL
IMP & REVIEW OF LAB RESULTS: NORMAL
LDLC SERPL CALC-MCNC: 101 MG/DL (ref 0–99)
MICROALBUMIN UR-MCNC: 11.5 UG/ML
TRIGL SERPL-MCNC: 100 MG/DL (ref 0–149)
TSH SERPL DL<=0.005 MIU/L-ACNC: 1.25 UIU/ML (ref 0.45–4.5)
VLDLC SERPL CALC-MCNC: 19 MG/DL (ref 5–40)

## 2023-11-20 ENCOUNTER — OFFICE VISIT (OUTPATIENT)
Age: 48
End: 2023-11-20
Payer: MEDICAID

## 2023-11-20 VITALS
BODY MASS INDEX: 54.22 KG/M2 | DIASTOLIC BLOOD PRESSURE: 68 MMHG | SYSTOLIC BLOOD PRESSURE: 120 MMHG | WEIGHT: 276.2 LBS | HEIGHT: 60 IN | HEART RATE: 72 BPM

## 2023-11-20 DIAGNOSIS — E11.42 TYPE 2 DIABETES MELLITUS WITH DIABETIC POLYNEUROPATHY, WITH LONG-TERM CURRENT USE OF INSULIN (HCC): Primary | ICD-10-CM

## 2023-11-20 DIAGNOSIS — E78.2 MIXED HYPERLIPIDEMIA: ICD-10-CM

## 2023-11-20 DIAGNOSIS — E66.01 MORBID OBESITY (HCC): ICD-10-CM

## 2023-11-20 DIAGNOSIS — Z79.4 TYPE 2 DIABETES MELLITUS WITH DIABETIC POLYNEUROPATHY, WITH LONG-TERM CURRENT USE OF INSULIN (HCC): Primary | ICD-10-CM

## 2023-11-20 PROCEDURE — 99214 OFFICE O/P EST MOD 30 MIN: CPT | Performed by: GENERAL ACUTE CARE HOSPITAL

## 2023-11-20 PROCEDURE — 3078F DIAST BP <80 MM HG: CPT | Performed by: GENERAL ACUTE CARE HOSPITAL

## 2023-11-20 PROCEDURE — 3074F SYST BP LT 130 MM HG: CPT | Performed by: GENERAL ACUTE CARE HOSPITAL

## 2023-11-20 PROCEDURE — 3051F HG A1C>EQUAL 7.0%<8.0%: CPT | Performed by: GENERAL ACUTE CARE HOSPITAL

## 2023-11-20 RX ORDER — INSULIN LISPRO 100 [IU]/ML
INJECTION, SOLUTION INTRAVENOUS; SUBCUTANEOUS
Qty: 45 ML | Refills: 5 | Status: SHIPPED | OUTPATIENT
Start: 2023-11-20

## 2023-11-20 RX ORDER — INSULIN GLARGINE 100 [IU]/ML
INJECTION, SOLUTION SUBCUTANEOUS
Qty: 60 ML | Refills: 5 | Status: SHIPPED | OUTPATIENT
Start: 2023-11-20

## 2023-11-20 RX ORDER — PEN NEEDLE, DIABETIC 32GX 5/32"
NEEDLE, DISPOSABLE MISCELLANEOUS
Qty: 450 EACH | Refills: 3 | Status: SHIPPED | OUTPATIENT
Start: 2023-11-20

## 2023-11-20 RX ORDER — METFORMIN HYDROCHLORIDE 500 MG/1
1000 TABLET, EXTENDED RELEASE ORAL 2 TIMES DAILY
Qty: 360 TABLET | Refills: 3 | Status: SHIPPED | OUTPATIENT
Start: 2023-11-20

## 2023-11-20 RX ORDER — GLIPIZIDE 10 MG/1
TABLET, FILM COATED, EXTENDED RELEASE ORAL
Qty: 180 TABLET | Refills: 3 | Status: SHIPPED | OUTPATIENT
Start: 2023-11-20

## 2023-11-20 RX ORDER — INSULIN GLARGINE 100 [IU]/ML
INJECTION, SOLUTION SUBCUTANEOUS
Qty: 45 ML | Refills: 5 | Status: SHIPPED | OUTPATIENT
Start: 2023-11-20 | End: 2023-11-20

## 2023-11-20 RX ORDER — DULAGLUTIDE 4.5 MG/.5ML
INJECTION, SOLUTION SUBCUTANEOUS
Qty: 6 ML | Refills: 3 | Status: SHIPPED | OUTPATIENT
Start: 2023-11-20

## 2023-11-20 NOTE — PROGRESS NOTES
[facility]  DR CHRISTIAN LEIJA     CHIEF COMPLAINT: f/u evaluation for uncontrolled type 2 diabetes    HISTORY OF PRESENT ILLNESS:   Caryle Miyamoto is a 46 y.o. female with a PMHx as noted below who presents to the endocrinology clinic for f/u evaluation of uncontrolled type 2 diabetes. 11/20/2023  Blood sugar more elevated than prior, she has been more sedentary due to joint pain related to RA. Compliant with her medications     Review of most recent hemoglobin A1c :  Lab Results   Component Value Date    JTR8UCGE 7.4 08/01/2022    OFC3TSGJ 8.5 04/27/2021    XMK2FLUQ 6.3 01/26/2021    PHE9AVCC 9.4 10/19/2020    JKT6RHFD 8.2 02/17/2020    FJW7XMHM 6.7 06/18/2019    LABA1C 7.7 (H) 11/17/2023    LABA1C 7.6 (H) 07/25/2023    LABA1C 11.7 (H) 04/08/2022    LABA1C 9.3 (H) 10/20/2021              8/29/2023  Feels slightly under the weather today. Denies new complaints. Compliant with her medications. She has gained some weight back of 6-7 lbs over past 5 months. 02/9/2023  Since the last visit ms Jason Hernandez had Orthopedics cervical spinal surgery on 01/06/2023 and is recovering well, has neck brace on and feels the numbness on her left side has resolved. She has been compliant with her medication and has lost 15 pounds since the last visit. She indicates that she has been having diarrhea for the last few days she usually has constipation, advised to follow-up with gastroenterologist regarding that or if she is having a lot of fluid lost that she is not able to replace to go to the emergency room department.     Current regimen:   Trulicity 0.5FX weekly  Levemir 36 in AM and 34 in PM  Humalog 15 units TIDAC  MTF 1g BID  Glipizide 10mg twice daily     She takes the Humalog before BREAKFAST AND DINNER ONLY  She was able to get the Dexcom and continues to use it  Denies hypoglycemia episodes and she is waking up in the morning with BS <140    She had made significant dietary changes,

## 2023-11-29 DIAGNOSIS — E11.42 TYPE 2 DIABETES MELLITUS WITH DIABETIC POLYNEUROPATHY, WITH LONG-TERM CURRENT USE OF INSULIN (HCC): ICD-10-CM

## 2023-11-29 DIAGNOSIS — Z79.4 TYPE 2 DIABETES MELLITUS WITH DIABETIC POLYNEUROPATHY, WITH LONG-TERM CURRENT USE OF INSULIN (HCC): ICD-10-CM

## 2023-11-29 DIAGNOSIS — E78.2 MIXED HYPERLIPIDEMIA: ICD-10-CM

## 2024-01-03 ENCOUNTER — HOSPITAL ENCOUNTER (OUTPATIENT)
Facility: HOSPITAL | Age: 49
Discharge: HOME | End: 2024-01-06
Payer: MEDICAID

## 2024-01-03 ENCOUNTER — OFFICE VISIT (OUTPATIENT)
Facility: CLINIC | Age: 49
End: 2024-01-03
Payer: MEDICAID

## 2024-01-03 VITALS
HEIGHT: 60 IN | WEIGHT: 281.8 LBS | BODY MASS INDEX: 55.32 KG/M2 | SYSTOLIC BLOOD PRESSURE: 131 MMHG | RESPIRATION RATE: 16 BRPM | HEART RATE: 71 BPM | OXYGEN SATURATION: 98 % | DIASTOLIC BLOOD PRESSURE: 74 MMHG | TEMPERATURE: 98.1 F

## 2024-01-03 DIAGNOSIS — M79.632 LEFT FOREARM PAIN: ICD-10-CM

## 2024-01-03 DIAGNOSIS — I10 ESSENTIAL HYPERTENSION: ICD-10-CM

## 2024-01-03 DIAGNOSIS — F33.1 MODERATE EPISODE OF RECURRENT MAJOR DEPRESSIVE DISORDER (HCC): ICD-10-CM

## 2024-01-03 DIAGNOSIS — Z11.4 SCREENING FOR HIV (HUMAN IMMUNODEFICIENCY VIRUS): ICD-10-CM

## 2024-01-03 DIAGNOSIS — R51.9 CHRONIC DAILY HEADACHE: Primary | ICD-10-CM

## 2024-01-03 PROCEDURE — 73090 X-RAY EXAM OF FOREARM: CPT

## 2024-01-03 PROCEDURE — 3075F SYST BP GE 130 - 139MM HG: CPT | Performed by: INTERNAL MEDICINE

## 2024-01-03 PROCEDURE — 3078F DIAST BP <80 MM HG: CPT | Performed by: INTERNAL MEDICINE

## 2024-01-03 PROCEDURE — 73080 X-RAY EXAM OF ELBOW: CPT

## 2024-01-03 PROCEDURE — 99215 OFFICE O/P EST HI 40 MIN: CPT | Performed by: INTERNAL MEDICINE

## 2024-01-03 RX ORDER — TOPIRAMATE 50 MG/1
50 TABLET, FILM COATED ORAL 2 TIMES DAILY
Qty: 60 TABLET | Refills: 1 | Status: SHIPPED | OUTPATIENT
Start: 2024-01-03 | End: 2024-01-03 | Stop reason: ALTCHOICE

## 2024-01-03 RX ORDER — BUTALBITAL, ACETAMINOPHEN AND CAFFEINE 50; 325; 40 MG/1; MG/1; MG/1
1 TABLET ORAL 2 TIMES DAILY PRN
Qty: 30 TABLET | Refills: 1 | Status: SHIPPED | OUTPATIENT
Start: 2024-01-03 | End: 2024-01-03 | Stop reason: ALTCHOICE

## 2024-01-03 RX ORDER — TOPIRAMATE 50 MG/1
50 TABLET, FILM COATED ORAL 2 TIMES DAILY
Qty: 60 TABLET | Refills: 1 | Status: SHIPPED | OUTPATIENT
Start: 2024-01-03

## 2024-01-03 RX ORDER — BUTALBITAL, ACETAMINOPHEN AND CAFFEINE 50; 325; 40 MG/1; MG/1; MG/1
1 TABLET ORAL 2 TIMES DAILY PRN
Qty: 30 TABLET | Refills: 1 | Status: SHIPPED | OUTPATIENT
Start: 2024-01-03

## 2024-01-03 ASSESSMENT — PATIENT HEALTH QUESTIONNAIRE - PHQ9
SUM OF ALL RESPONSES TO PHQ QUESTIONS 1-9: 18
4. FEELING TIRED OR HAVING LITTLE ENERGY: 3
1. LITTLE INTEREST OR PLEASURE IN DOING THINGS: 3
7. TROUBLE CONCENTRATING ON THINGS, SUCH AS READING THE NEWSPAPER OR WATCHING TELEVISION: 3
10. IF YOU CHECKED OFF ANY PROBLEMS, HOW DIFFICULT HAVE THESE PROBLEMS MADE IT FOR YOU TO DO YOUR WORK, TAKE CARE OF THINGS AT HOME, OR GET ALONG WITH OTHER PEOPLE: 2
5. POOR APPETITE OR OVEREATING: 3
8. MOVING OR SPEAKING SO SLOWLY THAT OTHER PEOPLE COULD HAVE NOTICED. OR THE OPPOSITE, BEING SO FIGETY OR RESTLESS THAT YOU HAVE BEEN MOVING AROUND A LOT MORE THAN USUAL: 0
SUM OF ALL RESPONSES TO PHQ9 QUESTIONS 1 & 2: 6
6. FEELING BAD ABOUT YOURSELF - OR THAT YOU ARE A FAILURE OR HAVE LET YOURSELF OR YOUR FAMILY DOWN: 0
9. THOUGHTS THAT YOU WOULD BE BETTER OFF DEAD, OR OF HURTING YOURSELF: 0
2. FEELING DOWN, DEPRESSED OR HOPELESS: 3
3. TROUBLE FALLING OR STAYING ASLEEP: 3
SUM OF ALL RESPONSES TO PHQ QUESTIONS 1-9: 18

## 2024-01-03 NOTE — PROGRESS NOTES
Radha Caban  Identified pt with two pt identifiers(name and ).  Chief Complaint   Patient presents with    Hypertension    Depression       1. Have you been to the ER, urgent care clinic since your last visit?  Hospitalized since your last visit? NO    2. Have you seen or consulted any other health care providers outside of the [facility] since your last visit?  Include any pap smears or colon screening. NO      Provider notified of reason for visit, vitals and flowsheets obtained on patients.     Patient received paperwork for advance directive during previous visit but has not completed at this time     Reviewed record In preparation for visit, huddled with provider and have obtained necessary documentation      Health Maintenance Due   Topic    Hepatitis B vaccine (1 of 3 - 3-dose series)    Pneumococcal 0-64 years Vaccine (1 - PCV)    HIV screen     Diabetic retinal exam     Flu vaccine (1)    COVID-19 Vaccine ( -  season)       Wt Readings from Last 3 Encounters:   23 125.3 kg (276 lb 3.2 oz)   23 126 kg (277 lb 12.8 oz)   23 124.7 kg (275 lb)     Temp Readings from Last 3 Encounters:   23 97.4 °F (36.3 °C) (Temporal)   23 98.2 °F (36.8 °C)   23 98.1 °F (36.7 °C) (Oral)     BP Readings from Last 3 Encounters:   23 120/68   23 128/86   23 (!) 166/71     Pulse Readings from Last 3 Encounters:   23 72   23 76   23 66          No data to display                  Learning Assessment:  :         1/3/2024     8:10 AM   [facility] AMB LEARNING ASSESSMENT   Primary Learner Patient   level of education > 4 YEARS OF COLLEGE   Primary Language ENGLISH   Learning Preference DEMONSTRATION   Answered By Patient   Relationship to Learner SELF       Fall Risk Assessment:  :         11/15/2022     7:22 AM 2022    10:16 AM 2021    10:57 AM 2021     8:24 AM 2021     3:01 PM 2021    11:36 AM 2021     9:45

## 2024-01-03 NOTE — PROGRESS NOTES
Chief Complaint   Patient presents with    Hypertension    Depression       HISTORY OF PRESENT ILLNESS  Radha Caban is a 48 y.o. female. Accompanied by her .    Presents for 5 month follow up of HTN and depression    Complains of severe headache that started 3 weeks ago. Constant, daily. Headache sometimes associated with blurred vision and photophobia. No relief with Tylenol or Aleve. Hydrocodone helps her sleep but does not help with headache. Last eye exam over a year ago. Has poor sleep due to interruptions. Wakes up several times at night.    Complains of left forearm pain and weakness, started about 1-2 months ago. Initially thought she had sprained her arm.     Feeling depressed due to concern about her medical problems, not talking much lately which is usually a sign of her feeling depressed according to .     Patient Active Problem List   Diagnosis    Seronegative rheumatoid arthritis (HCC)    Moderate episode of recurrent major depressive disorder (HCC)    CARLITO (obstructive sleep apnea)    Chronic midline low back pain without sciatica    Elevated liver enzymes    Gastroparesis    Type 2 diabetes with nephropathy (HCC)    Essential hypertension    Asthma    Iron deficiency anemia    Irritable bowel syndrome    Atypical squamous cells of undetermined significance (ASCUS) on Papanicolaou smear of cervix    Mixed hyperlipidemia    Type 2 diabetes mellitus with diabetic polyneuropathy, with long-term current use of insulin (HCC)    DDD (degenerative disc disease), lumbar    Morbid obesity (HCC)    Cervical stenosis of spinal canal    Vertigo    Body mass index (BMI) 45.0-49.9, adult (HCC)    Lumbar stenosis with neurogenic claudication    Neck pain     Past Medical History:   Diagnosis Date    Anxiety     Asthma     DM type 2 (diabetes mellitus, type 2) ([facility]) 11/23/2011    GERD (gastroesophageal reflux disease)     GI bleed 2015    Hepatic steatosis 11/2016    confirmed by US    HTN

## 2024-01-04 LAB
ALBUMIN SERPL-MCNC: 4 G/DL (ref 3.9–4.9)
ALBUMIN/GLOB SERPL: 1.2 {RATIO} (ref 1.2–2.2)
ALP SERPL-CCNC: 118 IU/L (ref 44–121)
ALT SERPL-CCNC: 48 IU/L (ref 0–32)
AST SERPL-CCNC: 43 IU/L (ref 0–40)
BASOPHILS # BLD AUTO: 0 X10E3/UL (ref 0–0.2)
BASOPHILS NFR BLD AUTO: 0 %
BILIRUB SERPL-MCNC: <0.2 MG/DL (ref 0–1.2)
BUN SERPL-MCNC: 8 MG/DL (ref 6–24)
BUN/CREAT SERPL: 11 (ref 9–23)
CALCIUM SERPL-MCNC: 8.8 MG/DL (ref 8.7–10.2)
CHLORIDE SERPL-SCNC: 105 MMOL/L (ref 96–106)
CK SERPL-CCNC: 178 U/L (ref 32–182)
CO2 SERPL-SCNC: 26 MMOL/L (ref 20–29)
CREAT SERPL-MCNC: 0.76 MG/DL (ref 0.57–1)
EGFRCR SERPLBLD CKD-EPI 2021: 95 ML/MIN/1.73
EOSINOPHIL # BLD AUTO: 0.1 X10E3/UL (ref 0–0.4)
EOSINOPHIL NFR BLD AUTO: 2 %
ERYTHROCYTE [DISTWIDTH] IN BLOOD BY AUTOMATED COUNT: 17.9 % (ref 11.7–15.4)
GLOBULIN SER CALC-MCNC: 3.4 G/DL (ref 1.5–4.5)
GLUCOSE SERPL-MCNC: 116 MG/DL (ref 70–99)
HCT VFR BLD AUTO: 32 % (ref 34–46.6)
HGB BLD-MCNC: 9.8 G/DL (ref 11.1–15.9)
HIV 1+2 AB+HIV1 P24 AG SERPL QL IA: NON REACTIVE
IMM GRANULOCYTES # BLD AUTO: 0 X10E3/UL (ref 0–0.1)
IMM GRANULOCYTES NFR BLD AUTO: 0 %
LYMPHOCYTES # BLD AUTO: 2.8 X10E3/UL (ref 0.7–3.1)
LYMPHOCYTES NFR BLD AUTO: 40 %
MCH RBC QN AUTO: 24.3 PG (ref 26.6–33)
MCHC RBC AUTO-ENTMCNC: 30.6 G/DL (ref 31.5–35.7)
MCV RBC AUTO: 79 FL (ref 79–97)
MONOCYTES # BLD AUTO: 0.5 X10E3/UL (ref 0.1–0.9)
MONOCYTES NFR BLD AUTO: 7 %
NEUTROPHILS # BLD AUTO: 3.5 X10E3/UL (ref 1.4–7)
NEUTROPHILS NFR BLD AUTO: 51 %
PLATELET # BLD AUTO: 317 X10E3/UL (ref 150–450)
POTASSIUM SERPL-SCNC: 4.7 MMOL/L (ref 3.5–5.2)
PROT SERPL-MCNC: 7.4 G/DL (ref 6–8.5)
RBC # BLD AUTO: 4.04 X10E6/UL (ref 3.77–5.28)
SODIUM SERPL-SCNC: 139 MMOL/L (ref 134–144)
WBC # BLD AUTO: 6.9 X10E3/UL (ref 3.4–10.8)

## 2024-01-06 DIAGNOSIS — D64.9 NORMOCYTIC ANEMIA: Primary | ICD-10-CM

## 2024-01-09 RX ORDER — PROCHLORPERAZINE 25 MG/1
SUPPOSITORY RECTAL
Qty: 3 EACH | Refills: 11 | Status: SHIPPED | OUTPATIENT
Start: 2024-01-09

## 2024-01-10 ENCOUNTER — TELEPHONE (OUTPATIENT)
Facility: CLINIC | Age: 49
End: 2024-01-10

## 2024-01-10 DIAGNOSIS — G44.52 NEW DAILY PERSISTENT HEADACHE: Primary | ICD-10-CM

## 2024-01-10 DIAGNOSIS — R51.9 CHRONIC DAILY HEADACHE: ICD-10-CM

## 2024-01-10 RX ORDER — IBUPROFEN 800 MG/1
800 TABLET ORAL 3 TIMES DAILY PRN
Qty: 45 TABLET | Refills: 0 | Status: SHIPPED | OUTPATIENT
Start: 2024-01-10

## 2024-01-10 NOTE — TELEPHONE ENCOUNTER
Pt stated provider prescribed these for headaches and told pt to give it three days but she has had a headache for a week now and would like something else

## 2024-01-10 NOTE — TELEPHONE ENCOUNTER
Inform patient that Dr. Rodas is:  1) placing an order for you to have a CT scan of the brain.   2) sending in a prescription for ibuprofen 800 mg for the headache  3) recommending you take topiramate 50 mg 1 tab twice a day for 5 days then increase to 2 tabs twice a day for headache prevention. Request a refill when you start getting low in tablets.     Dr. Rodas would like you to:  1) Schedule an appointment with your eye doctor if you haven't already done so.  2) Let Dr. Colon know that you are having daily headaches and ask if any of your rheumatoid arthritis medications could be causing the headaches.

## 2024-01-10 NOTE — TELEPHONE ENCOUNTER
butalbital-acetaminophen-caffeine (FIORICET, ESGIC) -40 MG per tablet   topiramate (TOPAMAX) 50 MG tablet  Pt stated provider prescribed these for headaches and told pt to give it three days but she has had a headache for a week now and would like something else

## 2024-01-10 NOTE — TELEPHONE ENCOUNTER
I called the patient and verified them by name and date of birth. I informed the patient on the message from the provider. They stated understanding and had no further questions.

## 2024-01-11 NOTE — TELEPHONE ENCOUNTER
Pt stated the order for head scan needs a PA in order for her to get it done tomorrow, if not she has to wait another ten days which she states she cannot do. Pt ws told her provider needed to call her insurance company, medicaid.

## 2024-01-11 NOTE — TELEPHONE ENCOUNTER
I called central scheduling and they stated that the patient will need an appointment in order to initiate the prior authorization. They suggest that she wait until next week but they can get her in tomorrow. She may have to sign a form that states she will have to cover the cost of the test.     I called the patient and verified them by name and date of birth. I informed her on the message per scheduling. She stated understanding and will contact scheduling and insurance.

## 2024-01-11 NOTE — TELEPHONE ENCOUNTER
Kim from center scheduling called stating order for scan needs to say STAT, can call back at [phone] with further questions

## 2024-01-12 ENCOUNTER — HOSPITAL ENCOUNTER (OUTPATIENT)
Facility: HOSPITAL | Age: 49
End: 2024-01-12
Payer: MEDICAID

## 2024-01-12 DIAGNOSIS — G44.52 NEW DAILY PERSISTENT HEADACHE: ICD-10-CM

## 2024-01-12 PROCEDURE — 70450 CT HEAD/BRAIN W/O DYE: CPT

## 2024-01-22 ENCOUNTER — TELEPHONE (OUTPATIENT)
Facility: CLINIC | Age: 49
End: 2024-01-22

## 2024-01-22 RX ORDER — INSULIN LISPRO 100 [IU]/ML
INJECTION, SOLUTION INTRAVENOUS; SUBCUTANEOUS
Qty: 45 ML | Refills: 5 | Status: SHIPPED | OUTPATIENT
Start: 2024-01-22

## 2024-01-22 RX ORDER — INSULIN GLARGINE 100 [IU]/ML
INJECTION, SOLUTION SUBCUTANEOUS
Qty: 60 ML | Refills: 5 | Status: SHIPPED | OUTPATIENT
Start: 2024-01-22

## 2024-01-22 NOTE — TELEPHONE ENCOUNTER
Spoke to pt verified name and . Pt stated she is still having issues with headaches, no changes from visit on 1/3/2024. She has tried the Fioricet and Topamax with no help. She does have a eye apt scheduled in a month. She wants to know what else she can take to help with the pain?

## 2024-01-22 NOTE — TELEPHONE ENCOUNTER
Patient left a voice message stating that she is out of insulin and she is requesting a supply be sent to her local pharmacy

## 2024-01-22 NOTE — TELEPHONE ENCOUNTER
She did state she \"doubled\" up on the Topamax and tried all medications that was sent in with no help.

## 2024-01-22 NOTE — TELEPHONE ENCOUNTER
Pt called and stated that she is aware of the CT results but she needs to know what to do now that this is now day 30 with these headaches and no relief

## 2024-01-22 NOTE — TELEPHONE ENCOUNTER
Spoke to pt verified name and . Made apt for virtual apt tomorrow morning to go over the headaches.

## 2024-01-22 NOTE — TELEPHONE ENCOUNTER
Tell her to increase Topamax 50 mg to 2 tablets twice a day. If still having headaches after a week, let us know. Ask if she needs a refill on Topamax. On 1/10/24, I sent in a prescription for ibuprofen 800 mg 1 tab 3 times a day for headache. Did she  the prescription?

## 2024-01-22 NOTE — TELEPHONE ENCOUNTER
Schedule her an in-person or telemedicine visit with me tomorrow at 8:10 am or 2:40 pm for intractable headache.

## 2024-01-23 ENCOUNTER — TELEMEDICINE (OUTPATIENT)
Facility: CLINIC | Age: 49
End: 2024-01-23
Payer: MEDICAID

## 2024-01-23 DIAGNOSIS — G44.52 NEW DAILY PERSISTENT HEADACHE: Primary | ICD-10-CM

## 2024-01-23 PROCEDURE — 99213 OFFICE O/P EST LOW 20 MIN: CPT | Performed by: INTERNAL MEDICINE

## 2024-01-23 RX ORDER — PROPRANOLOL HYDROCHLORIDE 20 MG/1
20 TABLET ORAL 3 TIMES DAILY
Qty: 90 TABLET | Refills: 1 | Status: SHIPPED | OUTPATIENT
Start: 2024-01-23

## 2024-01-23 RX ORDER — SUMATRIPTAN 50 MG/1
TABLET, FILM COATED ORAL
Qty: 9 TABLET | Refills: 1 | Status: SHIPPED | OUTPATIENT
Start: 2024-01-23

## 2024-01-23 NOTE — PROGRESS NOTES
Radha Caban  Identified pt with two pt identifiers(name and ).  Chief Complaint   Patient presents with    Headache       1. Have you been to the ER, urgent care clinic since your last visit?  Hospitalized since your last visit? NO    2. Have you seen or consulted any other health care providers outside of the [facility] since your last visit?  Include any pap smears or colon screening. NO      Provider notified of reason for visit, vitals and flowsheets obtained on patients.     Patient received paperwork for advance directive during previous visit but has not completed at this time     Reviewed record In preparation for visit, huddled with provider and have obtained necessary documentation      Health Maintenance Due   Topic    Hepatitis B vaccine (1 of 3 - 3-dose series)    Pneumococcal 0-64 years Vaccine (1 - PCV)    Diabetic retinal exam     Flu vaccine (1)    COVID-19 Vaccine (4 -  season)       Wt Readings from Last 3 Encounters:   24 127.8 kg (281 lb 12.8 oz)   23 125.3 kg (276 lb 3.2 oz)   23 126 kg (277 lb 12.8 oz)     Temp Readings from Last 3 Encounters:   24 98.1 °F (36.7 °C) (Oral)   23 97.4 °F (36.3 °C) (Temporal)   23 98.2 °F (36.8 °C)     BP Readings from Last 3 Encounters:   24 131/74   23 120/68   23 128/86     Pulse Readings from Last 3 Encounters:   24 71   23 72   23 76          No data to display                  Learning Assessment:  :         1/3/2024     8:10 AM   [facility] AMB LEARNING ASSESSMENT   Primary Learner Patient   level of education > 4 YEARS OF COLLEGE   Primary Language ENGLISH   Learning Preference DEMONSTRATION   Answered By Patient   Relationship to Learner SELF       Fall Risk Assessment:  :         11/15/2022     7:22 AM 2022    10:16 AM 2021    10:57 AM 2021     8:24 AM 2021     3:01 PM 2021    11:36 AM 2021     9:45 AM   Amb Fall Risk Assessment 
daily as needed Yes Automatic Reconciliation, Ar   dicyclomine (BENTYL) 10 MG/5ML syrup Take 10 mLs by mouth 4 times daily as needed Yes Automatic Reconciliation, Ar   HYDROcodone-acetaminophen (NORCO)  MG per tablet TAKE 1 TO 2 TABLETS BY MOUTH 4 TIMES DAILY AS NEEDED Yes Automatic Reconciliation, Ar   metoclopramide (REGLAN) 5 MG tablet Take 2 tablets by mouth 3 times daily 10 mg twice daily and 5 mg once daily Yes Automatic Reconciliation, Ar   omeprazole (PRILOSEC) 40 MG delayed release capsule 1 capsule in the morning and at bedtime ceived the following from [facility]: Outside name: omeprazole (PRILOSEC) 40 mg capsule Yes Automatic Reconciliation, Ar       Social History     Tobacco Use    Smoking status: Never    Smokeless tobacco: Never   Vaping Use    Vaping Use: Never used   Substance Use Topics    Alcohol use: Not Currently    Drug use: Not Currently        Allergies   Allergen Reactions    Lisinopril Cough    Penicillins Hives       PHYSICAL EXAMINATION:  [ INSTRUCTIONS:  \"[x]\" Indicates a positive item  \"[]\" Indicates a negative item  -- DELETE ALL ITEMS NOT EXAMINED]  Vital Signs: (As obtained by patient/caregiver or practitioner observation)    Blood pressure-  Heart rate-    Respiratory rate-    Temperature-  Pulse oximetry-     Constitutional: [x] Appears well-developed and well-nourished [] No apparent distress      [] Abnormal-   Mental status  [x] Alert and awake  [x] Oriented to person/place/time []Able to follow commands      Eyes:  EOM    [x]  Normal  [] Abnormal-  Sclera  [x]  Normal  [] Abnormal -         Discharge []  None visible  [] Abnormal -    HENT:   [x] Normocephalic, atraumatic.  [] Abnormal   [] Mouth/Throat: Mucous membranes are moist.     External Ears [] Normal  [] Abnormal-     Neck: [x] No visualized mass     Pulmonary/Chest: [x] Respiratory effort normal.  [] No visualized signs of difficulty breathing or respiratory distress        [] Abnormal-

## 2024-02-02 ENCOUNTER — OFFICE VISIT (OUTPATIENT)
Facility: CLINIC | Age: 49
End: 2024-02-02
Payer: MEDICAID

## 2024-02-02 VITALS
DIASTOLIC BLOOD PRESSURE: 78 MMHG | RESPIRATION RATE: 16 BRPM | OXYGEN SATURATION: 95 % | BODY MASS INDEX: 53.44 KG/M2 | SYSTOLIC BLOOD PRESSURE: 124 MMHG | HEART RATE: 73 BPM | WEIGHT: 272.2 LBS | TEMPERATURE: 98.2 F | HEIGHT: 60 IN

## 2024-02-02 DIAGNOSIS — G44.52 NEW DAILY PERSISTENT HEADACHE: Primary | ICD-10-CM

## 2024-02-02 DIAGNOSIS — I10 ESSENTIAL HYPERTENSION: ICD-10-CM

## 2024-02-02 DIAGNOSIS — E11.42 TYPE 2 DIABETES MELLITUS WITH DIABETIC POLYNEUROPATHY, WITH LONG-TERM CURRENT USE OF INSULIN (HCC): ICD-10-CM

## 2024-02-02 DIAGNOSIS — F33.1 MODERATE EPISODE OF RECURRENT MAJOR DEPRESSIVE DISORDER (HCC): ICD-10-CM

## 2024-02-02 DIAGNOSIS — Z79.4 TYPE 2 DIABETES MELLITUS WITH DIABETIC POLYNEUROPATHY, WITH LONG-TERM CURRENT USE OF INSULIN (HCC): ICD-10-CM

## 2024-02-02 PROCEDURE — 3074F SYST BP LT 130 MM HG: CPT | Performed by: INTERNAL MEDICINE

## 2024-02-02 PROCEDURE — 99214 OFFICE O/P EST MOD 30 MIN: CPT | Performed by: INTERNAL MEDICINE

## 2024-02-02 PROCEDURE — 3078F DIAST BP <80 MM HG: CPT | Performed by: INTERNAL MEDICINE

## 2024-02-02 RX ORDER — PROPRANOLOL HYDROCHLORIDE 40 MG/1
40 TABLET ORAL 3 TIMES DAILY
Qty: 90 TABLET | Refills: 1 | Status: SHIPPED | OUTPATIENT
Start: 2024-02-02

## 2024-02-02 NOTE — PROGRESS NOTES
Radha Caban  Identified pt with two pt identifiers(name and ).  Chief Complaint   Patient presents with    Headache       1. Have you been to the ER, urgent care clinic since your last visit?  Hospitalized since your last visit? NO    2. Have you seen or consulted any other health care providers outside of the [facility] since your last visit?  Include any pap smears or colon screening. NO      Provider notified of reason for visit, vitals and flowsheets obtained on patients.     Patient received paperwork for advance directive during previous visit but has not completed at this time     Reviewed record In preparation for visit, huddled with provider and have obtained necessary documentation      Health Maintenance Due   Topic    Hepatitis B vaccine (1 of 3 - 3-dose series)    Pneumococcal 0-64 years Vaccine (1 - PCV)    Diabetic retinal exam     Flu vaccine (1)    COVID-19 Vaccine (4 -  season)       Wt Readings from Last 3 Encounters:   24 123.5 kg (272 lb 3.2 oz)   24 127.8 kg (281 lb 12.8 oz)   23 125.3 kg (276 lb 3.2 oz)     Temp Readings from Last 3 Encounters:   24 98.2 °F (36.8 °C) (Oral)   24 98.1 °F (36.7 °C) (Oral)   23 97.4 °F (36.3 °C) (Temporal)     BP Readings from Last 3 Encounters:   24 124/78   24 131/74   23 120/68     Pulse Readings from Last 3 Encounters:   24 73   24 71   23 72          No data to display                  Learning Assessment:  :         1/3/2024     8:10 AM   [facility] AMB LEARNING ASSESSMENT   Primary Learner Patient   level of education > 4 YEARS OF COLLEGE   Primary Language ENGLISH   Learning Preference DEMONSTRATION   Answered By Patient   Relationship to Learner SELF       Fall Risk Assessment:  :         11/15/2022     7:22 AM 2022    10:16 AM 2021    10:57 AM 2021     8:24 AM 2021     3:01 PM 2021    11:36 AM 2021     9:45 AM   Amb Fall Risk 
doctor appointment. She will try scheduling with her 's eye doctor in [place].  - Start propranolol (INDERAL) 40 MG tablet; Take 1 tablet by mouth 3 times daily  Dispense: 90 tablet; Refill: 1    2. Essential hypertension  Controlled. Continue present management.    3. Type 2 diabetes mellitus with diabetic polyneuropathy, with long-term current use of insulin (HCC)  Last A1c 7.7% on 11/17/23. Continue following with Dr. Mohan to improve control.    4. Moderate episode of recurrent major depressive disorder (HCC)  Provided her with a list of places to call for a therapist. Also referred to [facility].  - [facility]    Return in about 2 months (around 4/2/2024), or if symptoms worsen or fail to improve, for Depression, chol.       For a therapist and/or psychiatrist, call:    [facility] [facility] ([facility])  Human Services [facility], [address] [place] [address]  [phone]    [facility]  [address], [place], [place] [id number] [phone]    [facility] of [address] [place], [place] [address] [phone]    [facility]  [phone]    [facility]  [address], [place], [place] [id number] [phone]    [facility]  [address], [place] [address] [phone]    [facility]  [address], [address], [place] [address] [phone]    I have discussed the diagnosis with the patient and the intended plan as seen in the above orders. Patient is in agreement.  The patient has received an after-visit summary and questions were answered concerning future plans.  I have discussed medication side effects and warnings with the patient as well.

## 2024-02-02 NOTE — PATIENT INSTRUCTIONS
For a therapist and/or psychiatrist, call:    [facility] ([facility])  Human Services Building, [address] [place] [address]  [phone]    [facility]  [address], [place] [address]  [phone]    [facility] of [address] [place], [place] [address] [phone]    [facility]  [phone]    [facility]  [address], [place], [place] [id number] [phone]    [facility]  [address], [place] [address] [phone]    [facility]  [address], [place] [address] [phone]

## 2024-02-07 ENCOUNTER — CLINICAL DOCUMENTATION (OUTPATIENT)
Facility: CLINIC | Age: 49
End: 2024-02-07

## 2024-02-07 NOTE — PROGRESS NOTES
Spoke to pt verified name and . I let pt know disability form has been faxed with the success it went through.  I made sure she had a copy of the form ( she does). Pt stated she is having abdominal pain and headache. I let her know to make sure she is getting plenty of fluid, dehydration can be a cause of headache. She stated she does not drink a lot and will work on it.

## 2024-02-15 ENCOUNTER — PROCEDURE VISIT (OUTPATIENT)
Age: 49
End: 2024-02-15
Payer: MEDICAID

## 2024-02-15 DIAGNOSIS — G56.03 CARPAL TUNNEL SYNDROME, BILATERAL: Primary | ICD-10-CM

## 2024-02-15 PROCEDURE — 95886 MUSC TEST DONE W/N TEST COMP: CPT | Performed by: PSYCHIATRY & NEUROLOGY

## 2024-02-15 PROCEDURE — 95913 NRV CNDJ TEST 13/> STUDIES: CPT | Performed by: PSYCHIATRY & NEUROLOGY

## 2024-02-16 ENCOUNTER — TELEPHONE (OUTPATIENT)
Facility: CLINIC | Age: 49
End: 2024-02-16

## 2024-02-16 NOTE — TELEPHONE ENCOUNTER
Inform patient: Your nerve conduction study showed that you have bilateral carpal tunnel syndrome. Would you like Dr. Rodas to refer you to see a specialist about this?

## 2024-02-26 ENCOUNTER — TELEPHONE (OUTPATIENT)
Age: 49
End: 2024-02-26

## 2024-02-26 NOTE — TELEPHONE ENCOUNTER
LVM for patient to return call to schedule a new patient appointment with Katelyn Frias NP at [facility].

## 2024-02-27 ENCOUNTER — TELEPHONE (OUTPATIENT)
Facility: CLINIC | Age: 49
End: 2024-02-27

## 2024-02-27 DIAGNOSIS — G56.03 BILATERAL CARPAL TUNNEL SYNDROME: Primary | ICD-10-CM

## 2024-02-27 NOTE — TELEPHONE ENCOUNTER
Pt called and stated that the referral for [place] [facility] is a little far for her and she would like to get [facility] [place]

## 2024-02-29 ENCOUNTER — TELEPHONE (OUTPATIENT)
Facility: CLINIC | Age: 49
End: 2024-02-29

## 2024-02-29 NOTE — TELEPHONE ENCOUNTER
Spoke to pt verified name and . I let pt know referral has been faxed over. Success it went through.

## 2024-03-05 DIAGNOSIS — E78.2 MIXED HYPERLIPIDEMIA: ICD-10-CM

## 2024-03-05 DIAGNOSIS — I10 ESSENTIAL HYPERTENSION: ICD-10-CM

## 2024-03-05 DIAGNOSIS — M51.36 DDD (DEGENERATIVE DISC DISEASE), LUMBAR: ICD-10-CM

## 2024-03-05 DIAGNOSIS — G44.52 NEW DAILY PERSISTENT HEADACHE: ICD-10-CM

## 2024-03-05 DIAGNOSIS — E11.42 TYPE 2 DIABETES MELLITUS WITH DIABETIC POLYNEUROPATHY, WITH LONG-TERM CURRENT USE OF INSULIN (HCC): ICD-10-CM

## 2024-03-05 DIAGNOSIS — Z79.4 TYPE 2 DIABETES MELLITUS WITH DIABETIC POLYNEUROPATHY, WITH LONG-TERM CURRENT USE OF INSULIN (HCC): ICD-10-CM

## 2024-03-05 DIAGNOSIS — F33.1 MODERATE EPISODE OF RECURRENT MAJOR DEPRESSIVE DISORDER (HCC): ICD-10-CM

## 2024-03-05 DIAGNOSIS — M79.2 NEURALGIA AND NEURITIS, UNSPECIFIED: ICD-10-CM

## 2024-03-05 RX ORDER — IBUPROFEN 800 MG/1
800 TABLET ORAL 3 TIMES DAILY PRN
Qty: 90 TABLET | Refills: 1 | Status: SHIPPED | OUTPATIENT
Start: 2024-03-05

## 2024-03-05 RX ORDER — ALBUTEROL SULFATE 90 UG/1
2 AEROSOL, METERED RESPIRATORY (INHALATION) EVERY 6 HOURS PRN
Qty: 18 G | Refills: 3 | Status: SHIPPED | OUTPATIENT
Start: 2024-03-05

## 2024-03-05 RX ORDER — BUPROPION HYDROCHLORIDE 150 MG/1
150 TABLET, EXTENDED RELEASE ORAL 2 TIMES DAILY
Qty: 180 TABLET | Refills: 3 | Status: SHIPPED | OUTPATIENT
Start: 2024-03-05

## 2024-03-05 RX ORDER — LIDOCAINE 50 MG/G
1 PATCH TOPICAL EVERY 24 HOURS
Qty: 90 PATCH | Refills: 3 | Status: SHIPPED | OUTPATIENT
Start: 2024-03-05

## 2024-03-05 RX ORDER — MONTELUKAST SODIUM 10 MG/1
TABLET ORAL
Qty: 90 TABLET | Refills: 3 | Status: SHIPPED | OUTPATIENT
Start: 2024-03-05

## 2024-03-05 RX ORDER — VALSARTAN 80 MG/1
80 TABLET ORAL DAILY
Qty: 90 TABLET | Refills: 3 | Status: SHIPPED | OUTPATIENT
Start: 2024-03-05

## 2024-03-05 RX ORDER — PROCHLORPERAZINE 25 MG/1
SUPPOSITORY RECTAL
Qty: 3 EACH | Refills: 11 | Status: CANCELLED | OUTPATIENT
Start: 2024-03-05

## 2024-03-05 RX ORDER — AMITRIPTYLINE HYDROCHLORIDE 50 MG/1
50 TABLET, FILM COATED ORAL NIGHTLY
Qty: 90 TABLET | Refills: 3 | Status: SHIPPED | OUTPATIENT
Start: 2024-03-05

## 2024-03-05 NOTE — TELEPHONE ENCOUNTER
PCP: Mary Rodas MD     Last appt:  2/2/2024      Future Appointments   Date Time Provider Department Center   3/6/2024 11:00 AM Katelyn Frias APRN - CNP [facility] BS AMB   4/12/2024 10:10 AM Mary Rodas MD [facility]   5/6/2024  8:30 AM Kerline Reyes APRN - NP NEUM[facility] BS AMB   9/3/2024  9:20 AM Amy Yancey MD [facility] BS AMB          Requested Prescriptions     Pending Prescriptions Disp Refills    buPROPion (WELLBUTRIN SR) 150 MG extended release tablet 180 tablet 3     Sig: Take 1 tablet by mouth 2 times daily    valsartan (DIOVAN) 80 MG tablet 90 tablet 3     Sig: Take 1 tablet by mouth daily high blood pressure    lidocaine (LIDODERM) 5 % 30 patch 3     Sig: Place 1 patch onto the skin every 24 hours    amitriptyline (ELAVIL) 50 MG tablet 90 tablet 3     Sig: Take 1 tablet by mouth nightly    montelukast (SINGULAIR) 10 MG tablet 90 tablet 3     Sig: TAKE 1 TABLET BY MOUTH ONCE DAILY FOR ALLERGIES AND FOR ASTHMA    albuterol sulfate HFA (PROVENTIL;VENTOLIN;PROAIR) 108 (90 Base) MCG/ACT inhaler 18 g 3     Sig: Inhale 2 puffs into the lungs every 6 hours as needed for Shortness of Breath or Wheezing    ibuprofen (ADVIL;MOTRIN) 800 MG tablet 45 tablet 0     Sig: Take 1 tablet by mouth 3 times daily as needed (headache) Take with meals.

## 2024-03-06 ENCOUNTER — PATIENT MESSAGE (OUTPATIENT)
Age: 49
End: 2024-03-06

## 2024-03-06 ENCOUNTER — OFFICE VISIT (OUTPATIENT)
Age: 49
End: 2024-03-06

## 2024-03-06 VITALS
OXYGEN SATURATION: 97 % | SYSTOLIC BLOOD PRESSURE: 107 MMHG | DIASTOLIC BLOOD PRESSURE: 64 MMHG | BODY MASS INDEX: 46.3 KG/M2 | WEIGHT: 271.2 LBS | HEIGHT: 64 IN | RESPIRATION RATE: 17 BRPM | HEART RATE: 86 BPM | TEMPERATURE: 97.6 F

## 2024-03-06 DIAGNOSIS — F33.1 MAJOR DEPRESSIVE DISORDER, RECURRENT, MODERATE (HCC): Primary | ICD-10-CM

## 2024-03-06 DIAGNOSIS — F41.1 GENERALIZED ANXIETY DISORDER: ICD-10-CM

## 2024-03-06 RX ORDER — PROCHLORPERAZINE 25 MG/1
SUPPOSITORY RECTAL
Qty: 1 EACH | Refills: 3 | Status: SHIPPED | OUTPATIENT
Start: 2024-03-06

## 2024-03-06 RX ORDER — DULAGLUTIDE 4.5 MG/.5ML
INJECTION, SOLUTION SUBCUTANEOUS
Qty: 6 ML | Refills: 3 | Status: SHIPPED | OUTPATIENT
Start: 2024-03-06

## 2024-03-06 RX ORDER — PROCHLORPERAZINE 25 MG/1
SUPPOSITORY RECTAL
Qty: 3 EACH | Refills: 11 | Status: SHIPPED | OUTPATIENT
Start: 2024-03-06

## 2024-03-06 RX ORDER — INSULIN LISPRO 100 [IU]/ML
INJECTION, SOLUTION INTRAVENOUS; SUBCUTANEOUS
Qty: 45 ML | Refills: 5 | Status: SHIPPED | OUTPATIENT
Start: 2024-03-06

## 2024-03-06 RX ORDER — INSULIN GLARGINE 100 [IU]/ML
INJECTION, SOLUTION SUBCUTANEOUS
Qty: 45 ML | Refills: 5 | Status: SHIPPED | OUTPATIENT
Start: 2024-03-06

## 2024-03-06 RX ORDER — PEN NEEDLE, DIABETIC 32GX 5/32"
NEEDLE, DISPOSABLE MISCELLANEOUS
Qty: 300 EACH | Refills: 3 | Status: SHIPPED | OUTPATIENT
Start: 2024-03-06

## 2024-03-06 RX ORDER — METFORMIN HYDROCHLORIDE 500 MG/1
1000 TABLET, EXTENDED RELEASE ORAL 2 TIMES DAILY
Qty: 360 TABLET | Refills: 3 | Status: SHIPPED | OUTPATIENT
Start: 2024-03-06

## 2024-03-06 RX ORDER — GLIPIZIDE 10 MG/1
TABLET, FILM COATED, EXTENDED RELEASE ORAL
Qty: 180 TABLET | Refills: 3 | Status: SHIPPED | OUTPATIENT
Start: 2024-03-06

## 2024-03-06 RX ORDER — ATORVASTATIN CALCIUM 40 MG/1
TABLET, FILM COATED ORAL
Qty: 90 TABLET | Refills: 3 | Status: SHIPPED | OUTPATIENT
Start: 2024-03-06

## 2024-03-06 ASSESSMENT — ANXIETY QUESTIONNAIRES
4. TROUBLE RELAXING: 1
3. WORRYING TOO MUCH ABOUT DIFFERENT THINGS: 1
IF YOU CHECKED OFF ANY PROBLEMS ON THIS QUESTIONNAIRE, HOW DIFFICULT HAVE THESE PROBLEMS MADE IT FOR YOU TO DO YOUR WORK, TAKE CARE OF THINGS AT HOME, OR GET ALONG WITH OTHER PEOPLE: VERY DIFFICULT
GAD7 TOTAL SCORE: 8
4. TROUBLE RELAXING: 3
2. NOT BEING ABLE TO STOP OR CONTROL WORRYING: 2
2. NOT BEING ABLE TO STOP OR CONTROL WORRYING: 1
2. NOT BEING ABLE TO STOP OR CONTROL WORRYING: MORE THAN HALF THE DAYS
6. BECOMING EASILY ANNOYED OR IRRITABLE: 2
IF YOU CHECKED OFF ANY PROBLEMS ON THIS QUESTIONNAIRE, HOW DIFFICULT HAVE THESE PROBLEMS MADE IT FOR YOU TO DO YOUR WORK, TAKE CARE OF THINGS AT HOME, OR GET ALONG WITH OTHER PEOPLE: VERY DIFFICULT
1. FEELING NERVOUS, ANXIOUS, OR ON EDGE: SEVERAL DAYS
6. BECOMING EASILY ANNOYED OR IRRITABLE: 2
7. FEELING AFRAID AS IF SOMETHING AWFUL MIGHT HAPPEN: MORE THAN HALF THE DAYS
6. BECOMING EASILY ANNOYED OR IRRITABLE: MORE THAN HALF THE DAYS
3. WORRYING TOO MUCH ABOUT DIFFERENT THINGS: 2
7. FEELING AFRAID AS IF SOMETHING AWFUL MIGHT HAPPEN: 2
IF YOU CHECKED OFF ANY PROBLEMS ON THIS QUESTIONNAIRE, HOW DIFFICULT HAVE THESE PROBLEMS MADE IT FOR YOU TO DO YOUR WORK, TAKE CARE OF THINGS AT HOME, OR GET ALONG WITH OTHER PEOPLE: VERY DIFFICULT
5. BEING SO RESTLESS THAT IT IS HARD TO SIT STILL: NEARLY EVERY DAY
4. TROUBLE RELAXING: NEARLY EVERY DAY
5. BEING SO RESTLESS THAT IT IS HARD TO SIT STILL: 1
GAD7 TOTAL SCORE: 15
3. WORRYING TOO MUCH ABOUT DIFFERENT THINGS: MORE THAN HALF THE DAYS
1. FEELING NERVOUS, ANXIOUS, OR ON EDGE: 1
5. BEING SO RESTLESS THAT IT IS HARD TO SIT STILL: 3
1. FEELING NERVOUS, ANXIOUS, OR ON EDGE: 1
7. FEELING AFRAID AS IF SOMETHING AWFUL MIGHT HAPPEN: 1

## 2024-03-06 ASSESSMENT — LIFESTYLE VARIABLES
ALCOHOL_DAYS_PER_WEEK: 0
HISTORY_ALCOHOL_USE: NO
PAST THREE MONTHS WHAT IS THE LARGEST AMOUNT OF ALCOHOLIC DRINKS YOU HAVE CONSUMED IN ONE DAY: 0
HAVE YOU EVER RECEIVED ALCOHOL OR OTHER DRUG ABUSE TREATMENT: NO

## 2024-03-06 ASSESSMENT — PATIENT HEALTH QUESTIONNAIRE - PHQ9
SUM OF ALL RESPONSES TO PHQ QUESTIONS 1-9: 12
SUM OF ALL RESPONSES TO PHQ QUESTIONS 1-9: 12
5. POOR APPETITE OR OVEREATING: 1
SUM OF ALL RESPONSES TO PHQ QUESTIONS 1-9: 12
SUM OF ALL RESPONSES TO PHQ9 QUESTIONS 1 & 2: 3
9. THOUGHTS THAT YOU WOULD BE BETTER OFF DEAD, OR OF HURTING YOURSELF: 0
2. FEELING DOWN, DEPRESSED OR HOPELESS: 1
3. TROUBLE FALLING OR STAYING ASLEEP: 1
6. FEELING BAD ABOUT YOURSELF - OR THAT YOU ARE A FAILURE OR HAVE LET YOURSELF OR YOUR FAMILY DOWN: 1
1. LITTLE INTEREST OR PLEASURE IN DOING THINGS: 2
8. MOVING OR SPEAKING SO SLOWLY THAT OTHER PEOPLE COULD HAVE NOTICED. OR THE OPPOSITE, BEING SO FIGETY OR RESTLESS THAT YOU HAVE BEEN MOVING AROUND A LOT MORE THAN USUAL: 0
7. TROUBLE CONCENTRATING ON THINGS, SUCH AS READING THE NEWSPAPER OR WATCHING TELEVISION: 3
4. FEELING TIRED OR HAVING LITTLE ENERGY: 3
10. IF YOU CHECKED OFF ANY PROBLEMS, HOW DIFFICULT HAVE THESE PROBLEMS MADE IT FOR YOU TO DO YOUR WORK, TAKE CARE OF THINGS AT HOME, OR GET ALONG WITH OTHER PEOPLE: 2
SUM OF ALL RESPONSES TO PHQ QUESTIONS 1-9: 12

## 2024-03-07 RX ORDER — PREGABALIN 300 MG/1
300 CAPSULE ORAL 2 TIMES DAILY
Qty: 180 CAPSULE | Refills: 1 | Status: SHIPPED | OUTPATIENT
Start: 2024-03-07 | End: 2024-09-03

## 2024-03-07 NOTE — TELEPHONE ENCOUNTER
Spoke with ms Caban and says she got an automated message that her refills for sensor were not going through, advised that all of her refill requests were sent yesterday, so she will contact the pharmacy and let us know if she has any issues getting the script, patient indicates understanding and agrees with plan.

## 2024-03-08 ENCOUNTER — CLINICAL DOCUMENTATION (OUTPATIENT)
Age: 49
End: 2024-03-08

## 2024-03-08 NOTE — PROGRESS NOTES
Faxed signed Rx for Lyrica 300 mg tab BID to [facility] at [phone] and received fax confirmation. Placed in fast scan pile.

## 2024-03-12 ENCOUNTER — OFFICE VISIT (OUTPATIENT)
Age: 49
End: 2024-03-12
Payer: MEDICAID

## 2024-03-12 VITALS
HEART RATE: 83 BPM | WEIGHT: 271.2 LBS | DIASTOLIC BLOOD PRESSURE: 71 MMHG | HEIGHT: 65 IN | SYSTOLIC BLOOD PRESSURE: 111 MMHG | BODY MASS INDEX: 45.18 KG/M2

## 2024-03-12 DIAGNOSIS — Z79.4 TYPE 2 DIABETES MELLITUS WITH DIABETIC POLYNEUROPATHY, WITH LONG-TERM CURRENT USE OF INSULIN (HCC): Primary | ICD-10-CM

## 2024-03-12 DIAGNOSIS — E78.2 MIXED HYPERLIPIDEMIA: ICD-10-CM

## 2024-03-12 DIAGNOSIS — E11.42 TYPE 2 DIABETES MELLITUS WITH DIABETIC POLYNEUROPATHY, WITH LONG-TERM CURRENT USE OF INSULIN (HCC): Primary | ICD-10-CM

## 2024-03-12 DIAGNOSIS — E66.01 MORBID OBESITY (HCC): ICD-10-CM

## 2024-03-12 LAB — HBA1C MFR BLD: 6.7 %

## 2024-03-12 PROCEDURE — 95251 CONT GLUC MNTR ANALYSIS I&R: CPT | Performed by: GENERAL ACUTE CARE HOSPITAL

## 2024-03-12 PROCEDURE — 83036 HEMOGLOBIN GLYCOSYLATED A1C: CPT | Performed by: GENERAL ACUTE CARE HOSPITAL

## 2024-03-12 PROCEDURE — 3074F SYST BP LT 130 MM HG: CPT | Performed by: GENERAL ACUTE CARE HOSPITAL

## 2024-03-12 PROCEDURE — 3078F DIAST BP <80 MM HG: CPT | Performed by: GENERAL ACUTE CARE HOSPITAL

## 2024-03-12 PROCEDURE — 99214 OFFICE O/P EST MOD 30 MIN: CPT | Performed by: GENERAL ACUTE CARE HOSPITAL

## 2024-03-12 NOTE — PATIENT INSTRUCTIONS
extra diabetes supplies such as a spare meter, test strips, and pump supplies in the car is a smart move.   -If you have one, wear your diabetes medical ID or have your Diabetes Alert Card in your wallet. These can help first responders treat you more quickly.

## 2024-03-12 NOTE — PROGRESS NOTES
from a quick-sugar food, such as 3 or 4 glucose tablets or ½ cup of juice. Wait 15 minutes and check your blood sugar. If it is still below 70 mg/dL, eat another 15 grams of carbohydrate. Repeat this every 15 minutes until your blood sugar is in a safe target range.  Once your blood sugar is in a safe range, eat a snack or meal to prevent recurrent low blood sugar.  Make sure family, friends, and coworkers know the symptoms of low blood sugar and know how to get your sugar level up.  If you were prescribed glucagon, always have it with you. Make sure friends and family know how to use it.  When should you call for help?     Call 911 anytime you think you may need emergency care. For example, call if:    You passed out (lost consciousness).     You are confused or cannot think clearly.     Your blood sugar is very high or very low.     Watch closely for changes in your health, and be sure to contact your doctor if:    Your blood sugar stays outside the level your doctor set for you.     You have any problems.       Before you drive:   -Check your blood glucose before driving.   -If it's low, treat the hypoglycemia and wait until you're at a safe level before driving.   -Keep fast-acting carbohydrates such as glucose tablets or a juice box and extra snacks in the car.   -If you start feeling low while you're driving, pull over safely and check your blood glucose. Checking your blood glucose or treating a high or low reading should not be done while driving.    -If low, don't begin driving again until you have treated it and your blood glucose is back to a safe level.  -In case of an emergency, having extra diabetes supplies such as a spare meter, test strips, and pump supplies in the car is a smart move.   -If you have one, wear your diabetes medical ID or have your Diabetes Alert Card in your wallet. These can help first responders treat you more quickly.

## 2024-04-17 ENCOUNTER — TELEPHONE (OUTPATIENT)
Age: 49
End: 2024-04-17

## 2024-04-18 RX ORDER — PROCHLORPERAZINE 25 MG/1
SUPPOSITORY RECTAL
Qty: 3 EACH | Refills: 11 | Status: SHIPPED | OUTPATIENT
Start: 2024-04-18 | End: 2024-04-18

## 2024-04-18 RX ORDER — PROCHLORPERAZINE 25 MG/1
SUPPOSITORY RECTAL
Qty: 3 EACH | Refills: 11 | Status: SHIPPED | OUTPATIENT
Start: 2024-04-18

## 2024-04-25 ENCOUNTER — OFFICE VISIT (OUTPATIENT)
Facility: CLINIC | Age: 49
End: 2024-04-25
Payer: MEDICAID

## 2024-04-25 VITALS
SYSTOLIC BLOOD PRESSURE: 175 MMHG | BODY MASS INDEX: 45.52 KG/M2 | HEART RATE: 73 BPM | OXYGEN SATURATION: 97 % | DIASTOLIC BLOOD PRESSURE: 99 MMHG | WEIGHT: 273.2 LBS | HEIGHT: 65 IN | RESPIRATION RATE: 16 BRPM | TEMPERATURE: 98.3 F

## 2024-04-25 DIAGNOSIS — E11.42 TYPE 2 DIABETES MELLITUS WITH DIABETIC POLYNEUROPATHY, WITH LONG-TERM CURRENT USE OF INSULIN (HCC): ICD-10-CM

## 2024-04-25 DIAGNOSIS — F51.04 CHRONIC INSOMNIA: ICD-10-CM

## 2024-04-25 DIAGNOSIS — F33.1 MAJOR DEPRESSIVE DISORDER, RECURRENT, MODERATE (HCC): Primary | ICD-10-CM

## 2024-04-25 DIAGNOSIS — E78.2 MIXED HYPERLIPIDEMIA: ICD-10-CM

## 2024-04-25 DIAGNOSIS — M06.00 SERONEGATIVE RHEUMATOID ARTHRITIS (HCC): ICD-10-CM

## 2024-04-25 DIAGNOSIS — Z79.4 TYPE 2 DIABETES MELLITUS WITH DIABETIC POLYNEUROPATHY, WITH LONG-TERM CURRENT USE OF INSULIN (HCC): ICD-10-CM

## 2024-04-25 PROCEDURE — 99214 OFFICE O/P EST MOD 30 MIN: CPT | Performed by: INTERNAL MEDICINE

## 2024-04-25 RX ORDER — AMITRIPTYLINE HYDROCHLORIDE 50 MG/1
100 TABLET, FILM COATED ORAL NIGHTLY
Qty: 90 TABLET | Refills: 3 | Status: SHIPPED
Start: 2024-04-25

## 2024-04-25 NOTE — PROGRESS NOTES
Radha Caban  Identified pt with two pt identifiers(name and ).  Chief Complaint   Patient presents with    Depression    Cholesterol Problem       1. Have you been to the ER, urgent care clinic since your last visit?  Hospitalized since your last visit? 2 weeks ago to [facility] for not being able to eat.     2. Have you seen or consulted any other health care providers outside of the [facility] since your last visit?  Include any pap smears or colon screening.  Appointment with eye doctor (Dr. Aguilar) is in May.       Provider notified of reason for visit, vitals and flowsheets obtained on patients.     Patient received paperwork for advance directive during previous visit but has not completed at this time     Reviewed record In preparation for visit, huddled with provider and have obtained necessary documentation      Health Maintenance Due   Topic    Hepatitis B vaccine (1 of 3 - 3-dose series)    Diabetic retinal exam        Wt Readings from Last 3 Encounters:   24 123.9 kg (273 lb 3.2 oz)   24 123 kg (271 lb 3.2 oz)   24 123.5 kg (272 lb 3.2 oz)     Temp Readings from Last 3 Encounters:   24 98.3 °F (36.8 °C) (Oral)   24 98.2 °F (36.8 °C) (Oral)   24 98.1 °F (36.7 °C) (Oral)     BP Readings from Last 3 Encounters:   24 (!) 175/99   24 111/71   24 124/78     Pulse Readings from Last 3 Encounters:   24 73   24 83   24 73          No data to display                  Learning Assessment:  :         1/3/2024     8:10 AM   [facility] AMB LEARNING ASSESSMENT   Primary Learner Patient   level of education > 4 YEARS OF COLLEGE   Primary Language ENGLISH   Learning Preference DEMONSTRATION   Answered By Patient   Relationship to Learner SELF       Fall Risk Assessment:  :         11/15/2022     7:22 AM 2022    10:16 AM 2021    10:57 AM 2021     8:24 AM 2021     3:01 PM 2021    11:36 AM 2021     9:45 AM 
morning and at bedtime ceived the following from [facility]: Outside name: omeprazole (PRILOSEC) 40 mg capsule      MARTINNCIA CLICKJECT 125 MG/ML SOAJ  (Patient not taking: Reported on 3/6/2024)       No current facility-administered medications for this visit.       Physical Exam  Lungs are normal.  Heart is normal.    BP (!) 175/99   Pulse 73   Temp 98.3 °F (36.8 °C) (Oral)   Resp 16   Ht 1.638 m (5' 4.5\")   Wt 123.9 kg (273 lb 3.2 oz)   SpO2 97%   BMI 46.17 kg/m²     General: Obese, no distress.  HEENT:  Head normocephalic/atraumatic, no scleral icterus  Neck: Supple. No carotid bruits, JVD, lymphadenopathy, or thyromegaly.  Lungs:  Clear to auscultation bilaterally. Good air movement.  Heart:  Regular rate and rhythm, normal S1 and S2, no murmur, gallop, or rub  Extremities: No clubbing, cyanosis, or edema. 2+ pedal pulses bilaterally.  Neurological: Alert and oriented. No focal deficits.  Psychiatric: Normal mood and affect. Behavior is normal.     Lab Results   Component Value Date    LDLCALC 101 (H) 11/17/2023     Assessment & Plan  1. Major depression.  The patient's condition remains stable. The patient is advised to persist with counseling. She is encouraged to review the list provided to her and explore alternative therapists. Additionally, she is encouraged to explore support groups for individuals with chronic pain. The dosage of amitriptyline will be increased to 100 mg, or 2 tablets of 50 mg at bedtime. The elevated dose of amitriptyline can help depression, sleep, and chronic pain.    2. Hypertension.  The patient's blood pressure was initially elevated but returned to normal. Her blood pressure has been well-controlled at previous visits.  The current dose of valsartan will be continued, given that her blood pressure has been well-regulated. Should her blood pressure persistently remain high at home, she is to inform me, at which point the dosage of valsartan will be increased.

## 2024-05-05 NOTE — PROGRESS NOTES
[facility]  In Office FOLLOW-UP VISIT         Radha aCban is a 49 y.o. female who presents today for the following:  Chief Complaint   Patient presents with    Lumbar stenosis     Feels like back pain has gotten worse    Neck Pain     Feels like the pain is the same         ASSESSMENT AND PLAN  Patient is known to this practice.  Chart and history reviewed in detail at today's office visit.    1. Carpal tunnel syndrome, bilateral  Assessment & Plan:   Supported by EMG completed on 5/15/2024 with Dr. 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    Patient continued to experience paresthesias in the hands but denied any worsening in symptoms.    EMG reviewed. Patient deferred any surgical interventions at this time but has agreed to wear hand brace.    An order for hand brace bilaterally was placed today.  Orders:  -     [facility] Order for (Specify) as OP  2. Other headache syndrome  Assessment & Plan:  Patient has a history of migraine headaches but has not had a migraine since high school.    Based on the description of the headaches today, it is most likely that this headaches are related due to side effects from her rheumatoid arthritis medications (given her headaches do not have any migrainous features and her symptoms resolved after she stopped taking the medication), and obstructive sleep apnea..    She has been taking propranolol 40 mg 3 times daily, Topamax 50 mg twice a day, sumatriptan 50 mg and Fioricet as needed.  She denied any side effects.    Given her description of symptoms, this is most likely related due to medication side effects and patient may be able to wean off of some of these medications.    She is to continue to use her CPAP machine nightly as prescribed to prevent worsening in her headaches.  She verbalized understanding.    We discussed the

## 2024-05-06 ENCOUNTER — OFFICE VISIT (OUTPATIENT)
Age: 49
End: 2024-05-06
Payer: MEDICAID

## 2024-05-06 VITALS
SYSTOLIC BLOOD PRESSURE: 148 MMHG | WEIGHT: 269 LBS | DIASTOLIC BLOOD PRESSURE: 86 MMHG | HEART RATE: 86 BPM | RESPIRATION RATE: 18 BRPM | OXYGEN SATURATION: 99 % | HEIGHT: 64 IN | BODY MASS INDEX: 45.93 KG/M2

## 2024-05-06 DIAGNOSIS — G56.03 CARPAL TUNNEL SYNDROME, BILATERAL: Primary | ICD-10-CM

## 2024-05-06 DIAGNOSIS — M48.062 LUMBAR STENOSIS WITH NEUROGENIC CLAUDICATION: ICD-10-CM

## 2024-05-06 DIAGNOSIS — G44.89 OTHER HEADACHE SYNDROME: ICD-10-CM

## 2024-05-06 DIAGNOSIS — M54.2 NECK PAIN: ICD-10-CM

## 2024-05-06 PROCEDURE — 99215 OFFICE O/P EST HI 40 MIN: CPT

## 2024-05-06 ASSESSMENT — PATIENT HEALTH QUESTIONNAIRE - PHQ9
SUM OF ALL RESPONSES TO PHQ9 QUESTIONS 1 & 2: 0
SUM OF ALL RESPONSES TO PHQ QUESTIONS 1-9: 0
1. LITTLE INTEREST OR PLEASURE IN DOING THINGS: NOT AT ALL
2. FEELING DOWN, DEPRESSED OR HOPELESS: NOT AT ALL
SUM OF ALL RESPONSES TO PHQ QUESTIONS 1-9: 0

## 2024-05-06 ASSESSMENT — ENCOUNTER SYMPTOMS
GASTROINTESTINAL NEGATIVE: 1
EYES NEGATIVE: 1
ALLERGIC/IMMUNOLOGIC NEGATIVE: 1
RESPIRATORY NEGATIVE: 1

## 2024-05-06 NOTE — ASSESSMENT & PLAN NOTE
Patient has a history of migraine headaches but has not had a migraine since high school.    Based on the description of the headaches today, it is most likely that this headaches are related due to side effects from her rheumatoid arthritis medications (given her headaches do not have any migrainous features and her symptoms resolved after she stopped taking the medication), and obstructive sleep apnea..    She has been taking propranolol 40 mg 3 times daily, Topamax 50 mg twice a day, sumatriptan 50 mg and Fioricet as needed.  She denied any side effects.    Given her description of symptoms, this is most likely related due to medication side effects and patient may be able to wean off of some of these medications.    She is to continue to use her CPAP machine nightly as prescribed to prevent worsening in her headaches.  She verbalized understanding.    We discussed the importance of a proper diet including plenty of fruits and vegetables as this can help with headache prevention.    We discussed the importance of staying hydrated and drinking at least 32 to 64 ounces of water daily as this can be a cause of tension type headaches.    We also discussed the importance of keeping a headache journal or log to identify triggers.    We discussed the importance of sleep hygiene and attempting to get at least 6 to 8 hours of sleep daily to help with headache prevention.

## 2024-05-06 NOTE — ASSESSMENT & PLAN NOTE
Supported by EMG completed on 5/15/2024 with Dr. 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    Patient continued to experience paresthesias in the hands but denied any worsening in symptoms.    EMG reviewed. Patient deferred any surgical interventions at this time but has agreed to wear hand brace.    An order for hand brace bilaterally was placed today.

## 2024-05-06 NOTE — PATIENT INSTRUCTIONS
Ms. Caban,    As per our discussion,    Overall, you seem stable without worsening in your symptoms.    I encourage you to continue to follow-up with your PCP and your other specialists for your COVID conditions as appropriate.    I also encouraged that you continue to use a cane to ambulate to prevent falls.    It was noted that your symptoms  are due to carpal tunnel syndrome.  Will go ahead and start you on hand brace in which she can wear at bedtime only for carpal tunnel management.  It may take several weeks to months before he can see the effect.    As for your headaches, continue to take medications as prescribed.    As we discussed,  The importance of a proper diet including plenty of fruits and vegetables as this can help with headache prevention.  The importance of staying hydrated and drinking at least 32 to 64 ounces of water daily as this can be a cause of tension type headaches.  The importance of keeping a headache journal or log to identify triggers.  The importance of sleep hygiene and attempting to get at least 6 to 8 hours of sleep daily to help with headache prevention.    It is always a pleasure to see you    Will see back in 6 months or sooner if needed    Please do not hesitate to reach out for any questions or concerns.

## 2024-05-06 NOTE — PROGRESS NOTES
1. Have you been to the ER, urgent care clinic since your last visit?  Hospitalized since your last visit?  No.    2. Have you seen or consulted any other health care providers outside of the [facility] since your last visit?  Include any pap smears or colon screening.   No.        Chief Complaint   Patient presents with    Lumbar stenosis     Feels like back pain has gotten worse    Neck Pain     Feels like the pain is the same

## 2024-05-06 NOTE — ASSESSMENT & PLAN NOTE
MRI lumbar spine completed on 11/23/2022 showed interval degenerative change progression in the lower thoracic spine and lumbar spine.  Interval moderate craniocaudal disc extrusion at T10 as T11 with moderate to severe canal stenosis with moderate bilateral foraminal stenosis, minimal cord compression, no associated cord edema.  Severe left and mild right foraminal stenosis at L4-5 increased.  Severe bilateral neural foraminal stenosis at L5-S1.    Patient was referred to orthopedic surgery for evaluation but never followed through.    She was also referred to physical therapy but never followed through.    It was explained to patient that the worsening on her symptoms may be related due to her lumbar stenosis and recommended that she sees by orthopedic surgery for evaluation.    It was also explained to patient that physical therapy may be beneficial to help alleviating her symptoms.    She verbalized understanding but deferred any interventions.    She is to continue amitriptyline 50 mg at bedtime, Lyrica 300 mg twice a day.  She was advised to restart taking Flexeril 10 mg 3 times a day as needed to help with symptoms management.    Fall safety was extensively reviewed with patient today.   OCCUPATIONAL THERAPY QUICK EVALUATION - INPATIENT    Room Number: 515/515-A  Evaluation Date: 2/6/2018     Type of Evaluation: Initial  Presenting Problem: weakness    Physician Order: IP Consult to Occupational Therapy  Reason for Therapy:  ADL/IADL Dysfu changes    Bilateral upper extremity strength is grossly poor     ACTIVITIES OF DAILY LIVING ASSESSMENT  AM-PAC ‘6-Clicks’ Inpatient Daily Activity Short Form   How much help from another person does the patient currently need…  -   Putting on and taking o discharge in a skilled setting. At end of session pt left up in chair w/ all needs in reach and chair alarm in place. RN aware. No further OT contact plannned     OT Discharge Recommendations: Skilled nursing facility; Long term care  OT Device Recommendati

## 2024-05-07 ENCOUNTER — CLINICAL DOCUMENTATION (OUTPATIENT)
Age: 49
End: 2024-05-07

## 2024-05-07 NOTE — PROGRESS NOTES
Faxed [facility] order for bilateral hand brace with last office note to [phone] and received fax confirmation.

## 2024-05-11 ENCOUNTER — HOSPITAL ENCOUNTER (EMERGENCY)
Facility: HOSPITAL | Age: 49
Discharge: HOME | End: 2024-05-11
Attending: STUDENT IN AN ORGANIZED HEALTH CARE EDUCATION/TRAINING PROGRAM
Payer: MEDICAID

## 2024-05-11 ENCOUNTER — APPOINTMENT (OUTPATIENT)
Facility: HOSPITAL | Age: 49
End: 2024-05-11
Payer: MEDICAID

## 2024-05-11 VITALS
OXYGEN SATURATION: 100 % | DIASTOLIC BLOOD PRESSURE: 73 MMHG | RESPIRATION RATE: 16 BRPM | BODY MASS INDEX: 46.41 KG/M2 | HEIGHT: 64 IN | SYSTOLIC BLOOD PRESSURE: 151 MMHG | HEART RATE: 76 BPM | WEIGHT: 271.83 LBS | TEMPERATURE: 98.3 F

## 2024-05-11 DIAGNOSIS — R10.31 ABDOMINAL PAIN, RIGHT LOWER QUADRANT: ICD-10-CM

## 2024-05-11 DIAGNOSIS — R11.2 NAUSEA AND VOMITING, UNSPECIFIED VOMITING TYPE: Primary | ICD-10-CM

## 2024-05-11 LAB
ALBUMIN SERPL-MCNC: 3.3 G/DL (ref 3.5–5)
ALBUMIN/GLOB SERPL: 0.7 (ref 1.1–2.2)
ALP SERPL-CCNC: 113 U/L (ref 45–117)
ALT SERPL-CCNC: 90 U/L (ref 12–78)
ANION GAP SERPL CALC-SCNC: 11 MMOL/L (ref 5–15)
APPEARANCE UR: CLEAR
AST SERPL-CCNC: 102 U/L (ref 15–37)
BACTERIA URNS QL MICRO: ABNORMAL /HPF
BASOPHILS # BLD: 0.1 K/UL (ref 0–0.1)
BASOPHILS NFR BLD: 1 % (ref 0–1)
BILIRUB SERPL-MCNC: 0.4 MG/DL (ref 0.2–1)
BILIRUB UR QL: NEGATIVE
BUN SERPL-MCNC: 7 MG/DL (ref 6–20)
BUN/CREAT SERPL: 6 (ref 12–20)
CALCIUM SERPL-MCNC: 9.1 MG/DL (ref 8.5–10.1)
CHLORIDE SERPL-SCNC: 103 MMOL/L (ref 97–108)
CO2 SERPL-SCNC: 28 MMOL/L (ref 21–32)
COLOR UR: ABNORMAL
COMMENT:: NORMAL
CREAT SERPL-MCNC: 1.09 MG/DL (ref 0.55–1.02)
DIFFERENTIAL METHOD BLD: ABNORMAL
EOSINOPHIL # BLD: 0.2 K/UL (ref 0–0.4)
EOSINOPHIL NFR BLD: 2 % (ref 0–7)
EPITH CASTS URNS QL MICRO: ABNORMAL /LPF
ERYTHROCYTE [DISTWIDTH] IN BLOOD BY AUTOMATED COUNT: 18.6 % (ref 11.5–14.5)
GLOBULIN SER CALC-MCNC: 4.6 G/DL (ref 2–4)
GLUCOSE SERPL-MCNC: 120 MG/DL (ref 65–100)
GLUCOSE UR STRIP.AUTO-MCNC: NEGATIVE MG/DL
HCT VFR BLD AUTO: 34.2 % (ref 35–47)
HGB BLD-MCNC: 10.6 G/DL (ref 11.5–16)
HGB UR QL STRIP: NEGATIVE
IMM GRANULOCYTES # BLD AUTO: 0.1 K/UL (ref 0–0.04)
IMM GRANULOCYTES NFR BLD AUTO: 1 % (ref 0–0.5)
KETONES UR QL STRIP.AUTO: NEGATIVE MG/DL
LEUKOCYTE ESTERASE UR QL STRIP.AUTO: NEGATIVE
LIPASE SERPL-CCNC: 18 U/L (ref 13–75)
LYMPHOCYTES # BLD: 3.2 K/UL (ref 0.8–3.5)
LYMPHOCYTES NFR BLD: 34 % (ref 12–49)
MCH RBC QN AUTO: 23 PG (ref 26–34)
MCHC RBC AUTO-ENTMCNC: 31 G/DL (ref 30–36.5)
MCV RBC AUTO: 74.3 FL (ref 80–99)
MONOCYTES # BLD: 0.7 K/UL (ref 0–1)
MONOCYTES NFR BLD: 8 % (ref 5–13)
NEUTS SEG # BLD: 5 K/UL (ref 1.8–8)
NEUTS SEG NFR BLD: 54 % (ref 32–75)
NITRITE UR QL STRIP.AUTO: NEGATIVE
NRBC # BLD: 0 K/UL (ref 0–0.01)
NRBC BLD-RTO: 0 PER 100 WBC
PH UR STRIP: 7 (ref 5–8)
PLATELET # BLD AUTO: 325 K/UL (ref 150–400)
PMV BLD AUTO: 11 FL (ref 8.9–12.9)
POTASSIUM SERPL-SCNC: 4.1 MMOL/L (ref 3.5–5.1)
PROT SERPL-MCNC: 7.9 G/DL (ref 6.4–8.2)
PROT UR STRIP-MCNC: NEGATIVE MG/DL
RBC # BLD AUTO: 4.6 M/UL (ref 3.8–5.2)
RBC #/AREA URNS HPF: ABNORMAL /HPF (ref 0–5)
SODIUM SERPL-SCNC: 142 MMOL/L (ref 136–145)
SP GR UR REFRACTOMETRY: 1.01 (ref 1–1.03)
SPECIMEN HOLD: NORMAL
UROBILINOGEN UR QL STRIP.AUTO: 0.2 EU/DL (ref 0.2–1)
WBC # BLD AUTO: 9.2 K/UL (ref 3.6–11)
WBC URNS QL MICRO: ABNORMAL /HPF (ref 0–4)

## 2024-05-11 PROCEDURE — 36415 COLL VENOUS BLD VENIPUNCTURE: CPT

## 2024-05-11 PROCEDURE — 85025 COMPLETE CBC W/AUTO DIFF WBC: CPT

## 2024-05-11 PROCEDURE — 81001 URINALYSIS AUTO W/SCOPE: CPT

## 2024-05-11 PROCEDURE — 74177 CT ABD & PELVIS W/CONTRAST: CPT

## 2024-05-11 PROCEDURE — 99285 EMERGENCY DEPT VISIT HI MDM: CPT

## 2024-05-11 PROCEDURE — 83690 ASSAY OF LIPASE: CPT

## 2024-05-11 PROCEDURE — 96374 THER/PROPH/DIAG INJ IV PUSH: CPT

## 2024-05-11 PROCEDURE — 2580000003 HC RX 258: Performed by: PHYSICIAN ASSISTANT

## 2024-05-11 PROCEDURE — 6360000004 HC RX CONTRAST MEDICATION: Performed by: PHYSICIAN ASSISTANT

## 2024-05-11 PROCEDURE — 96361 HYDRATE IV INFUSION ADD-ON: CPT

## 2024-05-11 PROCEDURE — 6360000002 HC RX W HCPCS: Performed by: PHYSICIAN ASSISTANT

## 2024-05-11 PROCEDURE — 96375 TX/PRO/DX INJ NEW DRUG ADDON: CPT

## 2024-05-11 PROCEDURE — 80053 COMPREHEN METABOLIC PANEL: CPT

## 2024-05-11 RX ORDER — 0.9 % SODIUM CHLORIDE 0.9 %
1000 INTRAVENOUS SOLUTION INTRAVENOUS ONCE
Status: COMPLETED | OUTPATIENT
Start: 2024-05-11 | End: 2024-05-11

## 2024-05-11 RX ORDER — DIPHENHYDRAMINE HYDROCHLORIDE 50 MG/ML
25 INJECTION INTRAMUSCULAR; INTRAVENOUS
Status: COMPLETED | OUTPATIENT
Start: 2024-05-11 | End: 2024-05-11

## 2024-05-11 RX ORDER — PROCHLORPERAZINE EDISYLATE 5 MG/ML
10 INJECTION INTRAMUSCULAR; INTRAVENOUS ONCE
Status: COMPLETED | OUTPATIENT
Start: 2024-05-11 | End: 2024-05-11

## 2024-05-11 RX ADMIN — DIPHENHYDRAMINE HYDROCHLORIDE 25 MG: 50 INJECTION INTRAMUSCULAR; INTRAVENOUS at 14:20

## 2024-05-11 RX ADMIN — SODIUM CHLORIDE 1000 ML: 9 INJECTION, SOLUTION INTRAVENOUS at 14:18

## 2024-05-11 RX ADMIN — PROCHLORPERAZINE EDISYLATE 10 MG: 5 INJECTION INTRAMUSCULAR; INTRAVENOUS at 14:24

## 2024-05-11 RX ADMIN — IOPAMIDOL 100 ML: 755 INJECTION, SOLUTION INTRAVENOUS at 14:36

## 2024-05-11 ASSESSMENT — ENCOUNTER SYMPTOMS
SORE THROAT: 0
RHINORRHEA: 0
NAUSEA: 1
DIARRHEA: 0
COUGH: 0
VOMITING: 1
ABDOMINAL PAIN: 1
SHORTNESS OF BREATH: 0
CONSTIPATION: 0

## 2024-05-11 ASSESSMENT — LIFESTYLE VARIABLES
HOW MANY STANDARD DRINKS CONTAINING ALCOHOL DO YOU HAVE ON A TYPICAL DAY: PATIENT DOES NOT DRINK
HOW OFTEN DO YOU HAVE A DRINK CONTAINING ALCOHOL: NEVER

## 2024-05-11 ASSESSMENT — PAIN DESCRIPTION - LOCATION: LOCATION: HEAD

## 2024-05-11 ASSESSMENT — PAIN SCALES - GENERAL
PAINLEVEL_OUTOF10: 9
PAINLEVEL_OUTOF10: 6

## 2024-05-11 ASSESSMENT — PAIN - FUNCTIONAL ASSESSMENT: PAIN_FUNCTIONAL_ASSESSMENT: 0-10

## 2024-05-11 NOTE — ED PROVIDER NOTES
[facility] EMERGENCY CTR  EMERGENCY DEPARTMENT ENCOUNTER      Pt Name: Radha Caban  MRN: [id number]  Birthdate 1975  Date of evaluation: 5/11/2024  Provider: LEYDI Shaw    CHIEF COMPLAINT       Chief Complaint   Patient presents with    Emesis    Headache         HISTORY OF PRESENT ILLNESS   (Location/Symptom, Timing/Onset, Context/Setting, Quality, Duration, Modifying Factors, Severity)  Note limiting factors.   This is a 49-year-old female with medical history significant for anxiety, asthma, diabetes mellitus, GERD, hepatic steatosis, hypertension, irritable bowel syndrome, gastroparesis, and neuropathy presenting to the emergency department with complaint of a 2-day history of cramping right lower quadrant abdominal pain with associated nausea and vomiting less than 10 times described as previously ingested food content, exacerbated with p.o. intake.  She reports today developing a headache secondary to significant nausea and vomiting.  She has Zofran at home, which she has tried taking.  This has provided minimal improvement.  She was able to hold down some water this morning.  She denies any recent travel, ill contacts or new or concerning dietary intakes.  She has had an associated low-grade fever of 101 Fahrenheit orally.  Her bowel pattern has been normal. no sore throat, cough, rhinorrhea, sneezing, or urinary complaints.                 Review of External Medical Records:     Nursing Notes were reviewed.    REVIEW OF SYSTEMS    (2-9 systems for level 4, 10 or more for level 5)     Review of Systems   Constitutional:  Positive for activity change, appetite change, fatigue and fever. Negative for chills.   HENT:  Negative for congestion, ear pain, rhinorrhea, sneezing and sore throat.    Respiratory:  Negative for cough and shortness of breath.    Cardiovascular:  Negative for chest pain.   Gastrointestinal:  Positive for abdominal pain, nausea and vomiting. Negative for constipation and     Inhale 2 puffs into the lungs every 6 hours as needed for Shortness of Breath or Wheezing    AMITRIPTYLINE (ELAVIL) 50 MG TABLET    Take 2 tablets by mouth nightly    ATORVASTATIN (LIPITOR) 40 MG TABLET    Take 1 tab at bedtime    BD PEN NEEDLE JEROD 2ND GEN 32G X 4 MM MISC    USE WITH INSULIN PENS FIVE TIMES DAILY E11.8    BUPROPION (WELLBUTRIN SR) 150 MG EXTENDED RELEASE TABLET    Take 1 tablet by mouth 2 times daily    BUTALBITAL-ACETAMINOPHEN-CAFFEINE (FIORICET, ESGIC) -40 MG PER TABLET    Take 1 tablet by mouth 2 times daily as needed for Headaches    CONTINUOUS BLOOD GLUC SENSOR (DEXCOM G6 SENSOR) MISC    Use 1 sensor for 10 days E11.65    CONTINUOUS BLOOD GLUC TRANSMIT (DEXCOM G6 TRANSMITTER) MISC    Use 1 transmitter for 90 days with Dexcom G6 sensors E11.65    CYCLOBENZAPRINE (FLEXERIL) 10 MG TABLET    Take 1 tablet by mouth 3 times daily as needed    DICYCLOMINE (BENTYL) 10 MG/5ML SYRUP    Take 10 mLs by mouth 4 times daily as needed    GLIPIZIDE (GLUCOTROL XL) 10 MG EXTENDED RELEASE TABLET    Take 2 tabs every morning    GOLIMUMAB (SIMPONI SC)    Inject into the skin every 28 days    HUMALOG KWIKPEN 100 UNIT/ML SOPN    Take 12 units before meals and correction scale, max dose of 60 units per day    HYDROCODONE-ACETAMINOPHEN (NORCO)  MG PER TABLET    TAKE 1 TO 2 TABLETS BY MOUTH 4 TIMES DAILY AS NEEDED    IBUPROFEN (ADVIL;MOTRIN) 800 MG TABLET    Take 1 tablet by mouth 3 times daily as needed (headache) Take with meals.    LANTUS SOLOSTAR 100 UNIT/ML INJECTION PEN    Take 60 units at bedtime    LIDOCAINE (LIDODERM) 5 %    Place 1 patch onto the skin every 24 hours    MECLIZINE (ANTIVERT) 25 MG TABLET    Take 1 tablet by mouth 3 times daily as needed for Dizziness    METFORMIN (GLUCOPHAGE-XR) 500 MG EXTENDED RELEASE TABLET    Take 2 tablets by mouth 2 times daily    METOCLOPRAMIDE (REGLAN) 5 MG TABLET    Take 2 tablets by mouth 3 times daily 10 mg in AM and 10 mg in PM    MONTELUKAST

## 2024-05-11 NOTE — DISCHARGE INSTRUCTIONS
Di[place] diet  Push fluids  Follow-up with regular doctor. Your Ct showed a fatty liver with some nodular quality. Discuss this with your doctor.  Return for any worsening as discussed

## 2024-05-11 NOTE — ED TRIAGE NOTES
Pt states for the last couple of days she has been n/v, not keeping anything down. Today she has a bad h/a in addition to the vomiting and feels like she is dehydrated. States has been running low grade fever in 100s. But no diarrhea and denies any other symptoms.

## 2024-05-15 RX ORDER — CYCLOBENZAPRINE HCL 10 MG
10 TABLET ORAL 3 TIMES DAILY PRN
Qty: 120 TABLET | Refills: 3 | Status: SHIPPED | OUTPATIENT
Start: 2024-05-15

## 2024-05-28 NOTE — TELEPHONE ENCOUNTER
Patient called requesting Lantus and Trulicity prescriptions to go to [facility].

## 2024-05-29 RX ORDER — DULAGLUTIDE 4.5 MG/.5ML
INJECTION, SOLUTION SUBCUTANEOUS
Qty: 6 ML | Refills: 3 | Status: SHIPPED | OUTPATIENT
Start: 2024-05-29

## 2024-05-29 RX ORDER — INSULIN GLARGINE 100 [IU]/ML
INJECTION, SOLUTION SUBCUTANEOUS
Qty: 60 ML | Refills: 3 | Status: SHIPPED | OUTPATIENT
Start: 2024-05-29 | End: 2024-05-31 | Stop reason: DRUGHIGH

## 2024-05-31 RX ORDER — INSULIN GLARGINE 100 [IU]/ML
INJECTION, SOLUTION SUBCUTANEOUS
Qty: 60 ML | Refills: 3 | Status: SHIPPED | OUTPATIENT
Start: 2024-05-31

## 2024-05-31 RX ORDER — INSULIN GLARGINE 100 [IU]/ML
INJECTION, SOLUTION SUBCUTANEOUS
Qty: 45 ML | Refills: 5 | Status: SHIPPED | OUTPATIENT
Start: 2024-05-31 | End: 2024-05-31 | Stop reason: SDUPTHER

## 2024-05-31 NOTE — TELEPHONE ENCOUNTER
Lantus needing a Prior Authorization, please let ms Garcia switch to Basaglar which is alternative to Lantus

## 2024-05-31 NOTE — TELEPHONE ENCOUNTER
Now Basaglar needing Prior Authorization and saying Lantus is preferred, will cancel basaglar prescription

## 2024-06-03 ENCOUNTER — PATIENT MESSAGE (OUTPATIENT)
Facility: CLINIC | Age: 49
End: 2024-06-03

## 2024-06-03 ENCOUNTER — TELEPHONE (OUTPATIENT)
Age: 49
End: 2024-06-03

## 2024-06-03 DIAGNOSIS — K31.84 GASTROPARESIS: ICD-10-CM

## 2024-06-03 DIAGNOSIS — G56.03 CARPAL TUNNEL SYNDROME, BILATERAL: ICD-10-CM

## 2024-06-03 DIAGNOSIS — E11.42 TYPE 2 DIABETES MELLITUS WITH DIABETIC POLYNEUROPATHY, WITH LONG-TERM CURRENT USE OF INSULIN (HCC): ICD-10-CM

## 2024-06-03 DIAGNOSIS — E11.21 TYPE 2 DIABETES WITH NEPHROPATHY (HCC): ICD-10-CM

## 2024-06-03 DIAGNOSIS — Z79.4 TYPE 2 DIABETES MELLITUS WITH DIABETIC POLYNEUROPATHY, WITH LONG-TERM CURRENT USE OF INSULIN (HCC): ICD-10-CM

## 2024-06-03 DIAGNOSIS — K58.9 IRRITABLE BOWEL SYNDROME, UNSPECIFIED TYPE: Primary | ICD-10-CM

## 2024-06-03 DIAGNOSIS — M06.00 SERONEGATIVE RHEUMATOID ARTHRITIS (HCC): ICD-10-CM

## 2024-06-03 DIAGNOSIS — G44.89 OTHER HEADACHE SYNDROME: ICD-10-CM

## 2024-06-03 NOTE — TELEPHONE ENCOUNTER
FYI: Spoke with Dee at [facility]. She stated that patient's pharmacy card has terminated as of 6/1/2024. (No need to pursue PA).  Spoke with patient. She states that she does have new insurance. Advised her to call [facility] to provide new insurance information and ask the pharmacy to process Lantus through that company.   Patient expressed understanding and agreement.

## 2024-06-03 NOTE — TELEPHONE ENCOUNTER
From: Radha Caban  To: Dr. Mary Rodas  Sent: 6/3/2024 1:49 PM EDT  Subject: Referrals due to new insurance    Please forward a new referral to the following doctors my insurance has changed and referral need to be on file in order for me to continue treatment there are (4) in total. Thank you in advance for your help regarding this important matter.    Rhematologist  [facility]  Dr. Shelly Bethea  [address]  [facility] [address]  Phone: [phone]  Fax: [phone]    Endocrinology  [facility]  Dr. Leif Mohan  [address] [address]  Phone: [phone]    Neurology  [facility]  [address]  [place] [id number]  Phone: [phone]    Gastroenterology  [facility]  Gastroenterology (1 more specialty)  [address] [place] [address]  Phone: [phone]

## 2024-06-11 NOTE — PROGRESS NOTES
Cole Dumas is a 48 y.o. female who was seen by synchronous (real-time) audio-video technology on 12/22/2021 for Asthma (Slight cough, dry. )        Assessment & Plan:     Diagnoses and all orders for this visit:    1. Mild persistent asthma with acute exacerbation  Recommended OTC Mucinex DM.  -     Start predniSONE (DELTASONE) 20 mg tablet; Take 2 tablets by mouth once daily for 5 days. 2. Moderate episode of recurrent major depressive disorder (HCC)  -     Refill buPROPion SR (WELLBUTRIN SR) 150 mg SR tablet; Take 1 Tablet by mouth two (2) times a day. 3. Type 2 diabetes mellitus with diabetic polyneuropathy, without long-term current use of insulin (HCC)  -     Refill glipiZIDE (GLUCOTROL) 10 mg tablet; Take 1 tablet by mouth twice daily      Follow-up and Dispositions    · Return if symptoms worsen or fail to improve, for Scheduled appointment on 1/27/22.           712  Subjective:     Complains of 2 day history of increased SOB, wheezing, and non-productive cough. Denies fevers, chills, sore throat, or sinus congestion. Treatment to date: Ne [place], increased use of albuterol inhaler. Prior to Admission medications    Medication Sig Start Date End Date Taking? Authorizing Provider   ibuprofen (MOTRIN) 600 mg tablet Take  by mouth every six (6) hours as needed for Pain. Yes Provider, Historical   atorvastatin (LIPITOR) 20 mg tablet Take 1 Tablet by mouth daily. Indications: high cholesterol 12/15/21  Yes Tish England MD   insulin detemir U-100 (Levemir FlexTouch U-100 Insuln) 100 unit/mL (3 mL) inpn 25 Units by SubCUTAneous route nightly.  Dx: E11.42 11/3/21  Yes Richie Choe MD   buPROPion SR (WELLBUTRIN SR) 150 mg SR tablet Take 1 tablet by mouth twice daily 10/4/21  Yes Richie Choe MD   glipiZIDE (GLUCOTROL) 10 mg tablet Take 1 tablet by mouth twice daily 8/27/21  Yes Michael Blas MD   ibuprofen (MOTRIN) 800 mg tablet Take one tablet every 8 hours for three days, [Adequate] : adequate and then every 8 hours as needed for pain thereafter 8/23/21  Yes Yazan Ramos MD   ondansetron (Zofran ODT) 4 mg disintegrating tablet Take 1 Tablet by mouth every eight (8) hours as needed for Nausea for up to 360 days. 8/23/21 8/18/22 Yes Yazan Ramos MD   Insulin Needles, Disposable, 31 gauge x 5/16\" ndle Use as directed once daily with Levemir Flextouch insulin pens. 8/11/21  Yes Miroslava England MD   adalimumab (Humira,CF,) 40 mg/0.4 mL sykt 40 mg by SubCUTAneous route every seven (7) days. Indications: rheumatoid arthritis   Yes Provider, Historical   dicyclomine (BENTYL) 10 mg/5 mL soln oral solution Take 10 mL by mouth four (4) times daily. 6/29/21  Yes Elgin Fan MD   pregabalin (LYRICA) 200 mg capsule TAKE 1 TABLET BY MOUTH TWICE DAILY . DO NOT EXCEED 2 PER 24 HOURS  Patient taking differently: 300 mg. TAKE 1 TABLET BY MOUTH TWICE DAILY . DO NOT EXCEED 2 PER 24 HOURS 6/15/21  Yes Subha Greco MD   metFORMIN ER (GLUCOPHAGE XR) 500 mg tablet Take 2 tablets by mouth twice daily 6/14/21  Yes Subha Greco MD   montelukast (SINGULAIR) 10 mg tablet TAKE 1 TABLET BY MOUTH ONCE DAILY FOR ALLERGIES AND FOR ASTHMA 4/15/21  Yes Subha Greco MD   lidocaine (LIDODERM) 5 % Apply patch to the affected area for 12 hours a day and remove for 12 hours a day. 4/6/21  Yes Darya Dumont PA-C   acetaminophen (TYLENOL) 500 mg tablet Take 2 Tabs by mouth every six (6) hours as needed for Pain. 4/6/21  Yes Darya Dumont PA-C   cyclobenzaprine (FLEXERIL) 10 mg tablet Take 1 Tab by mouth three (3) times daily as needed for Muscle Spasm(s). 4/6/21  Yes Darya Dumont PA-C   folic acid (FOLVITE) 1 mg tablet Take 1 mg by mouth daily. 1/19/21  Yes Provider, Historical   methotrexate (RHEUMATREX) 2.5 mg tablet 20 mg every Wednesday.  Pt takes 8 pills for 20 mg total. 1/19/21  Yes Provider, Historical   fluticasone propion-salmeteroL (Advair Diskus) 500-50 mcg/dose diskus inhaler Take 1 Puff by inhalation every twelve (12) hours. Asthma, Dx: J45.30 1/26/21  Yes Fay Abarca MD   albuterol (PROVENTIL HFA, VENTOLIN HFA, PROAIR HFA) 90 mcg/actuation inhaler Take 2 Puffs by inhalation every six (6) hours as needed for Wheezing. Asthma, Dx: J45.30 1/26/21  Yes Fay Abarca MD   valsartan (DIOVAN) 40 mg tablet Take 2 tablets every day by oral route. 1/26/21  Yes Tiesha England MD   amitriptyline (ELAVIL) 50 mg tablet Take 1 Tab by mouth nightly. For numbness at hands and feet. Patient taking differently: Take 50 mg by mouth nightly as needed. For numbness at hands and feet PRN 12/4/20  Yes Tiesha England MD   meclizine (ANTIVERT) 25 mg tablet Take 1 Tab by mouth three (3) times daily as needed for Dizziness. 2/28/20  Yes Tiesha England MD   nitroglycerin (NITROSTAT) 0.4 mg SL tablet Take 1 Tab by mouth every five (5) minutes as needed for Chest Pain.  Sit/Lay down then put one tab under the tongue every 5 minutes as needed for chest pain for 3 doses 6/8/18  Yes Colleen Tijerina MD   albuterol (PROVENTIL VENTOLIN) 2.5 mg /3 mL (0.083 %) nebulizer solution USE ONE VIAL IN NEBULIZER EVERY 4 HOURS AS NEEDED FOR WHEEZING 3/5/18  Yes Chapincito Singh NP     Patient Active Problem List   Diagnosis Code    HTN (hypertension) I10    Moderate episode of recurrent major depressive disorder ([facility] F33.1    FE (obstructive sleep apnea) G47.33    Asthma J45.909    Iron deficiency anemia D50.9    Hyperlipidemia E78.5    Obesity, morbid, BMI 50 or higher ([facility]) E66.01    Type 2 diabetes mellitus with diabetic polyneuropathy, without long-term current use of insulin ([facility]) E11.42    Coronary artery disease involving native coronary artery of native heart without angina pectoris I25.10    Chronic midline low back pain without sciatica M54.50, G89.29    Atypical squamous cells of undetermined significance (ASCUS) on Papanicolaou smear of cervix R87.610    Type 2 diabetes with nephropathy (HCC) E11.21    Ser[facility]gative rheumatoid arthritis ([facility] M06.00       Objective:     Patient-Reported Vitals 8/16/2021   Patient-Reported Weight 295lb   Patient-Reported Pulse 89   Patient-Reported Temperature 98.2   Patient-Reported SpO2 99   Patient-Reported Systolic  907   Patient-Reported Diastolic 82   Patient-Reported LMP -      General: alert, cooperative, no distress   Mental  status: normal mood, behavior, speech, dress, motor activity, and thought processes, able to follow commands   HENT: NCAT   Neck: no visualized mass   Resp: no respiratory distress   Neuro: no gross deficits   Skin: no discoloration or lesions of concern on visible areas   Psychiatric: normal affect, consistent with stated mood, no evidence of hallucinations     Additional exam findings: able to speak in complete sentences      We discussed the expected course, resolution and complications of the diagnosis(es) in detail. Medication risks, benefits, costs, interactions, and alternatives were discussed as indicated. I advised her to contact the office if her condition worsens, changes or fails to improve as anticipated. She expressed understanding with the diagnosis(es) and plan. THIS VISIT WAS COMPLETED VIRTUALLY USING MY CHART TELEMEDICINE    Giselle Sales, was evaluated through a synchronous (real-time) audio-video encounter. The patient (or guardian if applicable) is aware that this is a billable service. Verbal consent to proceed has been obtained within the past 12 months. The visit was conducted pursuant to the emergency declaration under the [facility], [address] [place] [address] authority and the [employer] General Act. Patient identification was verified, and a caregiver was present when appropriate. The patient was located in a state where the provider was credentialed to provide care.     Gely Galvez MD

## 2024-06-18 ENCOUNTER — OFFICE VISIT (OUTPATIENT)
Facility: CLINIC | Age: 49
End: 2024-06-18
Payer: COMMERCIAL

## 2024-06-18 VITALS
BODY MASS INDEX: 46.16 KG/M2 | TEMPERATURE: 98.8 F | HEART RATE: 89 BPM | RESPIRATION RATE: 16 BRPM | SYSTOLIC BLOOD PRESSURE: 131 MMHG | DIASTOLIC BLOOD PRESSURE: 84 MMHG | OXYGEN SATURATION: 96 % | WEIGHT: 270.4 LBS | HEIGHT: 64 IN

## 2024-06-18 DIAGNOSIS — E11.42 TYPE 2 DIABETES MELLITUS WITH DIABETIC POLYNEUROPATHY, WITH LONG-TERM CURRENT USE OF INSULIN (HCC): ICD-10-CM

## 2024-06-18 DIAGNOSIS — M06.00 SERONEGATIVE RHEUMATOID ARTHRITIS (HCC): Primary | ICD-10-CM

## 2024-06-18 DIAGNOSIS — G56.03 CARPAL TUNNEL SYNDROME, BILATERAL: ICD-10-CM

## 2024-06-18 DIAGNOSIS — K31.84 GASTROPARESIS: ICD-10-CM

## 2024-06-18 DIAGNOSIS — M54.2 NECK PAIN: ICD-10-CM

## 2024-06-18 DIAGNOSIS — Z79.4 TYPE 2 DIABETES MELLITUS WITH DIABETIC POLYNEUROPATHY, WITH LONG-TERM CURRENT USE OF INSULIN (HCC): ICD-10-CM

## 2024-06-18 DIAGNOSIS — G44.89 OTHER HEADACHE SYNDROME: ICD-10-CM

## 2024-06-18 PROCEDURE — 99213 OFFICE O/P EST LOW 20 MIN: CPT | Performed by: INTERNAL MEDICINE

## 2024-06-19 NOTE — PROGRESS NOTES
Chief Complaint   Patient presents with    insurance update       HISTORY OF PRESENT ILLNESS  Radha Caban is a 49 y.o. female    Presents for referrals to specialists due to insurance change. No new complaints.    Patient Active Problem List   Diagnosis    Seronegative rheumatoid arthritis (HCC)    Major depressive disorder, recurrent, moderate (HCC)    CARLITO (obstructive sleep apnea)    Chronic midline low back pain without sciatica    Elevated liver enzymes    Gastroparesis    Type 2 diabetes with nephropathy (HCC)    Asthma    Iron deficiency anemia    Irritable bowel syndrome    Atypical squamous cells of undetermined significance (ASCUS) on Papanicolaou smear of cervix    Mixed hyperlipidemia    Type 2 diabetes mellitus with diabetic polyneuropathy, with long-term current use of insulin (HCC)    DDD (degenerative disc disease), lumbar    Morbid obesity (HCC)    Cervical stenosis of spinal canal    Vertigo    Body mass index (BMI) 45.0-49.9, adult (HCC)    Lumbar stenosis with neurogenic claudication    Neck pain    Generalized anxiety disorder    Other headache syndrome    Carpal tunnel syndrome, bilateral     Past Medical History:   Diagnosis Date    Anxiety     Asthma     DM type 2 (diabetes mellitus, type 2) (HCC) 11/23/2011    GERD (gastroesophageal reflux disease)     GI bleed 2015    Hepatic steatosis 11/2016    confirmed by US    HTN (hypertension)     Irritable bowel     Kidney mass     5/28/19: US showed rigth renal cystic nephroma, follows with Urology (Dr. Uri Rivas)    Morbid obesity (HCC)     Neuropathy     CARLITO on CPAP     PUD (peptic ulcer disease) 2015    Renal cancer, right (HCC)     tx surgery    Rheumatoid arthritis (HCC)     per pt on 7/29/2021     Allergies   Allergen Reactions    Lisinopril Cough    Penicillins Hives       Current Outpatient Medications   Medication Sig Dispense Refill    LANTUS SOLOSTAR 100 UNIT/ML injection pen Take 60 units at bedtime 60 mL 3    TRULICITY 4.5 
      Abuse Screening:  :          No data to display                ADL Screening:  :         1/3/2024     8:00 AM   ADL ASSESSMENT   Feeding yourself No Help Needed   Getting from bed to chair No Help Needed   Getting dressed Help Needed   Bathing or showering Help Needed   Walk across the room (includes cane/walker) No Help Needed   Using the telphone No Help Needed   Taking your medications No Help Needed   Preparing meals No Help Needed   Managing money (expenses/bills) No Help Needed   Moderately strenuous housework (laundry) No Help Needed   Shopping for personal items (toiletries/medicines) Help Needed   Shopping for groceries Help Needed   Driving Help Needed   Climbing a flight of stairs No Help Needed   Getting to places beyond walking distances No Help Needed         Medication reconciliation up to date and corrected with patient at this time.

## 2024-06-25 RX ORDER — INSULIN GLARGINE 100 [IU]/ML
INJECTION, SOLUTION SUBCUTANEOUS
Qty: 60 ML | Refills: 3 | Status: SHIPPED | OUTPATIENT
Start: 2024-06-25

## 2024-06-25 RX ORDER — DULAGLUTIDE 4.5 MG/.5ML
INJECTION, SOLUTION SUBCUTANEOUS
Qty: 6 ML | Refills: 3 | Status: SHIPPED | OUTPATIENT
Start: 2024-06-25

## 2024-06-25 NOTE — TELEPHONE ENCOUNTER
Patient states can no longer use mail order pharmacy. Now needs to use local [facility] in [place]. Patient states needs Lantus and Trulicity ordered.

## 2024-06-27 ENCOUNTER — TELEPHONE (OUTPATIENT)
Age: 49
End: 2024-06-27

## 2024-06-27 NOTE — TELEPHONE ENCOUNTER
Pt LVM stating Trulicity is not covered and she asked that an alternative be sent. She states the RX will cost her over $800/month.

## 2024-07-01 NOTE — TELEPHONE ENCOUNTER
Pt called back stating she received the [employer] pt assistance application via email but stated she was told by her insurance that a PA could be submitted for Trulicity 4.5 mg.     PA initiated through [email or web address] for Trulicity 4.5 mg    Trulicity approved until 06/30/2025.

## 2024-07-01 NOTE — TELEPHONE ENCOUNTER
Pt states she would like to try to see if she qualifies for Ozempic via [employer] pt assistance. Pt asked that application be emailed to her at [email or web address]. Application emailed, pt informed. Asked pt to contact office if she doesn't receive email. Pt verbalized understanding.

## 2024-07-02 ENCOUNTER — TELEPHONE (OUTPATIENT)
Age: 49
End: 2024-07-02

## 2024-07-02 NOTE — TELEPHONE ENCOUNTER
Contacted patient [facility] for returned call.   Patient's Trulicity (dulaglutide) was approved by insurance.      normal...

## 2024-07-15 DIAGNOSIS — E11.42 TYPE 2 DIABETES MELLITUS WITH DIABETIC POLYNEUROPATHY, WITH LONG-TERM CURRENT USE OF INSULIN (HCC): ICD-10-CM

## 2024-07-15 DIAGNOSIS — M79.2 NEURALGIA AND NEURITIS, UNSPECIFIED: ICD-10-CM

## 2024-07-15 DIAGNOSIS — I10 ESSENTIAL HYPERTENSION: ICD-10-CM

## 2024-07-15 DIAGNOSIS — E78.2 MIXED HYPERLIPIDEMIA: ICD-10-CM

## 2024-07-15 DIAGNOSIS — Z79.4 TYPE 2 DIABETES MELLITUS WITH DIABETIC POLYNEUROPATHY, WITH LONG-TERM CURRENT USE OF INSULIN (HCC): ICD-10-CM

## 2024-07-15 DIAGNOSIS — M51.36 DDD (DEGENERATIVE DISC DISEASE), LUMBAR: ICD-10-CM

## 2024-07-15 DIAGNOSIS — F33.1 MODERATE EPISODE OF RECURRENT MAJOR DEPRESSIVE DISORDER (HCC): ICD-10-CM

## 2024-07-15 RX ORDER — BUPROPION HYDROCHLORIDE 150 MG/1
150 TABLET, EXTENDED RELEASE ORAL 2 TIMES DAILY
Qty: 180 TABLET | Refills: 0 | Status: SHIPPED | OUTPATIENT
Start: 2024-07-15

## 2024-07-15 RX ORDER — AMITRIPTYLINE HYDROCHLORIDE 50 MG/1
100 TABLET, FILM COATED ORAL NIGHTLY
Qty: 90 TABLET | Refills: 0 | Status: SHIPPED | OUTPATIENT
Start: 2024-07-15

## 2024-07-15 RX ORDER — CYCLOBENZAPRINE HCL 10 MG
10 TABLET ORAL 3 TIMES DAILY PRN
Qty: 120 TABLET | Refills: 3 | Status: SHIPPED | OUTPATIENT
Start: 2024-07-15

## 2024-07-15 RX ORDER — PROCHLORPERAZINE 25 MG/1
SUPPOSITORY RECTAL
Qty: 1 EACH | Refills: 3 | Status: SHIPPED | OUTPATIENT
Start: 2024-07-15

## 2024-07-15 RX ORDER — PROCHLORPERAZINE 25 MG/1
SUPPOSITORY RECTAL
Qty: 3 EACH | Refills: 11 | Status: SHIPPED | OUTPATIENT
Start: 2024-07-15

## 2024-07-15 RX ORDER — MONTELUKAST SODIUM 10 MG/1
TABLET ORAL
Qty: 90 TABLET | Refills: 0 | Status: SHIPPED | OUTPATIENT
Start: 2024-07-15

## 2024-07-15 RX ORDER — LIDOCAINE 50 MG/G
1 PATCH TOPICAL EVERY 24 HOURS
Qty: 90 PATCH | Refills: 0 | Status: SHIPPED | OUTPATIENT
Start: 2024-07-15

## 2024-07-15 RX ORDER — PEN NEEDLE, DIABETIC 32GX 5/32"
NEEDLE, DISPOSABLE MISCELLANEOUS
Qty: 300 EACH | Refills: 3 | Status: SHIPPED | OUTPATIENT
Start: 2024-07-15

## 2024-07-15 RX ORDER — METFORMIN HYDROCHLORIDE 500 MG/1
1000 TABLET, EXTENDED RELEASE ORAL 2 TIMES DAILY
Qty: 360 TABLET | Refills: 3 | Status: SHIPPED | OUTPATIENT
Start: 2024-07-15

## 2024-07-15 RX ORDER — ALBUTEROL SULFATE 90 UG/1
2 AEROSOL, METERED RESPIRATORY (INHALATION) EVERY 6 HOURS PRN
Qty: 18 G | Refills: 0 | Status: SHIPPED | OUTPATIENT
Start: 2024-07-15

## 2024-07-15 RX ORDER — INSULIN LISPRO 100 [IU]/ML
INJECTION, SOLUTION INTRAVENOUS; SUBCUTANEOUS
Qty: 45 ML | Refills: 5 | Status: SHIPPED | OUTPATIENT
Start: 2024-07-15

## 2024-07-15 RX ORDER — VALSARTAN 80 MG/1
80 TABLET ORAL DAILY
Qty: 90 TABLET | Refills: 0 | Status: SHIPPED | OUTPATIENT
Start: 2024-07-15

## 2024-07-15 RX ORDER — GLIPIZIDE 10 MG/1
TABLET, FILM COATED, EXTENDED RELEASE ORAL
Qty: 180 TABLET | Refills: 3 | Status: SHIPPED | OUTPATIENT
Start: 2024-07-15

## 2024-07-15 RX ORDER — PREGABALIN 300 MG/1
300 CAPSULE ORAL 2 TIMES DAILY
Qty: 180 CAPSULE | Refills: 1 | Status: SHIPPED | OUTPATIENT
Start: 2024-07-15 | End: 2025-01-11

## 2024-07-15 RX ORDER — ATORVASTATIN CALCIUM 40 MG/1
TABLET, FILM COATED ORAL
Qty: 90 TABLET | Refills: 3 | Status: SHIPPED | OUTPATIENT
Start: 2024-07-15

## 2024-07-15 NOTE — TELEPHONE ENCOUNTER
Future Appointments:  Future Appointments   Date Time Provider Department Center   7/29/2024  9:50 AM Mary Rodas MD [facility] BS AMB   8/15/2024  9:30 AM Leif Mohan MD RDE [facility] BS AMB   9/3/2024  9:20 AM Amy Yancey MD SDC BS AMB   11/26/2024  8:30 AM Kerline Reyes, APRN - NP [facility] BS AMB        Last Appointment With Me:  6/18/2024     Requested Prescriptions     Pending Prescriptions Disp Refills    buPROPion (WELLBUTRIN SR) 150 MG extended release tablet 180 tablet 3     Sig: Take 1 tablet by mouth 2 times daily    valsartan (DIOVAN) 80 MG tablet 90 tablet 3     Sig: Take 1 tablet by mouth daily high blood pressure    lidocaine (LIDODERM) 5 % 90 patch 3     Sig: Place 1 patch onto the skin every 24 hours    montelukast (SINGULAIR) 10 MG tablet 90 tablet 3     Sig: TAKE 1 TABLET BY MOUTH ONCE DAILY FOR ALLERGIES AND FOR ASTHMA    albuterol sulfate HFA (PROVENTIL;VENTOLIN;PROAIR) 108 (90 Base) MCG/ACT inhaler 18 g 3     Sig: Inhale 2 puffs into the lungs every 6 hours as needed for Shortness of Breath or Wheezing    amitriptyline (ELAVIL) 50 MG tablet 90 tablet 3     Sig: Take 2 tablets by mouth nightly

## 2024-07-18 ENCOUNTER — PATIENT MESSAGE (OUTPATIENT)
Facility: CLINIC | Age: 49
End: 2024-07-18

## 2024-07-18 DIAGNOSIS — G47.33 OSA (OBSTRUCTIVE SLEEP APNEA): Primary | ICD-10-CM

## 2024-07-18 NOTE — TELEPHONE ENCOUNTER
From: Radha Caban  To: Dr. Mary Rodas  Sent: 7/18/2024 1:23 PM EDT  Subject: Additional Referal    I am sorry to have to brother you again but I forgot to get a referal for my sleep doctor.    Sleep Medicine Doctor  Dr. Amy Yancey  [address] [address]  Phone: [phone]  Fax: [phone]    I cwill collect this referal at my next doctors' appointment with on on the July 29, 2024 @ 9:50 am

## 2024-07-29 ENCOUNTER — OFFICE VISIT (OUTPATIENT)
Facility: CLINIC | Age: 49
End: 2024-07-29
Payer: COMMERCIAL

## 2024-07-29 VITALS
WEIGHT: 277.2 LBS | DIASTOLIC BLOOD PRESSURE: 87 MMHG | TEMPERATURE: 97.7 F | BODY MASS INDEX: 47.33 KG/M2 | HEIGHT: 64 IN | RESPIRATION RATE: 16 BRPM | OXYGEN SATURATION: 97 % | HEART RATE: 87 BPM | SYSTOLIC BLOOD PRESSURE: 128 MMHG

## 2024-07-29 DIAGNOSIS — I10 PRIMARY HYPERTENSION: Primary | ICD-10-CM

## 2024-07-29 DIAGNOSIS — M06.00 SERONEGATIVE RHEUMATOID ARTHRITIS (HCC): ICD-10-CM

## 2024-07-29 DIAGNOSIS — E11.42 TYPE 2 DIABETES MELLITUS WITH DIABETIC POLYNEUROPATHY, WITH LONG-TERM CURRENT USE OF INSULIN (HCC): ICD-10-CM

## 2024-07-29 DIAGNOSIS — G47.00 INSOMNIA, UNSPECIFIED TYPE: ICD-10-CM

## 2024-07-29 DIAGNOSIS — Z79.4 TYPE 2 DIABETES MELLITUS WITH DIABETIC POLYNEUROPATHY, WITH LONG-TERM CURRENT USE OF INSULIN (HCC): ICD-10-CM

## 2024-07-29 DIAGNOSIS — F33.1 MAJOR DEPRESSIVE DISORDER, RECURRENT, MODERATE (HCC): ICD-10-CM

## 2024-07-29 PROCEDURE — 3074F SYST BP LT 130 MM HG: CPT | Performed by: INTERNAL MEDICINE

## 2024-07-29 PROCEDURE — 99214 OFFICE O/P EST MOD 30 MIN: CPT | Performed by: INTERNAL MEDICINE

## 2024-07-29 PROCEDURE — 3079F DIAST BP 80-89 MM HG: CPT | Performed by: INTERNAL MEDICINE

## 2024-07-29 RX ORDER — METOCLOPRAMIDE 10 MG/1
10 TABLET ORAL
COMMUNITY

## 2024-07-29 RX ORDER — PREGABALIN 300 MG/1
300 CAPSULE ORAL 2 TIMES DAILY
Qty: 180 CAPSULE | Refills: 1 | Status: SHIPPED | OUTPATIENT
Start: 2024-07-29 | End: 2025-01-25

## 2024-07-29 ASSESSMENT — PATIENT HEALTH QUESTIONNAIRE - PHQ9
7. TROUBLE CONCENTRATING ON THINGS, SUCH AS READING THE NEWSPAPER OR WATCHING TELEVISION: NOT AT ALL
SUM OF ALL RESPONSES TO PHQ9 QUESTIONS 1 & 2: 0
9. THOUGHTS THAT YOU WOULD BE BETTER OFF DEAD, OR OF HURTING YOURSELF: NOT AT ALL
10. IF YOU CHECKED OFF ANY PROBLEMS, HOW DIFFICULT HAVE THESE PROBLEMS MADE IT FOR YOU TO DO YOUR WORK, TAKE CARE OF THINGS AT HOME, OR GET ALONG WITH OTHER PEOPLE: NOT DIFFICULT AT ALL
3. TROUBLE FALLING OR STAYING ASLEEP: NOT AT ALL
2. FEELING DOWN, DEPRESSED OR HOPELESS: NOT AT ALL
SUM OF ALL RESPONSES TO PHQ QUESTIONS 1-9: 0
4. FEELING TIRED OR HAVING LITTLE ENERGY: NOT AT ALL
SUM OF ALL RESPONSES TO PHQ QUESTIONS 1-9: 0
1. LITTLE INTEREST OR PLEASURE IN DOING THINGS: NOT AT ALL
6. FEELING BAD ABOUT YOURSELF - OR THAT YOU ARE A FAILURE OR HAVE LET YOURSELF OR YOUR FAMILY DOWN: NOT AT ALL
8. MOVING OR SPEAKING SO SLOWLY THAT OTHER PEOPLE COULD HAVE NOTICED. OR THE OPPOSITE, BEING SO FIGETY OR RESTLESS THAT YOU HAVE BEEN MOVING AROUND A LOT MORE THAN USUAL: NOT AT ALL
SUM OF ALL RESPONSES TO PHQ QUESTIONS 1-9: 0
SUM OF ALL RESPONSES TO PHQ QUESTIONS 1-9: 0
5. POOR APPETITE OR OVEREATING: NOT AT ALL

## 2024-07-29 NOTE — PROGRESS NOTES
Radha Caban is a 49 y.o. female    Chief Complaint   Patient presents with    Hypertension    Depression    Insomnia       /87   Pulse 87   Temp 97.7 °F (36.5 °C)   Resp 16   Ht 1.626 m (5' 4\")   Wt 125.7 kg (277 lb 3.2 oz)   SpO2 97%   BMI 47.58 kg/m²         1. Have you been to the ER, urgent care clinic since your last visit?  Hospitalized since your last visit? No    2. Have you seen or consulted any other health care providers outside of the [facility] since your last visit?  Include any pap smears or colon screening. No    Learning Assessment:      1/3/2024     8:10 AM   [facility] AMB LEARNING ASSESSMENT   Primary Learner Patient   level of education > 4 YEARS OF COLLEGE   Primary Language ENGLISH   Learning Preference DEMONSTRATION   Answered By Patient   Relationship to Learner SELF       Fall Risk Assessment:      11/15/2022     7:22 AM 2022    10:16 AM 2021    10:57 AM 2021     8:24 AM 2021     3:01 PM 2021    11:36 AM 2021     9:45 AM   Amb Fall Risk Assessment and TUG Test   Total Score 2 1 1 2 3 2 2       Abuse Screening:       No data to display                ADL Screenin/3/2024     8:00 AM   ADL ASSESSMENT   Feeding yourself No Help Needed   Getting from bed to chair No Help Needed   Getting dressed Help Needed   Bathing or showering Help Needed   Walk across the room (includes cane/walker) No Help Needed   Using the telphone No Help Needed   Taking your medications No Help Needed   Preparing meals No Help Needed   Managing money (expenses/bills) No Help Needed   Moderately strenuous housework (laundry) No Help Needed   Shopping for personal items (toiletries/medicines) Help Needed   Shopping for groceries Help Needed   Driving Help Needed   Climbing a flight of stairs No Help Needed   Getting to places beyond walking distances No Help Needed

## 2024-07-29 NOTE — PROGRESS NOTES
Chief Complaint   Patient presents with    Hypertension    Depression    Insomnia     History of Present Illness  The patient is a 49-year-old female who presents for follow-up evaluation of hypertension, depression, and insomnia.    She denies CP, SOB, dizziness, heart palpitations, muscle cramps, or leg swelling.    She continues to have diffuse joint pains. She has yet to consult with her new rheumatologist, Dr. Horton, but has an appointment in two weeks. She was previously prescribed Lyrica by her rheumatologist and needs a refill now.     Her mood remains low, with no improvement in her depression. She had been seeing Ktaelyn Frias NP for therapy ([facility]) but was not satisfied with the treatment. She has not sought help from anyone else since then.    She reports poor sleep quality, often waking up during the night and feeling tired during the day. Despite taking an increased dose of two tablets of amitriptyline at night, her sleep has not improved. She also takes Wellbutrin both doses of Wellbutrin in the morning as recommended by Katelyn Frias NP, but it did not improve her sleep. Scheduled to see Dr. Yancey (Sleep Medicine) on 09/03/2024.    She has an upcoming appointment with Dr. Mohan for diabetes on 08/15/2024.     Patient Active Problem List   Diagnosis    Seronegative rheumatoid arthritis (HCC)    Major depressive disorder, recurrent, moderate (HCC)    CARLITO (obstructive sleep apnea)    Chronic midline low back pain without sciatica    Elevated liver enzymes    Gastroparesis    Type 2 diabetes with nephropathy (HCC)    Asthma    Iron deficiency anemia    Irritable bowel syndrome    Atypical squamous cells of undetermined significance (ASCUS) on Papanicolaou smear of cervix    Mixed hyperlipidemia    Type 2 diabetes mellitus with diabetic polyneuropathy, with long-term current use of insulin (HCC)    DDD (degenerative disc disease), lumbar    Morbid obesity (HCC)    Cervical stenosis of

## 2024-07-29 NOTE — PATIENT INSTRUCTIONS
For a therapist and/or psychiatrist, call:    [facility] (therapists only)  [address], [place] [address]  [phone]    [facility]   [address] [facility], [address], [place] [address] [phone]    [facility] [place] [address] [place], [place] [address] [phone]    [facility]  [phone]    [facility]  [address], [place], [place] [id number] [phone]    [facility]  [address] [facility] [address] [facility], [place] [id number] [phone]    [facility] Radha Ibrahima CHUA [facility], [address] [place] [address] [phone]

## 2024-08-02 ENCOUNTER — TELEPHONE (OUTPATIENT)
Facility: CLINIC | Age: 49
End: 2024-08-02

## 2024-08-02 NOTE — TELEPHONE ENCOUNTER
[facility], pt needs a back track insurance referral for 8/2 Dr. Oswaldo truong. Can call back at [phone]

## 2024-08-05 ENCOUNTER — TELEPHONE (OUTPATIENT)
Facility: CLINIC | Age: 49
End: 2024-08-05

## 2024-08-06 NOTE — TELEPHONE ENCOUNTER
I called the insurance company and they stated that the PA is still in process. Patient notified via [employer].

## 2024-08-15 ENCOUNTER — OFFICE VISIT (OUTPATIENT)
Age: 49
End: 2024-08-15

## 2024-08-15 VITALS
WEIGHT: 264.8 LBS | HEIGHT: 64 IN | HEART RATE: 70 BPM | DIASTOLIC BLOOD PRESSURE: 82 MMHG | BODY MASS INDEX: 45.21 KG/M2 | SYSTOLIC BLOOD PRESSURE: 126 MMHG

## 2024-08-15 DIAGNOSIS — E11.42 TYPE 2 DIABETES MELLITUS WITH DIABETIC POLYNEUROPATHY, WITH LONG-TERM CURRENT USE OF INSULIN (HCC): ICD-10-CM

## 2024-08-15 DIAGNOSIS — E66.01 MORBID OBESITY (HCC): ICD-10-CM

## 2024-08-15 DIAGNOSIS — E11.65 TYPE 2 DIABETES MELLITUS WITH HYPERGLYCEMIA, UNSPECIFIED WHETHER LONG TERM INSULIN USE (HCC): Primary | ICD-10-CM

## 2024-08-15 DIAGNOSIS — Z79.4 TYPE 2 DIABETES MELLITUS WITH DIABETIC POLYNEUROPATHY, WITH LONG-TERM CURRENT USE OF INSULIN (HCC): ICD-10-CM

## 2024-08-15 LAB — HBA1C MFR BLD: 8.3 %

## 2024-08-15 NOTE — PATIENT INSTRUCTIONS
PLAN FOR TODAY    We will plan to make the following changes to your diabetes medications:    Lantus and take 60 units at bedtime  Metformin 1000mg twice daily  Glipizide SR 10 mg take 2 tabs at the same time every morning  Trulicity 4.5mg weekly  Humalog 12 units before meals + Correction scale ( for breakfast and dinner if you are to eat larger carb portion than usual take an addition 1-2 units before that meal   Correction Scale:   1 unit for every 30 above 150    IF GLUCOSE IS:                 THEN TAKE:      0   Extra Unit  151-180   1   Extra Unit  181-210   2   Extra Units  211-240   3   Extra Units  241-270   4   Extra Units  271-300   5   Extra Units  >300    6   Extra Units    Example:   My planned insulin dose:    ____ Units    +   ____ Extra Correction Units  =  ____ total units to take together as one injection.      It will be important to continue checking your glucose just as you did previously.   I would like you at the very least to check you glucose during:    AM fasting before breakfast  Before Lunch  Before Dinner  Any other time that you are not feeling well.     Always provide a glucose log that is completed at every visit so that we can review the results of your home glucose together. Without this, it is not possible to make accurate changes to your diabetes regimen.    Leif Mohan MD  [facility]       Hypoglycemia:    Hypoglycemia means that your blood sugar is low and your body is not getting enough fuel. Some people get low blood sugar from not eating often enough. Some medicines to treat diabetes can cause low blood sugar. People who have had surgery on their stomachs or intestines may get hypoglycemia. Problems with the pancreas, kidneys, or liver also can cause low blood sugar.  A snack or drink with sugar in it will raise your blood sugar and should ease your symptoms right away.  How can you care for yourself at home?  Learn your signs of low blood sugar.

## 2024-08-15 NOTE — PROGRESS NOTES
right kidney, Dr Rivas       ALLERGIES:   Allergies   Allergen Reactions    Lisinopril Cough    Pcn [Penicillins] Hives       FAMILY HISTORY:  Family History   Problem Relation Age of Onset    Cancer Mother         Ovarian Cancer /breast ca    Heart Attack Father     Heart Disease Father     Diabetes Father     Other Father         pancreatitis    Cancer Sister         colon    Other Sister         fibromyalgia    Colon Cancer Sister     Crohn's Disease Sister     Other cancer Sister     Heart Attack Maternal Grandfather     Diabetes Paternal Grandmother     HIV/AIDS Son        REVIEW OF SYSTEMS: Complete ROS assessed and noted for that which is described above, all else are negative.      PHYSICAL EXAMINATION:   VITAL SIGNS:  /82 (Site: Right Upper Arm, Position: Sitting, Cuff Size: Large Adult)   Pulse 70   Ht 1.626 m (5' 4\")   Wt 120.1 kg (264 lb 12.8 oz)   BMI 45.45 kg/m²        GENERAL: NCAT, Sitting comfortably, NAD, has neckbrace  EYES: EOMI, non-icteric, no proptosis  Ear/Nose/Throat: NCAT, no inflammation, no masses  LYMPH NODES: No LAD  CARDIOVASCULAR: S1 S2, RRR, No murmur, 2+ radial pulses  RESPIRATORY: CTA b/l, no wheeze/rales  GASTROINTESTINAL:  ND  MUSCULOSKELETAL: Normal ROM, no atrophy  SKIN: warm, no edema/rash/ or other skin changes  NEUROLOGIC: 5/5 power all extremities, no tremors, AAOx3  PSYCHIATRIC: Normal affect, Normal insight and judgement    REVIEW OF LABORATORY AND RADIOLOGY DATA:   Labs and documentation have been reviewed as described above.     Please note that this dictation was completed with [employer], the computer voice recognition software.  Quite often unanticipated grammatical, syntax, homophones, and other interpretive errors are inadvertently transcribed by the computer software.  Efforts were made to correct these errors in proofreading. Please excuse any errors that have escaped final proofreading.  Thank you.     MD Eduardo [facility]

## 2024-08-16 PROBLEM — E11.65 TYPE 2 DIABETES MELLITUS WITH HYPERGLYCEMIA (HCC): Status: ACTIVE | Noted: 2024-08-16

## 2024-08-20 ENCOUNTER — TELEPHONE (OUTPATIENT)
Facility: CLINIC | Age: 49
End: 2024-08-20

## 2024-08-20 NOTE — TELEPHONE ENCOUNTER
Pt called and was trying to follow up on her pregablin medication PA. Pt stated that she is completely out now and would like to know what is going on

## 2024-08-21 NOTE — TELEPHONE ENCOUNTER
PA for Pregablin 300mg has been approved for 12 months.     PA Number: [id number]  Start Date: 08.21.2024  End Date: 08.21.2025    I called the patient and verified them by name and date of birth. I informed her on the approval from the insurance company. She stated understanding and had no further questions.

## 2024-09-03 ENCOUNTER — OFFICE VISIT (OUTPATIENT)
Age: 49
End: 2024-09-03
Payer: COMMERCIAL

## 2024-09-03 ENCOUNTER — CLINICAL DOCUMENTATION (OUTPATIENT)
Age: 49
End: 2024-09-03

## 2024-09-03 VITALS
BODY MASS INDEX: 44.73 KG/M2 | WEIGHT: 262 LBS | HEIGHT: 64 IN | TEMPERATURE: 98 F | SYSTOLIC BLOOD PRESSURE: 105 MMHG | DIASTOLIC BLOOD PRESSURE: 66 MMHG | OXYGEN SATURATION: 96 % | HEART RATE: 98 BPM

## 2024-09-03 DIAGNOSIS — G47.33 OBSTRUCTIVE SLEEP APNEA (ADULT) (PEDIATRIC): Primary | ICD-10-CM

## 2024-09-03 DIAGNOSIS — I10 ESSENTIAL (PRIMARY) HYPERTENSION: ICD-10-CM

## 2024-09-03 DIAGNOSIS — Z79.4 TYPE 2 DIABETES MELLITUS WITH HYPERGLYCEMIA, WITH LONG-TERM CURRENT USE OF INSULIN (HCC): ICD-10-CM

## 2024-09-03 DIAGNOSIS — E11.65 TYPE 2 DIABETES MELLITUS WITH HYPERGLYCEMIA, WITH LONG-TERM CURRENT USE OF INSULIN (HCC): ICD-10-CM

## 2024-09-03 PROCEDURE — 99214 OFFICE O/P EST MOD 30 MIN: CPT | Performed by: INTERNAL MEDICINE

## 2024-09-03 ASSESSMENT — SLEEP AND FATIGUE QUESTIONNAIRES
HOW LIKELY ARE YOU TO NOD OFF OR FALL ASLEEP WHILE WATCHING TV: HIGH CHANCE OF DOZING
HOW LIKELY ARE YOU TO NOD OFF OR FALL ASLEEP WHILE LYING DOWN TO REST IN THE AFTERNOON WHEN CIRCUMSTANCES PERMIT: MODERATE CHANCE OF DOZING
HOW LIKELY ARE YOU TO NOD OFF OR FALL ASLEEP WHILE SITTING AND TALKING TO SOMEONE: WOULD NEVER DOZE
HOW LIKELY ARE YOU TO NOD OFF OR FALL ASLEEP IN A CAR, WHILE STOPPED FOR A FEW MINUTES IN TRAFFIC: WOULD NEVER DOZE
HOW LIKELY ARE YOU TO NOD OFF OR FALL ASLEEP WHILE SITTING QUIETLY AFTER LUNCH WITHOUT ALCOHOL: SLIGHT CHANCE OF DOZING
HOW LIKELY ARE YOU TO NOD OFF OR FALL ASLEEP WHILE SITTING AND READING: HIGH CHANCE OF DOZING
HOW LIKELY ARE YOU TO NOD OFF OR FALL ASLEEP WHEN YOU ARE A PASSENGER IN A CAR FOR AN HOUR WITHOUT A BREAK: MODERATE CHANCE OF DOZING
HOW LIKELY ARE YOU TO NOD OFF OR FALL ASLEEP WHILE SITTING INACTIVE IN A PUBLIC PLACE: SLIGHT CHANCE OF DOZING
ESS TOTAL SCORE: 12

## 2024-09-03 NOTE — PROGRESS NOTES
[address]  [place] [address]  Tel.  [phone]  Fax. [phone] [address]  [place] [address]  Tel.  [phone]  Fax. [phone] [address] [place] [address]  Tel.  [phone]  Fax. [phone]     S>Radha Caban is a 49 y.o. female seen for a positive airway pressure follow-up.  She reports no problems using the device.  The following problems are identified:    Drowsiness no Problems exhaling no   Snoring no Forget to put on no   Mask Comfortable yes Can't fall asleep no   Dry Mouth no Mask falls off no   Air Leaking At times Frequent awakenings no   Estimated AHI flow from download shows 1.8/hour.   + significant leak  Averaging 7/hours use on nights used  Days with usage 97%  Adherence 97%  Weight from last visit 278 lb      She admits that her sleep has improved.      Allergies   Allergen Reactions    Lisinopril Cough    Penicillins Hives       She has a current medication list which includes the following prescription(s): metoclopramide, pregabalin, bupropion, valsartan, lidocaine, montelukast, albuterol sulfate hfa, amitriptyline, atorvastatin, dexcom g6 transmitter, glipizide, metformin, bd pen needle zacarias 2nd gen, humalog kwikpen, dexcom g6 sensor, cyclobenzaprine, lantus solostar, trulicity, golimumab, ibuprofen, propranolol, sumatriptan, butalbital-acetaminophen-caffeine, topiramate, ondansetron, nitroglycerin, meclizine, albuterol, acetaminophen, dicyclomine, hydrocodone-acetaminophen, and omeprazole..      She  has a past medical history of Anxiety, Asthma, DM type 2 (diabetes mellitus, type 2) (HCC), GERD (gastroesophageal reflux disease), GI bleed, Hepatic steatosis, HTN (hypertension), Irritable bowel, Kidney mass, Morbid obesity (HCC), Neuropathy, CARLITO on CPAP, PUD (peptic ulcer disease), Renal cancer, right (HCC), and Rheumatoid arthritis (HCC).        9/3/2024     9:22 AM 8/29/2023    10:50 AM 8/26/2022    10:35 AM

## 2024-09-27 NOTE — PROGRESS NOTES
Normal
inhaler Inhale 2 puffs into the lungs every 6 hours as needed for Shortness of Breath or Wheezing 3/5/24  Yes Mary Rodas MD   ibuprofen (ADVIL;MOTRIN) 800 MG tablet Take 1 tablet by mouth 3 times daily as needed (headache) Take with meals. 3/5/24  Yes Mary Rodas MD   propranolol (INDERAL) 40 MG tablet Take 1 tablet by mouth 3 times daily 2/2/24  Yes Mary Rodas MD   SUMAtriptan (IMITREX) 50 MG tablet Take 1-2 tablets by mouth at onset of headache. May repeat 1 tablet in 2 hrs if no headache relief. Maximum: 3 tablets daily. 1/23/24  Yes Mary Rodas MD   butalbital-acetaminophen-caffeine (FIORICET, ESGIC) -40 MG per tablet Take 1 tablet by mouth 2 times daily as needed for Headaches 1/3/24  Yes Mary Rodas MD   ondansetron (ZOFRAN-ODT) 4 MG disintegrating tablet DISSOLVE 1 TABLET IN MOUTH EVERY 8 HOURS AS NEEDED FOR NAUSEA 9/14/23  Yes Nilsa Vincent MD   nitroGLYCERIN (NITROSTAT) 0.4 MG SL tablet Place 1 tablet under the tongue every 5 minutes as needed for Chest pain 9/1/23  Yes Mary Rodas MD   meclizine (ANTIVERT) 25 MG tablet Take 1 tablet by mouth 3 times daily as needed for Dizziness 9/1/23  Yes Mary Rodas MD   albuterol (PROVENTIL) (2.5 MG/3ML) 0.083% nebulizer solution USE ONE VIAL IN NEBULIZER EVERY 4 HOURS AS NEEDED FOR WHEEZING. Dx: J45.40 9/1/23  Yes Mary Rodas MD   pregabalin (LYRICA) 300 MG capsule Take 1 capsule by mouth 2 times daily for 180 days. Max Daily Amount: 600 mg 6/16/23 3/6/24 Yes Yanira Jhaveri,    acetaminophen (TYLENOL) 500 MG tablet Take 2 tablets by mouth every 6 hours as needed 4/6/21  Yes Automatic Reconciliation, Ar   cyclobenzaprine (FLEXERIL) 10 MG tablet Take 1 tablet by mouth 3 times daily as needed 2/6/23  Yes Automatic Reconciliation, Ar   dicyclomine (BENTYL) 10 MG/5ML syrup Take 10 mLs by mouth 4 times daily as needed 1/27/22  Yes Automatic Reconciliation, Ar   HYDROcodone-acetaminophen (NORCO)  MG per tablet TAKE 1 TO 2 
psychopharmaceutical intervention with a simplified regime, psychotherapy, and continued engagement with social supports to help manage her depressed mood and anxiety. Prognosis is good considering the patient remains adherent to medication along with psychotherapy to address and manage her symptoms.     Discussed medication options. Pt is agreeable to continue Wellbutrin that has been prescribed by her PCP but take both doses in the AM instead of dividing the doses. Thoroughly reviewed medication profile as well as potential side effects associated with Wellbutrin. Pt verbalized understanding of the proper use and dose of medication and possible side effects to be aware of. Pt and was in agreement to engage in psychotherapy in order to better manage both depressive and anxiety symptoms.      Reviewed treatment goals and target symptoms to monitor for. Reinforced positive coping skills. Pt aware she can contact office anytime with questions, concerns, or changes to mood symptoms.  reviewed this date, no discrepancies noted; Score 360, #5 prescribers; No CS from . Will continue to monitor mood symptoms over time. Will Adjust psychiatric medications and doses as needed based upon diagnoses and response to treatment, and according to safety in regard to physical health co-morbidities: BP, Chronic pain, lab results, RA, DM    Plan:    1. No Medication changes continue Wellbutrin XL take both tablets in the morning.    2. Psychotherapy: Pt provided list of providers and suggested to schedule an appointment ASAP    3. Dispo: Follow-up in 8 weeks or sooner if needed,     4.  Counseling and coordination of care including instructions for treatment, risks/benefits, risk factor reduction and patient/family education. Radha agrees with the plan. Patient instructed to call with any side effects, questions or issues. Psych-education provided: Discussed rational versus irrational thinking patterns and their consequences.

## 2024-09-29 NOTE — LETTER
4/28/2022    Patient: Nadeem Hussein   YOB: 1975   Date of Visit: 4/27/2022     Neo Perrin MD  [address] [facility] [address] [address]  [address] [facility] [address]    Dear Neo Perrin MD,      Thank you for referring Ms. Kiara Mills to [facility] for evaluation. My notes for this consultation are attached. If you have questions, please do not hesitate to call me. I look forward to following your patient along with you.       Sincerely,    Yamilet Armstrong MD
weight-bearing as tolerated

## 2024-10-16 DIAGNOSIS — E11.42 TYPE 2 DIABETES MELLITUS WITH DIABETIC POLYNEUROPATHY, WITH LONG-TERM CURRENT USE OF INSULIN (HCC): ICD-10-CM

## 2024-10-16 DIAGNOSIS — Z79.4 TYPE 2 DIABETES MELLITUS WITH DIABETIC POLYNEUROPATHY, WITH LONG-TERM CURRENT USE OF INSULIN (HCC): ICD-10-CM

## 2024-10-16 DIAGNOSIS — G44.52 NEW DAILY PERSISTENT HEADACHE: ICD-10-CM

## 2024-10-16 DIAGNOSIS — I10 ESSENTIAL HYPERTENSION: ICD-10-CM

## 2024-10-16 DIAGNOSIS — F33.1 MODERATE EPISODE OF RECURRENT MAJOR DEPRESSIVE DISORDER (HCC): ICD-10-CM

## 2024-10-16 DIAGNOSIS — E78.2 MIXED HYPERLIPIDEMIA: ICD-10-CM

## 2024-10-16 DIAGNOSIS — R42 VERTIGO: ICD-10-CM

## 2024-10-16 DIAGNOSIS — J45.20 MILD INTERMITTENT ASTHMA WITHOUT COMPLICATION: Primary | ICD-10-CM

## 2024-10-16 DIAGNOSIS — M48.062 SPINAL STENOSIS, LUMBAR REGION WITH NEUROGENIC CLAUDICATION: Primary | ICD-10-CM

## 2024-10-16 RX ORDER — ATORVASTATIN CALCIUM 40 MG/1
TABLET, FILM COATED ORAL
Qty: 90 TABLET | Refills: 3 | Status: SHIPPED | OUTPATIENT
Start: 2024-10-16

## 2024-10-16 RX ORDER — PROCHLORPERAZINE 25 MG/1
SUPPOSITORY RECTAL
Qty: 1 EACH | Refills: 3 | Status: SHIPPED | OUTPATIENT
Start: 2024-10-16

## 2024-10-16 RX ORDER — METFORMIN HCL 500 MG
1000 TABLET, EXTENDED RELEASE 24 HR ORAL 2 TIMES DAILY
Qty: 360 TABLET | Refills: 3 | Status: SHIPPED | OUTPATIENT
Start: 2024-10-16

## 2024-10-16 RX ORDER — CYCLOBENZAPRINE HCL 10 MG
10 TABLET ORAL 3 TIMES DAILY PRN
Qty: 120 TABLET | Refills: 3 | Status: SHIPPED | OUTPATIENT
Start: 2024-10-16 | End: 2024-10-16

## 2024-10-16 RX ORDER — INSULIN GLARGINE 100 [IU]/ML
INJECTION, SOLUTION SUBCUTANEOUS
Qty: 60 ML | Refills: 3 | Status: SHIPPED | OUTPATIENT
Start: 2024-10-16

## 2024-10-16 RX ORDER — MONTELUKAST SODIUM 10 MG/1
TABLET ORAL
Qty: 90 TABLET | Refills: 3 | Status: SHIPPED | OUTPATIENT
Start: 2024-10-16

## 2024-10-16 RX ORDER — DULAGLUTIDE 4.5 MG/.5ML
INJECTION, SOLUTION SUBCUTANEOUS
Qty: 6 ML | Refills: 3 | Status: SHIPPED | OUTPATIENT
Start: 2024-10-16

## 2024-10-16 RX ORDER — ALBUTEROL SULFATE 90 UG/1
2 INHALANT RESPIRATORY (INHALATION) EVERY 6 HOURS PRN
Qty: 18 G | Refills: 3 | Status: SHIPPED | OUTPATIENT
Start: 2024-10-16

## 2024-10-16 RX ORDER — PROCHLORPERAZINE 25 MG/1
SUPPOSITORY RECTAL
Qty: 9 EACH | Refills: 3 | Status: SHIPPED | OUTPATIENT
Start: 2024-10-16

## 2024-10-16 RX ORDER — AMITRIPTYLINE HYDROCHLORIDE 50 MG/1
100 TABLET ORAL NIGHTLY
Qty: 90 TABLET | Refills: 3 | Status: SHIPPED | OUTPATIENT
Start: 2024-10-16

## 2024-10-16 RX ORDER — INSULIN LISPRO 100 [IU]/ML
INJECTION, SOLUTION INTRAVENOUS; SUBCUTANEOUS
Qty: 45 ML | Refills: 5 | Status: SHIPPED | OUTPATIENT
Start: 2024-10-16

## 2024-10-16 RX ORDER — GLIPIZIDE 10 MG/1
TABLET, FILM COATED, EXTENDED RELEASE ORAL
Qty: 180 TABLET | Refills: 3 | Status: SHIPPED | OUTPATIENT
Start: 2024-10-16

## 2024-10-16 RX ORDER — BUPROPION HYDROCHLORIDE 150 MG/1
150 TABLET, EXTENDED RELEASE ORAL 2 TIMES DAILY
Qty: 180 TABLET | Refills: 3 | Status: SHIPPED | OUTPATIENT
Start: 2024-10-16

## 2024-10-16 RX ORDER — PEN NEEDLE, DIABETIC 32GX 5/32"
NEEDLE, DISPOSABLE MISCELLANEOUS
Qty: 300 EACH | Refills: 3 | Status: SHIPPED | OUTPATIENT
Start: 2024-10-16

## 2024-10-16 RX ORDER — VALSARTAN 80 MG/1
80 TABLET ORAL DAILY
Qty: 90 TABLET | Refills: 3 | Status: SHIPPED | OUTPATIENT
Start: 2024-10-16

## 2024-10-16 NOTE — TELEPHONE ENCOUNTER
Future Appointments:  Future Appointments   Date Time Provider Department Center   11/4/2024 10:10 AM [facility]   11/26/2024  8:30 AM Kerline Reyes APRN - NP NEUMRSPB [facility]   2/17/2025  9:10 AM Leif Mohan MD R[address] BS [facility]   9/2/2025  9:20 AM Amy Yancey MD [facility] BS [facility]        Last Appointment With Me:  Visit date not found     Requested Prescriptions     Pending Prescriptions Disp Refills    buPROPion (WELLBUTRIN SR) 150 MG extended release tablet 180 tablet 3     Sig: Take 1 tablet by mouth 2 times daily    valsartan (DIOVAN) 80 MG tablet 90 tablet 0     Sig: Take 1 tablet by mouth daily high blood pressure    montelukast (SINGULAIR) 10 MG tablet 90 tablet 2     Sig: TAKE 1 TABLET BY MOUTH ONCE DAILY FOR ALLERGIES AND FOR ASTHMA    albuterol sulfate HFA (PROVENTIL;VENTOLIN;PROAIR) 108 (90 Base) MCG/ACT inhaler 18 g 3     Sig: Inhale 2 puffs into the lungs every 6 hours as needed for Shortness of Breath or Wheezing    amitriptyline (ELAVIL) 50 MG tablet 90 tablet 1     Sig: Take 2 tablets by mouth nightly

## 2024-10-17 RX ORDER — CYCLOBENZAPRINE HCL 10 MG
10 TABLET ORAL 3 TIMES DAILY PRN
Qty: 120 TABLET | Refills: 3 | Status: SHIPPED | OUTPATIENT
Start: 2024-10-17

## 2024-10-21 ENCOUNTER — TELEPHONE (OUTPATIENT)
Facility: CLINIC | Age: 49
End: 2024-10-21

## 2024-10-21 RX ORDER — MECLIZINE HYDROCHLORIDE 25 MG/1
25 TABLET ORAL 3 TIMES DAILY PRN
Qty: 270 TABLET | Refills: 1 | Status: SHIPPED | OUTPATIENT
Start: 2024-10-21

## 2024-10-21 RX ORDER — IBUPROFEN 800 MG/1
800 TABLET, FILM COATED ORAL 2 TIMES DAILY PRN
Qty: 180 TABLET | Refills: 1 | Status: SHIPPED | OUTPATIENT
Start: 2024-10-21

## 2024-10-21 RX ORDER — ALBUTEROL SULFATE 0.83 MG/ML
SOLUTION RESPIRATORY (INHALATION)
Qty: 120 EACH | Refills: 3 | Status: SHIPPED | OUTPATIENT
Start: 2024-10-21

## 2024-10-21 NOTE — TELEPHONE ENCOUNTER
PCP: Mary Rodas MD     Last appt:  7/29/2024      Future Appointments   Date Time Provider Department Center   11/4/2024 10:10 AM [facility]   11/26/2024  8:30 AM Kerline Reyes APRN - NP NEUMRSP[facility]AL[facility]   2/17/2025  9:10 AM Leif Mohan MD RDE [address] BS [facility]   9/2/2025  9:20 AM Amy Yancey MD [facility]          Requested Prescriptions     Pending Prescriptions Disp Refills    meclizine (ANTIVERT) 25 MG tablet 90 tablet 3     Sig: Take 1 tablet by mouth 3 times daily as needed for Dizziness    albuterol (PROVENTIL) (2.5 MG/3ML) 0.083% nebulizer solution 120 each 3     Sig: USE ONE VIAL IN NEBULIZER EVERY 4 HOURS AS NEEDED FOR WHEEZING. Dx: J45.40    ibuprofen (ADVIL;MOTRIN) 800 MG tablet 90 tablet 1     Sig: Take 1 tablet by mouth 3 times daily as needed (headache) Take with meals.

## 2024-10-21 NOTE — TELEPHONE ENCOUNTER
I called [facility] and they stated that the order has not gone through the pharmacist yet. As of right now nothing is needed from the office. If anything more is needed the pharmacist will contact us.     I left a message for the patient to call back or check [facility]

## 2024-10-21 NOTE — TELEPHONE ENCOUNTER
Pt is now under medicare and needs all her medications to be sent to [facility] pharmacy. Pt stated that she was told by [facility] that she has 3 rx that need PA's on medications but did not tell her were ones they were

## 2024-10-22 ENCOUNTER — PATIENT MESSAGE (OUTPATIENT)
Facility: CLINIC | Age: 49
End: 2024-10-22

## 2024-10-22 DIAGNOSIS — E11.42 TYPE 2 DIABETES MELLITUS WITH DIABETIC POLYNEUROPATHY, WITH LONG-TERM CURRENT USE OF INSULIN (HCC): ICD-10-CM

## 2024-10-22 DIAGNOSIS — Z79.4 TYPE 2 DIABETES MELLITUS WITH DIABETIC POLYNEUROPATHY, WITH LONG-TERM CURRENT USE OF INSULIN (HCC): ICD-10-CM

## 2024-10-23 NOTE — TELEPHONE ENCOUNTER
PCP: Mary Rodas MD     Last appt:  7/29/2024      Future Appointments   Date Time Provider Department Center   11/4/2024 10:10 AM Mary Rodas MD [facility]   11/26/2024  8:30 AM Kerline Reyes APRN - NP NEUMRSPB [facility]   2/17/2025  9:10 AM Leif Mohan MD R[address] BS [facility]   9/2/2025  9:20 AM Amy Yancey MD [facility]          Requested Prescriptions     Pending Prescriptions Disp Refills    pregabalin (LYRICA) 300 MG capsule 180 capsule 1     Sig: Take 1 capsule by mouth 2 times daily for 180 days. Max Daily Amount: 600 mg

## 2024-10-24 RX ORDER — PREGABALIN 300 MG/1
300 CAPSULE ORAL 2 TIMES DAILY
Qty: 180 CAPSULE | Refills: 0 | Status: SHIPPED | OUTPATIENT
Start: 2024-10-24 | End: 2025-01-22

## 2024-11-04 ENCOUNTER — OFFICE VISIT (OUTPATIENT)
Facility: CLINIC | Age: 49
End: 2024-11-04

## 2024-11-04 VITALS
SYSTOLIC BLOOD PRESSURE: 138 MMHG | TEMPERATURE: 98.7 F | RESPIRATION RATE: 16 BRPM | DIASTOLIC BLOOD PRESSURE: 82 MMHG | OXYGEN SATURATION: 99 % | WEIGHT: 278 LBS | BODY MASS INDEX: 47.46 KG/M2 | HEART RATE: 84 BPM | HEIGHT: 64 IN

## 2024-11-04 DIAGNOSIS — E11.42 TYPE 2 DIABETES MELLITUS WITH DIABETIC POLYNEUROPATHY, WITH LONG-TERM CURRENT USE OF INSULIN (HCC): ICD-10-CM

## 2024-11-04 DIAGNOSIS — M06.00 SERONEGATIVE RHEUMATOID ARTHRITIS (HCC): ICD-10-CM

## 2024-11-04 DIAGNOSIS — F33.1 MAJOR DEPRESSIVE DISORDER, RECURRENT, MODERATE (HCC): ICD-10-CM

## 2024-11-04 DIAGNOSIS — I10 ESSENTIAL HYPERTENSION: Primary | ICD-10-CM

## 2024-11-04 DIAGNOSIS — Z79.4 TYPE 2 DIABETES MELLITUS WITH DIABETIC POLYNEUROPATHY, WITH LONG-TERM CURRENT USE OF INSULIN (HCC): ICD-10-CM

## 2024-11-04 DIAGNOSIS — G47.33 OSA (OBSTRUCTIVE SLEEP APNEA): ICD-10-CM

## 2024-11-04 DIAGNOSIS — Z23 NEEDS FLU SHOT: ICD-10-CM

## 2024-11-04 DIAGNOSIS — K21.9 GASTROESOPHAGEAL REFLUX DISEASE WITHOUT ESOPHAGITIS: ICD-10-CM

## 2024-11-04 DIAGNOSIS — R51.9 NONINTRACTABLE EPISODIC HEADACHE, UNSPECIFIED HEADACHE TYPE: ICD-10-CM

## 2024-11-04 DIAGNOSIS — F51.04 CHRONIC INSOMNIA: ICD-10-CM

## 2024-11-04 RX ORDER — AMITRIPTYLINE HYDROCHLORIDE 50 MG/1
150 TABLET ORAL NIGHTLY
Qty: 270 TABLET | Refills: 1 | Status: SHIPPED | OUTPATIENT
Start: 2024-11-04

## 2024-11-04 RX ORDER — OMEPRAZOLE 40 MG/1
40 CAPSULE, DELAYED RELEASE ORAL 2 TIMES DAILY
Qty: 90 CAPSULE | Refills: 2 | Status: SHIPPED | OUTPATIENT
Start: 2024-11-04

## 2024-11-04 SDOH — ECONOMIC STABILITY: FOOD INSECURITY: WITHIN THE PAST 12 MONTHS, YOU WORRIED THAT YOUR FOOD WOULD RUN OUT BEFORE YOU GOT MONEY TO BUY MORE.: NEVER TRUE

## 2024-11-04 SDOH — ECONOMIC STABILITY: INCOME INSECURITY: HOW HARD IS IT FOR YOU TO PAY FOR THE VERY BASICS LIKE FOOD, HOUSING, MEDICAL CARE, AND HEATING?: NOT HARD AT ALL

## 2024-11-04 SDOH — ECONOMIC STABILITY: FOOD INSECURITY: WITHIN THE PAST 12 MONTHS, THE FOOD YOU BOUGHT JUST DIDN'T LAST AND YOU DIDN'T HAVE MONEY TO GET MORE.: NEVER TRUE

## 2024-11-04 ASSESSMENT — PATIENT HEALTH QUESTIONNAIRE - PHQ9
SUM OF ALL RESPONSES TO PHQ9 QUESTIONS 1 & 2: 4
SUM OF ALL RESPONSES TO PHQ QUESTIONS 1-9: 14
9. THOUGHTS THAT YOU WOULD BE BETTER OFF DEAD, OR OF HURTING YOURSELF: NOT AT ALL
2. FEELING DOWN, DEPRESSED OR HOPELESS: NEARLY EVERY DAY
4. FEELING TIRED OR HAVING LITTLE ENERGY: NEARLY EVERY DAY
SUM OF ALL RESPONSES TO PHQ QUESTIONS 1-9: 14
6. FEELING BAD ABOUT YOURSELF - OR THAT YOU ARE A FAILURE OR HAVE LET YOURSELF OR YOUR FAMILY DOWN: SEVERAL DAYS
SUM OF ALL RESPONSES TO PHQ QUESTIONS 1-9: 14
10. IF YOU CHECKED OFF ANY PROBLEMS, HOW DIFFICULT HAVE THESE PROBLEMS MADE IT FOR YOU TO DO YOUR WORK, TAKE CARE OF THINGS AT HOME, OR GET ALONG WITH OTHER PEOPLE: VERY DIFFICULT
5. POOR APPETITE OR OVEREATING: NOT AT ALL
1. LITTLE INTEREST OR PLEASURE IN DOING THINGS: SEVERAL DAYS
8. MOVING OR SPEAKING SO SLOWLY THAT OTHER PEOPLE COULD HAVE NOTICED. OR THE OPPOSITE, BEING SO FIGETY OR RESTLESS THAT YOU HAVE BEEN MOVING AROUND A LOT MORE THAN USUAL: NOT AT ALL
SUM OF ALL RESPONSES TO PHQ QUESTIONS 1-9: 14
3. TROUBLE FALLING OR STAYING ASLEEP: NEARLY EVERY DAY
7. TROUBLE CONCENTRATING ON THINGS, SUCH AS READING THE NEWSPAPER OR WATCHING TELEVISION: NEARLY EVERY DAY

## 2024-11-04 NOTE — PATIENT INSTRUCTIONS
Patient Instructions  For a therapist and/or psychiatrist, call:     [facility] (therapists only)  [address], [place] [address]  [phone]     [facility]   [address] [facility], [address], [place] [address] [phone]     [facility] of [address] [place], [place] [address] [phone]     [facility]  [phone]     [facility]  [address], [place], [place] [id number] [phone]     [facility]  [address] [facility] [facility] [address], [place] [address] [phone]     [facility] Radha CHUA [facility], [address] [place] [address] [phone]

## 2024-11-04 NOTE — PROGRESS NOTES
After verbal order read back of Dr. Rodas, patient received Flu Shot in Left deltoid.  NDC: 48594-031-91 Lot: [id number] Exp 06/30/2025. Patient tolerated procedure without complaints and received VIS.  
Radha Caban  Identified pt with two pt identifiers(name and ).  Chief Complaint   Patient presents with    Hypertension    Depression    Insomnia       1. Have you been to the ER, urgent care clinic since your last visit?  Hospitalized since your last visit? NO    2. Have you seen or consulted any other health care providers outside of the [facility] since your last visit?  Include any pap smears or colon screening. NO      Provider notified of reason for visit, vitals and flowsheets obtained on patients.     Patient received paperwork for advance directive during previous visit but has not completed at this time     Reviewed record In preparation for visit, huddled with provider and have obtained necessary documentation      Health Maintenance Due   Topic    Hepatitis B vaccine (1 of 3 - 19+ 3-dose series)    Diabetic retinal exam     Annual Wellness Visit (Medicare Advantage)     Flu vaccine (1)    Diabetic foot exam     Diabetic Alb to Cr ratio (uACR) test     Lipids        Wt Readings from Last 3 Encounters:   24 126.1 kg (278 lb)   24 118.8 kg (262 lb)   08/15/24 120.1 kg (264 lb 12.8 oz)     Temp Readings from Last 3 Encounters:   24 98.7 °F (37.1 °C) (Oral)   24 98 °F (36.7 °C)   24 97.7 °F (36.5 °C)     BP Readings from Last 3 Encounters:   24 138/82   24 105/66   08/15/24 126/82     Pulse Readings from Last 3 Encounters:   24 84   24 98   08/15/24 70          No data to display                  Learning Assessment:  :         1/3/2024     8:10 AM   [facility] AMB LEARNING ASSESSMENT   Primary Learner Patient   level of education > 4 YEARS OF COLLEGE   Primary Language ENGLISH   Learning Preference DEMONSTRATION   Answered By Patient   Relationship to Learner SELF       Fall Risk Assessment:  :         11/15/2022     7:22 AM 2022    10:16 AM 2021    10:57 AM 2021     8:24 AM 2021     3:01 PM 2021    11:36 AM 
lymphadenopathy, or thyromegaly.  Lungs:  Clear to auscultation bilaterally. Good air movement.  Heart:  Regular rate and rhythm, normal S1 and S2, no murmur, gallop, or rub  Extremities: No clubbing, cyanosis, or edema. 2+ pedal pulses bilaterally.  Neurological: Alert and oriented. No focal deficits.  Psychiatric: Normal mood and flat affect. Behavior is normal.    Assessment & Plan  1. Hypertension - Blood pressure readings are within the normal range today.  - Continue current regimen with valsartan.    2. Depression - Current regimen of Wellbutrin 150 mg twice daily will be maintained. Increase dosage of amitriptyline 50 mg to three tablets nightly for depression and sleep.  - Report any sudden increase in depressive symptoms or suicidal ideation.  - Provided a list of potential therapists for consideration.    3. Chronic Insomnia - Dosage of amitriptyline will be increased to three tablets daily to help with sleep.    4. CARLITO - Controlled.  - Continue using CPAP machine.    5. Headaches - No longer having migraine headaches.  - Discontinue propranolol and topiramate as headaches have improved. She has already been off for over a month.    6. Rheumatoid arthritis - She has diffuse joint pain.  - Advised patient to avoid long-term hydrocodone due to its potential for dependency and its inability to address the root cause of pain.  - Follow up with Dr. Horton (Rheumatology) as scheduled.    7. DM - Last A1c 8.3%.  - Follow up with Dr. Mohan as scheduled.    8. Acid Reflux - Refill of omeprazole prescription will be arranged.    9. Needs flu vaccine- Given in clinic today.      ICD-10-CM    1. Essential hypertension  I10       2. Major depressive disorder, recurrent, moderate (HCC)  F33.1       3. Chronic insomnia  F51.04       4. CARLITO (obstructive sleep apnea)  G47.33       5. Nonintractable episodic headache, unspecified headache type  R51.9       6. Seronegative rheumatoid arthritis (HCC)  M06.00       7. Type 2

## 2024-12-05 ENCOUNTER — TELEPHONE (OUTPATIENT)
Age: 49
End: 2024-12-05

## 2024-12-05 NOTE — TELEPHONE ENCOUNTER
The Lantus medication has arrived at the office. The patient has been notified and will stop by the office to  the medication.

## 2024-12-18 ENCOUNTER — TELEPHONE (OUTPATIENT)
Age: 49
End: 2024-12-18

## 2024-12-18 NOTE — TELEPHONE ENCOUNTER
How Severe Is Your Skin Lesion?: mild Pt will stop by tomorrow to .,    Is This A New Presentation, Or A Follow-Up?: Skin Lesions

## 2025-01-08 DIAGNOSIS — E11.65 TYPE 2 DIABETES MELLITUS WITH HYPERGLYCEMIA, UNSPECIFIED WHETHER LONG TERM INSULIN USE (HCC): Primary | ICD-10-CM

## 2025-01-08 DIAGNOSIS — E78.2 MIXED HYPERLIPIDEMIA: ICD-10-CM

## 2025-01-08 RX ORDER — MONTELUKAST SODIUM 10 MG/1
TABLET ORAL
Qty: 90 TABLET | Refills: 3 | Status: SHIPPED | OUTPATIENT
Start: 2025-01-08

## 2025-01-08 NOTE — TELEPHONE ENCOUNTER
Future Appointments:  Future Appointments   Date Time Provider Department Center   2/14/2025  8:50 AM [facility]   2/17/2025  9:10 AM Leif Mohan MD RDE [facility] BS AMB   9/2/2025  9:20 AM Amy Yancey MD [facility] BS AMB        Last Appointment With Me:  11/4/2024     Requested Prescriptions     Pending Prescriptions Disp Refills    montelukast (SINGULAIR) 10 MG tablet [Pharmacy Med Name: Montelukast Sodium Oral Tablet 10 MG] 90 tablet 3     Sig: TAKE 1 TABLET ONE TIME DAILY FOR ALLERGIES AND ASTHMA

## 2025-01-09 DIAGNOSIS — I10 ESSENTIAL HYPERTENSION: ICD-10-CM

## 2025-01-09 DIAGNOSIS — K21.9 GASTROESOPHAGEAL REFLUX DISEASE WITHOUT ESOPHAGITIS: ICD-10-CM

## 2025-01-09 RX ORDER — OMEPRAZOLE 40 MG/1
CAPSULE, DELAYED RELEASE ORAL
Qty: 180 CAPSULE | Refills: 0 | Status: SHIPPED | OUTPATIENT
Start: 2025-01-09

## 2025-01-09 RX ORDER — METFORMIN HYDROCHLORIDE 500 MG/1
TABLET, EXTENDED RELEASE ORAL
Qty: 360 TABLET | Refills: 3 | Status: SHIPPED | OUTPATIENT
Start: 2025-01-09

## 2025-01-09 RX ORDER — VALSARTAN 80 MG/1
TABLET ORAL
Qty: 90 TABLET | Refills: 3 | Status: SHIPPED | OUTPATIENT
Start: 2025-01-09

## 2025-01-09 RX ORDER — ATORVASTATIN CALCIUM 40 MG/1
TABLET, FILM COATED ORAL
Qty: 90 TABLET | Refills: 3 | Status: SHIPPED | OUTPATIENT
Start: 2025-01-09

## 2025-01-09 RX ORDER — PEN NEEDLE, DIABETIC 32GX 5/32"
NEEDLE, DISPOSABLE MISCELLANEOUS
Qty: 500 EACH | Refills: 3 | Status: SHIPPED | OUTPATIENT
Start: 2025-01-09

## 2025-01-09 RX ORDER — GLIPIZIDE 10 MG/1
TABLET, FILM COATED, EXTENDED RELEASE ORAL
Qty: 180 TABLET | Refills: 3 | Status: SHIPPED | OUTPATIENT
Start: 2025-01-09

## 2025-01-09 NOTE — TELEPHONE ENCOUNTER
PCP: Mary Rodas MD     Last appt:  11/4/2024      Future Appointments   Date Time Provider Department Center   2/14/2025  8:50 AM Mary Rodas MD [facility]   2/17/2025  9:10 AM Leif Mohan MD RDE [facility] BS AMB   9/2/2025  9:20 AM Amy Yancey MD [facility] BS AMB          Requested Prescriptions     Pending Prescriptions Disp Refills    omeprazole (PRILOSEC) 40 MG delayed release capsule [Pharmacy Med Name: Omeprazole Oral Capsule Delayed Release 40 MG] 180 capsule 3     Sig: TAKE 1 CAPSULE IN THE MORNING AND AT BEDTIME    valsartan (DIOVAN) 80 MG tablet [Pharmacy Med Name: Valsartan Oral Tablet 80 MG] 90 tablet 3     Sig: TAKE 1 TABLET EVERY DAY HIGH BLOOD PRESSURE

## 2025-01-13 DIAGNOSIS — M48.062 SPINAL STENOSIS, LUMBAR REGION WITH NEUROGENIC CLAUDICATION: ICD-10-CM

## 2025-01-13 RX ORDER — CYCLOBENZAPRINE HCL 10 MG
10 TABLET ORAL 3 TIMES DAILY PRN
Qty: 120 TABLET | Refills: 0 | Status: SHIPPED | OUTPATIENT
Start: 2025-01-13

## 2025-01-27 ENCOUNTER — TELEPHONE (OUTPATIENT)
Age: 50
End: 2025-01-27

## 2025-02-13 ENCOUNTER — TELEPHONE (OUTPATIENT)
Age: 50
End: 2025-02-13

## 2025-02-13 NOTE — TELEPHONE ENCOUNTER
Called and spoke with pt regarding scheduling an EMG, pt states that she has had this procedure before and does not wish to have it again. I told pt that if she changes her mind to please call the office back.

## 2025-02-14 ENCOUNTER — PATIENT MESSAGE (OUTPATIENT)
Facility: CLINIC | Age: 50
End: 2025-02-14

## 2025-02-17 ENCOUNTER — OFFICE VISIT (OUTPATIENT)
Age: 50
End: 2025-02-17
Payer: MEDICARE

## 2025-02-17 VITALS
WEIGHT: 280.8 LBS | HEIGHT: 64 IN | HEART RATE: 68 BPM | BODY MASS INDEX: 47.94 KG/M2 | DIASTOLIC BLOOD PRESSURE: 50 MMHG | SYSTOLIC BLOOD PRESSURE: 141 MMHG

## 2025-02-17 DIAGNOSIS — E11.42 TYPE 2 DIABETES MELLITUS WITH DIABETIC POLYNEUROPATHY, WITH LONG-TERM CURRENT USE OF INSULIN (HCC): Primary | ICD-10-CM

## 2025-02-17 DIAGNOSIS — E78.2 MIXED HYPERLIPIDEMIA: ICD-10-CM

## 2025-02-17 DIAGNOSIS — E66.01 MORBID OBESITY: ICD-10-CM

## 2025-02-17 DIAGNOSIS — Z79.4 TYPE 2 DIABETES MELLITUS WITH DIABETIC POLYNEUROPATHY, WITH LONG-TERM CURRENT USE OF INSULIN (HCC): Primary | ICD-10-CM

## 2025-02-17 LAB — HBA1C MFR BLD: 11.5 %

## 2025-02-17 PROCEDURE — 2022F DILAT RTA XM EVC RTNOPTHY: CPT | Performed by: GENERAL ACUTE CARE HOSPITAL

## 2025-02-17 PROCEDURE — 99214 OFFICE O/P EST MOD 30 MIN: CPT | Performed by: GENERAL ACUTE CARE HOSPITAL

## 2025-02-17 PROCEDURE — G8417 CALC BMI ABV UP PARAM F/U: HCPCS | Performed by: GENERAL ACUTE CARE HOSPITAL

## 2025-02-17 PROCEDURE — 3078F DIAST BP <80 MM HG: CPT | Performed by: GENERAL ACUTE CARE HOSPITAL

## 2025-02-17 PROCEDURE — G8427 DOCREV CUR MEDS BY ELIG CLIN: HCPCS | Performed by: GENERAL ACUTE CARE HOSPITAL

## 2025-02-17 PROCEDURE — 3077F SYST BP >= 140 MM HG: CPT | Performed by: GENERAL ACUTE CARE HOSPITAL

## 2025-02-17 PROCEDURE — 83036 HEMOGLOBIN GLYCOSYLATED A1C: CPT | Performed by: GENERAL ACUTE CARE HOSPITAL

## 2025-02-17 PROCEDURE — 3017F COLORECTAL CA SCREEN DOC REV: CPT | Performed by: GENERAL ACUTE CARE HOSPITAL

## 2025-02-17 PROCEDURE — 1036F TOBACCO NON-USER: CPT | Performed by: GENERAL ACUTE CARE HOSPITAL

## 2025-02-17 PROCEDURE — 3046F HEMOGLOBIN A1C LEVEL >9.0%: CPT | Performed by: GENERAL ACUTE CARE HOSPITAL

## 2025-02-17 RX ORDER — SEMAGLUTIDE 1.34 MG/ML
INJECTION, SOLUTION SUBCUTANEOUS
Qty: 3 ML | Refills: 5 | Status: SHIPPED | OUTPATIENT
Start: 2025-02-17

## 2025-02-17 RX ORDER — GLIPIZIDE 10 MG/1
TABLET, FILM COATED, EXTENDED RELEASE ORAL
Qty: 180 TABLET | Refills: 3 | Status: SHIPPED | OUTPATIENT
Start: 2025-02-17

## 2025-02-17 RX ORDER — ACYCLOVIR 400 MG/1
TABLET ORAL
Qty: 1 EACH | Refills: 0 | Status: SHIPPED | OUTPATIENT
Start: 2025-02-17

## 2025-02-17 RX ORDER — INSULIN LISPRO 100 [IU]/ML
INJECTION, SOLUTION INTRAVENOUS; SUBCUTANEOUS
Qty: 45 ML | Refills: 11 | Status: SHIPPED | OUTPATIENT
Start: 2025-02-17

## 2025-02-17 RX ORDER — PEN NEEDLE, DIABETIC 32GX 5/32"
NEEDLE, DISPOSABLE MISCELLANEOUS
Qty: 500 EACH | Refills: 11 | Status: SHIPPED | OUTPATIENT
Start: 2025-02-17

## 2025-02-17 RX ORDER — ATORVASTATIN CALCIUM 40 MG/1
TABLET, FILM COATED ORAL
Qty: 90 TABLET | Refills: 3 | Status: SHIPPED | OUTPATIENT
Start: 2025-02-17

## 2025-02-17 RX ORDER — ACYCLOVIR 400 MG/1
TABLET ORAL
Qty: 9 EACH | Refills: 3 | Status: SHIPPED | OUTPATIENT
Start: 2025-02-17

## 2025-02-17 RX ORDER — METFORMIN HYDROCHLORIDE 500 MG/1
1000 TABLET, EXTENDED RELEASE ORAL 2 TIMES DAILY
Qty: 360 TABLET | Refills: 3 | Status: SHIPPED | OUTPATIENT
Start: 2025-02-17

## 2025-02-17 RX ORDER — INSULIN GLARGINE 100 [IU]/ML
INJECTION, SOLUTION SUBCUTANEOUS
Qty: 60 ML | Refills: 11 | Status: SHIPPED | OUTPATIENT
Start: 2025-02-17

## 2025-02-17 NOTE — PATIENT INSTRUCTIONS
can you care for yourself at home?  Learn your signs of low blood sugar. They are different for everyone. Some common early signs include:  Nausea.  Hunger.  Feeling nervous, irritable, or shaky.  Cold, clammy skin.  Sweating (when you're not exercising).  Use the \"rule of 15\" to treat low blood sugar. This includes eating 15 grams of carbohydrate from a quick-sugar food, such as 3 or 4 glucose tablets or ½ cup of juice. Wait 15 minutes and check your blood sugar. If it is still below 70 mg/dL, eat another 15 grams of carbohydrate. Repeat this every 15 minutes until your blood sugar is in a safe target range.  Once your blood sugar is in a safe range, eat a snack or meal to prevent recurrent low blood sugar.  Make sure family, friends, and coworkers know the symptoms of low blood sugar and know how to get your sugar level up.  If you were prescribed glucagon, always have it with you. Make sure friends and family know how to use it.  When should you call for help?     Call 911 anytime you think you may need emergency care. For example, call if:    You passed out (lost consciousness).     You are confused or cannot think clearly.     Your blood sugar is very high or very low.     Watch closely for changes in your health, and be sure to contact your doctor if:    Your blood sugar stays outside the level your doctor set for you.     You have any problems.       Before you drive:   -Check your blood glucose before driving.   -If it's low, treat the hypoglycemia and wait until you're at a safe level before driving.   -Keep fast-acting carbohydrates such as glucose tablets or a juice box and extra snacks in the car.   -If you start feeling low while you're driving, pull over safely and check your blood glucose. Checking your blood glucose or treating a high or low reading should not be done while driving.    -If low, don't begin driving again until you have treated it and your blood glucose is back to a safe level.  -In

## 2025-02-17 NOTE — PROGRESS NOTES
know how to use it.  When should you call for help?     Call 911 anytime you think you may need emergency care. For example, call if:    You passed out (lost consciousness).     You are confused or cannot think clearly.     Your blood sugar is very high or very low.     Watch closely for changes in your health, and be sure to contact your doctor if:    Your blood sugar stays outside the level your doctor set for you.     You have any problems.       Before you drive:   -Check your blood glucose before driving.   -If it's low, treat the hypoglycemia and wait until you're at a safe level before driving.   -Keep fast-acting carbohydrates such as glucose tablets or a juice box and extra snacks in the car.   -If you start feeling low while you're driving, pull over safely and check your blood glucose. Checking your blood glucose or treating a high or low reading should not be done while driving.    -If low, don't begin driving again until you have treated it and your blood glucose is back to a safe level.  -In case of an emergency, having extra diabetes supplies such as a spare meter, test strips, and pump supplies in the car is a smart move.   -If you have one, wear your diabetes medical ID or have your Diabetes Alert Card in your wallet. These can help first responders treat you more quickly.                For more food/recipe information:    American Diabetes Association website: https://www.diabetesfoodhub.org  DiaTribe : https://diatribe.org/diabetes-recipes

## 2025-02-25 ENCOUNTER — TELEPHONE (OUTPATIENT)
Facility: CLINIC | Age: 50
End: 2025-02-25

## 2025-02-25 NOTE — TELEPHONE ENCOUNTER
----- Message from Arely SHELDON sent at 2/25/2025 12:23 PM EST -----  Regarding: E[facility] Appointment Request  E[facility] Appointment Request    Patient needs appointment for ECC Appointment Type: Existing Condition Follow Up.  medication refill with lab work     Patient Requested Dates(s): February 28, 2025 or February 27th in the afternoon  Patient Requested Time: Morning appointment  Provider Name: Mary Rodas MD    Reason for Appointment Request: Established Patient - Available appointments did not meet patient need  --------------------------------------------------------------------------------------------------------------------------    Relationship to Patient: Self     Call Back Information: OK to leave message on [employer]  Preferred Call Back Number: Phone [phone]    Patient also wants to know if she needs to do the lab work before her appointment.

## 2025-02-28 ENCOUNTER — PATIENT MESSAGE (OUTPATIENT)
Age: 50
End: 2025-02-28

## 2025-02-28 ENCOUNTER — OFFICE VISIT (OUTPATIENT)
Facility: CLINIC | Age: 50
End: 2025-02-28
Payer: MEDICARE

## 2025-02-28 VITALS
WEIGHT: 282.4 LBS | SYSTOLIC BLOOD PRESSURE: 132 MMHG | HEIGHT: 64 IN | OXYGEN SATURATION: 99 % | BODY MASS INDEX: 48.21 KG/M2 | HEART RATE: 87 BPM | RESPIRATION RATE: 16 BRPM | DIASTOLIC BLOOD PRESSURE: 80 MMHG | TEMPERATURE: 98.1 F

## 2025-02-28 DIAGNOSIS — F33.1 MAJOR DEPRESSIVE DISORDER, RECURRENT, MODERATE (HCC): ICD-10-CM

## 2025-02-28 DIAGNOSIS — M06.00 SERONEGATIVE RHEUMATOID ARTHRITIS (HCC): Primary | ICD-10-CM

## 2025-02-28 PROCEDURE — 1036F TOBACCO NON-USER: CPT | Performed by: INTERNAL MEDICINE

## 2025-02-28 PROCEDURE — G8417 CALC BMI ABV UP PARAM F/U: HCPCS | Performed by: INTERNAL MEDICINE

## 2025-02-28 PROCEDURE — 99213 OFFICE O/P EST LOW 20 MIN: CPT | Performed by: INTERNAL MEDICINE

## 2025-02-28 PROCEDURE — 3075F SYST BP GE 130 - 139MM HG: CPT | Performed by: INTERNAL MEDICINE

## 2025-02-28 PROCEDURE — G8427 DOCREV CUR MEDS BY ELIG CLIN: HCPCS | Performed by: INTERNAL MEDICINE

## 2025-02-28 PROCEDURE — 3017F COLORECTAL CA SCREEN DOC REV: CPT | Performed by: INTERNAL MEDICINE

## 2025-02-28 PROCEDURE — 3079F DIAST BP 80-89 MM HG: CPT | Performed by: INTERNAL MEDICINE

## 2025-02-28 RX ORDER — SEMAGLUTIDE 1.34 MG/ML
INJECTION, SOLUTION SUBCUTANEOUS
Qty: 9 ML | Refills: 1 | Status: SHIPPED | OUTPATIENT
Start: 2025-02-28

## 2025-02-28 RX ORDER — ACYCLOVIR 400 MG/1
TABLET ORAL
Qty: 9 EACH | Refills: 3 | Status: SHIPPED | OUTPATIENT
Start: 2025-02-28

## 2025-02-28 RX ORDER — HYDROCODONE BITARTRATE AND ACETAMINOPHEN 10; 325 MG/1; MG/1
1 TABLET ORAL EVERY 6 HOURS PRN
Qty: 28 TABLET | Refills: 0 | Status: SHIPPED | OUTPATIENT
Start: 2025-02-28 | End: 2025-03-07

## 2025-02-28 SDOH — ECONOMIC STABILITY: FOOD INSECURITY: WITHIN THE PAST 12 MONTHS, YOU WORRIED THAT YOUR FOOD WOULD RUN OUT BEFORE YOU GOT MONEY TO BUY MORE.: NEVER TRUE

## 2025-02-28 SDOH — ECONOMIC STABILITY: FOOD INSECURITY: WITHIN THE PAST 12 MONTHS, THE FOOD YOU BOUGHT JUST DIDN'T LAST AND YOU DIDN'T HAVE MONEY TO GET MORE.: NEVER TRUE

## 2025-02-28 ASSESSMENT — PATIENT HEALTH QUESTIONNAIRE - PHQ9
1. LITTLE INTEREST OR PLEASURE IN DOING THINGS: NEARLY EVERY DAY
5. POOR APPETITE OR OVEREATING: NOT AT ALL
2. FEELING DOWN, DEPRESSED OR HOPELESS: NEARLY EVERY DAY
SUM OF ALL RESPONSES TO PHQ QUESTIONS 1-9: 15
10. IF YOU CHECKED OFF ANY PROBLEMS, HOW DIFFICULT HAVE THESE PROBLEMS MADE IT FOR YOU TO DO YOUR WORK, TAKE CARE OF THINGS AT HOME, OR GET ALONG WITH OTHER PEOPLE: SOMEWHAT DIFFICULT
3. TROUBLE FALLING OR STAYING ASLEEP: NEARLY EVERY DAY
9. THOUGHTS THAT YOU WOULD BE BETTER OFF DEAD, OR OF HURTING YOURSELF: NOT AT ALL
SUM OF ALL RESPONSES TO PHQ QUESTIONS 1-9: 15
SUM OF ALL RESPONSES TO PHQ QUESTIONS 1-9: 15
SUM OF ALL RESPONSES TO PHQ9 QUESTIONS 1 & 2: 6
7. TROUBLE CONCENTRATING ON THINGS, SUCH AS READING THE NEWSPAPER OR WATCHING TELEVISION: NEARLY EVERY DAY
SUM OF ALL RESPONSES TO PHQ QUESTIONS 1-9: 15
4. FEELING TIRED OR HAVING LITTLE ENERGY: NEARLY EVERY DAY
6. FEELING BAD ABOUT YOURSELF - OR THAT YOU ARE A FAILURE OR HAVE LET YOURSELF OR YOUR FAMILY DOWN: NOT AT ALL
8. MOVING OR SPEAKING SO SLOWLY THAT OTHER PEOPLE COULD HAVE NOTICED. OR THE OPPOSITE, BEING SO FIGETY OR RESTLESS THAT YOU HAVE BEEN MOVING AROUND A LOT MORE THAN USUAL: NOT AT ALL

## 2025-02-28 NOTE — PROGRESS NOTES
Radha Caban  Identified pt with two pt identifiers(name and ).  Chief Complaint   Patient presents with    Pain       1. Have you been to the ER, urgent care clinic since your last visit?  Hospitalized since your last visit? NO    2. Have you seen or consulted any other health care providers outside of the [facility] since your last visit?  Include any pap smears or colon screening. NO      Provider notified of reason for visit, vitals and flowsheets obtained on patients.     Patient received paperwork for advance directive during previous visit but has not completed at this time     Reviewed record In preparation for visit, huddled with provider and have obtained necessary documentation      Health Maintenance Due   Topic    Hepatitis B vaccine (1 of 3 - 19+ 3-dose series)    Pneumococcal 50+ years Vaccine (1 of 2 - PCV)    Shingles vaccine (1 of 2)    Diabetic foot exam     Diabetic Alb to Cr ratio (uACR) test     Lipids     Annual Wellness Visit (Medicare Advantage)        Wt Readings from Last 3 Encounters:   25 127.4 kg (280 lb 12.8 oz)   24 126.1 kg (278 lb)   24 118.8 kg (262 lb)     Temp Readings from Last 3 Encounters:   24 98.7 °F (37.1 °C) (Oral)   24 98 °F (36.7 °C)   24 97.7 °F (36.5 °C)     BP Readings from Last 3 Encounters:   25 (!) 141/50   24 138/82   24 105/66     Pulse Readings from Last 3 Encounters:   25 68   24 84   24 98          No data to display                  Learning Assessment:  :         1/3/2024     8:10 AM   [facility] AMB LEARNING ASSESSMENT   Primary Learner Patient   level of education > 4 YEARS OF COLLEGE   Primary Language ENGLISH   Learning Preference DEMONSTRATION   Answered By Patient   Relationship to Learner SELF       Fall Risk Assessment:  :         11/15/2022     7:22 AM 2022    10:16 AM 2021    10:57 AM 2021     8:24 AM 2021     3:01 PM 2021    11:36 AM 
capsule TAKE 1 CAPSULE IN THE MORNING AND AT BEDTIME 180 capsule 0    valsartan (DIOVAN) 80 MG tablet TAKE 1 TABLET EVERY DAY HIGH BLOOD PRESSURE 90 tablet 3    montelukast (SINGULAIR) 10 MG tablet TAKE 1 TABLET ONE TIME DAILY FOR ALLERGIES AND ASTHMA 90 tablet 3    amitriptyline (ELAVIL) 50 MG tablet Take 3 tablets by mouth nightly 270 tablet 1    pregabalin (LYRICA) 300 MG capsule Take 1 capsule by mouth 2 times daily for 90 days. Max Daily Amount: 600 mg 180 capsule 0    meclizine (ANTIVERT) 25 MG tablet Take 1 tablet by mouth 3 times daily as needed for Dizziness 270 tablet 1    albuterol (PROVENTIL) (2.5 MG/3ML) 0.083% nebulizer solution USE ONE VIAL IN NEBULIZER EVERY 4 HOURS AS NEEDED FOR WHEEZING. Dx: J45.40 120 each 3    ibuprofen (ADVIL;MOTRIN) 800 MG tablet Take 1 tablet by mouth 2 times daily as needed (headache) Take with meals. 180 tablet 1    buPROPion (WELLBUTRIN SR) 150 MG extended release tablet Take 1 tablet by mouth 2 times daily 180 tablet 3    albuterol sulfate HFA (PROVENTIL;VENTOLIN;PROAIR) 108 (90 Base) MCG/ACT inhaler Inhale 2 puffs into the lungs every 6 hours as needed for Shortness of Breath or Wheezing 18 g 3    metoclopramide (REGLAN) 10 MG tablet Take 1 tablet by mouth 10 mg once daily      SUMAtriptan (IMITREX) 50 MG tablet Take 1-2 tablets by mouth at onset of headache. May repeat 1 tablet in 2 hrs if no headache relief. Maximum: 3 tablets daily. 9 tablet 1    butalbital-acetaminophen-caffeine (FIORICET, ESGIC) -40 MG per tablet Take 1 tablet by mouth 2 times daily as needed for Headaches 30 tablet 1    ondansetron (ZOFRAN-ODT) 4 MG disintegrating tablet DISSOLVE 1 TABLET IN MOUTH EVERY 8 HOURS AS NEEDED FOR NAUSEA 20 tablet 3    nitroGLYCERIN (NITROSTAT) 0.4 MG SL tablet Place 1 tablet under the tongue every 5 minutes as needed for Chest pain 25 tablet 3    acetaminophen (TYLENOL) 500 MG tablet Take 2 tablets by mouth every 6 hours as needed      dicyclomine (BENTYL) 10

## 2025-03-04 ENCOUNTER — TELEPHONE (OUTPATIENT)
Facility: CLINIC | Age: 50
End: 2025-03-04

## 2025-03-11 NOTE — PROGRESS NOTES
Nml    Right Deltoid Axillary C5-6 Nml Nml Nml Nml Nml Nml Nml    Right Triceps Radial C6-7-8 Nml Nml Nml Nml Nml Nml Nml    Right PronatorTeres Median C6-7 Nml Nml Nml Nml Nml Nml Nml    Right 1stDorInt Ulnar C8-T1 Nml Nml Nml Nml Nml Nml Nml    Right Abd Poll Brev Median C8-T1 Nml Nml Nml Nml Nml Nml Nml            Waveforms:                                      
yes

## 2025-03-21 ENCOUNTER — TELEPHONE (OUTPATIENT)
Age: 50
End: 2025-03-21

## 2025-03-23 DIAGNOSIS — K21.9 GASTROESOPHAGEAL REFLUX DISEASE WITHOUT ESOPHAGITIS: ICD-10-CM

## 2025-03-23 DIAGNOSIS — G44.52 NEW DAILY PERSISTENT HEADACHE: ICD-10-CM

## 2025-03-23 DIAGNOSIS — R42 VERTIGO: ICD-10-CM

## 2025-03-24 DIAGNOSIS — Z79.4 TYPE 2 DIABETES MELLITUS WITH DIABETIC POLYNEUROPATHY, WITH LONG-TERM CURRENT USE OF INSULIN (HCC): ICD-10-CM

## 2025-03-24 DIAGNOSIS — E11.42 TYPE 2 DIABETES MELLITUS WITH DIABETIC POLYNEUROPATHY, WITH LONG-TERM CURRENT USE OF INSULIN (HCC): ICD-10-CM

## 2025-03-24 DIAGNOSIS — F33.1 MODERATE EPISODE OF RECURRENT MAJOR DEPRESSIVE DISORDER (HCC): ICD-10-CM

## 2025-03-24 RX ORDER — MECLIZINE HYDROCHLORIDE 25 MG/1
TABLET ORAL
Qty: 270 TABLET | Refills: 3 | Status: SHIPPED | OUTPATIENT
Start: 2025-03-24

## 2025-03-24 RX ORDER — OMEPRAZOLE 40 MG/1
CAPSULE, DELAYED RELEASE ORAL
Qty: 180 CAPSULE | Refills: 3 | Status: SHIPPED | OUTPATIENT
Start: 2025-03-24

## 2025-03-24 RX ORDER — PREGABALIN 300 MG/1
300 CAPSULE ORAL DAILY
Qty: 180 CAPSULE | Refills: 0 | Status: SHIPPED | OUTPATIENT
Start: 2025-03-24 | End: 2025-06-22

## 2025-03-24 RX ORDER — BUPROPION HYDROCHLORIDE 150 MG/1
150 TABLET, EXTENDED RELEASE ORAL 2 TIMES DAILY
Qty: 180 TABLET | Refills: 3 | Status: SHIPPED | OUTPATIENT
Start: 2025-03-24

## 2025-03-24 RX ORDER — IBUPROFEN 800 MG/1
TABLET ORAL
Qty: 180 TABLET | Refills: 0 | Status: SHIPPED | OUTPATIENT
Start: 2025-03-24

## 2025-03-24 NOTE — TELEPHONE ENCOUNTER
PCP: Mary Rodas MD     Last appt:  2/28/2025      Future Appointments   Date Time Provider Department Center   5/21/2025 11:10 AM Leif Mohan MD RDE [facility] BS AMB   9/2/2025  9:20 AM Amy Yancey MD [facility] BS AMB          Requested Prescriptions     Pending Prescriptions Disp Refills    buPROPion (WELLBUTRIN SR) 150 MG extended release tablet 180 tablet 3     Sig: Take 1 tablet by mouth 2 times daily

## 2025-03-24 NOTE — TELEPHONE ENCOUNTER
PCP: Mary Rodas MD     Last appt:  2/28/2025      Future Appointments   Date Time Provider Department Center   5/21/2025 11:10 AM [facility]   9/2/2025  9:20 AM Amy Yancey MD [facility] BS AMB          Requested Prescriptions     Pending Prescriptions Disp Refills    pregabalin (LYRICA) 300 MG capsule [Pharmacy Med Name: Pregabalin Oral Capsule 300 MG] 180 capsule      Sig: TAKE 1 CAPSULE TWICE DAILY. MAX DAILY AMOUNT: 600 MG

## 2025-04-18 ENCOUNTER — TELEMEDICINE (OUTPATIENT)
Facility: CLINIC | Age: 50
End: 2025-04-18
Payer: MEDICARE

## 2025-04-18 DIAGNOSIS — B96.89 ACUTE BACTERIAL SINUSITIS: Primary | ICD-10-CM

## 2025-04-18 DIAGNOSIS — J01.90 ACUTE BACTERIAL SINUSITIS: Primary | ICD-10-CM

## 2025-04-18 DIAGNOSIS — H10.33 ACUTE CONJUNCTIVITIS OF BOTH EYES, UNSPECIFIED ACUTE CONJUNCTIVITIS TYPE: ICD-10-CM

## 2025-04-18 PROCEDURE — G8427 DOCREV CUR MEDS BY ELIG CLIN: HCPCS | Performed by: INTERNAL MEDICINE

## 2025-04-18 PROCEDURE — 99213 OFFICE O/P EST LOW 20 MIN: CPT | Performed by: INTERNAL MEDICINE

## 2025-04-18 PROCEDURE — 3017F COLORECTAL CA SCREEN DOC REV: CPT | Performed by: INTERNAL MEDICINE

## 2025-04-18 RX ORDER — POLYMYXIN B SULFATE AND TRIMETHOPRIM 1; 10000 MG/ML; [USP'U]/ML
1 SOLUTION OPHTHALMIC EVERY 4 HOURS
Qty: 3 ML | Refills: 0 | Status: SHIPPED | OUTPATIENT
Start: 2025-04-18 | End: 2025-04-28

## 2025-04-18 RX ORDER — DOXYCYCLINE HYCLATE 100 MG
100 TABLET ORAL 2 TIMES DAILY
Qty: 14 TABLET | Refills: 0 | Status: SHIPPED | OUTPATIENT
Start: 2025-04-18 | End: 2025-04-25

## 2025-04-18 NOTE — PROGRESS NOTES
2025    TELEHEALTH EVALUATION -- Audio/Visual    HPI:    Radha Caban (:  1975) has requested an audio/video evaluation for the following concern(s):    Complains of HA, drainage from eyes, crusted in the morning, no redness. Vision is okay. Pressure in forehead, no nasal congestion. No contacts.   No sore throat or runny nose. +itching. Has been taking allegra, not helping.   Eye allergy relief. No f/c or cough. Was having chills last night.     Review of Systems  Per HPI  Prior to Visit Medications    Medication Sig Taking? Authorizing Provider   doxycycline hyclate (VIBRA-TABS) 100 MG tablet Take 1 tablet by mouth 2 times daily for 7 days Yes Antoinette Segovia MD   trimethoprim-polymyxin b (POLYTRIM) 60701-5.1 UNIT/ML-% ophthalmic solution Place 1 drop into both eyes every 4 hours for 10 days Yes Antoinette Segovia MD   omeprazole (PRILOSEC) 40 MG delayed release capsule TAKE 1 CAPSULE IN THE MORNING AND AT BEDTIME Yes Mary Rodas MD   ibuprofen (IBU) 800 MG tablet TAKE 1 TABLET TWICE DAILY WITH MEALS AS NEEDED FOR HEADACHE Yes Mary Rodas MD   meclizine (ANTIVERT) 25 MG tablet TAKE 1 TABLET THREE TIMES DAILY AS NEEDED FOR DIZZINESS Yes Mary Rodas MD   pregabalin (LYRICA) 300 MG capsule Take 1 capsule by mouth daily for 90 days. Max Daily Amount: 300 mg Yes Mary Rodas MD   buPROPion (WELLBUTRIN SR) 150 MG extended release tablet Take 1 tablet by mouth 2 times daily Yes Mary Rodas MD   Continuous Glucose Sensor (DEXCOM G7 SENSOR) MISC Used to measure blood sugar 4 to 5 times per day, E11.65 Yes Leif Mohan MD   glipiZIDE (GLUCOTROL XL) 10 MG extended release tablet TAKE 2 TABLETS EVERY MORNING Yes Leif Mohan MD   HUMALOG KWIKPEN 100 UNIT/ML SOPN Take 14 units before meals and correction scale, max dose of 60 units per day Yes Leif Mohan MD   LANTUS SOLOSTAR 100 UNIT/ML injection pen Take 70 units at bedtime Yes Leif Mohan MD   metFORMIN (GLUCOPHAGE-XR) 500 MG  Negative

## 2025-05-01 DIAGNOSIS — E11.42 TYPE 2 DIABETES MELLITUS WITH DIABETIC POLYNEUROPATHY, WITH LONG-TERM CURRENT USE OF INSULIN (HCC): ICD-10-CM

## 2025-05-01 DIAGNOSIS — Z79.4 TYPE 2 DIABETES MELLITUS WITH DIABETIC POLYNEUROPATHY, WITH LONG-TERM CURRENT USE OF INSULIN (HCC): ICD-10-CM

## 2025-05-02 DIAGNOSIS — E11.42 TYPE 2 DIABETES MELLITUS WITH DIABETIC POLYNEUROPATHY, WITH LONG-TERM CURRENT USE OF INSULIN (HCC): Primary | ICD-10-CM

## 2025-05-02 DIAGNOSIS — Z79.4 TYPE 2 DIABETES MELLITUS WITH DIABETIC POLYNEUROPATHY, WITH LONG-TERM CURRENT USE OF INSULIN (HCC): Primary | ICD-10-CM

## 2025-05-02 DIAGNOSIS — E78.2 MIXED HYPERLIPIDEMIA: ICD-10-CM

## 2025-05-02 RX ORDER — ATORVASTATIN CALCIUM 40 MG/1
TABLET, FILM COATED ORAL
Qty: 90 TABLET | Refills: 3 | Status: SHIPPED | OUTPATIENT
Start: 2025-05-02

## 2025-05-02 RX ORDER — METFORMIN HYDROCHLORIDE 500 MG/1
1000 TABLET, FILM COATED, EXTENDED RELEASE ORAL 2 TIMES DAILY WITH MEALS
Qty: 360 TABLET | Refills: 3 | Status: SHIPPED | OUTPATIENT
Start: 2025-05-02

## 2025-05-05 DIAGNOSIS — E11.42 TYPE 2 DIABETES MELLITUS WITH DIABETIC POLYNEUROPATHY, WITH LONG-TERM CURRENT USE OF INSULIN (HCC): ICD-10-CM

## 2025-05-05 DIAGNOSIS — Z79.4 TYPE 2 DIABETES MELLITUS WITH DIABETIC POLYNEUROPATHY, WITH LONG-TERM CURRENT USE OF INSULIN (HCC): ICD-10-CM

## 2025-05-05 RX ORDER — AMITRIPTYLINE HYDROCHLORIDE 50 MG/1
TABLET ORAL
Qty: 270 TABLET | Refills: 3 | Status: SHIPPED | OUTPATIENT
Start: 2025-05-05

## 2025-05-05 NOTE — TELEPHONE ENCOUNTER
Future Appointments:  Future Appointments   Date Time Provider Department Center   5/21/2025 11:10 AM Leif Mohan MD RDE [facility] BS AMB   9/2/2025  9:20 AM Amy Yancey MD [facility] BS AMB        Last Appointment With Me:  2/28/2025     Requested Prescriptions     Pending Prescriptions Disp Refills    amitriptyline (ELAVIL) 50 MG tablet [Pharmacy Med Name: Amitriptyline HCl Oral Tablet 50 MG] 270 tablet 3     Sig: TAKE 3 TABLETS EVERY NIGHT

## 2025-05-21 ENCOUNTER — OFFICE VISIT (OUTPATIENT)
Age: 50
End: 2025-05-21
Payer: MEDICARE

## 2025-05-21 VITALS
BODY MASS INDEX: 46.33 KG/M2 | HEIGHT: 64 IN | RESPIRATION RATE: 20 BRPM | OXYGEN SATURATION: 97 % | WEIGHT: 271.4 LBS | HEART RATE: 91 BPM

## 2025-05-21 DIAGNOSIS — M79.7 FIBROMYALGIA: ICD-10-CM

## 2025-05-21 DIAGNOSIS — Z79.4 TYPE 2 DIABETES MELLITUS WITH DIABETIC POLYNEUROPATHY, WITH LONG-TERM CURRENT USE OF INSULIN (HCC): Primary | ICD-10-CM

## 2025-05-21 DIAGNOSIS — E11.42 TYPE 2 DIABETES MELLITUS WITH DIABETIC POLYNEUROPATHY, WITH LONG-TERM CURRENT USE OF INSULIN (HCC): Primary | ICD-10-CM

## 2025-05-21 DIAGNOSIS — E11.65 TYPE 2 DIABETES MELLITUS WITH HYPERGLYCEMIA, WITH LONG-TERM CURRENT USE OF INSULIN (HCC): ICD-10-CM

## 2025-05-21 DIAGNOSIS — F32.A DEPRESSION, UNSPECIFIED DEPRESSION TYPE: ICD-10-CM

## 2025-05-21 DIAGNOSIS — Z79.4 TYPE 2 DIABETES MELLITUS WITH HYPERGLYCEMIA, WITH LONG-TERM CURRENT USE OF INSULIN (HCC): ICD-10-CM

## 2025-05-21 DIAGNOSIS — E66.01 MORBID OBESITY (HCC): ICD-10-CM

## 2025-05-21 DIAGNOSIS — E78.2 MIXED HYPERLIPIDEMIA: ICD-10-CM

## 2025-05-21 LAB — HBA1C MFR BLD: 8.5 %

## 2025-05-21 PROCEDURE — G8417 CALC BMI ABV UP PARAM F/U: HCPCS | Performed by: GENERAL ACUTE CARE HOSPITAL

## 2025-05-21 PROCEDURE — G8427 DOCREV CUR MEDS BY ELIG CLIN: HCPCS | Performed by: GENERAL ACUTE CARE HOSPITAL

## 2025-05-21 PROCEDURE — 83036 HEMOGLOBIN GLYCOSYLATED A1C: CPT | Performed by: GENERAL ACUTE CARE HOSPITAL

## 2025-05-21 PROCEDURE — 2022F DILAT RTA XM EVC RTNOPTHY: CPT | Performed by: GENERAL ACUTE CARE HOSPITAL

## 2025-05-21 PROCEDURE — 1036F TOBACCO NON-USER: CPT | Performed by: GENERAL ACUTE CARE HOSPITAL

## 2025-05-21 PROCEDURE — 99214 OFFICE O/P EST MOD 30 MIN: CPT | Performed by: GENERAL ACUTE CARE HOSPITAL

## 2025-05-21 PROCEDURE — 3017F COLORECTAL CA SCREEN DOC REV: CPT | Performed by: GENERAL ACUTE CARE HOSPITAL

## 2025-05-21 PROCEDURE — 3046F HEMOGLOBIN A1C LEVEL >9.0%: CPT | Performed by: GENERAL ACUTE CARE HOSPITAL

## 2025-05-21 PROCEDURE — G2211 COMPLEX E/M VISIT ADD ON: HCPCS | Performed by: GENERAL ACUTE CARE HOSPITAL

## 2025-05-21 PROCEDURE — 3052F HG A1C>EQUAL 8.0%<EQUAL 9.0%: CPT | Performed by: GENERAL ACUTE CARE HOSPITAL

## 2025-05-21 RX ORDER — ATORVASTATIN CALCIUM 40 MG/1
TABLET, FILM COATED ORAL
Qty: 90 TABLET | Refills: 3 | Status: SHIPPED | OUTPATIENT
Start: 2025-05-21

## 2025-05-21 RX ORDER — SEMAGLUTIDE 1.34 MG/ML
INJECTION, SOLUTION SUBCUTANEOUS
Qty: 3 ML | Refills: 11 | Status: ACTIVE | OUTPATIENT
Start: 2025-05-21

## 2025-05-21 RX ORDER — PEN NEEDLE, DIABETIC 32GX 5/32"
NEEDLE, DISPOSABLE MISCELLANEOUS
Qty: 500 EACH | Refills: 11 | Status: SHIPPED | OUTPATIENT
Start: 2025-05-21

## 2025-05-21 RX ORDER — METFORMIN HYDROCHLORIDE 500 MG/1
1000 TABLET, FILM COATED, EXTENDED RELEASE ORAL 2 TIMES DAILY WITH MEALS
Qty: 360 TABLET | Refills: 3 | Status: SHIPPED | OUTPATIENT
Start: 2025-05-21

## 2025-05-21 RX ORDER — GLIPIZIDE 10 MG/1
TABLET, FILM COATED, EXTENDED RELEASE ORAL
Qty: 180 TABLET | Refills: 3 | Status: SHIPPED | OUTPATIENT
Start: 2025-05-21

## 2025-05-21 RX ORDER — ACYCLOVIR 400 MG/1
TABLET ORAL
Qty: 9 EACH | Refills: 3 | Status: SHIPPED | OUTPATIENT
Start: 2025-05-21

## 2025-05-21 ASSESSMENT — PATIENT HEALTH QUESTIONNAIRE - PHQ9
SUM OF ALL RESPONSES TO PHQ QUESTIONS 1-9: 0
2. FEELING DOWN, DEPRESSED OR HOPELESS: NOT AT ALL
1. LITTLE INTEREST OR PLEASURE IN DOING THINGS: NOT AT ALL

## 2025-05-21 NOTE — PROGRESS NOTES
Identified pt with two pt identifiers(name and ). Reviewed record in preparation for visit and have obtained necessary documentation. All patient medications has been reviewed.  Chief Complaint   Patient presents with    Diabetes     Patient is concerned that her numbers are still coming up as high.       Wt Readings from Last 3 Encounters:   25 123.1 kg (271 lb 6.4 oz)   25 128.1 kg (282 lb 6.4 oz)   25 127.4 kg (280 lb 12.8 oz)     Temp Readings from Last 3 Encounters:   25 98.1 °F (36.7 °C) (Oral)   24 98.7 °F (37.1 °C) (Oral)   24 98 °F (36.7 °C)     BP Readings from Last 3 Encounters:   25 132/80   25 (!) 141/50   24 138/82     Pulse Readings from Last 3 Encounters:   25 91   25 87   25 68       Have you been to the ER, urgent care clinic since your last visit?  Hospitalized since your last visit?   NO    Have you seen or consulted any other health care providers outside our system since your last visit?   NO

## 2025-05-21 NOTE — PATIENT INSTRUCTIONS
PLAN FOR TODAY    We will plan to make the following changes to your diabetes medications:    Metformin 1000mg twice daily  Glipizide SR 10 mg take 2 tabs at the same time every morning  Ozempic 1mg weekly and after 4 weeks take 0.5mg weekly  Lantus 74 units at bedtime  Humalog 14 units before meals + Correction scale ( for breakfast and dinner if you are to eat larger carb portion than usual take an addition 1-2 units before that meal   Correction Scale:  1 unit for every 25 above 150    IF GLUCOSE IS:                 THEN TAKE:      0   Extra Unit  151-175   1   Extra Unit  176-200   2   Extra Units  201-225   3   Extra Units  226-250   4   Extra Units  251-275   5   Extra Units  276-300   6   Extra Units  301-325   7   Extra Units    Example:   My planned insulin dose:    ____ Units    +    ____ Extra Correction Units  =  ____ total units to take together as one injection.      It will be important to continue checking your glucose just as you did previously.   I would like you at the very least to check you glucose during:    AM fasting before breakfast  Before Lunch  Before Dinner  Any other time that you are not feeling well.     Always provide a glucose log that is completed at every visit so that we can review the results of your home glucose together. Without this, it is not possible to make accurate changes to your diabetes regimen.    Leif Mohan MD  [facility]       Hypoglycemia:    Hypoglycemia means that your blood sugar is low and your body is not getting enough fuel. Some people get low blood sugar from not eating often enough. Some medicines to treat diabetes can cause low blood sugar. People who have had surgery on their stomachs or intestines may get hypoglycemia. Problems with the pancreas, kidneys, or liver also can cause low blood sugar.  A snack or drink with sugar in it will raise your blood sugar and should ease your symptoms right away.  How can you care for

## 2025-05-21 NOTE — PROGRESS NOTES
[facility]  DR CHRISTIAN LEIJA       The patient (or guardian, if applicable) and other individuals in attendance with the patient were advised that Artificial Intelligence will be utilized during this visit to record, process the conversation to generate a clinical note, and support improvement of the AI technology. The patient (or guardian, if applicable) and other individuals in attendance at the appointment consented to the use of AI, including the recording.        ASSESSMENT AND PLAN:       Diabetes Mellitus 2, uncontrolled  Complications: Periph Neuropathy, FIELDS  Hypertension   Hyperlipidemia ;    - A1c levels have decreased from 11.5 to 8.6, indicating improved glycemic control  - Morning fasting blood glucose levels remain elevated at 230-238 mg/dL  - Discussion on the impact of pain and stress on blood sugar levels and the importance of maintaining physical activity and a healthy diet  - Increase Ozempic to 1 mg, discontinue 0.5 mg dose  - Increase Lantus to 74 units  - Maintain Humalog, glipizide, and metformin  - Prescriptions sent to pharmacy for 1 year      Medications:    Metformin 1000mg twice daily  Glipizide SR 10 mg take 2 tabs at the same time every morning  Ozempic 1mg weekly and after 4 weeks take 0.5mg weekly  Lantus 74 units at bedtime  Humalog 14 units before meals + Correction scale ( for breakfast and dinner if you are to eat larger carb portion than usual take an addition 1-2 units before that meal   Correction Scale:  1 unit for every 25 above 150    Continue to check glucose 4-6x/day with CGM  I am recommending a CMG system for this patient, and they meet the following criteria:   - Patient is diabetic and is on insulin   - Patient is either on a pump or daily insulin shots  - Patient makes frequent self-adjustments to their insulin regimen   - I recommend a CGM for this patient    Completed Diabetes Mellitus Educ classes  To keep her Ophthalm and Podiatry

## 2025-06-04 DIAGNOSIS — G44.52 NEW DAILY PERSISTENT HEADACHE: ICD-10-CM

## 2025-06-04 RX ORDER — IBUPROFEN 800 MG/1
TABLET, FILM COATED ORAL
Qty: 180 TABLET | Refills: 0 | Status: SHIPPED | OUTPATIENT
Start: 2025-06-04

## 2025-06-04 NOTE — TELEPHONE ENCOUNTER
PCP: Mary Rodas MD     Last appt:  4/18/2025      Future Appointments   Date Time Provider Department Center   9/2/2025  9:20 AM Amy Yancey MD [facility] AMB          Requested Prescriptions     Pending Prescriptions Disp Refills    ibuprofen (ADVIL;MOTRIN) 800 MG tablet [Pharmacy Med Name: Ibuprofen Oral Tablet 800 MG] 180 tablet 3     Sig: TAKE 1 TABLET TWICE DAILY WITH MEALS AS NEEDED FOR HEADACHE

## 2025-07-01 ENCOUNTER — PATIENT MESSAGE (OUTPATIENT)
Facility: CLINIC | Age: 50
End: 2025-07-01

## 2025-07-01 ENCOUNTER — OFFICE VISIT (OUTPATIENT)
Facility: CLINIC | Age: 50
End: 2025-07-01
Payer: MEDICARE

## 2025-07-01 VITALS
HEART RATE: 94 BPM | DIASTOLIC BLOOD PRESSURE: 80 MMHG | RESPIRATION RATE: 16 BRPM | HEIGHT: 64 IN | TEMPERATURE: 98.2 F | BODY MASS INDEX: 45.45 KG/M2 | OXYGEN SATURATION: 98 % | SYSTOLIC BLOOD PRESSURE: 138 MMHG | WEIGHT: 266.2 LBS

## 2025-07-01 DIAGNOSIS — M79.7 FIBROMYALGIA: ICD-10-CM

## 2025-07-01 DIAGNOSIS — R55 SYNCOPE, UNSPECIFIED SYNCOPE TYPE: Primary | ICD-10-CM

## 2025-07-01 DIAGNOSIS — M06.00 SERONEGATIVE RHEUMATOID ARTHRITIS (HCC): ICD-10-CM

## 2025-07-01 DIAGNOSIS — R42 LIGHTHEADEDNESS: ICD-10-CM

## 2025-07-01 DIAGNOSIS — R51.9 PERSISTENT HEADACHES: ICD-10-CM

## 2025-07-01 PROCEDURE — G8417 CALC BMI ABV UP PARAM F/U: HCPCS | Performed by: INTERNAL MEDICINE

## 2025-07-01 PROCEDURE — 3075F SYST BP GE 130 - 139MM HG: CPT | Performed by: INTERNAL MEDICINE

## 2025-07-01 PROCEDURE — 1036F TOBACCO NON-USER: CPT | Performed by: INTERNAL MEDICINE

## 2025-07-01 PROCEDURE — 99214 OFFICE O/P EST MOD 30 MIN: CPT | Performed by: INTERNAL MEDICINE

## 2025-07-01 PROCEDURE — 3017F COLORECTAL CA SCREEN DOC REV: CPT | Performed by: INTERNAL MEDICINE

## 2025-07-01 PROCEDURE — G8427 DOCREV CUR MEDS BY ELIG CLIN: HCPCS | Performed by: INTERNAL MEDICINE

## 2025-07-01 PROCEDURE — 3079F DIAST BP 80-89 MM HG: CPT | Performed by: INTERNAL MEDICINE

## 2025-07-01 RX ORDER — LEFLUNOMIDE 10 MG/1
10 TABLET ORAL DAILY
COMMUNITY
Start: 2025-06-03

## 2025-07-01 NOTE — PROGRESS NOTES
Chief Complaint   Patient presents with    Pain    Dizziness     Fainted twice X 2 weeks.      History of Present Illness  The patient presents for evaluation of fainting spells, headaches, and diabetes.  Accompanied by her , Parth (\"Big Kanchan\")    She has experienced episodes of lightheadedness, distinct from vertigo, leading to fainting twice, both while seated and ambulatory, without complete loss of consciousness. She reports daily lightheadedness. Her  notes that she has not been drinking water regularly. Her medication regimen has remained unchanged over the past month. Meclizine was ineffective for dizziness and discontinued.    She is under Dr. Bethea's care for rheumatoid arthritis, taking leflunomide for 2-3 months. He recommended she see a pain management specialist for suspected fibromyalgia, not yet scheduled. She takes cyclobenzaprine as needed, most recently last night, and ibuprofen 800 mg for arthritis pain, which is somewhat helpful but not for headaches.    She reports poor sleep quality despite increased amitriptyline dosage, taken at night.    She has had daily headaches for about 3 months, primarily at the temples. Sumatriptan was discontinued due to not helping and making her feel worse. She currently takes Fioricet for headaches.    She reports occasional chest pain and shortness of breath, without leg or ankle swelling, facial numbness or tingling, or unilateral weakness. She experiences constant numbness, tingling, and burning in her feet.    Her blood sugar levels are elevated, without fainting episodes related to this. She uses a continuous glucose monitor and has no endocrinologist since Dr. Mohan left the practice. Prescriptions renewed for a year by Dr. Mohan prior to departure.    No recent allergy issues, attributed to consistent montelukast use for asthma.    Patient Active Problem List   Diagnosis    Seronegative rheumatoid arthritis (HCC)    Major depressive

## 2025-07-01 NOTE — PATIENT INSTRUCTIONS
To get off amitriptyline:  1) Take 2 tablets a night for 5 days, then  2) Take 1 tablet a night for 5 days, then  3) Take 1/2 tablet a night for 5 days, then  4) Take 1/2 tablet every other night for 1 week,  Then stop.    Let me know if you have any problems stopping it.

## 2025-07-01 NOTE — PROGRESS NOTES
Radha Caban  Identified pt with two pt identifiers(name and ).  Chief Complaint   Patient presents with    Pain    Dizziness     Fainted twice X 2 weeks.        1. Have you been to the ER, urgent care clinic since your last visit?  Hospitalized since your last visit? NO    2. Have you seen or consulted any other health care providers outside of the [facility] since your last visit?  Include any pap smears or colon screening. NO      Provider notified of reason for visit, vitals and flowsheets obtained on patients.     Patient received paperwork for advance directive during previous visit but has not completed at this time     Reviewed record In preparation for visit, huddled with provider and have obtained necessary documentation      Health Maintenance Due   Topic    Pneumococcal 50+ years Vaccine (1 of 2 - PCV)    Diabetic foot exam     Diabetic Alb to Cr ratio (uACR) test     Lipids     Annual Wellness Visit (Medicare Advantage)     Breast cancer screen        Wt Readings from Last 3 Encounters:   25 120.7 kg (266 lb 3.2 oz)   25 123.1 kg (271 lb 6.4 oz)   25 128.1 kg (282 lb 6.4 oz)     Temp Readings from Last 3 Encounters:   25 98.2 °F (36.8 °C) (Temporal)   25 98.1 °F (36.7 °C) (Oral)   24 98.7 °F (37.1 °C) (Oral)     BP Readings from Last 3 Encounters:   25 138/80   25 132/80   25 (!) 141/50     Pulse Readings from Last 3 Encounters:   25 94   25 91   25 87          No data to display                  Learning Assessment:  :         1/3/2024     8:10 AM   [facility] AMB LEARNING ASSESSMENT   Primary Learner Patient   level of education > 4 YEARS OF COLLEGE   Primary Language ENGLISH   Learning Preference DEMONSTRATION   Answered By Patient   Relationship to Learner SELF       Fall Risk Assessment:  :         11/15/2022     7:22 AM 2022    10:16 AM 2021    10:57 AM 2021     8:24 AM 2021     3:01 PM

## 2025-07-10 ENCOUNTER — TELEMEDICINE ON DEMAND (OUTPATIENT)
Age: 50
End: 2025-07-10
Payer: MEDICARE

## 2025-07-10 DIAGNOSIS — R05.1 ACUTE COUGH: ICD-10-CM

## 2025-07-10 DIAGNOSIS — J45.21 EXACERBATION OF INTERMITTENT ASTHMA, UNSPECIFIED ASTHMA SEVERITY: Primary | ICD-10-CM

## 2025-07-10 PROCEDURE — 99213 OFFICE O/P EST LOW 20 MIN: CPT | Performed by: NURSE PRACTITIONER

## 2025-07-10 RX ORDER — BENZONATATE 200 MG/1
200 CAPSULE ORAL 3 TIMES DAILY PRN
Qty: 30 CAPSULE | Refills: 0 | Status: SHIPPED | OUTPATIENT
Start: 2025-07-10 | End: 2025-07-20

## 2025-07-10 RX ORDER — DOXYCYCLINE HYCLATE 100 MG
100 TABLET ORAL 2 TIMES DAILY
Qty: 14 TABLET | Refills: 0 | Status: SHIPPED | OUTPATIENT
Start: 2025-07-10 | End: 2025-07-17

## 2025-07-10 ASSESSMENT — ENCOUNTER SYMPTOMS
CHEST TIGHTNESS: 1
COUGH: 1
WHEEZING: 1
SHORTNESS OF BREATH: 1

## 2025-07-10 NOTE — PROGRESS NOTES
Radha Caban, was evaluated through a synchronous (real-time) audio-video encounter. The patient (or guardian if applicable) is aware that this is a billable service, which includes applicable co-pays. This Virtual Visit was conducted with patient's (and/or legal guardian's) consent. Patient identification was verified, and a caregiver was present when appropriate.   The patient was located at Home: [id number] [place] [id number]  Provider was located at Home (Appt Dept State): [place]  Confirm you are appropriately licensed, registered, or certified to deliver care in the state where the patient is located as indicated above. If you are not or unsure, please re-schedule the visit: Yes, I confirm.     Radha Caban (:  1975) is a Established patient, presenting virtually for evaluation of the following:    X10 days, barking cough, SOB, chest heaviness, albuterol nebulizer is not helping. Seen in ED Monday, but was not discharged with any medication.       Below is the assessment and plan developed based on review of pertinent history, physical exam, labs, studies, and medications.    Assessment & Plan  Exacerbation of intermittent asthma, unspecified asthma severity   Problem    Orders:    doxycycline hyclate (VIBRA-TABS) 100 MG tablet; Take 1 tablet by mouth 2 times daily for 7 days    Continue nebulizers and inhalers as previously directed    Review patient instructions within AVS  Acute cough   Problem    Orders:    benzonatate (TESSALON) 200 MG capsule; Take 1 capsule by mouth 3 times daily as needed for Cough    Review patient instructions within AVS    Patient was instructed to follow-up with provider within 2 to 3 days if symptoms have not improved or if they have worsened    Subjective   This is a 50 year old patient of Dr. Rodas consenting to an on demand video visit.  Patient has complaints of:X10 days, barking cough, SOB, chest heaviness, albuterol nebulizer is not helping. Seen

## 2025-07-23 ENCOUNTER — HOSPITAL ENCOUNTER (EMERGENCY)
Facility: HOSPITAL | Age: 50
Discharge: HOME | End: 2025-07-23
Attending: EMERGENCY MEDICINE
Payer: MEDICARE

## 2025-07-23 ENCOUNTER — APPOINTMENT (OUTPATIENT)
Facility: HOSPITAL | Age: 50
End: 2025-07-23
Payer: MEDICARE

## 2025-07-23 ENCOUNTER — TELEPHONE (OUTPATIENT)
Facility: CLINIC | Age: 50
End: 2025-07-23

## 2025-07-23 VITALS
DIASTOLIC BLOOD PRESSURE: 78 MMHG | SYSTOLIC BLOOD PRESSURE: 169 MMHG | RESPIRATION RATE: 17 BRPM | WEIGHT: 265 LBS | OXYGEN SATURATION: 99 % | TEMPERATURE: 98.8 F | HEART RATE: 94 BPM | BODY MASS INDEX: 45.49 KG/M2

## 2025-07-23 DIAGNOSIS — J45.41 MODERATE PERSISTENT REACTIVE AIRWAY DISEASE WITH ACUTE EXACERBATION: ICD-10-CM

## 2025-07-23 DIAGNOSIS — E11.9 TYPE 2 DIABETES MELLITUS WITHOUT COMPLICATION, WITH LONG-TERM CURRENT USE OF INSULIN (HCC): ICD-10-CM

## 2025-07-23 DIAGNOSIS — Z79.4 TYPE 2 DIABETES MELLITUS WITHOUT COMPLICATION, WITH LONG-TERM CURRENT USE OF INSULIN (HCC): ICD-10-CM

## 2025-07-23 DIAGNOSIS — J06.9 UPPER RESPIRATORY TRACT INFECTION, UNSPECIFIED TYPE: Primary | ICD-10-CM

## 2025-07-23 LAB
ALBUMIN SERPL-MCNC: 3.3 G/DL (ref 3.5–5)
ALBUMIN/GLOB SERPL: 0.8 (ref 1.1–2.2)
ALP SERPL-CCNC: 116 U/L (ref 45–117)
ALT SERPL-CCNC: 75 U/L (ref 12–78)
ANION GAP SERPL CALC-SCNC: 6 MMOL/L (ref 2–12)
AST SERPL-CCNC: 73 U/L (ref 15–37)
BASOPHILS # BLD: 0.05 K/UL (ref 0–0.1)
BASOPHILS NFR BLD: 0.5 % (ref 0–1)
BILIRUB SERPL-MCNC: 0.3 MG/DL (ref 0.2–1)
BUN SERPL-MCNC: 8 MG/DL (ref 6–20)
BUN/CREAT SERPL: 9 (ref 12–20)
CALCIUM SERPL-MCNC: 9.5 MG/DL (ref 8.5–10.1)
CHLORIDE SERPL-SCNC: 105 MMOL/L (ref 97–108)
CO2 SERPL-SCNC: 27 MMOL/L (ref 21–32)
CREAT SERPL-MCNC: 0.93 MG/DL (ref 0.55–1.02)
DIFFERENTIAL METHOD BLD: ABNORMAL
EKG ATRIAL RATE: 91 BPM
EKG DIAGNOSIS: NORMAL
EKG P AXIS: 58 DEGREES
EKG P-R INTERVAL: 150 MS
EKG Q-T INTERVAL: 354 MS
EKG QRS DURATION: 78 MS
EKG QTC CALCULATION (BAZETT): 435 MS
EKG R AXIS: 45 DEGREES
EKG T AXIS: 18 DEGREES
EKG VENTRICULAR RATE: 91 BPM
EOSINOPHIL # BLD: 0.43 K/UL (ref 0–0.4)
EOSINOPHIL NFR BLD: 4.7 % (ref 0–7)
ERYTHROCYTE [DISTWIDTH] IN BLOOD BY AUTOMATED COUNT: 21.6 % (ref 11.5–14.5)
FLUAV RNA SPEC QL NAA+PROBE: NOT DETECTED
FLUBV RNA SPEC QL NAA+PROBE: NOT DETECTED
GLOBULIN SER CALC-MCNC: 4.1 G/DL (ref 2–4)
GLUCOSE SERPL-MCNC: 177 MG/DL (ref 65–100)
HCT VFR BLD AUTO: 34.8 % (ref 35–47)
HGB BLD-MCNC: 10.4 G/DL (ref 11.5–16)
IMM GRANULOCYTES # BLD AUTO: 0.05 K/UL (ref 0–0.04)
IMM GRANULOCYTES NFR BLD AUTO: 0.5 % (ref 0–0.5)
LYMPHOCYTES # BLD: 3.92 K/UL (ref 0.8–3.5)
LYMPHOCYTES NFR BLD: 42.6 % (ref 12–49)
MAGNESIUM SERPL-MCNC: 1.5 MG/DL (ref 1.6–2.4)
MCH RBC QN AUTO: 23.1 PG (ref 26–34)
MCHC RBC AUTO-ENTMCNC: 29.9 G/DL (ref 30–36.5)
MCV RBC AUTO: 77.3 FL (ref 80–99)
MONOCYTES # BLD: 0.71 K/UL (ref 0–1)
MONOCYTES NFR BLD: 7.7 % (ref 5–13)
NEUTS SEG # BLD: 4.05 K/UL (ref 1.8–8)
NEUTS SEG NFR BLD: 44 % (ref 32–75)
NRBC # BLD: 0 K/UL (ref 0–0.01)
NRBC BLD-RTO: 0 PER 100 WBC
PLATELET # BLD AUTO: 321 K/UL (ref 150–400)
PMV BLD AUTO: 11.5 FL (ref 8.9–12.9)
POTASSIUM SERPL-SCNC: 4.7 MMOL/L (ref 3.5–5.1)
PROT SERPL-MCNC: 7.4 G/DL (ref 6.4–8.2)
RBC # BLD AUTO: 4.5 M/UL (ref 3.8–5.2)
RBC MORPH BLD: ABNORMAL
SARS-COV-2 RNA RESP QL NAA+PROBE: NOT DETECTED
SODIUM SERPL-SCNC: 138 MMOL/L (ref 136–145)
SOURCE: NORMAL
TROPONIN I SERPL HS-MCNC: 10 NG/L (ref 0–51)
WBC # BLD AUTO: 9.2 K/UL (ref 3.6–11)

## 2025-07-23 PROCEDURE — 85025 COMPLETE CBC W/AUTO DIFF WBC: CPT

## 2025-07-23 PROCEDURE — 71045 X-RAY EXAM CHEST 1 VIEW: CPT

## 2025-07-23 PROCEDURE — 94640 AIRWAY INHALATION TREATMENT: CPT

## 2025-07-23 PROCEDURE — 80053 COMPREHEN METABOLIC PANEL: CPT

## 2025-07-23 PROCEDURE — 96374 THER/PROPH/DIAG INJ IV PUSH: CPT

## 2025-07-23 PROCEDURE — 6370000000 HC RX 637 (ALT 250 FOR IP): Performed by: EMERGENCY MEDICINE

## 2025-07-23 PROCEDURE — 36415 COLL VENOUS BLD VENIPUNCTURE: CPT

## 2025-07-23 PROCEDURE — 93005 ELECTROCARDIOGRAM TRACING: CPT | Performed by: EMERGENCY MEDICINE

## 2025-07-23 PROCEDURE — 87636 SARSCOV2 & INF A&B AMP PRB: CPT

## 2025-07-23 PROCEDURE — 83735 ASSAY OF MAGNESIUM: CPT

## 2025-07-23 PROCEDURE — 99285 EMERGENCY DEPT VISIT HI MDM: CPT

## 2025-07-23 PROCEDURE — 6360000002 HC RX W HCPCS: Performed by: EMERGENCY MEDICINE

## 2025-07-23 PROCEDURE — 84484 ASSAY OF TROPONIN QUANT: CPT

## 2025-07-23 RX ORDER — PREDNISONE 10 MG/1
TABLET ORAL
Qty: 33 TABLET | Refills: 0 | Status: SHIPPED | OUTPATIENT
Start: 2025-07-23 | End: 2025-07-31

## 2025-07-23 RX ORDER — IPRATROPIUM BROMIDE AND ALBUTEROL SULFATE 2.5; .5 MG/3ML; MG/3ML
1 SOLUTION RESPIRATORY (INHALATION)
Status: COMPLETED | OUTPATIENT
Start: 2025-07-23 | End: 2025-07-23

## 2025-07-23 RX ORDER — DEXAMETHASONE SODIUM PHOSPHATE 10 MG/ML
10 INJECTION, SOLUTION INTRAMUSCULAR; INTRAVENOUS ONCE
Status: COMPLETED | OUTPATIENT
Start: 2025-07-23 | End: 2025-07-23

## 2025-07-23 RX ORDER — AZITHROMYCIN 250 MG/1
TABLET, FILM COATED ORAL
Qty: 6 TABLET | Refills: 0 | Status: SHIPPED | OUTPATIENT
Start: 2025-07-23 | End: 2025-08-02

## 2025-07-23 RX ADMIN — IPRATROPIUM BROMIDE AND ALBUTEROL SULFATE 1 DOSE: .5; 3 SOLUTION RESPIRATORY (INHALATION) at 12:28

## 2025-07-23 RX ADMIN — DEXAMETHASONE SODIUM PHOSPHATE 10 MG: 10 INJECTION, SOLUTION INTRAMUSCULAR; INTRAVENOUS at 12:11

## 2025-07-23 RX ADMIN — IPRATROPIUM BROMIDE AND ALBUTEROL SULFATE 1 DOSE: .5; 3 SOLUTION RESPIRATORY (INHALATION) at 12:29

## 2025-07-23 RX ADMIN — IPRATROPIUM BROMIDE AND ALBUTEROL SULFATE 1 DOSE: .5; 3 SOLUTION RESPIRATORY (INHALATION) at 12:30

## 2025-07-23 ASSESSMENT — PAIN - FUNCTIONAL ASSESSMENT: PAIN_FUNCTIONAL_ASSESSMENT: NONE - DENIES PAIN

## 2025-07-23 NOTE — TELEPHONE ENCOUNTER
Patient transferred from ECC triage line with reports of Cough, Difficulty breathing, SOB.  Pt went to the ER X 2 weeks ago [facility], they did a chest Xray, tested for Flu/Covid and it was normal. Pt has been having fever 101 only at night.  Given the information that the pt has given me I've advised for her to go the ER to be re evaluated, advised pt to go to a [facility] location. Patient given an opportunity to ask questions, repeated information, and verbalized understanding.
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
## 2025-07-25 DIAGNOSIS — F33.1 MODERATE EPISODE OF RECURRENT MAJOR DEPRESSIVE DISORDER (HCC): ICD-10-CM

## 2025-07-25 RX ORDER — BUPROPION HYDROCHLORIDE 150 MG/1
150 TABLET, EXTENDED RELEASE ORAL 2 TIMES DAILY
Qty: 180 TABLET | Refills: 0 | Status: SHIPPED | OUTPATIENT
Start: 2025-07-25

## 2025-07-25 NOTE — TELEPHONE ENCOUNTER
PCP: Mary Rodas MD     Last appt: 7/1/2025    Future Appointments   Date Time Provider Department Center   9/2/2025  9:20 AM Amy Yancey MD [facility] BS [facility]   9/26/2025  8:40 AM LAB [facility] DEP   10/3/2025  2:00 PM Mary Rodas MD [facility] DEP          Requested Prescriptions     Pending Prescriptions Disp Refills    buPROPion (WELLBUTRIN SR) 150 MG extended release tablet 180 tablet 1     Sig: Take 1 tablet by mouth 2 times daily

## 2025-08-04 ENCOUNTER — APPOINTMENT (OUTPATIENT)
Facility: HOSPITAL | Age: 50
End: 2025-08-04
Payer: MEDICARE

## 2025-08-04 ENCOUNTER — HOSPITAL ENCOUNTER (EMERGENCY)
Facility: HOSPITAL | Age: 50
Discharge: HOME | End: 2025-08-04
Attending: STUDENT IN AN ORGANIZED HEALTH CARE EDUCATION/TRAINING PROGRAM
Payer: MEDICARE

## 2025-08-04 VITALS
HEART RATE: 96 BPM | BODY MASS INDEX: 46.81 KG/M2 | DIASTOLIC BLOOD PRESSURE: 84 MMHG | OXYGEN SATURATION: 99 % | TEMPERATURE: 97.9 F | RESPIRATION RATE: 18 BRPM | WEIGHT: 272.71 LBS | SYSTOLIC BLOOD PRESSURE: 126 MMHG

## 2025-08-04 DIAGNOSIS — J45.901 EXACERBATION OF ASTHMA, UNSPECIFIED ASTHMA SEVERITY, UNSPECIFIED WHETHER PERSISTENT: Primary | ICD-10-CM

## 2025-08-04 LAB
ALBUMIN SERPL-MCNC: 3.3 G/DL (ref 3.5–5.2)
ALBUMIN/GLOB SERPL: 0.8 (ref 1.1–2.2)
ALP SERPL-CCNC: 112 U/L (ref 35–104)
ALT SERPL-CCNC: 68 U/L (ref 10–35)
ANION GAP SERPL CALC-SCNC: 11 MMOL/L (ref 2–14)
AST SERPL-CCNC: 42 U/L (ref 10–35)
BASE EXCESS BLDV CALC-SCNC: 0.8 MMOL/L
BASOPHILS # BLD: 0.04 K/UL (ref 0–0.1)
BASOPHILS NFR BLD: 0.3 % (ref 0–1)
BILIRUB SERPL-MCNC: 0.4 MG/DL (ref 0–1.2)
BUN SERPL-MCNC: 9 MG/DL (ref 6–20)
BUN/CREAT SERPL: 10 (ref 12–20)
CALCIUM SERPL-MCNC: 9.6 MG/DL (ref 8.6–10)
CHLORIDE SERPL-SCNC: 100 MMOL/L (ref 98–107)
CO2 SERPL-SCNC: 24 MMOL/L (ref 20–29)
CREAT SERPL-MCNC: 0.86 MG/DL (ref 0.6–1)
DIFFERENTIAL METHOD BLD: ABNORMAL
EKG ATRIAL RATE: 81 BPM
EKG DIAGNOSIS: NORMAL
EKG P AXIS: 63 DEGREES
EKG P-R INTERVAL: 144 MS
EKG Q-T INTERVAL: 402 MS
EKG QRS DURATION: 72 MS
EKG QTC CALCULATION (BAZETT): 466 MS
EKG R AXIS: 55 DEGREES
EKG T AXIS: -33 DEGREES
EKG VENTRICULAR RATE: 81 BPM
EOSINOPHIL # BLD: 0.27 K/UL (ref 0–0.4)
EOSINOPHIL NFR BLD: 2.3 % (ref 0–7)
ERYTHROCYTE [DISTWIDTH] IN BLOOD BY AUTOMATED COUNT: 20.4 % (ref 11.5–14.5)
FLUAV RNA SPEC QL NAA+PROBE: NOT DETECTED
FLUBV RNA SPEC QL NAA+PROBE: NOT DETECTED
GLOBULIN SER CALC-MCNC: 4.3 G/DL (ref 2–4)
GLUCOSE SERPL-MCNC: 294 MG/DL (ref 65–100)
HCO3 BLDV-SCNC: 26.7 MMOL/L (ref 23–28)
HCT VFR BLD AUTO: 33.6 % (ref 35–47)
HGB BLD-MCNC: 10.4 G/DL (ref 11.5–16)
IMM GRANULOCYTES # BLD AUTO: 0.07 K/UL (ref 0–0.04)
IMM GRANULOCYTES NFR BLD AUTO: 0.6 % (ref 0–0.5)
LYMPHOCYTES # BLD: 4.09 K/UL (ref 0.8–3.5)
LYMPHOCYTES NFR BLD: 34.7 % (ref 12–49)
MAGNESIUM SERPL-MCNC: 1.8 MG/DL (ref 1.6–2.6)
MCH RBC QN AUTO: 23.5 PG (ref 26–34)
MCHC RBC AUTO-ENTMCNC: 31 G/DL (ref 30–36.5)
MCV RBC AUTO: 75.8 FL (ref 80–99)
MONOCYTES # BLD: 0.64 K/UL (ref 0–1)
MONOCYTES NFR BLD: 5.4 % (ref 5–13)
NEUTS SEG # BLD: 6.69 K/UL (ref 1.8–8)
NEUTS SEG NFR BLD: 56.7 % (ref 32–75)
NRBC # BLD: 0 K/UL (ref 0–0.01)
NRBC BLD-RTO: 0 PER 100 WBC
NT PRO BNP: <36 PG/ML (ref 0–125)
PCO2 BLDV: 47.4 MMHG (ref 41–51)
PH BLDV: 7.36 (ref 7.32–7.42)
PLATELET # BLD AUTO: 292 K/UL (ref 150–400)
PMV BLD AUTO: 11.2 FL (ref 8.9–12.9)
PO2 BLDV: <27 MMHG (ref 25–40)
POTASSIUM SERPL-SCNC: 4.4 MMOL/L (ref 3.5–5.1)
PROT SERPL-MCNC: 7.6 G/DL (ref 6.4–8.3)
RBC # BLD AUTO: 4.43 M/UL (ref 3.8–5.2)
RBC MORPH BLD: ABNORMAL
RBC MORPH BLD: ABNORMAL
SARS-COV-2 RNA RESP QL NAA+PROBE: NOT DETECTED
SERVICE CMNT-IMP: NORMAL
SODIUM SERPL-SCNC: 136 MMOL/L (ref 136–145)
SOURCE: NORMAL
SPECIMEN TYPE: NORMAL
TROPONIN T SERPL HS-MCNC: 13.8 NG/L (ref 0–14)
WBC # BLD AUTO: 11.8 K/UL (ref 3.6–11)

## 2025-08-04 PROCEDURE — 6360000002 HC RX W HCPCS: Performed by: STUDENT IN AN ORGANIZED HEALTH CARE EDUCATION/TRAINING PROGRAM

## 2025-08-04 PROCEDURE — 71045 X-RAY EXAM CHEST 1 VIEW: CPT

## 2025-08-04 PROCEDURE — 83880 ASSAY OF NATRIURETIC PEPTIDE: CPT

## 2025-08-04 PROCEDURE — 80053 COMPREHEN METABOLIC PANEL: CPT

## 2025-08-04 PROCEDURE — 96365 THER/PROPH/DIAG IV INF INIT: CPT

## 2025-08-04 PROCEDURE — 99285 EMERGENCY DEPT VISIT HI MDM: CPT

## 2025-08-04 PROCEDURE — 94640 AIRWAY INHALATION TREATMENT: CPT

## 2025-08-04 PROCEDURE — 36415 COLL VENOUS BLD VENIPUNCTURE: CPT

## 2025-08-04 PROCEDURE — 96375 TX/PRO/DX INJ NEW DRUG ADDON: CPT

## 2025-08-04 PROCEDURE — 96366 THER/PROPH/DIAG IV INF ADDON: CPT

## 2025-08-04 PROCEDURE — 87636 SARSCOV2 & INF A&B AMP PRB: CPT

## 2025-08-04 PROCEDURE — 84484 ASSAY OF TROPONIN QUANT: CPT

## 2025-08-04 PROCEDURE — 6370000000 HC RX 637 (ALT 250 FOR IP): Performed by: STUDENT IN AN ORGANIZED HEALTH CARE EDUCATION/TRAINING PROGRAM

## 2025-08-04 PROCEDURE — 93005 ELECTROCARDIOGRAM TRACING: CPT | Performed by: STUDENT IN AN ORGANIZED HEALTH CARE EDUCATION/TRAINING PROGRAM

## 2025-08-04 PROCEDURE — 71275 CT ANGIOGRAPHY CHEST: CPT

## 2025-08-04 PROCEDURE — 83735 ASSAY OF MAGNESIUM: CPT

## 2025-08-04 PROCEDURE — 2500000003 HC RX 250 WO HCPCS: Performed by: STUDENT IN AN ORGANIZED HEALTH CARE EDUCATION/TRAINING PROGRAM

## 2025-08-04 PROCEDURE — 82803 BLOOD GASES ANY COMBINATION: CPT

## 2025-08-04 PROCEDURE — 6360000004 HC RX CONTRAST MEDICATION: Performed by: STUDENT IN AN ORGANIZED HEALTH CARE EDUCATION/TRAINING PROGRAM

## 2025-08-04 PROCEDURE — 85025 COMPLETE CBC W/AUTO DIFF WBC: CPT

## 2025-08-04 RX ORDER — IPRATROPIUM BROMIDE AND ALBUTEROL SULFATE 2.5; .5 MG/3ML; MG/3ML
1 SOLUTION RESPIRATORY (INHALATION)
Status: COMPLETED | OUTPATIENT
Start: 2025-08-04 | End: 2025-08-04

## 2025-08-04 RX ORDER — PREDNISONE 10 MG/1
TABLET ORAL
Qty: 48 EACH | Refills: 0 | Status: SHIPPED | OUTPATIENT
Start: 2025-08-04

## 2025-08-04 RX ORDER — IOPAMIDOL 755 MG/ML
100 INJECTION, SOLUTION INTRAVASCULAR
Status: COMPLETED | OUTPATIENT
Start: 2025-08-04 | End: 2025-08-04

## 2025-08-04 RX ORDER — MAGNESIUM SULFATE IN WATER 40 MG/ML
2000 INJECTION, SOLUTION INTRAVENOUS
Status: COMPLETED | OUTPATIENT
Start: 2025-08-04 | End: 2025-08-04

## 2025-08-04 RX ADMIN — IPRATROPIUM BROMIDE AND ALBUTEROL SULFATE 1 DOSE: .5; 3 SOLUTION RESPIRATORY (INHALATION) at 11:36

## 2025-08-04 RX ADMIN — IPRATROPIUM BROMIDE AND ALBUTEROL SULFATE 1 DOSE: .5; 3 SOLUTION RESPIRATORY (INHALATION) at 11:34

## 2025-08-04 RX ADMIN — IOPAMIDOL 100 ML: 755 INJECTION, SOLUTION INTRAVENOUS at 12:56

## 2025-08-04 RX ADMIN — MAGNESIUM SULFATE HEPTAHYDRATE 2000 MG: 40 INJECTION, SOLUTION INTRAVENOUS at 12:09

## 2025-08-04 RX ADMIN — WATER 125 MG: 1 INJECTION INTRAMUSCULAR; INTRAVENOUS; SUBCUTANEOUS at 12:05

## 2025-08-04 RX ADMIN — IPRATROPIUM BROMIDE AND ALBUTEROL SULFATE 1 DOSE: .5; 3 SOLUTION RESPIRATORY (INHALATION) at 11:35

## 2025-08-04 ASSESSMENT — PAIN DESCRIPTION - LOCATION: LOCATION: GENERALIZED

## 2025-08-04 ASSESSMENT — PAIN SCALES - GENERAL: PAINLEVEL_OUTOF10: 8

## 2025-08-16 DIAGNOSIS — G44.52 NEW DAILY PERSISTENT HEADACHE: ICD-10-CM

## 2025-08-17 RX ORDER — IBUPROFEN 800 MG/1
TABLET, FILM COATED ORAL
Qty: 180 TABLET | Refills: 0 | Status: SHIPPED | OUTPATIENT
Start: 2025-08-17

## 2025-08-25 ENCOUNTER — TELEPHONE (OUTPATIENT)
Facility: CLINIC | Age: 50
End: 2025-08-25

## 2025-08-25 DIAGNOSIS — Z12.11 SCREENING FOR COLON CANCER: Primary | ICD-10-CM

## 2025-09-02 ENCOUNTER — CLINICAL DOCUMENTATION (OUTPATIENT)
Age: 50
End: 2025-09-02

## 2025-09-02 ENCOUNTER — OFFICE VISIT (OUTPATIENT)
Age: 50
End: 2025-09-02
Payer: MEDICARE

## 2025-09-02 VITALS
TEMPERATURE: 98.6 F | BODY MASS INDEX: 46.44 KG/M2 | HEART RATE: 81 BPM | HEIGHT: 64 IN | WEIGHT: 272 LBS | SYSTOLIC BLOOD PRESSURE: 142 MMHG | DIASTOLIC BLOOD PRESSURE: 86 MMHG | OXYGEN SATURATION: 96 %

## 2025-09-02 DIAGNOSIS — I10 ESSENTIAL (PRIMARY) HYPERTENSION: ICD-10-CM

## 2025-09-02 DIAGNOSIS — G47.33 OBSTRUCTIVE SLEEP APNEA (ADULT) (PEDIATRIC): Primary | ICD-10-CM

## 2025-09-02 PROCEDURE — G8417 CALC BMI ABV UP PARAM F/U: HCPCS | Performed by: INTERNAL MEDICINE

## 2025-09-02 PROCEDURE — 99214 OFFICE O/P EST MOD 30 MIN: CPT | Performed by: INTERNAL MEDICINE

## 2025-09-02 PROCEDURE — G8427 DOCREV CUR MEDS BY ELIG CLIN: HCPCS | Performed by: INTERNAL MEDICINE

## 2025-09-02 PROCEDURE — 3017F COLORECTAL CA SCREEN DOC REV: CPT | Performed by: INTERNAL MEDICINE

## 2025-09-02 PROCEDURE — 3077F SYST BP >= 140 MM HG: CPT | Performed by: INTERNAL MEDICINE

## 2025-09-02 PROCEDURE — 3079F DIAST BP 80-89 MM HG: CPT | Performed by: INTERNAL MEDICINE

## 2025-09-02 PROCEDURE — 1036F TOBACCO NON-USER: CPT | Performed by: INTERNAL MEDICINE

## 2025-09-02 ASSESSMENT — SLEEP AND FATIGUE QUESTIONNAIRES
HOW LIKELY ARE YOU TO NOD OFF OR FALL ASLEEP WHILE LYING DOWN TO REST IN THE AFTERNOON WHEN CIRCUMSTANCES PERMIT: HIGH CHANCE OF DOZING
HOW LIKELY ARE YOU TO NOD OFF OR FALL ASLEEP WHILE WATCHING TV: HIGH CHANCE OF DOZING
HOW LIKELY ARE YOU TO NOD OFF OR FALL ASLEEP WHILE SITTING INACTIVE IN A PUBLIC PLACE: MODERATE CHANCE OF DOZING
HOW LIKELY ARE YOU TO NOD OFF OR FALL ASLEEP WHILE SITTING AND TALKING TO SOMEONE: SLIGHT CHANCE OF DOZING
HOW LIKELY ARE YOU TO NOD OFF OR FALL ASLEEP WHEN YOU ARE A PASSENGER IN A CAR FOR AN HOUR WITHOUT A BREAK: HIGH CHANCE OF DOZING
HOW LIKELY ARE YOU TO NOD OFF OR FALL ASLEEP IN A CAR, WHILE STOPPED FOR A FEW MINUTES IN TRAFFIC: MODERATE CHANCE OF DOZING
ESS TOTAL SCORE: 19
HOW LIKELY ARE YOU TO NOD OFF OR FALL ASLEEP WHILE SITTING QUIETLY AFTER LUNCH WITHOUT ALCOHOL: MODERATE CHANCE OF DOZING
HOW LIKELY ARE YOU TO NOD OFF OR FALL ASLEEP WHILE SITTING AND READING: HIGH CHANCE OF DOZING

## 2025-09-03 ENCOUNTER — TRANSCRIBE ORDERS (OUTPATIENT)
Facility: HOSPITAL | Age: 50
End: 2025-09-03

## 2025-09-03 DIAGNOSIS — K59.09 OTHER CONSTIPATION: ICD-10-CM

## 2025-09-03 DIAGNOSIS — R10.31 ABDOMINAL PAIN, RIGHT LOWER QUADRANT: Primary | ICD-10-CM

## 2025-09-03 DIAGNOSIS — K92.1 BLOOD IN STOOL: ICD-10-CM

## (undated) DEVICE — CONTINU-FLO SOLUTION SET, 2 INJECTION SITES, MALE LUER LOCK ADAPTER WITH RETRACTABLE COLLAR, LARGE BORE STOPCOCK WITH ROTATING MALE LUER LOCK EXTENSION SET, 2 INJECTION SITES, MALE LUER LOCK ADAPTER WITH RETRACTABLE COLLAR: Brand: INTERLINK/CONTINU-FLO

## (undated) DEVICE — SYR 3ML LL TIP 1/10ML GRAD --

## (undated) DEVICE — ELECTRODE,RADIOTRANSLUCENT,FOAM,5PK: Brand: MEDLINE

## (undated) DEVICE — INFECTION CONTROL KIT SYS

## (undated) DEVICE — SET ADMIN 16ML TBNG L100IN 2 Y INJ SITE IV PIGGY BK DISP

## (undated) DEVICE — PAD,SANITARY,11 IN,MAXI,N-STRL,IND WRAP: Brand: MEDLINE

## (undated) DEVICE — SUTURE ABSORBABLE BRAIDED 0 CT-1 8X27 IN UD VICRYL JJ41G

## (undated) DEVICE — D&C/GYN-LF: Brand: MEDLINE INDUSTRIES, INC.

## (undated) DEVICE — GOWN,SIRUS,FABRNF,XL,20/CS: Brand: MEDLINE

## (undated) DEVICE — CATH IV AUTOGRD BC PNK 20GA 25 -- INSYTE

## (undated) DEVICE — Z DISCONTINUED PER MEDLINE LINE GAS SAMPLING O2/CO2 LNG AD 13 FT NSL W/ TBNG FILTERLINE

## (undated) DEVICE — 1200 GUARD II KIT W/5MM TUBE W/O VAC TUBE: Brand: GUARDIAN

## (undated) DEVICE — TROCAR: Brand: KII SLEEVE

## (undated) DEVICE — SOLUTION IRRIG 1000ML 0.9% SOD CHL USP POUR PLAS BTL

## (undated) DEVICE — TOWEL 4 PLY TISS 19X30 SUE WHT

## (undated) DEVICE — BASIN EMSIS 16OZ GRAPHITE PLAS KID SHP MOLD GRAD FOR ORAL

## (undated) DEVICE — ADPT HND SWCH HARM SCALP DISP --

## (undated) DEVICE — KENDALL DL ECG CABLE AND LEAD WIRE SYSTEM, 3-LEAD, SINGLE PATIENT USE: Brand: KENDALL

## (undated) DEVICE — Device

## (undated) DEVICE — SPONGE GZ W4XL4IN COT RADPQ HIGHLY ABSRB

## (undated) DEVICE — NEEDLE HYPO 25GA L1.5IN BVL ORIENTED ECLIPSE

## (undated) DEVICE — SYR 10ML LUER LOK 1/5ML GRAD --

## (undated) DEVICE — COVER LT HNDL PLAS RIG 1 PER PK

## (undated) DEVICE — SUTURE STRATAFIX SPRL PDS + SZ 2-0 L9IN ABSRB VLT CT-1 SXPP1B456

## (undated) DEVICE — VISUALIZATION SYSTEM: Brand: CLEARIFY

## (undated) DEVICE — ELECTRO LUBE IS A SINGLE PATIENT USE DEVICE THAT IS INTENDED TO BE USED ON ELECTROSURGICAL ELECTRODES TO REDUCE STICKING.: Brand: KEY SURGICAL ELECTRO LUBE

## (undated) DEVICE — STERILE POLYISOPRENE POWDER-FREE SURGICAL GLOVES: Brand: PROTEXIS

## (undated) DEVICE — SUTURE VCRL SZ 3-0 L27IN ABSRB UD L26MM SH 1/2 CIR J416H

## (undated) DEVICE — SET SEALS HYSTEROSCOPE DISP -- MYOSURE  EA=10

## (undated) DEVICE — SOCK SPEC L9IN WHT UNIV W/ STD PRT FOR FLD MGMT

## (undated) DEVICE — YANKAUER,TAPERED BULBOUS TIP,W/O VENT: Brand: MEDLINE

## (undated) DEVICE — TROCAR: Brand: KII FIOS FIRST ENTRY

## (undated) DEVICE — SOLUTION LACTATED RINGERS INJECTION USP

## (undated) DEVICE — SOL IRR SOD CL 0.9% 3000ML --

## (undated) DEVICE — AIRSEAL 5 MM ACCESS PORT AND LOW PROFILE OBTURATOR WITH BLADELESS OPTICAL TIP, 100 MM LENGTH: Brand: AIRSEAL

## (undated) DEVICE — POWDER HEMOSTAT GEL 3.0GR -- SURGICEL

## (undated) DEVICE — NEONATAL-ADULT SPO2 SENSOR: Brand: NELLCOR

## (undated) DEVICE — SET ADMIN 16ML TBNG L100IN 2 Y INJ SITE IV PIGGY BK DISP (ORDER IN MULIPLES OF 48)

## (undated) DEVICE — TRI-LUMEN FILTERED TUBE SET WITH ACTIVATED CHARCOAL FILTER: Brand: AIRSEAL

## (undated) DEVICE — PAD BD MATTRESS 73X32 IN STD CONVOLUTED FOAM LTX FREE

## (undated) DEVICE — NEEDLE HYPO 18GA L1.5IN PNK S STL HUB POLYPR SHLD REG BVL

## (undated) DEVICE — SEALER TISS L35CM DIA5MM CRV JAW ARTC ADV BPLR ENSEAL G2

## (undated) DEVICE — TUBING, SUCTION, 1/4" X 10', STRAIGHT: Brand: MEDLINE

## (undated) DEVICE — SOLUTION IRRIG 1000ML STRL H2O USP PLAS POUR BTL

## (undated) DEVICE — YANKAUER,BULB TIP,W/O VENT,RIGID,STERILE: Brand: MEDLINE

## (undated) DEVICE — VCARE MEDIUM, UTERINE MANIPULATOR, VAGINAL-CERVICAL-AHLUWALIA'S-RETRACTOR-ELEVATOR: Brand: VCARE

## (undated) DEVICE — SOLIDIFIER FLD 2OZ 1500CC N DISINF IN BTL DISP SAFESORB

## (undated) DEVICE — BLOCK BITE ENDOSCP AD 21 MM W/ DIL BLU LF DISP

## (undated) DEVICE — 3M™ TEGADERM™ TRANSPARENT FILM DRESSING FRAME STYLE, 1624W, 2-3/8 IN X 2-3/4 IN (6 CM X 7 CM), 100/CT 4CT/CASE: Brand: 3M™ TEGADERM™

## (undated) DEVICE — TUBE ST FLD CTRL AQUILEX INFLO --

## (undated) DEVICE — BLADE HARM SCALP HK DISSECTING --

## (undated) DEVICE — HYPODERMIC SAFETY NEEDLE: Brand: MAGELLAN

## (undated) DEVICE — GYN LAPAROSCOPY-MRMC: Brand: MEDLINE INDUSTRIES, INC.

## (undated) DEVICE — SURGICEL ENDOSCP APPL

## (undated) DEVICE — SUTURE MCRYL SZ 4-0 L27IN ABSRB UD L19MM PS-2 1/2 CIR PRIM Y426H

## (undated) DEVICE — PREP PAD BNS: Brand: CONVERTORS

## (undated) DEVICE — TUBE ST FLD AQUILEX OUTFLO --